# Patient Record
Sex: MALE | Race: WHITE | NOT HISPANIC OR LATINO | Employment: FULL TIME | URBAN - METROPOLITAN AREA
[De-identification: names, ages, dates, MRNs, and addresses within clinical notes are randomized per-mention and may not be internally consistent; named-entity substitution may affect disease eponyms.]

---

## 2017-10-25 ENCOUNTER — OFFICE VISIT (OUTPATIENT)
Dept: INTERNAL MEDICINE | Facility: CLINIC | Age: 52
End: 2017-10-25
Payer: COMMERCIAL

## 2017-10-25 VITALS
HEIGHT: 74 IN | HEART RATE: 78 BPM | BODY MASS INDEX: 24.56 KG/M2 | TEMPERATURE: 97 F | SYSTOLIC BLOOD PRESSURE: 138 MMHG | WEIGHT: 191.38 LBS | DIASTOLIC BLOOD PRESSURE: 88 MMHG

## 2017-10-25 DIAGNOSIS — Z00.00 ANNUAL PHYSICAL EXAM: Primary | ICD-10-CM

## 2017-10-25 PROBLEM — I10 HTN (HYPERTENSION): Status: ACTIVE | Noted: 2017-10-25

## 2017-10-25 PROCEDURE — 99999 PR PBB SHADOW E&M-NEW PATIENT-LVL III: CPT | Mod: PBBFAC,,, | Performed by: FAMILY MEDICINE

## 2017-10-25 PROCEDURE — 99386 PREV VISIT NEW AGE 40-64: CPT | Mod: S$GLB,,, | Performed by: FAMILY MEDICINE

## 2017-10-25 RX ORDER — METOPROLOL SUCCINATE 100 MG/1
100 TABLET, EXTENDED RELEASE ORAL DAILY
Qty: 90 TABLET | Refills: 3 | Status: SHIPPED | OUTPATIENT
Start: 2017-10-25 | End: 2018-10-26 | Stop reason: SDUPTHER

## 2017-10-25 RX ORDER — METOPROLOL SUCCINATE 100 MG/1
100 TABLET, EXTENDED RELEASE ORAL DAILY
COMMUNITY
Start: 2017-03-24 | End: 2017-10-25 | Stop reason: SDUPTHER

## 2017-10-26 NOTE — PROGRESS NOTES
"Subjective:      Patient ID: Rosalio Muñoz is a 51 y.o. male.    Chief Complaint: Establish Care (HTN )    HPI  52 yo male here to establish care and get general exam.  On BP med, has been stable.  Smoke 1.5 packs daily and using chewing tobacco.  Never had Colonoscopy, thinks he will do it next spring.  Denies CP/SOB, no cough/wheezing unless he is sick.  Drinks beer nightly    Past Medical History:   Diagnosis Date    Hypertension      Family History   Problem Relation Age of Onset    Hyperlipidemia Mother     Hypertension Father     Diabetes Father     Kidney disease Father     Prostate cancer Maternal Grandfather     Colon cancer Neg Hx      Past Surgical History:   Procedure Laterality Date    WISDOM TOOTH EXTRACTION       Social History   Substance Use Topics    Smoking status: Current Every Day Smoker     Packs/day: 1.50     Years: 25.00     Types: Cigarettes    Smokeless tobacco: Current User     Types: Chew    Alcohol use 2.4 - 3.6 oz/week     4 - 6 Cans of beer per week       /88   Pulse 78   Temp 97.3 °F (36.3 °C) (Tympanic)   Ht 6' 1.5" (1.867 m)   Wt 86.8 kg (191 lb 5.8 oz)   BMI 24.90 kg/m²     Review of Systems   Constitutional: Negative for activity change, appetite change, chills, diaphoresis, fatigue, fever and unexpected weight change.   HENT: Negative for hearing loss and tinnitus.    Eyes: Negative for visual disturbance.   Respiratory: Negative for cough, chest tightness, shortness of breath and wheezing.    Cardiovascular: Negative for chest pain, palpitations and leg swelling.   Gastrointestinal: Negative for abdominal distention, abdominal pain, constipation and diarrhea.   Genitourinary: Negative for difficulty urinating, dysuria and frequency.   Musculoskeletal: Negative for arthralgias and back pain.   Skin: Negative.    Neurological: Negative for dizziness, weakness and headaches.     Objective:     Physical Exam   Constitutional: He is oriented to person, " place, and time. He appears well-developed and well-nourished. No distress.   HENT:   Right Ear: External ear normal.   Left Ear: External ear normal.   Nose: Nose normal.   Mouth/Throat: Oropharynx is clear and moist.   Eyes: Pupils are equal, round, and reactive to light.   Neck: Normal range of motion. Neck supple.   Cardiovascular: Normal rate, regular rhythm and normal heart sounds.    No murmur heard.  Pulmonary/Chest: Effort normal and breath sounds normal. No respiratory distress. He has no wheezes.   Abdominal: Soft. Bowel sounds are normal. He exhibits no distension. There is no tenderness.   Musculoskeletal: He exhibits no edema.   Neurological: He is alert and oriented to person, place, and time. No cranial nerve deficit.   Skin: Skin is warm and dry. He is not diaphoretic.   Psychiatric: He has a normal mood and affect. His behavior is normal. Judgment and thought content normal.   Nursing note and vitals reviewed.      No results found for: WBC, HGB, HCT, PLT, CHOL, TRIG, HDL, LDLDIRECT, ALT, AST, NA, K, CL, CREATININE, BUN, CO2, TSH, PSA, INR, GLUF, HGBA1C, MICROALBUR    Assessment:     1. Annual physical exam       Plan:   Annual physical exam  -     CBC auto differential; Future; Expected date: 11/03/2017  -     Comprehensive metabolic panel; Future; Expected date: 11/03/2017  -     TSH; Future; Expected date: 11/03/2017  -     Lipid panel; Future; Expected date: 11/03/2017  -     PSA, Screening; Future; Expected date: 11/03/2017  -     Hemoglobin A1c; Future; Expected date: 11/03/2017    Other orders  -     metoprolol succinate (TOPROL-XL) 100 MG 24 hr tablet; Take 1 tablet (100 mg total) by mouth once daily.  Dispense: 90 tablet; Refill: 3    Schedule screening labs  BP stable, cont current med  Smoking cessation advised  Pt wishes to wait on Cscope, next Spring possibly  Healthy low sodium diet and daily exercise recommended  If lipids high, will likely need statin given risk factors  Recommend  starting 81mg of ASA  Return in about 1 year (around 10/25/2018), or if symptoms worsen or fail to improve.

## 2017-11-03 ENCOUNTER — LAB VISIT (OUTPATIENT)
Dept: LAB | Facility: HOSPITAL | Age: 52
End: 2017-11-03
Attending: FAMILY MEDICINE
Payer: COMMERCIAL

## 2017-11-03 DIAGNOSIS — Z00.00 ANNUAL PHYSICAL EXAM: ICD-10-CM

## 2017-11-03 LAB
ALBUMIN SERPL BCP-MCNC: 3.8 G/DL
ALP SERPL-CCNC: 65 U/L
ALT SERPL W/O P-5'-P-CCNC: 19 U/L
ANION GAP SERPL CALC-SCNC: 9 MMOL/L
AST SERPL-CCNC: 22 U/L
BASOPHILS # BLD AUTO: 0.06 K/UL
BASOPHILS NFR BLD: 1 %
BILIRUB SERPL-MCNC: 1 MG/DL
BUN SERPL-MCNC: 7 MG/DL
CALCIUM SERPL-MCNC: 9.6 MG/DL
CHLORIDE SERPL-SCNC: 101 MMOL/L
CHOLEST SERPL-MCNC: 208 MG/DL
CHOLEST/HDLC SERPL: 3.2 {RATIO}
CO2 SERPL-SCNC: 26 MMOL/L
COMPLEXED PSA SERPL-MCNC: 0.25 NG/ML
CREAT SERPL-MCNC: 0.8 MG/DL
DIFFERENTIAL METHOD: ABNORMAL
EOSINOPHIL # BLD AUTO: 0.1 K/UL
EOSINOPHIL NFR BLD: 1.8 %
ERYTHROCYTE [DISTWIDTH] IN BLOOD BY AUTOMATED COUNT: 13.4 %
EST. GFR  (AFRICAN AMERICAN): >60 ML/MIN/1.73 M^2
EST. GFR  (NON AFRICAN AMERICAN): >60 ML/MIN/1.73 M^2
ESTIMATED AVG GLUCOSE: 100 MG/DL
GLUCOSE SERPL-MCNC: 79 MG/DL
HBA1C MFR BLD HPLC: 5.1 %
HCT VFR BLD AUTO: 48.8 %
HDLC SERPL-MCNC: 66 MG/DL
HDLC SERPL: 31.7 %
HGB BLD-MCNC: 16.4 G/DL
IMM GRANULOCYTES # BLD AUTO: 0.01 K/UL
IMM GRANULOCYTES NFR BLD AUTO: 0.2 %
LDLC SERPL CALC-MCNC: 129.2 MG/DL
LYMPHOCYTES # BLD AUTO: 1.7 K/UL
LYMPHOCYTES NFR BLD: 27.5 %
MCH RBC QN AUTO: 33.8 PG
MCHC RBC AUTO-ENTMCNC: 33.6 G/DL
MCV RBC AUTO: 101 FL
MONOCYTES # BLD AUTO: 0.5 K/UL
MONOCYTES NFR BLD: 7.6 %
NEUTROPHILS # BLD AUTO: 3.8 K/UL
NEUTROPHILS NFR BLD: 61.9 %
NONHDLC SERPL-MCNC: 142 MG/DL
NRBC BLD-RTO: 0 /100 WBC
PLATELET # BLD AUTO: 251 K/UL
PMV BLD AUTO: 10.4 FL
POTASSIUM SERPL-SCNC: 5 MMOL/L
PROT SERPL-MCNC: 7.2 G/DL
RBC # BLD AUTO: 4.85 M/UL
SODIUM SERPL-SCNC: 136 MMOL/L
TRIGL SERPL-MCNC: 64 MG/DL
TSH SERPL DL<=0.005 MIU/L-ACNC: 1.85 UIU/ML
WBC # BLD AUTO: 6.07 K/UL

## 2017-11-03 PROCEDURE — 84153 ASSAY OF PSA TOTAL: CPT

## 2017-11-03 PROCEDURE — 83036 HEMOGLOBIN GLYCOSYLATED A1C: CPT

## 2017-11-03 PROCEDURE — 84443 ASSAY THYROID STIM HORMONE: CPT

## 2017-11-03 PROCEDURE — 85025 COMPLETE CBC W/AUTO DIFF WBC: CPT

## 2017-11-03 PROCEDURE — 80053 COMPREHEN METABOLIC PANEL: CPT

## 2017-11-03 PROCEDURE — 80061 LIPID PANEL: CPT

## 2017-11-03 PROCEDURE — 36415 COLL VENOUS BLD VENIPUNCTURE: CPT | Mod: PO

## 2017-12-15 ENCOUNTER — OFFICE VISIT (OUTPATIENT)
Dept: URGENT CARE | Facility: CLINIC | Age: 52
End: 2017-12-15
Payer: COMMERCIAL

## 2017-12-15 ENCOUNTER — HOSPITAL ENCOUNTER (OUTPATIENT)
Dept: RADIOLOGY | Facility: HOSPITAL | Age: 52
Discharge: HOME | End: 2017-12-15
Attending: NURSE PRACTITIONER
Payer: COMMERCIAL

## 2017-12-15 VITALS
HEIGHT: 73 IN | WEIGHT: 183.88 LBS | DIASTOLIC BLOOD PRESSURE: 89 MMHG | TEMPERATURE: 100 F | RESPIRATION RATE: 20 BRPM | HEART RATE: 101 BPM | OXYGEN SATURATION: 97 % | BODY MASS INDEX: 24.37 KG/M2 | SYSTOLIC BLOOD PRESSURE: 153 MMHG

## 2017-12-15 DIAGNOSIS — R68.89 FLU-LIKE SYMPTOMS: ICD-10-CM

## 2017-12-15 DIAGNOSIS — F17.200 TOBACCO USE DISORDER: ICD-10-CM

## 2017-12-15 DIAGNOSIS — J20.8 ACUTE BRONCHITIS DUE TO OTHER SPECIFIED ORGANISMS: ICD-10-CM

## 2017-12-15 DIAGNOSIS — Z20.828 EXPOSURE TO THE FLU: ICD-10-CM

## 2017-12-15 DIAGNOSIS — R05.9 COUGH: ICD-10-CM

## 2017-12-15 DIAGNOSIS — R05.9 COUGH: Primary | ICD-10-CM

## 2017-12-15 PROCEDURE — 71020 XR CHEST PA AND LATERAL: CPT | Mod: 26,,, | Performed by: RADIOLOGY

## 2017-12-15 PROCEDURE — 71020 XR CHEST PA AND LATERAL: CPT | Mod: TC,PO

## 2017-12-15 PROCEDURE — 99214 OFFICE O/P EST MOD 30 MIN: CPT | Mod: 25,S$GLB,, | Performed by: NURSE PRACTITIONER

## 2017-12-15 PROCEDURE — 99999 PR PBB SHADOW E&M-EST. PATIENT-LVL III: CPT | Mod: PBBFAC,,, | Performed by: NURSE PRACTITIONER

## 2017-12-15 PROCEDURE — 94640 AIRWAY INHALATION TREATMENT: CPT | Mod: S$GLB,,, | Performed by: FAMILY MEDICINE

## 2017-12-15 RX ORDER — PROMETHAZINE HYDROCHLORIDE AND DEXTROMETHORPHAN HYDROBROMIDE 6.25; 15 MG/5ML; MG/5ML
5 SYRUP ORAL
Qty: 180 ML | Refills: 0 | Status: SHIPPED | OUTPATIENT
Start: 2017-12-15 | End: 2017-12-25

## 2017-12-15 RX ORDER — ALBUTEROL SULFATE 90 UG/1
2 AEROSOL, METERED RESPIRATORY (INHALATION) EVERY 4 HOURS PRN
Qty: 1 INHALER | Refills: 1 | Status: SHIPPED | OUTPATIENT
Start: 2017-12-15 | End: 2018-11-09

## 2017-12-15 RX ORDER — OSELTAMIVIR PHOSPHATE 75 MG/1
75 CAPSULE ORAL 2 TIMES DAILY
Qty: 10 CAPSULE | Refills: 0 | Status: SHIPPED | OUTPATIENT
Start: 2017-12-15 | End: 2017-12-20

## 2017-12-15 RX ORDER — DOXYCYCLINE 100 MG/1
100 CAPSULE ORAL 2 TIMES DAILY
Qty: 14 CAPSULE | Refills: 0 | Status: SHIPPED | OUTPATIENT
Start: 2017-12-15 | End: 2017-12-22

## 2017-12-15 RX ORDER — LEVALBUTEROL INHALATION SOLUTION 1.25 MG/3ML
1.25 SOLUTION RESPIRATORY (INHALATION)
Status: COMPLETED | OUTPATIENT
Start: 2017-12-15 | End: 2017-12-15

## 2017-12-15 RX ADMIN — LEVALBUTEROL INHALATION SOLUTION 1.25 MG: 1.25 SOLUTION RESPIRATORY (INHALATION) at 10:12

## 2017-12-15 NOTE — PATIENT INSTRUCTIONS
PLAN: CXR,   Advised increased p.o. fluids  Nebulizer with Xopenex 1.25 for 15 and clinic now x 1  Drink plenty of clear fluids--at least 64 ounces of water/juice & rest  Simply saline nasal wash or flonase to irrigate sinuses and for congestion/runny nose.  Cool mist humidifier/vaporizer.  Meds: Doxycycline, prednisone, albuterol inhaler & phenergan dm / no refills  Practice good handwashing..  Mucinex for cough and chest congestion.  Tylenol  for fever, headache and body aches.  Warm salt water gargles for throat comfort.  Chloraseptic spray or lozenges for throat comfort.  Advise follow up with PCP  Advise go to ER if symptoms worsen or fail to improve with treatment.  Given work excuse

## 2017-12-15 NOTE — PROGRESS NOTES
Chief complaint/reason for visit: Nasal congestion, postnasal drip, cough and fever    HISTORY OF PRESENT ILLNESS:   50 y/o male complains of nasal congestion, postnasal drip, headache,  slight shortness of breath, productive cough with wheezing, fever with chills, body aches and back pain with cough onset 1-2 days ago.  Patient admits sone tested positive for influenza.  Patient complains cough worse at night.  Admits tried medication with no relief. Discussed with patient the need for further evaluation with a chest x-ray.  Discussed with patient the need for nebulized treatment due to rhonchi and wheezing.  Patient agrees with plan of therapy.  Patient requesting a work excuse.     Past Medical History:   Diagnosis Date    Hypertension        Past Surgical History:   Procedure Laterality Date    WISDOM TOOTH EXTRACTION              Family History   Problem Relation Age of Onset    Hyperlipidemia Mother     Hypertension Father     Diabetes Father     Kidney disease Father     Prostate cancer Maternal Grandfather     Colon cancer Neg Hx             Social History     Social History    Marital status:      Spouse name: N/A    Number of children: 2    Years of education: N/A     Occupational History          Social History Main Topics    Smoking status: Current Every Day Smoker     Packs/day: 1.50     Years: 25.00     Types: Cigarettes    Smokeless tobacco: Current User     Types: Chew    Alcohol use 2.4 - 3.6 oz/week     4 - 6 Cans of beer per week    Drug use: No    Sexual activity: Yes     Partners: Female     Other Topics Concern    Not on file     Social History Narrative    No narrative on file       ROS:  GENERAL: Reports fever with chills, body aches and fatigue.   SKIN: No rashes, itching or changes in color or texture of skin.  HEENT: Reports nasal congestion, postnasal drip, headache, hoarseness.   NODES: No masses or lesions. Denies swollen glands.  CHEST:  Reports productive cough. & wheezing  CARDIOVASCULAR: Denies chest pain, shortness of breath  ABDOMEN: Appetite fair, No weight loss.  MUSCULOSKELETAL: reports back pain.  NEUROLOGIC: No history of seizures, paralysis, alteration of gait or coordination.  PSYCHIATRIC: [place] mood swings, depression.    PE:   APPEARANCE: Well nourished, well developed, in moderate distress  V/S: Reviewed.  SKIN: Normal skin turgor, no lesions.  HEENT: Turbinates red, Minimal red pharynx, TMs poor light reflex bilateral.  CHEST:  minimal expiratory wheezing with rhonchi on auscultation.  CARDIOVASCULAR: Regular rate and rhythm.   ABDOMEN:  Soft. No tenderness or masses. No CVA tenderness.  MUSCULOSKELETAL: HUMPHRIES without difficulty  NEUROLOGIC: No sensory deficits. Gait & Posture: normal, No cerebellar signs.  MENTAL STATUS: Patient alert, oriented x 3 & conversant.    PLAN: CXR,   Advised increased p.o. fluids  Nebulizer with Xopenex 1.25 for 15 and clinic now x 1  Drink plenty of clear fluids--at least 64 ounces of water/juice & rest  Simply saline nasal wash or flonase to irrigate sinuses and for congestion/runny nose.  Cool mist humidifier/vaporizer.  Meds: Doxycycline, prednisone, albuterol inhaler & phenergan dm / no refills  Practice good handwashing..  Mucinex for cough and chest congestion.  Tylenol  for fever, headache and body aches.  Warm salt water gargles for throat comfort.  Chloraseptic spray or lozenges for throat comfort.  Advise follow up with PCP  Advise go to ER if symptoms worsen or fail to improve with treatment.  Given work excuse      DIAGNOSIS:  Fever   Fatigue  Hypertension  Flu exposure   Flulike symptoms   Bronchitis vs Pneumonia

## 2017-12-19 ENCOUNTER — OFFICE VISIT (OUTPATIENT)
Dept: INTERNAL MEDICINE | Facility: CLINIC | Age: 52
End: 2017-12-19
Payer: COMMERCIAL

## 2017-12-19 ENCOUNTER — PATIENT MESSAGE (OUTPATIENT)
Dept: INTERNAL MEDICINE | Facility: CLINIC | Age: 52
End: 2017-12-19

## 2017-12-19 VITALS
HEART RATE: 88 BPM | HEIGHT: 73 IN | SYSTOLIC BLOOD PRESSURE: 120 MMHG | WEIGHT: 185.44 LBS | BODY MASS INDEX: 24.58 KG/M2 | TEMPERATURE: 99 F | DIASTOLIC BLOOD PRESSURE: 80 MMHG

## 2017-12-19 DIAGNOSIS — Z72.0 TOBACCO ABUSE: ICD-10-CM

## 2017-12-19 PROCEDURE — 99999 PR PBB SHADOW E&M-EST. PATIENT-LVL III: CPT | Mod: PBBFAC,,, | Performed by: PHYSICIAN ASSISTANT

## 2017-12-19 NOTE — PROGRESS NOTES
"Subjective:       Patient ID: Rosalio Muñoz is a 51 y.o. male.    Chief Complaint: Follow-up    Patient comes in for follow up flu and bronchitis   Reports improvement   He is a 51-year-old male patient of Dr. Lawton.  He is a current smoker.  He was given both Tamiflu and doxycycline.  He had wheezing on exam.  Wheezing has improved today as compared to urgent care's note.  He feels that possibly after 1 more day of rest he is able to go back to work without restrictions.  He is an  and does labor and activity throughout his job everyday     No fever, shortness of breath or cp   Still has dry cough         Past Medical History:   Diagnosis Date    Hypertension        Current Outpatient Prescriptions   Medication Sig Dispense Refill    albuterol 90 mcg/actuation inhaler Inhale 2 puffs into the lungs every 4 (four) hours as needed for Wheezing. 1 Inhaler 1    doxycycline (MONODOX) 100 MG capsule Take 1 capsule (100 mg total) by mouth 2 (two) times daily. 14 capsule 0    metoprolol succinate (TOPROL-XL) 100 MG 24 hr tablet Take 1 tablet (100 mg total) by mouth once daily. 90 tablet 3    oseltamivir (TAMIFLU) 75 MG capsule Take 1 capsule (75 mg total) by mouth 2 (two) times daily. 10 capsule 0    promethazine-dextromethorphan (PROMETHAZINE-DM) 6.25-15 mg/5 mL Syrp Take 5 mLs by mouth every 6 to 8 hours as needed. 180 mL 0     No current facility-administered medications for this visit.        Review of Systems   Constitutional: Negative for fever and unexpected weight change.   Genitourinary: Negative for difficulty urinating.   Neurological: Negative for dizziness and facial asymmetry.   Hematological: Negative for adenopathy. Does not bruise/bleed easily.     see HPI   Objective:   /80   Pulse 88   Temp 98.6 °F (37 °C) (Tympanic)   Ht 6' 1" (1.854 m)   Wt 84.1 kg (185 lb 6.5 oz)   BMI 24.46 kg/m²      Physical Exam   Constitutional: He is oriented to person, place, and time. He " appears well-developed and well-nourished. No distress.   HENT:   Head: Normocephalic and atraumatic.   Right Ear: Hearing, tympanic membrane, external ear and ear canal normal.   Left Ear: Hearing, tympanic membrane, external ear and ear canal normal.   Nose: Nose normal.   Mouth/Throat: Oropharynx is clear and moist.   Eyes: Conjunctivae and EOM are normal. Pupils are equal, round, and reactive to light.   Neck: Normal range of motion. No thyromegaly present.   Cardiovascular: Normal rate, regular rhythm, normal heart sounds and intact distal pulses.    Pulmonary/Chest: Effort normal. He has wheezes (scant ).   Lymphadenopathy:     He has no cervical adenopathy.   Neurological: He is alert and oriented to person, place, and time.         Lab Results   Component Value Date    WBC 6.07 11/03/2017    HGB 16.4 11/03/2017    HCT 48.8 11/03/2017     11/03/2017    CHOL 208 (H) 11/03/2017    TRIG 64 11/03/2017    HDL 66 11/03/2017    ALT 19 11/03/2017    AST 22 11/03/2017     11/03/2017    K 5.0 11/03/2017     11/03/2017    CREATININE 0.8 11/03/2017    BUN 7 11/03/2017    CO2 26 11/03/2017    TSH 1.855 11/03/2017    PSA 0.25 11/03/2017    HGBA1C 5.1 11/03/2017       Assessment:       1. Influenza, bronchopneumonia    2. Tobacco abuse        Plan:   Influenza, bronchopneumonia  Treated with both antibiotic and Tamiflu.  Return to work Thursday.  Excuse given.  Tobacco abuse    Discussed to stop smoking.  This can infections and of course cause issues like cancers heart disease, stroke, aneurysms.

## 2018-10-09 ENCOUNTER — TELEPHONE (OUTPATIENT)
Dept: INTERNAL MEDICINE | Facility: CLINIC | Age: 53
End: 2018-10-09

## 2018-10-09 DIAGNOSIS — Z00.00 ROUTINE ADULT HEALTH MAINTENANCE: Primary | ICD-10-CM

## 2018-10-19 ENCOUNTER — LAB VISIT (OUTPATIENT)
Dept: LAB | Facility: HOSPITAL | Age: 53
End: 2018-10-19
Attending: FAMILY MEDICINE
Payer: COMMERCIAL

## 2018-10-19 DIAGNOSIS — Z00.00 ROUTINE ADULT HEALTH MAINTENANCE: ICD-10-CM

## 2018-10-19 LAB
ALBUMIN SERPL BCP-MCNC: 4.1 G/DL
ALP SERPL-CCNC: 74 U/L
ALT SERPL W/O P-5'-P-CCNC: 18 U/L
ANION GAP SERPL CALC-SCNC: 7 MMOL/L
AST SERPL-CCNC: 22 U/L
BASOPHILS # BLD AUTO: 0.06 K/UL
BASOPHILS NFR BLD: 0.8 %
BILIRUB SERPL-MCNC: 1 MG/DL
BUN SERPL-MCNC: 6 MG/DL
CALCIUM SERPL-MCNC: 9.7 MG/DL
CHLORIDE SERPL-SCNC: 102 MMOL/L
CHOLEST SERPL-MCNC: 196 MG/DL
CHOLEST/HDLC SERPL: 2.8 {RATIO}
CO2 SERPL-SCNC: 27 MMOL/L
COMPLEXED PSA SERPL-MCNC: 0.43 NG/ML
CREAT SERPL-MCNC: 0.9 MG/DL
DIFFERENTIAL METHOD: ABNORMAL
EOSINOPHIL # BLD AUTO: 0.1 K/UL
EOSINOPHIL NFR BLD: 1.1 %
ERYTHROCYTE [DISTWIDTH] IN BLOOD BY AUTOMATED COUNT: 13.6 %
EST. GFR  (AFRICAN AMERICAN): >60 ML/MIN/1.73 M^2
EST. GFR  (NON AFRICAN AMERICAN): >60 ML/MIN/1.73 M^2
ESTIMATED AVG GLUCOSE: 97 MG/DL
GLUCOSE SERPL-MCNC: 92 MG/DL
HBA1C MFR BLD HPLC: 5 %
HCT VFR BLD AUTO: 51.9 %
HDLC SERPL-MCNC: 70 MG/DL
HDLC SERPL: 35.7 %
HGB BLD-MCNC: 17.3 G/DL
IMM GRANULOCYTES # BLD AUTO: 0.03 K/UL
IMM GRANULOCYTES NFR BLD AUTO: 0.4 %
LDLC SERPL CALC-MCNC: 109.2 MG/DL
LYMPHOCYTES # BLD AUTO: 1.7 K/UL
LYMPHOCYTES NFR BLD: 23.7 %
MCH RBC QN AUTO: 34.2 PG
MCHC RBC AUTO-ENTMCNC: 33.3 G/DL
MCV RBC AUTO: 103 FL
MONOCYTES # BLD AUTO: 0.5 K/UL
MONOCYTES NFR BLD: 7.1 %
NEUTROPHILS # BLD AUTO: 4.8 K/UL
NEUTROPHILS NFR BLD: 66.9 %
NONHDLC SERPL-MCNC: 126 MG/DL
NRBC BLD-RTO: 0 /100 WBC
PLATELET # BLD AUTO: 234 K/UL
PMV BLD AUTO: 10.4 FL
POTASSIUM SERPL-SCNC: 5.3 MMOL/L
PROT SERPL-MCNC: 7.4 G/DL
RBC # BLD AUTO: 5.06 M/UL
SODIUM SERPL-SCNC: 136 MMOL/L
TRIGL SERPL-MCNC: 84 MG/DL
TSH SERPL DL<=0.005 MIU/L-ACNC: 1.63 UIU/ML
VIT B12 SERPL-MCNC: 445 PG/ML
WBC # BLD AUTO: 7.16 K/UL

## 2018-10-19 PROCEDURE — 84153 ASSAY OF PSA TOTAL: CPT

## 2018-10-19 PROCEDURE — 80053 COMPREHEN METABOLIC PANEL: CPT

## 2018-10-19 PROCEDURE — 82607 VITAMIN B-12: CPT

## 2018-10-19 PROCEDURE — 85025 COMPLETE CBC W/AUTO DIFF WBC: CPT

## 2018-10-19 PROCEDURE — 80061 LIPID PANEL: CPT

## 2018-10-19 PROCEDURE — 36415 COLL VENOUS BLD VENIPUNCTURE: CPT | Mod: PO

## 2018-10-19 PROCEDURE — 84443 ASSAY THYROID STIM HORMONE: CPT

## 2018-10-19 PROCEDURE — 83036 HEMOGLOBIN GLYCOSYLATED A1C: CPT

## 2018-10-26 RX ORDER — METOPROLOL SUCCINATE 100 MG/1
100 TABLET, EXTENDED RELEASE ORAL DAILY
Qty: 30 TABLET | Refills: 0 | Status: SHIPPED | OUTPATIENT
Start: 2018-10-26 | End: 2018-11-09 | Stop reason: SDUPTHER

## 2018-11-09 ENCOUNTER — OFFICE VISIT (OUTPATIENT)
Dept: INTERNAL MEDICINE | Facility: CLINIC | Age: 53
End: 2018-11-09
Payer: COMMERCIAL

## 2018-11-09 VITALS
WEIGHT: 194.44 LBS | HEART RATE: 80 BPM | SYSTOLIC BLOOD PRESSURE: 138 MMHG | TEMPERATURE: 97 F | BODY MASS INDEX: 25.77 KG/M2 | DIASTOLIC BLOOD PRESSURE: 86 MMHG | HEIGHT: 73 IN

## 2018-11-09 DIAGNOSIS — Z00.00 ANNUAL PHYSICAL EXAM: Primary | ICD-10-CM

## 2018-11-09 DIAGNOSIS — E78.5 DYSLIPIDEMIA: ICD-10-CM

## 2018-11-09 PROCEDURE — 90686 IIV4 VACC NO PRSV 0.5 ML IM: CPT | Mod: S$GLB,,, | Performed by: FAMILY MEDICINE

## 2018-11-09 PROCEDURE — 99999 PR PBB SHADOW E&M-EST. PATIENT-LVL III: CPT | Mod: PBBFAC,,, | Performed by: FAMILY MEDICINE

## 2018-11-09 PROCEDURE — 99396 PREV VISIT EST AGE 40-64: CPT | Mod: 25,S$GLB,, | Performed by: FAMILY MEDICINE

## 2018-11-09 PROCEDURE — 90471 IMMUNIZATION ADMIN: CPT | Mod: S$GLB,,, | Performed by: FAMILY MEDICINE

## 2018-11-09 RX ORDER — METOPROLOL SUCCINATE 100 MG/1
100 TABLET, EXTENDED RELEASE ORAL DAILY
Qty: 90 TABLET | Refills: 3 | Status: SHIPPED | OUTPATIENT
Start: 2018-11-09 | End: 2019-10-10 | Stop reason: SDUPTHER

## 2018-11-09 RX ORDER — ATORVASTATIN CALCIUM 20 MG/1
20 TABLET, FILM COATED ORAL DAILY
Qty: 90 TABLET | Refills: 3 | Status: SHIPPED | OUTPATIENT
Start: 2018-11-09 | End: 2019-10-10 | Stop reason: SDUPTHER

## 2018-11-09 NOTE — PROGRESS NOTES
"Subjective:      Patient ID: Rosalio Muñoz is a 52 y.o. male.    Chief Complaint: Annual Exam    52 year old male here for annual  Smoking 1 pack a day  Drinking 3 cups coffee a day  Doing well on Metoprolol 100mg. No side effects  Getting 10,000 steps a day  Bowels moving well  CV risk today is 20%, would benefit from low dose statin and baby asa to reduce risk factors  Drinking beer at night        Past Medical History:   Diagnosis Date    Hypertension      Family History   Problem Relation Age of Onset    Hyperlipidemia Mother     Hypertension Father     Diabetes Father     Kidney disease Father     Heart disease Father     Prostate cancer Maternal Grandfather     Colon cancer Neg Hx      Past Surgical History:   Procedure Laterality Date    WISDOM TOOTH EXTRACTION       Social History     Tobacco Use    Smoking status: Current Every Day Smoker     Packs/day: 1.50     Years: 25.00     Pack years: 37.50     Types: Cigarettes    Smokeless tobacco: Current User     Types: Chew   Substance Use Topics    Alcohol use: Yes     Alcohol/week: 2.4 - 3.6 oz     Types: 4 - 6 Cans of beer per week    Drug use: No       BP (!) 148/90 (BP Location: Right arm, Patient Position: Sitting, BP Method: Large (Manual))   Pulse 80   Temp 97.2 °F (36.2 °C) (Tympanic)   Ht 6' 1" (1.854 m)   Wt 88.2 kg (194 lb 7.1 oz)   BMI 25.65 kg/m²     Review of Systems   Constitutional: Negative for activity change, appetite change, chills, diaphoresis, fatigue, fever and unexpected weight change.   HENT: Negative for hearing loss and tinnitus.    Eyes: Negative for visual disturbance.   Respiratory: Negative for cough, chest tightness, shortness of breath and wheezing.    Cardiovascular: Negative for chest pain, palpitations and leg swelling.   Gastrointestinal: Negative for abdominal distention, abdominal pain, blood in stool and rectal pain.   Genitourinary: Negative for difficulty urinating, discharge and testicular pain. "   Musculoskeletal: Negative for arthralgias and back pain.   Neurological: Negative for dizziness, weakness and headaches.       Objective:     Physical Exam   Constitutional: He is oriented to person, place, and time. He appears well-developed and well-nourished. No distress.   HENT:   Right Ear: External ear normal.   Left Ear: External ear normal.   Nose: Nose normal.   Mouth/Throat: Oropharynx is clear and moist.   Eyes: Conjunctivae are normal. Pupils are equal, round, and reactive to light.   Neck: Normal range of motion. Neck supple. No thyromegaly present.   Cardiovascular: Normal rate, regular rhythm and normal heart sounds.   No murmur heard.  Pulmonary/Chest: Effort normal and breath sounds normal. No respiratory distress. He has no wheezes.   Abdominal: Soft. Bowel sounds are normal. He exhibits no distension. There is no tenderness. There is no guarding.   Musculoskeletal: He exhibits no edema.   Lymphadenopathy:     He has no cervical adenopathy.   Neurological: He is alert and oriented to person, place, and time. No cranial nerve deficit.   Skin: Skin is warm and dry. No rash noted. He is not diaphoretic.   Psychiatric: He has a normal mood and affect. His behavior is normal. Judgment and thought content normal.   Nursing note and vitals reviewed.      Lab Results   Component Value Date    WBC 7.16 10/19/2018    HGB 17.3 10/19/2018    HCT 51.9 10/19/2018     10/19/2018    CHOL 196 10/19/2018    TRIG 84 10/19/2018    HDL 70 10/19/2018    ALT 18 10/19/2018    AST 22 10/19/2018     10/19/2018    K 5.3 (H) 10/19/2018     10/19/2018    CREATININE 0.9 10/19/2018    BUN 6 10/19/2018    CO2 27 10/19/2018    TSH 1.634 10/19/2018    PSA 0.43 10/19/2018    HGBA1C 5.0 10/19/2018       Assessment:     1. Annual physical exam    2. Dyslipidemia         Plan:     Annual physical exam    Dyslipidemia  -     Comprehensive metabolic panel; Future; Expected date: 02/09/2019  -     Lipid panel; Future;  Expected date: 02/09/2019    Other orders  -     atorvastatin (LIPITOR) 20 MG tablet; Take 1 tablet (20 mg total) by mouth once daily.  Dispense: 90 tablet; Refill: 3  -     metoprolol succinate (TOPROL-XL) 100 MG 24 hr tablet; Take 1 tablet (100 mg total) by mouth once daily.  Dispense: 90 tablet; Refill: 3    needs to quit smoking, will work on decreasing this year  Start low dose lipitor with baby aspirin  Focus on diet and exercise, decrease caffeine   Repeat labs in 3 months  Flu shot today  F/u annually and PRN

## 2019-02-08 ENCOUNTER — LAB VISIT (OUTPATIENT)
Dept: LAB | Facility: HOSPITAL | Age: 54
End: 2019-02-08
Attending: FAMILY MEDICINE
Payer: COMMERCIAL

## 2019-02-08 DIAGNOSIS — E78.5 DYSLIPIDEMIA: ICD-10-CM

## 2019-02-08 LAB
ALBUMIN SERPL BCP-MCNC: 4.3 G/DL
ALP SERPL-CCNC: 71 U/L
ALT SERPL W/O P-5'-P-CCNC: 29 U/L
ANION GAP SERPL CALC-SCNC: 7 MMOL/L
AST SERPL-CCNC: 22 U/L
BILIRUB SERPL-MCNC: 1.2 MG/DL
BUN SERPL-MCNC: 9 MG/DL
CALCIUM SERPL-MCNC: 9.9 MG/DL
CHLORIDE SERPL-SCNC: 102 MMOL/L
CHOLEST SERPL-MCNC: 171 MG/DL
CHOLEST/HDLC SERPL: 2.2 {RATIO}
CO2 SERPL-SCNC: 26 MMOL/L
CREAT SERPL-MCNC: 0.8 MG/DL
EST. GFR  (AFRICAN AMERICAN): >60 ML/MIN/1.73 M^2
EST. GFR  (NON AFRICAN AMERICAN): >60 ML/MIN/1.73 M^2
GLUCOSE SERPL-MCNC: 96 MG/DL
HDLC SERPL-MCNC: 78 MG/DL
HDLC SERPL: 45.6 %
LDLC SERPL CALC-MCNC: 83 MG/DL
NONHDLC SERPL-MCNC: 93 MG/DL
POTASSIUM SERPL-SCNC: 4.3 MMOL/L
PROT SERPL-MCNC: 7.5 G/DL
SODIUM SERPL-SCNC: 135 MMOL/L
TRIGL SERPL-MCNC: 50 MG/DL

## 2019-02-08 PROCEDURE — 36415 COLL VENOUS BLD VENIPUNCTURE: CPT | Mod: PO

## 2019-02-08 PROCEDURE — 80061 LIPID PANEL: CPT

## 2019-02-08 PROCEDURE — 80053 COMPREHEN METABOLIC PANEL: CPT

## 2019-02-15 ENCOUNTER — TELEPHONE (OUTPATIENT)
Dept: INTERNAL MEDICINE | Facility: CLINIC | Age: 54
End: 2019-02-15

## 2019-02-15 NOTE — TELEPHONE ENCOUNTER
----- Message from Mihir Lopez MD sent at 2/13/2019 12:58 PM CST -----  Labs look good, cholesterol is better.  Cont current regimen.

## 2019-05-07 DIAGNOSIS — Z12.11 COLON CANCER SCREENING: ICD-10-CM

## 2019-10-04 ENCOUNTER — PATIENT OUTREACH (OUTPATIENT)
Dept: ADMINISTRATIVE | Facility: HOSPITAL | Age: 54
End: 2019-10-04

## 2019-10-10 ENCOUNTER — LAB VISIT (OUTPATIENT)
Dept: LAB | Facility: HOSPITAL | Age: 54
End: 2019-10-10
Attending: FAMILY MEDICINE
Payer: COMMERCIAL

## 2019-10-10 ENCOUNTER — OFFICE VISIT (OUTPATIENT)
Dept: INTERNAL MEDICINE | Facility: CLINIC | Age: 54
End: 2019-10-10
Payer: COMMERCIAL

## 2019-10-10 VITALS
DIASTOLIC BLOOD PRESSURE: 86 MMHG | BODY MASS INDEX: 24.66 KG/M2 | HEIGHT: 73 IN | HEART RATE: 76 BPM | TEMPERATURE: 98 F | WEIGHT: 186.06 LBS | SYSTOLIC BLOOD PRESSURE: 130 MMHG

## 2019-10-10 DIAGNOSIS — Z12.11 COLON CANCER SCREENING: ICD-10-CM

## 2019-10-10 DIAGNOSIS — Z00.00 ANNUAL PHYSICAL EXAM: Primary | ICD-10-CM

## 2019-10-10 DIAGNOSIS — Z00.00 ANNUAL PHYSICAL EXAM: ICD-10-CM

## 2019-10-10 DIAGNOSIS — Z72.0 TOBACCO ABUSE: ICD-10-CM

## 2019-10-10 LAB
ALBUMIN SERPL BCP-MCNC: 4.6 G/DL (ref 3.5–5.2)
ALP SERPL-CCNC: 71 U/L (ref 55–135)
ALT SERPL W/O P-5'-P-CCNC: 21 U/L (ref 10–44)
ANION GAP SERPL CALC-SCNC: 10 MMOL/L (ref 8–16)
AST SERPL-CCNC: 20 U/L (ref 10–40)
BASOPHILS # BLD AUTO: 0.07 K/UL (ref 0–0.2)
BASOPHILS NFR BLD: 0.8 % (ref 0–1.9)
BILIRUB SERPL-MCNC: 1.4 MG/DL (ref 0.1–1)
BUN SERPL-MCNC: 5 MG/DL (ref 6–20)
CALCIUM SERPL-MCNC: 9.7 MG/DL (ref 8.7–10.5)
CHLORIDE SERPL-SCNC: 98 MMOL/L (ref 95–110)
CHOLEST SERPL-MCNC: 176 MG/DL (ref 120–199)
CHOLEST/HDLC SERPL: 2.4 {RATIO} (ref 2–5)
CO2 SERPL-SCNC: 28 MMOL/L (ref 23–29)
CREAT SERPL-MCNC: 0.8 MG/DL (ref 0.5–1.4)
DIFFERENTIAL METHOD: ABNORMAL
EOSINOPHIL # BLD AUTO: 0.1 K/UL (ref 0–0.5)
EOSINOPHIL NFR BLD: 0.6 % (ref 0–8)
ERYTHROCYTE [DISTWIDTH] IN BLOOD BY AUTOMATED COUNT: 13.4 % (ref 11.5–14.5)
EST. GFR  (AFRICAN AMERICAN): >60 ML/MIN/1.73 M^2
EST. GFR  (NON AFRICAN AMERICAN): >60 ML/MIN/1.73 M^2
ESTIMATED AVG GLUCOSE: 97 MG/DL (ref 68–131)
GLUCOSE SERPL-MCNC: 93 MG/DL (ref 70–110)
HBA1C MFR BLD HPLC: 5 % (ref 4–5.6)
HCT VFR BLD AUTO: 52.1 % (ref 40–54)
HDLC SERPL-MCNC: 74 MG/DL (ref 40–75)
HDLC SERPL: 42 % (ref 20–50)
HGB BLD-MCNC: 17.6 G/DL (ref 14–18)
IMM GRANULOCYTES # BLD AUTO: 0.02 K/UL (ref 0–0.04)
IMM GRANULOCYTES NFR BLD AUTO: 0.2 % (ref 0–0.5)
LDLC SERPL CALC-MCNC: 87.8 MG/DL (ref 63–159)
LYMPHOCYTES # BLD AUTO: 1.8 K/UL (ref 1–4.8)
LYMPHOCYTES NFR BLD: 20.2 % (ref 18–48)
MCH RBC QN AUTO: 34.6 PG (ref 27–31)
MCHC RBC AUTO-ENTMCNC: 33.8 G/DL (ref 32–36)
MCV RBC AUTO: 103 FL (ref 82–98)
MONOCYTES # BLD AUTO: 0.6 K/UL (ref 0.3–1)
MONOCYTES NFR BLD: 6.3 % (ref 4–15)
NEUTROPHILS # BLD AUTO: 6.4 K/UL (ref 1.8–7.7)
NEUTROPHILS NFR BLD: 71.9 % (ref 38–73)
NONHDLC SERPL-MCNC: 102 MG/DL
NRBC BLD-RTO: 0 /100 WBC
PLATELET # BLD AUTO: 276 K/UL (ref 150–350)
PMV BLD AUTO: 9.8 FL (ref 9.2–12.9)
POTASSIUM SERPL-SCNC: 4.2 MMOL/L (ref 3.5–5.1)
PROT SERPL-MCNC: 7.8 G/DL (ref 6–8.4)
RBC # BLD AUTO: 5.08 M/UL (ref 4.6–6.2)
SODIUM SERPL-SCNC: 136 MMOL/L (ref 136–145)
TRIGL SERPL-MCNC: 71 MG/DL (ref 30–150)
WBC # BLD AUTO: 8.87 K/UL (ref 3.9–12.7)

## 2019-10-10 PROCEDURE — 84153 ASSAY OF PSA TOTAL: CPT

## 2019-10-10 PROCEDURE — 80053 COMPREHEN METABOLIC PANEL: CPT

## 2019-10-10 PROCEDURE — 90471 IMMUNIZATION ADMIN: CPT | Mod: S$GLB,,, | Performed by: FAMILY MEDICINE

## 2019-10-10 PROCEDURE — 83036 HEMOGLOBIN GLYCOSYLATED A1C: CPT

## 2019-10-10 PROCEDURE — 85025 COMPLETE CBC W/AUTO DIFF WBC: CPT

## 2019-10-10 PROCEDURE — 80061 LIPID PANEL: CPT

## 2019-10-10 PROCEDURE — 90472 IMMUNIZATION ADMIN EACH ADD: CPT | Mod: S$GLB,,, | Performed by: FAMILY MEDICINE

## 2019-10-10 PROCEDURE — 99396 PREV VISIT EST AGE 40-64: CPT | Mod: 25,S$GLB,, | Performed by: FAMILY MEDICINE

## 2019-10-10 PROCEDURE — 90750 ZOSTER RECOMBINANT VACCINE: ICD-10-PCS | Mod: S$GLB,,, | Performed by: FAMILY MEDICINE

## 2019-10-10 PROCEDURE — 99999 PR PBB SHADOW E&M-EST. PATIENT-LVL III: ICD-10-PCS | Mod: PBBFAC,,, | Performed by: FAMILY MEDICINE

## 2019-10-10 PROCEDURE — 90686 FLU VACCINE (QUAD) GREATER THAN OR EQUAL TO 3YO PRESERVATIVE FREE IM: ICD-10-PCS | Mod: S$GLB,,, | Performed by: FAMILY MEDICINE

## 2019-10-10 PROCEDURE — 99999 PR PBB SHADOW E&M-EST. PATIENT-LVL III: CPT | Mod: PBBFAC,,, | Performed by: FAMILY MEDICINE

## 2019-10-10 PROCEDURE — 99396 PR PREVENTIVE VISIT,EST,40-64: ICD-10-PCS | Mod: 25,S$GLB,, | Performed by: FAMILY MEDICINE

## 2019-10-10 PROCEDURE — 90471 FLU VACCINE (QUAD) GREATER THAN OR EQUAL TO 3YO PRESERVATIVE FREE IM: ICD-10-PCS | Mod: S$GLB,,, | Performed by: FAMILY MEDICINE

## 2019-10-10 PROCEDURE — 90750 HZV VACC RECOMBINANT IM: CPT | Mod: S$GLB,,, | Performed by: FAMILY MEDICINE

## 2019-10-10 PROCEDURE — 90686 IIV4 VACC NO PRSV 0.5 ML IM: CPT | Mod: S$GLB,,, | Performed by: FAMILY MEDICINE

## 2019-10-10 PROCEDURE — 36415 COLL VENOUS BLD VENIPUNCTURE: CPT | Mod: PO

## 2019-10-10 PROCEDURE — 90472 ZOSTER RECOMBINANT VACCINE: ICD-10-PCS | Mod: S$GLB,,, | Performed by: FAMILY MEDICINE

## 2019-10-10 RX ORDER — METOPROLOL SUCCINATE 100 MG/1
100 TABLET, EXTENDED RELEASE ORAL DAILY
Qty: 90 TABLET | Refills: 3 | Status: SHIPPED | OUTPATIENT
Start: 2019-10-10 | End: 2020-11-13 | Stop reason: SDUPTHER

## 2019-10-10 RX ORDER — ATORVASTATIN CALCIUM 20 MG/1
20 TABLET, FILM COATED ORAL DAILY
Qty: 90 TABLET | Refills: 3 | Status: SHIPPED | OUTPATIENT
Start: 2019-10-10 | End: 2020-11-13 | Stop reason: SDUPTHER

## 2019-10-10 NOTE — PROGRESS NOTES
"Subjective:      Patient ID: Rosalio Muñoz is a 53 y.o. male.    Chief Complaint:  Update annual    HPI  54 yo male here for annual visit.  Stable on meds, taking daily.  Still smoking, no desire to quit at this time.  Overall, feeling well  Working a lot, has dropped some weight with increased activity.  Bowels moving normal.  Ready to schedule colonoscopy    Past Medical History:   Diagnosis Date    Hypertension      Family History   Problem Relation Age of Onset    Hyperlipidemia Mother     Hypertension Father     Diabetes Father     Kidney disease Father     Heart disease Father     Prostate cancer Maternal Grandfather     Colon cancer Neg Hx      Past Surgical History:   Procedure Laterality Date    WISDOM TOOTH EXTRACTION       Social History     Tobacco Use    Smoking status: Current Every Day Smoker     Packs/day: 1.50     Years: 25.00     Pack years: 37.50     Types: Cigarettes    Smokeless tobacco: Current User     Types: Chew   Substance Use Topics    Alcohol use: Yes     Alcohol/week: 4.0 - 6.0 standard drinks     Types: 4 - 6 Cans of beer per week    Drug use: No       /86 (BP Location: Right arm, Patient Position: Sitting, BP Method: Large (Manual))   Pulse 76   Temp 98.3 °F (36.8 °C) (Oral)   Ht 6' 1" (1.854 m)   Wt 84.4 kg (186 lb 1.1 oz)   BMI 24.55 kg/m²     Review of Systems   Constitutional: Negative for activity change, appetite change, chills, diaphoresis, fatigue, fever and unexpected weight change.   HENT: Negative for hearing loss and tinnitus.    Eyes: Negative for visual disturbance.   Respiratory: Negative for cough, chest tightness, shortness of breath and wheezing.    Cardiovascular: Negative for chest pain, palpitations and leg swelling.   Gastrointestinal: Negative for abdominal distention, abdominal pain, blood in stool, constipation and diarrhea.   Genitourinary: Negative for difficulty urinating.   Musculoskeletal: Negative for arthralgias and back " pain.   Neurological: Negative for dizziness, weakness and headaches.       Objective:     Physical Exam   Constitutional: He is oriented to person, place, and time. He appears well-developed and well-nourished. No distress.   HENT:   Right Ear: External ear normal.   Left Ear: External ear normal.   Nose: Nose normal.   Mouth/Throat: Oropharynx is clear and moist.   Eyes: Pupils are equal, round, and reactive to light. Conjunctivae are normal.   Neck: Normal range of motion. Neck supple.   Cardiovascular: Normal rate, regular rhythm and normal heart sounds.   Pulmonary/Chest: Effort normal and breath sounds normal. No respiratory distress. He has no wheezes.   Abdominal: Soft. Bowel sounds are normal. He exhibits no distension. There is no tenderness. There is no guarding.   Musculoskeletal: He exhibits no edema.   Neurological: He is alert and oriented to person, place, and time. No cranial nerve deficit.   Skin: Skin is warm and dry. No rash noted. He is not diaphoretic.   Psychiatric: He has a normal mood and affect. His behavior is normal. Judgment and thought content normal.   Nursing note and vitals reviewed.      Lab Results   Component Value Date    WBC 7.16 10/19/2018    HGB 17.3 10/19/2018    HCT 51.9 10/19/2018     10/19/2018    CHOL 171 02/08/2019    TRIG 50 02/08/2019    HDL 78 (H) 02/08/2019    ALT 29 02/08/2019    AST 22 02/08/2019     (L) 02/08/2019    K 4.3 02/08/2019     02/08/2019    CREATININE 0.8 02/08/2019    BUN 9 02/08/2019    CO2 26 02/08/2019    TSH 1.634 10/19/2018    PSA 0.43 10/19/2018    HGBA1C 5.0 10/19/2018       Assessment:     1. Annual physical exam    2. Tobacco abuse    3. Colon cancer screening         Plan:     Annual physical exam  -     CBC auto differential; Future; Expected date: 10/10/2019  -     Comprehensive metabolic panel; Future; Expected date: 10/10/2019  -     Hemoglobin A1c; Future; Expected date: 10/10/2019  -     Lipid panel; Future; Expected  date: 10/10/2019  -     PSA, Screening; Future; Expected date: 10/10/2019    Tobacco abuse    Colon cancer screening  -     Case request GI: COLONOSCOPY    Other orders  -     (In Office Administered) Zoster Recombinant Vaccine  -     (In Office Administered) Zoster Recombinant Vaccine; Future; Expected date: 12/10/2019  -     metoprolol succinate (TOPROL-XL) 100 MG 24 hr tablet; Take 1 tablet (100 mg total) by mouth once daily.  Dispense: 90 tablet; Refill: 3  -     atorvastatin (LIPITOR) 20 MG tablet; Take 1 tablet (20 mg total) by mouth once daily.  Dispense: 90 tablet; Refill: 3  -     Influenza - Quadrivalent (PF)    Update annual labs  BP stable/cont current med  Lipids have been stable/cont statin  Start shingrix #1 today, #2 in 2 mos  Flu shot today  Colonoscopy orders in  Smoking cessation encouraged  F/u annually and PRN

## 2019-10-11 ENCOUNTER — TELEPHONE (OUTPATIENT)
Dept: ENDOSCOPY | Facility: HOSPITAL | Age: 54
End: 2019-10-11

## 2019-10-11 LAB — COMPLEXED PSA SERPL-MCNC: 0.23 NG/ML (ref 0–4)

## 2019-11-26 ENCOUNTER — TELEPHONE (OUTPATIENT)
Dept: ENDOSCOPY | Facility: HOSPITAL | Age: 54
End: 2019-11-26

## 2019-12-13 ENCOUNTER — CLINICAL SUPPORT (OUTPATIENT)
Dept: INTERNAL MEDICINE | Facility: CLINIC | Age: 54
End: 2019-12-13
Payer: COMMERCIAL

## 2019-12-13 DIAGNOSIS — Z23 NEED FOR SHINGLES VACCINE: Primary | ICD-10-CM

## 2019-12-13 PROCEDURE — 90471 IMMUNIZATION ADMIN: CPT | Mod: S$GLB,,, | Performed by: FAMILY MEDICINE

## 2019-12-13 PROCEDURE — 90750 ZOSTER RECOMBINANT VACCINE: ICD-10-PCS | Mod: S$GLB,,, | Performed by: FAMILY MEDICINE

## 2019-12-13 PROCEDURE — 90750 HZV VACC RECOMBINANT IM: CPT | Mod: S$GLB,,, | Performed by: FAMILY MEDICINE

## 2019-12-13 PROCEDURE — 90471 ZOSTER RECOMBINANT VACCINE: ICD-10-PCS | Mod: S$GLB,,, | Performed by: FAMILY MEDICINE

## 2019-12-13 PROCEDURE — 99999 PR PBB SHADOW E&M-EST. PATIENT-LVL I: CPT | Mod: PBBFAC,,,

## 2019-12-13 PROCEDURE — 99999 PR PBB SHADOW E&M-EST. PATIENT-LVL I: ICD-10-PCS | Mod: PBBFAC,,,

## 2019-12-13 NOTE — PROGRESS NOTES
Shingles vaccine administered. Pt tolerated well. Advised pt to remain in lobby 15 minutes after injection. If no adverse/allergic reaction, may leave

## 2020-03-03 ENCOUNTER — PATIENT OUTREACH (OUTPATIENT)
Dept: ADMINISTRATIVE | Facility: HOSPITAL | Age: 55
End: 2020-03-03

## 2020-03-11 ENCOUNTER — TELEPHONE (OUTPATIENT)
Dept: ADMINISTRATIVE | Facility: HOSPITAL | Age: 55
End: 2020-03-11

## 2020-08-10 ENCOUNTER — PATIENT OUTREACH (OUTPATIENT)
Dept: ADMINISTRATIVE | Facility: HOSPITAL | Age: 55
End: 2020-08-10

## 2020-09-14 ENCOUNTER — OFFICE VISIT (OUTPATIENT)
Dept: URGENT CARE | Facility: CLINIC | Age: 55
End: 2020-09-14
Payer: COMMERCIAL

## 2020-09-14 VITALS
SYSTOLIC BLOOD PRESSURE: 174 MMHG | OXYGEN SATURATION: 98 % | HEIGHT: 73 IN | BODY MASS INDEX: 24.18 KG/M2 | WEIGHT: 182.44 LBS | TEMPERATURE: 98 F | DIASTOLIC BLOOD PRESSURE: 89 MMHG | HEART RATE: 80 BPM

## 2020-09-14 DIAGNOSIS — F17.200 TOBACCO USE DISORDER: ICD-10-CM

## 2020-09-14 DIAGNOSIS — G44.89 OTHER HEADACHE SYNDROME: ICD-10-CM

## 2020-09-14 DIAGNOSIS — I10 HYPERTENSION, UNSPECIFIED TYPE: Primary | ICD-10-CM

## 2020-09-14 PROCEDURE — 99999 PR PBB SHADOW E&M-EST. PATIENT-LVL III: ICD-10-PCS | Mod: PBBFAC,,, | Performed by: NURSE PRACTITIONER

## 2020-09-14 PROCEDURE — 3008F PR BODY MASS INDEX (BMI) DOCUMENTED: ICD-10-PCS | Mod: CPTII,S$GLB,, | Performed by: NURSE PRACTITIONER

## 2020-09-14 PROCEDURE — 99999 PR PBB SHADOW E&M-EST. PATIENT-LVL III: CPT | Mod: PBBFAC,,, | Performed by: NURSE PRACTITIONER

## 2020-09-14 PROCEDURE — 99213 PR OFFICE/OUTPT VISIT, EST, LEVL III, 20-29 MIN: ICD-10-PCS | Mod: S$GLB,,, | Performed by: NURSE PRACTITIONER

## 2020-09-14 PROCEDURE — 3079F DIAST BP 80-89 MM HG: CPT | Mod: CPTII,S$GLB,, | Performed by: NURSE PRACTITIONER

## 2020-09-14 PROCEDURE — 3079F PR MOST RECENT DIASTOLIC BLOOD PRESSURE 80-89 MM HG: ICD-10-PCS | Mod: CPTII,S$GLB,, | Performed by: NURSE PRACTITIONER

## 2020-09-14 PROCEDURE — 3077F PR MOST RECENT SYSTOLIC BLOOD PRESSURE >= 140 MM HG: ICD-10-PCS | Mod: CPTII,S$GLB,, | Performed by: NURSE PRACTITIONER

## 2020-09-14 PROCEDURE — 3008F BODY MASS INDEX DOCD: CPT | Mod: CPTII,S$GLB,, | Performed by: NURSE PRACTITIONER

## 2020-09-14 PROCEDURE — 99213 OFFICE O/P EST LOW 20 MIN: CPT | Mod: S$GLB,,, | Performed by: NURSE PRACTITIONER

## 2020-09-14 PROCEDURE — 3077F SYST BP >= 140 MM HG: CPT | Mod: CPTII,S$GLB,, | Performed by: NURSE PRACTITIONER

## 2020-09-14 NOTE — PROGRESS NOTES
"CHIEF COMPLAINT/REASON FOR VISIT:  Migraine headache this morning    HISTORY OF PRESENT ILLNESS:  54-year-old male complains of having a migraine headache this morning.  Patient admits took medication with relief.  Complains of having to miss work, needing a work excuse.  Denies headache, nausea, vomiting, dizziness, blurred vision at present time.  Patient denies chest pain, shortness of breath, congestion, cough, dizziness, weakness, blurred vision, nausea, vomiting, diarrhea, " aching all over", fatigue, loss of appetite & fever.  Discussed smoking cessation/deferred.      Past Medical History:   Diagnosis Date    Hypertension           Social History     Socioeconomic History    Marital status:      Spouse name: Not on file    Number of children: 2    Years of education: Not on file    Highest education level: Not on file   Occupational History    Occupation:    Social Needs    Financial resource strain: Not on file    Food insecurity     Worry: Not on file     Inability: Not on file    Transportation needs     Medical: Not on file     Non-medical: Not on file   Tobacco Use    Smoking status: Current Every Day Smoker     Packs/day: 1.50     Years: 25.00     Pack years: 37.50     Types: Cigarettes    Smokeless tobacco: Current User     Types: Chew   Substance and Sexual Activity    Alcohol use: Yes     Alcohol/week: 4.0 - 6.0 standard drinks     Types: 4 - 6 Cans of beer per week    Drug use: No    Sexual activity: Yes     Partners: Female   Lifestyle    Physical activity     Days per week: Not on file     Minutes per session: Not on file    Stress: Not on file   Relationships    Social connections     Talks on phone: Not on file     Gets together: Not on file     Attends Church service: Not on file     Active member of club or organization: Not on file     Attends meetings of clubs or organizations: Not on file     Relationship status: Not on file   Other Topics " Concern    Not on file   Social History Narrative    Not on file          Family History   Problem Relation Age of Onset    Hyperlipidemia Mother     Hypertension Father     Diabetes Father     Kidney disease Father     Heart disease Father     Prostate cancer Maternal Grandfather     Colon cancer Neg Hx        ROS:  GENERAL: No fever, chills, fatigability or weight loss.  SKIN: No rashes, itching or changes in color or texture of skin.  HEENT:  Headache/ migraine,  Visual acuity fine. No photophobia, ocular pain or diplopia. Denies ear pain, discharge or vertigo. No loss of smell, no epistaxis or postnasal drip. No hoarseness or change in voice.   NODES: No masses or lesions. Denies swollen glands.  CHEST: Denies cyanosis, wheezing, cough and sputum production.  CARDIOVASCULAR: Denies chest pain, PND, orthopnea or reduced exercise tolerance.  ABDOMEN: Appetite fine. No weight loss. Denies diarrhea, abdominal pain  MUSCULOSKELETAL: No joint stiffness or swelling. Denies back pain.  NEUROLOGIC: No history of seizures, paralysis, alteration of gait or coordination.  PSYCHIATRIC: Denies mood swings, depression or suicidal thoughts.    PE:   APPEARANCE: Well nourished, well developed, in no acute distress.  Temp 98, pulse ox 98%, tobacco odor  V/S: Reviewed.  SKIN: Normal skin turgor, no lesions.  HEENT:  turbinates pink, pink pharynx, TMs clear bilateral.  CHEST: Lungs clear to auscultation.  CARDIOVASCULAR: Regular rate and rhythm   MUSCULOSKELETAL: Motor: 5/5 strength major flexors/extensors.  NEUROLOGIC: No sensory deficits. Gait & Posture: Normal gait and fine motion. No cerebellar signs.  MENTAL STATUS: Patient alert, oriented x 3 & conversant.    PLAN:   Advise increase p.o. fluids--water/juice & rest  Practice good handwashing.  Advise use of face mask.  Tylenol or Ibuprofen for fever, headache and body aches.  Advise follow up with PCP in 2- 3 days for recheck  Advise go to ER if nausea, vomiting,  fever, increased pain, or fail to improve with treatment.  AVS provided and reviewed with patient including supportive care, follow up, and red flag symptoms.   Patient verbalizes understanding and agrees with treatment plan. Discharged from Urgent Care in stable condition.  Given work excuse      DIAGNOSIS:  Other Headache  Hypertension  Tobacco use disorder

## 2020-09-14 NOTE — LETTER
September 14, 2020    Rosaliovandana Muñoz  [id number] [facility] [address] [id number]         [facility]  [address]  [place] [address]  Phone: [phone]  Fax: [phone] September 14, 2020     Patient: Rosalio Muñoz   YOB: 1965   Date of Visit: 9/14/2020       To Whom It May Concern:    It is my medical opinion that Rosalio Muñoz may return to full duty immediately with no restrictions on 9/15/2020.    If you have any questions or concerns, please don't hesitate to call.    Sincerely,        Shakila CortesNP

## 2020-09-14 NOTE — PATIENT INSTRUCTIONS
PLAN:   Advise increase p.o. fluids--water/juice & rest  Practice good handwashing.  Advise use of face mask.  Tylenol or Ibuprofen for fever, headache and body aches.  Advise follow up with PCP in 2- 3 days for recheck  Advise go to ER if nausea, vomiting, fever, increased pain, or fail to improve with treatment.  AVS provided and reviewed with patient including supportive care, follow up, and red flag symptoms.   Patient verbalizes understanding and agrees with treatment plan. Discharged from Urgent Care in stable condition.  Given work excuse

## 2020-11-13 ENCOUNTER — OFFICE VISIT (OUTPATIENT)
Dept: INTERNAL MEDICINE | Facility: CLINIC | Age: 55
End: 2020-11-13
Payer: COMMERCIAL

## 2020-11-13 VITALS
WEIGHT: 180.56 LBS | HEART RATE: 78 BPM | BODY MASS INDEX: 23.93 KG/M2 | TEMPERATURE: 98 F | DIASTOLIC BLOOD PRESSURE: 84 MMHG | HEIGHT: 73 IN | SYSTOLIC BLOOD PRESSURE: 156 MMHG

## 2020-11-13 DIAGNOSIS — H91.93 BILATERAL HEARING LOSS, UNSPECIFIED HEARING LOSS TYPE: ICD-10-CM

## 2020-11-13 DIAGNOSIS — Z00.00 ANNUAL PHYSICAL EXAM: Primary | ICD-10-CM

## 2020-11-13 DIAGNOSIS — Z12.11 COLON CANCER SCREENING: ICD-10-CM

## 2020-11-13 PROCEDURE — 90686 IIV4 VACC NO PRSV 0.5 ML IM: CPT | Mod: S$GLB,,, | Performed by: FAMILY MEDICINE

## 2020-11-13 PROCEDURE — 1126F AMNT PAIN NOTED NONE PRSNT: CPT | Mod: S$GLB,,, | Performed by: FAMILY MEDICINE

## 2020-11-13 PROCEDURE — 1126F PR PAIN SEVERITY QUANTIFIED, NO PAIN PRESENT: ICD-10-PCS | Mod: S$GLB,,, | Performed by: FAMILY MEDICINE

## 2020-11-13 PROCEDURE — 99396 PREV VISIT EST AGE 40-64: CPT | Mod: 25,S$GLB,, | Performed by: FAMILY MEDICINE

## 2020-11-13 PROCEDURE — 99396 PR PREVENTIVE VISIT,EST,40-64: ICD-10-PCS | Mod: 25,S$GLB,, | Performed by: FAMILY MEDICINE

## 2020-11-13 PROCEDURE — 3008F BODY MASS INDEX DOCD: CPT | Mod: CPTII,S$GLB,, | Performed by: FAMILY MEDICINE

## 2020-11-13 PROCEDURE — 90471 FLU VACCINE (QUAD) GREATER THAN OR EQUAL TO 3YO PRESERVATIVE FREE IM: ICD-10-PCS | Mod: S$GLB,,, | Performed by: FAMILY MEDICINE

## 2020-11-13 PROCEDURE — 90471 IMMUNIZATION ADMIN: CPT | Mod: S$GLB,,, | Performed by: FAMILY MEDICINE

## 2020-11-13 PROCEDURE — 3008F PR BODY MASS INDEX (BMI) DOCUMENTED: ICD-10-PCS | Mod: CPTII,S$GLB,, | Performed by: FAMILY MEDICINE

## 2020-11-13 PROCEDURE — 99999 PR PBB SHADOW E&M-EST. PATIENT-LVL IV: ICD-10-PCS | Mod: PBBFAC,,, | Performed by: FAMILY MEDICINE

## 2020-11-13 PROCEDURE — 90686 FLU VACCINE (QUAD) GREATER THAN OR EQUAL TO 3YO PRESERVATIVE FREE IM: ICD-10-PCS | Mod: S$GLB,,, | Performed by: FAMILY MEDICINE

## 2020-11-13 PROCEDURE — 99999 PR PBB SHADOW E&M-EST. PATIENT-LVL IV: CPT | Mod: PBBFAC,,, | Performed by: FAMILY MEDICINE

## 2020-11-13 RX ORDER — ATORVASTATIN CALCIUM 20 MG/1
20 TABLET, FILM COATED ORAL DAILY
Qty: 90 TABLET | Refills: 3 | Status: SHIPPED | OUTPATIENT
Start: 2020-11-13 | End: 2022-05-02 | Stop reason: SDUPTHER

## 2020-11-13 RX ORDER — METOPROLOL SUCCINATE 100 MG/1
100 TABLET, EXTENDED RELEASE ORAL DAILY
Qty: 90 TABLET | Refills: 3 | Status: SHIPPED | OUTPATIENT
Start: 2020-11-13 | End: 2021-11-05 | Stop reason: SDUPTHER

## 2020-11-13 RX ORDER — ASPIRIN 81 MG/1
81 TABLET ORAL DAILY
Status: ON HOLD | COMMUNITY
End: 2023-05-31 | Stop reason: SDUPTHER

## 2020-11-13 RX ORDER — OLMESARTAN MEDOXOMIL 20 MG/1
20 TABLET ORAL DAILY
Qty: 90 TABLET | Refills: 3 | Status: SHIPPED | OUTPATIENT
Start: 2020-11-13 | End: 2021-11-05 | Stop reason: SDUPTHER

## 2020-11-13 NOTE — PROGRESS NOTES
"Subjective:      Patient ID: Rosalio Muñoz is a 54 y.o. male.    Chief Complaint: Annual Exam    HPI  53 yo male smoker with HTN, hyperlipidemia here for annual.  Taking meds daily.  BP running high.  Feeling well//no symptoms.  Still smoking//no intent to quit at this time.  Denies CP/SOb  Reports normal BMs  Trouble with hearing//wants to see Audiology for evaluation    Past Medical History:   Diagnosis Date    Hypertension      Family History   Problem Relation Age of Onset    Hyperlipidemia Mother     Hypertension Father     Diabetes Father     Kidney disease Father     Heart disease Father     Prostate cancer Maternal Grandfather     Colon cancer Neg Hx      Past Surgical History:   Procedure Laterality Date    WISDOM TOOTH EXTRACTION       Social History     Tobacco Use    Smoking status: Current Every Day Smoker     Packs/day: 1.50     Years: 25.00     Pack years: 37.50     Types: Cigarettes    Smokeless tobacco: Current User     Types: Chew   Substance Use Topics    Alcohol use: Yes     Alcohol/week: 4.0 - 6.0 standard drinks     Types: 4 - 6 Cans of beer per week    Drug use: No       BP (!) 156/84 (BP Location: Right arm, Patient Position: Sitting, BP Method: Large (Manual))   Pulse 78   Temp 98.4 °F (36.9 °C) (Temporal)   Ht 6' 1" (1.854 m)   Wt 81.9 kg (180 lb 8.9 oz)   BMI 23.82 kg/m²     Review of Systems   Constitutional: Negative for activity change, appetite change, chills, diaphoresis, fatigue, fever and unexpected weight change.   HENT: Positive for hearing loss. Negative for tinnitus.    Eyes: Negative for visual disturbance.   Respiratory: Negative for cough, chest tightness, shortness of breath and wheezing.    Cardiovascular: Negative for chest pain, palpitations and leg swelling.   Gastrointestinal: Negative for abdominal distention, abdominal pain, blood in stool and rectal pain.   Genitourinary: Negative for difficulty urinating, discharge and testicular pain. "   Musculoskeletal: Negative for arthralgias and back pain.   Neurological: Negative for dizziness, weakness and headaches.       Objective:     Physical Exam  Vitals signs and nursing note reviewed.   Constitutional:       General: He is not in acute distress.     Appearance: He is well-developed. He is not diaphoretic.   HENT:      Right Ear: External ear normal.      Left Ear: External ear normal.      Nose: Nose normal.   Eyes:      Conjunctiva/sclera: Conjunctivae normal.      Pupils: Pupils are equal, round, and reactive to light.   Neck:      Musculoskeletal: Normal range of motion and neck supple.      Thyroid: No thyromegaly.   Cardiovascular:      Rate and Rhythm: Normal rate and regular rhythm.      Heart sounds: Normal heart sounds.   Pulmonary:      Effort: Pulmonary effort is normal. No respiratory distress.      Breath sounds: Normal breath sounds. No wheezing.   Abdominal:      General: Bowel sounds are normal. There is no distension.      Palpations: Abdomen is soft.      Tenderness: There is no abdominal tenderness. There is no guarding.   Musculoskeletal: Normal range of motion.   Skin:     General: Skin is warm and dry.      Findings: No rash.   Neurological:      Mental Status: He is alert and oriented to person, place, and time.      Cranial Nerves: No cranial nerve deficit.   Psychiatric:         Mood and Affect: Mood normal.         Behavior: Behavior normal.         Thought Content: Thought content normal.         Judgment: Judgment normal.         Lab Results   Component Value Date    WBC 8.87 10/10/2019    HGB 17.6 10/10/2019    HCT 52.1 10/10/2019     10/10/2019    CHOL 176 10/10/2019    TRIG 71 10/10/2019    HDL 74 10/10/2019    ALT 21 10/10/2019    AST 20 10/10/2019     10/10/2019    K 4.2 10/10/2019    CL 98 10/10/2019    CREATININE 0.8 10/10/2019    BUN 5 (L) 10/10/2019    CO2 28 10/10/2019    TSH 1.634 10/19/2018    PSA 0.23 10/10/2019    HGBA1C 5.0 10/10/2019        Assessment:     1. Annual physical exam    2. Bilateral hearing loss, unspecified hearing loss type    3. Colon cancer screening         Plan:     Annual physical exam  -     CBC Auto Differential; Future; Expected date: 11/13/2020  -     Comprehensive Metabolic Panel; Future; Expected date: 11/13/2020  -     Hemoglobin A1C; Future; Expected date: 11/13/2020  -     Hepatitis C Antibody; Future; Expected date: 11/13/2020  -     HIV 1/2 Ag/Ab (4th Gen); Future; Expected date: 11/13/2020  -     Lipid Panel; Future; Expected date: 11/13/2020  -     TSH; Future; Expected date: 11/13/2020  -     PSA, Screening; Future; Expected date: 11/13/2020    Bilateral hearing loss, unspecified hearing loss type  -     Ambulatory referral/consult to Audiology; Future; Expected date: 11/20/2020    Colon cancer screening  -     Case request GI: COLONOSCOPY    Other orders  -     atorvastatin (LIPITOR) 20 MG tablet; Take 1 tablet (20 mg total) by mouth once daily.  Dispense: 90 tablet; Refill: 3  -     olmesartan (BENICAR) 20 MG tablet; Take 1 tablet (20 mg total) by mouth once daily.  Dispense: 90 tablet; Refill: 3      Add Benicar 20mg to BB  Add 81mg ASA  Cont statin  Update labs  Colonoscopy order in  F/u 4 wks with Viki for BP check

## 2020-11-25 ENCOUNTER — TELEPHONE (OUTPATIENT)
Dept: ENDOSCOPY | Facility: HOSPITAL | Age: 55
End: 2020-11-25

## 2020-11-25 ENCOUNTER — CLINICAL SUPPORT (OUTPATIENT)
Dept: AUDIOLOGY | Facility: CLINIC | Age: 55
End: 2020-11-25
Payer: COMMERCIAL

## 2020-11-25 DIAGNOSIS — H91.93 BILATERAL HEARING LOSS, UNSPECIFIED HEARING LOSS TYPE: ICD-10-CM

## 2020-11-25 DIAGNOSIS — H93.12 TINNITUS, LEFT EAR: ICD-10-CM

## 2020-11-25 DIAGNOSIS — H90.3 SENSORINEURAL HEARING LOSS, BILATERAL: Primary | ICD-10-CM

## 2020-11-25 DIAGNOSIS — Z12.11 COLON CANCER SCREENING: Primary | ICD-10-CM

## 2020-11-25 PROCEDURE — 92567 PR TYMPA2METRY: ICD-10-PCS | Mod: S$GLB,,, | Performed by: AUDIOLOGIST-HEARING AID FITTER

## 2020-11-25 PROCEDURE — 92557 PR COMPREHENSIVE HEARING TEST: ICD-10-PCS | Mod: S$GLB,,, | Performed by: AUDIOLOGIST-HEARING AID FITTER

## 2020-11-25 PROCEDURE — 99999 PR PBB SHADOW E&M-EST. PATIENT-LVL I: ICD-10-PCS | Mod: PBBFAC,,, | Performed by: AUDIOLOGIST-HEARING AID FITTER

## 2020-11-25 PROCEDURE — 92557 COMPREHENSIVE HEARING TEST: CPT | Mod: S$GLB,,, | Performed by: AUDIOLOGIST-HEARING AID FITTER

## 2020-11-25 PROCEDURE — 92567 TYMPANOMETRY: CPT | Mod: S$GLB,,, | Performed by: AUDIOLOGIST-HEARING AID FITTER

## 2020-11-25 PROCEDURE — 99999 PR PBB SHADOW E&M-EST. PATIENT-LVL I: CPT | Mod: PBBFAC,,, | Performed by: AUDIOLOGIST-HEARING AID FITTER

## 2020-11-25 RX ORDER — SODIUM, POTASSIUM,MAG SULFATES 17.5-3.13G
1 SOLUTION, RECONSTITUTED, ORAL ORAL DAILY
Qty: 1 KIT | Refills: 0 | Status: SHIPPED | OUTPATIENT
Start: 2020-11-25 | End: 2020-11-27

## 2020-11-25 NOTE — PROGRESS NOTES
Rosalio Muñoz was seen 11/25/2020 for an audiological evaluation.  Patient complains of bilateral hearing loss which has been present for several years. The patient reports a history of loud noise exposure and family history (father and grandmother) of hearing loss. The patient reports left sided tinnitus which is described as humming. The patient denies dizziness.       Results reveal a moderate sensorineural hearing loss 250-8000 Hz for the right ear, and  moderate sensorineural hearing loss 250-8000 Hz for the left ear.   Speech Reception Thresholds were  50 dBHL for the right ear and 50 dBHL for the left ear.   Word recognition scores were good for the right ear and good for the left ear.   Tympanograms were Type A for the right ear and Type A for the left ear.    Patient was counseled on the above findings.    Recommendations:  1. Annual audiograms to monitor hearing loss  2. Hearing aids, binaural - patient provide [facility] hearing aid packet. He will call when ready to schedule a HA consult.   3. Hearing protection in loud noise.

## 2020-11-25 NOTE — TELEPHONE ENCOUNTER
Location Screening:    If answers yes to any of the following, schedule at [facility] ONLY. If No, OK for either location.    1. Is there a diagnosis of heart failure, severe heart valve disease (aortic stenosis) or mechanical valve? no  a. Is the Left Ventricle Ejection Fraction <30% ? no    2. Does the pt have pulmonary hypertension? no   a. Is pulmonary arterial pressure gradient >50mmHg? no   b. Is there evidence of right ventricular dysfunction? no    3. Does the pt have achalasia? no    4. Any history of negative reaction to anesthesia? no   a. Myasthenia gravis? no   b. Malignant hyperthermia? no   c. Other? no    5. Is procedure for esophageal banding? no      COVID Screening    1. Have you had a fever in the last 7 days or have you used fever reducing medicines for a fever in the last 7 days?  no    2. Are you experiencing shortness of breath, cough, muscle aches, loss of taste or loss of smell?  no    If answered yes to questions 1 and 2, the patient must seek medical attention with their PCP.  Do not schedule their procedure.     3. Are you residing with anyone who has tested positive for Covid?  no    If answered yes to question 3, recommend 14 day self-quarantine from the date of relative's positive test and place special needs note in the depot.  Wait to schedule.     ENDO screening    1. Have you been admitted for cardiac, kidney or pulmonary causes to the hospital in the past 3 months? no   If yes, schedule an appointment with PCP before scheduling endoscopic procedure.     2. Have you had a stent placed in the last 12 months? no   If yes, for a screening visit, cancel and message the ordering provider.  The patient will need a new order when the time is appropriate.     3. Have you had a stroke or heart attack in the past 6 months? no   If yes, cancel and refer patient to ordering provider for clearance, also message ordering provider to inform.     4. Have you had any chest pain in the past 3 months?  "no   If yes, Have you been evaluated by your PCP and/or cardiologist and it was determined to not be heart related? not applicable   If No, Pt needs to be seen by PCP or Cardiologist .  Pt can be scheduled once clearance obtained by either of those providers.     5. Do you take prescription weight loss medications?  no   If yes, must stop for 2 weeks prior to procedure.     6. Have you been diagnosed with diverticulitis within the past 3 months? no   If yes, must have been seen by GI within the last 3 months, if not schedule with GI JORDANA.    If pt has been seen by GI, schedule procedure 8-12 weeks post antibiotic treatment.     7. Are you on Dialysis? no  If yes, schedule procedure for the day AFTER dialysis.  Appt time should be 9am or later, patient arrival time is 2 hours prior.  Nulytely or miralax prep for all patients with kidney disease.     8. Are you diabetic?  no   If yes, schedule morning appt. Advise pt to hold all diabetic meds day of procedure.     9. If pt is older than 80 years of age and HAS NOT been seen by GI or PCP within the last 6 months, needs appt with GI JORDANA.   If pt has been seen by the GI provider or PCP within the past 6  months AND meets criteria, schedule procedure AND send message to the endoscopist.     10. Is patient on a "high risk" medication (blood thinner/antiplatelet agent)?  no   If yes, has cardiac clearance been obtained within the last 60 days? No   If no, a new clearance needs to be obtained.     Final Questions:    1.I have reviewed the last colonoscopy for recommendations regarding next procedure bowel prep. No  2. I have reviewed medications and allergies.  yes  3. I have verified the pharmacy information and appropriate prep sent if needed. yes  4. Prep instructions have been mailed or sent to portal per patient request. yes    03- rodriguez    All schedulers will have ability to reach out to the ordering GI provider to clarify any issues.       "

## 2020-12-08 ENCOUNTER — LAB VISIT (OUTPATIENT)
Dept: LAB | Facility: HOSPITAL | Age: 55
End: 2020-12-08
Attending: FAMILY MEDICINE
Payer: COMMERCIAL

## 2020-12-08 DIAGNOSIS — Z00.00 ANNUAL PHYSICAL EXAM: ICD-10-CM

## 2020-12-08 LAB
BASOPHILS # BLD AUTO: 0.08 K/UL (ref 0–0.2)
BASOPHILS NFR BLD: 1.1 % (ref 0–1.9)
DIFFERENTIAL METHOD: ABNORMAL
EOSINOPHIL # BLD AUTO: 0.1 K/UL (ref 0–0.5)
EOSINOPHIL NFR BLD: 1.2 % (ref 0–8)
ERYTHROCYTE [DISTWIDTH] IN BLOOD BY AUTOMATED COUNT: 12.9 % (ref 11.5–14.5)
HCT VFR BLD AUTO: 50 % (ref 40–54)
HGB BLD-MCNC: 16.8 G/DL (ref 14–18)
IMM GRANULOCYTES # BLD AUTO: 0.02 K/UL (ref 0–0.04)
IMM GRANULOCYTES NFR BLD AUTO: 0.3 % (ref 0–0.5)
LYMPHOCYTES # BLD AUTO: 1.9 K/UL (ref 1–4.8)
LYMPHOCYTES NFR BLD: 26.3 % (ref 18–48)
MCH RBC QN AUTO: 34.6 PG (ref 27–31)
MCHC RBC AUTO-ENTMCNC: 33.6 G/DL (ref 32–36)
MCV RBC AUTO: 103 FL (ref 82–98)
MONOCYTES # BLD AUTO: 0.6 K/UL (ref 0.3–1)
MONOCYTES NFR BLD: 8.9 % (ref 4–15)
NEUTROPHILS # BLD AUTO: 4.5 K/UL (ref 1.8–7.7)
NEUTROPHILS NFR BLD: 62.2 % (ref 38–73)
NRBC BLD-RTO: 0 /100 WBC
PLATELET # BLD AUTO: 305 K/UL (ref 150–350)
PMV BLD AUTO: 10.2 FL (ref 9.2–12.9)
RBC # BLD AUTO: 4.85 M/UL (ref 4.6–6.2)
WBC # BLD AUTO: 7.23 K/UL (ref 3.9–12.7)

## 2020-12-08 PROCEDURE — 85025 COMPLETE CBC W/AUTO DIFF WBC: CPT

## 2020-12-08 PROCEDURE — 84153 ASSAY OF PSA TOTAL: CPT

## 2020-12-08 PROCEDURE — 86803 HEPATITIS C AB TEST: CPT

## 2020-12-08 PROCEDURE — 36415 COLL VENOUS BLD VENIPUNCTURE: CPT | Mod: PO

## 2020-12-08 PROCEDURE — 80053 COMPREHEN METABOLIC PANEL: CPT

## 2020-12-08 PROCEDURE — 80061 LIPID PANEL: CPT

## 2020-12-08 PROCEDURE — 86703 HIV-1/HIV-2 1 RESULT ANTBDY: CPT

## 2020-12-08 PROCEDURE — 84443 ASSAY THYROID STIM HORMONE: CPT

## 2020-12-08 PROCEDURE — 83036 HEMOGLOBIN GLYCOSYLATED A1C: CPT

## 2020-12-09 LAB
ALBUMIN SERPL BCP-MCNC: 4.4 G/DL (ref 3.5–5.2)
ALP SERPL-CCNC: 80 U/L (ref 55–135)
ALT SERPL W/O P-5'-P-CCNC: 17 U/L (ref 10–44)
ANION GAP SERPL CALC-SCNC: 13 MMOL/L (ref 8–16)
AST SERPL-CCNC: 21 U/L (ref 10–40)
BILIRUB SERPL-MCNC: 1.3 MG/DL (ref 0.1–1)
BUN SERPL-MCNC: 6 MG/DL (ref 6–20)
CALCIUM SERPL-MCNC: 9.3 MG/DL (ref 8.7–10.5)
CHLORIDE SERPL-SCNC: 96 MMOL/L (ref 95–110)
CHOLEST SERPL-MCNC: 200 MG/DL (ref 120–199)
CHOLEST/HDLC SERPL: 2.6 {RATIO} (ref 2–5)
CO2 SERPL-SCNC: 23 MMOL/L (ref 23–29)
COMPLEXED PSA SERPL-MCNC: 0.16 NG/ML (ref 0–4)
CREAT SERPL-MCNC: 0.7 MG/DL (ref 0.5–1.4)
EST. GFR  (AFRICAN AMERICAN): >60 ML/MIN/1.73 M^2
EST. GFR  (NON AFRICAN AMERICAN): >60 ML/MIN/1.73 M^2
ESTIMATED AVG GLUCOSE: 94 MG/DL (ref 68–131)
GLUCOSE SERPL-MCNC: 96 MG/DL (ref 70–110)
HBA1C MFR BLD HPLC: 4.9 % (ref 4–5.6)
HCV AB SERPL QL IA: NEGATIVE
HDLC SERPL-MCNC: 77 MG/DL (ref 40–75)
HDLC SERPL: 38.5 % (ref 20–50)
HIV 1+2 AB+HIV1 P24 AG SERPL QL IA: NEGATIVE
LDLC SERPL CALC-MCNC: 108.6 MG/DL (ref 63–159)
NONHDLC SERPL-MCNC: 123 MG/DL
POTASSIUM SERPL-SCNC: 4.6 MMOL/L (ref 3.5–5.1)
PROT SERPL-MCNC: 7.6 G/DL (ref 6–8.4)
SODIUM SERPL-SCNC: 132 MMOL/L (ref 136–145)
TRIGL SERPL-MCNC: 72 MG/DL (ref 30–150)
TSH SERPL DL<=0.005 MIU/L-ACNC: 2.4 UIU/ML (ref 0.4–4)

## 2020-12-11 ENCOUNTER — LAB VISIT (OUTPATIENT)
Dept: LAB | Facility: HOSPITAL | Age: 55
End: 2020-12-11
Payer: COMMERCIAL

## 2020-12-11 ENCOUNTER — OFFICE VISIT (OUTPATIENT)
Dept: INTERNAL MEDICINE | Facility: CLINIC | Age: 55
End: 2020-12-11
Payer: COMMERCIAL

## 2020-12-11 VITALS
RESPIRATION RATE: 18 BRPM | DIASTOLIC BLOOD PRESSURE: 80 MMHG | BODY MASS INDEX: 24.05 KG/M2 | WEIGHT: 181.44 LBS | HEIGHT: 73 IN | SYSTOLIC BLOOD PRESSURE: 126 MMHG | HEART RATE: 86 BPM | TEMPERATURE: 98 F

## 2020-12-11 DIAGNOSIS — E87.1 LOW SODIUM LEVELS: ICD-10-CM

## 2020-12-11 DIAGNOSIS — I10 ESSENTIAL HYPERTENSION: Primary | ICD-10-CM

## 2020-12-11 LAB
ANION GAP SERPL CALC-SCNC: 11 MMOL/L (ref 8–16)
BUN SERPL-MCNC: 6 MG/DL (ref 6–20)
CALCIUM SERPL-MCNC: 9.2 MG/DL (ref 8.7–10.5)
CHLORIDE SERPL-SCNC: 97 MMOL/L (ref 95–110)
CO2 SERPL-SCNC: 26 MMOL/L (ref 23–29)
CREAT SERPL-MCNC: 0.8 MG/DL (ref 0.5–1.4)
EST. GFR  (AFRICAN AMERICAN): >60 ML/MIN/1.73 M^2
EST. GFR  (NON AFRICAN AMERICAN): >60 ML/MIN/1.73 M^2
GLUCOSE SERPL-MCNC: 93 MG/DL (ref 70–110)
POTASSIUM SERPL-SCNC: 5.3 MMOL/L (ref 3.5–5.1)
SODIUM SERPL-SCNC: 134 MMOL/L (ref 136–145)

## 2020-12-11 PROCEDURE — 1126F AMNT PAIN NOTED NONE PRSNT: CPT | Mod: S$GLB,,, | Performed by: NURSE PRACTITIONER

## 2020-12-11 PROCEDURE — 99999 PR PBB SHADOW E&M-EST. PATIENT-LVL III: ICD-10-PCS | Mod: PBBFAC,,, | Performed by: NURSE PRACTITIONER

## 2020-12-11 PROCEDURE — 99999 PR PBB SHADOW E&M-EST. PATIENT-LVL III: CPT | Mod: PBBFAC,,, | Performed by: NURSE PRACTITIONER

## 2020-12-11 PROCEDURE — 3074F SYST BP LT 130 MM HG: CPT | Mod: CPTII,S$GLB,, | Performed by: NURSE PRACTITIONER

## 2020-12-11 PROCEDURE — 3079F PR MOST RECENT DIASTOLIC BLOOD PRESSURE 80-89 MM HG: ICD-10-PCS | Mod: CPTII,S$GLB,, | Performed by: NURSE PRACTITIONER

## 2020-12-11 PROCEDURE — 99213 OFFICE O/P EST LOW 20 MIN: CPT | Mod: S$GLB,,, | Performed by: NURSE PRACTITIONER

## 2020-12-11 PROCEDURE — 1126F PR PAIN SEVERITY QUANTIFIED, NO PAIN PRESENT: ICD-10-PCS | Mod: S$GLB,,, | Performed by: NURSE PRACTITIONER

## 2020-12-11 PROCEDURE — 3008F PR BODY MASS INDEX (BMI) DOCUMENTED: ICD-10-PCS | Mod: CPTII,S$GLB,, | Performed by: NURSE PRACTITIONER

## 2020-12-11 PROCEDURE — 99213 PR OFFICE/OUTPT VISIT, EST, LEVL III, 20-29 MIN: ICD-10-PCS | Mod: S$GLB,,, | Performed by: NURSE PRACTITIONER

## 2020-12-11 PROCEDURE — 80048 BASIC METABOLIC PNL TOTAL CA: CPT

## 2020-12-11 PROCEDURE — 3079F DIAST BP 80-89 MM HG: CPT | Mod: CPTII,S$GLB,, | Performed by: NURSE PRACTITIONER

## 2020-12-11 PROCEDURE — 3008F BODY MASS INDEX DOCD: CPT | Mod: CPTII,S$GLB,, | Performed by: NURSE PRACTITIONER

## 2020-12-11 PROCEDURE — 36415 COLL VENOUS BLD VENIPUNCTURE: CPT | Mod: PO

## 2020-12-11 PROCEDURE — 3074F PR MOST RECENT SYSTOLIC BLOOD PRESSURE < 130 MM HG: ICD-10-PCS | Mod: CPTII,S$GLB,, | Performed by: NURSE PRACTITIONER

## 2020-12-11 NOTE — PROGRESS NOTES
"Subjective:       Patient ID: Rosalio Muñoz is a 54 y.o. male.    Chief Complaint: Hypertension    54 year old male here for bp follow up  Dr. POPE added olmesartan to his metoprolol last visit  bp controlled today  Feeling fine  Sodium was low on last wellness labs. We will be repeating it today. 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     Hypertension  Pertinent negatives include no chest pain, headaches, palpitations or shortness of breath.       /80   Pulse 86   Temp 98.2 °F (36.8 °C) (Temporal)   Resp 18   Ht 6' 1" (1.854 m)   Wt 82.3 kg (181 lb 7 oz)   BMI 23.94 kg/m²     Review of Systems   Constitutional: Negative for activity change, appetite change, chills, diaphoresis, fatigue, fever and unexpected weight change.   HENT: Negative.    Eyes: Negative.    Respiratory: Negative for apnea, chest tightness, shortness of breath and stridor.    Cardiovascular: Negative for chest pain, palpitations and leg swelling.   Gastrointestinal: Negative.    Endocrine: Negative.    Genitourinary: Negative.    Musculoskeletal: Negative for arthralgias and myalgias.   Skin: Negative for color change, pallor, rash and wound.   Allergic/Immunologic: Negative.    Neurological: Negative for dizziness, facial asymmetry, light-headedness and headaches.   Hematological: Negative for adenopathy.   Psychiatric/Behavioral: Negative for agitation and behavioral problems.       Objective:      Physical Exam  Vitals signs and nursing note reviewed.   Constitutional:       General: He is not in acute distress.     Appearance: He is well-developed. He is not diaphoretic.   HENT:      Head: Normocephalic and atraumatic.   Cardiovascular:      Rate and Rhythm: Normal rate and regular rhythm.      Heart sounds: Normal heart sounds.   Pulmonary:      Effort: Pulmonary effort is normal. No respiratory distress.      Breath sounds: Normal breath sounds.   Skin:     General: Skin is warm and dry.      " Findings: No rash.   Neurological:      Mental Status: He is alert and oriented to person, place, and time.   Psychiatric:         Behavior: Behavior normal.         Thought Content: Thought content normal.         Judgment: Judgment normal.         Assessment:       1. Essential hypertension    2. Low sodium levels        Plan:       Rosalio was seen today for hypertension.    Diagnoses and all orders for this visit:    Essential hypertension    Low sodium levels  -     Basic Metabolic Panel; Future    bp controlled, continue current meds  Recheck sodium today

## 2020-12-15 ENCOUNTER — TELEPHONE (OUTPATIENT)
Dept: INTERNAL MEDICINE | Facility: CLINIC | Age: 55
End: 2020-12-15

## 2020-12-15 DIAGNOSIS — E87.1 LOW SODIUM LEVELS: Primary | ICD-10-CM

## 2020-12-15 NOTE — TELEPHONE ENCOUNTER
Detailed message of results and recommendations left on voice mail as requested. Lab appt scheduled

## 2020-12-15 NOTE — TELEPHONE ENCOUNTER
Dr. POPE has reviewed labs. Sodium is a little better. Potassium is a little high. Is he on any potassium supplements or vitamins? If so stop those and try to limit food intake of potassium rich foods. Recheck lab 4 weeks

## 2020-12-15 NOTE — TELEPHONE ENCOUNTER
----- Message from Olga Mak sent at 12/15/2020 10:30 AM CST -----  Contact: Natan  Type:  Patient Returning Call    Who Called:Patient  Who Left Message for Patient:Angelika  Does the patient know what this is regarding?:no  Would the patient rather a call back or a response via [facility]? Call back  Best Call Back Number:[phone]  Additional Information: he stated that you can leave a detailed message as to what you needed to tell him due to the fact he is at work.    Thanks  KT

## 2021-01-29 ENCOUNTER — PATIENT MESSAGE (OUTPATIENT)
Dept: ADMINISTRATIVE | Facility: HOSPITAL | Age: 56
End: 2021-01-29

## 2021-02-25 ENCOUNTER — TELEPHONE (OUTPATIENT)
Dept: ENDOSCOPY | Facility: HOSPITAL | Age: 56
End: 2021-02-25

## 2021-03-02 ENCOUNTER — TELEPHONE (OUTPATIENT)
Dept: ENDOSCOPY | Facility: HOSPITAL | Age: 56
End: 2021-03-02

## 2021-03-22 ENCOUNTER — OFFICE VISIT (OUTPATIENT)
Dept: INTERNAL MEDICINE | Facility: CLINIC | Age: 56
End: 2021-03-22
Payer: COMMERCIAL

## 2021-03-22 VITALS
BODY MASS INDEX: 24.16 KG/M2 | HEART RATE: 78 BPM | SYSTOLIC BLOOD PRESSURE: 110 MMHG | TEMPERATURE: 98 F | WEIGHT: 182.31 LBS | DIASTOLIC BLOOD PRESSURE: 66 MMHG | HEIGHT: 73 IN

## 2021-03-22 DIAGNOSIS — Z28.39: ICD-10-CM

## 2021-03-22 DIAGNOSIS — I10 ESSENTIAL HYPERTENSION: ICD-10-CM

## 2021-03-22 DIAGNOSIS — Z28.9 DELAYED IMMUNIZATIONS: ICD-10-CM

## 2021-03-22 DIAGNOSIS — R30.0 DYSURIA: Primary | ICD-10-CM

## 2021-03-22 DIAGNOSIS — E78.49 OTHER HYPERLIPIDEMIA: ICD-10-CM

## 2021-03-22 LAB
BILIRUB UR QL STRIP: NEGATIVE
CLARITY UR REFRACT.AUTO: CLEAR
COLOR UR AUTO: YELLOW
GLUCOSE UR QL STRIP: NEGATIVE
HGB UR QL STRIP: NEGATIVE
KETONES UR QL STRIP: NEGATIVE
LEUKOCYTE ESTERASE UR QL STRIP: NEGATIVE
NITRITE UR QL STRIP: NEGATIVE
PH UR STRIP: 7 [PH] (ref 5–8)
PROT UR QL STRIP: NEGATIVE
SP GR UR STRIP: 1.01 (ref 1–1.03)
URN SPEC COLLECT METH UR: NORMAL

## 2021-03-22 PROCEDURE — 3078F PR MOST RECENT DIASTOLIC BLOOD PRESSURE < 80 MM HG: ICD-10-PCS | Mod: CPTII,S$GLB,, | Performed by: FAMILY MEDICINE

## 2021-03-22 PROCEDURE — 99214 OFFICE O/P EST MOD 30 MIN: CPT | Mod: 25,S$GLB,, | Performed by: FAMILY MEDICINE

## 2021-03-22 PROCEDURE — 3008F PR BODY MASS INDEX (BMI) DOCUMENTED: ICD-10-PCS | Mod: CPTII,S$GLB,, | Performed by: FAMILY MEDICINE

## 2021-03-22 PROCEDURE — 90732 PNEUMOCOCCAL POLYSACCHARIDE VACCINE 23-VALENT =>2YO SQ IM: ICD-10-PCS | Mod: S$GLB,,, | Performed by: FAMILY MEDICINE

## 2021-03-22 PROCEDURE — 1125F PR PAIN SEVERITY QUANTIFIED, PAIN PRESENT: ICD-10-PCS | Mod: S$GLB,,, | Performed by: FAMILY MEDICINE

## 2021-03-22 PROCEDURE — 81003 URINALYSIS AUTO W/O SCOPE: CPT | Performed by: FAMILY MEDICINE

## 2021-03-22 PROCEDURE — 96372 PR INJECTION,THERAP/PROPH/DIAG2ST, IM OR SUBCUT: ICD-10-PCS | Mod: 59,S$GLB,, | Performed by: FAMILY MEDICINE

## 2021-03-22 PROCEDURE — 3078F DIAST BP <80 MM HG: CPT | Mod: CPTII,S$GLB,, | Performed by: FAMILY MEDICINE

## 2021-03-22 PROCEDURE — 90732 PPSV23 VACC 2 YRS+ SUBQ/IM: CPT | Mod: S$GLB,,, | Performed by: FAMILY MEDICINE

## 2021-03-22 PROCEDURE — 3074F PR MOST RECENT SYSTOLIC BLOOD PRESSURE < 130 MM HG: ICD-10-PCS | Mod: CPTII,S$GLB,, | Performed by: FAMILY MEDICINE

## 2021-03-22 PROCEDURE — 96372 THER/PROPH/DIAG INJ SC/IM: CPT | Mod: 59,S$GLB,, | Performed by: FAMILY MEDICINE

## 2021-03-22 PROCEDURE — 1125F AMNT PAIN NOTED PAIN PRSNT: CPT | Mod: S$GLB,,, | Performed by: FAMILY MEDICINE

## 2021-03-22 PROCEDURE — 99999 PR PBB SHADOW E&M-EST. PATIENT-LVL III: ICD-10-PCS | Mod: PBBFAC,,, | Performed by: FAMILY MEDICINE

## 2021-03-22 PROCEDURE — 90471 PNEUMOCOCCAL POLYSACCHARIDE VACCINE 23-VALENT =>2YO SQ IM: ICD-10-PCS | Mod: S$GLB,,, | Performed by: FAMILY MEDICINE

## 2021-03-22 PROCEDURE — 99214 PR OFFICE/OUTPT VISIT, EST, LEVL IV, 30-39 MIN: ICD-10-PCS | Mod: 25,S$GLB,, | Performed by: FAMILY MEDICINE

## 2021-03-22 PROCEDURE — 90471 IMMUNIZATION ADMIN: CPT | Mod: S$GLB,,, | Performed by: FAMILY MEDICINE

## 2021-03-22 PROCEDURE — 3008F BODY MASS INDEX DOCD: CPT | Mod: CPTII,S$GLB,, | Performed by: FAMILY MEDICINE

## 2021-03-22 PROCEDURE — 99999 PR PBB SHADOW E&M-EST. PATIENT-LVL III: CPT | Mod: PBBFAC,,, | Performed by: FAMILY MEDICINE

## 2021-03-22 PROCEDURE — 3074F SYST BP LT 130 MM HG: CPT | Mod: CPTII,S$GLB,, | Performed by: FAMILY MEDICINE

## 2021-03-22 RX ORDER — CEFTRIAXONE 1 G/1
1 INJECTION, POWDER, FOR SOLUTION INTRAMUSCULAR; INTRAVENOUS
Status: COMPLETED | OUTPATIENT
Start: 2021-03-22 | End: 2021-03-22

## 2021-03-22 RX ORDER — KETOROLAC TROMETHAMINE 30 MG/ML
60 INJECTION, SOLUTION INTRAMUSCULAR; INTRAVENOUS
Status: COMPLETED | OUTPATIENT
Start: 2021-03-22 | End: 2021-03-22

## 2021-03-22 RX ORDER — CIPROFLOXACIN 500 MG/1
500 TABLET ORAL EVERY 12 HOURS
Qty: 10 TABLET | Refills: 0 | Status: CANCELLED | OUTPATIENT
Start: 2021-03-22

## 2021-03-22 RX ADMIN — CEFTRIAXONE 1 G: 1 INJECTION, POWDER, FOR SOLUTION INTRAMUSCULAR; INTRAVENOUS at 09:03

## 2021-03-22 RX ADMIN — KETOROLAC TROMETHAMINE 60 MG: 30 INJECTION, SOLUTION INTRAMUSCULAR; INTRAVENOUS at 09:03

## 2021-03-23 ENCOUNTER — PATIENT MESSAGE (OUTPATIENT)
Dept: INTERNAL MEDICINE | Facility: CLINIC | Age: 56
End: 2021-03-23

## 2021-06-14 ENCOUNTER — CLINICAL SUPPORT (OUTPATIENT)
Dept: AUDIOLOGY | Facility: CLINIC | Age: 56
End: 2021-06-14
Payer: COMMERCIAL

## 2021-06-14 DIAGNOSIS — Z71.89 HEARING AID CONSULTATION: Primary | ICD-10-CM

## 2021-06-14 PROCEDURE — 92557 COMPREHENSIVE HEARING TEST: CPT | Mod: S$GLB,,, | Performed by: AUDIOLOGIST-HEARING AID FITTER

## 2021-06-14 PROCEDURE — 92557 PR COMPREHENSIVE HEARING TEST: ICD-10-PCS | Mod: S$GLB,,, | Performed by: AUDIOLOGIST-HEARING AID FITTER

## 2021-06-23 ENCOUNTER — PATIENT OUTREACH (OUTPATIENT)
Dept: ADMINISTRATIVE | Facility: OTHER | Age: 56
End: 2021-06-23

## 2021-06-25 ENCOUNTER — LAB VISIT (OUTPATIENT)
Dept: LAB | Facility: HOSPITAL | Age: 56
End: 2021-06-25
Attending: PHYSICIAN ASSISTANT
Payer: COMMERCIAL

## 2021-06-25 ENCOUNTER — OFFICE VISIT (OUTPATIENT)
Dept: OTOLARYNGOLOGY | Facility: CLINIC | Age: 56
End: 2021-06-25
Payer: COMMERCIAL

## 2021-06-25 VITALS
BODY MASS INDEX: 23.56 KG/M2 | WEIGHT: 178.56 LBS | TEMPERATURE: 99 F | DIASTOLIC BLOOD PRESSURE: 83 MMHG | SYSTOLIC BLOOD PRESSURE: 134 MMHG | HEART RATE: 82 BPM

## 2021-06-25 DIAGNOSIS — H90.3 SENSORINEURAL HEARING LOSS (SNHL) OF BOTH EARS: ICD-10-CM

## 2021-06-25 DIAGNOSIS — H90.3 ASYMMETRIC SNHL (SENSORINEURAL HEARING LOSS): ICD-10-CM

## 2021-06-25 DIAGNOSIS — H90.3 ASYMMETRIC SNHL (SENSORINEURAL HEARING LOSS): Primary | ICD-10-CM

## 2021-06-25 DIAGNOSIS — H93.13 TINNITUS OF BOTH EARS: ICD-10-CM

## 2021-06-25 LAB
CREAT SERPL-MCNC: 0.8 MG/DL (ref 0.5–1.4)
EST. GFR  (AFRICAN AMERICAN): >60 ML/MIN/1.73 M^2
EST. GFR  (NON AFRICAN AMERICAN): >60 ML/MIN/1.73 M^2

## 2021-06-25 PROCEDURE — 82565 ASSAY OF CREATININE: CPT | Performed by: PHYSICIAN ASSISTANT

## 2021-06-25 PROCEDURE — 3008F BODY MASS INDEX DOCD: CPT | Mod: CPTII,S$GLB,, | Performed by: PHYSICIAN ASSISTANT

## 2021-06-25 PROCEDURE — 99999 PR PBB SHADOW E&M-EST. PATIENT-LVL III: ICD-10-PCS | Mod: PBBFAC,,, | Performed by: PHYSICIAN ASSISTANT

## 2021-06-25 PROCEDURE — 99204 OFFICE O/P NEW MOD 45 MIN: CPT | Mod: S$GLB,,, | Performed by: PHYSICIAN ASSISTANT

## 2021-06-25 PROCEDURE — 99999 PR PBB SHADOW E&M-EST. PATIENT-LVL III: CPT | Mod: PBBFAC,,, | Performed by: PHYSICIAN ASSISTANT

## 2021-06-25 PROCEDURE — 99204 PR OFFICE/OUTPT VISIT, NEW, LEVL IV, 45-59 MIN: ICD-10-PCS | Mod: S$GLB,,, | Performed by: PHYSICIAN ASSISTANT

## 2021-06-25 PROCEDURE — 36415 COLL VENOUS BLD VENIPUNCTURE: CPT | Performed by: PHYSICIAN ASSISTANT

## 2021-06-25 PROCEDURE — 3008F PR BODY MASS INDEX (BMI) DOCUMENTED: ICD-10-PCS | Mod: CPTII,S$GLB,, | Performed by: PHYSICIAN ASSISTANT

## 2021-06-29 ENCOUNTER — CLINICAL SUPPORT (OUTPATIENT)
Dept: AUDIOLOGY | Facility: CLINIC | Age: 56
End: 2021-06-29
Payer: COMMERCIAL

## 2021-06-29 DIAGNOSIS — Z46.1 ENCOUNTER FOR HEARING AID FITTING OF BOTH EARS: Primary | ICD-10-CM

## 2021-07-07 ENCOUNTER — HOSPITAL ENCOUNTER (OUTPATIENT)
Dept: RADIOLOGY | Facility: HOSPITAL | Age: 56
Discharge: HOME | End: 2021-07-07
Attending: PHYSICIAN ASSISTANT
Payer: COMMERCIAL

## 2021-07-07 DIAGNOSIS — H90.3 ASYMMETRIC SNHL (SENSORINEURAL HEARING LOSS): ICD-10-CM

## 2021-07-07 DIAGNOSIS — H90.3 SENSORINEURAL HEARING LOSS (SNHL) OF BOTH EARS: ICD-10-CM

## 2021-07-07 PROCEDURE — A9585 GADOBUTROL INJECTION: HCPCS | Performed by: PHYSICIAN ASSISTANT

## 2021-07-07 PROCEDURE — 70553 MRI BRAIN STEM W/O & W/DYE: CPT | Mod: TC

## 2021-07-07 PROCEDURE — 25500020 PHARM REV CODE 255: Performed by: PHYSICIAN ASSISTANT

## 2021-07-07 RX ORDER — GADOBUTROL 604.72 MG/ML
10 INJECTION INTRAVENOUS
Status: COMPLETED | OUTPATIENT
Start: 2021-07-07 | End: 2021-07-07

## 2021-07-07 RX ADMIN — GADOBUTROL 8 ML: 604.72 INJECTION INTRAVENOUS at 09:07

## 2021-07-08 ENCOUNTER — PATIENT MESSAGE (OUTPATIENT)
Dept: OTOLARYNGOLOGY | Facility: CLINIC | Age: 56
End: 2021-07-08

## 2021-07-09 ENCOUNTER — CLINICAL SUPPORT (OUTPATIENT)
Dept: AUDIOLOGY | Facility: CLINIC | Age: 56
End: 2021-07-09
Payer: COMMERCIAL

## 2021-07-09 DIAGNOSIS — Z46.1 ENCOUNTER FOR FITTING AND ADJUSTMENT OF HEARING AID OF BOTH EARS: Primary | ICD-10-CM

## 2021-07-22 ENCOUNTER — CLINICAL SUPPORT (OUTPATIENT)
Dept: AUDIOLOGY | Facility: CLINIC | Age: 56
End: 2021-07-22
Payer: COMMERCIAL

## 2021-07-22 DIAGNOSIS — Z46.1 ENCOUNTER FOR FITTING AND ADJUSTMENT OF HEARING AID OF BOTH EARS: Primary | ICD-10-CM

## 2021-08-09 ENCOUNTER — TELEPHONE (OUTPATIENT)
Dept: AUDIOLOGY | Facility: CLINIC | Age: 56
End: 2021-08-09

## 2021-08-09 ENCOUNTER — CLINICAL SUPPORT (OUTPATIENT)
Dept: AUDIOLOGY | Facility: CLINIC | Age: 56
End: 2021-08-09
Payer: COMMERCIAL

## 2021-08-09 DIAGNOSIS — Z46.1 FITTING AND ADJUSTMENT OF LEFT HEARING AID: Primary | ICD-10-CM

## 2021-08-20 ENCOUNTER — CLINICAL SUPPORT (OUTPATIENT)
Dept: AUDIOLOGY | Facility: CLINIC | Age: 56
End: 2021-08-20
Payer: COMMERCIAL

## 2021-08-20 DIAGNOSIS — Z46.1 ENCOUNTER FOR FITTING AND ADJUSTMENT OF HEARING AID OF BOTH EARS: Primary | ICD-10-CM

## 2021-11-08 RX ORDER — METOPROLOL SUCCINATE 100 MG/1
100 TABLET, EXTENDED RELEASE ORAL DAILY
Qty: 90 TABLET | Refills: 1 | Status: SHIPPED | OUTPATIENT
Start: 2021-11-08 | End: 2022-05-02 | Stop reason: SDUPTHER

## 2021-11-08 RX ORDER — OLMESARTAN MEDOXOMIL 20 MG/1
20 TABLET ORAL DAILY
Qty: 90 TABLET | Refills: 1 | Status: SHIPPED | OUTPATIENT
Start: 2021-11-08 | End: 2022-05-02 | Stop reason: SDUPTHER

## 2022-02-09 ENCOUNTER — PATIENT OUTREACH (OUTPATIENT)
Dept: ADMINISTRATIVE | Facility: HOSPITAL | Age: 57
End: 2022-02-09
Payer: COMMERCIAL

## 2022-04-14 DIAGNOSIS — I10 HTN (HYPERTENSION): ICD-10-CM

## 2022-05-02 ENCOUNTER — OFFICE VISIT (OUTPATIENT)
Dept: INTERNAL MEDICINE | Facility: CLINIC | Age: 57
End: 2022-05-02
Payer: COMMERCIAL

## 2022-05-02 VITALS
TEMPERATURE: 98 F | WEIGHT: 185.88 LBS | DIASTOLIC BLOOD PRESSURE: 80 MMHG | SYSTOLIC BLOOD PRESSURE: 122 MMHG | BODY MASS INDEX: 24.52 KG/M2 | HEART RATE: 99 BPM

## 2022-05-02 DIAGNOSIS — Z72.0 TOBACCO ABUSE: ICD-10-CM

## 2022-05-02 DIAGNOSIS — Z12.2 SCREENING FOR LUNG CANCER: ICD-10-CM

## 2022-05-02 DIAGNOSIS — Z00.00 ANNUAL PHYSICAL EXAM: Primary | ICD-10-CM

## 2022-05-02 DIAGNOSIS — Z12.11 COLON CANCER SCREENING: ICD-10-CM

## 2022-05-02 DIAGNOSIS — L98.9 SKIN LESION: ICD-10-CM

## 2022-05-02 PROCEDURE — 3074F SYST BP LT 130 MM HG: CPT | Mod: CPTII,S$GLB,, | Performed by: FAMILY MEDICINE

## 2022-05-02 PROCEDURE — 1159F MED LIST DOCD IN RCRD: CPT | Mod: CPTII,S$GLB,, | Performed by: FAMILY MEDICINE

## 2022-05-02 PROCEDURE — 3079F PR MOST RECENT DIASTOLIC BLOOD PRESSURE 80-89 MM HG: ICD-10-PCS | Mod: CPTII,S$GLB,, | Performed by: FAMILY MEDICINE

## 2022-05-02 PROCEDURE — 3079F DIAST BP 80-89 MM HG: CPT | Mod: CPTII,S$GLB,, | Performed by: FAMILY MEDICINE

## 2022-05-02 PROCEDURE — 4010F PR ACE/ARB THEARPY RXD/TAKEN: ICD-10-PCS | Mod: CPTII,S$GLB,, | Performed by: FAMILY MEDICINE

## 2022-05-02 PROCEDURE — 1160F RVW MEDS BY RX/DR IN RCRD: CPT | Mod: CPTII,S$GLB,, | Performed by: FAMILY MEDICINE

## 2022-05-02 PROCEDURE — 1160F PR REVIEW ALL MEDS BY PRESCRIBER/CLIN PHARMACIST DOCUMENTED: ICD-10-PCS | Mod: CPTII,S$GLB,, | Performed by: FAMILY MEDICINE

## 2022-05-02 PROCEDURE — 99396 PREV VISIT EST AGE 40-64: CPT | Mod: S$GLB,,, | Performed by: FAMILY MEDICINE

## 2022-05-02 PROCEDURE — 99396 PR PREVENTIVE VISIT,EST,40-64: ICD-10-PCS | Mod: S$GLB,,, | Performed by: FAMILY MEDICINE

## 2022-05-02 PROCEDURE — 3008F PR BODY MASS INDEX (BMI) DOCUMENTED: ICD-10-PCS | Mod: CPTII,S$GLB,, | Performed by: FAMILY MEDICINE

## 2022-05-02 PROCEDURE — 4010F ACE/ARB THERAPY RXD/TAKEN: CPT | Mod: CPTII,S$GLB,, | Performed by: FAMILY MEDICINE

## 2022-05-02 PROCEDURE — 99999 PR PBB SHADOW E&M-EST. PATIENT-LVL IV: CPT | Mod: PBBFAC,,, | Performed by: FAMILY MEDICINE

## 2022-05-02 PROCEDURE — 3008F BODY MASS INDEX DOCD: CPT | Mod: CPTII,S$GLB,, | Performed by: FAMILY MEDICINE

## 2022-05-02 PROCEDURE — 1159F PR MEDICATION LIST DOCUMENTED IN MEDICAL RECORD: ICD-10-PCS | Mod: CPTII,S$GLB,, | Performed by: FAMILY MEDICINE

## 2022-05-02 PROCEDURE — 99999 PR PBB SHADOW E&M-EST. PATIENT-LVL IV: ICD-10-PCS | Mod: PBBFAC,,, | Performed by: FAMILY MEDICINE

## 2022-05-02 PROCEDURE — 3074F PR MOST RECENT SYSTOLIC BLOOD PRESSURE < 130 MM HG: ICD-10-PCS | Mod: CPTII,S$GLB,, | Performed by: FAMILY MEDICINE

## 2022-05-02 RX ORDER — OLMESARTAN MEDOXOMIL 20 MG/1
20 TABLET ORAL DAILY
Qty: 90 TABLET | Refills: 3 | Status: SHIPPED | OUTPATIENT
Start: 2022-05-02 | End: 2023-05-08 | Stop reason: SDUPTHER

## 2022-05-02 RX ORDER — METOPROLOL SUCCINATE 100 MG/1
100 TABLET, EXTENDED RELEASE ORAL DAILY
Qty: 90 TABLET | Refills: 3 | Status: SHIPPED | OUTPATIENT
Start: 2022-05-02 | End: 2023-05-08 | Stop reason: SDUPTHER

## 2022-05-02 RX ORDER — ATORVASTATIN CALCIUM 20 MG/1
20 TABLET, FILM COATED ORAL DAILY
Qty: 90 TABLET | Refills: 3 | Status: SHIPPED | OUTPATIENT
Start: 2022-05-02 | End: 2023-05-08 | Stop reason: SDUPTHER

## 2022-05-02 NOTE — PROGRESS NOTES
Subjective:      Patient ID: Rosalio Muñoz is a 56 y.o. male.    Chief Complaint: Annual Exam    HPI 56 y.o.   male patient with a PMHx of HTN presents to clinic for annual exam. The patient was last seen on November 13, 2020      Today he reports he is doing good. Patient reports he did not quit smoking yet. He reports he is taking his medications daily. Patient reports he started taking OTC testosterone enhancements.  He reports that he have been exercising.  Patient otherwise without complaints. Denies SOB, chest pain, and bowel changes.      Past Medical History:   Diagnosis Date    Hypertension      Family History   Problem Relation Age of Onset    Hyperlipidemia Mother     Hypertension Father     Diabetes Father     Kidney disease Father     Heart disease Father     Prostate cancer Maternal Grandfather     Colon cancer Neg Hx      Past Surgical History:   Procedure Laterality Date    WISDOM TOOTH EXTRACTION       Social History     Tobacco Use    Smoking status: Current Every Day Smoker     Packs/day: 1.50     Years: 25.00     Pack years: 37.50     Types: Cigarettes    Smokeless tobacco: Current User     Types: Chew   Substance Use Topics    Alcohol use: Yes     Alcohol/week: 4.0 - 6.0 standard drinks     Types: 4 - 6 Cans of beer per week    Drug use: No       /80   Pulse 99   Temp 98.1 °F (36.7 °C)   Wt 84.3 kg (185 lb 13.6 oz)   BMI 24.52 kg/m²     Review of Systems   Constitutional: Negative for activity change, appetite change, chills, diaphoresis, fatigue, fever and unexpected weight change.   HENT: Negative for hearing loss and rhinorrhea.    Eyes: Negative for discharge and visual disturbance.   Respiratory: Negative for cough, chest tightness, shortness of breath and wheezing.    Cardiovascular: Negative for chest pain, palpitations and leg swelling.   Gastrointestinal: Negative for abdominal distention, abdominal pain, blood in stool, constipation, diarrhea and vomiting.    Endocrine: Negative for polydipsia and polyuria.   Genitourinary: Negative for difficulty urinating, dysuria, frequency, hematuria and urgency.   Musculoskeletal: Negative for arthralgias, back pain, joint swelling and neck pain.   Skin: Negative for color change and rash.   Neurological: Negative for weakness and headaches.   Hematological: Negative for adenopathy.   Psychiatric/Behavioral: Negative for confusion, decreased concentration and dysphoric mood.       Objective:     Physical Exam  Vitals and nursing note reviewed.   Constitutional:       General: He is not in acute distress.  HENT:      Right Ear: External ear normal.      Left Ear: External ear normal.      Nose: Nose normal.   Eyes:      Conjunctiva/sclera: Conjunctivae normal.      Pupils: Pupils are equal, round, and reactive to light.   Neck:      Vascular: No carotid bruit.   Cardiovascular:      Rate and Rhythm: Normal rate and regular rhythm.      Heart sounds: Normal heart sounds.   Pulmonary:      Effort: Pulmonary effort is normal. No respiratory distress.      Breath sounds: Normal breath sounds. No wheezing or rales.   Abdominal:      General: Bowel sounds are normal. There is no distension.      Palpations: Abdomen is soft.      Tenderness: There is no abdominal tenderness. There is no guarding.   Musculoskeletal:      Cervical back: Normal range of motion and neck supple.      Right lower leg: No edema.      Left lower leg: No edema.   Skin:     General: Skin is warm and dry.      Findings: No rash.   Neurological:      Mental Status: He is alert and oriented to person, place, and time.   Psychiatric:         Behavior: Behavior normal.         Thought Content: Thought content normal.         Judgment: Judgment normal.         Lab Results   Component Value Date    WBC 7.23 12/08/2020    HGB 16.8 12/08/2020    HCT 50.0 12/08/2020     12/08/2020    CHOL 200 (H) 12/08/2020    TRIG 72 12/08/2020    HDL 77 (H) 12/08/2020    ALT 17  12/08/2020    AST 21 12/08/2020     (L) 12/11/2020    K 5.3 (H) 12/11/2020    CL 97 12/11/2020    CREATININE 0.8 06/25/2021    BUN 6 12/11/2020    CO2 26 12/11/2020    TSH 2.397 12/08/2020    PSA 0.16 12/08/2020    HGBA1C 4.9 12/08/2020       Assessment:     1. Annual physical exam    2. Skin lesion    3. Colon cancer screening    4. Screening for lung cancer    5. Tobacco abuse         Plan:     Annual physical exam  -     CBC Auto Differential; Future; Expected date: 05/02/2022  -     Comprehensive Metabolic Panel; Future; Expected date: 05/02/2022  -     Hemoglobin A1C; Future; Expected date: 05/02/2022  -     Lipid Panel; Future; Expected date: 05/02/2022  -     TSH; Future; Expected date: 05/02/2022  -     PSA, Screening; Future; Expected date: 05/02/2022    Skin lesion  -     Ambulatory referral/consult to Dermatology; Future; Expected date: 05/09/2022    Colon cancer screening  -     Ambulatory referral/consult to Endo Procedure ; Future; Expected date: 05/03/2022    Screening for lung cancer  -     CT Chest Lung Screening Low Dose; Future; Expected date: 05/02/2022    Tobacco abuse  -     CT Chest Lung Screening Low Dose; Future; Expected date: 05/02/2022    Other orders  -     olmesartan (BENICAR) 20 MG tablet; Take 1 tablet (20 mg total) by mouth once daily.  Dispense: 90 tablet; Refill: 3  -     metoprolol succinate (TOPROL-XL) 100 MG 24 hr tablet; Take 1 tablet (100 mg total) by mouth once daily.  Dispense: 90 tablet; Refill: 3  -     atorvastatin (LIPITOR) 20 MG tablet; Take 1 tablet (20 mg total) by mouth once daily.  Dispense: 90 tablet; Refill: 3  -     (In Office Administered) Pneumococcal Conjugate Vaccine (13 Valent) (IM); Future; Expected date: 05/06/2022        Vitals reviewed. BP within normal range at 122/80.    Patient due for med refills.  He is due for labs, colon screening and lung screening, orders placed, he will receive a call for scheduling.  Patient is not fasting today  will reschedule for fasting labs.  Patient is also due for his second pneumonia vaccine, will get Friday when he return for his labs.  He is advised that he can continue the supplements he is currently taking if they are not affecting his BP and he is not having side effects for his testosterone.  He is advised to work on smoking cessation.  Patient otherwise doing well.  Questions and concerns addressed.          Documentation entered by Shalom Carpio, acting as scribe for Dr. Mihir Sorto. 05/02/2022 9:52 AM.

## 2022-05-06 ENCOUNTER — CLINICAL SUPPORT (OUTPATIENT)
Dept: INTERNAL MEDICINE | Facility: CLINIC | Age: 57
End: 2022-05-06
Payer: COMMERCIAL

## 2022-05-06 DIAGNOSIS — Z23 NEED FOR PNEUMOCOCCAL VACCINATION: Primary | ICD-10-CM

## 2022-05-06 PROCEDURE — 90471 IMMUNIZATION ADMIN: CPT | Mod: S$GLB,,, | Performed by: FAMILY MEDICINE

## 2022-05-06 PROCEDURE — 90471 PNEUMOCOCCAL CONJUGATE VACCINE 13-VALENT LESS THAN 5YO & GREATER THAN: ICD-10-PCS | Mod: S$GLB,,, | Performed by: FAMILY MEDICINE

## 2022-05-06 PROCEDURE — 99999 PR PBB SHADOW E&M-EST. PATIENT-LVL II: CPT | Mod: PBBFAC,,,

## 2022-05-06 PROCEDURE — 90670 PNEUMOCOCCAL CONJUGATE VACCINE 13-VALENT LESS THAN 5YO & GREATER THAN: ICD-10-PCS | Mod: S$GLB,,, | Performed by: FAMILY MEDICINE

## 2022-05-06 PROCEDURE — 90670 PCV13 VACCINE IM: CPT | Mod: S$GLB,,, | Performed by: FAMILY MEDICINE

## 2022-05-06 PROCEDURE — 99999 PR PBB SHADOW E&M-EST. PATIENT-LVL II: ICD-10-PCS | Mod: PBBFAC,,,

## 2022-05-06 NOTE — PROGRESS NOTES
Prevnar 13 administered. Pt tolerated well. Advised pt to remain in lobby 15 minutes after injection. If no adverse/allergic reaction, may leave

## 2022-05-11 ENCOUNTER — PATIENT OUTREACH (OUTPATIENT)
Dept: ADMINISTRATIVE | Facility: OTHER | Age: 57
End: 2022-05-11
Payer: COMMERCIAL

## 2022-05-11 ENCOUNTER — LAB VISIT (OUTPATIENT)
Dept: LAB | Facility: HOSPITAL | Age: 57
End: 2022-05-11
Attending: OTOLARYNGOLOGY
Payer: COMMERCIAL

## 2022-05-11 ENCOUNTER — PATIENT MESSAGE (OUTPATIENT)
Dept: INTERNAL MEDICINE | Facility: CLINIC | Age: 57
End: 2022-05-11

## 2022-05-11 ENCOUNTER — OFFICE VISIT (OUTPATIENT)
Dept: OTOLARYNGOLOGY | Facility: CLINIC | Age: 57
End: 2022-05-11
Payer: COMMERCIAL

## 2022-05-11 ENCOUNTER — OFFICE VISIT (OUTPATIENT)
Dept: INTERNAL MEDICINE | Facility: CLINIC | Age: 57
End: 2022-05-11
Payer: COMMERCIAL

## 2022-05-11 VITALS — SYSTOLIC BLOOD PRESSURE: 129 MMHG | HEART RATE: 91 BPM | TEMPERATURE: 98 F | DIASTOLIC BLOOD PRESSURE: 87 MMHG

## 2022-05-11 VITALS
HEART RATE: 86 BPM | DIASTOLIC BLOOD PRESSURE: 80 MMHG | BODY MASS INDEX: 24.37 KG/M2 | TEMPERATURE: 98 F | HEIGHT: 73 IN | WEIGHT: 183.88 LBS | SYSTOLIC BLOOD PRESSURE: 138 MMHG

## 2022-05-11 DIAGNOSIS — E78.49 OTHER HYPERLIPIDEMIA: ICD-10-CM

## 2022-05-11 DIAGNOSIS — Z72.0 TOBACCO ABUSE: ICD-10-CM

## 2022-05-11 DIAGNOSIS — J02.9 EXUDATIVE PHARYNGITIS: Primary | ICD-10-CM

## 2022-05-11 DIAGNOSIS — I10 PRIMARY HYPERTENSION: ICD-10-CM

## 2022-05-11 DIAGNOSIS — J03.90 ACUTE TONSILLITIS, UNSPECIFIED ETIOLOGY: ICD-10-CM

## 2022-05-11 DIAGNOSIS — J03.90 ACUTE TONSILLITIS, UNSPECIFIED ETIOLOGY: Primary | ICD-10-CM

## 2022-05-11 LAB
CTP QC/QA: YES
HETEROPH AB SERPL QL IA: NEGATIVE
MOLECULAR STREP A: NEGATIVE

## 2022-05-11 PROCEDURE — 36415 COLL VENOUS BLD VENIPUNCTURE: CPT | Performed by: OTOLARYNGOLOGY

## 2022-05-11 PROCEDURE — 4010F ACE/ARB THERAPY RXD/TAKEN: CPT | Mod: CPTII,S$GLB,, | Performed by: OTOLARYNGOLOGY

## 2022-05-11 PROCEDURE — 3008F BODY MASS INDEX DOCD: CPT | Mod: CPTII,S$GLB,, | Performed by: NURSE PRACTITIONER

## 2022-05-11 PROCEDURE — 99999 PR PBB SHADOW E&M-EST. PATIENT-LVL III: ICD-10-PCS | Mod: PBBFAC,,, | Performed by: OTOLARYNGOLOGY

## 2022-05-11 PROCEDURE — 87651 POCT STREP A MOLECULAR: ICD-10-PCS | Mod: QW,S$GLB,, | Performed by: NURSE PRACTITIONER

## 2022-05-11 PROCEDURE — 3074F PR MOST RECENT SYSTOLIC BLOOD PRESSURE < 130 MM HG: ICD-10-PCS | Mod: CPTII,S$GLB,, | Performed by: OTOLARYNGOLOGY

## 2022-05-11 PROCEDURE — 3079F PR MOST RECENT DIASTOLIC BLOOD PRESSURE 80-89 MM HG: ICD-10-PCS | Mod: CPTII,S$GLB,, | Performed by: OTOLARYNGOLOGY

## 2022-05-11 PROCEDURE — 1159F MED LIST DOCD IN RCRD: CPT | Mod: CPTII,S$GLB,, | Performed by: OTOLARYNGOLOGY

## 2022-05-11 PROCEDURE — 4010F ACE/ARB THERAPY RXD/TAKEN: CPT | Mod: CPTII,S$GLB,, | Performed by: NURSE PRACTITIONER

## 2022-05-11 PROCEDURE — 4010F PR ACE/ARB THEARPY RXD/TAKEN: ICD-10-PCS | Mod: CPTII,S$GLB,, | Performed by: NURSE PRACTITIONER

## 2022-05-11 PROCEDURE — 3079F DIAST BP 80-89 MM HG: CPT | Mod: CPTII,S$GLB,, | Performed by: OTOLARYNGOLOGY

## 2022-05-11 PROCEDURE — 3074F SYST BP LT 130 MM HG: CPT | Mod: CPTII,S$GLB,, | Performed by: OTOLARYNGOLOGY

## 2022-05-11 PROCEDURE — 3079F PR MOST RECENT DIASTOLIC BLOOD PRESSURE 80-89 MM HG: ICD-10-PCS | Mod: CPTII,S$GLB,, | Performed by: NURSE PRACTITIONER

## 2022-05-11 PROCEDURE — 99214 OFFICE O/P EST MOD 30 MIN: CPT | Mod: S$GLB,,, | Performed by: NURSE PRACTITIONER

## 2022-05-11 PROCEDURE — 3008F PR BODY MASS INDEX (BMI) DOCUMENTED: ICD-10-PCS | Mod: CPTII,S$GLB,, | Performed by: NURSE PRACTITIONER

## 2022-05-11 PROCEDURE — 99999 PR PBB SHADOW E&M-EST. PATIENT-LVL III: CPT | Mod: PBBFAC,,, | Performed by: NURSE PRACTITIONER

## 2022-05-11 PROCEDURE — 99999 PR PBB SHADOW E&M-EST. PATIENT-LVL III: CPT | Mod: PBBFAC,,, | Performed by: OTOLARYNGOLOGY

## 2022-05-11 PROCEDURE — 87651 STREP A DNA AMP PROBE: CPT | Mod: QW,S$GLB,, | Performed by: NURSE PRACTITIONER

## 2022-05-11 PROCEDURE — 86592 SYPHILIS TEST NON-TREP QUAL: CPT | Performed by: OTOLARYNGOLOGY

## 2022-05-11 PROCEDURE — 1159F PR MEDICATION LIST DOCUMENTED IN MEDICAL RECORD: ICD-10-PCS | Mod: CPTII,S$GLB,, | Performed by: OTOLARYNGOLOGY

## 2022-05-11 PROCEDURE — 99214 PR OFFICE/OUTPT VISIT, EST, LEVL IV, 30-39 MIN: ICD-10-PCS | Mod: S$GLB,,, | Performed by: OTOLARYNGOLOGY

## 2022-05-11 PROCEDURE — 1159F MED LIST DOCD IN RCRD: CPT | Mod: CPTII,S$GLB,, | Performed by: NURSE PRACTITIONER

## 2022-05-11 PROCEDURE — 4010F PR ACE/ARB THEARPY RXD/TAKEN: ICD-10-PCS | Mod: CPTII,S$GLB,, | Performed by: OTOLARYNGOLOGY

## 2022-05-11 PROCEDURE — 3075F SYST BP GE 130 - 139MM HG: CPT | Mod: CPTII,S$GLB,, | Performed by: NURSE PRACTITIONER

## 2022-05-11 PROCEDURE — 3075F PR MOST RECENT SYSTOLIC BLOOD PRESS GE 130-139MM HG: ICD-10-PCS | Mod: CPTII,S$GLB,, | Performed by: NURSE PRACTITIONER

## 2022-05-11 PROCEDURE — 86308 HETEROPHILE ANTIBODY SCREEN: CPT | Performed by: OTOLARYNGOLOGY

## 2022-05-11 PROCEDURE — 99214 PR OFFICE/OUTPT VISIT, EST, LEVL IV, 30-39 MIN: ICD-10-PCS | Mod: S$GLB,,, | Performed by: NURSE PRACTITIONER

## 2022-05-11 PROCEDURE — 99999 PR PBB SHADOW E&M-EST. PATIENT-LVL III: ICD-10-PCS | Mod: PBBFAC,,, | Performed by: NURSE PRACTITIONER

## 2022-05-11 PROCEDURE — 3079F DIAST BP 80-89 MM HG: CPT | Mod: CPTII,S$GLB,, | Performed by: NURSE PRACTITIONER

## 2022-05-11 PROCEDURE — 1159F PR MEDICATION LIST DOCUMENTED IN MEDICAL RECORD: ICD-10-PCS | Mod: CPTII,S$GLB,, | Performed by: NURSE PRACTITIONER

## 2022-05-11 PROCEDURE — 99214 OFFICE O/P EST MOD 30 MIN: CPT | Mod: S$GLB,,, | Performed by: OTOLARYNGOLOGY

## 2022-05-11 RX ORDER — AMOXICILLIN AND CLAVULANATE POTASSIUM 875; 125 MG/1; MG/1
1 TABLET, FILM COATED ORAL 2 TIMES DAILY
Qty: 20 TABLET | Refills: 0 | Status: SHIPPED | OUTPATIENT
Start: 2022-05-11 | End: 2022-05-21

## 2022-05-11 NOTE — PROGRESS NOTES
"Subjective:       Patient ID: Rosalio Muñoz is a 56 y.o. male.    Chief Complaint: Sore Throat    Patient here with throat pain 8/10 that started yesterday  No fever  Has been taking ibuprofen which helps a little  He is a smoker  Does still have tonsils  Has had some nasal congestion for the last 3 days, no fever  Patient is a daily smoker for 30 + years    Sore Throat   Associated symptoms include congestion. Pertinent negatives include no headaches, shortness of breath or stridor.       /80   Pulse 86   Temp 97.6 °F (36.4 °C)   Ht 6' 1" (1.854 m)   Wt 83.4 kg (183 lb 13.8 oz)   BMI 24.26 kg/m²     Review of Systems   Constitutional: Positive for activity change. Negative for appetite change, chills, diaphoresis, fatigue, fever and unexpected weight change.   HENT: Positive for congestion and sore throat.    Eyes: Negative.    Respiratory: Negative for apnea, chest tightness, shortness of breath and stridor.    Cardiovascular: Negative for chest pain, palpitations and leg swelling.   Gastrointestinal: Negative.    Endocrine: Negative.    Genitourinary: Negative.    Musculoskeletal: Negative for arthralgias and myalgias.   Skin: Negative for color change, pallor, rash and wound.   Allergic/Immunologic: Negative.    Neurological: Negative for dizziness, facial asymmetry, light-headedness and headaches.   Hematological: Negative for adenopathy.   Psychiatric/Behavioral: Negative for agitation and behavioral problems.       Objective:      Physical Exam  Vitals and nursing note reviewed.   Constitutional:       General: He is not in acute distress.     Appearance: He is well-developed. He is not ill-appearing, toxic-appearing or diaphoretic.   HENT:      Head: Normocephalic and atraumatic.      Right Ear: Hearing, tympanic membrane, ear canal and external ear normal.      Left Ear: Hearing, tympanic membrane, ear canal and external ear normal.      Nose: Nose normal.      Mouth/Throat:      Pharynx: " Oropharyngeal exudate and uvula swelling present.      Tonsils: Tonsillar exudate present.      Comments: See picture insert  Eyes:      General: Lids are normal. No scleral icterus.        Right eye: No discharge.         Left eye: No discharge.      Conjunctiva/sclera: Conjunctivae normal.   Cardiovascular:      Rate and Rhythm: Normal rate and regular rhythm.      Heart sounds: Normal heart sounds.   Pulmonary:      Effort: Pulmonary effort is normal. No tachypnea, accessory muscle usage or respiratory distress.      Breath sounds: Normal breath sounds. No stridor.   Musculoskeletal:      Cervical back: Full passive range of motion without pain.   Lymphadenopathy:      Head:      Right side of head: Tonsillar adenopathy present.      Left side of head: Tonsillar adenopathy present.      Cervical: Cervical adenopathy present.   Skin:     General: Skin is warm and dry.      Coloration: Skin is not pale.      Findings: No rash.   Neurological:      Mental Status: He is alert and oriented to person, place, and time. He is not disoriented.   Psychiatric:         Attention and Perception: He is attentive.         Mood and Affect: Mood is not anxious or depressed. Affect is not labile, blunt, angry or inappropriate.         Speech: Speech normal.         Behavior: Behavior normal.         Thought Content: Thought content normal.         Judgment: Judgment normal.             Assessment:       1. Exudative pharyngitis    2. Tobacco abuse    3. Primary hypertension    4. Other hyperlipidemia        Plan:       Rosalio was seen today for sore throat.    Diagnoses and all orders for this visit:    Exudative pharyngitis  -     POCT Strep A, Molecular  Strep test negative  Patient's throat appearance is quite concerning  Messaged Dr. Buckner who reviewed images and recommends patient see ENT today at the [facility]  Coordinated urgent visit with Dr. Flaherty this afternoon  Patient agrees to go straight for evaluation    Tobacco  abuse    Primary hypertension  Stable on metoprolol and olmesartan    Other hyperlipidemia  Stable on statin  Lab Results   Component Value Date    LDLCALC 108.6 12/08/2020

## 2022-05-11 NOTE — PROGRESS NOTES
"Referring Provider:    Viki Julien, [place]  [id number] [place]  [place] [id number]  Subjective:   Patient: Rosalio Muñoz [id number], :1965   Visit date:2022 2:29 PM    Chief Complaint:  Sore Throat (Pt states sore throat began yesterday afternoon. Pt states he did take ibuprofen to help with the soreness. He states he looked in the mirror this morning and had "white pockets" in the back of his throat. Pt went to urgent care to be tested for strep which came back negative for strep, was then referred to be seen by ent. Pt also notes sinus drainage and allergies. )    HPI:    Prior notes reviewed by myself.  Clinical documentation obtained by nursing staff reviewed.     55 y/o gentleman with long history of smoking presents with acute onset of sore throat that started during the last 24-48 hours.  He reports dysphagia, odynophagia, fatigue.  He has no prior history of recurrent acute or chronic tonsillitis.  He denies any acute weight loss.  He does have some enlarged tender cervical lymph nodes.  No known recent sick contacts.      Objective:     Physical Exam:  Vitals:  /87   Pulse 91   Temp 98 °F (36.7 °C) (Temporal)   General appearance:  Well developed, well nourished    Ears:  Otoscopy of external auditory canals and tympanic membranes was normal, clinical speech reception thresholds grossly intact, no mass/lesion of auricle.    Nose:  No masses/lesions of external nose, nasal mucosa, septum, and turbinates were within normal limits.    Mouth:  No mass/lesion of lips, teeth, gums, hard/soft palate, tongue, tonsils 3+ with thick white exudate bilaterally and oropharyngeal erythema.    Neck & Lymphatics:  No cervical lymphadenopathy, no neck mass/crepitus/ asymmetry, trachea is midline, no thyroid enlargement/tenderness/mass.        [x]  Data Reviewed:    Lab Results   Component Value Date    WBC 7.23 2020    HGB 16.8 2020    HCT 50.0 2020     (H) " 12/08/2020    EOSINOPHIL 1.2 12/08/2020               Assessment & Plan:   Acute tonsillitis, unspecified etiology  -     Heterophile Ab Screen; Future; Expected date: 05/11/2022  -     RPR; Future; Expected date: 05/11/2022  -     amoxicillin-clavulanate 875-125mg (AUGMENTIN) 875-125 mg per tablet; Take 1 tablet by mouth 2 (two) times daily. for 10 days  Dispense: 20 tablet; Refill: 0  -     diphenhydrAMINE-aluminum-magnesium hydroxide-simethicone-LIDOcaine HCl 2%; Swish and swallow 10 mLs every 4 (four) hours as needed (throat pain).  Dispense: 500 mL; Refill: 0        He has acute exudative tonsillitis.  We agreed to screen him for mono and treat with oral antibiotics and magic mouthwash.  He will f/u in 1 week

## 2022-05-11 NOTE — Clinical Note
Dr. Flaherty, thank you for seeing this patient today. His throat is quite concerning. Strep was negative.

## 2022-05-11 NOTE — PROGRESS NOTES
"Subjective:       Patient ID: Rosalio Muñoz is a 56 y.o. male.    Chief Complaint: Sore Throat    HPI    /80   Pulse 86   Temp 97.6 °F (36.4 °C)   Ht 6' 1" (1.854 m)   Wt 83.4 kg (183 lb 13.8 oz)   BMI 24.26 kg/m²     Review of Systems    Objective:      Physical Exam    Assessment:       1. Exudative pharyngitis        Plan:       Rosalio was seen today for sore throat.    Diagnoses and all orders for this visit:    Exudative pharyngitis  -     POCT rapid strep A      There are no Patient Instructions on file for this visit.    "

## 2022-05-11 NOTE — PROGRESS NOTES
Health Maintenance Due   Topic Date Due    Colorectal Cancer Screening  Never done    LDCT Lung Screen  Never done    PROSTATE-SPECIFIC ANTIGEN  12/08/2021    COVID-19 Vaccine (4 - Booster for Moderna series) 03/29/2022     Updates were requested from care everywhere.  Chart was reviewed for overdue Proactive [facility] Encounters ([facility]) topics (CRS, Breast Cancer Screening, Eye exam)  Health Maintenance has been updated.  LINKS immunization registry triggered.  Immunizations were reconciled.

## 2022-05-12 LAB — RPR SER QL: NORMAL

## 2022-05-18 ENCOUNTER — OFFICE VISIT (OUTPATIENT)
Dept: OTOLARYNGOLOGY | Facility: CLINIC | Age: 57
End: 2022-05-18
Payer: COMMERCIAL

## 2022-05-18 VITALS
TEMPERATURE: 98 F | HEART RATE: 95 BPM | WEIGHT: 181.69 LBS | SYSTOLIC BLOOD PRESSURE: 118 MMHG | DIASTOLIC BLOOD PRESSURE: 77 MMHG | BODY MASS INDEX: 23.97 KG/M2

## 2022-05-18 DIAGNOSIS — J03.90 ACUTE TONSILLITIS, UNSPECIFIED ETIOLOGY: Primary | ICD-10-CM

## 2022-05-18 PROCEDURE — 99999 PR PBB SHADOW E&M-EST. PATIENT-LVL III: ICD-10-PCS | Mod: PBBFAC,,, | Performed by: OTOLARYNGOLOGY

## 2022-05-18 PROCEDURE — 3008F PR BODY MASS INDEX (BMI) DOCUMENTED: ICD-10-PCS | Mod: CPTII,S$GLB,, | Performed by: OTOLARYNGOLOGY

## 2022-05-18 PROCEDURE — 1160F RVW MEDS BY RX/DR IN RCRD: CPT | Mod: CPTII,S$GLB,, | Performed by: OTOLARYNGOLOGY

## 2022-05-18 PROCEDURE — 99212 PR OFFICE/OUTPT VISIT, EST, LEVL II, 10-19 MIN: ICD-10-PCS | Mod: S$GLB,,, | Performed by: OTOLARYNGOLOGY

## 2022-05-18 PROCEDURE — 99999 PR PBB SHADOW E&M-EST. PATIENT-LVL III: CPT | Mod: PBBFAC,,, | Performed by: OTOLARYNGOLOGY

## 2022-05-18 PROCEDURE — 1159F PR MEDICATION LIST DOCUMENTED IN MEDICAL RECORD: ICD-10-PCS | Mod: CPTII,S$GLB,, | Performed by: OTOLARYNGOLOGY

## 2022-05-18 PROCEDURE — 99212 OFFICE O/P EST SF 10 MIN: CPT | Mod: S$GLB,,, | Performed by: OTOLARYNGOLOGY

## 2022-05-18 PROCEDURE — 3078F PR MOST RECENT DIASTOLIC BLOOD PRESSURE < 80 MM HG: ICD-10-PCS | Mod: CPTII,S$GLB,, | Performed by: OTOLARYNGOLOGY

## 2022-05-18 PROCEDURE — 3074F SYST BP LT 130 MM HG: CPT | Mod: CPTII,S$GLB,, | Performed by: OTOLARYNGOLOGY

## 2022-05-18 PROCEDURE — 3008F BODY MASS INDEX DOCD: CPT | Mod: CPTII,S$GLB,, | Performed by: OTOLARYNGOLOGY

## 2022-05-18 PROCEDURE — 1160F PR REVIEW ALL MEDS BY PRESCRIBER/CLIN PHARMACIST DOCUMENTED: ICD-10-PCS | Mod: CPTII,S$GLB,, | Performed by: OTOLARYNGOLOGY

## 2022-05-18 PROCEDURE — 3074F PR MOST RECENT SYSTOLIC BLOOD PRESSURE < 130 MM HG: ICD-10-PCS | Mod: CPTII,S$GLB,, | Performed by: OTOLARYNGOLOGY

## 2022-05-18 PROCEDURE — 3078F DIAST BP <80 MM HG: CPT | Mod: CPTII,S$GLB,, | Performed by: OTOLARYNGOLOGY

## 2022-05-18 PROCEDURE — 4010F PR ACE/ARB THEARPY RXD/TAKEN: ICD-10-PCS | Mod: CPTII,S$GLB,, | Performed by: OTOLARYNGOLOGY

## 2022-05-18 PROCEDURE — 1159F MED LIST DOCD IN RCRD: CPT | Mod: CPTII,S$GLB,, | Performed by: OTOLARYNGOLOGY

## 2022-05-18 PROCEDURE — 4010F ACE/ARB THERAPY RXD/TAKEN: CPT | Mod: CPTII,S$GLB,, | Performed by: OTOLARYNGOLOGY

## 2022-05-18 NOTE — PROGRESS NOTES
Referring Provider:    No referring provider defined for this encounter.  Subjective:   Patient: Rosalio Muñoz [id number], :1965   Visit date:2022 2:29 PM    Chief Complaint:  Follow-up (Pt states symptoms are much better)    HPI:    Prior notes reviewed by myself.  Clinical documentation obtained by nursing staff reviewed.     55 y/o gentleman with long history of smoking presents with acute onset of sore throat that started during the last 24-48 hours.  He reports dysphagia, odynophagia, fatigue.  He has no prior history of recurrent acute or chronic tonsillitis.  He denies any acute weight loss.  He does have some enlarged tender cervical lymph nodes.  No known recent sick contacts.    22 update:  Feels MUCH better, says he is almost back to normal.      Objective:     Physical Exam:  Vitals:  /77   Pulse 95   Temp 98.4 °F (36.9 °C) (Temporal)   Wt 82.4 kg (181 lb 10.5 oz)   BMI 23.97 kg/m²   General appearance:  Well developed, well nourished    Ears:  Otoscopy of external auditory canals and tympanic membranes was normal, clinical speech reception thresholds grossly intact, no mass/lesion of auricle.    Nose:  No masses/lesions of external nose, nasal mucosa, septum, and turbinates were within normal limits.    Mouth:  No mass/lesion of lips, teeth, gums, hard/soft palate, tongue, tonsils 2+ with mild erythema, exudate has resolved.    Neck & Lymphatics:  No cervical lymphadenopathy, no neck mass/crepitus/ asymmetry, trachea is midline, no thyroid enlargement/tenderness/mass.        [x]  Data Reviewed:    Lab Results   Component Value Date    WBC 7.23 2020    HGB 16.8 2020    HCT 50.0 2020     (H) 2020    EOSINOPHIL 1.2 2020             Results    Collected Updated Procedure    2022 1451 2022 1218 RPR [[id number]]    Blood    Component Value   RPR Non-reactive          2022 1451 2022 1514 Heterophile Ab Screen  [[id number]]    Blood    Component Value   Monospot Negative          05/11/2022 1500 05/11/2022 1500 POCT Strep A, Molecular [[id number]] Component Value   Molecular Strep A, POC Negative    Acceptable Yes                    Assessment & Plan:   Acute tonsillitis, unspecified etiology        He has acute exudative tonsillitis.  We agreed to screen him for mono and treat with oral antibiotics and magic mouthwash.  He will f/u in 1 week    5/18/22 update:  Exudate resolved, patient is subjectively and objectively improved.  Rec continuing his antibiotics and he can f/u as needed with ENT.

## 2022-06-01 ENCOUNTER — OFFICE VISIT (OUTPATIENT)
Dept: DERMATOLOGY | Facility: CLINIC | Age: 57
End: 2022-06-01
Payer: COMMERCIAL

## 2022-06-01 DIAGNOSIS — L81.4 LENTIGO: Primary | ICD-10-CM

## 2022-06-01 DIAGNOSIS — L98.9 SKIN LESION: ICD-10-CM

## 2022-06-01 DIAGNOSIS — L72.0 EPIDERMAL CYST: ICD-10-CM

## 2022-06-01 DIAGNOSIS — D23.9 INTRADERMAL NEVUS: ICD-10-CM

## 2022-06-01 PROCEDURE — 1160F PR REVIEW ALL MEDS BY PRESCRIBER/CLIN PHARMACIST DOCUMENTED: ICD-10-PCS | Mod: CPTII,S$GLB,, | Performed by: PHYSICIAN ASSISTANT

## 2022-06-01 PROCEDURE — 4010F PR ACE/ARB THEARPY RXD/TAKEN: ICD-10-PCS | Mod: CPTII,S$GLB,, | Performed by: PHYSICIAN ASSISTANT

## 2022-06-01 PROCEDURE — 1159F PR MEDICATION LIST DOCUMENTED IN MEDICAL RECORD: ICD-10-PCS | Mod: CPTII,S$GLB,, | Performed by: PHYSICIAN ASSISTANT

## 2022-06-01 PROCEDURE — 99999 PR PBB SHADOW E&M-EST. PATIENT-LVL III: CPT | Mod: PBBFAC,,, | Performed by: PHYSICIAN ASSISTANT

## 2022-06-01 PROCEDURE — 1160F RVW MEDS BY RX/DR IN RCRD: CPT | Mod: CPTII,S$GLB,, | Performed by: PHYSICIAN ASSISTANT

## 2022-06-01 PROCEDURE — 4010F ACE/ARB THERAPY RXD/TAKEN: CPT | Mod: CPTII,S$GLB,, | Performed by: PHYSICIAN ASSISTANT

## 2022-06-01 PROCEDURE — 99203 OFFICE O/P NEW LOW 30 MIN: CPT | Mod: S$GLB,,, | Performed by: PHYSICIAN ASSISTANT

## 2022-06-01 PROCEDURE — 1159F MED LIST DOCD IN RCRD: CPT | Mod: CPTII,S$GLB,, | Performed by: PHYSICIAN ASSISTANT

## 2022-06-01 PROCEDURE — 99203 PR OFFICE/OUTPT VISIT, NEW, LEVL III, 30-44 MIN: ICD-10-PCS | Mod: S$GLB,,, | Performed by: PHYSICIAN ASSISTANT

## 2022-06-01 PROCEDURE — 99999 PR PBB SHADOW E&M-EST. PATIENT-LVL III: ICD-10-PCS | Mod: PBBFAC,,, | Performed by: PHYSICIAN ASSISTANT

## 2022-06-01 NOTE — PROGRESS NOTES
Subjective:       Patient ID:  Rosalio Muñoz is a 56 y.o. male who presents for   Chief Complaint   Patient presents with    Spot     C/o spot on right cheek and nose, no hx of skin cancer     History of Present Illness: The patient presents with chief complaint of skin lesion.  Location: nasal bridge  Duration: 1 year  Signs/Symptoms: discoloration, occasional dryness. Denies bleeding, tenderness, change in size.   Referred by Dr. Lopez for evaluation of skin lesion.     Prior treatments: none    C/o spot of right cheek, bump beneath the skin, +cyst like. Duration: 10 years. Denies change in size, h/o swelling, drainage, or tenderness. States he had a similar cyst lesion (sebaceous cyst) of the left cheek removed many years ago.     Denies Personal or FHX of skin CA      Review of Systems   Constitutional: Negative for fever and chills.   HENT: Negative for nosebleeds and headaches.    Gastrointestinal: Negative for nausea, vomiting, diarrhea and Sensitivity to oral antibiotics.   Genitourinary: Negative for irregular periods.   Musculoskeletal: Negative for arthralgias.   Skin: Positive for activity-related sunscreen use. Negative for itching, rash, dry skin, sun sensitivity, daily sunscreen use, recent sunburn, dry lips and abscesses.   Neurological: Negative for headaches.   Psychiatric/Behavioral: Negative for depressed mood.   Hematologic/Lymphatic: Does not bruise/bleed easily.        Objective:    Physical Exam   Constitutional: He appears well-developed and well-nourished. No distress.   Neurological: He is alert and oriented to person, place, and time. He is not disoriented.   Psychiatric: He has a normal mood and affect.   Skin:   Areas Examined (abnormalities noted in diagram):   Head / Face Inspection Performed  Neck Inspection Performed  Chest / Axilla Inspection Performed  Back Inspection Performed  RUE Inspected  LUE Inspection Performed  Nails and Digits Inspection Performed                    Diagram Legend     Erythematous scaling macule/papule c/w actinic keratosis       Vascular papule c/w angioma      Pigmented verrucoid papule/plaque c/w seborrheic keratosis      Yellow umbilicated papule c/w sebaceous hyperplasia      Irregularly shaped tan macule c/w lentigo     1-2 mm smooth white papules consistent with Milia      Movable subcutaneous cyst with punctum c/w epidermal inclusion cyst      Subcutaneous movable cyst c/w pilar cyst      Firm pink to brown papule c/w dermatofibroma      Pedunculated fleshy papule(s) c/w skin tag(s)      Evenly pigmented macule c/w junctional nevus     Mildly variegated pigmented, slightly irregular-bordered macule c/w mildly atypical nevus      Flesh colored to evenly pigmented papule c/w intradermal nevus       Pink pearly papule/plaque c/w basal cell carcinoma      Erythematous hyperkeratotic cursted plaque c/w SCC      Surgical scar with no sign of skin cancer recurrence      Open and closed comedones      Inflammatory papules and pustules      Verrucoid papule consistent consistent with wart     Erythematous eczematous patches and plaques     Dystrophic onycholytic nail with subungual debris c/w onychomycosis     Umbilicated papule    Erythematous-base heme-crusted tan verrucoid plaque consistent with inflamed seborrheic keratosis     Erythematous Silvery Scaling Plaque c/w Psoriasis     See annotation      Assessment / Plan:        Lentigo  Ddx: early pigmented AK vs. R/o atypia  Encouraged daily photoprotection. Warned of potential LM and the red flag signs. Discussed trial of cyro in future. He declines any tx today.    Skin lesion  -     Ambulatory referral/consult to Dermatology    Epidermal cyst  Discussed excision is elective unless any red flag signs. Given size and location (~1.5 to 2 cm facial lesion) would recommend referral to ENT/ Plastics for excision if desired.     Intradermal nevus  Reviewed ABCDEFs of moles, encouraged regular skin exams at  least annually.            Follow up in about 3 months (around 9/1/2022) for Full Skin Exam.

## 2022-06-14 ENCOUNTER — LAB VISIT (OUTPATIENT)
Dept: LAB | Facility: HOSPITAL | Age: 57
End: 2022-06-14
Attending: FAMILY MEDICINE
Payer: COMMERCIAL

## 2022-06-14 DIAGNOSIS — Z00.00 ANNUAL PHYSICAL EXAM: ICD-10-CM

## 2022-06-14 LAB
ALBUMIN SERPL BCP-MCNC: 3.8 G/DL (ref 3.5–5.2)
ALP SERPL-CCNC: 92 U/L (ref 55–135)
ALT SERPL W/O P-5'-P-CCNC: 16 U/L (ref 10–44)
ANION GAP SERPL CALC-SCNC: 14 MMOL/L (ref 8–16)
AST SERPL-CCNC: 20 U/L (ref 10–40)
BASOPHILS # BLD AUTO: 0.05 K/UL (ref 0–0.2)
BASOPHILS NFR BLD: 1 % (ref 0–1.9)
BILIRUB SERPL-MCNC: 1.1 MG/DL (ref 0.1–1)
BUN SERPL-MCNC: <3 MG/DL (ref 6–20)
CALCIUM SERPL-MCNC: 9.5 MG/DL (ref 8.7–10.5)
CHLORIDE SERPL-SCNC: 96 MMOL/L (ref 95–110)
CHOLEST SERPL-MCNC: 196 MG/DL (ref 120–199)
CHOLEST/HDLC SERPL: 2.5 {RATIO} (ref 2–5)
CO2 SERPL-SCNC: 23 MMOL/L (ref 23–29)
COMPLEXED PSA SERPL-MCNC: 0.33 NG/ML (ref 0–4)
CREAT SERPL-MCNC: 0.7 MG/DL (ref 0.5–1.4)
DIFFERENTIAL METHOD: ABNORMAL
EOSINOPHIL # BLD AUTO: 0.1 K/UL (ref 0–0.5)
EOSINOPHIL NFR BLD: 1 % (ref 0–8)
ERYTHROCYTE [DISTWIDTH] IN BLOOD BY AUTOMATED COUNT: 13.9 % (ref 11.5–14.5)
EST. GFR  (AFRICAN AMERICAN): >60 ML/MIN/1.73 M^2
EST. GFR  (NON AFRICAN AMERICAN): >60 ML/MIN/1.73 M^2
ESTIMATED AVG GLUCOSE: 91 MG/DL (ref 68–131)
GLUCOSE SERPL-MCNC: 88 MG/DL (ref 70–110)
HBA1C MFR BLD: 4.8 % (ref 4–5.6)
HCT VFR BLD AUTO: 54.9 % (ref 40–54)
HDLC SERPL-MCNC: 78 MG/DL (ref 40–75)
HDLC SERPL: 39.8 % (ref 20–50)
HGB BLD-MCNC: 18.7 G/DL (ref 14–18)
IMM GRANULOCYTES # BLD AUTO: 0.02 K/UL (ref 0–0.04)
IMM GRANULOCYTES NFR BLD AUTO: 0.4 % (ref 0–0.5)
LDLC SERPL CALC-MCNC: 99.6 MG/DL (ref 63–159)
LYMPHOCYTES # BLD AUTO: 1.3 K/UL (ref 1–4.8)
LYMPHOCYTES NFR BLD: 26.3 % (ref 18–48)
MCH RBC QN AUTO: 34.7 PG (ref 27–31)
MCHC RBC AUTO-ENTMCNC: 34.1 G/DL (ref 32–36)
MCV RBC AUTO: 102 FL (ref 82–98)
MONOCYTES # BLD AUTO: 0.5 K/UL (ref 0.3–1)
MONOCYTES NFR BLD: 10.6 % (ref 4–15)
NEUTROPHILS # BLD AUTO: 2.9 K/UL (ref 1.8–7.7)
NEUTROPHILS NFR BLD: 60.7 % (ref 38–73)
NONHDLC SERPL-MCNC: 118 MG/DL
NRBC BLD-RTO: 0 /100 WBC
PLATELET # BLD AUTO: 263 K/UL (ref 150–450)
PMV BLD AUTO: 10 FL (ref 9.2–12.9)
POTASSIUM SERPL-SCNC: 4.9 MMOL/L (ref 3.5–5.1)
PROT SERPL-MCNC: 6.8 G/DL (ref 6–8.4)
RBC # BLD AUTO: 5.39 M/UL (ref 4.6–6.2)
SODIUM SERPL-SCNC: 133 MMOL/L (ref 136–145)
TRIGL SERPL-MCNC: 92 MG/DL (ref 30–150)
TSH SERPL DL<=0.005 MIU/L-ACNC: 2.12 UIU/ML (ref 0.4–4)
WBC # BLD AUTO: 4.83 K/UL (ref 3.9–12.7)

## 2022-06-14 PROCEDURE — 83036 HEMOGLOBIN GLYCOSYLATED A1C: CPT | Performed by: FAMILY MEDICINE

## 2022-06-14 PROCEDURE — 80061 LIPID PANEL: CPT | Performed by: FAMILY MEDICINE

## 2022-06-14 PROCEDURE — 84153 ASSAY OF PSA TOTAL: CPT | Performed by: FAMILY MEDICINE

## 2022-06-14 PROCEDURE — 85025 COMPLETE CBC W/AUTO DIFF WBC: CPT | Performed by: FAMILY MEDICINE

## 2022-06-14 PROCEDURE — 36415 COLL VENOUS BLD VENIPUNCTURE: CPT | Mod: PO | Performed by: FAMILY MEDICINE

## 2022-06-14 PROCEDURE — 80053 COMPREHEN METABOLIC PANEL: CPT | Performed by: FAMILY MEDICINE

## 2022-06-14 PROCEDURE — 84443 ASSAY THYROID STIM HORMONE: CPT | Performed by: FAMILY MEDICINE

## 2022-06-15 ENCOUNTER — PATIENT MESSAGE (OUTPATIENT)
Dept: INTERNAL MEDICINE | Facility: CLINIC | Age: 57
End: 2022-06-15
Payer: COMMERCIAL

## 2022-06-15 DIAGNOSIS — D58.2 ELEVATED HEMOGLOBIN: Primary | ICD-10-CM

## 2022-06-20 ENCOUNTER — PATIENT MESSAGE (OUTPATIENT)
Dept: INTERNAL MEDICINE | Facility: CLINIC | Age: 57
End: 2022-06-20
Payer: COMMERCIAL

## 2022-06-29 ENCOUNTER — TELEPHONE (OUTPATIENT)
Dept: INTERNAL MEDICINE | Facility: CLINIC | Age: 57
End: 2022-06-29
Payer: COMMERCIAL

## 2022-06-29 NOTE — TELEPHONE ENCOUNTER
----- Message from Mitch Ray sent at 6/29/2022  8:55 AM CDT -----  Contact: self  Rosalio Muñoz would like a call back at [phone], in regards to scheduling a pre op visit. Pt states that he would like an appointment on next week.

## 2022-07-08 ENCOUNTER — HOSPITAL ENCOUNTER (OUTPATIENT)
Dept: CARDIOLOGY | Facility: HOSPITAL | Age: 57
Discharge: HOME | End: 2022-07-08
Payer: COMMERCIAL

## 2022-07-08 ENCOUNTER — OFFICE VISIT (OUTPATIENT)
Dept: INTERNAL MEDICINE | Facility: CLINIC | Age: 57
End: 2022-07-08
Payer: COMMERCIAL

## 2022-07-08 ENCOUNTER — HOSPITAL ENCOUNTER (OUTPATIENT)
Dept: RADIOLOGY | Facility: HOSPITAL | Age: 57
Discharge: HOME | End: 2022-07-08
Attending: NURSE PRACTITIONER
Payer: COMMERCIAL

## 2022-07-08 VITALS
HEART RATE: 100 BPM | DIASTOLIC BLOOD PRESSURE: 88 MMHG | WEIGHT: 181 LBS | TEMPERATURE: 98 F | HEIGHT: 73 IN | SYSTOLIC BLOOD PRESSURE: 138 MMHG | BODY MASS INDEX: 23.99 KG/M2

## 2022-07-08 DIAGNOSIS — Z01.818 PRE-OP EXAM: ICD-10-CM

## 2022-07-08 DIAGNOSIS — F17.200 SMOKES TOBACCO DAILY: ICD-10-CM

## 2022-07-08 DIAGNOSIS — K08.9 POOR DENTITION: ICD-10-CM

## 2022-07-08 DIAGNOSIS — Z01.818 PRE-OP EXAM: Primary | ICD-10-CM

## 2022-07-08 PROCEDURE — 93005 ELECTROCARDIOGRAM TRACING: CPT | Mod: PO

## 2022-07-08 PROCEDURE — 4010F ACE/ARB THERAPY RXD/TAKEN: CPT | Mod: CPTII,S$GLB,, | Performed by: NURSE PRACTITIONER

## 2022-07-08 PROCEDURE — 4010F PR ACE/ARB THEARPY RXD/TAKEN: ICD-10-PCS | Mod: CPTII,S$GLB,, | Performed by: NURSE PRACTITIONER

## 2022-07-08 PROCEDURE — 93010 ELECTROCARDIOGRAM REPORT: CPT | Mod: ,,, | Performed by: INTERNAL MEDICINE

## 2022-07-08 PROCEDURE — 99214 PR OFFICE/OUTPT VISIT, EST, LEVL IV, 30-39 MIN: ICD-10-PCS | Mod: S$GLB,,, | Performed by: NURSE PRACTITIONER

## 2022-07-08 PROCEDURE — 3044F HG A1C LEVEL LT 7.0%: CPT | Mod: CPTII,S$GLB,, | Performed by: NURSE PRACTITIONER

## 2022-07-08 PROCEDURE — 99214 OFFICE O/P EST MOD 30 MIN: CPT | Mod: S$GLB,,, | Performed by: NURSE PRACTITIONER

## 2022-07-08 PROCEDURE — 93010 EKG 12-LEAD: ICD-10-PCS | Mod: ,,, | Performed by: INTERNAL MEDICINE

## 2022-07-08 PROCEDURE — 3079F PR MOST RECENT DIASTOLIC BLOOD PRESSURE 80-89 MM HG: ICD-10-PCS | Mod: CPTII,S$GLB,, | Performed by: NURSE PRACTITIONER

## 2022-07-08 PROCEDURE — 3008F PR BODY MASS INDEX (BMI) DOCUMENTED: ICD-10-PCS | Mod: CPTII,S$GLB,, | Performed by: NURSE PRACTITIONER

## 2022-07-08 PROCEDURE — 99999 PR PBB SHADOW E&M-EST. PATIENT-LVL III: CPT | Mod: PBBFAC,,, | Performed by: NURSE PRACTITIONER

## 2022-07-08 PROCEDURE — 99999 PR PBB SHADOW E&M-EST. PATIENT-LVL III: ICD-10-PCS | Mod: PBBFAC,,, | Performed by: NURSE PRACTITIONER

## 2022-07-08 PROCEDURE — 71046 X-RAY EXAM CHEST 2 VIEWS: CPT | Mod: 26,,, | Performed by: RADIOLOGY

## 2022-07-08 PROCEDURE — 3079F DIAST BP 80-89 MM HG: CPT | Mod: CPTII,S$GLB,, | Performed by: NURSE PRACTITIONER

## 2022-07-08 PROCEDURE — 71046 XR CHEST PA AND LATERAL: ICD-10-PCS | Mod: 26,,, | Performed by: RADIOLOGY

## 2022-07-08 PROCEDURE — 3044F PR MOST RECENT HEMOGLOBIN A1C LEVEL <7.0%: ICD-10-PCS | Mod: CPTII,S$GLB,, | Performed by: NURSE PRACTITIONER

## 2022-07-08 PROCEDURE — 3008F BODY MASS INDEX DOCD: CPT | Mod: CPTII,S$GLB,, | Performed by: NURSE PRACTITIONER

## 2022-07-08 PROCEDURE — 71046 X-RAY EXAM CHEST 2 VIEWS: CPT | Mod: TC,FY,PO

## 2022-07-08 PROCEDURE — 3075F PR MOST RECENT SYSTOLIC BLOOD PRESS GE 130-139MM HG: ICD-10-PCS | Mod: CPTII,S$GLB,, | Performed by: NURSE PRACTITIONER

## 2022-07-08 PROCEDURE — 3075F SYST BP GE 130 - 139MM HG: CPT | Mod: CPTII,S$GLB,, | Performed by: NURSE PRACTITIONER

## 2022-07-08 NOTE — PROGRESS NOTES
Subjective:     Rosalio Muñoz is a 56 y.o. male who presents to the office today for a preoperative consultation at the request of surgeon Dr. Chacon who plans on performing extraction of all teeth on to be determined. This consultation is requested for the specific conditions prompting preoperative evaluation (i.e. because of potential affect on operative risk). Planned anesthesia: general. The patient has the following known anesthesia issues: none. Patients bleeding risk: no recent abnormal bleeding. Patient does not have objections to receiving blood products if needed.    Smokes 1.5 packs/day     The following portions of the patient's history were reviewed and updated as appropriate:  Past Medical History:   Diagnosis Date    Hypertension      Past Medical History:   Diagnosis Date    Hypertension      Family History   Problem Relation Age of Onset    Hyperlipidemia Mother     Hypertension Father     Diabetes Father     Kidney disease Father     Heart disease Father     Prostate cancer Maternal Grandfather     Colon cancer Neg Hx      Past Surgical History:   Procedure Laterality Date    WISDOM TOOTH EXTRACTION       Social History     Socioeconomic History    Marital status:     Number of children: 2   Occupational History    Occupation:    Tobacco Use    Smoking status: Current Every Day Smoker     Packs/day: 1.50     Years: 25.00     Pack years: 37.50     Types: Cigarettes    Smokeless tobacco: Current User     Types: Chew   Substance and Sexual Activity    Alcohol use: Yes     Alcohol/week: 4.0 - 6.0 standard drinks     Types: 4 - 6 Cans of beer per week    Drug use: No    Sexual activity: Yes     Partners: Female     Review of patient's allergies indicates:  No Known Allergies  Medication List with Changes/Refills   Current Medications    ASPIRIN (ECOTRIN) 81 MG EC TABLET    Take 81 mg by mouth once daily.    ATORVASTATIN (LIPITOR) 20 MG TABLET    Take  1 tablet (20 mg total) by mouth once daily.    METOPROLOL SUCCINATE (TOPROL-XL) 100 MG 24 HR TABLET    Take 1 tablet (100 mg total) by mouth once daily.    OLMESARTAN (BENICAR) 20 MG TABLET    Take 1 tablet (20 mg total) by mouth once daily.     Patient Active Problem List   Diagnosis    HTN (hypertension)    Umbilical hernia    Tobacco abuse    Delayed immunizations    Other hyperlipidemia       Review of Systems  Review of Systems   Constitutional: Negative for appetite change, chills, diaphoresis, fever and unexpected weight change.   HENT: Negative for congestion, ear pain, nosebleeds, postnasal drip, rhinorrhea, sinus pressure, sneezing, sore throat and trouble swallowing.    Eyes: Negative for photophobia, pain and visual disturbance.   Respiratory: Negative for apnea, cough, choking, chest tightness, shortness of breath and wheezing.    Cardiovascular: Negative for chest pain, palpitations and leg swelling.   Gastrointestinal: Negative for abdominal pain, blood in stool, constipation, diarrhea, nausea and vomiting.   Genitourinary: Negative for decreased urine volume, difficulty urinating, dysuria, hematuria and urgency.   Musculoskeletal: Negative for arthralgias, gait problem, joint swelling and myalgias.   Skin: Negative for rash.   Neurological: Negative for dizziness, tremors, seizures, syncope, weakness, light-headedness, numbness and headaches.   Psychiatric/Behavioral: Negative for agitation, confusion, decreased concentration, hallucinations and sleep disturbance. The patient is not nervous/anxious.         Objective:     Vitals:    07/08/22 0845   BP: 138/88   Pulse: 100   Temp: 97.9 °F (36.6 °C)       Physical Exam  Vitals and nursing note reviewed.   Constitutional:       General: He is not in acute distress.     Appearance: He is well-developed. He is not diaphoretic.   HENT:      Head: Normocephalic and atraumatic.   Cardiovascular:      Rate and Rhythm: Normal rate and regular rhythm.       Heart sounds: Normal heart sounds.   Pulmonary:      Effort: Pulmonary effort is normal. No respiratory distress.      Breath sounds: Normal breath sounds.   Skin:     General: Skin is warm and dry.      Findings: No rash.   Neurological:      Mental Status: He is alert and oriented to person, place, and time.   Psychiatric:         Behavior: Behavior normal.         Thought Content: Thought content normal.         Judgment: Judgment normal.          Assessment:       Encounter Diagnoses   Name Primary?    Pre-op exam Yes    Poor dentition     Smokes tobacco daily         Plan:     Pre-op exam  -     Basic Metabolic Panel; Future; Expected date: 07/08/2022  -     CBC Auto Differential; Future; Expected date: 07/08/2022  -     EKG 12-lead; Future  -     X-Ray Chest PA And Lateral; Future; Expected date: 07/08/2022    Poor dentition    Smokes tobacco daily        I reviewed the patient's past medical, surgical, social and family history and with  physical exam findings and the proposed surgery and I make the following recommendations:     From a cardiac standpoint the patient is low risk for surgery with a low risk surgery. Patient has no evidence of cardiac symptomatology or cardiac diagnoses. The patient may proceed with surgery without further cardiac workup.     From a pulmonary standpoint the patient presents as a moderate risk candidate due to him smoking 1.5 packs of cigarettes/day. Pre op cxr shows no acute findings. The patient has no history of lung disease or pulmonary symptoms. Good pulmonary toilet, incentive spirometry, early ambulation are all recommended to improve the pulmonary outcome. No further pulmonary workup as noted prior to surgery.     Patient is a daily smoker. The patient understands that smoking puts him at increased risk of post op complication such as infection and delayed wound healing. I encouraged patient to stop smoking prior to procedure.    DVT prophylaxis should be per  standard. Venous compression devices are recommended. Early ambulation. Patient has been educated on signs and symptoms of both DVT and pulmonary embolus and instructed on what to do if there are symptoms postop.     The patient has been instructed to take blood pressure medication the morning of surgery with a sip of water. Avoid any aspirin or anti-inflammatories between now and surgery.     If there is any further I can do to assist in the care of this patient please not hesitate to contact me. I will forward the lab results upon my receipt.    Viki Julien, KENDRA-C

## 2022-07-10 ENCOUNTER — OFFICE VISIT (OUTPATIENT)
Dept: URGENT CARE | Facility: CLINIC | Age: 57
End: 2022-07-10
Payer: COMMERCIAL

## 2022-07-10 VITALS
HEART RATE: 94 BPM | RESPIRATION RATE: 18 BRPM | DIASTOLIC BLOOD PRESSURE: 86 MMHG | OXYGEN SATURATION: 98 % | BODY MASS INDEX: 23.99 KG/M2 | SYSTOLIC BLOOD PRESSURE: 144 MMHG | TEMPERATURE: 98 F | WEIGHT: 181 LBS | HEIGHT: 73 IN

## 2022-07-10 DIAGNOSIS — K13.21 LEUKOPLAKIA OF TONGUE: Primary | ICD-10-CM

## 2022-07-10 DIAGNOSIS — J02.9 SORE THROAT: ICD-10-CM

## 2022-07-10 LAB
CTP QC/QA: YES
MOLECULAR STREP A: NEGATIVE

## 2022-07-10 PROCEDURE — 87651 STREP A DNA AMP PROBE: CPT | Mod: QW,S$GLB,, | Performed by: PHYSICIAN ASSISTANT

## 2022-07-10 PROCEDURE — 3079F PR MOST RECENT DIASTOLIC BLOOD PRESSURE 80-89 MM HG: ICD-10-PCS | Mod: CPTII,S$GLB,, | Performed by: PHYSICIAN ASSISTANT

## 2022-07-10 PROCEDURE — 1159F PR MEDICATION LIST DOCUMENTED IN MEDICAL RECORD: ICD-10-PCS | Mod: CPTII,S$GLB,, | Performed by: PHYSICIAN ASSISTANT

## 2022-07-10 PROCEDURE — 1159F MED LIST DOCD IN RCRD: CPT | Mod: CPTII,S$GLB,, | Performed by: PHYSICIAN ASSISTANT

## 2022-07-10 PROCEDURE — 4010F PR ACE/ARB THEARPY RXD/TAKEN: ICD-10-PCS | Mod: CPTII,S$GLB,, | Performed by: PHYSICIAN ASSISTANT

## 2022-07-10 PROCEDURE — 3044F PR MOST RECENT HEMOGLOBIN A1C LEVEL <7.0%: ICD-10-PCS | Mod: CPTII,S$GLB,, | Performed by: PHYSICIAN ASSISTANT

## 2022-07-10 PROCEDURE — 99213 OFFICE O/P EST LOW 20 MIN: CPT | Mod: S$GLB,,, | Performed by: PHYSICIAN ASSISTANT

## 2022-07-10 PROCEDURE — 3044F HG A1C LEVEL LT 7.0%: CPT | Mod: CPTII,S$GLB,, | Performed by: PHYSICIAN ASSISTANT

## 2022-07-10 PROCEDURE — 3077F PR MOST RECENT SYSTOLIC BLOOD PRESSURE >= 140 MM HG: ICD-10-PCS | Mod: CPTII,S$GLB,, | Performed by: PHYSICIAN ASSISTANT

## 2022-07-10 PROCEDURE — 3008F BODY MASS INDEX DOCD: CPT | Mod: CPTII,S$GLB,, | Performed by: PHYSICIAN ASSISTANT

## 2022-07-10 PROCEDURE — 3077F SYST BP >= 140 MM HG: CPT | Mod: CPTII,S$GLB,, | Performed by: PHYSICIAN ASSISTANT

## 2022-07-10 PROCEDURE — 3079F DIAST BP 80-89 MM HG: CPT | Mod: CPTII,S$GLB,, | Performed by: PHYSICIAN ASSISTANT

## 2022-07-10 PROCEDURE — 3008F PR BODY MASS INDEX (BMI) DOCUMENTED: ICD-10-PCS | Mod: CPTII,S$GLB,, | Performed by: PHYSICIAN ASSISTANT

## 2022-07-10 PROCEDURE — 1160F RVW MEDS BY RX/DR IN RCRD: CPT | Mod: CPTII,S$GLB,, | Performed by: PHYSICIAN ASSISTANT

## 2022-07-10 PROCEDURE — 99213 PR OFFICE/OUTPT VISIT, EST, LEVL III, 20-29 MIN: ICD-10-PCS | Mod: S$GLB,,, | Performed by: PHYSICIAN ASSISTANT

## 2022-07-10 PROCEDURE — 4010F ACE/ARB THERAPY RXD/TAKEN: CPT | Mod: CPTII,S$GLB,, | Performed by: PHYSICIAN ASSISTANT

## 2022-07-10 PROCEDURE — 1160F PR REVIEW ALL MEDS BY PRESCRIBER/CLIN PHARMACIST DOCUMENTED: ICD-10-PCS | Mod: CPTII,S$GLB,, | Performed by: PHYSICIAN ASSISTANT

## 2022-07-10 PROCEDURE — 87651 POCT STREP A MOLECULAR: ICD-10-PCS | Mod: QW,S$GLB,, | Performed by: PHYSICIAN ASSISTANT

## 2022-07-10 NOTE — PROGRESS NOTES
"Subjective:       Patient ID: Rosalio Muñoz is a 56 y.o. male.    Vitals:  height is 6' 1" (1.854 m) and weight is 82.1 kg (181 lb). His temperature is 98.2 °F (36.8 °C). His blood pressure is 144/86 (abnormal) and his pulse is 94. His respiration is 18 and oxygen saturation is 98%.     Chief Complaint: Thrush    Rosalio Muñoz is a 56 year old male who presents with plaque like build up to tongue/mouth and sore throat for about 2 weeks.  States a couple of months ago he had a bad case of Tonsillitis and was placed on antibiotics.  He developed what he treated as thrush with an at home treatment and it cleared up.  States similar symptoms returned 2 weeks ago.  He is having trouble with pain when swallowing and a dark discoloration of his tongue.  He denies fever, cough, or nasal congestion.  He states he is a smoker.    Mouth Lesions   The current episode started more than 1 week ago. The onset was gradual. The problem occurs continuously. The problem has been gradually worsening. The problem is moderate. Nothing relieves the symptoms. Associated symptoms include mouth sores. Pertinent negatives include no orthopnea, no fever, no decreased vision, no double vision, no eye itching, no photophobia, no abdominal pain, no constipation, no diarrhea, no nausea, no vomiting, no congestion, no ear discharge, no ear pain, no headaches, no hearing loss, no rhinorrhea, no sore throat, no stridor, no swollen glands, no muscle aches, no neck pain, no neck stiffness, no cough, no URI, no wheezing, no rash, no diaper rash, no eye discharge, no eye pain and no eye redness.       Constitution: Negative for fever.   HENT: Positive for mouth sores. Negative for ear pain, ear discharge, hearing loss, congestion and sore throat.    Neck: Negative for neck pain.   Eyes: Negative for eye discharge, eye itching, eye pain, eye redness, photophobia and double vision.   Respiratory: Negative for cough, stridor and wheezing.  "   Gastrointestinal: Negative for abdominal pain, nausea, vomiting, constipation and diarrhea.   Skin: Negative for rash.   Neurological: Negative for headaches.       Objective:      Physical Exam   Constitutional: He is oriented to person, place, and time. He appears well-developed. He is cooperative.  Non-toxic appearance. He does not appear ill. No distress.   HENT:   Head: Normocephalic and atraumatic.   Ears:   Right Ear: Hearing, tympanic membrane, external ear and ear canal normal.   Left Ear: Hearing, tympanic membrane, external ear and ear canal normal.   Nose: Nose normal. No mucosal edema, rhinorrhea or nasal deformity. No epistaxis. Right sinus exhibits no maxillary sinus tenderness and no frontal sinus tenderness. Left sinus exhibits no maxillary sinus tenderness and no frontal sinus tenderness.   Mouth/Throat: Uvula is midline, oropharynx is clear and moist and mucous membranes are normal. Oral lesions (tongue with a plaque type build up.  Brownish/white in coloration.  Unable to scrape it off of tongue.) present. No trismus in the jaw. Abnormal dentition. Dental caries present. No uvula swelling. No oropharyngeal exudate, posterior oropharyngeal edema or posterior oropharyngeal erythema.   Eyes: Conjunctivae and lids are normal. No scleral icterus.   Neck: Trachea normal and phonation normal. Neck supple. No edema present. No erythema present. No neck rigidity present.   Cardiovascular: Normal rate, regular rhythm, normal heart sounds and normal pulses.   Pulmonary/Chest: Effort normal and breath sounds normal. No respiratory distress. He has no decreased breath sounds. He has no rhonchi.   Abdominal: Normal appearance.   Musculoskeletal: Normal range of motion.         General: No deformity. Normal range of motion.   Neurological: He is alert and oriented to person, place, and time. He exhibits normal muscle tone. Coordination normal.   Skin: Skin is warm, dry, intact, not diaphoretic and not pale.    Psychiatric: His speech is normal and behavior is normal. Judgment and thought content normal.   Nursing note and vitals reviewed.        Assessment:       1. Leukoplakia of tongue    2. Sore throat          Plan:         Leukoplakia of tongue    Sore throat  -     POCT Strep A, Molecular      Results for orders placed or performed in visit on 07/10/22   POCT Strep A, Molecular   Result Value Ref Range    Molecular Strep A, POC Negative Negative     Acceptable Yes         Supportive treatment.  Recommend close follow up with ENT for further evaluation.  RTC as needed with new or worsening symptoms.

## 2022-07-12 ENCOUNTER — PATIENT MESSAGE (OUTPATIENT)
Dept: INTERNAL MEDICINE | Facility: CLINIC | Age: 57
End: 2022-07-12
Payer: COMMERCIAL

## 2022-07-12 ENCOUNTER — TELEPHONE (OUTPATIENT)
Dept: INTERNAL MEDICINE | Facility: CLINIC | Age: 57
End: 2022-07-12
Payer: COMMERCIAL

## 2022-07-12 DIAGNOSIS — D58.2 ELEVATED HEMOGLOBIN: Primary | ICD-10-CM

## 2022-07-12 NOTE — TELEPHONE ENCOUNTER
----- Message from KENDRA Melo-TOSHIA sent at 7/12/2022  7:38 AM CDT -----  Fax pre op. See RX sheet with fax number.

## 2022-07-13 ENCOUNTER — TELEPHONE (OUTPATIENT)
Dept: URGENT CARE | Facility: CLINIC | Age: 57
End: 2022-07-13
Payer: COMMERCIAL

## 2022-07-13 ENCOUNTER — PATIENT MESSAGE (OUTPATIENT)
Dept: HEMATOLOGY/ONCOLOGY | Facility: CLINIC | Age: 57
End: 2022-07-13
Payer: COMMERCIAL

## 2022-07-13 ENCOUNTER — TELEPHONE (OUTPATIENT)
Dept: HEMATOLOGY/ONCOLOGY | Facility: CLINIC | Age: 57
End: 2022-07-13
Payer: COMMERCIAL

## 2022-07-19 ENCOUNTER — OFFICE VISIT (OUTPATIENT)
Dept: OTOLARYNGOLOGY | Facility: CLINIC | Age: 57
End: 2022-07-19
Payer: COMMERCIAL

## 2022-07-19 VITALS — TEMPERATURE: 98 F | BODY MASS INDEX: 24.23 KG/M2 | WEIGHT: 183.63 LBS

## 2022-07-19 DIAGNOSIS — K21.9 LARYNGOPHARYNGEAL REFLUX (LPR): Primary | ICD-10-CM

## 2022-07-19 DIAGNOSIS — K02.9 DENTAL CARIES: ICD-10-CM

## 2022-07-19 DIAGNOSIS — R13.10 DYSPHAGIA, UNSPECIFIED TYPE: ICD-10-CM

## 2022-07-19 PROCEDURE — 31575 DIAGNOSTIC LARYNGOSCOPY: CPT | Mod: S$GLB,,, | Performed by: OTOLARYNGOLOGY

## 2022-07-19 PROCEDURE — 99214 OFFICE O/P EST MOD 30 MIN: CPT | Mod: 25,S$GLB,, | Performed by: OTOLARYNGOLOGY

## 2022-07-19 PROCEDURE — 3008F PR BODY MASS INDEX (BMI) DOCUMENTED: ICD-10-PCS | Mod: CPTII,S$GLB,, | Performed by: OTOLARYNGOLOGY

## 2022-07-19 PROCEDURE — 4010F ACE/ARB THERAPY RXD/TAKEN: CPT | Mod: CPTII,S$GLB,, | Performed by: OTOLARYNGOLOGY

## 2022-07-19 PROCEDURE — 3008F BODY MASS INDEX DOCD: CPT | Mod: CPTII,S$GLB,, | Performed by: OTOLARYNGOLOGY

## 2022-07-19 PROCEDURE — 99214 PR OFFICE/OUTPT VISIT, EST, LEVL IV, 30-39 MIN: ICD-10-PCS | Mod: 25,S$GLB,, | Performed by: OTOLARYNGOLOGY

## 2022-07-19 PROCEDURE — 99999 PR PBB SHADOW E&M-EST. PATIENT-LVL III: CPT | Mod: PBBFAC,,, | Performed by: OTOLARYNGOLOGY

## 2022-07-19 PROCEDURE — 1159F PR MEDICATION LIST DOCUMENTED IN MEDICAL RECORD: ICD-10-PCS | Mod: CPTII,S$GLB,, | Performed by: OTOLARYNGOLOGY

## 2022-07-19 PROCEDURE — 3044F PR MOST RECENT HEMOGLOBIN A1C LEVEL <7.0%: ICD-10-PCS | Mod: CPTII,S$GLB,, | Performed by: OTOLARYNGOLOGY

## 2022-07-19 PROCEDURE — 1159F MED LIST DOCD IN RCRD: CPT | Mod: CPTII,S$GLB,, | Performed by: OTOLARYNGOLOGY

## 2022-07-19 PROCEDURE — 99999 PR PBB SHADOW E&M-EST. PATIENT-LVL III: ICD-10-PCS | Mod: PBBFAC,,, | Performed by: OTOLARYNGOLOGY

## 2022-07-19 PROCEDURE — 31575 PR LARYNGOSCOPY, FLEXIBLE; DIAGNOSTIC: ICD-10-PCS | Mod: S$GLB,,, | Performed by: OTOLARYNGOLOGY

## 2022-07-19 PROCEDURE — 4010F PR ACE/ARB THEARPY RXD/TAKEN: ICD-10-PCS | Mod: CPTII,S$GLB,, | Performed by: OTOLARYNGOLOGY

## 2022-07-19 PROCEDURE — 3044F HG A1C LEVEL LT 7.0%: CPT | Mod: CPTII,S$GLB,, | Performed by: OTOLARYNGOLOGY

## 2022-07-19 RX ORDER — PANTOPRAZOLE SODIUM 40 MG/1
40 TABLET, DELAYED RELEASE ORAL DAILY
Qty: 30 TABLET | Refills: 11 | Status: ON HOLD | OUTPATIENT
Start: 2022-07-19 | End: 2023-05-31 | Stop reason: HOSPADM

## 2022-07-19 NOTE — PROGRESS NOTES
Referring Provider:    No referring provider defined for this encounter.  Subjective:   Patient: Rosalio Muñoz [id number], :1965   Visit date:2022 10:24 AM    Chief Complaint:  Oral Pain and Dysphagia    HPI:    Prior notes reviewed by myself.  Clinical documentation obtained by nursing staff reviewed.     56-year-old gentleman presents for evaluation of dysphagia as well as possible tongue lesion.  He states that, since having COVID 19 in January, he has noticed some increased difficulty in swallowing.  He feels as if this has improved recently.  He does report frequent throat clearing and occasional heartburn.  He noticed some bumps on the posterior aspect of his tongue recently.  He is a heavy smoker and wants to have these evaluated as he is concerned about malignancy.      Objective:     Physical Exam:  Vitals:  Temp 97.9 °F (36.6 °C) (Temporal)   Wt 83.3 kg (183 lb 10.3 oz)   BMI 24.23 kg/m²   General appearance:  Well developed, well nourished    Ears:  Otoscopy of external auditory canals and tympanic membranes was normal, clinical speech reception thresholds grossly intact, no mass/lesion of auricle.    Nose:  No masses/lesions of external nose, nasal mucosa, septum, and turbinates were within normal limits.    Mouth:  No mass/lesion of lips, poor dentition/multiple caries, gums, hard/soft palate, tongue, tonsils, or oropharynx.    Neck & Lymphatics:  No cervical lymphadenopathy, no neck mass/crepitus/ asymmetry, trachea is midline, no thyroid enlargement/tenderness/mass.        [x]  Data Reviewed:    Lab Results   Component Value Date    WBC 7.04 2022    HGB 19.0 (H) 2022    HCT 54.8 (H) 2022     (H) 2022    EOSINOPHIL 0.7 2022         [x]  Independent interpretation of test:  Due to indication in patient's history, presentation or risk factors,  a fiber optic exam was performed.    SEPARATE PROCEDURE NOTE:    ANESTHESIA:  Topical xylocaine with  francine-synephrine  FINDINGS:  Moderate to severe inaterarytenoid erythema and edema    PROCEDURE:  After verbal consent was obtained, the flexible scope was passed through the patient's nasal cavity without difficulty.  The nasopharynx (adenoid pad) and eustachian tube orifices were first visualized and were found to be normal, without masses or irregularity.  The posterior pharyngeal wall and base of tongue were then examined and no mass or irregular tissue was seen.  The scope was then advanced to the larynx, and the epiglottis, valleculae, and piriform sinuses were normal, without masses or mucosal irregularity.  The false vocal folds and true vocal folds were then examined and were found to have normal mobility (full abduction and adduction) and no masses or mucosal irregularity was seen.  The interartyenoid area had moderate to severe edema and erythema consistent with reflux.            Assessment & Plan:   Laryngopharyngeal reflux (LPR)  -     pantoprazole (PROTONIX) 40 MG tablet; Take 1 tablet (40 mg total) by mouth once daily.  Dispense: 30 tablet; Refill: 11    Dysphagia, unspecified type    Dental caries      He is noticing his normal circumvallate papillae on his posterior tongue.  I reassured him that this was normal anatomy.  I also encouraged him to pursue smoking cessation and he has plans to stop smoking next week.  He does have signs and symptoms consistent with laryngopharyngeal reflux.  We agreed to treat this with Protonix daily.  He will follow-up in 6 months.    Laryngopharyngeal Reflux (LPR) Patient Information and Medication Instructions    LPR (laryngopharyngeal reflux) can be a challenging condition to control.  Some patients require once daily medication like a proton pump inhibitor to control their symptoms (such as Omeprazole, Pantoprazole, Esomeprazole).  HOWEVER, other patients will require taking this medication twice daily and even may be started on another medication called an H2  blocker (such as Pepcid, Zantac) at bedtime.    It is important that medication is not the only technique that you use to help control your LPR.  Therapeutic lifestyle changes are very important as well:    1.  Weight Loss:  If you are overweight, losing weight can significantly reduce your reflux.  2. Diet Control:  It is important to determine what your Trigger foods for reflux are.  Limiting your intake of these foods will significantly improve your reflux.  Examples of foods known to increase reflux are listed below  a. Carbonated beverages  b. Caffeine (this includes Coffee, soda, iced tea, energy drinks etc.)  c. Alcohol  d. Fried/ fatty/ greasy foods  e. Acidic foods (citrus, tomato etc.)  f. Chocolate  g. Spearmint/Peppermint  3. Elevating the head of your bed:  This doesn't mean more pillows.  You need to actually elevate the head of your bed.  Some patients have found something to put under the legs of the head of their bed.  Other patients have employed a wedge or an air bladder of some sort to elevate the head of their bed.  This makes a significant difference with reflux and can also help with nasal congestion.  4. Eating before bedtime:  Try not to eat anything for at least 2 hours before bedtime.  This allows for less material to remain in your stomach when you lay down at night which equals less severe reflux.  5. More information:  If you would like to read more about diet changes and lifestyle changes that you can employ that will help with your reflux, the books below may be helpful.  a. Dropping Acid:  The Reflux diet Cookbook & Cure by Sage Johnson M.D. and Dario Orellana M.D.  b. The Acid Watcher Diet:  A 28 Day Reflux Prevention and Healing Program by Aiden Madrigal M.D.      Weaning Instructions After Symptom Improvement    It can sometimes take up to 12 weeks to see symptom improvement in LPR.  The medications may reduce the amount of acid you are producing very quickly, but then the tissues  of your voice box and upper throat have to heal themselves.  Your physician may have increased year proton pump inhibitor to twice daily dosing and even may have added an H2 blocker at bedtime.  Eventually, the goal is a complete resolution of your symptoms.  Hopefully you have been working on the lifestyle modifications listed above over this time period as well!    Once your symptoms have improved, our goal is to wean you back off of your reflux medication.  The instructions below will help guide you through this process.    1. Take all anti-reflux medications for at least 3 weeks beyond when your symptoms have improved/resolved  2. If you are on Twice daily dosing of a proton pump inhibitor (omeprazole, pantoprazole, esomeprazole etc.) and an H2 blocker at bedtime (pepcid, zantac) then you should first discontinue your night time dose of your proton pump inhibitor.  3. If your symptoms are still controlled after 3 weeks of taking your PPI in the morning and your H2 blocker at bedtime,  discontinue your bedtime H2 blocker  4. If your symptoms are still controlled after 3 more weeks of only taking your PPI in the morning, discontinue your morning PPI    If at any point your symptoms return during this weaning process, you can return to the previous step and let your physician know at the next appointment.

## 2022-08-04 ENCOUNTER — HOSPITAL ENCOUNTER (OUTPATIENT)
Dept: RADIOLOGY | Facility: HOSPITAL | Age: 57
Discharge: HOME | End: 2022-08-04
Attending: FAMILY MEDICINE
Payer: COMMERCIAL

## 2022-08-04 DIAGNOSIS — Z12.2 SCREENING FOR LUNG CANCER: ICD-10-CM

## 2022-08-04 DIAGNOSIS — Z72.0 TOBACCO ABUSE: ICD-10-CM

## 2022-08-04 PROCEDURE — 71271 CT CHEST LUNG SCREENING LOW DOSE: ICD-10-PCS | Mod: 26,,, | Performed by: RADIOLOGY

## 2022-08-04 PROCEDURE — 71271 CT THORAX LUNG CANCER SCR C-: CPT | Mod: 26,,, | Performed by: RADIOLOGY

## 2022-08-04 PROCEDURE — 71271 CT THORAX LUNG CANCER SCR C-: CPT | Mod: TC

## 2022-08-16 ENCOUNTER — TELEPHONE (OUTPATIENT)
Dept: OTOLARYNGOLOGY | Facility: CLINIC | Age: 57
End: 2022-08-16
Payer: COMMERCIAL

## 2022-08-18 ENCOUNTER — CLINICAL SUPPORT (OUTPATIENT)
Dept: AUDIOLOGY | Facility: CLINIC | Age: 57
End: 2022-08-18
Payer: COMMERCIAL

## 2022-08-18 DIAGNOSIS — H90.3 SENSORINEURAL HEARING LOSS, ASYMMETRICAL: Primary | ICD-10-CM

## 2022-08-18 DIAGNOSIS — H90.A22 SENSORINEURAL HEARING LOSS (SNHL) OF LEFT EAR WITH RESTRICTED HEARING OF RIGHT EAR: ICD-10-CM

## 2022-08-18 DIAGNOSIS — Z46.1 ENCOUNTER FOR FITTING AND ADJUSTMENT OF HEARING AID OF BOTH EARS: Primary | ICD-10-CM

## 2022-08-18 PROCEDURE — 92557 PR COMPREHENSIVE HEARING TEST: ICD-10-PCS | Mod: S$GLB,,, | Performed by: AUDIOLOGIST-HEARING AID FITTER

## 2022-08-18 PROCEDURE — 92567 PR TYMPA2METRY: ICD-10-PCS | Mod: S$GLB,,, | Performed by: AUDIOLOGIST-HEARING AID FITTER

## 2022-08-18 PROCEDURE — 92567 TYMPANOMETRY: CPT | Mod: S$GLB,,, | Performed by: AUDIOLOGIST-HEARING AID FITTER

## 2022-08-18 PROCEDURE — 92557 COMPREHENSIVE HEARING TEST: CPT | Mod: S$GLB,,, | Performed by: AUDIOLOGIST-HEARING AID FITTER

## 2022-08-18 NOTE — PROGRESS NOTES
Rosalio Muñoz was seen 08/18/2022 for annual audiological evaluation to monitor asymmetry in hearing.  Patient has long history of bilateral symmetric SNHL. He was last seen for audiogram in June 2021 and presented with a notable left ear high-frequency sensorineural decline in hearing with newer onset left-sided tinnitus. Patient had a negative MRI IAC/Temporal bone 06/25/2021. Patient suspects he may have change in hearing over the past year. He no longer perceives tinnitus. Patient has been wearing hearing aids, binaurally, since 06/29/2021; and is doing well with them.     Results reveal a moderate sensorineural hearing loss 250-8000 Hz for the right ear, and a moderate-to-severe sensorineural hearing loss 250-8000 Hz for the left ear.   Speech Reception Thresholds were 55 dBHL for the right ear and 55 dBHL for the left ear.   Word recognition scores were good for the right ear and good for the left ear.   Tympanograms were Type A for the right ear and Type A for the left ear.    Hearing is essentially stable for each ear with no significant change since his previous audiogram on 06/14/2021.   Patient was counseled on the above findings.    Recommendations:  1. Annual audiogram to monitor asymmetry in hearing.   2. Continue with hearing aids - adjusted today.

## 2022-08-18 NOTE — PROGRESS NOTES
Rosalio Muñoz was seen 08/18/2022 for annual hearing aid follow-up.  Audiogram reveals no significant change in hearing. Patient reports he is doing well overall. His primary problem is understanding speech (ie: his wife) when speaker is not facing him or in another room. Discussed realistic expectations but also made adjustments to try to assist with this concern. He reports restaurant program does help to reduce the background noise but it also takes away too much speech intelligibility at his table; made adjustments to help with this. Both aids are in GWO and well-maintained. He has been changing wax filters weekly noting he sweats a lot that discolors filters; has not been performing daily cleanings. Reviewed daily cleaning recommendation to hopefully reduce need to change filters weekly, and discussed option to use sani-spray. He will wait on purchasing sani-spray and try daily cleaning routine first. He is purchasing wax filters through [employer]; works well. Patient and I agree for annual hearing and check with audiogram. He will contact me sooner if any concerns arise.     Adjusted aids as follows:  P1: Normal: reprogrammed to today's audiogram, level 3  P2: Restaurant: increased HF gain to assist with speech intelligibility  P3: Lecture: to assist with distant speech; patient appreciates a notable boost in HF-gain and likes this mode  Discussed safe music streaming levels. Confirmed luis/streaming is working normally.

## 2022-09-22 NOTE — TELEPHONE ENCOUNTER
----- Message from Maki Candelaria sent at 10/9/2018  2:18 PM CDT -----  Contact: pt's wife ciarra [phone]            Name of Who is Calling: pt's wife ciarra [phone]      What is the request in detail: pt needs blood work orders before his annual. Call wife      Can the clinic reply by [facility]: no      What Number to Call Back if not in [facility]: pt's wife ciarra [phone]                                    
Called pt's wife and let her know that booked him for fasting labs 10-19-18 .    
Quality 111:Pneumonia Vaccination Status For Older Adults: Pneumococcal Vaccination Previously Received
Quality 110: Preventive Care And Screening: Influenza Immunization: Influenza Immunization Administered during Influenza season
Detail Level: Detailed

## 2022-09-27 ENCOUNTER — PATIENT MESSAGE (OUTPATIENT)
Dept: HEMATOLOGY/ONCOLOGY | Facility: CLINIC | Age: 57
End: 2022-09-27
Payer: COMMERCIAL

## 2023-01-06 ENCOUNTER — PATIENT MESSAGE (OUTPATIENT)
Dept: ADMINISTRATIVE | Facility: HOSPITAL | Age: 58
End: 2023-01-06
Payer: COMMERCIAL

## 2023-04-19 ENCOUNTER — PATIENT MESSAGE (OUTPATIENT)
Dept: ADMINISTRATIVE | Facility: HOSPITAL | Age: 58
End: 2023-04-19
Payer: COMMERCIAL

## 2023-05-08 RX ORDER — OLMESARTAN MEDOXOMIL 20 MG/1
20 TABLET ORAL DAILY
Qty: 30 TABLET | Refills: 0 | Status: SHIPPED | OUTPATIENT
Start: 2023-05-08 | End: 2023-05-26 | Stop reason: SDUPTHER

## 2023-05-08 RX ORDER — METOPROLOL SUCCINATE 100 MG/1
100 TABLET, EXTENDED RELEASE ORAL DAILY
Qty: 30 TABLET | Refills: 0 | Status: SHIPPED | OUTPATIENT
Start: 2023-05-08 | End: 2023-05-26 | Stop reason: SDUPTHER

## 2023-05-08 RX ORDER — ATORVASTATIN CALCIUM 20 MG/1
20 TABLET, FILM COATED ORAL DAILY
Qty: 30 TABLET | Refills: 0 | Status: SHIPPED | OUTPATIENT
Start: 2023-05-08 | End: 2023-05-26 | Stop reason: SDUPTHER

## 2023-05-08 NOTE — TELEPHONE ENCOUNTER
Care Due:                  Date            Visit Type   Department     Provider  --------------------------------------------------------------------------------                                MYCHART                              ANNUAL                              CHECKUP/PHY  PRV INTERNAL  Last Visit: 05-      [facility]       Mihir Sorto  Next Visit: None Scheduled  None         None Found                                                            Last  Test          Frequency    Reason                     Performed    Due Date  --------------------------------------------------------------------------------    Office Visit  12 months..  atorvastatin, metoprolol,   05- 04-                             olmesartan...............    CMP.........  12 months..  atorvastatin, olmesartan.  06- 06-    Lipid Panel.  12 months..  atorvastatin.............  06- 06-    [facility] Embedded Care Due Messages. Reference number: [id number].   5/08/2023 6:59:58 AM CDT

## 2023-05-26 ENCOUNTER — OFFICE VISIT (OUTPATIENT)
Dept: INTERNAL MEDICINE | Facility: CLINIC | Age: 58
End: 2023-05-26
Payer: COMMERCIAL

## 2023-05-26 VITALS
DIASTOLIC BLOOD PRESSURE: 100 MMHG | HEIGHT: 73 IN | BODY MASS INDEX: 27.23 KG/M2 | SYSTOLIC BLOOD PRESSURE: 150 MMHG | WEIGHT: 205.5 LBS | TEMPERATURE: 99 F | HEART RATE: 88 BPM

## 2023-05-26 DIAGNOSIS — E78.49 OTHER HYPERLIPIDEMIA: ICD-10-CM

## 2023-05-26 DIAGNOSIS — Z12.11 COLON CANCER SCREENING: ICD-10-CM

## 2023-05-26 DIAGNOSIS — I10 PRIMARY HYPERTENSION: Primary | ICD-10-CM

## 2023-05-26 DIAGNOSIS — Z00.00 ANNUAL PHYSICAL EXAM: ICD-10-CM

## 2023-05-26 PROCEDURE — 4010F ACE/ARB THERAPY RXD/TAKEN: CPT | Mod: CPTII,S$GLB,, | Performed by: FAMILY MEDICINE

## 2023-05-26 PROCEDURE — 1160F RVW MEDS BY RX/DR IN RCRD: CPT | Mod: CPTII,S$GLB,, | Performed by: FAMILY MEDICINE

## 2023-05-26 PROCEDURE — 1160F PR REVIEW ALL MEDS BY PRESCRIBER/CLIN PHARMACIST DOCUMENTED: ICD-10-PCS | Mod: CPTII,S$GLB,, | Performed by: FAMILY MEDICINE

## 2023-05-26 PROCEDURE — 99999 PR PBB SHADOW E&M-EST. PATIENT-LVL IV: CPT | Mod: PBBFAC,,, | Performed by: FAMILY MEDICINE

## 2023-05-26 PROCEDURE — 3080F DIAST BP >= 90 MM HG: CPT | Mod: CPTII,S$GLB,, | Performed by: FAMILY MEDICINE

## 2023-05-26 PROCEDURE — 3008F BODY MASS INDEX DOCD: CPT | Mod: CPTII,S$GLB,, | Performed by: FAMILY MEDICINE

## 2023-05-26 PROCEDURE — 1159F PR MEDICATION LIST DOCUMENTED IN MEDICAL RECORD: ICD-10-PCS | Mod: CPTII,S$GLB,, | Performed by: FAMILY MEDICINE

## 2023-05-26 PROCEDURE — 3080F PR MOST RECENT DIASTOLIC BLOOD PRESSURE >= 90 MM HG: ICD-10-PCS | Mod: CPTII,S$GLB,, | Performed by: FAMILY MEDICINE

## 2023-05-26 PROCEDURE — 3077F PR MOST RECENT SYSTOLIC BLOOD PRESSURE >= 140 MM HG: ICD-10-PCS | Mod: CPTII,S$GLB,, | Performed by: FAMILY MEDICINE

## 2023-05-26 PROCEDURE — 99214 PR OFFICE/OUTPT VISIT, EST, LEVL IV, 30-39 MIN: ICD-10-PCS | Mod: S$GLB,,, | Performed by: FAMILY MEDICINE

## 2023-05-26 PROCEDURE — 4010F PR ACE/ARB THEARPY RXD/TAKEN: ICD-10-PCS | Mod: CPTII,S$GLB,, | Performed by: FAMILY MEDICINE

## 2023-05-26 PROCEDURE — 3077F SYST BP >= 140 MM HG: CPT | Mod: CPTII,S$GLB,, | Performed by: FAMILY MEDICINE

## 2023-05-26 PROCEDURE — 3008F PR BODY MASS INDEX (BMI) DOCUMENTED: ICD-10-PCS | Mod: CPTII,S$GLB,, | Performed by: FAMILY MEDICINE

## 2023-05-26 PROCEDURE — 1159F MED LIST DOCD IN RCRD: CPT | Mod: CPTII,S$GLB,, | Performed by: FAMILY MEDICINE

## 2023-05-26 PROCEDURE — 99214 OFFICE O/P EST MOD 30 MIN: CPT | Mod: S$GLB,,, | Performed by: FAMILY MEDICINE

## 2023-05-26 PROCEDURE — 99999 PR PBB SHADOW E&M-EST. PATIENT-LVL IV: ICD-10-PCS | Mod: PBBFAC,,, | Performed by: FAMILY MEDICINE

## 2023-05-26 RX ORDER — METOPROLOL SUCCINATE 100 MG/1
100 TABLET, EXTENDED RELEASE ORAL DAILY
Qty: 30 TABLET | Refills: 0 | Status: ON HOLD | OUTPATIENT
Start: 2023-05-26 | End: 2023-05-31 | Stop reason: HOSPADM

## 2023-05-26 RX ORDER — OLMESARTAN MEDOXOMIL 40 MG/1
40 TABLET ORAL DAILY
Qty: 30 TABLET | Refills: 0 | Status: ON HOLD | OUTPATIENT
Start: 2023-05-26 | End: 2023-05-31 | Stop reason: HOSPADM

## 2023-05-26 RX ORDER — ATORVASTATIN CALCIUM 20 MG/1
20 TABLET, FILM COATED ORAL DAILY
Qty: 30 TABLET | Refills: 0 | Status: ON HOLD | OUTPATIENT
Start: 2023-05-26 | End: 2023-05-31 | Stop reason: SDUPTHER

## 2023-05-26 NOTE — PROGRESS NOTES
"Subjective:      Patient ID: Rosalio Muñoz is a 57 y.o. male.    Chief Complaint: Annual Exam    HPI  56 yo here for annual but BP is too high.  Will reschedule annual.  Taking all meds in AM//about 2 hrs ago  Occ light HA  No Cp/SOB  No visual changes  Has gained weight, up around 20-25 lbs since quitting smoking.  Drinks a lot of caffeine and sodium intake is high.    Past Medical History:   Diagnosis Date    Hypertension      Family History   Problem Relation Age of Onset    Hyperlipidemia Mother     Hypertension Father     Diabetes Father     Kidney disease Father     Heart disease Father     Prostate cancer Maternal Grandfather     Colon cancer Neg Hx      Past Surgical History:   Procedure Laterality Date    WISDOM TOOTH EXTRACTION       Social History     Tobacco Use    Smoking status: Former     Packs/day: 1.50     Years: 25.00     Pack years: 37.50     Types: Cigarettes     Quit date: 10/4/2022     Years since quittin.6    Smokeless tobacco: Never   Substance Use Topics    Alcohol use: Yes     Alcohol/week: 4.0 - 6.0 standard drinks     Types: 4 - 6 Cans of beer per week    Drug use: No       BP (!) 150/100   Pulse 88   Temp 98.7 °F (37.1 °C)   Ht 6' 1" (1.854 m)   Wt 93.2 kg (205 lb 7.5 oz)   BMI 27.11 kg/m²     Review of Systems   Constitutional:  Positive for unexpected weight change.   Neurological:  Positive for headaches.     Objective:     Physical Exam  Vitals and nursing note reviewed.   Constitutional:       General: He is not in acute distress.     Appearance: He is well-developed. He is not diaphoretic.   HENT:      Head: Normocephalic and atraumatic.   Eyes:      General:         Right eye: No discharge.         Left eye: No discharge.   Cardiovascular:      Rate and Rhythm: Normal rate and regular rhythm.   Pulmonary:      Effort: Pulmonary effort is normal. No respiratory distress.   Neurological:      Mental Status: He is alert and oriented to person, place, and time. "   Psychiatric:         Behavior: Behavior normal.         Thought Content: Thought content normal.         Judgment: Judgment normal.       Lab Results   Component Value Date    WBC 7.04 07/08/2022    HGB 19.0 (H) 07/08/2022    HCT 54.8 (H) 07/08/2022     07/08/2022    CHOL 196 06/14/2022    TRIG 92 06/14/2022    HDL 78 (H) 06/14/2022    ALT 16 06/14/2022    AST 20 06/14/2022     07/08/2022    K 5.3 (H) 07/08/2022    CL 96 07/08/2022    CREATININE 0.7 07/08/2022    BUN 4 (L) 07/08/2022    CO2 28 07/08/2022    TSH 2.117 06/14/2022    PSA 0.33 06/14/2022    HGBA1C 4.8 06/14/2022       Assessment:     1. Primary hypertension    2. Other hyperlipidemia    3. Colon cancer screening    4. Annual physical exam         Plan:     Primary hypertension    Other hyperlipidemia    Colon cancer screening  -     Ambulatory referral/consult to Endo Procedure ; Future; Expected date: 05/27/2023    Annual physical exam  -     CBC Auto Differential; Future; Expected date: 06/26/2023  -     Comprehensive Metabolic Panel; Future; Expected date: 06/26/2023  -     Hemoglobin A1C; Future; Expected date: 06/26/2023  -     Lipid Panel; Future; Expected date: 06/26/2023  -     TSH; Future; Expected date: 06/26/2023  -     PSA, Screening; Future; Expected date: 06/26/2023    Other orders  -     olmesartan (BENICAR) 40 MG tablet; Take 1 tablet (40 mg total) by mouth once daily.  Dispense: 30 tablet; Refill: 0  -     atorvastatin (LIPITOR) 20 MG tablet; Take 1 tablet (20 mg total) by mouth once daily.  Dispense: 30 tablet; Refill: 0  -     metoprolol succinate (TOPROL-XL) 100 MG 24 hr tablet; Take 1 tablet (100 mg total) by mouth once daily.  Dispense: 30 tablet; Refill: 0      BP rechecked/correct  Increase benicar from 20 to 40mg.  Cont toprol 100mg  Cont statin  Take ASA  Reduce sodium/caffeine and increase water intake  Fu 1 mos with labs prior for annual

## 2023-05-29 ENCOUNTER — HOSPITAL ENCOUNTER (INPATIENT)
Facility: HOSPITAL | Age: 58
LOS: 1 days | Discharge: HOME | DRG: 305 | End: 2023-05-31
Attending: EMERGENCY MEDICINE | Admitting: INTERNAL MEDICINE
Payer: COMMERCIAL

## 2023-05-29 DIAGNOSIS — R79.89 ELEVATED TROPONIN: ICD-10-CM

## 2023-05-29 DIAGNOSIS — I21.4 NSTEMI (NON-ST ELEVATED MYOCARDIAL INFARCTION): ICD-10-CM

## 2023-05-29 DIAGNOSIS — I16.0 HYPERTENSIVE URGENCY: ICD-10-CM

## 2023-05-29 DIAGNOSIS — I10 HYPERTENSION: ICD-10-CM

## 2023-05-29 DIAGNOSIS — R03.0 ELEVATED BLOOD PRESSURE READING: ICD-10-CM

## 2023-05-29 DIAGNOSIS — I16.1 HYPERTENSIVE EMERGENCY: Primary | ICD-10-CM

## 2023-05-29 LAB
ALBUMIN SERPL BCP-MCNC: 4.1 G/DL (ref 3.5–5.2)
ALP SERPL-CCNC: 79 U/L (ref 55–135)
ALT SERPL W/O P-5'-P-CCNC: 17 U/L (ref 10–44)
ANION GAP SERPL CALC-SCNC: 15 MMOL/L (ref 8–16)
AST SERPL-CCNC: 20 U/L (ref 10–40)
BASOPHILS # BLD AUTO: 0.04 K/UL (ref 0–0.2)
BASOPHILS NFR BLD: 0.7 % (ref 0–1.9)
BILIRUB SERPL-MCNC: 1.1 MG/DL (ref 0.1–1)
BILIRUB UR QL STRIP: NEGATIVE
BUN SERPL-MCNC: 4 MG/DL (ref 6–20)
CALCIUM SERPL-MCNC: 9.3 MG/DL (ref 8.7–10.5)
CHLORIDE SERPL-SCNC: 94 MMOL/L (ref 95–110)
CLARITY UR: CLEAR
CO2 SERPL-SCNC: 24 MMOL/L (ref 23–29)
COLOR UR: COLORLESS
CREAT SERPL-MCNC: 0.8 MG/DL (ref 0.5–1.4)
DIFFERENTIAL METHOD: ABNORMAL
EOSINOPHIL # BLD AUTO: 0.1 K/UL (ref 0–0.5)
EOSINOPHIL NFR BLD: 1.2 % (ref 0–8)
ERYTHROCYTE [DISTWIDTH] IN BLOOD BY AUTOMATED COUNT: 11.4 % (ref 11.5–14.5)
EST. GFR  (NO RACE VARIABLE): >60 ML/MIN/1.73 M^2
GLUCOSE SERPL-MCNC: 95 MG/DL (ref 70–110)
GLUCOSE UR QL STRIP: NEGATIVE
HCT VFR BLD AUTO: 44.4 % (ref 40–54)
HGB BLD-MCNC: 15.8 G/DL (ref 14–18)
HGB UR QL STRIP: NEGATIVE
IMM GRANULOCYTES # BLD AUTO: 0.01 K/UL (ref 0–0.04)
IMM GRANULOCYTES NFR BLD AUTO: 0.2 % (ref 0–0.5)
KETONES UR QL STRIP: NEGATIVE
LEUKOCYTE ESTERASE UR QL STRIP: NEGATIVE
LYMPHOCYTES # BLD AUTO: 2.2 K/UL (ref 1–4.8)
LYMPHOCYTES NFR BLD: 37.1 % (ref 18–48)
MCH RBC QN AUTO: 33.6 PG (ref 27–31)
MCHC RBC AUTO-ENTMCNC: 35.6 G/DL (ref 32–36)
MCV RBC AUTO: 95 FL (ref 82–98)
MONOCYTES # BLD AUTO: 0.5 K/UL (ref 0.3–1)
MONOCYTES NFR BLD: 7.6 % (ref 4–15)
NEUTROPHILS # BLD AUTO: 3.2 K/UL (ref 1.8–7.7)
NEUTROPHILS NFR BLD: 53.2 % (ref 38–73)
NITRITE UR QL STRIP: NEGATIVE
NRBC BLD-RTO: 0 /100 WBC
PH UR STRIP: 7 [PH] (ref 5–8)
PLATELET # BLD AUTO: 230 K/UL (ref 150–450)
PMV BLD AUTO: 9.2 FL (ref 9.2–12.9)
POTASSIUM SERPL-SCNC: 3.7 MMOL/L (ref 3.5–5.1)
PROT SERPL-MCNC: 7.7 G/DL (ref 6–8.4)
PROT UR QL STRIP: NEGATIVE
RBC # BLD AUTO: 4.7 M/UL (ref 4.6–6.2)
SODIUM SERPL-SCNC: 133 MMOL/L (ref 136–145)
SP GR UR STRIP: <1.005 (ref 1–1.03)
TROPONIN I SERPL DL<=0.01 NG/ML-MCNC: 0.05 NG/ML (ref 0–0.03)
URN SPEC COLLECT METH UR: ABNORMAL
UROBILINOGEN UR STRIP-ACNC: NEGATIVE EU/DL
WBC # BLD AUTO: 5.96 K/UL (ref 3.9–12.7)

## 2023-05-29 PROCEDURE — 96375 TX/PRO/DX INJ NEW DRUG ADDON: CPT

## 2023-05-29 PROCEDURE — 93005 ELECTROCARDIOGRAM TRACING: CPT

## 2023-05-29 PROCEDURE — 85025 COMPLETE CBC W/AUTO DIFF WBC: CPT | Performed by: EMERGENCY MEDICINE

## 2023-05-29 PROCEDURE — 93010 ELECTROCARDIOGRAM REPORT: CPT | Mod: ,,, | Performed by: INTERNAL MEDICINE

## 2023-05-29 PROCEDURE — 93010 EKG 12-LEAD: ICD-10-PCS | Mod: ,,, | Performed by: INTERNAL MEDICINE

## 2023-05-29 PROCEDURE — 84484 ASSAY OF TROPONIN QUANT: CPT | Performed by: EMERGENCY MEDICINE

## 2023-05-29 PROCEDURE — 25000003 PHARM REV CODE 250: Performed by: EMERGENCY MEDICINE

## 2023-05-29 PROCEDURE — 99285 EMERGENCY DEPT VISIT HI MDM: CPT | Mod: 25

## 2023-05-29 PROCEDURE — 63600175 PHARM REV CODE 636 W HCPCS: Performed by: EMERGENCY MEDICINE

## 2023-05-29 PROCEDURE — 80053 COMPREHEN METABOLIC PANEL: CPT | Performed by: EMERGENCY MEDICINE

## 2023-05-29 PROCEDURE — 81003 URINALYSIS AUTO W/O SCOPE: CPT | Performed by: NURSE PRACTITIONER

## 2023-05-29 RX ORDER — MAG HYDROX/ALUMINUM HYD/SIMETH 200-200-20
30 SUSPENSION, ORAL (FINAL DOSE FORM) ORAL EVERY 6 HOURS PRN
Status: DISCONTINUED | OUTPATIENT
Start: 2023-05-30 | End: 2023-05-31 | Stop reason: HOSPADM

## 2023-05-29 RX ORDER — ASPIRIN 81 MG/1
81 TABLET ORAL DAILY
Status: DISCONTINUED | OUTPATIENT
Start: 2023-05-30 | End: 2023-05-31 | Stop reason: HOSPADM

## 2023-05-29 RX ORDER — LABETALOL HYDROCHLORIDE 5 MG/ML
10 INJECTION, SOLUTION INTRAVENOUS EVERY 6 HOURS PRN
Status: DISCONTINUED | OUTPATIENT
Start: 2023-05-29 | End: 2023-05-31 | Stop reason: HOSPADM

## 2023-05-29 RX ORDER — METOPROLOL SUCCINATE 50 MG/1
100 TABLET, EXTENDED RELEASE ORAL DAILY
Status: DISCONTINUED | OUTPATIENT
Start: 2023-05-30 | End: 2023-05-30

## 2023-05-29 RX ORDER — LABETALOL HYDROCHLORIDE 5 MG/ML
20 INJECTION, SOLUTION INTRAVENOUS
Status: COMPLETED | OUTPATIENT
Start: 2023-05-29 | End: 2023-05-29

## 2023-05-29 RX ORDER — IPRATROPIUM BROMIDE AND ALBUTEROL SULFATE 2.5; .5 MG/3ML; MG/3ML
3 SOLUTION RESPIRATORY (INHALATION) EVERY 4 HOURS PRN
Status: DISCONTINUED | OUTPATIENT
Start: 2023-05-30 | End: 2023-05-31 | Stop reason: HOSPADM

## 2023-05-29 RX ORDER — ACETAMINOPHEN 325 MG/1
650 TABLET ORAL EVERY 6 HOURS PRN
Status: DISCONTINUED | OUTPATIENT
Start: 2023-05-30 | End: 2023-05-31 | Stop reason: HOSPADM

## 2023-05-29 RX ORDER — GUAIFENESIN 100 MG/5ML
200 SOLUTION ORAL EVERY 4 HOURS PRN
Status: DISCONTINUED | OUTPATIENT
Start: 2023-05-30 | End: 2023-05-31 | Stop reason: HOSPADM

## 2023-05-29 RX ORDER — HYDRALAZINE HYDROCHLORIDE 20 MG/ML
10 INJECTION INTRAMUSCULAR; INTRAVENOUS EVERY 8 HOURS PRN
Status: DISCONTINUED | OUTPATIENT
Start: 2023-05-30 | End: 2023-05-31 | Stop reason: HOSPADM

## 2023-05-29 RX ORDER — ONDANSETRON 2 MG/ML
4 INJECTION INTRAMUSCULAR; INTRAVENOUS
Status: COMPLETED | OUTPATIENT
Start: 2023-05-29 | End: 2023-05-29

## 2023-05-29 RX ORDER — ATORVASTATIN CALCIUM 10 MG/1
20 TABLET, FILM COATED ORAL DAILY
Status: DISCONTINUED | OUTPATIENT
Start: 2023-05-30 | End: 2023-05-31 | Stop reason: HOSPADM

## 2023-05-29 RX ORDER — LOSARTAN POTASSIUM 50 MG/1
100 TABLET ORAL DAILY
Status: DISCONTINUED | OUTPATIENT
Start: 2023-05-30 | End: 2023-05-31 | Stop reason: HOSPADM

## 2023-05-29 RX ORDER — ONDANSETRON 2 MG/ML
4 INJECTION INTRAMUSCULAR; INTRAVENOUS EVERY 8 HOURS PRN
Status: DISCONTINUED | OUTPATIENT
Start: 2023-05-30 | End: 2023-05-31 | Stop reason: HOSPADM

## 2023-05-29 RX ADMIN — LABETALOL HYDROCHLORIDE 20 MG: 5 INJECTION INTRAVENOUS at 10:05

## 2023-05-29 RX ADMIN — ONDANSETRON 4 MG: 2 INJECTION INTRAMUSCULAR; INTRAVENOUS at 10:05

## 2023-05-29 NOTE — Clinical Note
Diagnosis: Hypertensive urgency [125856]   Future Attending Provider: MARVIN CORRALES [[id number]]   Admitting Provider:: MARVIN CORRALES [[id number]]   Special Needs:: No Special Needs [1]

## 2023-05-30 PROBLEM — E83.42 HYPOMAGNESEMIA: Status: ACTIVE | Noted: 2023-05-30

## 2023-05-30 LAB
ALBUMIN SERPL BCP-MCNC: 3.6 G/DL (ref 3.5–5.2)
ALP SERPL-CCNC: 76 U/L (ref 55–135)
ALT SERPL W/O P-5'-P-CCNC: 14 U/L (ref 10–44)
ANION GAP SERPL CALC-SCNC: 13 MMOL/L (ref 8–16)
AORTIC ROOT ANNULUS: 3.41 CM
ASCENDING AORTA: 3.48 CM
AST SERPL-CCNC: 17 U/L (ref 10–40)
AV INDEX (PROSTH): 0.85
AV MEAN GRADIENT: 6 MMHG
AV PEAK GRADIENT: 9 MMHG
AV VALVE AREA: 2.93 CM2
AV VELOCITY RATIO: 0.82
BASOPHILS # BLD AUTO: 0.04 K/UL (ref 0–0.2)
BASOPHILS NFR BLD: 0.7 % (ref 0–1.9)
BILIRUB SERPL-MCNC: 1.2 MG/DL (ref 0.1–1)
BNP SERPL-MCNC: 53 PG/ML (ref 0–99)
BSA FOR ECHO PROCEDURE: 2.19 M2
BUN SERPL-MCNC: 4 MG/DL (ref 6–20)
CALCIUM SERPL-MCNC: 9.3 MG/DL (ref 8.7–10.5)
CHLORIDE SERPL-SCNC: 97 MMOL/L (ref 95–110)
CO2 SERPL-SCNC: 24 MMOL/L (ref 23–29)
CREAT SERPL-MCNC: 0.7 MG/DL (ref 0.5–1.4)
CV ECHO LV RWT: 0.53 CM
DIFFERENTIAL METHOD: ABNORMAL
DOP CALC AO PEAK VEL: 1.52 M/S
DOP CALC AO VTI: 30.4 CM
DOP CALC LVOT AREA: 3.5 CM2
DOP CALC LVOT DIAMETER: 2.1 CM
DOP CALC LVOT PEAK VEL: 1.24 M/S
DOP CALC LVOT STROKE VOLUME: 88.97 CM3
DOP CALC RVOT PEAK VEL: 0.87 M/S
DOP CALC RVOT VTI: 15.5 CM
DOP CALCLVOT PEAK VEL VTI: 25.7 CM
E WAVE DECELERATION TIME: 210.36 MSEC
E/A RATIO: 0.79
E/E' RATIO: 6.9 M/S
ECHO LV POSTERIOR WALL: 1.2 CM (ref 0.6–1.1)
EJECTION FRACTION: 60 %
EOSINOPHIL # BLD AUTO: 0 K/UL (ref 0–0.5)
EOSINOPHIL NFR BLD: 0.5 % (ref 0–8)
ERYTHROCYTE [DISTWIDTH] IN BLOOD BY AUTOMATED COUNT: 11.5 % (ref 11.5–14.5)
EST. GFR  (NO RACE VARIABLE): >60 ML/MIN/1.73 M^2
FRACTIONAL SHORTENING: 31 % (ref 28–44)
GLUCOSE SERPL-MCNC: 111 MG/DL (ref 70–110)
HCT VFR BLD AUTO: 41.7 % (ref 40–54)
HGB BLD-MCNC: 15 G/DL (ref 14–18)
IMM GRANULOCYTES # BLD AUTO: 0.02 K/UL (ref 0–0.04)
IMM GRANULOCYTES NFR BLD AUTO: 0.4 % (ref 0–0.5)
INTERVENTRICULAR SEPTUM: 1.39 CM (ref 0.6–1.1)
IVC DIAMETER: 1.56 CM
IVRT: 72.31 MSEC
LA MAJOR: 5.82 CM
LA MINOR: 5.68 CM
LA WIDTH: 3.8 CM
LEFT ATRIUM SIZE: 3.98 CM
LEFT ATRIUM VOLUME INDEX: 33.9 ML/M2
LEFT ATRIUM VOLUME: 73.91 CM3
LEFT INTERNAL DIMENSION IN SYSTOLE: 3.09 CM (ref 2.1–4)
LEFT VENTRICLE DIASTOLIC VOLUME INDEX: 42.14 ML/M2
LEFT VENTRICLE DIASTOLIC VOLUME: 91.86 ML
LEFT VENTRICLE MASS INDEX: 101 G/M2
LEFT VENTRICLE SYSTOLIC VOLUME INDEX: 17.3 ML/M2
LEFT VENTRICLE SYSTOLIC VOLUME: 37.65 ML
LEFT VENTRICULAR INTERNAL DIMENSION IN DIASTOLE: 4.49 CM (ref 3.5–6)
LEFT VENTRICULAR MASS: 220.56 G
LV LATERAL E/E' RATIO: 6.27 M/S
LV SEPTAL E/E' RATIO: 7.67 M/S
LVOT MG: 3.94 MMHG
LVOT MV: 0.94 CM/S
LYMPHOCYTES # BLD AUTO: 1.4 K/UL (ref 1–4.8)
LYMPHOCYTES NFR BLD: 25.2 % (ref 18–48)
MAGNESIUM SERPL-MCNC: 0.8 MG/DL (ref 1.6–2.6)
MCH RBC QN AUTO: 33.6 PG (ref 27–31)
MCHC RBC AUTO-ENTMCNC: 36 G/DL (ref 32–36)
MCV RBC AUTO: 93 FL (ref 82–98)
MONOCYTES # BLD AUTO: 0.5 K/UL (ref 0.3–1)
MONOCYTES NFR BLD: 8.8 % (ref 4–15)
MV PEAK A VEL: 0.87 M/S
MV PEAK E VEL: 0.69 M/S
MV STENOSIS PRESSURE HALF TIME: 61 MS
MV VALVE AREA P 1/2 METHOD: 3.61 CM2
NEUTROPHILS # BLD AUTO: 3.6 K/UL (ref 1.8–7.7)
NEUTROPHILS NFR BLD: 64.4 % (ref 38–73)
NRBC BLD-RTO: 0 /100 WBC
PISA TR MAX VEL: 2.05 M/S
PLATELET # BLD AUTO: 204 K/UL (ref 150–450)
PMV BLD AUTO: 9.4 FL (ref 9.2–12.9)
POTASSIUM SERPL-SCNC: 3.7 MMOL/L (ref 3.5–5.1)
PROT SERPL-MCNC: 6.9 G/DL (ref 6–8.4)
PV MEAN GRADIENT: 2 MMHG
RA MAJOR: 4.3 CM
RA PRESSURE: 3 MMHG
RA WIDTH: 2.8 CM
RBC # BLD AUTO: 4.47 M/UL (ref 4.6–6.2)
SODIUM SERPL-SCNC: 134 MMOL/L (ref 136–145)
STJ: 3.64 CM
TDI LATERAL: 0.11 M/S
TDI SEPTAL: 0.09 M/S
TDI: 0.1 M/S
TR MAX PG: 17 MMHG
TRICUSPID ANNULAR PLANE SYSTOLIC EXCURSION: 2.2 CM
TROPONIN I SERPL DL<=0.01 NG/ML-MCNC: 0.04 NG/ML (ref 0–0.03)
TROPONIN I SERPL DL<=0.01 NG/ML-MCNC: 0.05 NG/ML (ref 0–0.03)
TV REST PULMONARY ARTERY PRESSURE: 20 MMHG
WBC # BLD AUTO: 5.59 K/UL (ref 3.9–12.7)

## 2023-05-30 PROCEDURE — 99222 PR INITIAL HOSPITAL CARE,LEVL II: ICD-10-PCS | Mod: 25,,, | Performed by: INTERNAL MEDICINE

## 2023-05-30 PROCEDURE — 96376 TX/PRO/DX INJ SAME DRUG ADON: CPT

## 2023-05-30 PROCEDURE — 25000003 PHARM REV CODE 250: Performed by: INTERNAL MEDICINE

## 2023-05-30 PROCEDURE — 36415 COLL VENOUS BLD VENIPUNCTURE: CPT | Performed by: INTERNAL MEDICINE

## 2023-05-30 PROCEDURE — 63600175 PHARM REV CODE 636 W HCPCS: Performed by: INTERNAL MEDICINE

## 2023-05-30 PROCEDURE — 96365 THER/PROPH/DIAG IV INF INIT: CPT

## 2023-05-30 PROCEDURE — 99222 1ST HOSP IP/OBS MODERATE 55: CPT | Mod: 25,,, | Performed by: INTERNAL MEDICINE

## 2023-05-30 PROCEDURE — 84484 ASSAY OF TROPONIN QUANT: CPT | Performed by: INTERNAL MEDICINE

## 2023-05-30 PROCEDURE — 96366 THER/PROPH/DIAG IV INF ADDON: CPT

## 2023-05-30 PROCEDURE — 80053 COMPREHEN METABOLIC PANEL: CPT | Performed by: INTERNAL MEDICINE

## 2023-05-30 PROCEDURE — 83880 ASSAY OF NATRIURETIC PEPTIDE: CPT | Performed by: INTERNAL MEDICINE

## 2023-05-30 PROCEDURE — 85025 COMPLETE CBC W/AUTO DIFF WBC: CPT | Performed by: INTERNAL MEDICINE

## 2023-05-30 PROCEDURE — 21400001 HC TELEMETRY ROOM

## 2023-05-30 PROCEDURE — 84484 ASSAY OF TROPONIN QUANT: CPT | Mod: 91 | Performed by: INTERNAL MEDICINE

## 2023-05-30 PROCEDURE — 83735 ASSAY OF MAGNESIUM: CPT | Performed by: INTERNAL MEDICINE

## 2023-05-30 RX ORDER — MAGNESIUM SULFATE HEPTAHYDRATE 40 MG/ML
2 INJECTION, SOLUTION INTRAVENOUS
Status: COMPLETED | OUTPATIENT
Start: 2023-05-30 | End: 2023-05-30

## 2023-05-30 RX ORDER — CARVEDILOL 12.5 MG/1
25 TABLET ORAL 2 TIMES DAILY
Status: DISCONTINUED | OUTPATIENT
Start: 2023-05-30 | End: 2023-05-31 | Stop reason: HOSPADM

## 2023-05-30 RX ORDER — AMLODIPINE BESYLATE 10 MG/1
10 TABLET ORAL DAILY
Status: DISCONTINUED | OUTPATIENT
Start: 2023-05-30 | End: 2023-05-31 | Stop reason: HOSPADM

## 2023-05-30 RX ORDER — REGADENOSON 0.08 MG/ML
0.4 INJECTION, SOLUTION INTRAVENOUS ONCE
Status: COMPLETED | OUTPATIENT
Start: 2023-05-31 | End: 2023-05-31

## 2023-05-30 RX ADMIN — MAGNESIUM SULFATE HEPTAHYDRATE 2 G: 40 INJECTION, SOLUTION INTRAVENOUS at 07:05

## 2023-05-30 RX ADMIN — ASPIRIN 81 MG: 81 TABLET, COATED ORAL at 08:05

## 2023-05-30 RX ADMIN — LABETALOL HYDROCHLORIDE 10 MG: 5 INJECTION INTRAVENOUS at 12:05

## 2023-05-30 RX ADMIN — METOPROLOL SUCCINATE 100 MG: 50 TABLET, EXTENDED RELEASE ORAL at 08:05

## 2023-05-30 RX ADMIN — AMLODIPINE BESYLATE 10 MG: 10 TABLET ORAL at 09:05

## 2023-05-30 RX ADMIN — LOSARTAN POTASSIUM 100 MG: 50 TABLET, FILM COATED ORAL at 08:05

## 2023-05-30 RX ADMIN — ATORVASTATIN CALCIUM 20 MG: 10 TABLET, FILM COATED ORAL at 08:05

## 2023-05-30 RX ADMIN — CARVEDILOL 25 MG: 12.5 TABLET, FILM COATED ORAL at 06:05

## 2023-05-30 RX ADMIN — MAGNESIUM SULFATE HEPTAHYDRATE 2 G: 40 INJECTION, SOLUTION INTRAVENOUS at 09:05

## 2023-05-30 NOTE — HPI
Mr. Muñoz is a 57-year-old  male with PMH significant for HTN, former tobacco user, hyperlipidemia, presented to the ED complaining of elevated blood pressure for the past 3-4 days, in spite of taking home dose olmesartan, metoprolol.  He was seen by his PCP, dose of olmesartan was doubled from 20-40 mg.  Highest /105.  Received labetalol 20 mg IV x1, with BP improving into systolic 180s.  Troponin elevated 0.049, patient denies CP/SOB, complained of mild nausea without vomiting.  Rest of the laboratory workup unremarkable.  No prior known history of CAD.  EKG NSR with no ST or T-wave changes.  Placed in observation for hypertensive urgency.

## 2023-05-30 NOTE — ED PROVIDER NOTES
SCRIBE #1 NOTE: I, Jason Bro, am scribing for, and in the presence of, Lavern Dacosta DO. I have scribed the entire note.       History     Chief Complaint   Patient presents with    Hypertension     States BP has been high, took meds today. Also reports low abdominal pain and low back pain x 3 days, tingling pain when urinating.      Review of patient's allergies indicates:  No Known Allergies      History of Present Illness     HPI    5/29/2023, 9:41 PM  History obtained from the patient      History of Present Illness: Rosalio Muñoz is a 57 y.o. male patient with a PMHx of HTN who presents to the Emergency Department for evaluation of Hypertension which onset gradually. Pt's BP has slowly elevated everyday since Friday when pt went in for yearly check-up with PCP. Symptoms are constant and moderate in severity. No mitigating or exacerbating factors reported. Associated sxs includes nausea. Patient denies any difficulty urinating or decreased urine output, HA, CP, SOB, emesis, and all other sxs at this time. No prior Tx. Pt has not eaten today since 11:30am. Pt drinks EtOH everyday. Pt quit smoking Oct 4th of last year and vape last week. Pt takes Olmesartan and Metoprolol daily. Pt also takes Lipitor. No further complaints or concerns at this time.       Arrival mode: Personal vehicle    PCP: Mihir Lopez MD        Past Medical History:  Past Medical History:   Diagnosis Date    Hypertension        Past Surgical History:  Past Surgical History:   Procedure Laterality Date    WISDOM TOOTH EXTRACTION           Family History:  Family History   Problem Relation Age of Onset    Hyperlipidemia Mother     Hypertension Father     Diabetes Father     Kidney disease Father     Heart disease Father     Prostate cancer Maternal Grandfather     Colon cancer Neg Hx        Social History:  Social History     Tobacco Use    Smoking status: Former     Packs/day: 1.50     Years: 25.00     Pack years: 37.50      Types: Cigarettes     Quit date: 10/4/2022     Years since quittin.6    Smokeless tobacco: Never   Substance and Sexual Activity    Alcohol use: Yes     Alcohol/week: 4.0 - 6.0 standard drinks     Types: 4 - 6 Cans of beer per week    Drug use: No    Sexual activity: Yes     Partners: Female        Review of Systems     Review of Systems   Respiratory:  Negative for shortness of breath.    Cardiovascular:  Negative for chest pain.        (+) High BP   Gastrointestinal:  Positive for nausea. Negative for vomiting.   Genitourinary:  Negative for difficulty urinating.   Neurological:  Negative for headaches.      Physical Exam     Initial Vitals [23 1713]   BP Pulse Resp Temp SpO2   (!) 191/103 80 16 98.2 °F (36.8 °C) 97 %      MAP       --          Physical Exam  Nursing Notes and Vital Signs Reviewed.  Constitutional: Patient is in no acute distress. Well-developed and well-nourished. Deaf, reads lips.   Head: Atraumatic. Normocephalic.  Eyes: PERRL. EOM intact. Conjunctivae are not pale. No scleral icterus.  ENT: Mucous membranes are moist.  Wearing dentures.  Neck: Supple. Full ROM. No JVD.  Cardiovascular: Regular rate. Regular rhythm. No murmurs, rubs, or gallops. Distal pulses are 2+ and symmetric.  Pulmonary/Chest: No respiratory distress. Clear to auscultation bilaterally. No wheezing or rales.  Abdominal: Soft and non-distended.  There is no tenderness.  No rebound, guarding, or rigidity.   Genitourinary: No CVA tenderness  Musculoskeletal: Moves all extremities. No obvious deformities. No edema. No calf tenderness.  Skin: Warm and dry.  Neurological:  Alert, awake, and appropriate.  Normal speech.  No acute focal neurological deficits are appreciated.  Psychiatric: Normal affect. Good eye contact. Appropriate in content.     ED Course   Critical Care    Date/Time: 2023 11:09 PM  Performed by: Lavern Dacosta DO  Authorized by: Lavern Dacosta DO   Direct patient critical care time: 10  minutes  Additional history critical care time: 6 minutes  Ordering / reviewing critical care time: 10 minutes  Documentation critical care time: 6 minutes  Consulting other physicians critical care time: 6 minutes  Total critical care time (exclusive of procedural time) : 38 minutes  Critical care time was exclusive of separately billable procedures and treating other patients and teaching time.  Critical care was necessary to treat or prevent imminent or life-threatening deterioration of the following conditions: circulatory failure (Severe Hypertension).  Critical care was time spent personally by me on the following activities: development of treatment plan with patient or surrogate, discussions with consultants, evaluation of patient's response to treatment, obtaining history from patient or surrogate, examination of patient, ordering and performing treatments and interventions, ordering and review of radiographic studies, ordering and review of laboratory studies, re-evaluation of patient's condition and pulse oximetry.      ED Vital Signs:  Vitals:    05/29/23 1713 05/29/23 2207 05/30/23 0030   BP: (!) 191/103  (!) 142/80   Pulse: 80 84 87   Resp: 16  17   Temp: 98.2 °F (36.8 °C)  98.3 °F (36.8 °C)   TempSrc: Oral  Oral   SpO2: 97%  (!) 93%   Weight: 93.7 kg (206 lb 9.1 oz)         Abnormal Lab Results:  Labs Reviewed   URINALYSIS, REFLEX TO URINE CULTURE - Abnormal; Notable for the following components:       Result Value    Color, UA Colorless (*)     Specific Gravity, UA <1.005 (*)     All other components within normal limits    Narrative:     Specimen Source->Urine   CBC W/ AUTO DIFFERENTIAL - Abnormal; Notable for the following components:    MCH 33.6 (*)     RDW 11.4 (*)     All other components within normal limits   COMPREHENSIVE METABOLIC PANEL - Abnormal; Notable for the following components:    Sodium 133 (*)     Chloride 94 (*)     BUN 4 (*)     Total Bilirubin 1.1 (*)     All other components  within normal limits   TROPONIN I - Abnormal; Notable for the following components:    Troponin I 0.049 (*)     All other components within normal limits   TROPONIN I        All Lab Results:  Results for orders placed or performed during the hospital encounter of 05/29/23   Urinalysis, Reflex to Urine Culture Urine, Clean Catch    Specimen: Urine   Result Value Ref Range    Specimen UA Urine, Clean Catch     Color, UA Colorless (A) Yellow, Straw, Marge    Appearance, UA Clear Clear    pH, UA 7.0 5.0 - 8.0    Specific Gravity, UA <1.005 (A) 1.005 - 1.030    Protein, UA Negative Negative    Glucose, UA Negative Negative    Ketones, UA Negative Negative    Bilirubin (UA) Negative Negative    Occult Blood UA Negative Negative    Nitrite, UA Negative Negative    Urobilinogen, UA Negative <2.0 EU/dL    Leukocytes, UA Negative Negative   CBC auto differential   Result Value Ref Range    WBC 5.96 3.90 - 12.70 K/uL    RBC 4.70 4.60 - 6.20 M/uL    Hemoglobin 15.8 14.0 - 18.0 g/dL    Hematocrit 44.4 40.0 - 54.0 %    MCV 95 82 - 98 fL    MCH 33.6 (H) 27.0 - 31.0 pg    MCHC 35.6 32.0 - 36.0 g/dL    RDW 11.4 (L) 11.5 - 14.5 %    Platelets 230 150 - 450 K/uL    MPV 9.2 9.2 - 12.9 fL    Immature Granulocytes 0.2 0.0 - 0.5 %    Gran # (ANC) 3.2 1.8 - 7.7 K/uL    Immature Grans (Abs) 0.01 0.00 - 0.04 K/uL    Lymph # 2.2 1.0 - 4.8 K/uL    Mono # 0.5 0.3 - 1.0 K/uL    Eos # 0.1 0.0 - 0.5 K/uL    Baso # 0.04 0.00 - 0.20 K/uL    nRBC 0 0 /100 WBC    Gran % 53.2 38.0 - 73.0 %    Lymph % 37.1 18.0 - 48.0 %    Mono % 7.6 4.0 - 15.0 %    Eosinophil % 1.2 0.0 - 8.0 %    Basophil % 0.7 0.0 - 1.9 %    Differential Method Automated    Comprehensive metabolic panel   Result Value Ref Range    Sodium 133 (L) 136 - 145 mmol/L    Potassium 3.7 3.5 - 5.1 mmol/L    Chloride 94 (L) 95 - 110 mmol/L    CO2 24 23 - 29 mmol/L    Glucose 95 70 - 110 mg/dL    BUN 4 (L) 6 - 20 mg/dL    Creatinine 0.8 0.5 - 1.4 mg/dL    Calcium 9.3 8.7 - 10.5 mg/dL    Total  Protein 7.7 6.0 - 8.4 g/dL    Albumin 4.1 3.5 - 5.2 g/dL    Total Bilirubin 1.1 (H) 0.1 - 1.0 mg/dL    Alkaline Phosphatase 79 55 - 135 U/L    AST 20 10 - 40 U/L    ALT 17 10 - 44 U/L    Anion Gap 15 8 - 16 mmol/L    eGFR >60 >60 mL/min/1.73 m^2   Troponin I   Result Value Ref Range    Troponin I 0.049 (H) 0.000 - 0.026 ng/mL        Imaging Results:  Imaging Results              X-Ray Chest AP Portable (Final result)  Result time 05/29/23 22:07:14      Final result by Guerline Castillo MD (05/29/23 22:07:14)                   Impression:      No active finding      Electronically signed by: Guerline Castillo  Date:    05/29/2023  Time:    22:07               Narrative:    EXAMINATION:  XR CHEST AP PORTABLE    CLINICAL HISTORY:  hypertension;    TECHNIQUE:  Single frontal portable view of the chest was performed.    COMPARISON:  July 8, 2022    FINDINGS:  No pulmonary consolidation or pleural effusion.  Mediastinal contour stable midline                                       The EKG was ordered, reviewed, and independently interpreted by the ED provider.  Interpretation time: 21:59  Rate: 90 BPM  Rhythm: normal sinus rhythm  Interpretation: No acute ST changes. No STEMI.           The Emergency Provider reviewed the vital signs and test results, which are outlined above.     ED Discussion       10:54 PM: Discussed case with Chloe Benavidez NP ([facility]). Dr. Payan agrees with current care and management of pt and accepts admission.   Admitting Service:   Admitting Physician: Dr. Payan  Admit to: OBS Tele     10:54 PM: Re-evaluated pt. I have discussed test results, shared treatment plan, and the need for admission with patient and family at bedside. Pt and family express understanding at this time and agree with all information. All questions answered. Pt and family have no further questions or concerns at this time. Pt is ready for admit.     ED Course as of 05/30/23 0102   Mon May 29, 2023   [id number]  37-year-old male who presents with hypertensive emergency.  Troponin 0.049.  Normal EKG and denies chest pain.  Elevated troponin likely secondary to blood pressure.  Normal renal function.  No protein in the urine.  Chest x-ray negative for CHF.  Blood pressure initially 191/103.  Now 172/93.  Treating with IV labetalol.  Hospital medicine consulted for admission for hypertensive emergency. [NF]      ED Course User Index  [NF] Lavren Dacosta DO     Medical Decision Making:   Clinical Tests:   Lab Tests: Ordered and Reviewed  Radiological Study: Ordered and Reviewed  Medical Tests: Ordered and Reviewed         ED Medication(s):  Medications   acetaminophen tablet 650 mg (has no administration in time range)   ondansetron injection 4 mg (has no administration in time range)   aluminum-magnesium hydroxide-simethicone 200-200-20 mg/5 mL suspension 30 mL (has no administration in time range)   albuterol-ipratropium 2.5 mg-0.5 mg/3 mL nebulizer solution 3 mL (has no administration in time range)   guaiFENesin 100 mg/5 ml syrup 200 mg (has no administration in time range)   labetaloL injection 10 mg (has no administration in time range)   hydrALAZINE injection 10 mg (has no administration in time range)   aspirin EC tablet 81 mg (has no administration in time range)   atorvastatin tablet 20 mg (has no administration in time range)   metoprolol succinate (TOPROL-XL) 24 hr tablet 100 mg (has no administration in time range)   losartan tablet 100 mg (has no administration in time range)   ondansetron injection 4 mg (4 mg Intravenous Given 5/29/23 2205)   labetaloL injection 20 mg (20 mg Intravenous Given 5/29/23 2251)       New Prescriptions    No medications on file               Scribe Attestation:   Scribe #1: I performed the above scribed service and the documentation accurately describes the services I performed. I attest to the accuracy of the note.     Attending:   Physician Attestation Statement for Scribe #1: I,  Lavern Dacosta DO, personally performed the services described in this documentation, as scribed by Jason Bro, in my presence, and it is both accurate and complete.           Clinical Impression       ICD-10-CM ICD-9-CM   1. Hypertensive emergency  I16.1 401.9   2. Elevated blood pressure reading  R03.0 796.2   3. Hypertension  I10 401.9   4. Hypertensive urgency  I16.0 401.9   5. NSTEMI (non-ST elevated myocardial infarction)  I21.4 410.70       Disposition:   Disposition: Placed in Observation  Condition: Stable      Lavern Dacosta DO  05/30/23 0102

## 2023-05-30 NOTE — CONSULTS
[facility] - Emergency Dept.  Cardiology  Consult Note    Patient Name: Rosalio Muñoz  MRN: [id number]  Admission Date: 5/29/2023  Hospital Length of Stay: 0 days  Code Status: No Order   Attending Provider: Kameron Patel MD   Consulting Provider: Maryjo Rosas NP  Primary Care Physician: Mihir Lopez MD  Principal Problem:Hypertensive urgency    Patient information was obtained from patient and ER records.     Inpatient consult to Cardiology  Consult performed by: Maryjo Rosas NP  Consult ordered by: Jonas Payan MD        Subjective:     Chief Complaint:  Elevated home BP readings     HPI:   Mr. Muñoz is a 57-year-old  male with PMH significant for HTN, former tobacco user, hyperlipidemia, presented to the ED complaining of elevated blood pressure for the past 3-4 days, in spite of taking home dose olmesartan, metoprolol.  He was seen by his PCP, dose of olmesartan was doubled from 20-40 mg.  Highest /105.  Received labetalol 20 mg IV x1, with BP improving into systolic 180s.  Troponin elevated 0.049, patient denies CP/SOB, complained of mild nausea without vomiting.  Rest of the laboratory workup unremarkable.  No prior known history of CAD.  EKG NSR with no ST or T-wave changes.  Placed in observation for hypertensive urgency. Cardiology consulted to assist with management. Pt seen and examined today resting in ED. Pt reports possibly eating salty food on Saturday. He also endorses recently stopping cigarettes and started vaping, reports he drinks 3-4 beers daily. Saw his PCP on Friday who increased his olmesartan. Reports strong family history of CAD.   Labs reviewed, troponin 0.044, BNP neg, Crt 0.7, Mg 0.8, CXR neg for acute findings, echo pending. CT 8/22' showed atherosclerosis including coronary arteries.      Past Medical History:   Diagnosis Date    Hypertension        Past Surgical History:   Procedure Laterality Date    WISDOM TOOTH EXTRACTION         Review  of patient's allergies indicates:  No Known Allergies    No current facility-administered medications on file prior to encounter.     Current Outpatient Medications on File Prior to Encounter   Medication Sig    aspirin (ECOTRIN) 81 MG EC tablet Take 81 mg by mouth once daily.    atorvastatin (LIPITOR) 20 MG tablet Take 1 tablet (20 mg total) by mouth once daily.    metoprolol succinate (TOPROL-XL) 100 MG 24 hr tablet Take 1 tablet (100 mg total) by mouth once daily.    olmesartan (BENICAR) 40 MG tablet Take 1 tablet (40 mg total) by mouth once daily.    pantoprazole (PROTONIX) 40 MG tablet Take 1 tablet (40 mg total) by mouth once daily.     Family History       Problem Relation (Age of Onset)    Diabetes Father    Heart disease Father    Hyperlipidemia Mother    Hypertension Father    Kidney disease Father    Prostate cancer Maternal Grandfather          Tobacco Use    Smoking status: Former     Packs/day: 1.50     Years: 25.00     Pack years: 37.50     Types: Cigarettes     Quit date: 10/4/2022     Years since quittin.6    Smokeless tobacco: Never   Substance and Sexual Activity    Alcohol use: Yes     Alcohol/week: 4.0 - 6.0 standard drinks     Types: 4 - 6 Cans of beer per week    Drug use: No    Sexual activity: Yes     Partners: Female     Review of Systems   Constitutional: Negative.   HENT: Negative.     Eyes: Negative.    Cardiovascular: Negative.    Respiratory: Negative.     Skin: Negative.    Musculoskeletal: Negative.    Gastrointestinal:  Positive for abdominal pain.   Genitourinary: Negative.    Neurological: Negative.    Psychiatric/Behavioral: Negative.     Objective:     Vital Signs (Most Recent):  Temp: 98.3 °F (36.8 °C) (23 0030)  Pulse: 95 (23 0932)  Resp: 19 (23 0931)  BP: (!) 156/99 (23 0932)  SpO2: 97 % (23 0932) Vital Signs (24h Range):  Temp:  [98.2 °F (36.8 °C)-98.3 °F (36.8 °C)] 98.3 °F (36.8 °C)  Pulse:  [80-95] 95  Resp:  [16-20] 19  SpO2:  [93 %-97 %]  97 %  BP: (130-191)/() 156/99     Weight: 93.4 kg (206 lb)  Body mass index is 27.18 kg/m².    SpO2: 97 %         Intake/Output Summary (Last 24 hours) at 5/30/2023 0956  Last data filed at 5/30/2023 0712  Gross per 24 hour   Intake --   Output 1430 ml   Net -1430 ml       Lines/Drains/Airways       Peripheral Intravenous Line  Duration                  Peripheral IV - Single Lumen 05/29/23 2205 22 G Right Antecubital <1 day                     Physical Exam  Vitals and nursing note reviewed.   Constitutional:       Appearance: Normal appearance.   HENT:      Head: Normocephalic and atraumatic.   Eyes:      General:         Right eye: No discharge.         Left eye: No discharge.      Pupils: Pupils are equal, round, and reactive to light.   Cardiovascular:      Rate and Rhythm: Normal rate and regular rhythm.      Heart sounds: S1 normal and S2 normal. No murmur heard.    No friction rub.   Pulmonary:      Effort: Pulmonary effort is normal. No respiratory distress.      Breath sounds: Normal breath sounds. No rales.   Abdominal:      Palpations: Abdomen is soft.      Tenderness: There is no abdominal tenderness.   Musculoskeletal:      Cervical back: Neck supple.      Right lower leg: No edema.      Left lower leg: No edema.   Skin:     General: Skin is warm and dry.   Neurological:      General: No focal deficit present.      Mental Status: He is alert and oriented to person, place, and time.   Psychiatric:         Mood and Affect: Mood normal.         Behavior: Behavior normal.         Thought Content: Thought content normal.        Significant Labs: BMP:   Recent Labs   Lab 05/29/23 2204 05/30/23  0537   GLU 95 111*   * 134*   K 3.7 3.7   CL 94* 97   CO2 24 24   BUN 4* 4*   CREATININE 0.8 0.7   CALCIUM 9.3 9.3   MG  --  0.8*   , CMP   Recent Labs   Lab 05/29/23 2204 05/30/23  0537   * 134*   K 3.7 3.7   CL 94* 97   CO2 24 24   GLU 95 111*   BUN 4* 4*   CREATININE 0.8 0.7   CALCIUM 9.3 9.3    PROT 7.7 6.9   ALBUMIN 4.1 3.6   BILITOT 1.1* 1.2*   ALKPHOS 79 76   AST 20 17   ALT 17 14   ANIONGAP 15 13   , CBC   Recent Labs   Lab 05/29/23 2204 05/30/23  0537   WBC 5.96 5.59   HGB 15.8 15.0   HCT 44.4 41.7    204   , INR No results for input(s): INR, PROTIME in the last 48 hours., Lipid Panel No results for input(s): CHOL, HDL, LDLCALC, TRIG, CHOLHDL in the last 48 hours., Troponin   Recent Labs   Lab 05/29/23 2204 05/30/23  0040 05/30/23  0537   TROPONINI 0.049* 0.047* 0.044*   , and All pertinent lab results from the last 24 hours have been reviewed.    Significant Imaging: Echocardiogram: Transthoracic echo (TTE) complete ([facility] Only):   Results for orders placed or performed during the hospital encounter of 05/29/23   Echo   Result Value Ref Range    BSA 2.19 m2    TDI SEPTAL 0.09 m/s    LV LATERAL E/E' RATIO 6.27 m/s    LV SEPTAL E/E' RATIO 7.67 m/s    LA WIDTH 3.80 cm    IVC diameter 1.56 cm    Left Ventricular Outflow Tract Mean Velocity 0.94 cm/s    Left Ventricular Outflow Tract Mean Gradient 3.94 mmHg    TDI LATERAL 0.11 m/s    LVIDd 4.49 3.5 - 6.0 cm    IVS 1.39 (A) 0.6 - 1.1 cm    Posterior Wall 1.20 (A) 0.6 - 1.1 cm    Ao root annulus 3.41 cm    LVIDs 3.09 2.1 - 4.0 cm    FS 31 28 - 44 %    LA volume 73.91 cm3    STJ 3.64 cm    Ascending aorta 3.48 cm    LV mass 220.56 g    LA size 3.98 cm    TAPSE 2.20 cm    Left Ventricle Relative Wall Thickness 0.53 cm    AV mean gradient 6 mmHg    AV valve area 2.93 cm2    AV Velocity Ratio 0.82     AV index (prosthetic) 0.85     MV valve area p 1/2 method 3.61 cm2    PV peak gradient 3.06 mmHg    E/A ratio 0.79     Mean e' 0.10 m/s    E wave deceleration time 210.36 msec    IVRT 72.31 msec    LVOT diameter 2.10 cm    LVOT area 3.5 cm2    LVOT peak dru 1.24 m/s    LVOT peak VTI 25.70 cm    Ao peak dru 1.52 m/s    Ao VTI 30.4 cm    RVOT peak dru 0.87 m/s    RVOT peak VTI 15.5 cm    LVOT stroke volume 88.97 cm3    AV peak gradient 9 mmHg    PV mean  gradient 2.00 mmHg    E/E' ratio 6.90 m/s    MV Peak E Ashok 0.69 m/s    TR Max Ashok 2.05 m/s    MV stenosis pressure 1/2 time 61.00 ms    MV Peak A Ashok 0.87 m/s    LV Systolic Volume 37.65 mL    LV Systolic Volume Index 17.3 mL/m2    LV Diastolic Volume 91.86 mL    LV Diastolic Volume Index 42.14 mL/m2    LA Volume Index 33.9 mL/m2    LV Mass Index 101 g/m2    RA Major Axis 4.30 cm    Left Atrium Minor Axis 5.68 cm    Left Atrium Major Axis 5.82 cm    Triscuspid Valve Regurgitation Peak Gradient 17 mmHg    RA Width 2.80 cm   , EKG: reviewed, and X-Ray: CXR: X-Ray Chest 1 View (CXR): No results found for this visit on 05/29/23.    Assessment and Plan:     * Hypertensive urgency  Added amlodipine  PRN hydralazine, labetolol   Titrate meds      Elevated troponin  Troponin flat  EKG reviewed no acute dynamic changes  Likely demand  Nuclear stress test tomorrow if BP stable  Mg 0.8, replete  NPO mn    Other hyperlipidemia  Statin therapy        VTE Risk Mitigation (From admission, onward)           Ordered     Place sequential compression device  Until discontinued         05/29/23 2284                    Thank you for your consult. I will follow-up with patient. Please contact us if you have any additional questions.    Maryjo Rosas, NP  Cardiology   [facility] - Emergency Dept.

## 2023-05-30 NOTE — PLAN OF CARE
Problem: Adult Inpatient Plan of Care  Goal: Plan of Care Review  Outcome: Ongoing, Progressing  Goal: Patient-Specific Goal (Individualized)  Outcome: Ongoing, Progressing  Goal: Absence of Hospital-Acquired Illness or Injury  Outcome: Ongoing, Progressing  Goal: Optimal Comfort and Wellbeing  Outcome: Ongoing, Progressing  Goal: Readiness for Transition of Care  Outcome: Ongoing, Progressing     Problem: Hypertension Acute  Goal: Blood Pressure Within Desired Range  Outcome: Ongoing, Progressing     Problem: Nausea and Vomiting  Goal: Fluid and Electrolyte Balance  Outcome: Ongoing, Progressing

## 2023-05-30 NOTE — ASSESSMENT & PLAN NOTE
Troponin flat  EKG reviewed no acute dynamic changes  Likely demand  Nuclear stress test tomorrow if BP stable  NPO mn

## 2023-05-30 NOTE — SUBJECTIVE & OBJECTIVE
Interval History: patient reads lips. He reports he was complaint with his medication but his blood pressure remained high. Having regular bowel movements. BP and troponin elevated. Amlodipine added by cardiology    Review of Systems  Objective:     Vital Signs (Most Recent):  Temp: 98.3 °F (36.8 °C) (05/30/23 0030)  Pulse: 78 (05/30/23 1202)  Resp: 15 (05/30/23 1202)  BP: (!) 167/86 (05/30/23 1202)  SpO2: (!) 94 % (05/30/23 1202) Vital Signs (24h Range):  Temp:  [98.2 °F (36.8 °C)-98.3 °F (36.8 °C)] 98.3 °F (36.8 °C)  Pulse:  [78-95] 78  Resp:  [15-20] 15  SpO2:  [93 %-97 %] 94 %  BP: (130-191)/() 167/86     Weight: 93.4 kg (206 lb)  Body mass index is 27.18 kg/m².    Intake/Output Summary (Last 24 hours) at 5/30/2023 1224  Last data filed at 5/30/2023 0712  Gross per 24 hour   Intake --   Output 1430 ml   Net -1430 ml         Physical Exam  HENT:      Head: Normocephalic and atraumatic.   Cardiovascular:      Rate and Rhythm: Normal rate and regular rhythm.      Heart sounds: No murmur heard.  Pulmonary:      Effort: Pulmonary effort is normal. No respiratory distress.      Breath sounds: Normal breath sounds. No wheezing.   Abdominal:      General: Bowel sounds are normal. There is no distension.      Palpations: Abdomen is soft.      Tenderness: There is no abdominal tenderness.   Musculoskeletal:         General: No swelling.   Skin:     General: Skin is warm and dry.   Neurological:      Mental Status: He is alert and oriented to person, place, and time. Mental status is at baseline.           Significant Labs: All pertinent labs within the past 24 hours have been reviewed.  CBC:   Recent Labs   Lab 05/29/23 2204 05/30/23 0537   WBC 5.96 5.59   HGB 15.8 15.0   HCT 44.4 41.7    204     CMP:   Recent Labs   Lab 05/29/23 2204 05/30/23 0537   * 134*   K 3.7 3.7   CL 94* 97   CO2 24 24   GLU 95 111*   BUN 4* 4*   CREATININE 0.8 0.7   CALCIUM 9.3 9.3   PROT 7.7 6.9   ALBUMIN 4.1 3.6   BILITOT  1.1* 1.2*   ALKPHOS 79 76   AST 20 17   ALT 17 14   ANIONGAP 15 13     Cardiac Markers:   Recent Labs   Lab 05/30/23  1101   BNP 53     Troponin:   Recent Labs   Lab 05/29/23  2204 05/30/23  0040 05/30/23  0537   TROPONINI 0.049* 0.047* 0.044*       Significant Imaging: I have reviewed all pertinent imaging results/findings within the past 24 hours.

## 2023-05-30 NOTE — ASSESSMENT & PLAN NOTE
Patient has a current diagnosis of hypertensive urgency (without evidence of end organ damage) which is uncontrolled.  Latest blood pressure and vitals reviewed-   Temp:  [98.2 °F (36.8 °C)]   Pulse:  [80-84]   Resp:  [16]   BP: (191)/(103)   SpO2:  [97 %] .   Patient currently on IV antihypertensives.   Home meds for hypertension were reviewed and noted below.   Hypertension Medications             metoprolol succinate (TOPROL-XL) 100 MG 24 hr tablet Take 1 tablet (100 mg total) by mouth once daily.    olmesartan (BENICAR) 40 MG tablet Take 1 tablet (40 mg total) by mouth once daily.          Medication adjustment for hospital antihypertensives is as follows- IV hydralazine, IV labetalol as needed    Will aim for controlled BP reduction by medications noted above. Monitor and mitigate end organ damage as indicated.

## 2023-05-30 NOTE — ASSESSMENT & PLAN NOTE
-troponin elevated 0.049.    -EKG NSR with no ST or T-wave changes.    -patient denies CP/SOB, palpitations.    -elevated troponin likely secondary to uncontrolled hypertension.    -trend cardiac enzymes.    -monitor on telemetry.

## 2023-05-30 NOTE — ASSESSMENT & PLAN NOTE
-troponin flat  -likely related to elevated blood pressure  -cardiology consulted, adjusted antihypertensive meds

## 2023-05-30 NOTE — ASSESSMENT & PLAN NOTE
Patient has a current diagnosis of hypertensive urgency (without evidence of end organ damage) which is uncontrolled.  Latest blood pressure and vitals reviewed-   Temp:  [98.2 °F (36.8 °C)-98.3 °F (36.8 °C)]   Pulse:  [78-95]   Resp:  [15-20]   BP: (130-191)/()   SpO2:  [93 %-97 %] .   Patient currently on IV antihypertensives.   Home meds for hypertension were reviewed and noted below.   Hypertension Medications             metoprolol succinate (TOPROL-XL) 100 MG 24 hr tablet Take 1 tablet (100 mg total) by mouth once daily.    olmesartan (BENICAR) 40 MG tablet Take 1 tablet (40 mg total) by mouth once daily.          Medication adjustment for hospital antihypertensives is as follows- IV hydralazine, IV labetalol as needed, amlodipine added to oral regimen  Continue losartan and metoprolol, ARB changed as home med not on formulary    Will aim for controlled BP reduction by medications noted above. Monitor and mitigate end organ damage as indicated.

## 2023-05-30 NOTE — PHARMACY MED REC
"Admission Medication History     The home medication history was taken by Tc Martinez.    You may go to "Admission" then "Reconcile Home Medications" tabs to review and/or act upon these items.     The home medication list has been updated by the Pharmacy department.   Please read ALL comments highlighted in yellow.   Please address this information as you see fit.    Feel free to contact us if you have any questions or require assistance.      Medications listed below were obtained from: Patient/family and Analytic software- [employer]  (Not in a hospital admission)      Tc Martinez  [id number]    Current Outpatient Medications on File Prior to Encounter   Medication Sig Dispense Refill Last Dose    aspirin (ECOTRIN) 81 MG EC tablet Take 81 mg by mouth once daily.   5/29/2023    atorvastatin (LIPITOR) 20 MG tablet Take 1 tablet (20 mg total) by mouth once daily. 30 tablet 0 5/29/2023    metoprolol succinate (TOPROL-XL) 100 MG 24 hr tablet Take 1 tablet (100 mg total) by mouth once daily. 30 tablet 0 5/29/2023    olmesartan (BENICAR) 40 MG tablet Take 1 tablet (40 mg total) by mouth once daily. 30 tablet 0 5/29/2023    pantoprazole (PROTONIX) 40 MG tablet Take 1 tablet (40 mg total) by mouth once daily. 30 tablet 11 5/29/2023                         .        "

## 2023-05-30 NOTE — HPI
Mr. Muñoz is a 57-year-old  male with PMH significant for HTN, former tobacco user, hyperlipidemia, presented to the ED complaining of elevated blood pressure for the past 3-4 days, in spite of taking home dose olmesartan, metoprolol.  He was seen by his PCP, dose of olmesartan was doubled from 20-40 mg.  Highest /105.  Received labetalol 20 mg IV x1, with BP improving into systolic 180s.  Troponin elevated 0.049, patient denies CP/SOB, complained of mild nausea without vomiting.  Rest of the laboratory workup unremarkable.  No prior known history of CAD.  EKG NSR with no ST or T-wave changes.  Placed in observation for hypertensive urgency. Cardiology consulted to assist with management. Pt seen and examined today resting in ED. Pt reports possibly eating salty food on Saturday. He also endorses recently stopping cigarettes and started vaping, reports he drinks 3-4 beers daily. Saw his PCP on Friday who increased his olmesartan. Reports strong family history of CAD.   Labs reviewed, troponin 0.044, BNP neg, Crt 0.7, Mg 0.8, CXR neg for acute findings, echo pending. CT 8/22' showed atherosclerosis including coronary arteries.

## 2023-05-30 NOTE — H&P
[facility] - Emergency Dept.  [facility]  History & Physical    Patient Name: Rosalio Muñoz  MRN: [id number]  Patient Class: Emergency  Admission Date: 5/29/2023  Attending Physician: Lavern Dacosta DO   Primary Care Provider: Mihir Lopez MD         Patient information was obtained from patient, past medical records and ER records.     Subjective:     Principal Problem:Hypertensive urgency    Chief Complaint:   Chief Complaint   Patient presents with    Hypertension     States BP has been high, took meds today. Also reports low abdominal pain and low back pain x 3 days, tingling pain when urinating.         HPI: Mr. Muñoz is a 57-year-old  male with PMH significant for HTN, former tobacco user, hyperlipidemia, presented to the ED complaining of elevated blood pressure for the past 3-4 days, in spite of taking home dose olmesartan, metoprolol.  He was seen by his PCP, dose of olmesartan was doubled from 20-40 mg.  Highest /105.  Received labetalol 20 mg IV x1, with BP improving into systolic 180s.  Troponin elevated 0.049, patient denies CP/SOB, complained of mild nausea without vomiting.  Rest of the laboratory workup unremarkable.  No prior known history of CAD.  EKG NSR with no ST or T-wave changes.  Placed in observation for hypertensive urgency.      Past Medical History:   Diagnosis Date    Hypertension        Past Surgical History:   Procedure Laterality Date    WISDOM TOOTH EXTRACTION         Review of patient's allergies indicates:  No Known Allergies    No current facility-administered medications on file prior to encounter.     Current Outpatient Medications on File Prior to Encounter   Medication Sig    aspirin (ECOTRIN) 81 MG EC tablet Take 81 mg by mouth once daily.    atorvastatin (LIPITOR) 20 MG tablet Take 1 tablet (20 mg total) by mouth once daily.    metoprolol succinate (TOPROL-XL) 100 MG 24 hr tablet Take 1 tablet (100 mg total) by mouth once daily.     olmesartan (BENICAR) 40 MG tablet Take 1 tablet (40 mg total) by mouth once daily.    pantoprazole (PROTONIX) 40 MG tablet Take 1 tablet (40 mg total) by mouth once daily.     Family History       Problem Relation (Age of Onset)    Diabetes Father    Heart disease Father    Hyperlipidemia Mother    Hypertension Father    Kidney disease Father    Prostate cancer Maternal Grandfather          Tobacco Use    Smoking status: Former     Packs/day: 1.50     Years: 25.00     Pack years: 37.50     Types: Cigarettes     Quit date: 10/4/2022     Years since quittin.6    Smokeless tobacco: Never   Substance and Sexual Activity    Alcohol use: Yes     Alcohol/week: 4.0 - 6.0 standard drinks     Types: 4 - 6 Cans of beer per week    Drug use: No    Sexual activity: Yes     Partners: Female     Review of Systems   Constitutional: Negative.  Negative for chills and fever.   HENT: Negative.  Negative for congestion, rhinorrhea, sore throat and trouble swallowing.    Eyes: Negative.  Negative for visual disturbance.   Respiratory: Negative.  Negative for cough, shortness of breath and wheezing.    Cardiovascular: Negative.  Negative for chest pain and palpitations.   Gastrointestinal:  Positive for nausea. Negative for abdominal pain, diarrhea and vomiting.   Endocrine: Negative.    Genitourinary: Negative.  Negative for dysuria and flank pain.   Musculoskeletal: Negative.  Negative for back pain.   Skin: Negative.  Negative for rash.   Allergic/Immunologic: Negative.    Neurological: Negative.  Negative for speech difficulty, weakness, numbness and headaches.   Hematological: Negative.    Psychiatric/Behavioral: Negative.  Negative for hallucinations.    All other systems reviewed and are negative.  Objective:     Vital Signs (Most Recent):  Temp: 98.2 °F (36.8 °C) (23)  Pulse: 84 (23)  Resp: 16 (23)  BP: (!) 191/103 (23)  SpO2: 97 % (23) Vital Signs (24h  Range):  Temp:  [98.2 °F (36.8 °C)] 98.2 °F (36.8 °C)  Pulse:  [80-84] 84  Resp:  [16] 16  SpO2:  [97 %] 97 %  BP: (191)/(103) 191/103     Weight: 93.7 kg (206 lb 9.1 oz)  Body mass index is 27.25 kg/m².     Physical Exam  Vitals and nursing note reviewed.   Constitutional:       General: He is awake. He is not in acute distress.     Appearance: He is not ill-appearing.   HENT:      Head: Normocephalic and atraumatic.      Mouth/Throat:      Mouth: Mucous membranes are moist.   Eyes:      General: No scleral icterus.     Conjunctiva/sclera: Conjunctivae normal.   Cardiovascular:      Rate and Rhythm: Normal rate and regular rhythm.      Heart sounds: No murmur heard.  Pulmonary:      Effort: Pulmonary effort is normal. No respiratory distress.      Breath sounds: Normal breath sounds. No wheezing.   Abdominal:      Palpations: Abdomen is soft.      Tenderness: There is no abdominal tenderness.   Musculoskeletal:         General: No swelling. Normal range of motion.      Cervical back: Normal range of motion and neck supple.   Skin:     General: Skin is warm.      Coloration: Skin is not jaundiced.   Neurological:      General: No focal deficit present.      Mental Status: He is alert and oriented to person, place, and time. Mental status is at baseline.   Psychiatric:         Attention and Perception: Attention normal.         Mood and Affect: Mood normal.         Speech: Speech normal.         Behavior: Behavior normal. Behavior is cooperative.              Significant Labs: All pertinent labs within the past 24 hours have been reviewed.  BMP:   Recent Labs   Lab 05/29/23 2204   GLU 95   *   K 3.7   CL 94*   CO2 24   BUN 4*   CREATININE 0.8   CALCIUM 9.3     CBC:   Recent Labs   Lab 05/29/23 2204   WBC 5.96   HGB 15.8   HCT 44.4        CMP:   Recent Labs   Lab 05/29/23 2204   *   K 3.7   CL 94*   CO2 24   GLU 95   BUN 4*   CREATININE 0.8   CALCIUM 9.3   PROT 7.7   ALBUMIN 4.1   BILITOT 1.1*    ALKPHOS 79   AST 20   ALT 17   ANIONGAP 15     Troponin:   Recent Labs   Lab 05/29/23  2204   TROPONINI 0.049*       Significant Imaging: I have reviewed all pertinent imaging results/findings within the past 24 hours.  I have reviewed and interpreted all pertinent imaging results/findings within the past 24 hours.    Imaging Results              X-Ray Chest AP Portable (Final result)  Result time 05/29/23 22:07:14      Final result by Guerline Castillo MD (05/29/23 22:07:14)                   Impression:      No active finding      Electronically signed by: Guerline Castillo  Date:    05/29/2023  Time:    22:07               Narrative:    EXAMINATION:  XR CHEST AP PORTABLE    CLINICAL HISTORY:  hypertension;    TECHNIQUE:  Single frontal portable view of the chest was performed.    COMPARISON:  July 8, 2022    FINDINGS:  No pulmonary consolidation or pleural effusion.  Mediastinal contour stable midline                                    I have independently reviewed and interpreted the EKG.     I have independently reviewed all pertinent labs within the past 24 hours.    I have independently reviewed, visualized and interpreted all pertinent imaging results within the past 24 hours and discussed the findings with the ED physician, Lavern Dacosta DO          Assessment/Plan:     * Hypertensive urgency  Patient has a current diagnosis of hypertensive urgency (without evidence of end organ damage) which is uncontrolled.  Latest blood pressure and vitals reviewed-   Temp:  [98.2 °F (36.8 °C)]   Pulse:  [80-84]   Resp:  [16]   BP: (191)/(103)   SpO2:  [97 %] .   Patient currently on IV antihypertensives.   Home meds for hypertension were reviewed and noted below.   Hypertension Medications             metoprolol succinate (TOPROL-XL) 100 MG 24 hr tablet Take 1 tablet (100 mg total) by mouth once daily.    olmesartan (BENICAR) 40 MG tablet Take 1 tablet (40 mg total) by mouth once daily.          Medication  adjustment for hospital antihypertensives is as follows- IV hydralazine, IV labetalol as needed    Will aim for controlled BP reduction by medications noted above. Monitor and mitigate end organ damage as indicated.    Elevated troponin  -troponin elevated 0.049.    -EKG NSR with no ST or T-wave changes.    -patient denies CP/SOB, palpitations.    -elevated troponin likely secondary to uncontrolled hypertension.    -trend cardiac enzymes.    -monitor on telemetry.      Other hyperlipidemia  -continue statin        VTE Risk Mitigation (From admission, onward)         Ordered     Place sequential compression device  Until discontinued         05/29/23 1132                 The patient is placed in OBSERVATION status.      Jonas Payan MD  Department of Hospital Medicine  [facility] - Emergency Dept.

## 2023-05-30 NOTE — SUBJECTIVE & OBJECTIVE
Past Medical History:   Diagnosis Date    Hypertension        Past Surgical History:   Procedure Laterality Date    WISDOM TOOTH EXTRACTION         Review of patient's allergies indicates:  No Known Allergies    No current facility-administered medications on file prior to encounter.     Current Outpatient Medications on File Prior to Encounter   Medication Sig    aspirin (ECOTRIN) 81 MG EC tablet Take 81 mg by mouth once daily.    atorvastatin (LIPITOR) 20 MG tablet Take 1 tablet (20 mg total) by mouth once daily.    metoprolol succinate (TOPROL-XL) 100 MG 24 hr tablet Take 1 tablet (100 mg total) by mouth once daily.    olmesartan (BENICAR) 40 MG tablet Take 1 tablet (40 mg total) by mouth once daily.    pantoprazole (PROTONIX) 40 MG tablet Take 1 tablet (40 mg total) by mouth once daily.     Family History       Problem Relation (Age of Onset)    Diabetes Father    Heart disease Father    Hyperlipidemia Mother    Hypertension Father    Kidney disease Father    Prostate cancer Maternal Grandfather          Tobacco Use    Smoking status: Former     Packs/day: 1.50     Years: 25.00     Pack years: 37.50     Types: Cigarettes     Quit date: 10/4/2022     Years since quittin.6    Smokeless tobacco: Never   Substance and Sexual Activity    Alcohol use: Yes     Alcohol/week: 4.0 - 6.0 standard drinks     Types: 4 - 6 Cans of beer per week    Drug use: No    Sexual activity: Yes     Partners: Female     Review of Systems   Constitutional: Negative.  Negative for chills and fever.   HENT: Negative.  Negative for congestion, rhinorrhea, sore throat and trouble swallowing.    Eyes: Negative.  Negative for visual disturbance.   Respiratory: Negative.  Negative for cough, shortness of breath and wheezing.    Cardiovascular: Negative.  Negative for chest pain and palpitations.   Gastrointestinal:  Positive for nausea. Negative for abdominal pain, diarrhea and vomiting.   Endocrine: Negative.    Genitourinary: Negative.   Negative for dysuria and flank pain.   Musculoskeletal: Negative.  Negative for back pain.   Skin: Negative.  Negative for rash.   Allergic/Immunologic: Negative.    Neurological: Negative.  Negative for speech difficulty, weakness, numbness and headaches.   Hematological: Negative.    Psychiatric/Behavioral: Negative.  Negative for hallucinations.    All other systems reviewed and are negative.  Objective:     Vital Signs (Most Recent):  Temp: 98.2 °F (36.8 °C) (05/29/23 1713)  Pulse: 84 (05/29/23 2207)  Resp: 16 (05/29/23 1713)  BP: (!) 191/103 (05/29/23 1713)  SpO2: 97 % (05/29/23 1713) Vital Signs (24h Range):  Temp:  [98.2 °F (36.8 °C)] 98.2 °F (36.8 °C)  Pulse:  [80-84] 84  Resp:  [16] 16  SpO2:  [97 %] 97 %  BP: (191)/(103) 191/103     Weight: 93.7 kg (206 lb 9.1 oz)  Body mass index is 27.25 kg/m².     Physical Exam  Vitals and nursing note reviewed.   Constitutional:       General: He is awake. He is not in acute distress.     Appearance: He is not ill-appearing.   HENT:      Head: Normocephalic and atraumatic.      Mouth/Throat:      Mouth: Mucous membranes are moist.   Eyes:      General: No scleral icterus.     Conjunctiva/sclera: Conjunctivae normal.   Cardiovascular:      Rate and Rhythm: Normal rate and regular rhythm.      Heart sounds: No murmur heard.  Pulmonary:      Effort: Pulmonary effort is normal. No respiratory distress.      Breath sounds: Normal breath sounds. No wheezing.   Abdominal:      Palpations: Abdomen is soft.      Tenderness: There is no abdominal tenderness.   Musculoskeletal:         General: No swelling. Normal range of motion.      Cervical back: Normal range of motion and neck supple.   Skin:     General: Skin is warm.      Coloration: Skin is not jaundiced.   Neurological:      General: No focal deficit present.      Mental Status: He is alert and oriented to person, place, and time. Mental status is at baseline.   Psychiatric:         Attention and Perception: Attention  normal.         Mood and Affect: Mood normal.         Speech: Speech normal.         Behavior: Behavior normal. Behavior is cooperative.              Significant Labs: All pertinent labs within the past 24 hours have been reviewed.  BMP:   Recent Labs   Lab 05/29/23 2204   GLU 95   *   K 3.7   CL 94*   CO2 24   BUN 4*   CREATININE 0.8   CALCIUM 9.3     CBC:   Recent Labs   Lab 05/29/23 2204   WBC 5.96   HGB 15.8   HCT 44.4        CMP:   Recent Labs   Lab 05/29/23 2204   *   K 3.7   CL 94*   CO2 24   GLU 95   BUN 4*   CREATININE 0.8   CALCIUM 9.3   PROT 7.7   ALBUMIN 4.1   BILITOT 1.1*   ALKPHOS 79   AST 20   ALT 17   ANIONGAP 15     Troponin:   Recent Labs   Lab 05/29/23 2204   TROPONINI 0.049*       Significant Imaging: I have reviewed all pertinent imaging results/findings within the past 24 hours.  I have reviewed and interpreted all pertinent imaging results/findings within the past 24 hours.    Imaging Results              X-Ray Chest AP Portable (Final result)  Result time 05/29/23 22:07:14      Final result by Guerline Castillo MD (05/29/23 22:07:14)                   Impression:      No active finding      Electronically signed by: Guerline Castillo  Date:    05/29/2023  Time:    22:07               Narrative:    EXAMINATION:  XR CHEST AP PORTABLE    CLINICAL HISTORY:  hypertension;    TECHNIQUE:  Single frontal portable view of the chest was performed.    COMPARISON:  July 8, 2022    FINDINGS:  No pulmonary consolidation or pleural effusion.  Mediastinal contour stable midline                                    I have independently reviewed and interpreted the EKG.     I have independently reviewed all pertinent labs within the past 24 hours.    I have independently reviewed, visualized and interpreted all pertinent imaging results within the past 24 hours and discussed the findings with the ED physician, Lavern Dacosta DO

## 2023-05-30 NOTE — PROGRESS NOTES
[facility] - Emergency Dept.  [facility] Medicine  Progress Note    Patient Name: Roslaio Muñoz  MRN: [id number]  Patient Class: OP- Observation   Admission Date: 5/29/2023  Length of Stay: 0 days  Attending Physician: Kameron Patel MD  Primary Care Provider: Mihir Lopez MD        Subjective:     Principal Problem:Hypertensive urgency        HPI:  Mr. Muñoz is a 57-year-old  male with PMH significant for HTN, former tobacco user, hyperlipidemia, presented to the ED complaining of elevated blood pressure for the past 3-4 days, in spite of taking home dose olmesartan, metoprolol.  He was seen by his PCP, dose of olmesartan was doubled from 20-40 mg.  Highest /105.  Received labetalol 20 mg IV x1, with BP improving into systolic 180s.  Troponin elevated 0.049, patient denies CP/SOB, complained of mild nausea without vomiting.  Rest of the laboratory workup unremarkable.  No prior known history of CAD.  EKG NSR with no ST or T-wave changes.  Placed in observation for hypertensive urgency.      Overview/Hospital Course:  No notes on file    Interval History: patient reads lips. He reports he was complaint with his medication but his blood pressure remained high. Having regular bowel movements. BP and troponin elevated. Amlodipine added by cardiology    Review of Systems  Objective:     Vital Signs (Most Recent):  Temp: 98.3 °F (36.8 °C) (05/30/23 0030)  Pulse: 78 (05/30/23 1202)  Resp: 15 (05/30/23 1202)  BP: (!) 167/86 (05/30/23 1202)  SpO2: (!) 94 % (05/30/23 1202) Vital Signs (24h Range):  Temp:  [98.2 °F (36.8 °C)-98.3 °F (36.8 °C)] 98.3 °F (36.8 °C)  Pulse:  [78-95] 78  Resp:  [15-20] 15  SpO2:  [93 %-97 %] 94 %  BP: (130-191)/() 167/86     Weight: 93.4 kg (206 lb)  Body mass index is 27.18 kg/m².    Intake/Output Summary (Last 24 hours) at 5/30/2023 1224  Last data filed at 5/30/2023 0712  Gross per 24 hour   Intake --   Output 1430 ml   Net -1430 ml         Physical Exam  HENT:       Head: Normocephalic and atraumatic.   Cardiovascular:      Rate and Rhythm: Normal rate and regular rhythm.      Heart sounds: No murmur heard.  Pulmonary:      Effort: Pulmonary effort is normal. No respiratory distress.      Breath sounds: Normal breath sounds. No wheezing.   Abdominal:      General: Bowel sounds are normal. There is no distension.      Palpations: Abdomen is soft.      Tenderness: There is no abdominal tenderness.   Musculoskeletal:         General: No swelling.   Skin:     General: Skin is warm and dry.   Neurological:      Mental Status: He is alert and oriented to person, place, and time. Mental status is at baseline.           Significant Labs: All pertinent labs within the past 24 hours have been reviewed.  CBC:   Recent Labs   Lab 05/29/23 2204 05/30/23  0537   WBC 5.96 5.59   HGB 15.8 15.0   HCT 44.4 41.7    204     CMP:   Recent Labs   Lab 05/29/23 2204 05/30/23  0537   * 134*   K 3.7 3.7   CL 94* 97   CO2 24 24   GLU 95 111*   BUN 4* 4*   CREATININE 0.8 0.7   CALCIUM 9.3 9.3   PROT 7.7 6.9   ALBUMIN 4.1 3.6   BILITOT 1.1* 1.2*   ALKPHOS 79 76   AST 20 17   ALT 17 14   ANIONGAP 15 13     Cardiac Markers:   Recent Labs   Lab 05/30/23  1101   BNP 53     Troponin:   Recent Labs   Lab 05/29/23 2204 05/30/23  0040 05/30/23  0537   TROPONINI 0.049* 0.047* 0.044*       Significant Imaging: I have reviewed all pertinent imaging results/findings within the past 24 hours.      Assessment/Plan:      * Hypertensive urgency  Patient has a current diagnosis of hypertensive urgency (without evidence of end organ damage) which is uncontrolled.  Latest blood pressure and vitals reviewed-   Temp:  [98.2 °F (36.8 °C)-98.3 °F (36.8 °C)]   Pulse:  [78-95]   Resp:  [15-20]   BP: (130-191)/()   SpO2:  [93 %-97 %] .   Patient currently on IV antihypertensives.   Home meds for hypertension were reviewed and noted below.   Hypertension Medications               metoprolol succinate  (TOPROL-XL) 100 MG 24 hr tablet Take 1 tablet (100 mg total) by mouth once daily.    olmesartan (BENICAR) 40 MG tablet Take 1 tablet (40 mg total) by mouth once daily.            Medication adjustment for hospital antihypertensives is as follows- IV hydralazine, IV labetalol as needed, amlodipine added to oral regimen  Continue losartan and metoprolol, ARB changed as home med not on formulary    Will aim for controlled BP reduction by medications noted above. Monitor and mitigate end organ damage as indicated.    Elevated troponin  -troponin flat  -likely related to elevated blood pressure  -cardiology consulted, adjusted antihypertensive meds    Other hyperlipidemia  -continue statin        Hypomagnesemia        -replace with IV supplementation        -repeat labs in am      VTE Risk Mitigation (From admission, onward)           Ordered     Place sequential compression device  Until discontinued         05/29/23 1859                    Discharge Planning   RUTH:      Code Status: Not on file   Is the patient medically ready for discharge?:     Reason for patient still in hospital (select all that apply): Patient trending condition, Laboratory test, Treatment and Consult recommendations                     Kameron Patel MD  Department of Hospital Medicine   [facility] - Emergency Dept.

## 2023-05-31 ENCOUNTER — TELEPHONE (OUTPATIENT)
Dept: CARDIOLOGY | Facility: HOSPITAL | Age: 58
End: 2023-05-31
Payer: COMMERCIAL

## 2023-05-31 VITALS
TEMPERATURE: 98 F | BODY MASS INDEX: 27.17 KG/M2 | OXYGEN SATURATION: 98 % | DIASTOLIC BLOOD PRESSURE: 73 MMHG | SYSTOLIC BLOOD PRESSURE: 124 MMHG | RESPIRATION RATE: 16 BRPM | WEIGHT: 205 LBS | HEART RATE: 72 BPM | HEIGHT: 73 IN

## 2023-05-31 LAB
ALBUMIN SERPL BCP-MCNC: 3.7 G/DL (ref 3.5–5.2)
ALP SERPL-CCNC: 78 U/L (ref 55–135)
ALT SERPL W/O P-5'-P-CCNC: 14 U/L (ref 10–44)
ANION GAP SERPL CALC-SCNC: 10 MMOL/L (ref 8–16)
AST SERPL-CCNC: 18 U/L (ref 10–40)
BASOPHILS # BLD AUTO: 0.03 K/UL (ref 0–0.2)
BASOPHILS NFR BLD: 0.6 % (ref 0–1.9)
BILIRUB SERPL-MCNC: 1.7 MG/DL (ref 0.1–1)
BNP SERPL-MCNC: 26 PG/ML (ref 0–99)
BUN SERPL-MCNC: 5 MG/DL (ref 6–20)
CALCIUM SERPL-MCNC: 8.9 MG/DL (ref 8.7–10.5)
CHLORIDE SERPL-SCNC: 97 MMOL/L (ref 95–110)
CO2 SERPL-SCNC: 28 MMOL/L (ref 23–29)
CREAT SERPL-MCNC: 0.8 MG/DL (ref 0.5–1.4)
CV STRESS BASE HR: 77 BPM
DIASTOLIC BLOOD PRESSURE: 90 MMHG
DIFFERENTIAL METHOD: ABNORMAL
EOSINOPHIL # BLD AUTO: 0 K/UL (ref 0–0.5)
EOSINOPHIL NFR BLD: 0.8 % (ref 0–8)
ERYTHROCYTE [DISTWIDTH] IN BLOOD BY AUTOMATED COUNT: 11.8 % (ref 11.5–14.5)
EST. GFR  (NO RACE VARIABLE): >60 ML/MIN/1.73 M^2
GLUCOSE SERPL-MCNC: 114 MG/DL (ref 70–110)
HCT VFR BLD AUTO: 42.4 % (ref 40–54)
HGB BLD-MCNC: 15 G/DL (ref 14–18)
IMM GRANULOCYTES # BLD AUTO: 0.01 K/UL (ref 0–0.04)
IMM GRANULOCYTES NFR BLD AUTO: 0.2 % (ref 0–0.5)
LYMPHOCYTES # BLD AUTO: 1.2 K/UL (ref 1–4.8)
LYMPHOCYTES NFR BLD: 25.8 % (ref 18–48)
MAGNESIUM SERPL-MCNC: 1.8 MG/DL (ref 1.6–2.6)
MCH RBC QN AUTO: 33.8 PG (ref 27–31)
MCHC RBC AUTO-ENTMCNC: 35.4 G/DL (ref 32–36)
MCV RBC AUTO: 96 FL (ref 82–98)
MONOCYTES # BLD AUTO: 0.6 K/UL (ref 0.3–1)
MONOCYTES NFR BLD: 12.1 % (ref 4–15)
NEUTROPHILS # BLD AUTO: 2.9 K/UL (ref 1.8–7.7)
NEUTROPHILS NFR BLD: 60.5 % (ref 38–73)
NRBC BLD-RTO: 0 /100 WBC
NUC REST EJECTION FRACTION: 71
NUC STRESS EJECTION FRACTION: 68 %
OHS CV CPX 85 PERCENT MAX PREDICTED HEART RATE MALE: 139
OHS CV CPX MAX PREDICTED HEART RATE: 164
OHS CV CPX PATIENT IS FEMALE: 0
OHS CV CPX PATIENT IS MALE: 1
OHS CV CPX PEAK DIASTOLIC BLOOD PRESSURE: 90 MMHG
OHS CV CPX PEAK HEAR RATE: 116 BPM
OHS CV CPX PEAK RATE PRESSURE PRODUCT: NORMAL
OHS CV CPX PEAK SYSTOLIC BLOOD PRESSURE: 154 MMHG
OHS CV CPX PERCENT MAX PREDICTED HEART RATE ACHIEVED: 71
OHS CV CPX RATE PRESSURE PRODUCT PRESENTING: NORMAL
PLATELET # BLD AUTO: 219 K/UL (ref 150–450)
PMV BLD AUTO: 9.5 FL (ref 9.2–12.9)
POTASSIUM SERPL-SCNC: 4.1 MMOL/L (ref 3.5–5.1)
PROT SERPL-MCNC: 7 G/DL (ref 6–8.4)
RBC # BLD AUTO: 4.44 M/UL (ref 4.6–6.2)
SODIUM SERPL-SCNC: 135 MMOL/L (ref 136–145)
SYSTOLIC BLOOD PRESSURE: 154 MMHG
WBC # BLD AUTO: 4.72 K/UL (ref 3.9–12.7)

## 2023-05-31 PROCEDURE — 25000003 PHARM REV CODE 250: Performed by: INTERNAL MEDICINE

## 2023-05-31 PROCEDURE — 85025 COMPLETE CBC W/AUTO DIFF WBC: CPT | Performed by: STUDENT IN AN ORGANIZED HEALTH CARE EDUCATION/TRAINING PROGRAM

## 2023-05-31 PROCEDURE — 80053 COMPREHEN METABOLIC PANEL: CPT | Performed by: INTERNAL MEDICINE

## 2023-05-31 PROCEDURE — 99499 NO LOS: ICD-10-PCS | Mod: ,,, | Performed by: INTERNAL MEDICINE

## 2023-05-31 PROCEDURE — 63600175 PHARM REV CODE 636 W HCPCS: Performed by: INTERNAL MEDICINE

## 2023-05-31 PROCEDURE — 83735 ASSAY OF MAGNESIUM: CPT | Performed by: INTERNAL MEDICINE

## 2023-05-31 PROCEDURE — 83880 ASSAY OF NATRIURETIC PEPTIDE: CPT | Performed by: INTERNAL MEDICINE

## 2023-05-31 PROCEDURE — 36415 COLL VENOUS BLD VENIPUNCTURE: CPT | Performed by: INTERNAL MEDICINE

## 2023-05-31 PROCEDURE — 99499 UNLISTED E&M SERVICE: CPT | Mod: ,,, | Performed by: INTERNAL MEDICINE

## 2023-05-31 RX ORDER — ASPIRIN 81 MG/1
81 TABLET ORAL DAILY
Qty: 30 TABLET | Refills: 0 | Status: SHIPPED | OUTPATIENT
Start: 2023-05-31 | End: 2023-06-16 | Stop reason: SDUPTHER

## 2023-05-31 RX ORDER — LOSARTAN POTASSIUM 100 MG/1
100 TABLET ORAL DAILY
Qty: 30 TABLET | Refills: 0 | Status: SHIPPED | OUTPATIENT
Start: 2023-06-01 | End: 2023-06-16 | Stop reason: SDUPTHER

## 2023-05-31 RX ORDER — CARVEDILOL 25 MG/1
25 TABLET ORAL 2 TIMES DAILY
Qty: 60 TABLET | Refills: 0 | Status: SHIPPED | OUTPATIENT
Start: 2023-05-31 | End: 2023-06-16 | Stop reason: SDUPTHER

## 2023-05-31 RX ORDER — AMLODIPINE BESYLATE 10 MG/1
10 TABLET ORAL DAILY
Qty: 30 TABLET | Refills: 0 | Status: SHIPPED | OUTPATIENT
Start: 2023-06-01 | End: 2023-06-02

## 2023-05-31 RX ORDER — ATORVASTATIN CALCIUM 20 MG/1
20 TABLET, FILM COATED ORAL DAILY
Qty: 30 TABLET | Refills: 0 | Status: SHIPPED | OUTPATIENT
Start: 2023-05-31 | End: 2023-06-16

## 2023-05-31 RX ADMIN — LOSARTAN POTASSIUM 100 MG: 50 TABLET, FILM COATED ORAL at 08:05

## 2023-05-31 RX ADMIN — ATORVASTATIN CALCIUM 20 MG: 10 TABLET, FILM COATED ORAL at 08:05

## 2023-05-31 RX ADMIN — CARVEDILOL 25 MG: 12.5 TABLET, FILM COATED ORAL at 08:05

## 2023-05-31 RX ADMIN — AMLODIPINE BESYLATE 10 MG: 10 TABLET ORAL at 08:05

## 2023-05-31 RX ADMIN — REGADENOSON 0.4 MG: 0.08 INJECTION, SOLUTION INTRAVENOUS at 10:05

## 2023-05-31 RX ADMIN — ASPIRIN 81 MG: 81 TABLET, COATED ORAL at 08:05

## 2023-05-31 NOTE — SUBJECTIVE & OBJECTIVE
Interval History:     No acute events overnight  Patient denied chest pain, shortness Akil, chest tightness, palpitations.    Blood pressure stable on current regimen, will monitor and adjust meds;     NPO since midnight, scheduled for stress test today;       Review of Systems    -ve except as mentioned above    Objective:     Vital Signs (Most Recent):  Temp: 98.4 °F (36.9 °C) (05/31/23 1115)  Pulse: 78 (05/31/23 1115)  Resp: 16 (05/31/23 1115)  BP: 124/73 (05/31/23 1115)  SpO2: 98 % (05/31/23 1115) Vital Signs (24h Range):  Temp:  [97.9 °F (36.6 °C)-98.4 °F (36.9 °C)] 98.4 °F (36.9 °C)  Pulse:  [74-87] 78  Resp:  [15-20] 16  SpO2:  [93 %-99 %] 98 %  BP: (124-167)/(59-96) 124/73     Weight: 93 kg (205 lb)  Body mass index is 27.05 kg/m².    Intake/Output Summary (Last 24 hours) at 5/31/2023 1130  Last data filed at 5/31/2023 1017  Gross per 24 hour   Intake --   Output 1450 ml   Net -1450 ml         Physical Exam      HENT:      Head: Normocephalic and atraumatic.   Cardiovascular:      Rate and Rhythm: Normal rate and regular rhythm.      Heart sounds: No murmur heard.  Pulmonary:      Effort: Pulmonary effort is normal. No respiratory distress.      Breath sounds: Normal breath sounds. No wheezing.   Abdominal:      General: Bowel sounds are normal. There is no distension.      Palpations: Abdomen is soft.      Tenderness: There is no abdominal tenderness.   Musculoskeletal:         General: No swelling.   Skin:     General: Skin is warm and dry.   Neurological:      Mental Status: He is alert and oriented to person, place, and time. Mental status is at baseline.     Significant Labs: All pertinent labs within the past 24 hours have been reviewed.  CBC:   Recent Labs   Lab 05/29/23  2204 05/30/23  0537 05/31/23  0437   WBC 5.96 5.59 4.72   HGB 15.8 15.0 15.0   HCT 44.4 41.7 42.4    204 219     CMP:   Recent Labs   Lab 05/29/23  2204 05/30/23  0537 05/31/23  0437   * 134* 135*   K 3.7 3.7 4.1   CL  94* 97 97   CO2 24 24 28   GLU 95 111* 114*   BUN 4* 4* 5*   CREATININE 0.8 0.7 0.8   CALCIUM 9.3 9.3 8.9   PROT 7.7 6.9 7.0   ALBUMIN 4.1 3.6 3.7   BILITOT 1.1* 1.2* 1.7*   ALKPHOS 79 76 78   AST 20 17 18   ALT 17 14 14   ANIONGAP 15 13 10       Significant Imaging:     Imaging Results              X-Ray Chest AP Portable (Final result)  Result time 05/29/23 22:07:14      Final result by Guerline Castillo MD (05/29/23 22:07:14)                   Impression:      No active finding      Electronically signed by: Guerline Castillo  Date:    05/29/2023  Time:    22:07               Narrative:    EXAMINATION:  XR CHEST AP PORTABLE    CLINICAL HISTORY:  hypertension;    TECHNIQUE:  Single frontal portable view of the chest was performed.    COMPARISON:  July 8, 2022    FINDINGS:  No pulmonary consolidation or pleural effusion.  Mediastinal contour stable midline

## 2023-05-31 NOTE — PLAN OF CARE
[facility] - Telemetry (Hospital)  Discharge Final Note    Primary Care Provider: Mihir Lopez MD    Expected Discharge Date: 5/31/2023    Final Discharge Note (most recent)       Final Note - 05/31/23 1352          Final Note    Assessment Type Final Discharge Note     Anticipated Discharge Disposition Home or Self Care     Hospital Resources/Appts/Education Provided Appointments scheduled and added to AVS        Post-Acute Status    Discharge Delays None known at this time                   Pt to discharge home today.  No CM needs for discharge.     PCP: Hospital Follow Up with Mihir Lopez MD, Tuesday Jun 6, 2023 8:20 AM    Important Message from Medicare

## 2023-05-31 NOTE — DISCHARGE INSTRUCTIONS
Recommend pt to be compliant with medications, diet, follow up with PCP/ cardio within one week upon discharge

## 2023-05-31 NOTE — PLAN OF CARE
[facility] - [facility] (Hospital)  Initial Discharge Assessment       Primary Care Provider: Mihir Lopez MD    Admission Diagnosis: Elevated blood pressure reading [R03.0]  Hypertension [I10]  NSTEMI (non-ST elevated myocardial infarction) [I21.4]  Hypertensive urgency [I16.0]  Hypertensive emergency [I16.1]    Admission Date: 5/29/2023  Expected Discharge Date:     Transition of Care Barriers: None    Payor: BLUE CROSS [employer]IT / Plan: [employer] [place] [place]OSS [place] / Product Type: Self Funded /     Extended Emergency Contact Information  Primary Emergency Contact: Fanta Muñoz   [facility]  Home Phone: [phone]  Mobile Phone: [phone]  Relation: Spouse    Discharge Plan A: Home, Home with family  Discharge Plan B: Home      [facility] [address] [place] [address] [facility] [address]  [address]  [facility] [address]  Phone: [phone] Fax: [phone]      Initial Assessment (most recent)       Adult Discharge Assessment - 05/31/23 1234          Discharge Assessment    Assessment Type Discharge Planning Assessment     Confirmed/corrected address, phone number and insurance Yes     Confirmed Demographics Correct on Facesheet     Source of Information patient     Communicated RUTH with patient/caregiver Date not available/Unable to determine     Reason For Admission Hypertensive urgency     People in Home spouse     Facility Arrived From: Home     Do you expect to return to your current living situation? Yes     Do you have help at home or someone to help you manage your care at home? Yes     Who are your caregiver(s) and their phone number(s)? Pt's spouse     Current cognitive status: Alert/Oriented     Equipment Currently Used at Home none     Readmission within 30 days? No     Patient currently being followed by outpatient case management? No     Do you currently have service(s) that help you manage your care at home? No     Do you take prescription  medications? Yes     Do you have prescription coverage? Yes     Is the patient taking medications as prescribed? yes     Who is going to help you get home at discharge? Pt's spouse or parents     How do you get to doctors appointments? car, drives self;family or friend will provide     Are you on dialysis? No     Do you take coumadin? No     Discharge Plan A Home;Home with family     Discharge Plan B Home     DME Needed Upon Discharge  none     Discharge Plan discussed with: Patient     Transition of Care Barriers None        Financial Resource Strain    How hard is it for you to pay for the very basics like food, housing, medical care, and heating? Not hard at all        Housing Stability    In the last 12 months, was there a time when you were not able to pay the mortgage or rent on time? No     In the last 12 months, was there a time when you did not have a steady place to sleep or slept in a shelter (including now)? No        Transportation Needs    In the past 12 months, has lack of transportation kept you from medical appointments or from getting medications? No     In the past 12 months, has lack of transportation kept you from meetings, work, or from getting things needed for daily living? No        Food Insecurity    Within the past 12 months, you worried that your food would run out before you got the money to buy more. Never true     Within the past 12 months, the food you bought just didn't last and you didn't have money to get more. Never true                   Anticipated DC dispo: Home   Prior Level of Function: Lives at home with spouse, independent and ambulatory    PCP: SHIRA Lopez MD      Comments: SW met with patient at bedside. No CM needs expressed during assessment. Pt anticipates DC today.     SW provided CM contact number, if needs arise.

## 2023-05-31 NOTE — HOSPITAL COURSE
Mr. Muñoz is a 57-year-old  male with PMH significant for HTN, former tobacco user, hyperlipidemia, presented to the ED complaining of elevated blood pressure for the past 3-4 days, in spite of taking home dose olmesartan, metoprolol. He was seen by his PCP, dose of olmesartan was doubled from 20-40 mg.  Highest /105.  Received labetalol 20 mg IV x1, with BP improving into systolic 180s.  Troponin elevated 0.049, ;  Placed in observation for hypertensive urgency.      5/31    No acute events overnight, examination done at bedside, patient denied headache, dizziness, chest pain, palpitations, shortness O breath, nausea, vomiting, bowel or bladder issues.    Troponin 0.049, likely demand ischemia, however given strong family history of coronary artery disease, Cardiology plan for stress test on 05/31, no acute/abnormal findings.  Blood pressure stabilized on current regimen.    Considering clinical and hemodynamic stability, planning to discharge patient today, discussed with Cardiology, agreed to the plan, emphasized on compliance with medications, follow-up visits, diet, exercise, hydration, patient agreed to the plan.  Medications to be delivered to bedside, ordered NP at home program, discharge today;

## 2023-05-31 NOTE — HOSPITAL COURSE
5/31/23 Pt seen and examined today, feels ok. Denies any Cp, agnina or anginal equivalent at this time. Nuclear stress test neg

## 2023-05-31 NOTE — SUBJECTIVE & OBJECTIVE
Review of Systems   Constitutional: Negative.   HENT: Negative.     Eyes: Negative.    Cardiovascular: Negative.    Respiratory: Negative.     Skin: Negative.    Musculoskeletal: Negative.    Gastrointestinal: Negative.    Genitourinary: Negative.    Neurological: Negative.    Psychiatric/Behavioral: Negative.     Objective:     Vital Signs (Most Recent):  Temp: 98.4 °F (36.9 °C) (05/31/23 1115)  Pulse: 72 (05/31/23 1304)  Resp: 16 (05/31/23 1115)  BP: 124/73 (05/31/23 1115)  SpO2: 98 % (05/31/23 1115) Vital Signs (24h Range):  Temp:  [97.9 °F (36.6 °C)-98.4 °F (36.9 °C)] 98.4 °F (36.9 °C)  Pulse:  [72-87] 72  Resp:  [16-20] 16  SpO2:  [93 %-99 %] 98 %  BP: (124-160)/(59-96) 124/73     Weight: 93 kg (205 lb)  Body mass index is 27.05 kg/m².     SpO2: 98 %         Intake/Output Summary (Last 24 hours) at 5/31/2023 1346  Last data filed at 5/31/2023 1017  Gross per 24 hour   Intake --   Output 1450 ml   Net -1450 ml       Lines/Drains/Airways       Peripheral Intravenous Line  Duration                  Peripheral IV - Single Lumen 05/29/23 2205 22 G Right Antecubital 1 day                       Physical Exam  Vitals and nursing note reviewed.   Constitutional:       Appearance: Normal appearance.   HENT:      Head: Normocephalic and atraumatic.   Eyes:      General:         Right eye: No discharge.         Left eye: No discharge.      Pupils: Pupils are equal, round, and reactive to light.   Cardiovascular:      Rate and Rhythm: Normal rate and regular rhythm.      Heart sounds: S1 normal and S2 normal. No murmur heard.    No friction rub.   Pulmonary:      Effort: Pulmonary effort is normal. No respiratory distress.      Breath sounds: Normal breath sounds. No rales.   Abdominal:      Palpations: Abdomen is soft.      Tenderness: There is no abdominal tenderness.   Musculoskeletal:      Cervical back: Neck supple.      Right lower leg: No edema.      Left lower leg: No edema.   Skin:     General: Skin is warm and  dry.   Neurological:      General: No focal deficit present.      Mental Status: He is alert and oriented to person, place, and time.   Psychiatric:         Mood and Affect: Mood normal.         Behavior: Behavior normal.         Thought Content: Thought content normal.          Significant Labs: BMP:   Recent Labs   Lab 05/29/23 2204 05/30/23 0537 05/31/23 0437   GLU 95 111* 114*   * 134* 135*   K 3.7 3.7 4.1   CL 94* 97 97   CO2 24 24 28   BUN 4* 4* 5*   CREATININE 0.8 0.7 0.8   CALCIUM 9.3 9.3 8.9   MG  --  0.8* 1.8   , CMP   Recent Labs   Lab 05/29/23 2204 05/30/23 0537 05/31/23 0437   * 134* 135*   K 3.7 3.7 4.1   CL 94* 97 97   CO2 24 24 28   GLU 95 111* 114*   BUN 4* 4* 5*   CREATININE 0.8 0.7 0.8   CALCIUM 9.3 9.3 8.9   PROT 7.7 6.9 7.0   ALBUMIN 4.1 3.6 3.7   BILITOT 1.1* 1.2* 1.7*   ALKPHOS 79 76 78   AST 20 17 18   ALT 17 14 14   ANIONGAP 15 13 10   , CBC   Recent Labs   Lab 05/29/23 2204 05/30/23 0537 05/31/23 0437   WBC 5.96 5.59 4.72   HGB 15.8 15.0 15.0   HCT 44.4 41.7 42.4    204 219   , INR No results for input(s): INR, PROTIME in the last 48 hours., Lipid Panel No results for input(s): CHOL, HDL, LDLCALC, TRIG, CHOLHDL in the last 48 hours., Troponin   Recent Labs   Lab 05/29/23 2204 05/30/23  0040 05/30/23 0537   TROPONINI 0.049* 0.047* 0.044*   , and All pertinent lab results from the last 24 hours have been reviewed.    Significant Imaging: Echocardiogram: Transthoracic echo (TTE) complete (Cupid Only):   Results for orders placed or performed during the hospital encounter of 05/29/23   Echo   Result Value Ref Range    BSA 2.19 m2    TDI SEPTAL 0.09 m/s    LV LATERAL E/E' RATIO 6.27 m/s    LV SEPTAL E/E' RATIO 7.67 m/s    LA WIDTH 3.80 cm    IVC diameter 1.56 cm    Left Ventricular Outflow Tract Mean Velocity 0.94 cm/s    Left Ventricular Outflow Tract Mean Gradient 3.94 mmHg    TDI LATERAL 0.11 m/s    LVIDd 4.49 3.5 - 6.0 cm    IVS 1.39 (A) 0.6 - 1.1 cm     Posterior Wall 1.20 (A) 0.6 - 1.1 cm    Ao root annulus 3.41 cm    LVIDs 3.09 2.1 - 4.0 cm    FS 31 28 - 44 %    LA volume 73.91 cm3    STJ 3.64 cm    Ascending aorta 3.48 cm    LV mass 220.56 g    LA size 3.98 cm    TAPSE 2.20 cm    Left Ventricle Relative Wall Thickness 0.53 cm    AV mean gradient 6 mmHg    AV valve area 2.93 cm2    AV Velocity Ratio 0.82     AV index (prosthetic) 0.85     MV valve area p 1/2 method 3.61 cm2    E/A ratio 0.79     Mean e' 0.10 m/s    E wave deceleration time 210.36 msec    IVRT 72.31 msec    LVOT diameter 2.10 cm    LVOT area 3.5 cm2    LVOT peak ashok 1.24 m/s    LVOT peak VTI 25.70 cm    Ao peak ashok 1.52 m/s    Ao VTI 30.4 cm    RVOT peak ashok 0.87 m/s    RVOT peak VTI 15.5 cm    LVOT stroke volume 88.97 cm3    AV peak gradient 9 mmHg    PV mean gradient 2.00 mmHg    E/E' ratio 6.90 m/s    MV Peak E Ashok 0.69 m/s    TR Max Ashok 2.05 m/s    MV stenosis pressure 1/2 time 61.00 ms    MV Peak A Ashok 0.87 m/s    LV Systolic Volume 37.65 mL    LV Systolic Volume Index 17.3 mL/m2    LV Diastolic Volume 91.86 mL    LV Diastolic Volume Index 42.14 mL/m2    LA Volume Index 33.9 mL/m2    LV Mass Index 101 g/m2    RA Major Axis 4.30 cm    Left Atrium Minor Axis 5.68 cm    Left Atrium Major Axis 5.82 cm    Triscuspid Valve Regurgitation Peak Gradient 17 mmHg    RA Width 2.80 cm    Right Atrial Pressure (from IVC) 3 mmHg    EF 60 %    TV rest pulmonary artery pressure 20 mmHg    Narrative    · The left ventricle is normal in size with concentric remodeling and   normal systolic function.  · The estimated ejection fraction is 60%.  · Normal left ventricular diastolic function.  · Normal right ventricular size with normal right ventricular systolic   function.  · Normal central venous pressure (3 mmHg).  · The estimated PA systolic pressure is 20 mmHg.      , EKG: reviewed, Stress Test: reviewed, and X-Ray: CXR: X-Ray Chest 1 View (CXR): No results found for this visit on 05/29/23.

## 2023-05-31 NOTE — PROGRESS NOTES
[facility] - Telemetry ([facility])  Cardiology  Progress Note    Patient Name: Rosalio Muñoz  MRN: [id number]  Admission Date: 5/29/2023  Hospital Length of Stay: 1 days  Code Status: No Order   Attending Physician: Lisa Sewell, *   Primary Care Physician: Mihir Lopez MD  Expected Discharge Date: 5/31/2023  Principal Problem:Hypertensive urgency    Subjective:   HPI:   Mr. Muñoz is a 57-year-old  male with PMH significant for HTN, former tobacco user, hyperlipidemia, presented to the ED complaining of elevated blood pressure for the past 3-4 days, in spite of taking home dose olmesartan, metoprolol.  He was seen by his PCP, dose of olmesartan was doubled from 20-40 mg.  Highest /105.  Received labetalol 20 mg IV x1, with BP improving into systolic 180s.  Troponin elevated 0.049, patient denies CP/SOB, complained of mild nausea without vomiting.  Rest of the laboratory workup unremarkable.  No prior known history of CAD.  EKG NSR with no ST or T-wave changes.  Placed in observation for hypertensive urgency. Cardiology consulted to assist with management. Pt seen and examined today resting in ED. Pt reports possibly eating salty food on Saturday. He also endorses recently stopping cigarettes and started vaping, reports he drinks 3-4 beers daily. Saw his PCP on Friday who increased his olmesartan. Reports strong family history of CAD.   Labs reviewed, troponin 0.044, BNP neg, Crt 0.7, Mg 0.8, CXR neg for acute findings, echo pending. CT 8/22' showed atherosclerosis including coronary arteries.  Hospital Course:   5/31/23 Pt seen and examined today, feels ok. Denies any Cp, agnina or anginal equivalent at this time. Nuclear stress test neg          Review of Systems   Constitutional: Negative.   HENT: Negative.     Eyes: Negative.    Cardiovascular: Negative.    Respiratory: Negative.     Skin: Negative.    Musculoskeletal: Negative.    Gastrointestinal: Negative.    Genitourinary:  Negative.    Neurological: Negative.    Psychiatric/Behavioral: Negative.     Objective:     Vital Signs (Most Recent):  Temp: 98.4 °F (36.9 °C) (05/31/23 1115)  Pulse: 72 (05/31/23 1304)  Resp: 16 (05/31/23 1115)  BP: 124/73 (05/31/23 1115)  SpO2: 98 % (05/31/23 1115) Vital Signs (24h Range):  Temp:  [97.9 °F (36.6 °C)-98.4 °F (36.9 °C)] 98.4 °F (36.9 °C)  Pulse:  [72-87] 72  Resp:  [16-20] 16  SpO2:  [93 %-99 %] 98 %  BP: (124-160)/(59-96) 124/73     Weight: 93 kg (205 lb)  Body mass index is 27.05 kg/m².     SpO2: 98 %         Intake/Output Summary (Last 24 hours) at 5/31/2023 1346  Last data filed at 5/31/2023 1017  Gross per 24 hour   Intake --   Output 1450 ml   Net -1450 ml       Lines/Drains/Airways       Peripheral Intravenous Line  Duration                  Peripheral IV - Single Lumen 05/29/23 2205 22 G Right Antecubital 1 day                       Physical Exam  Vitals and nursing note reviewed.   Constitutional:       Appearance: Normal appearance.   HENT:      Head: Normocephalic and atraumatic.   Eyes:      General:         Right eye: No discharge.         Left eye: No discharge.      Pupils: Pupils are equal, round, and reactive to light.   Cardiovascular:      Rate and Rhythm: Normal rate and regular rhythm.      Heart sounds: S1 normal and S2 normal. No murmur heard.    No friction rub.   Pulmonary:      Effort: Pulmonary effort is normal. No respiratory distress.      Breath sounds: Normal breath sounds. No rales.   Abdominal:      Palpations: Abdomen is soft.      Tenderness: There is no abdominal tenderness.   Musculoskeletal:      Cervical back: Neck supple.      Right lower leg: No edema.      Left lower leg: No edema.   Skin:     General: Skin is warm and dry.   Neurological:      General: No focal deficit present.      Mental Status: He is alert and oriented to person, place, and time.   Psychiatric:         Mood and Affect: Mood normal.         Behavior: Behavior normal.         Thought  Content: Thought content normal.          Significant Labs: BMP:   Recent Labs   Lab 05/29/23 2204 05/30/23 0537 05/31/23 0437   GLU 95 111* 114*   * 134* 135*   K 3.7 3.7 4.1   CL 94* 97 97   CO2 24 24 28   BUN 4* 4* 5*   CREATININE 0.8 0.7 0.8   CALCIUM 9.3 9.3 8.9   MG  --  0.8* 1.8   , CMP   Recent Labs   Lab 05/29/23 2204 05/30/23 0537 05/31/23 0437   * 134* 135*   K 3.7 3.7 4.1   CL 94* 97 97   CO2 24 24 28   GLU 95 111* 114*   BUN 4* 4* 5*   CREATININE 0.8 0.7 0.8   CALCIUM 9.3 9.3 8.9   PROT 7.7 6.9 7.0   ALBUMIN 4.1 3.6 3.7   BILITOT 1.1* 1.2* 1.7*   ALKPHOS 79 76 78   AST 20 17 18   ALT 17 14 14   ANIONGAP 15 13 10   , CBC   Recent Labs   Lab 05/29/23 2204 05/30/23 0537 05/31/23 0437   WBC 5.96 5.59 4.72   HGB 15.8 15.0 15.0   HCT 44.4 41.7 42.4    204 219   , INR No results for input(s): INR, PROTIME in the last 48 hours., Lipid Panel No results for input(s): CHOL, HDL, LDLCALC, TRIG, CHOLHDL in the last 48 hours., Troponin   Recent Labs   Lab 05/29/23 2204 05/30/23  0040 05/30/23 0537   TROPONINI 0.049* 0.047* 0.044*   , and All pertinent lab results from the last 24 hours have been reviewed.    Significant Imaging: Echocardiogram: Transthoracic echo (TTE) complete (Cupid Only):   Results for orders placed or performed during the hospital encounter of 05/29/23   Echo   Result Value Ref Range    BSA 2.19 m2    TDI SEPTAL 0.09 m/s    LV LATERAL E/E' RATIO 6.27 m/s    LV SEPTAL E/E' RATIO 7.67 m/s    LA WIDTH 3.80 cm    IVC diameter 1.56 cm    Left Ventricular Outflow Tract Mean Velocity 0.94 cm/s    Left Ventricular Outflow Tract Mean Gradient 3.94 mmHg    TDI LATERAL 0.11 m/s    LVIDd 4.49 3.5 - 6.0 cm    IVS 1.39 (A) 0.6 - 1.1 cm    Posterior Wall 1.20 (A) 0.6 - 1.1 cm    Ao root annulus 3.41 cm    LVIDs 3.09 2.1 - 4.0 cm    FS 31 28 - 44 %    LA volume 73.91 cm3    STJ 3.64 cm    Ascending aorta 3.48 cm    LV mass 220.56 g    LA size 3.98 cm    TAPSE 2.20 cm    Left  Ventricle Relative Wall Thickness 0.53 cm    AV mean gradient 6 mmHg    AV valve area 2.93 cm2    AV Velocity Ratio 0.82     AV index (prosthetic) 0.85     MV valve area p 1/2 method 3.61 cm2    E/A ratio 0.79     Mean e' 0.10 m/s    E wave deceleration time 210.36 msec    IVRT 72.31 msec    LVOT diameter 2.10 cm    LVOT area 3.5 cm2    LVOT peak ashok 1.24 m/s    LVOT peak VTI 25.70 cm    Ao peak ashok 1.52 m/s    Ao VTI 30.4 cm    RVOT peak ashok 0.87 m/s    RVOT peak VTI 15.5 cm    LVOT stroke volume 88.97 cm3    AV peak gradient 9 mmHg    PV mean gradient 2.00 mmHg    E/E' ratio 6.90 m/s    MV Peak E Ashok 0.69 m/s    TR Max Ashok 2.05 m/s    MV stenosis pressure 1/2 time 61.00 ms    MV Peak A Ashok 0.87 m/s    LV Systolic Volume 37.65 mL    LV Systolic Volume Index 17.3 mL/m2    LV Diastolic Volume 91.86 mL    LV Diastolic Volume Index 42.14 mL/m2    LA Volume Index 33.9 mL/m2    LV Mass Index 101 g/m2    RA Major Axis 4.30 cm    Left Atrium Minor Axis 5.68 cm    Left Atrium Major Axis 5.82 cm    Triscuspid Valve Regurgitation Peak Gradient 17 mmHg    RA Width 2.80 cm    Right Atrial Pressure (from IVC) 3 mmHg    EF 60 %    TV rest pulmonary artery pressure 20 mmHg    Narrative    · The left ventricle is normal in size with concentric remodeling and   normal systolic function.  · The estimated ejection fraction is 60%.  · Normal left ventricular diastolic function.  · Normal right ventricular size with normal right ventricular systolic   function.  · Normal central venous pressure (3 mmHg).  · The estimated PA systolic pressure is 20 mmHg.      , EKG: reviewed, Stress Test: reviewed, and X-Ray: CXR: X-Ray Chest 1 View (CXR): No results found for this visit on 05/29/23.    Assessment and Plan:         * Hypertensive urgency  amlodipine, coreg, losartan  Titrate meds      Elevated troponin  Troponin flat  EKG reviewed no acute dynamic changes  Likely demand  Nuclear stress test tomorrow if BP stable  NPO  mn    5/31/23  Stress test neg  Follow up in [facility]inc    Other hyperlipidemia  Statin therapy        VTE Risk Mitigation (From admission, onward)         Ordered     Place sequential compression device  Until discontinued         05/29/23 7558                Maryjo Rosas NP  Cardiology  [facility] - Telemetry ([facility])

## 2023-05-31 NOTE — DISCHARGE SUMMARY
[facility] - Telemetry ([facility]  Discharge Summary      Patient Name: Rosalio Muñoz  MRN: [id number]  [facility]: [id number]  Patient Class: IP- Inpatient  Admission Date: 5/29/2023  Hospital Length of Stay: 1 days  Discharge Date and Time: 5/31/2023  Attending Physician: Lisa Sewell, *   Discharging Provider: Lisa Sewell MD  Primary Care Provider: Mihir Lopez MD    Primary Care Team: [facility]    HPI:   Mr. Muñoz is a 57-year-old  male with PMH significant for HTN, former tobacco user, hyperlipidemia, presented to the ED complaining of elevated blood pressure for the past 3-4 days, in spite of taking home dose olmesartan, metoprolol.  He was seen by his PCP, dose of olmesartan was doubled from 20-40 mg.  Highest /105.  Received labetalol 20 mg IV x1, with BP improving into systolic 180s.  Troponin elevated 0.049, patient denies CP/SOB, complained of mild nausea without vomiting.  Rest of the laboratory workup unremarkable.  No prior known history of CAD.  EKG NSR with no ST or T-wave changes.  Placed in observation for hypertensive urgency.      * No surgery found *      Hospital Course:   Mr. Muñoz is a 57-year-old  male with PMH significant for HTN, former tobacco user, hyperlipidemia, presented to the ED complaining of elevated blood pressure for the past 3-4 days, in spite of taking home dose olmesartan, metoprolol. He was seen by his PCP, dose of olmesartan was doubled from 20-40 mg.  Highest /105.  Received labetalol 20 mg IV x1, with BP improving into systolic 180s.  Troponin elevated 0.049, ;  Placed in observation for hypertensive urgency.      5/31    No acute events overnight, examination done at bedside, patient denied headache, dizziness, chest pain, palpitations, shortness O breath, nausea, vomiting, bowel or bladder issues.    Troponin 0.049, likely demand ischemia, however given strong family history of  coronary artery disease, Cardiology plan for stress test on 05/31, no acute/abnormal findings.  Blood pressure stabilized on current regimen.    Considering clinical and hemodynamic stability, planning to discharge patient today, discussed with Cardiology, agreed to the plan, emphasized on compliance with medications, follow-up visits, diet, exercise, hydration, patient agreed to the plan.  Medications to be delivered to bedside, ordered NP at home program, discharge today;             Review of Systems     -ve except as mentioned above      Physical Exam      HENT:      Head: Normocephalic and atraumatic.   Cardiovascular:      Rate and Rhythm: Normal rate and regular rhythm.      Heart sounds: No murmur heard.  Pulmonary:      Effort: Pulmonary effort is normal. No respiratory distress.      Breath sounds: Normal breath sounds. No wheezing.   Abdominal:      General: Bowel sounds are normal. There is no distension.      Palpations: Abdomen is soft.      Tenderness: There is no abdominal tenderness.   Musculoskeletal:         General: No swelling.   Skin:     General: Skin is warm and dry.   Neurological:      Mental Status: He is alert and oriented to person, place, and time. Mental status is at baseline.       Goals of Care Treatment Preferences:         Consults:   Consults (From admission, onward)        Status Ordering Provider     Inpatient consult to Cardiology  Once        Provider:  Elias Morales MD    Completed MARVIN CORRALES          No new Assessment & Plan notes have been filed under this hospital service since the last note was generated.  Service: Hospital Medicine    Final Active Diagnoses:    Diagnosis Date Noted POA    PRINCIPAL PROBLEM:  Hypertensive urgency [I16.0] 05/29/2023 Yes    Hypomagnesemia [E83.42] 05/30/2023 Unknown    Elevated troponin [R77.8] 05/29/2023 Yes    Other hyperlipidemia [E78.49] 03/22/2021 Yes      Problems Resolved During this Admission:       Discharged Condition:  stable    Disposition:     Follow Up:   Follow-up Information     Mihir Lopez MD Follow up in 1 week(s).    Specialty: Family Medicine  Contact information:  [address] [place] [address] [phone]             Dennis Lockett Md, MD Follow up.    Specialties: Cardiology, Internal Medicine  Contact information:  [address] [facility] [place]  [place] [address] [phone]                       Patient Instructions:      Ambulatory referral/consult to [facility]   Standing Status: Future   Referral Priority: Routine Referral Type: Consultation   Referral Reason: Specialty Services Required   Number of Visits Requested: 1       Significant Diagnostic Studies:     Results for orders placed or performed during the hospital encounter of 05/29/23   Urinalysis, Reflex to Urine Culture Urine, Clean Catch    Specimen: Urine   Result Value Ref Range    Specimen UA Urine, Clean Catch     Color, UA Colorless (A) Yellow, Straw, Marge    Appearance, UA Clear Clear    pH, UA 7.0 5.0 - 8.0    Specific Gravity, UA <1.005 (A) 1.005 - 1.030    Protein, UA Negative Negative    Glucose, UA Negative Negative    Ketones, UA Negative Negative    Bilirubin (UA) Negative Negative    Occult Blood UA Negative Negative    Nitrite, UA Negative Negative    Urobilinogen, UA Negative <2.0 EU/dL    Leukocytes, UA Negative Negative   CBC auto differential   Result Value Ref Range    WBC 5.96 3.90 - 12.70 K/uL    RBC 4.70 4.60 - 6.20 M/uL    Hemoglobin 15.8 14.0 - 18.0 g/dL    Hematocrit 44.4 40.0 - 54.0 %    MCV 95 82 - 98 fL    MCH 33.6 (H) 27.0 - 31.0 pg    MCHC 35.6 32.0 - 36.0 g/dL    RDW 11.4 (L) 11.5 - 14.5 %    Platelets 230 150 - 450 K/uL    MPV 9.2 9.2 - 12.9 fL    Immature Granulocytes 0.2 0.0 - 0.5 %    Gran # (ANC) 3.2 1.8 - 7.7 K/uL    Immature Grans (Abs) 0.01 0.00 - 0.04 K/uL    Lymph # 2.2 1.0 - 4.8 K/uL    Mono # 0.5 0.3 - 1.0 K/uL    Eos # 0.1 0.0 - 0.5 K/uL    Baso # 0.04 0.00 - 0.20 K/uL    nRBC 0 0  /100 WBC    Gran % 53.2 38.0 - 73.0 %    Lymph % 37.1 18.0 - 48.0 %    Mono % 7.6 4.0 - 15.0 %    Eosinophil % 1.2 0.0 - 8.0 %    Basophil % 0.7 0.0 - 1.9 %    Differential Method Automated    Comprehensive metabolic panel   Result Value Ref Range    Sodium 133 (L) 136 - 145 mmol/L    Potassium 3.7 3.5 - 5.1 mmol/L    Chloride 94 (L) 95 - 110 mmol/L    CO2 24 23 - 29 mmol/L    Glucose 95 70 - 110 mg/dL    BUN 4 (L) 6 - 20 mg/dL    Creatinine 0.8 0.5 - 1.4 mg/dL    Calcium 9.3 8.7 - 10.5 mg/dL    Total Protein 7.7 6.0 - 8.4 g/dL    Albumin 4.1 3.5 - 5.2 g/dL    Total Bilirubin 1.1 (H) 0.1 - 1.0 mg/dL    Alkaline Phosphatase 79 55 - 135 U/L    AST 20 10 - 40 U/L    ALT 17 10 - 44 U/L    Anion Gap 15 8 - 16 mmol/L    eGFR >60 >60 mL/min/1.73 m^2   Troponin I   Result Value Ref Range    Troponin I 0.049 (H) 0.000 - 0.026 ng/mL   Troponin I   Result Value Ref Range    Troponin I 0.047 (H) 0.000 - 0.026 ng/mL   CBC Auto Differential   Result Value Ref Range    WBC 5.59 3.90 - 12.70 K/uL    RBC 4.47 (L) 4.60 - 6.20 M/uL    Hemoglobin 15.0 14.0 - 18.0 g/dL    Hematocrit 41.7 40.0 - 54.0 %    MCV 93 82 - 98 fL    MCH 33.6 (H) 27.0 - 31.0 pg    MCHC 36.0 32.0 - 36.0 g/dL    RDW 11.5 11.5 - 14.5 %    Platelets 204 150 - 450 K/uL    MPV 9.4 9.2 - 12.9 fL    Immature Granulocytes 0.4 0.0 - 0.5 %    Gran # (ANC) 3.6 1.8 - 7.7 K/uL    Immature Grans (Abs) 0.02 0.00 - 0.04 K/uL    Lymph # 1.4 1.0 - 4.8 K/uL    Mono # 0.5 0.3 - 1.0 K/uL    Eos # 0.0 0.0 - 0.5 K/uL    Baso # 0.04 0.00 - 0.20 K/uL    nRBC 0 0 /100 WBC    Gran % 64.4 38.0 - 73.0 %    Lymph % 25.2 18.0 - 48.0 %    Mono % 8.8 4.0 - 15.0 %    Eosinophil % 0.5 0.0 - 8.0 %    Basophil % 0.7 0.0 - 1.9 %    Differential Method Automated    Magnesium   Result Value Ref Range    Magnesium 0.8 (LL) 1.6 - 2.6 mg/dL   Comprehensive Metabolic Panel   Result Value Ref Range    Sodium 134 (L) 136 - 145 mmol/L    Potassium 3.7 3.5 - 5.1 mmol/L    Chloride 97 95 - 110 mmol/L    CO2 24  23 - 29 mmol/L    Glucose 111 (H) 70 - 110 mg/dL    BUN 4 (L) 6 - 20 mg/dL    Creatinine 0.7 0.5 - 1.4 mg/dL    Calcium 9.3 8.7 - 10.5 mg/dL    Total Protein 6.9 6.0 - 8.4 g/dL    Albumin 3.6 3.5 - 5.2 g/dL    Total Bilirubin 1.2 (H) 0.1 - 1.0 mg/dL    Alkaline Phosphatase 76 55 - 135 U/L    AST 17 10 - 40 U/L    ALT 14 10 - 44 U/L    Anion Gap 13 8 - 16 mmol/L    eGFR >60 >60 mL/min/1.73 m^2   Troponin I   Result Value Ref Range    Troponin I 0.044 (H) 0.000 - 0.026 ng/mL   Brain natriuretic peptide   Result Value Ref Range    BNP 26 0 - 99 pg/mL   Brain natriuretic peptide   Result Value Ref Range    BNP 53 0 - 99 pg/mL   Comprehensive Metabolic Panel   Result Value Ref Range    Sodium 135 (L) 136 - 145 mmol/L    Potassium 4.1 3.5 - 5.1 mmol/L    Chloride 97 95 - 110 mmol/L    CO2 28 23 - 29 mmol/L    Glucose 114 (H) 70 - 110 mg/dL    BUN 5 (L) 6 - 20 mg/dL    Creatinine 0.8 0.5 - 1.4 mg/dL    Calcium 8.9 8.7 - 10.5 mg/dL    Total Protein 7.0 6.0 - 8.4 g/dL    Albumin 3.7 3.5 - 5.2 g/dL    Total Bilirubin 1.7 (H) 0.1 - 1.0 mg/dL    Alkaline Phosphatase 78 55 - 135 U/L    AST 18 10 - 40 U/L    ALT 14 10 - 44 U/L    Anion Gap 10 8 - 16 mmol/L    eGFR >60 >60 mL/min/1.73 m^2   Magnesium   Result Value Ref Range    Magnesium 1.8 1.6 - 2.6 mg/dL   CBC auto differential   Result Value Ref Range    WBC 4.72 3.90 - 12.70 K/uL    RBC 4.44 (L) 4.60 - 6.20 M/uL    Hemoglobin 15.0 14.0 - 18.0 g/dL    Hematocrit 42.4 40.0 - 54.0 %    MCV 96 82 - 98 fL    MCH 33.8 (H) 27.0 - 31.0 pg    MCHC 35.4 32.0 - 36.0 g/dL    RDW 11.8 11.5 - 14.5 %    Platelets 219 150 - 450 K/uL    MPV 9.5 9.2 - 12.9 fL    Immature Granulocytes 0.2 0.0 - 0.5 %    Gran # (ANC) 2.9 1.8 - 7.7 K/uL    Immature Grans (Abs) 0.01 0.00 - 0.04 K/uL    Lymph # 1.2 1.0 - 4.8 K/uL    Mono # 0.6 0.3 - 1.0 K/uL    Eos # 0.0 0.0 - 0.5 K/uL    Baso # 0.03 0.00 - 0.20 K/uL    nRBC 0 0 /100 WBC    Gran % 60.5 38.0 - 73.0 %    Lymph % 25.8 18.0 - 48.0 %    Mono % 12.1 4.0  - 15.0 %    Eosinophil % 0.8 0.0 - 8.0 %    Basophil % 0.6 0.0 - 1.9 %    Differential Method Automated    Nuclear Stress - Cardiology Interpreted   Result Value Ref Range    85% Max Predicted      Max Predicted      OHS CV CPX PATIENT IS MALE 1.0     OHS CV CPX PATIENT IS FEMALE 0.0     Systolic blood pressure 154 mmHg    Diastolic blood pressure 90 mmHg    HR at rest 77 bpm    Peak Systolic  mmHg    Peak Diatolic BP 90 mmHg    Peak  bpm    % Max HR Achieved 71     RPP 11,858     Peak RPP 17,864     Nuc Rest EF 71     Nuc Stress EF 68 %   Echo   Result Value Ref Range    BSA 2.19 m2    TDI SEPTAL 0.09 m/s    LV LATERAL E/E' RATIO 6.27 m/s    LV SEPTAL E/E' RATIO 7.67 m/s    LA WIDTH 3.80 cm    IVC diameter 1.56 cm    Left Ventricular Outflow Tract Mean Velocity 0.94 cm/s    Left Ventricular Outflow Tract Mean Gradient 3.94 mmHg    TDI LATERAL 0.11 m/s    LVIDd 4.49 3.5 - 6.0 cm    IVS 1.39 (A) 0.6 - 1.1 cm    Posterior Wall 1.20 (A) 0.6 - 1.1 cm    Ao root annulus 3.41 cm    LVIDs 3.09 2.1 - 4.0 cm    FS 31 28 - 44 %    LA volume 73.91 cm3    STJ 3.64 cm    Ascending aorta 3.48 cm    LV mass 220.56 g    LA size 3.98 cm    TAPSE 2.20 cm    Left Ventricle Relative Wall Thickness 0.53 cm    AV mean gradient 6 mmHg    AV valve area 2.93 cm2    AV Velocity Ratio 0.82     AV index (prosthetic) 0.85     MV valve area p 1/2 method 3.61 cm2    E/A ratio 0.79     Mean e' 0.10 m/s    E wave deceleration time 210.36 msec    IVRT 72.31 msec    LVOT diameter 2.10 cm    LVOT area 3.5 cm2    LVOT peak ashok 1.24 m/s    LVOT peak VTI 25.70 cm    Ao peak ashok 1.52 m/s    Ao VTI 30.4 cm    RVOT peak ashok 0.87 m/s    RVOT peak VTI 15.5 cm    LVOT stroke volume 88.97 cm3    AV peak gradient 9 mmHg    PV mean gradient 2.00 mmHg    E/E' ratio 6.90 m/s    MV Peak E Ashok 0.69 m/s    TR Max Ashok 2.05 m/s    MV stenosis pressure 1/2 time 61.00 ms    MV Peak A Ashok 0.87 m/s    LV Systolic Volume 37.65 mL    LV Systolic Volume  Index 17.3 mL/m2    LV Diastolic Volume 91.86 mL    LV Diastolic Volume Index 42.14 mL/m2    LA Volume Index 33.9 mL/m2    LV Mass Index 101 g/m2    RA Major Axis 4.30 cm    Left Atrium Minor Axis 5.68 cm    Left Atrium Major Axis 5.82 cm    Triscuspid Valve Regurgitation Peak Gradient 17 mmHg    RA Width 2.80 cm    Right Atrial Pressure (from IVC) 3 mmHg    EF 60 %    TV rest pulmonary artery pressure 20 mmHg       Imaging Results          X-Ray Chest AP Portable (Final result)  Result time 05/29/23 22:07:14    Final result by Guerline Castillo MD (05/29/23 22:07:14)                 Impression:      No active finding      Electronically signed by: Guerline Castillo  Date:    05/29/2023  Time:    22:07             Narrative:    EXAMINATION:  XR CHEST AP PORTABLE    CLINICAL HISTORY:  hypertension;    TECHNIQUE:  Single frontal portable view of the chest was performed.    COMPARISON:  July 8, 2022    FINDINGS:  No pulmonary consolidation or pleural effusion.  Mediastinal contour stable midline                                Pending Diagnostic Studies:     None         Medications:         Medication List      START taking these medications    amLODIPine 10 MG tablet  Commonly known as: NORVASC  Take 1 tablet (10 mg total) by mouth once daily.  Start taking on: June 1, 2023     carvediloL 25 MG tablet  Commonly known as: COREG  Take 1 tablet (25 mg total) by mouth 2 (two) times daily.     losartan 100 MG tablet  Commonly known as: COZAAR  Take 1 tablet (100 mg total) by mouth once daily.  Start taking on: June 1, 2023  Replaces: olmesartan 40 MG tablet        CONTINUE taking these medications    aspirin 81 MG EC tablet  Commonly known as: ECOTRIN  Take 1 tablet (81 mg total) by mouth once daily.     atorvastatin 20 MG tablet  Commonly known as: LIPITOR  Take 1 tablet (20 mg total) by mouth once daily.        STOP taking these medications    metoprolol succinate 100 MG 24 hr tablet  Commonly known as: TOPROL-XL      olmesartan 40 MG tablet  Commonly known as: BENICAR  Replaced by: losartan 100 MG tablet     pantoprazole 40 MG tablet  Commonly known as: PROTONIX           Where to Get Your Medications      These medications were sent to [facility] [address] [place] [address]    Hours: Mon-Fri, 8a-5:30p Phone: [phone]   · amLODIPine 10 MG tablet  · aspirin 81 MG EC tablet  · atorvastatin 20 MG tablet  · carvediloL 25 MG tablet  · losartan 100 MG tablet         Indwelling Lines/Drains at time of discharge:   Lines/Drains/Airways     None                 Time spent on the discharge of patient: 82 minutes         Lisa Sewell MD  Department of Hospital Medicine  [facility] - Telemetry ([facility])

## 2023-05-31 NOTE — ASSESSMENT & PLAN NOTE
Troponin flat  EKG reviewed no acute dynamic changes  Likely demand  Nuclear stress test tomorrow if BP stable  NPO mn    5/31/23  Stress test neg  Follow up in [facility]

## 2023-05-31 NOTE — PROGRESS NOTES
[facility] - Telemetry ([facility]  Progress Note    Patient Name: Rosalio Muñoz  MRN: [id number]  Patient Class: IP- Inpatient   Admission Date: 5/29/2023  Length of Stay: 1 days  Attending Physician: Lisa Sewell, *  Primary Care Provider: Mihir Lopez MD        Subjective:     Principal Problem:Hypertensive urgency        HPI:  Mr. Muñoz is a 57-year-old  male with PMH significant for HTN, former tobacco user, hyperlipidemia, presented to the ED complaining of elevated blood pressure for the past 3-4 days, in spite of taking home dose olmesartan, metoprolol.  He was seen by his PCP, dose of olmesartan was doubled from 20-40 mg.  Highest /105.  Received labetalol 20 mg IV x1, with BP improving into systolic 180s.  Troponin elevated 0.049, patient denies CP/SOB, complained of mild nausea without vomiting.  Rest of the laboratory workup unremarkable.  No prior known history of CAD.  EKG NSR with no ST or T-wave changes.  Placed in observation for hypertensive urgency.      Overview/Hospital Course:  Mr. Muñoz is a 57-year-old  male with PMH significant for HTN, former tobacco user, hyperlipidemia, presented to the ED complaining of elevated blood pressure for the past 3-4 days, in spite of taking home dose olmesartan, metoprolol. He was seen by his PCP, dose of olmesartan was doubled from 20-40 mg.  Highest /105.  Received labetalol 20 mg IV x1, with BP improving into systolic 180s.  Troponin elevated 0.049, ;  Placed in observation for hypertensive urgency.  Med pressure medications adjusted, given elevated troponin although likely demand ischemia, with significant family history is of heart disease, cardiology - should refer stress test on 05/31              Interval History:     No acute events overnight  Patient denied chest pain, shortness Akil, chest tightness, palpitations.    Blood pressure stable on current regimen, will monitor and  adjust meds;     NPO since midnight, scheduled for stress test today;       Review of Systems    -ve except as mentioned above    Objective:     Vital Signs (Most Recent):  Temp: 98.4 °F (36.9 °C) (05/31/23 1115)  Pulse: 78 (05/31/23 1115)  Resp: 16 (05/31/23 1115)  BP: 124/73 (05/31/23 1115)  SpO2: 98 % (05/31/23 1115) Vital Signs (24h Range):  Temp:  [97.9 °F (36.6 °C)-98.4 °F (36.9 °C)] 98.4 °F (36.9 °C)  Pulse:  [74-87] 78  Resp:  [15-20] 16  SpO2:  [93 %-99 %] 98 %  BP: (124-167)/(59-96) 124/73     Weight: 93 kg (205 lb)  Body mass index is 27.05 kg/m².    Intake/Output Summary (Last 24 hours) at 5/31/2023 1130  Last data filed at 5/31/2023 1017  Gross per 24 hour   Intake --   Output 1450 ml   Net -1450 ml         Physical Exam      HENT:      Head: Normocephalic and atraumatic.   Cardiovascular:      Rate and Rhythm: Normal rate and regular rhythm.      Heart sounds: No murmur heard.  Pulmonary:      Effort: Pulmonary effort is normal. No respiratory distress.      Breath sounds: Normal breath sounds. No wheezing.   Abdominal:      General: Bowel sounds are normal. There is no distension.      Palpations: Abdomen is soft.      Tenderness: There is no abdominal tenderness.   Musculoskeletal:         General: No swelling.   Skin:     General: Skin is warm and dry.   Neurological:      Mental Status: He is alert and oriented to person, place, and time. Mental status is at baseline.     Significant Labs: All pertinent labs within the past 24 hours have been reviewed.  CBC:   Recent Labs   Lab 05/29/23 2204 05/30/23 0537 05/31/23 0437   WBC 5.96 5.59 4.72   HGB 15.8 15.0 15.0   HCT 44.4 41.7 42.4    204 219     CMP:   Recent Labs   Lab 05/29/23 2204 05/30/23 0537 05/31/23 0437   * 134* 135*   K 3.7 3.7 4.1   CL 94* 97 97   CO2 24 24 28   GLU 95 111* 114*   BUN 4* 4* 5*   CREATININE 0.8 0.7 0.8   CALCIUM 9.3 9.3 8.9   PROT 7.7 6.9 7.0   ALBUMIN 4.1 3.6 3.7   BILITOT 1.1* 1.2* 1.7*   ALKPHOS 79 76  78   AST 20 17 18   ALT 17 14 14   ANIONGAP 15 13 10       Significant Imaging:     Imaging Results              X-Ray Chest AP Portable (Final result)  Result time 05/29/23 22:07:14      Final result by Guerline Castillo MD (05/29/23 22:07:14)                   Impression:      No active finding      Electronically signed by: Guerline Castillo  Date:    05/29/2023  Time:    22:07               Narrative:    EXAMINATION:  XR CHEST AP PORTABLE    CLINICAL HISTORY:  hypertension;    TECHNIQUE:  Single frontal portable view of the chest was performed.    COMPARISON:  July 8, 2022    FINDINGS:  No pulmonary consolidation or pleural effusion.  Mediastinal contour stable midline                                         Assessment/Plan:      * Hypertensive urgency  Patient has a current diagnosis of hypertensive urgency (without evidence of end organ damage) which is uncontrolled.  Latest blood pressure and vitals reviewed-   Temp:  [98.2 °F (36.8 °C)-98.3 °F (36.8 °C)]   Pulse:  [78-95]   Resp:  [15-20]   BP: (130-191)/()   SpO2:  [93 %-97 %] .   Patient currently on IV antihypertensives.   Home meds for hypertension were reviewed and noted below.   Hypertension Medications             metoprolol succinate (TOPROL-XL) 100 MG 24 hr tablet Take 1 tablet (100 mg total) by mouth once daily.    olmesartan (BENICAR) 40 MG tablet Take 1 tablet (40 mg total) by mouth once daily.          Medication adjustment for hospital antihypertensives is as follows- IV hydralazine, IV labetalol as needed, amlodipine added to oral regimen  Continue losartan and metoprolol, ARB changed as home med not on formulary    Will aim for controlled BP reduction by medications noted above. Monitor and mitigate end organ damage as indicated.    Hypomagnesemia  -replace with IV supplementation  -repeat labs in am      Elevated troponin  -troponin flat  -likely related to elevated blood pressure  -cardiology consulted, adjusted antihypertensive  meds    Other hyperlipidemia  -continue statin        VTE Risk Mitigation (From admission, onward)         Ordered     Place sequential compression device  Until discontinued         05/29/23 6164                Discharge Planning   RUTH:      Code Status: Not on file   Is the patient medically ready for discharge?:     Reason for patient still in hospital (select all that apply): monitor clinical improvement                      Lisa Sewell MD  Department of Hospital Medicine   [facility] - Telemetry ([facility])

## 2023-05-31 NOTE — PLAN OF CARE
Patient AAO x 4. Room air. SBP < 160, no PRNs given for hypertension. Safety measures in place. Will continue to monitor.       Problem: Hypertension Acute  Goal: Blood Pressure Within Desired Range  Outcome: Ongoing, Progressing

## 2023-06-01 ENCOUNTER — PATIENT MESSAGE (OUTPATIENT)
Dept: INTERNAL MEDICINE | Facility: CLINIC | Age: 58
End: 2023-06-01
Payer: COMMERCIAL

## 2023-06-01 ENCOUNTER — PATIENT OUTREACH (OUTPATIENT)
Dept: ADMINISTRATIVE | Facility: CLINIC | Age: 58
End: 2023-06-01
Payer: COMMERCIAL

## 2023-06-01 ENCOUNTER — PATIENT MESSAGE (OUTPATIENT)
Dept: CARDIOLOGY | Facility: CLINIC | Age: 58
End: 2023-06-01
Payer: COMMERCIAL

## 2023-06-01 ENCOUNTER — PES CALL (OUTPATIENT)
Dept: ADMINISTRATIVE | Facility: CLINIC | Age: 58
End: 2023-06-01
Payer: COMMERCIAL

## 2023-06-02 ENCOUNTER — PES CALL (OUTPATIENT)
Dept: ADMINISTRATIVE | Facility: CLINIC | Age: 58
End: 2023-06-02
Payer: COMMERCIAL

## 2023-06-02 RX ORDER — AMLODIPINE BESYLATE 5 MG/1
5 TABLET ORAL DAILY
Qty: 30 TABLET | Refills: 0 | Status: SHIPPED | OUTPATIENT
Start: 2023-06-02 | End: 2023-06-16 | Stop reason: SDUPTHER

## 2023-06-06 ENCOUNTER — OFFICE VISIT (OUTPATIENT)
Dept: INTERNAL MEDICINE | Facility: CLINIC | Age: 58
End: 2023-06-06
Payer: COMMERCIAL

## 2023-06-06 VITALS
DIASTOLIC BLOOD PRESSURE: 88 MMHG | WEIGHT: 205.25 LBS | HEART RATE: 67 BPM | SYSTOLIC BLOOD PRESSURE: 136 MMHG | TEMPERATURE: 98 F | BODY MASS INDEX: 27.2 KG/M2 | HEIGHT: 73 IN

## 2023-06-06 DIAGNOSIS — E78.49 OTHER HYPERLIPIDEMIA: ICD-10-CM

## 2023-06-06 DIAGNOSIS — I10 PRIMARY HYPERTENSION: Primary | ICD-10-CM

## 2023-06-06 DIAGNOSIS — Z09 HOSPITAL DISCHARGE FOLLOW-UP: ICD-10-CM

## 2023-06-06 PROCEDURE — 99214 PR OFFICE/OUTPT VISIT, EST, LEVL IV, 30-39 MIN: ICD-10-PCS | Mod: S$GLB,,, | Performed by: FAMILY MEDICINE

## 2023-06-06 PROCEDURE — 1159F PR MEDICATION LIST DOCUMENTED IN MEDICAL RECORD: ICD-10-PCS | Mod: CPTII,S$GLB,, | Performed by: FAMILY MEDICINE

## 2023-06-06 PROCEDURE — 3008F PR BODY MASS INDEX (BMI) DOCUMENTED: ICD-10-PCS | Mod: CPTII,S$GLB,, | Performed by: FAMILY MEDICINE

## 2023-06-06 PROCEDURE — 1160F RVW MEDS BY RX/DR IN RCRD: CPT | Mod: CPTII,S$GLB,, | Performed by: FAMILY MEDICINE

## 2023-06-06 PROCEDURE — 4010F ACE/ARB THERAPY RXD/TAKEN: CPT | Mod: CPTII,S$GLB,, | Performed by: FAMILY MEDICINE

## 2023-06-06 PROCEDURE — 99999 PR PBB SHADOW E&M-EST. PATIENT-LVL III: CPT | Mod: PBBFAC,,, | Performed by: FAMILY MEDICINE

## 2023-06-06 PROCEDURE — 3079F PR MOST RECENT DIASTOLIC BLOOD PRESSURE 80-89 MM HG: ICD-10-PCS | Mod: CPTII,S$GLB,, | Performed by: FAMILY MEDICINE

## 2023-06-06 PROCEDURE — 99214 OFFICE O/P EST MOD 30 MIN: CPT | Mod: S$GLB,,, | Performed by: FAMILY MEDICINE

## 2023-06-06 PROCEDURE — 3008F BODY MASS INDEX DOCD: CPT | Mod: CPTII,S$GLB,, | Performed by: FAMILY MEDICINE

## 2023-06-06 PROCEDURE — 3075F SYST BP GE 130 - 139MM HG: CPT | Mod: CPTII,S$GLB,, | Performed by: FAMILY MEDICINE

## 2023-06-06 PROCEDURE — 1160F PR REVIEW ALL MEDS BY PRESCRIBER/CLIN PHARMACIST DOCUMENTED: ICD-10-PCS | Mod: CPTII,S$GLB,, | Performed by: FAMILY MEDICINE

## 2023-06-06 PROCEDURE — 3075F PR MOST RECENT SYSTOLIC BLOOD PRESS GE 130-139MM HG: ICD-10-PCS | Mod: CPTII,S$GLB,, | Performed by: FAMILY MEDICINE

## 2023-06-06 PROCEDURE — 99999 PR PBB SHADOW E&M-EST. PATIENT-LVL III: ICD-10-PCS | Mod: PBBFAC,,, | Performed by: FAMILY MEDICINE

## 2023-06-06 PROCEDURE — 1111F PR DISCHARGE MEDS RECONCILED W/ CURRENT OUTPATIENT MED LIST: ICD-10-PCS | Mod: CPTII,S$GLB,, | Performed by: FAMILY MEDICINE

## 2023-06-06 PROCEDURE — 3079F DIAST BP 80-89 MM HG: CPT | Mod: CPTII,S$GLB,, | Performed by: FAMILY MEDICINE

## 2023-06-06 PROCEDURE — 1111F DSCHRG MED/CURRENT MED MERGE: CPT | Mod: CPTII,S$GLB,, | Performed by: FAMILY MEDICINE

## 2023-06-06 PROCEDURE — 4010F PR ACE/ARB THEARPY RXD/TAKEN: ICD-10-PCS | Mod: CPTII,S$GLB,, | Performed by: FAMILY MEDICINE

## 2023-06-06 PROCEDURE — 1159F MED LIST DOCD IN RCRD: CPT | Mod: CPTII,S$GLB,, | Performed by: FAMILY MEDICINE

## 2023-06-06 NOTE — PROGRESS NOTES
"Subjective:      Patient ID: Rosalio Muñoz is a 57 y.o. male.    Chief Complaint: Hypertension    HPI  56 yo here for hosp f/u.  Last visit with me, pt's BP was high.  We adjusted meds, still kept increasing.  Went to ER//troponin was elevated.  Pt was asymptomatic.  BP regimen adjusted by Cards  Cardiac stress test/Echo looked ok.  Bp readings improving and overall he is feeling better.      Past Medical History:   Diagnosis Date    Hypertension      Family History   Problem Relation Age of Onset    Hyperlipidemia Mother     Hypertension Father     Diabetes Father     Kidney disease Father     Heart disease Father     Prostate cancer Maternal Grandfather     Colon cancer Neg Hx      Past Surgical History:   Procedure Laterality Date    WISDOM TOOTH EXTRACTION       Social History     Tobacco Use    Smoking status: Former     Packs/day: 1.50     Years: 25.00     Pack years: 37.50     Types: Cigarettes     Quit date: 10/4/2022     Years since quittin.6     Passive exposure: Never    Smokeless tobacco: Never   Substance Use Topics    Alcohol use: Yes     Alcohol/week: 4.0 - 6.0 standard drinks     Types: 4 - 6 Cans of beer per week    Drug use: No       /88   Pulse 67   Temp 97.6 °F (36.4 °C) (Tympanic)   Ht 6' 1" (1.854 m)   Wt 93.1 kg (205 lb 4 oz)   BMI 27.08 kg/m²     Review of Systems   Constitutional:  Negative for activity change, appetite change, chills, diaphoresis, fatigue, fever and unexpected weight change.   HENT:  Negative for hearing loss and tinnitus.    Eyes:  Negative for visual disturbance.   Respiratory:  Negative for cough, chest tightness, shortness of breath and wheezing.    Cardiovascular:  Negative for chest pain, palpitations and leg swelling.   Gastrointestinal:  Negative for abdominal distention, abdominal pain, blood in stool and rectal pain.   Genitourinary:  Negative for difficulty urinating, penile discharge and testicular pain.   Musculoskeletal:  Negative for " arthralgias and back pain.   Neurological:  Negative for dizziness, weakness and headaches.     Objective:     Physical Exam  Vitals and nursing note reviewed.   Constitutional:       General: He is not in acute distress.     Appearance: He is well-developed. He is not diaphoretic.   HENT:      Head: Normocephalic and atraumatic.   Eyes:      General:         Right eye: No discharge.         Left eye: No discharge.   Cardiovascular:      Rate and Rhythm: Normal rate and regular rhythm.   Pulmonary:      Effort: Pulmonary effort is normal. No respiratory distress.   Neurological:      Mental Status: He is alert and oriented to person, place, and time.   Psychiatric:         Behavior: Behavior normal.         Thought Content: Thought content normal.         Judgment: Judgment normal.       Lab Results   Component Value Date    WBC 4.72 05/31/2023    HGB 15.0 05/31/2023    HCT 42.4 05/31/2023     05/31/2023    CHOL 196 06/14/2022    TRIG 92 06/14/2022    HDL 78 (H) 06/14/2022    ALT 14 05/31/2023    AST 18 05/31/2023     (L) 05/31/2023    K 4.1 05/31/2023    CL 97 05/31/2023    CREATININE 0.8 05/31/2023    BUN 5 (L) 05/31/2023    CO2 28 05/31/2023    TSH 2.117 06/14/2022    PSA 0.33 06/14/2022    HGBA1C 4.8 06/14/2022       Assessment:     1. Primary hypertension    2. Other hyperlipidemia    3. Hospital discharge follow-up         Plan:     Primary hypertension    Other hyperlipidemia    Hospital discharge follow-up      Bp improving  Cont meds  Reviewed records  Cardiac testing normal//has upcoming visit with Cards  Keep annual review with me in a few wks

## 2023-06-07 ENCOUNTER — HOSPITAL ENCOUNTER (OUTPATIENT)
Dept: PREADMISSION TESTING | Facility: HOSPITAL | Age: 58
Discharge: HOME | End: 2023-06-07
Attending: SURGERY
Payer: COMMERCIAL

## 2023-06-07 DIAGNOSIS — Z12.11 COLON CANCER SCREENING: ICD-10-CM

## 2023-06-15 ENCOUNTER — TELEPHONE (OUTPATIENT)
Dept: CARDIOLOGY | Facility: CLINIC | Age: 58
End: 2023-06-15
Payer: COMMERCIAL

## 2023-06-15 NOTE — PROGRESS NOTES
Subjective:   Patient ID:  Rosalio Muñoz is a 57 y.o. male who presents for evaluation of No chief complaint on file.      Ada Muñoz is a 57-year-old  male with PMH significant for HTN, former tobacco user, hyperlipidemia here for HFU. Pt was seen by cardiology at [facility] for HTN, he was started on amlodipine, coreg and losartan. Here today, home BP 1teens-130s/70s. Currently taking amlodipine every other day, will try 5mg daily. (10mg dropped sys to 80s and felt bad)    No longer smoking cigarettes nor nape  Smokes cigarillo in am  Drinks beer daily 3-4    Past Medical History:   Diagnosis Date    Hypertension        Past Surgical History:   Procedure Laterality Date    WISDOM TOOTH EXTRACTION         Social History     Tobacco Use    Smoking status: Former     Packs/day: 1.50     Years: 25.00     Pack years: 37.50     Types: Cigarettes     Quit date: 10/4/2022     Years since quittin.6     Passive exposure: Never    Smokeless tobacco: Never   Substance Use Topics    Alcohol use: Yes     Alcohol/week: 4.0 - 6.0 standard drinks     Types: 4 - 6 Cans of beer per week    Drug use: No       Family History   Problem Relation Age of Onset    Hyperlipidemia Mother     Hypertension Father     Diabetes Father     Kidney disease Father     Heart disease Father     Prostate cancer Maternal Grandfather     Colon cancer Neg Hx        Current Outpatient Medications on File Prior to Visit   Medication Sig Dispense Refill    amLODIPine (NORVASC) 5 MG tablet Take 1 tablet (5 mg total) by mouth once daily. 30 tablet 0    aspirin (ECOTRIN) 81 MG EC tablet Take 1 tablet (81 mg total) by mouth once daily. 30 tablet 0    atorvastatin (LIPITOR) 20 MG tablet Take 1 tablet (20 mg total) by mouth once daily. 30 tablet 0    carvediloL (COREG) 25 MG tablet Take 1 tablet (25 mg total) by mouth 2 (two) times daily. 60 tablet 0    losartan (COZAAR) 100 MG tablet Take 1 tablet (100 mg total) by mouth once daily. 30  tablet 0     No current facility-administered medications on file prior to visit.      Wt Readings from Last 3 Encounters:   06/06/23 93.1 kg (205 lb 4 oz)   05/31/23 93 kg (205 lb)   05/26/23 93.2 kg (205 lb 7.5 oz)     Temp Readings from Last 3 Encounters:   06/06/23 97.6 °F (36.4 °C) (Tympanic)   05/31/23 98.4 °F (36.9 °C)   05/26/23 98.7 °F (37.1 °C)     BP Readings from Last 3 Encounters:   06/06/23 136/88   05/31/23 124/73   05/26/23 (!) 150/100     Pulse Readings from Last 3 Encounters:   06/06/23 67   05/31/23 72   05/26/23 88        Review of Systems   Constitutional: Negative.   HENT: Negative.     Eyes: Negative.    Cardiovascular: Negative.    Respiratory: Negative.     Skin: Negative.    Musculoskeletal: Negative.    Gastrointestinal: Negative.    Genitourinary: Negative.    Neurological: Negative.    Psychiatric/Behavioral: Negative.       Objective:   Physical Exam  Vitals and nursing note reviewed.   Constitutional:       Appearance: Normal appearance.   HENT:      Head: Normocephalic and atraumatic.   Eyes:      General:         Right eye: No discharge.         Left eye: No discharge.      Pupils: Pupils are equal, round, and reactive to light.   Cardiovascular:      Rate and Rhythm: Normal rate and regular rhythm.      Heart sounds: S1 normal and S2 normal. No murmur heard.    No friction rub.   Pulmonary:      Effort: Pulmonary effort is normal. No respiratory distress.      Breath sounds: Normal breath sounds. No rales.   Abdominal:      Palpations: Abdomen is soft.      Tenderness: There is no abdominal tenderness.   Musculoskeletal:      Cervical back: Neck supple.      Right lower leg: No edema.      Left lower leg: No edema.   Skin:     General: Skin is warm and dry.   Neurological:      General: No focal deficit present.      Mental Status: He is alert and oriented to person, place, and time.   Psychiatric:         Mood and Affect: Mood normal.         Behavior: Behavior normal.          Thought Content: Thought content normal.       Lab Results   Component Value Date    CHOL 196 06/14/2022    CHOL 200 (H) 12/08/2020    CHOL 176 10/10/2019     Lab Results   Component Value Date    HDL 78 (H) 06/14/2022    HDL 77 (H) 12/08/2020    HDL 74 10/10/2019     Lab Results   Component Value Date    LDLCALC 99.6 06/14/2022    LDLCALC 108.6 12/08/2020    LDLCALC 87.8 10/10/2019     Lab Results   Component Value Date    TRIG 92 06/14/2022    TRIG 72 12/08/2020    TRIG 71 10/10/2019     Lab Results   Component Value Date    CHOLHDL 39.8 06/14/2022    CHOLHDL 38.5 12/08/2020    CHOLHDL 42.0 10/10/2019       Chemistry        Component Value Date/Time     (L) 05/31/2023 0437    K 4.1 05/31/2023 0437    CL 97 05/31/2023 0437    CO2 28 05/31/2023 0437    BUN 5 (L) 05/31/2023 0437    CREATININE 0.8 05/31/2023 0437     (H) 05/31/2023 0437        Component Value Date/Time    CALCIUM 8.9 05/31/2023 0437    ALKPHOS 78 05/31/2023 0437    AST 18 05/31/2023 0437    ALT 14 05/31/2023 0437    BILITOT 1.7 (H) 05/31/2023 0437    ESTGFRAFRICA >60.0 07/08/2022 0928    EGFRNONAA >60.0 07/08/2022 0928          Lab Results   Component Value Date    TSH 2.117 06/14/2022     No results found for: INR, PROTIME  @RESUFAST(WBC,HGB,HCT,MCV,PLT)  @LABRCNTIP(BNP,BNPTRIAGEBLO)@  CrCl cannot be calculated (Patient's most recent lab result is older than the maximum 7 days allowed.).     Results for orders placed during the hospital encounter of 05/29/23    Echo    Interpretation Summary  · The left ventricle is normal in size with concentric remodeling and normal systolic function.  · The estimated ejection fraction is 60%.  · Normal left ventricular diastolic function.  · Normal right ventricular size with normal right ventricular systolic function.  · Normal central venous pressure (3 mmHg).  · The estimated PA systolic pressure is 20 mmHg.     Results for orders placed during the hospital encounter of 05/29/23    Nuclear Stress -  Cardiology Interpreted    Interpretation Summary    Normal myocardial perfusion scan. There is no evidence of myocardial ischemia or infarction.    The gated perfusion images showed an ejection fraction of 71% at rest. The gated perfusion images showed an ejection fraction of 68% post stress.    There is normal wall motion at rest and post stress.    The ECG portion of the study is negative for ischemia.     Assessment:      1. Primary hypertension    2. Other hyperlipidemia    3. Elevated troponin    4. Tobacco abuse        Plan:   Primary hypertension    Other hyperlipidemia    Elevated troponin    Tobacco abuse      Cont amlodipine, coreg, losartan- HTN  Smoking cessation  RF modification, low Na diet, daily exercise as tolerated    RTC 3 mos, BP    Maryjo Rosas, FNP-C [facility], Cardiology

## 2023-06-16 ENCOUNTER — OFFICE VISIT (OUTPATIENT)
Dept: CARDIOLOGY | Facility: CLINIC | Age: 58
End: 2023-06-16
Payer: COMMERCIAL

## 2023-06-16 ENCOUNTER — LAB VISIT (OUTPATIENT)
Dept: LAB | Facility: HOSPITAL | Age: 58
End: 2023-06-16
Attending: FAMILY MEDICINE
Payer: COMMERCIAL

## 2023-06-16 VITALS
SYSTOLIC BLOOD PRESSURE: 130 MMHG | DIASTOLIC BLOOD PRESSURE: 80 MMHG | WEIGHT: 203.69 LBS | HEART RATE: 73 BPM | BODY MASS INDEX: 26.99 KG/M2 | HEIGHT: 73 IN | OXYGEN SATURATION: 99 %

## 2023-06-16 DIAGNOSIS — I10 PRIMARY HYPERTENSION: Primary | ICD-10-CM

## 2023-06-16 DIAGNOSIS — E78.49 OTHER HYPERLIPIDEMIA: ICD-10-CM

## 2023-06-16 DIAGNOSIS — Z72.0 TOBACCO ABUSE: ICD-10-CM

## 2023-06-16 DIAGNOSIS — Z00.00 ANNUAL PHYSICAL EXAM: ICD-10-CM

## 2023-06-16 DIAGNOSIS — R79.89 ELEVATED TROPONIN: ICD-10-CM

## 2023-06-16 LAB
ALBUMIN SERPL BCP-MCNC: 4.2 G/DL (ref 3.5–5.2)
ALP SERPL-CCNC: 79 U/L (ref 55–135)
ALT SERPL W/O P-5'-P-CCNC: 27 U/L (ref 10–44)
ANION GAP SERPL CALC-SCNC: 9 MMOL/L (ref 8–16)
AST SERPL-CCNC: 24 U/L (ref 10–40)
BASOPHILS # BLD AUTO: 0.05 K/UL (ref 0–0.2)
BASOPHILS NFR BLD: 1.2 % (ref 0–1.9)
BILIRUB SERPL-MCNC: 1.3 MG/DL (ref 0.1–1)
BUN SERPL-MCNC: 4 MG/DL (ref 6–20)
CALCIUM SERPL-MCNC: 9.3 MG/DL (ref 8.7–10.5)
CHLORIDE SERPL-SCNC: 100 MMOL/L (ref 95–110)
CHOLEST SERPL-MCNC: 146 MG/DL (ref 120–199)
CHOLEST/HDLC SERPL: 2.1 {RATIO} (ref 2–5)
CO2 SERPL-SCNC: 23 MMOL/L (ref 23–29)
COMPLEXED PSA SERPL-MCNC: 0.23 NG/ML (ref 0–4)
CREAT SERPL-MCNC: 0.8 MG/DL (ref 0.5–1.4)
DIFFERENTIAL METHOD: ABNORMAL
EOSINOPHIL # BLD AUTO: 0.1 K/UL (ref 0–0.5)
EOSINOPHIL NFR BLD: 2.1 % (ref 0–8)
ERYTHROCYTE [DISTWIDTH] IN BLOOD BY AUTOMATED COUNT: 11.6 % (ref 11.5–14.5)
EST. GFR  (NO RACE VARIABLE): >60 ML/MIN/1.73 M^2
ESTIMATED AVG GLUCOSE: 100 MG/DL (ref 68–131)
GLUCOSE SERPL-MCNC: 103 MG/DL (ref 70–110)
HBA1C MFR BLD: 5.1 % (ref 4–5.6)
HCT VFR BLD AUTO: 44.5 % (ref 40–54)
HDLC SERPL-MCNC: 69 MG/DL (ref 40–75)
HDLC SERPL: 47.3 % (ref 20–50)
HGB BLD-MCNC: 15.5 G/DL (ref 14–18)
IMM GRANULOCYTES # BLD AUTO: 0.01 K/UL (ref 0–0.04)
IMM GRANULOCYTES NFR BLD AUTO: 0.2 % (ref 0–0.5)
LDLC SERPL CALC-MCNC: 65.4 MG/DL (ref 63–159)
LYMPHOCYTES # BLD AUTO: 1.4 K/UL (ref 1–4.8)
LYMPHOCYTES NFR BLD: 32.8 % (ref 18–48)
MCH RBC QN AUTO: 33.1 PG (ref 27–31)
MCHC RBC AUTO-ENTMCNC: 34.8 G/DL (ref 32–36)
MCV RBC AUTO: 95 FL (ref 82–98)
MONOCYTES # BLD AUTO: 0.4 K/UL (ref 0.3–1)
MONOCYTES NFR BLD: 8.7 % (ref 4–15)
NEUTROPHILS # BLD AUTO: 2.4 K/UL (ref 1.8–7.7)
NEUTROPHILS NFR BLD: 55 % (ref 38–73)
NONHDLC SERPL-MCNC: 77 MG/DL
NRBC BLD-RTO: 0 /100 WBC
PLATELET # BLD AUTO: 278 K/UL (ref 150–450)
PMV BLD AUTO: 8.8 FL (ref 9.2–12.9)
POTASSIUM SERPL-SCNC: 4.2 MMOL/L (ref 3.5–5.1)
PROT SERPL-MCNC: 7.5 G/DL (ref 6–8.4)
RBC # BLD AUTO: 4.68 M/UL (ref 4.6–6.2)
SODIUM SERPL-SCNC: 132 MMOL/L (ref 136–145)
TRIGL SERPL-MCNC: 58 MG/DL (ref 30–150)
TSH SERPL DL<=0.005 MIU/L-ACNC: 2.42 UIU/ML (ref 0.4–4)
WBC # BLD AUTO: 4.27 K/UL (ref 3.9–12.7)

## 2023-06-16 PROCEDURE — 4010F PR ACE/ARB THEARPY RXD/TAKEN: ICD-10-PCS | Mod: CPTII,S$GLB,,

## 2023-06-16 PROCEDURE — 84153 ASSAY OF PSA TOTAL: CPT | Performed by: FAMILY MEDICINE

## 2023-06-16 PROCEDURE — 99999 PR PBB SHADOW E&M-EST. PATIENT-LVL III: ICD-10-PCS | Mod: PBBFAC,,,

## 2023-06-16 PROCEDURE — 1160F RVW MEDS BY RX/DR IN RCRD: CPT | Mod: CPTII,S$GLB,,

## 2023-06-16 PROCEDURE — 99999 PR PBB SHADOW E&M-EST. PATIENT-LVL III: CPT | Mod: PBBFAC,,,

## 2023-06-16 PROCEDURE — 1159F MED LIST DOCD IN RCRD: CPT | Mod: CPTII,S$GLB,,

## 2023-06-16 PROCEDURE — 3008F PR BODY MASS INDEX (BMI) DOCUMENTED: ICD-10-PCS | Mod: CPTII,S$GLB,,

## 2023-06-16 PROCEDURE — 99214 PR OFFICE/OUTPT VISIT, EST, LEVL IV, 30-39 MIN: ICD-10-PCS | Mod: S$GLB,,,

## 2023-06-16 PROCEDURE — 1160F PR REVIEW ALL MEDS BY PRESCRIBER/CLIN PHARMACIST DOCUMENTED: ICD-10-PCS | Mod: CPTII,S$GLB,,

## 2023-06-16 PROCEDURE — 99214 OFFICE O/P EST MOD 30 MIN: CPT | Mod: S$GLB,,,

## 2023-06-16 PROCEDURE — 83036 HEMOGLOBIN GLYCOSYLATED A1C: CPT | Performed by: FAMILY MEDICINE

## 2023-06-16 PROCEDURE — 84443 ASSAY THYROID STIM HORMONE: CPT | Performed by: FAMILY MEDICINE

## 2023-06-16 PROCEDURE — 3008F BODY MASS INDEX DOCD: CPT | Mod: CPTII,S$GLB,,

## 2023-06-16 PROCEDURE — 4010F ACE/ARB THERAPY RXD/TAKEN: CPT | Mod: CPTII,S$GLB,,

## 2023-06-16 PROCEDURE — 36415 COLL VENOUS BLD VENIPUNCTURE: CPT | Performed by: FAMILY MEDICINE

## 2023-06-16 PROCEDURE — 1111F DSCHRG MED/CURRENT MED MERGE: CPT | Mod: CPTII,S$GLB,,

## 2023-06-16 PROCEDURE — 3075F PR MOST RECENT SYSTOLIC BLOOD PRESS GE 130-139MM HG: ICD-10-PCS | Mod: CPTII,S$GLB,,

## 2023-06-16 PROCEDURE — 3079F DIAST BP 80-89 MM HG: CPT | Mod: CPTII,S$GLB,,

## 2023-06-16 PROCEDURE — 85025 COMPLETE CBC W/AUTO DIFF WBC: CPT | Performed by: FAMILY MEDICINE

## 2023-06-16 PROCEDURE — 3075F SYST BP GE 130 - 139MM HG: CPT | Mod: CPTII,S$GLB,,

## 2023-06-16 PROCEDURE — 80053 COMPREHEN METABOLIC PANEL: CPT | Performed by: FAMILY MEDICINE

## 2023-06-16 PROCEDURE — 80061 LIPID PANEL: CPT | Performed by: FAMILY MEDICINE

## 2023-06-16 PROCEDURE — 3079F PR MOST RECENT DIASTOLIC BLOOD PRESSURE 80-89 MM HG: ICD-10-PCS | Mod: CPTII,S$GLB,,

## 2023-06-16 PROCEDURE — 1111F PR DISCHARGE MEDS RECONCILED W/ CURRENT OUTPATIENT MED LIST: ICD-10-PCS | Mod: CPTII,S$GLB,,

## 2023-06-16 PROCEDURE — 1159F PR MEDICATION LIST DOCUMENTED IN MEDICAL RECORD: ICD-10-PCS | Mod: CPTII,S$GLB,,

## 2023-06-16 RX ORDER — CARVEDILOL 25 MG/1
25 TABLET ORAL 2 TIMES DAILY
Qty: 60 TABLET | Refills: 11 | Status: SHIPPED | OUTPATIENT
Start: 2023-06-16 | End: 2024-06-10

## 2023-06-16 RX ORDER — ATORVASTATIN CALCIUM 20 MG/1
20 TABLET, FILM COATED ORAL NIGHTLY
Qty: 30 TABLET | Refills: 11 | Status: SHIPPED | OUTPATIENT
Start: 2023-06-16 | End: 2024-06-10

## 2023-06-16 RX ORDER — LOSARTAN POTASSIUM 100 MG/1
100 TABLET ORAL DAILY
Qty: 30 TABLET | Refills: 11 | Status: SHIPPED | OUTPATIENT
Start: 2023-06-16 | End: 2024-03-28 | Stop reason: SDUPTHER

## 2023-06-16 RX ORDER — ASPIRIN 81 MG/1
81 TABLET ORAL DAILY
Qty: 30 TABLET | Refills: 11 | Status: SHIPPED | OUTPATIENT
Start: 2023-06-16 | End: 2024-06-10

## 2023-06-16 RX ORDER — AMLODIPINE BESYLATE 5 MG/1
5 TABLET ORAL DAILY
Qty: 30 TABLET | Refills: 11 | Status: SHIPPED | OUTPATIENT
Start: 2023-06-16 | End: 2024-03-28 | Stop reason: SDUPTHER

## 2023-06-23 ENCOUNTER — HOSPITAL ENCOUNTER (OUTPATIENT)
Dept: PREADMISSION TESTING | Facility: HOSPITAL | Age: 58
Discharge: HOME | End: 2023-06-23
Attending: INTERNAL MEDICINE
Payer: COMMERCIAL

## 2023-06-26 ENCOUNTER — OFFICE VISIT (OUTPATIENT)
Dept: INTERNAL MEDICINE | Facility: CLINIC | Age: 58
End: 2023-06-26
Payer: COMMERCIAL

## 2023-06-26 VITALS
DIASTOLIC BLOOD PRESSURE: 78 MMHG | TEMPERATURE: 98 F | HEART RATE: 76 BPM | WEIGHT: 203.25 LBS | HEIGHT: 73 IN | SYSTOLIC BLOOD PRESSURE: 126 MMHG | BODY MASS INDEX: 26.94 KG/M2

## 2023-06-26 DIAGNOSIS — Z87.891 SMOKING HX: ICD-10-CM

## 2023-06-26 DIAGNOSIS — Z00.00 ANNUAL PHYSICAL EXAM: Primary | ICD-10-CM

## 2023-06-26 DIAGNOSIS — H91.93 BILATERAL HEARING LOSS, UNSPECIFIED HEARING LOSS TYPE: ICD-10-CM

## 2023-06-26 DIAGNOSIS — E87.1 HYPONATREMIA: ICD-10-CM

## 2023-06-26 DIAGNOSIS — Z12.11 COLON CANCER SCREENING: ICD-10-CM

## 2023-06-26 DIAGNOSIS — Z12.2 SCREENING FOR LUNG CANCER: ICD-10-CM

## 2023-06-26 PROCEDURE — 3078F PR MOST RECENT DIASTOLIC BLOOD PRESSURE < 80 MM HG: ICD-10-PCS | Mod: CPTII,S$GLB,, | Performed by: FAMILY MEDICINE

## 2023-06-26 PROCEDURE — 1160F RVW MEDS BY RX/DR IN RCRD: CPT | Mod: CPTII,S$GLB,, | Performed by: FAMILY MEDICINE

## 2023-06-26 PROCEDURE — 4010F PR ACE/ARB THEARPY RXD/TAKEN: ICD-10-PCS | Mod: CPTII,S$GLB,, | Performed by: FAMILY MEDICINE

## 2023-06-26 PROCEDURE — 3008F PR BODY MASS INDEX (BMI) DOCUMENTED: ICD-10-PCS | Mod: CPTII,S$GLB,, | Performed by: FAMILY MEDICINE

## 2023-06-26 PROCEDURE — 99999 PR PBB SHADOW E&M-EST. PATIENT-LVL IV: ICD-10-PCS | Mod: PBBFAC,,, | Performed by: FAMILY MEDICINE

## 2023-06-26 PROCEDURE — 99396 PR PREVENTIVE VISIT,EST,40-64: ICD-10-PCS | Mod: S$GLB,,, | Performed by: FAMILY MEDICINE

## 2023-06-26 PROCEDURE — 4010F ACE/ARB THERAPY RXD/TAKEN: CPT | Mod: CPTII,S$GLB,, | Performed by: FAMILY MEDICINE

## 2023-06-26 PROCEDURE — 3044F PR MOST RECENT HEMOGLOBIN A1C LEVEL <7.0%: ICD-10-PCS | Mod: CPTII,S$GLB,, | Performed by: FAMILY MEDICINE

## 2023-06-26 PROCEDURE — 3074F PR MOST RECENT SYSTOLIC BLOOD PRESSURE < 130 MM HG: ICD-10-PCS | Mod: CPTII,S$GLB,, | Performed by: FAMILY MEDICINE

## 2023-06-26 PROCEDURE — 1159F PR MEDICATION LIST DOCUMENTED IN MEDICAL RECORD: ICD-10-PCS | Mod: CPTII,S$GLB,, | Performed by: FAMILY MEDICINE

## 2023-06-26 PROCEDURE — 1159F MED LIST DOCD IN RCRD: CPT | Mod: CPTII,S$GLB,, | Performed by: FAMILY MEDICINE

## 2023-06-26 PROCEDURE — 1160F PR REVIEW ALL MEDS BY PRESCRIBER/CLIN PHARMACIST DOCUMENTED: ICD-10-PCS | Mod: CPTII,S$GLB,, | Performed by: FAMILY MEDICINE

## 2023-06-26 PROCEDURE — 1111F DSCHRG MED/CURRENT MED MERGE: CPT | Mod: CPTII,S$GLB,, | Performed by: FAMILY MEDICINE

## 2023-06-26 PROCEDURE — 3044F HG A1C LEVEL LT 7.0%: CPT | Mod: CPTII,S$GLB,, | Performed by: FAMILY MEDICINE

## 2023-06-26 PROCEDURE — 1111F PR DISCHARGE MEDS RECONCILED W/ CURRENT OUTPATIENT MED LIST: ICD-10-PCS | Mod: CPTII,S$GLB,, | Performed by: FAMILY MEDICINE

## 2023-06-26 PROCEDURE — 99396 PREV VISIT EST AGE 40-64: CPT | Mod: S$GLB,,, | Performed by: FAMILY MEDICINE

## 2023-06-26 PROCEDURE — 3074F SYST BP LT 130 MM HG: CPT | Mod: CPTII,S$GLB,, | Performed by: FAMILY MEDICINE

## 2023-06-26 PROCEDURE — 3008F BODY MASS INDEX DOCD: CPT | Mod: CPTII,S$GLB,, | Performed by: FAMILY MEDICINE

## 2023-06-26 PROCEDURE — 3078F DIAST BP <80 MM HG: CPT | Mod: CPTII,S$GLB,, | Performed by: FAMILY MEDICINE

## 2023-06-26 PROCEDURE — 99999 PR PBB SHADOW E&M-EST. PATIENT-LVL IV: CPT | Mod: PBBFAC,,, | Performed by: FAMILY MEDICINE

## 2023-06-27 ENCOUNTER — HOSPITAL ENCOUNTER (OUTPATIENT)
Dept: PREADMISSION TESTING | Facility: HOSPITAL | Age: 58
Discharge: HOME | End: 2023-06-27
Attending: INTERNAL MEDICINE
Payer: COMMERCIAL

## 2023-06-27 DIAGNOSIS — Z12.11 COLON CANCER SCREENING: Primary | ICD-10-CM

## 2023-06-28 RX ORDER — SODIUM, POTASSIUM,MAG SULFATES 17.5-3.13G
1 SOLUTION, RECONSTITUTED, ORAL ORAL DAILY
Qty: 1 KIT | Refills: 0 | Status: SHIPPED | OUTPATIENT
Start: 2023-06-28 | End: 2023-06-30

## 2023-07-24 ENCOUNTER — LAB VISIT (OUTPATIENT)
Dept: LAB | Facility: HOSPITAL | Age: 58
End: 2023-07-24
Attending: FAMILY MEDICINE
Payer: COMMERCIAL

## 2023-07-24 DIAGNOSIS — E87.1 HYPONATREMIA: ICD-10-CM

## 2023-07-24 LAB
ANION GAP SERPL CALC-SCNC: 14 MMOL/L (ref 8–16)
BUN SERPL-MCNC: 4 MG/DL (ref 6–20)
CALCIUM SERPL-MCNC: 8.9 MG/DL (ref 8.7–10.5)
CHLORIDE SERPL-SCNC: 97 MMOL/L (ref 95–110)
CO2 SERPL-SCNC: 22 MMOL/L (ref 23–29)
CREAT SERPL-MCNC: 0.8 MG/DL (ref 0.5–1.4)
EST. GFR  (NO RACE VARIABLE): >60 ML/MIN/1.73 M^2
GLUCOSE SERPL-MCNC: 87 MG/DL (ref 70–110)
POTASSIUM SERPL-SCNC: 4.7 MMOL/L (ref 3.5–5.1)
SODIUM SERPL-SCNC: 133 MMOL/L (ref 136–145)

## 2023-07-24 PROCEDURE — 80048 BASIC METABOLIC PNL TOTAL CA: CPT | Performed by: FAMILY MEDICINE

## 2023-07-24 PROCEDURE — 36415 COLL VENOUS BLD VENIPUNCTURE: CPT | Mod: PO | Performed by: FAMILY MEDICINE

## 2023-07-31 ENCOUNTER — OFFICE VISIT (OUTPATIENT)
Dept: INTERNAL MEDICINE | Facility: CLINIC | Age: 58
End: 2023-07-31
Payer: COMMERCIAL

## 2023-07-31 VITALS
HEART RATE: 75 BPM | BODY MASS INDEX: 27.03 KG/M2 | SYSTOLIC BLOOD PRESSURE: 134 MMHG | HEIGHT: 73 IN | DIASTOLIC BLOOD PRESSURE: 78 MMHG | TEMPERATURE: 99 F | WEIGHT: 203.94 LBS

## 2023-07-31 DIAGNOSIS — Z87.891 SMOKING HX: ICD-10-CM

## 2023-07-31 DIAGNOSIS — E87.1 HYPONATREMIA: ICD-10-CM

## 2023-07-31 DIAGNOSIS — I10 PRIMARY HYPERTENSION: Primary | ICD-10-CM

## 2023-07-31 PROCEDURE — 99214 OFFICE O/P EST MOD 30 MIN: CPT | Mod: S$GLB,,, | Performed by: FAMILY MEDICINE

## 2023-07-31 PROCEDURE — 3078F DIAST BP <80 MM HG: CPT | Mod: CPTII,S$GLB,, | Performed by: FAMILY MEDICINE

## 2023-07-31 PROCEDURE — 3044F HG A1C LEVEL LT 7.0%: CPT | Mod: CPTII,S$GLB,, | Performed by: FAMILY MEDICINE

## 2023-07-31 PROCEDURE — 1160F PR REVIEW ALL MEDS BY PRESCRIBER/CLIN PHARMACIST DOCUMENTED: ICD-10-PCS | Mod: CPTII,S$GLB,, | Performed by: FAMILY MEDICINE

## 2023-07-31 PROCEDURE — 1159F PR MEDICATION LIST DOCUMENTED IN MEDICAL RECORD: ICD-10-PCS | Mod: CPTII,S$GLB,, | Performed by: FAMILY MEDICINE

## 2023-07-31 PROCEDURE — 3078F PR MOST RECENT DIASTOLIC BLOOD PRESSURE < 80 MM HG: ICD-10-PCS | Mod: CPTII,S$GLB,, | Performed by: FAMILY MEDICINE

## 2023-07-31 PROCEDURE — 3075F SYST BP GE 130 - 139MM HG: CPT | Mod: CPTII,S$GLB,, | Performed by: FAMILY MEDICINE

## 2023-07-31 PROCEDURE — 3075F PR MOST RECENT SYSTOLIC BLOOD PRESS GE 130-139MM HG: ICD-10-PCS | Mod: CPTII,S$GLB,, | Performed by: FAMILY MEDICINE

## 2023-07-31 PROCEDURE — 1159F MED LIST DOCD IN RCRD: CPT | Mod: CPTII,S$GLB,, | Performed by: FAMILY MEDICINE

## 2023-07-31 PROCEDURE — 3008F BODY MASS INDEX DOCD: CPT | Mod: CPTII,S$GLB,, | Performed by: FAMILY MEDICINE

## 2023-07-31 PROCEDURE — 3008F PR BODY MASS INDEX (BMI) DOCUMENTED: ICD-10-PCS | Mod: CPTII,S$GLB,, | Performed by: FAMILY MEDICINE

## 2023-07-31 PROCEDURE — 3044F PR MOST RECENT HEMOGLOBIN A1C LEVEL <7.0%: ICD-10-PCS | Mod: CPTII,S$GLB,, | Performed by: FAMILY MEDICINE

## 2023-07-31 PROCEDURE — 1160F RVW MEDS BY RX/DR IN RCRD: CPT | Mod: CPTII,S$GLB,, | Performed by: FAMILY MEDICINE

## 2023-07-31 PROCEDURE — 4010F PR ACE/ARB THEARPY RXD/TAKEN: ICD-10-PCS | Mod: CPTII,S$GLB,, | Performed by: FAMILY MEDICINE

## 2023-07-31 PROCEDURE — 99999 PR PBB SHADOW E&M-EST. PATIENT-LVL III: ICD-10-PCS | Mod: PBBFAC,,, | Performed by: FAMILY MEDICINE

## 2023-07-31 PROCEDURE — 4010F ACE/ARB THERAPY RXD/TAKEN: CPT | Mod: CPTII,S$GLB,, | Performed by: FAMILY MEDICINE

## 2023-07-31 PROCEDURE — 99999 PR PBB SHADOW E&M-EST. PATIENT-LVL III: CPT | Mod: PBBFAC,,, | Performed by: FAMILY MEDICINE

## 2023-07-31 PROCEDURE — 99214 PR OFFICE/OUTPT VISIT, EST, LEVL IV, 30-39 MIN: ICD-10-PCS | Mod: S$GLB,,, | Performed by: FAMILY MEDICINE

## 2023-07-31 NOTE — PROGRESS NOTES
"Subjective:      Patient ID: Rosalio Muñoz is a 57 y.o. male.    Chief Complaint: Hypertension    Hypertension      58 yo male here for fu on BP/lab.  Drinking 8-10 16 oz bottles of water daily  Feeling well    Past Medical History:   Diagnosis Date    Hypertension      Family History   Problem Relation Age of Onset    Hyperlipidemia Mother     Hypertension Father     Diabetes Father     Kidney disease Father     Heart disease Father     Prostate cancer Maternal Grandfather     Colon cancer Neg Hx      Past Surgical History:   Procedure Laterality Date    WISDOM TOOTH EXTRACTION       Social History     Tobacco Use    Smoking status: Former     Current packs/day: 0.00     Types: Cigars, Cigarettes     Quit date: 10/4/2022     Years since quittin.8     Passive exposure: Never    Smokeless tobacco: Never   Substance Use Topics    Alcohol use: Yes     Alcohol/week: 4.0 - 6.0 standard drinks of alcohol     Types: 4 - 6 Cans of beer per week    Drug use: No       /78   Pulse 75   Temp 98.8 °F (37.1 °C) (Tympanic)   Ht 6' 1" (1.854 m)   Wt 92.5 kg (203 lb 14.8 oz)   BMI 26.90 kg/m²     Review of Systems   Constitutional:  Negative for activity change.   Musculoskeletal:  Negative for arthralgias.   Neurological:  Negative for dizziness and weakness.       Objective:     Physical Exam  Vitals and nursing note reviewed.   Constitutional:       General: He is not in acute distress.     Appearance: He is well-developed. He is not diaphoretic.   HENT:      Head: Normocephalic and atraumatic.   Eyes:      General:         Right eye: No discharge.         Left eye: No discharge.   Cardiovascular:      Rate and Rhythm: Normal rate and regular rhythm.   Pulmonary:      Effort: Pulmonary effort is normal. No respiratory distress.   Neurological:      Mental Status: He is alert and oriented to person, place, and time.   Psychiatric:         Behavior: Behavior normal.         Thought Content: Thought content " normal.         Judgment: Judgment normal.         Lab Results   Component Value Date    WBC 4.27 06/16/2023    HGB 15.5 06/16/2023    HCT 44.5 06/16/2023     06/16/2023    CHOL 146 06/16/2023    TRIG 58 06/16/2023    HDL 69 06/16/2023    ALT 27 06/16/2023    AST 24 06/16/2023     (L) 07/24/2023    K 4.7 07/24/2023    CL 97 07/24/2023    CREATININE 0.8 07/24/2023    BUN 4 (L) 07/24/2023    CO2 22 (L) 07/24/2023    TSH 2.420 06/16/2023    PSA 0.23 06/16/2023    HGBA1C 5.1 06/16/2023       Assessment:     1. Primary hypertension    2. Hyponatremia    3. Smoking hx         Plan:     Primary hypertension    Hyponatremia  -     Basic Metabolic Panel; Future; Expected date: 09/25/2023    Smoking hx      BP stable/cont med  Na likely low due to all the water intake  Cut down on water intake, 4 16 oz bottles, 5 at most  Upcoming CT lung and colon  Rpt lab in 2 mos

## 2023-08-07 ENCOUNTER — HOSPITAL ENCOUNTER (OUTPATIENT)
Dept: RADIOLOGY | Facility: HOSPITAL | Age: 58
Discharge: HOME | End: 2023-08-07
Attending: FAMILY MEDICINE
Payer: COMMERCIAL

## 2023-08-07 DIAGNOSIS — Z87.891 SMOKING HX: ICD-10-CM

## 2023-08-07 DIAGNOSIS — Z12.2 SCREENING FOR LUNG CANCER: ICD-10-CM

## 2023-08-07 PROCEDURE — 71271 CT THORAX LUNG CANCER SCR C-: CPT | Mod: 26,,, | Performed by: RADIOLOGY

## 2023-08-07 PROCEDURE — 71271 CT CHEST LUNG SCREENING LOW DOSE: ICD-10-PCS | Mod: 26,,, | Performed by: RADIOLOGY

## 2023-08-07 PROCEDURE — 71271 CT THORAX LUNG CANCER SCR C-: CPT | Mod: TC

## 2023-08-25 ENCOUNTER — ANESTHESIA (OUTPATIENT)
Dept: ENDOSCOPY | Facility: HOSPITAL | Age: 58
End: 2023-08-25
Payer: COMMERCIAL

## 2023-08-25 ENCOUNTER — HOSPITAL ENCOUNTER (OUTPATIENT)
Facility: HOSPITAL | Age: 58
Discharge: HOME | End: 2023-08-25
Attending: INTERNAL MEDICINE | Admitting: INTERNAL MEDICINE
Payer: COMMERCIAL

## 2023-08-25 ENCOUNTER — ANESTHESIA EVENT (OUTPATIENT)
Dept: ENDOSCOPY | Facility: HOSPITAL | Age: 58
End: 2023-08-25
Payer: COMMERCIAL

## 2023-08-25 VITALS
OXYGEN SATURATION: 100 % | BODY MASS INDEX: 26.9 KG/M2 | DIASTOLIC BLOOD PRESSURE: 69 MMHG | RESPIRATION RATE: 20 BRPM | SYSTOLIC BLOOD PRESSURE: 128 MMHG | TEMPERATURE: 98 F | HEART RATE: 73 BPM | WEIGHT: 203 LBS | HEIGHT: 73 IN

## 2023-08-25 DIAGNOSIS — Z12.11 COLON CANCER SCREENING: Primary | ICD-10-CM

## 2023-08-25 PROCEDURE — 63600175 PHARM REV CODE 636 W HCPCS: Performed by: NURSE ANESTHETIST, CERTIFIED REGISTERED

## 2023-08-25 PROCEDURE — 37000009 HC ANESTHESIA EA ADD 15 MINS: Performed by: INTERNAL MEDICINE

## 2023-08-25 PROCEDURE — 27201089 HC SNARE, DISP (ANY): Performed by: INTERNAL MEDICINE

## 2023-08-25 PROCEDURE — 45385 COLONOSCOPY W/LESION REMOVAL: CPT | Mod: 33,,, | Performed by: INTERNAL MEDICINE

## 2023-08-25 PROCEDURE — 25000003 PHARM REV CODE 250: Performed by: NURSE ANESTHETIST, CERTIFIED REGISTERED

## 2023-08-25 PROCEDURE — 88305 TISSUE EXAM BY PATHOLOGIST: CPT | Mod: 26,,, | Performed by: PATHOLOGY

## 2023-08-25 PROCEDURE — 88305 TISSUE EXAM BY PATHOLOGIST: ICD-10-PCS | Mod: 26,,, | Performed by: PATHOLOGY

## 2023-08-25 PROCEDURE — 25000003 PHARM REV CODE 250: Performed by: INTERNAL MEDICINE

## 2023-08-25 PROCEDURE — 45380 COLONOSCOPY AND BIOPSY: CPT | Mod: PT,59 | Performed by: INTERNAL MEDICINE

## 2023-08-25 PROCEDURE — 45380 COLONOSCOPY AND BIOPSY: CPT | Mod: 33,59,, | Performed by: INTERNAL MEDICINE

## 2023-08-25 PROCEDURE — 27201012 HC FORCEPS, HOT/COLD, DISP: Performed by: INTERNAL MEDICINE

## 2023-08-25 PROCEDURE — 37000008 HC ANESTHESIA 1ST 15 MINUTES: Performed by: INTERNAL MEDICINE

## 2023-08-25 PROCEDURE — 45385 PR COLONOSCOPY,REMV LESN,SNARE: ICD-10-PCS | Mod: 33,,, | Performed by: INTERNAL MEDICINE

## 2023-08-25 PROCEDURE — 45385 COLONOSCOPY W/LESION REMOVAL: CPT | Mod: PT | Performed by: INTERNAL MEDICINE

## 2023-08-25 PROCEDURE — 45380 PR COLONOSCOPY,BIOPSY: ICD-10-PCS | Mod: 33,59,, | Performed by: INTERNAL MEDICINE

## 2023-08-25 PROCEDURE — 88305 TISSUE EXAM BY PATHOLOGIST: CPT | Performed by: PATHOLOGY

## 2023-08-25 RX ORDER — LIDOCAINE HYDROCHLORIDE 10 MG/ML
INJECTION, SOLUTION EPIDURAL; INFILTRATION; INTRACAUDAL; PERINEURAL
Status: DISCONTINUED | OUTPATIENT
Start: 2023-08-25 | End: 2023-08-25

## 2023-08-25 RX ORDER — PROPOFOL 10 MG/ML
VIAL (ML) INTRAVENOUS
Status: DISCONTINUED | OUTPATIENT
Start: 2023-08-25 | End: 2023-08-25

## 2023-08-25 RX ORDER — DEXTROMETHORPHAN/PSEUDOEPHED 2.5-7.5/.8
DROPS ORAL
Status: DISCONTINUED | OUTPATIENT
Start: 2023-08-25 | End: 2023-08-25 | Stop reason: HOSPADM

## 2023-08-25 RX ORDER — PHENYLEPHRINE HYDROCHLORIDE 10 MG/ML
INJECTION INTRAVENOUS
Status: DISCONTINUED | OUTPATIENT
Start: 2023-08-25 | End: 2023-08-25

## 2023-08-25 RX ADMIN — PROPOFOL 20 MG: 10 INJECTION, EMULSION INTRAVENOUS at 06:08

## 2023-08-25 RX ADMIN — PROPOFOL 20 MG: 10 INJECTION, EMULSION INTRAVENOUS at 07:08

## 2023-08-25 RX ADMIN — PROPOFOL 30 MG: 10 INJECTION, EMULSION INTRAVENOUS at 07:08

## 2023-08-25 RX ADMIN — PHENYLEPHRINE HYDROCHLORIDE 200 MCG: 10 INJECTION INTRAVENOUS at 07:08

## 2023-08-25 RX ADMIN — LIDOCAINE HYDROCHLORIDE 50 MG: 10 SOLUTION INTRAVENOUS at 06:08

## 2023-08-25 RX ADMIN — PROPOFOL 50 MG: 10 INJECTION, EMULSION INTRAVENOUS at 07:08

## 2023-08-25 RX ADMIN — PROPOFOL 100 MG: 10 INJECTION, EMULSION INTRAVENOUS at 06:08

## 2023-08-25 RX ADMIN — PROPOFOL 40 MG: 10 INJECTION, EMULSION INTRAVENOUS at 07:08

## 2023-08-25 RX ADMIN — SODIUM CHLORIDE, POTASSIUM CHLORIDE, SODIUM LACTATE AND CALCIUM CHLORIDE: 600; 310; 30; 20 INJECTION, SOLUTION INTRAVENOUS at 06:08

## 2023-08-25 NOTE — PROVATION PATIENT INSTRUCTIONS
Discharge Summary/Instructions after an Endoscopic Procedure  Patient Name: Rosalio Muñoz  Patient MRN: [id number]  Patient YOB: 1965 Friday, August 25, 2023 Pebbles Spear MD  Dear patient,  As a result of recent federal legislation (The Federal Cures Act), you may   receive lab or pathology results from your procedure in your [facility]   account before your physician is able to contact you. Your physician or   their representative will relay the results to you with their   recommendations at their soonest availability.  Thank you,  RESTRICTIONS:  During your procedure today, you received medications for sedation.  These   medications may affect your judgment, balance and coordination.  Therefore,   for 24 hours, you have the following restrictions:   - DO NOT drive a car, operate machinery, make legal/financial decisions,   sign important papers or drink alcohol.    ACTIVITY:  Today: no heavy lifting, straining or running due to procedural   sedation/anesthesia.  The following day: return to full activity including work.  DIET:  Eat and drink normally unless instructed otherwise.     TREATMENT FOR COMMON SIDE EFFECTS:  - Mild abdominal pain, nausea, belching, bloating or excessive gas:  rest,   eat lightly and use a heating pad.  - Sore Throat: treat with throat lozenges and/or gargle with warm salt   water.  - Because air was used during the procedure, expelling large amounts of air   from your rectum or belching is normal.  - If a bowel prep was taken, you may not have a bowel movement for 1-3 days.    This is normal.  SYMPTOMS TO WATCH FOR AND REPORT TO YOUR PHYSICIAN:  1. Abdominal pain or bloating, other than gas cramps.  2. Chest pain.  3. Back pain.  4. Signs of infection such as: chills or fever occurring within 24 hours   after the procedure.  5. Rectal bleeding, which would show as bright red, maroon, or black stools.   (A tablespoon of blood from the rectum is not serious, especially  if   hemorrhoids are present.)  6. Vomiting.  7. Weakness or dizziness.  GO DIRECTLY TO THE NEAREST EMERGENCY ROOM IF YOU HAVE ANY OF THE FOLLOWING:      Difficulty breathing              Chills and/or fever over 101 F   Persistent vomiting and/or vomiting blood   Severe abdominal pain   Severe chest pain   Black, tarry stools   Bleeding- more than one tablespoon   Any other symptom or condition that you feel may need urgent attention  Your doctor recommends these additional instructions:  If any biopsies were taken, your doctors clinic will contact you in 1 to 2   weeks with any results.  - Discharge patient to home.   - Resume previous diet.   - Continue present medications.   - Await pathology results.   - Repeat colonoscopy in 6 months for surveillance.   - Return to referring physician.   - Patient has a contact number available for emergencies.  The signs and   symptoms of potential delayed complications were discussed with the   patient.  Return to normal activities tomorrow.  Written discharge   instructions were provided to the patient.  For questions, problems or results please call your physician Pebbles Spear MD at Work:  [phone]  If you have any questions about the above instructions, call the GI   department at [phone] or call the endoscopy unit at [phone]   from 7am until 3 pm.  [facility] [place], EMERGENCY ROOM PHONE NUMBER:   [phone]  IF A COMPLICATION OR EMERGENCY SITUATION ARISES AND YOU ARE UNABLE TO REACH   YOUR PHYSICIAN - GO DIRECTLY TO THE EMERGENCY ROOM.  I have read or have had read to me these discharge instructions for my   procedure and have received a written copy.  I understand these   instructions and will follow-up with my physician if I have any questions.     __________________________________       _____________________________________  Nurse Signature                                          Patient/Designated   Responsible  Party Signature  Pebbles Spear MD  8/25/2023 7:50:14 AM  This report has been verified and signed electronically.  Dear patient,  As a result of recent federal legislation (The Federal Cures Act), you may   receive lab or pathology results from your procedure in your [facility]   account before your physician is able to contact you. Your physician or   their representative will relay the results to you with their   recommendations at their soonest availability.  Thank you,  PROVATION

## 2023-08-25 NOTE — H&P
PRE PROCEDURE H&P    Patient Name: Rosalio Muñoz  MRN: [id number]  : 1965  Date of Procedure:  2023  Referring Physician: Mihir Kimble MD  Primary Physician: Mihir Kimble MD  Procedure Physician: Pebbles Spear MD       Planned Procedure: Colonoscopy  Diagnosis: screening for colon cancer  Chief Complaint: Same as above    HPI: Patient is an 57 y.o. male is here for the above.     Last colonoscopy: Index colonoscopy   Family history: None   Anticoagulation: None     Past Medical History:   Past Medical History:   Diagnosis Date    Hypertension         Past Surgical History:  Past Surgical History:   Procedure Laterality Date    WISDOM TOOTH EXTRACTION          Home Medications:  Prior to Admission medications    Medication Sig Start Date End Date Taking? Authorizing Provider   amLODIPine (NORVASC) 5 MG tablet Take 1 tablet (5 mg total) by mouth once daily. 6/16/23 6/10/24 Yes Maryjo Rosas NP   aspirin (ECOTRIN) 81 MG EC tablet Take 1 tablet (81 mg total) by mouth once daily. 6/16/23 6/10/24 Yes Maryjo Rosas NP   atorvastatin (LIPITOR) 20 MG tablet Take 1 tablet (20 mg total) by mouth every evening. 6/16/23 6/10/24 Yes Maryjo Rosas NP   carvediloL (COREG) 25 MG tablet Take 1 tablet (25 mg total) by mouth 2 (two) times daily. 6/16/23 6/10/24 Yes Maryjo Rosas NP   losartan (COZAAR) 100 MG tablet Take 1 tablet (100 mg total) by mouth once daily. 6/16/23 6/10/24 Yes Maryjo Rosas NP        Allergies:  Review of patient's allergies indicates:  No Known Allergies     Social History:   Social History     Socioeconomic History    Marital status:     Number of children: 2   Occupational History    Occupation:    Tobacco Use    Smoking status: Former     Current packs/day: 0.00     Types: Cigars, Cigarettes     Quit date: 10/4/2022     Years since quittin.8     Passive exposure: Never    Smokeless tobacco: Current  "  Substance and Sexual Activity    Alcohol use: Yes     Alcohol/week: 4.0 - 6.0 standard drinks of alcohol     Types: 4 - 6 Cans of beer per week    Drug use: No    Sexual activity: Yes     Partners: Female     Social Determinants of Health     Financial Resource Strain: Low Risk  (5/31/2023)    Overall Financial Resource Strain (CARDIA)     Difficulty of Paying Living Expenses: Not hard at all   Food Insecurity: No Food Insecurity (5/31/2023)    Hunger Vital Sign     Worried About Running Out of Food in the Last Year: Never true     Ran Out of Food in the Last Year: Never true   Transportation Needs: No Transportation Needs (5/31/2023)    PRAPARE - Transportation     Lack of Transportation (Medical): No     Lack of Transportation (Non-Medical): No   Housing Stability: Unknown (5/31/2023)    Housing Stability Vital Sign     Unable to Pay for Housing in the Last Year: No     Unstable Housing in the Last Year: No       Family History:  Family History   Problem Relation Age of Onset    Hyperlipidemia Mother     Hypertension Father     Diabetes Father     Kidney disease Father     Heart disease Father     Prostate cancer Maternal Grandfather     Colon cancer Neg Hx        ROS: No acute cardiac events, no acute respiratory complaints.     Physical Exam (all patients):    /67   Pulse 74   Temp 98.2 °F (36.8 °C) (Temporal)   Resp 16   Ht 6' 1" (1.854 m)   Wt 92.1 kg (203 lb)   SpO2 95%   BMI 26.78 kg/m²   Lungs: Clear to auscultation bilaterally, respirations unlabored  Heart: Regular rate and rhythm, S1 and S2 normal, no obvious murmurs  Abdomen:         Soft, non-tender, bowel sounds normal, no masses, no organomegaly    Lab Results   Component Value Date    WBC 4.27 06/16/2023    MCV 95 06/16/2023    RDW 11.6 06/16/2023     06/16/2023    GLU 87 07/24/2023    HGBA1C 5.1 06/16/2023    BUN 4 (L) 07/24/2023     (L) 07/24/2023    K 4.7 07/24/2023    CL 97 07/24/2023        SEDATION PLAN: per " anesthesia      History reviewed, vital signs satisfactory, cardiopulmonary status satisfactory, sedation options, risks and plans have been discussed with the patient  All their questions were answered and the patient agrees to the sedation procedures as planned and the patient is deemed an appropriate candidate for the sedation as planned.    Procedure explained to patient, informed consent obtained and placed in chart.    Pebbles Spear  8/25/2023  6:54 AM

## 2023-08-25 NOTE — ANESTHESIA POSTPROCEDURE EVALUATION
Anesthesia Post Evaluation    Patient: Rosalio Muñoz    Procedure(s) Performed: Procedure(s) (LRB):  COLONOSCOPY (N/A)    Final Anesthesia Type: MAC      Patient location during evaluation: GI PACU  Patient participation: Yes- Able to Participate  Level of consciousness: awake and alert  Post-procedure vital signs: reviewed and stable  Pain management: adequate  Airway patency: patent    PONV status at discharge: No PONV  Anesthetic complications: no      Cardiovascular status: hemodynamically stable  Respiratory status: unassisted, room air and spontaneous ventilation  Hydration status: euvolemic  Follow-up not needed.          Vitals Value Taken Time   /69 08/25/23 0758   Temp 36.6 °C (97.9 °F) 08/25/23 0758   Pulse 73 08/25/23 0758   Resp 20 08/25/23 0758   SpO2 100 % 08/25/23 0758         Event Time   Out of Recovery 08:01:40         Pain/Nick Score: Nick Score: 10 (8/25/2023  7:58 AM)

## 2023-08-25 NOTE — ANESTHESIA PREPROCEDURE EVALUATION
08/25/2023  Rosalio Muñoz is a 57 y.o., male.      Pre-op Assessment    I have reviewed the Patient Summary Reports.     I have reviewed the Nursing Notes. I have reviewed the NPO Status.   I have reviewed the Medications.     Review of Systems  Hematology/Oncology:     Oncology Normal     EENT/Dental:EENT/Dental Normal   Cardiovascular:   Exercise tolerance: good Hypertension Conclusion         Normal myocardial perfusion scan. There is no evidence of myocardial ischemia or infarction.    The gated perfusion images showed an ejection fraction of 71% at rest. The gated perfusion images showed an ejection fraction of 68% post stress.    There is normal wall motion at rest and post stress.    The ECG portion of the study is negative for ischemia.      Summary    ? The left ventricle is normal in size with concentric remodeling and normal systolic function.  ? The estimated ejection fraction is 60%.  ? Normal left ventricular diastolic function.  ? Normal right ventricular size with normal right ventricular systolic function.  ? Normal central venous pressure (3 mmHg).  ? The estimated PA systolic pressure is 20 mmHg.    Normal sinus rhythm   Normal ECG   When compared with ECG of 08-JUL-2022 09:47,   No significant change was found   Confirmed by MAN MCDONOUGH MD ([id number]) on 5/30/2023 1:35:22 PM    Pulmonary:  Pulmonary Normal    Hepatic/GI:   Bowel Prep.    Musculoskeletal:  Musculoskeletal Normal    Neurological:  Neurology Normal    Endocrine:  Endocrine Normal    Psych:  Psychiatric Normal           Physical Exam  General: Cooperative, Well nourished, Alert and Oriented    Airway:  Mallampati: II   Mouth Opening: Normal  TM Distance: Normal  Tongue: Normal  Neck ROM: Normal ROM    Dental:  Intact        Anesthesia Plan  Type of Anesthesia, risks & benefits discussed:    Anesthesia Type: Gen Natural  Airway, MAC  Intra-op Monitoring Plan: Standard ASA Monitors  Post Op Pain Control Plan: IV/PO Opioids PRN  Induction:  IV  Informed Consent: Informed consent signed with the Patient and all parties understand the risks and agree with anesthesia plan.  All questions answered.   ASA Score: 2  Day of Surgery Review of History & Physical: H&P Update referred to the surgeon/provider.I have interviewed and examined the patient. I have reviewed the patient's H&P dated: There are no significant changes.     Ready For Surgery From Anesthesia Perspective.     .

## 2023-08-25 NOTE — TRANSFER OF CARE
"Anesthesia Transfer of Care Note    Patient: Rosalio Muñoz    Procedure(s) Performed: Procedure(s) (LRB):  COLONOSCOPY (N/A)    Patient location: GI    Anesthesia Type: MAC    Transport from OR: Transported from OR on room air with adequate spontaneous ventilation    Post pain: adequate analgesia    Post assessment: no apparent anesthetic complications    Post vital signs: stable    Level of consciousness: responds to stimulation    Nausea/Vomiting: no nausea/vomiting    Complications: none    Transfer of care protocol was followed      Last vitals:   Visit Vitals  /67   Pulse 74   Temp 36.8 °C (98.2 °F) (Temporal)   Resp 16   Ht 6' 1" (1.854 m)   Wt 92.1 kg (203 lb)   SpO2 95%   BMI 26.78 kg/m²     "

## 2023-08-28 LAB
FINAL PATHOLOGIC DIAGNOSIS: NORMAL
GROSS: NORMAL
Lab: NORMAL

## 2023-09-13 ENCOUNTER — OFFICE VISIT (OUTPATIENT)
Dept: CARDIOLOGY | Facility: CLINIC | Age: 58
End: 2023-09-13
Payer: COMMERCIAL

## 2023-09-13 VITALS
WEIGHT: 197.75 LBS | OXYGEN SATURATION: 98 % | DIASTOLIC BLOOD PRESSURE: 86 MMHG | HEART RATE: 65 BPM | HEIGHT: 73 IN | SYSTOLIC BLOOD PRESSURE: 138 MMHG | BODY MASS INDEX: 26.21 KG/M2

## 2023-09-13 DIAGNOSIS — Z72.0 TOBACCO ABUSE: ICD-10-CM

## 2023-09-13 DIAGNOSIS — E78.49 OTHER HYPERLIPIDEMIA: ICD-10-CM

## 2023-09-13 DIAGNOSIS — I10 PRIMARY HYPERTENSION: Primary | ICD-10-CM

## 2023-09-13 PROCEDURE — 3075F PR MOST RECENT SYSTOLIC BLOOD PRESS GE 130-139MM HG: ICD-10-PCS | Mod: CPTII,S$GLB,,

## 2023-09-13 PROCEDURE — 3008F PR BODY MASS INDEX (BMI) DOCUMENTED: ICD-10-PCS | Mod: CPTII,S$GLB,,

## 2023-09-13 PROCEDURE — 99999 PR PBB SHADOW E&M-EST. PATIENT-LVL III: ICD-10-PCS | Mod: PBBFAC,,,

## 2023-09-13 PROCEDURE — 3075F SYST BP GE 130 - 139MM HG: CPT | Mod: CPTII,S$GLB,,

## 2023-09-13 PROCEDURE — 3044F HG A1C LEVEL LT 7.0%: CPT | Mod: CPTII,S$GLB,,

## 2023-09-13 PROCEDURE — 1160F PR REVIEW ALL MEDS BY PRESCRIBER/CLIN PHARMACIST DOCUMENTED: ICD-10-PCS | Mod: CPTII,S$GLB,,

## 2023-09-13 PROCEDURE — 3079F DIAST BP 80-89 MM HG: CPT | Mod: CPTII,S$GLB,,

## 2023-09-13 PROCEDURE — 1159F MED LIST DOCD IN RCRD: CPT | Mod: CPTII,S$GLB,,

## 2023-09-13 PROCEDURE — 4010F ACE/ARB THERAPY RXD/TAKEN: CPT | Mod: CPTII,S$GLB,,

## 2023-09-13 PROCEDURE — 99999 PR PBB SHADOW E&M-EST. PATIENT-LVL III: CPT | Mod: PBBFAC,,,

## 2023-09-13 PROCEDURE — 1159F PR MEDICATION LIST DOCUMENTED IN MEDICAL RECORD: ICD-10-PCS | Mod: CPTII,S$GLB,,

## 2023-09-13 PROCEDURE — 4010F PR ACE/ARB THEARPY RXD/TAKEN: ICD-10-PCS | Mod: CPTII,S$GLB,,

## 2023-09-13 PROCEDURE — 99214 OFFICE O/P EST MOD 30 MIN: CPT | Mod: S$GLB,,,

## 2023-09-13 PROCEDURE — 3079F PR MOST RECENT DIASTOLIC BLOOD PRESSURE 80-89 MM HG: ICD-10-PCS | Mod: CPTII,S$GLB,,

## 2023-09-13 PROCEDURE — 1160F RVW MEDS BY RX/DR IN RCRD: CPT | Mod: CPTII,S$GLB,,

## 2023-09-13 PROCEDURE — 99214 PR OFFICE/OUTPT VISIT, EST, LEVL IV, 30-39 MIN: ICD-10-PCS | Mod: S$GLB,,,

## 2023-09-13 PROCEDURE — 3008F BODY MASS INDEX DOCD: CPT | Mod: CPTII,S$GLB,,

## 2023-09-13 PROCEDURE — 3044F PR MOST RECENT HEMOGLOBIN A1C LEVEL <7.0%: ICD-10-PCS | Mod: CPTII,S$GLB,,

## 2023-09-13 NOTE — PROGRESS NOTES
Subjective:   Patient ID:  Rosalio Muñoz is a 57 y.o. male who presents for evaluation of Follow-up      Follow-up    Mr. Muñoz is a 57-year-old  male with PMH significant for HTN, former tobacco user, hyperlipidemia here for HFU. Pt was seen by cardiology at [facility] for HTN, he was started on amlodipine, coreg and losartan. Here today, home BP 1teens-130s/70s. Currently taking amlodipine every other day, will try 5mg daily. (10mg dropped sys to 80s and felt bad)    No longer smoking cigarettes nor nape  Smokes cigarillo in am  Drinks beer daily 3-4      23  Here today for CV folow up. Home -130s/70-80s. Compliant with medications, drink 4-5 beers daily. Denies any CP, angina or anginal equivalent at this time Denies any LUKE, tried to watch his salt intake    Past Medical History:   Diagnosis Date    Hypertension        Past Surgical History:   Procedure Laterality Date    COLONOSCOPY N/A 2023    Procedure: COLONOSCOPY;  Surgeon: Pebbles Spear MD;  Location: [facility] ENDO;  Service: Endoscopy;  Laterality: N/A;    WISDOM TOOTH EXTRACTION         Social History     Tobacco Use    Smoking status: Former     Current packs/day: 0.00     Types: Cigars, Cigarettes     Quit date: 10/4/2022     Years since quittin.9     Passive exposure: Never    Smokeless tobacco: Current   Substance Use Topics    Alcohol use: Yes     Alcohol/week: 4.0 - 6.0 standard drinks of alcohol     Types: 4 - 6 Cans of beer per week    Drug use: No       Family History   Problem Relation Age of Onset    Hyperlipidemia Mother     Hypertension Father     Diabetes Father     Kidney disease Father     Heart disease Father     Prostate cancer Maternal Grandfather     Colon cancer Neg Hx        Current Outpatient Medications on File Prior to Visit   Medication Sig Dispense Refill    amLODIPine (NORVASC) 5 MG tablet Take 1 tablet (5 mg total) by mouth once daily. 30 tablet 11    aspirin (ECOTRIN) 81 MG EC  tablet Take 1 tablet (81 mg total) by mouth once daily. 30 tablet 11    atorvastatin (LIPITOR) 20 MG tablet Take 1 tablet (20 mg total) by mouth every evening. 30 tablet 11    carvediloL (COREG) 25 MG tablet Take 1 tablet (25 mg total) by mouth 2 (two) times daily. 60 tablet 11    losartan (COZAAR) 100 MG tablet Take 1 tablet (100 mg total) by mouth once daily. 30 tablet 11     No current facility-administered medications on file prior to visit.      Wt Readings from Last 3 Encounters:   09/13/23 89.7 kg (197 lb 12 oz)   08/25/23 92.1 kg (203 lb)   07/31/23 92.5 kg (203 lb 14.8 oz)     Temp Readings from Last 3 Encounters:   08/25/23 97.9 °F (36.6 °C) (Temporal)   07/31/23 98.8 °F (37.1 °C) (Tympanic)   06/26/23 98.1 °F (36.7 °C) (Tympanic)     BP Readings from Last 3 Encounters:   09/13/23 138/86   08/25/23 128/69   07/31/23 134/78     Pulse Readings from Last 3 Encounters:   09/13/23 65   08/25/23 73   07/31/23 75        Review of Systems   Constitutional: Negative.   HENT: Negative.     Eyes: Negative.    Cardiovascular: Negative.    Respiratory: Negative.     Skin: Negative.    Musculoskeletal: Negative.    Gastrointestinal: Negative.    Genitourinary: Negative.    Neurological: Negative.    Psychiatric/Behavioral: Negative.         Objective:   Physical Exam  Vitals and nursing note reviewed.   Constitutional:       Appearance: Normal appearance.   HENT:      Head: Normocephalic and atraumatic.   Eyes:      General:         Right eye: No discharge.         Left eye: No discharge.      Pupils: Pupils are equal, round, and reactive to light.   Cardiovascular:      Rate and Rhythm: Normal rate and regular rhythm.      Heart sounds: S1 normal and S2 normal. No murmur heard.     No friction rub.   Pulmonary:      Effort: Pulmonary effort is normal. No respiratory distress.      Breath sounds: Normal breath sounds. No rales.   Abdominal:      Palpations: Abdomen is soft.      Tenderness: There is no abdominal  "tenderness.   Musculoskeletal:      Cervical back: Neck supple.      Right lower leg: No edema.      Left lower leg: No edema.   Skin:     General: Skin is warm and dry.   Neurological:      General: No focal deficit present.      Mental Status: He is alert and oriented to person, place, and time.   Psychiatric:         Mood and Affect: Mood normal.         Behavior: Behavior normal.         Thought Content: Thought content normal.         Lab Results   Component Value Date    CHOL 146 06/16/2023    CHOL 196 06/14/2022    CHOL 200 (H) 12/08/2020     Lab Results   Component Value Date    HDL 69 06/16/2023    HDL 78 (H) 06/14/2022    HDL 77 (H) 12/08/2020     Lab Results   Component Value Date    LDLCALC 65.4 06/16/2023    LDLCALC 99.6 06/14/2022    LDLCALC 108.6 12/08/2020     Lab Results   Component Value Date    TRIG 58 06/16/2023    TRIG 92 06/14/2022    TRIG 72 12/08/2020     Lab Results   Component Value Date    CHOLHDL 47.3 06/16/2023    CHOLHDL 39.8 06/14/2022    CHOLHDL 38.5 12/08/2020       Chemistry        Component Value Date/Time     (L) 07/24/2023 1202    K 4.7 07/24/2023 1202    CL 97 07/24/2023 1202    CO2 22 (L) 07/24/2023 1202    BUN 4 (L) 07/24/2023 1202    CREATININE 0.8 07/24/2023 1202    GLU 87 07/24/2023 1202        Component Value Date/Time    CALCIUM 8.9 07/24/2023 1202    ALKPHOS 79 06/16/2023 0908    AST 24 06/16/2023 0908    ALT 27 06/16/2023 0908    BILITOT 1.3 (H) 06/16/2023 0908    ESTGFRAFRICA >60.0 07/08/2022 0928    EGFRNONAA >60.0 07/08/2022 0928          Lab Results   Component Value Date    TSH 2.420 06/16/2023     No results found for: "INR", "PROTIME"  @RESUFAST(WBC,HGB,HCT,MCV,PLT)  @LABRCNTIP(BNP,BNPTRIAGEBLO)@  CrCl cannot be calculated (Patient's most recent lab result is older than the maximum 7 days allowed.).     Results for orders placed during the hospital encounter of 05/29/23    Echo    Interpretation Summary  · The left ventricle is normal in size with " concentric remodeling and normal systolic function.  · The estimated ejection fraction is 60%.  · Normal left ventricular diastolic function.  · Normal right ventricular size with normal right ventricular systolic function.  · Normal central venous pressure (3 mmHg).  · The estimated PA systolic pressure is 20 mmHg.     Results for orders placed during the hospital encounter of 05/29/23    Nuclear Stress - Cardiology Interpreted    Interpretation Summary    Normal myocardial perfusion scan. There is no evidence of myocardial ischemia or infarction.    The gated perfusion images showed an ejection fraction of 71% at rest. The gated perfusion images showed an ejection fraction of 68% post stress.    There is normal wall motion at rest and post stress.    The ECG portion of the study is negative for ischemia.     Assessment:      1. Primary hypertension    2. Other hyperlipidemia    3. Tobacco abuse          Plan:   Primary hypertension    Other hyperlipidemia    Tobacco abuse        Cont amlodipine, coreg, losartan- HTN  Smoking cessation, no longer smoking  Stop drinking  RF modification, low Na diet, daily exercise as tolerated    RTC 6 mos with MD Maryjo Rosas, FNP-C [facility], Cardiology

## 2023-10-26 ENCOUNTER — LAB VISIT (OUTPATIENT)
Dept: LAB | Facility: HOSPITAL | Age: 58
End: 2023-10-26
Attending: FAMILY MEDICINE
Payer: COMMERCIAL

## 2023-10-26 DIAGNOSIS — E87.1 HYPONATREMIA: ICD-10-CM

## 2023-10-26 PROCEDURE — 80048 BASIC METABOLIC PNL TOTAL CA: CPT | Performed by: FAMILY MEDICINE

## 2023-10-26 PROCEDURE — 36415 COLL VENOUS BLD VENIPUNCTURE: CPT | Mod: PO | Performed by: FAMILY MEDICINE

## 2023-10-27 LAB
ANION GAP SERPL CALC-SCNC: 10 MMOL/L (ref 8–16)
BUN SERPL-MCNC: 7 MG/DL (ref 6–20)
CALCIUM SERPL-MCNC: 9.1 MG/DL (ref 8.7–10.5)
CHLORIDE SERPL-SCNC: 96 MMOL/L (ref 95–110)
CO2 SERPL-SCNC: 23 MMOL/L (ref 23–29)
CREAT SERPL-MCNC: 0.8 MG/DL (ref 0.5–1.4)
EST. GFR  (NO RACE VARIABLE): >60 ML/MIN/1.73 M^2
GLUCOSE SERPL-MCNC: 92 MG/DL (ref 70–110)
POTASSIUM SERPL-SCNC: 3.9 MMOL/L (ref 3.5–5.1)
SODIUM SERPL-SCNC: 129 MMOL/L (ref 136–145)

## 2023-11-20 ENCOUNTER — OFFICE VISIT (OUTPATIENT)
Dept: OTOLARYNGOLOGY | Facility: CLINIC | Age: 58
End: 2023-11-20
Payer: COMMERCIAL

## 2023-11-20 VITALS — BODY MASS INDEX: 25.3 KG/M2 | WEIGHT: 191.81 LBS

## 2023-11-20 DIAGNOSIS — R22.0 FACIAL MASS: Primary | ICD-10-CM

## 2023-11-20 PROCEDURE — 99999 PR PBB SHADOW E&M-EST. PATIENT-LVL IV: CPT | Mod: PBBFAC,,, | Performed by: OTOLARYNGOLOGY

## 2023-11-20 PROCEDURE — 99999 PR PBB SHADOW E&M-EST. PATIENT-LVL IV: ICD-10-PCS | Mod: PBBFAC,,, | Performed by: OTOLARYNGOLOGY

## 2023-11-20 PROCEDURE — 1160F PR REVIEW ALL MEDS BY PRESCRIBER/CLIN PHARMACIST DOCUMENTED: ICD-10-PCS | Mod: CPTII,S$GLB,, | Performed by: OTOLARYNGOLOGY

## 2023-11-20 PROCEDURE — 3008F PR BODY MASS INDEX (BMI) DOCUMENTED: ICD-10-PCS | Mod: CPTII,S$GLB,, | Performed by: OTOLARYNGOLOGY

## 2023-11-20 PROCEDURE — 3008F BODY MASS INDEX DOCD: CPT | Mod: CPTII,S$GLB,, | Performed by: OTOLARYNGOLOGY

## 2023-11-20 PROCEDURE — 99214 OFFICE O/P EST MOD 30 MIN: CPT | Mod: S$GLB,,, | Performed by: OTOLARYNGOLOGY

## 2023-11-20 PROCEDURE — 4010F PR ACE/ARB THEARPY RXD/TAKEN: ICD-10-PCS | Mod: CPTII,S$GLB,, | Performed by: OTOLARYNGOLOGY

## 2023-11-20 PROCEDURE — 3044F PR MOST RECENT HEMOGLOBIN A1C LEVEL <7.0%: ICD-10-PCS | Mod: CPTII,S$GLB,, | Performed by: OTOLARYNGOLOGY

## 2023-11-20 PROCEDURE — 99214 PR OFFICE/OUTPT VISIT, EST, LEVL IV, 30-39 MIN: ICD-10-PCS | Mod: S$GLB,,, | Performed by: OTOLARYNGOLOGY

## 2023-11-20 PROCEDURE — 4010F ACE/ARB THERAPY RXD/TAKEN: CPT | Mod: CPTII,S$GLB,, | Performed by: OTOLARYNGOLOGY

## 2023-11-20 PROCEDURE — 1159F PR MEDICATION LIST DOCUMENTED IN MEDICAL RECORD: ICD-10-PCS | Mod: CPTII,S$GLB,, | Performed by: OTOLARYNGOLOGY

## 2023-11-20 PROCEDURE — 3044F HG A1C LEVEL LT 7.0%: CPT | Mod: CPTII,S$GLB,, | Performed by: OTOLARYNGOLOGY

## 2023-11-20 PROCEDURE — 1159F MED LIST DOCD IN RCRD: CPT | Mod: CPTII,S$GLB,, | Performed by: OTOLARYNGOLOGY

## 2023-11-20 PROCEDURE — 1160F RVW MEDS BY RX/DR IN RCRD: CPT | Mod: CPTII,S$GLB,, | Performed by: OTOLARYNGOLOGY

## 2023-11-20 NOTE — PROGRESS NOTES
"Referring Provider:    No referring provider defined for this encounter.  Subjective:   Patient: Rosalio Muñoz [id number], :1965   Visit date:2023 9:10 AM    Chief Complaint:  Cyst (Pt has a cyst on face and states that it has been there for about 6 years now )    HPI:    Prior notes reviewed by myself.  Clinical documentation obtained by nursing staff reviewed.     56 y/o gentleman here for evaluation of cyst on right cheek.  He  states that this cyst has been steadily enlarging and has been there for at least 6 years.  Minor discomfort with the cyst and states that it will occasionally drain "white stuff."  He is interested in having it removed.      Objective:     Physical Exam:  Vitals:  Wt 87 kg (191 lb 12.8 oz)   BMI 25.30 kg/m²   General appearance:  Well developed, well nourished    Ears:  Otoscopy of external auditory canals and tympanic membranes was normal, clinical speech reception thresholds grossly intact, no mass/lesion of auricle.    Nose:  No masses/lesions of external nose, nasal mucosa, septum, and turbinates were within normal limits.    Mouth:  No mass/lesion of lips, teeth, gums, hard/soft palate, tongue, tonsils, or oropharynx.    Neck & Lymphatics:  No cervical lymphadenopathy, no neck mass/crepitus/ asymmetry, trachea is midline, no thyroid enlargement/tenderness/mass. Cystic lesion subcutaneous right cheek, 3 cm.         [x]  Data Reviewed:    Lab Results   Component Value Date    WBC 4.27 2023    HGB 15.5 2023    HCT 44.5 2023    MCV 95 2023    EOSINOPHIL 2.1 2023                 Assessment & Plan:   Facial mass  -     Case Request Operating Room: EXCISION, LESION, FACE        We discussed the risks, benefits and alternatives to the procedure and he would like to proceed on     "

## 2023-11-20 NOTE — H&P (VIEW-ONLY)
"Referring Provider:    No referring provider defined for this encounter.  Subjective:   Patient: Rosalio Muñoz [id number], :1965   Visit date:2023 9:10 AM    Chief Complaint:  Cyst (Pt has a cyst on face and states that it has been there for about 6 years now )    HPI:    Prior notes reviewed by myself.  Clinical documentation obtained by nursing staff reviewed.     58 y/o gentleman here for evaluation of cyst on right cheek.  He  states that this cyst has been steadily enlarging and has been there for at least 6 years.  Minor discomfort with the cyst and states that it will occasionally drain "white stuff."  He is interested in having it removed.      Objective:     Physical Exam:  Vitals:  Wt 87 kg (191 lb 12.8 oz)   BMI 25.30 kg/m²   General appearance:  Well developed, well nourished    Ears:  Otoscopy of external auditory canals and tympanic membranes was normal, clinical speech reception thresholds grossly intact, no mass/lesion of auricle.    Nose:  No masses/lesions of external nose, nasal mucosa, septum, and turbinates were within normal limits.    Mouth:  No mass/lesion of lips, teeth, gums, hard/soft palate, tongue, tonsils, or oropharynx.    Neck & Lymphatics:  No cervical lymphadenopathy, no neck mass/crepitus/ asymmetry, trachea is midline, no thyroid enlargement/tenderness/mass. Cystic lesion subcutaneous right cheek, 3 cm.         [x]  Data Reviewed:    Lab Results   Component Value Date    WBC 4.27 2023    HGB 15.5 2023    HCT 44.5 2023    MCV 95 2023    EOSINOPHIL 2.1 2023                 Assessment & Plan:   Facial mass  -     Case Request Operating Room: EXCISION, LESION, FACE        We discussed the risks, benefits and alternatives to the procedure and he would like to proceed on     "

## 2023-11-22 ENCOUNTER — PATIENT MESSAGE (OUTPATIENT)
Dept: PREADMISSION TESTING | Facility: HOSPITAL | Age: 58
End: 2023-11-22
Payer: COMMERCIAL

## 2023-11-27 ENCOUNTER — HOSPITAL ENCOUNTER (OUTPATIENT)
Dept: PREADMISSION TESTING | Facility: HOSPITAL | Age: 58
Discharge: HOME | End: 2023-11-27
Attending: PHYSICIAN ASSISTANT
Payer: COMMERCIAL

## 2023-11-27 DIAGNOSIS — Z01.818 PREOP TESTING: ICD-10-CM

## 2023-11-28 ENCOUNTER — TELEPHONE (OUTPATIENT)
Dept: CARDIOLOGY | Facility: CLINIC | Age: 58
End: 2023-11-28
Payer: COMMERCIAL

## 2023-11-28 ENCOUNTER — ANESTHESIA EVENT (OUTPATIENT)
Dept: SURGERY | Facility: HOSPITAL | Age: 58
End: 2023-11-28
Payer: COMMERCIAL

## 2023-11-28 NOTE — TELEPHONE ENCOUNTER
Notified the patient of the results          Labs overall stable cont current medications, potassium mildly elevated but kidney function looks good, limit potassium rich foods

## 2023-11-28 NOTE — TELEPHONE ENCOUNTER
----- Message from Maryjo Rosas NP sent at 11/28/2023  9:45 AM CST -----  Labs overall stable cont current medications, potassium mildly elevated but kidney function looks good, limit potassium rich foods

## 2023-11-28 NOTE — ANESTHESIA PREPROCEDURE EVALUATION
11/28/2023  Rosalio Muñoz is a 57 y.o., male.  Past Medical History:   Diagnosis Date    Hypertension      Past Surgical History:   Procedure Laterality Date    COLONOSCOPY N/A 8/25/2023    Procedure: COLONOSCOPY;  Surgeon: Pebbles Spear MD;  Location: [facility];  Service: Endoscopy;  Laterality: N/A;    WISDOM TOOTH EXTRACTION           Pre-op Assessment    I have reviewed the Patient Summary Reports.    I have reviewed the NPO Status.   I have reviewed the Medications.     Review of Systems  Anesthesia Hx:  No problems with previous Anesthesia   History of prior surgery of interest to airway management or planning:  Previous anesthesia: MAC        Denies Family Hx of Anesthesia complications.    Denies Personal Hx of Anesthesia complications.                    Social:  Smoker       Cardiovascular:     Hypertension           hyperlipidemia                             Pulmonary:  Pulmonary Normal                       Hepatic/GI:  Hepatic/GI Normal                 Endocrine:  Endocrine Normal                Physical Exam  General: Alert and Oriented    Airway:  Mallampati: II   Mouth Opening: Normal  TM Distance: Normal  Tongue: Normal  Neck ROM: Normal ROM    Dental:  Dentures    Chest/Lungs:  Clear to auscultation, Normal Respiratory Rate    Heart:  Rate: Normal  Rhythm: Regular Rhythm        Anesthesia Plan  Type of Anesthesia, risks & benefits discussed:    Anesthesia Type: Gen Supraglottic Airway  Intra-op Monitoring Plan: Standard ASA Monitors  Post Op Pain Control Plan: multimodal analgesia and IV/PO Opioids PRN  Induction:  IV  Informed Consent: Informed consent signed with the Patient and all parties understand the risks and agree with anesthesia plan.  All questions answered. Patient consented to blood products? No  ASA Score: 2  Day of Surgery Review of History & Physical: H&P Update  referred to the surgeon/provider.    Ready For Surgery From Anesthesia Perspective.     .

## 2023-11-30 ENCOUNTER — TELEPHONE (OUTPATIENT)
Dept: PREADMISSION TESTING | Facility: HOSPITAL | Age: 58
End: 2023-11-30
Payer: COMMERCIAL

## 2023-12-01 ENCOUNTER — ANESTHESIA (OUTPATIENT)
Dept: SURGERY | Facility: HOSPITAL | Age: 58
End: 2023-12-01
Payer: COMMERCIAL

## 2023-12-01 ENCOUNTER — HOSPITAL ENCOUNTER (OUTPATIENT)
Facility: HOSPITAL | Age: 58
Discharge: HOME | End: 2023-12-01
Attending: OTOLARYNGOLOGY | Admitting: OTOLARYNGOLOGY
Payer: COMMERCIAL

## 2023-12-01 VITALS
SYSTOLIC BLOOD PRESSURE: 142 MMHG | BODY MASS INDEX: 25.54 KG/M2 | HEART RATE: 81 BPM | OXYGEN SATURATION: 97 % | HEIGHT: 73 IN | WEIGHT: 192.69 LBS | RESPIRATION RATE: 20 BRPM | DIASTOLIC BLOOD PRESSURE: 90 MMHG | TEMPERATURE: 98 F

## 2023-12-01 DIAGNOSIS — Z01.818 PREOP TESTING: Primary | ICD-10-CM

## 2023-12-01 DIAGNOSIS — R22.0 FACIAL MASS: ICD-10-CM

## 2023-12-01 PROCEDURE — 88304 PR  SURG PATH,LEVEL III: ICD-10-PCS | Mod: 26,,, | Performed by: PATHOLOGY

## 2023-12-01 PROCEDURE — 36000706: Performed by: OTOLARYNGOLOGY

## 2023-12-01 PROCEDURE — D9220A PRA ANESTHESIA: ICD-10-PCS | Mod: ,,, | Performed by: NURSE ANESTHETIST, CERTIFIED REGISTERED

## 2023-12-01 PROCEDURE — 37000008 HC ANESTHESIA 1ST 15 MINUTES: Performed by: OTOLARYNGOLOGY

## 2023-12-01 PROCEDURE — 12052 INTMD RPR FACE/MM 2.6-5.0 CM: CPT | Mod: ,,, | Performed by: OTOLARYNGOLOGY

## 2023-12-01 PROCEDURE — 11443 PR EXC SKIN BENIG 2.1-3 CM FACE,FACIAL: ICD-10-PCS | Mod: 51,,, | Performed by: OTOLARYNGOLOGY

## 2023-12-01 PROCEDURE — 11443 EXC FACE-MM B9+MARG 2.1-3 CM: CPT | Mod: 51,,, | Performed by: OTOLARYNGOLOGY

## 2023-12-01 PROCEDURE — 36000707: Performed by: OTOLARYNGOLOGY

## 2023-12-01 PROCEDURE — 37000009 HC ANESTHESIA EA ADD 15 MINS: Performed by: OTOLARYNGOLOGY

## 2023-12-01 PROCEDURE — 63600175 PHARM REV CODE 636 W HCPCS: Performed by: OTOLARYNGOLOGY

## 2023-12-01 PROCEDURE — D9220A PRA ANESTHESIA: Mod: ,,, | Performed by: NURSE ANESTHETIST, CERTIFIED REGISTERED

## 2023-12-01 PROCEDURE — 71000015 HC POSTOP RECOV 1ST HR: Performed by: OTOLARYNGOLOGY

## 2023-12-01 PROCEDURE — 25000003 PHARM REV CODE 250: Performed by: NURSE ANESTHETIST, CERTIFIED REGISTERED

## 2023-12-01 PROCEDURE — 71000033 HC RECOVERY, INTIAL HOUR: Performed by: OTOLARYNGOLOGY

## 2023-12-01 PROCEDURE — 88304 TISSUE EXAM BY PATHOLOGIST: CPT | Mod: 26,,, | Performed by: PATHOLOGY

## 2023-12-01 PROCEDURE — 12052 PR INTERMED WOUND REPAIR FACE/EAR/EYELID/NOSE/LIP/MUC MEBR, 2.6 TO 5.0CM: ICD-10-PCS | Mod: ,,, | Performed by: OTOLARYNGOLOGY

## 2023-12-01 PROCEDURE — 88307 TISSUE EXAM BY PATHOLOGIST: CPT | Performed by: PATHOLOGY

## 2023-12-01 PROCEDURE — 63600175 PHARM REV CODE 636 W HCPCS: Performed by: NURSE ANESTHETIST, CERTIFIED REGISTERED

## 2023-12-01 PROCEDURE — 88304 TISSUE EXAM BY PATHOLOGIST: CPT | Performed by: PATHOLOGY

## 2023-12-01 RX ORDER — CEFAZOLIN SODIUM 1 G/3ML
INJECTION, POWDER, FOR SOLUTION INTRAMUSCULAR; INTRAVENOUS
Status: DISCONTINUED | OUTPATIENT
Start: 2023-12-01 | End: 2023-12-01

## 2023-12-01 RX ORDER — HYDROCODONE BITARTRATE AND ACETAMINOPHEN 7.5; 325 MG/1; MG/1
1 TABLET ORAL EVERY 6 HOURS PRN
Qty: 12 TABLET | Refills: 0 | Status: SHIPPED | OUTPATIENT
Start: 2023-12-01 | End: 2023-12-11

## 2023-12-01 RX ORDER — PHENYLEPHRINE HYDROCHLORIDE 10 MG/ML
INJECTION INTRAVENOUS
Status: DISCONTINUED | OUTPATIENT
Start: 2023-12-01 | End: 2023-12-01

## 2023-12-01 RX ORDER — DIPHENHYDRAMINE HYDROCHLORIDE 50 MG/ML
25 INJECTION INTRAMUSCULAR; INTRAVENOUS EVERY 6 HOURS PRN
Status: DISCONTINUED | OUTPATIENT
Start: 2023-12-01 | End: 2023-12-01 | Stop reason: HOSPADM

## 2023-12-01 RX ORDER — DEXAMETHASONE SODIUM PHOSPHATE 4 MG/ML
INJECTION, SOLUTION INTRA-ARTICULAR; INTRALESIONAL; INTRAMUSCULAR; INTRAVENOUS; SOFT TISSUE
Status: DISCONTINUED | OUTPATIENT
Start: 2023-12-01 | End: 2023-12-01

## 2023-12-01 RX ORDER — BACITRACIN ZINC 500 UNIT/G
OINTMENT (GRAM) TOPICAL
Status: DISCONTINUED
Start: 2023-12-01 | End: 2023-12-01 | Stop reason: HOSPADM

## 2023-12-01 RX ORDER — SODIUM CHLORIDE, SODIUM LACTATE, POTASSIUM CHLORIDE, CALCIUM CHLORIDE 600; 310; 30; 20 MG/100ML; MG/100ML; MG/100ML; MG/100ML
INJECTION, SOLUTION INTRAVENOUS CONTINUOUS
Status: DISCONTINUED | OUTPATIENT
Start: 2023-12-01 | End: 2023-12-01 | Stop reason: HOSPADM

## 2023-12-01 RX ORDER — CEPHALEXIN 500 MG/1
500 CAPSULE ORAL EVERY 8 HOURS
Qty: 21 CAPSULE | Refills: 0 | Status: SHIPPED | OUTPATIENT
Start: 2023-12-01 | End: 2023-12-08

## 2023-12-01 RX ORDER — FENTANYL CITRATE 50 UG/ML
25 INJECTION, SOLUTION INTRAMUSCULAR; INTRAVENOUS EVERY 5 MIN PRN
Status: DISCONTINUED | OUTPATIENT
Start: 2023-12-01 | End: 2023-12-01 | Stop reason: HOSPADM

## 2023-12-01 RX ORDER — HYDROCODONE BITARTRATE AND ACETAMINOPHEN 5; 325 MG/1; MG/1
1 TABLET ORAL
Status: DISCONTINUED | OUTPATIENT
Start: 2023-12-01 | End: 2023-12-01 | Stop reason: HOSPADM

## 2023-12-01 RX ORDER — LIDOCAINE HYDROCHLORIDE AND EPINEPHRINE 10; 10 MG/ML; UG/ML
INJECTION, SOLUTION INFILTRATION; PERINEURAL
Status: DISCONTINUED | OUTPATIENT
Start: 2023-12-01 | End: 2023-12-01 | Stop reason: HOSPADM

## 2023-12-01 RX ORDER — ONDANSETRON 2 MG/ML
4 INJECTION INTRAMUSCULAR; INTRAVENOUS ONCE AS NEEDED
Status: DISCONTINUED | OUTPATIENT
Start: 2023-12-01 | End: 2023-12-01 | Stop reason: HOSPADM

## 2023-12-01 RX ORDER — PROPOFOL 10 MG/ML
VIAL (ML) INTRAVENOUS
Status: DISCONTINUED | OUTPATIENT
Start: 2023-12-01 | End: 2023-12-01

## 2023-12-01 RX ORDER — ONDANSETRON HYDROCHLORIDE 2 MG/ML
INJECTION, SOLUTION INTRAMUSCULAR; INTRAVENOUS
Status: DISCONTINUED | OUTPATIENT
Start: 2023-12-01 | End: 2023-12-01

## 2023-12-01 RX ORDER — FENTANYL CITRATE 50 UG/ML
INJECTION, SOLUTION INTRAMUSCULAR; INTRAVENOUS
Status: DISCONTINUED | OUTPATIENT
Start: 2023-12-01 | End: 2023-12-01

## 2023-12-01 RX ORDER — LIDOCAINE HYDROCHLORIDE 20 MG/ML
INJECTION, SOLUTION EPIDURAL; INFILTRATION; INTRACAUDAL; PERINEURAL
Status: DISCONTINUED | OUTPATIENT
Start: 2023-12-01 | End: 2023-12-01

## 2023-12-01 RX ORDER — MIDAZOLAM HYDROCHLORIDE 1 MG/ML
INJECTION INTRAMUSCULAR; INTRAVENOUS
Status: DISCONTINUED | OUTPATIENT
Start: 2023-12-01 | End: 2023-12-01

## 2023-12-01 RX ADMIN — MIDAZOLAM HYDROCHLORIDE 2 MG: 1 INJECTION INTRAMUSCULAR; INTRAVENOUS at 09:12

## 2023-12-01 RX ADMIN — SODIUM CHLORIDE, POTASSIUM CHLORIDE, SODIUM LACTATE AND CALCIUM CHLORIDE: 600; 310; 30; 20 INJECTION, SOLUTION INTRAVENOUS at 08:12

## 2023-12-01 RX ADMIN — PHENYLEPHRINE HYDROCHLORIDE 100 MCG: 10 INJECTION INTRAVENOUS at 10:12

## 2023-12-01 RX ADMIN — LIDOCAINE HYDROCHLORIDE 40 MG: 20 INJECTION, SOLUTION EPIDURAL; INFILTRATION; INTRACAUDAL; PERINEURAL at 09:12

## 2023-12-01 RX ADMIN — PHENYLEPHRINE HYDROCHLORIDE 50 MCG: 10 INJECTION INTRAVENOUS at 10:12

## 2023-12-01 RX ADMIN — DEXAMETHASONE SODIUM PHOSPHATE 4 MG: 4 INJECTION, SOLUTION INTRA-ARTICULAR; INTRALESIONAL; INTRAMUSCULAR; INTRAVENOUS; SOFT TISSUE at 10:12

## 2023-12-01 RX ADMIN — CEFAZOLIN 2 G: 330 INJECTION, POWDER, FOR SOLUTION INTRAMUSCULAR; INTRAVENOUS at 10:12

## 2023-12-01 RX ADMIN — ONDANSETRON HYDROCHLORIDE 4 MG: 2 INJECTION, SOLUTION INTRAMUSCULAR; INTRAVENOUS at 10:12

## 2023-12-01 RX ADMIN — FENTANYL CITRATE 50 MCG: 0.05 INJECTION, SOLUTION INTRAMUSCULAR; INTRAVENOUS at 09:12

## 2023-12-01 RX ADMIN — PROPOFOL 150 MG: 10 INJECTION, EMULSION INTRAVENOUS at 09:12

## 2023-12-01 NOTE — TRANSFER OF CARE
"Anesthesia Transfer of Care Note    Patient: Rosalio Muñoz    Procedure(s) Performed: Procedure(s) (LRB):  EXCISION, LESION, FACE (Right)    Patient location: PACU    Anesthesia Type: general    Transport from OR: Transported from OR on room air with adequate spontaneous ventilation    Post pain: adequate analgesia    Post assessment: no apparent anesthetic complications    Post vital signs: stable    Level of consciousness: awake    Nausea/Vomiting: no nausea/vomiting    Complications: none    Transfer of care protocol was followed      Last vitals: Visit Vitals  /75 (BP Location: Right arm, Patient Position: Lying)   Pulse 82   Temp 36.4 °C (97.6 °F) (Temporal)   Resp 17   Ht 6' 1" (1.854 m)   Wt 87.4 kg (192 lb 10.9 oz)   SpO2 96%   BMI 25.42 kg/m²     "

## 2023-12-01 NOTE — BRIEF OP NOTE
[facility]  Brief Operative Note     SUMMARY     Surgery Date: 12/1/2023     Surgeon(s) and Role:     * Jose Flaherty MD - Primary    Assisting Surgeon: None    Pre-op Diagnosis:  Facial mass [R22.0]    Post-op Diagnosis:  Post-Op Diagnosis Codes:     * Facial mass [R22.0]    Procedure(s) (LRB):  EXCISION, LESION, FACE (Right)    Anesthesia: General    Findings/Key Components:  3 cm right facial mass consistent with EIC    Estimated Blood Loss: minimal         Specimens:   Specimen (24h ago, onward)       Start     Ordered    12/01/23 1021  Specimen to Pathology, Surgery ENT  Once        Comments: Pre-op Diagnosis: Facial mass [R22.0]Procedure(s):EXCISION, LESION, FACE Number of specimens: 1Name of specimens: 1. Right facial mass - PERM     References:    Click here for ordering Quick Tip   Question Answer Comment   Procedure Type: ENT    Specimen Class: Routine/Screening    Which provider would you like to cc? JOSE FLAHERTY    Release to patient Immediate        12/01/23 1020                    Discharge Note    SUMMARY     Admit Date: 12/1/2023    Discharge Date and Time: No discharge date for patient encounter.    Attending Physician: Jose Flaherty MD     Discharge Provider: Jose Flaherty    Final Diagnosis: Post-Op Diagnosis Codes:     * Facial mass [R22.0]    Disposition: Home or Self Care, discharged in good condition    Follow Up/Patient Instructions:       Medications:  Reconciled Home Medications:   Current Discharge Medication List        CONTINUE these medications which have NOT CHANGED    Details   amLODIPine (NORVASC) 5 MG tablet Take 1 tablet (5 mg total) by mouth once daily.  Qty: 30 tablet, Refills: 11    Comments: .  Associated Diagnoses: Primary hypertension      aspirin (ECOTRIN) 81 MG EC tablet Take 1 tablet (81 mg total) by mouth once daily.  Qty: 30 tablet, Refills: 11    Associated Diagnoses: Primary hypertension      atorvastatin (LIPITOR) 20 MG tablet Take 1 tablet (20 mg total)  by mouth every evening.  Qty: 30 tablet, Refills: 11    Associated Diagnoses: Other hyperlipidemia      carvediloL (COREG) 25 MG tablet Take 1 tablet (25 mg total) by mouth 2 (two) times daily.  Qty: 60 tablet, Refills: 11    Comments: .  Associated Diagnoses: Primary hypertension      losartan (COZAAR) 100 MG tablet Take 1 tablet (100 mg total) by mouth once daily.  Qty: 30 tablet, Refills: 11    Comments: .  Associated Diagnoses: Primary hypertension           Discharge Procedure Orders   CBC W/ AUTO DIFFERENTIAL   Standing Status: Future Number of Occurrences: 1 Standing Exp. Date: 01/19/25     COMPREHENSIVE METABOLIC PANEL   Standing Status: Future Number of Occurrences: 1 Standing Exp. Date: 01/19/25

## 2023-12-01 NOTE — OP NOTE
SURGEON:  Dr. Jose Flaherty  Assistant:   None  Date of procedure:  12/01/2023    Preoperative Diagnosis:  Cystic facial mass    Postoperative Diagnosis:  Same    Procedure:    1. Excision facial subcutaneous mass right cheek 3 cm. 2. Intermediate layered closure 3.5 cm    Findings:   Large facial cyst consistent with epidermal inclusion cyst right cheek    Anesthesia:  General    Blood loss:  minimal    Implants, prosthesis:   None    Specimens:  right facial mass    Indications for procedure:   Patient present to ENT clinic with complaints of a progressively enlarging right-sided subcutaneous facial mass.  Did report that at times some white substance would drain from the mass.  Risks and benefits of the procedure were discussed in detail, and the patient elected to proceed with the procedure.    Procedure in detail:  After appropriate consents were obtained, the patient was taken to the Operating Room and placed on the operating table in a supine position.  His head was turned to the left to expose the right cheek.  The patient was prepped and draped in usual sterile fashion.  An elliptical incision was drawn out over the cystic mass of his right cheek paralleling his relaxed skin tension lines.  3 cc of 1% lidocaine with epinephrine were used to infiltrate the planned incision.  He is incision was made using a 15 blade and then the cystic mass was dissected circumferentially using tenotomy scissors.  The mass lesion was removed from the field and the wound was irrigated with normal saline.  Bleeding was controlled using bipolar electrocautery.  He skin incision was closed in 2 layers of deep interrupted 5 0 Vicryl and interrupted 5 0 Prolene for the skin.  He was cleaned with wet and dry laps and bacitracin was applied.  The patient tolerated the procedure well, and was awakened by Anesthesia without difficulty and brought to the recovery room in good condition.

## 2023-12-01 NOTE — ANESTHESIA PROCEDURE NOTES
Intubation    Date/Time: 12/1/2023 9:56 AM    Performed by: Michell Garcia CRNA  Authorized by: Kim Cortes MD    Intubation:     Induction:  Intravenous    Intubated:  Postinduction    Mask Ventilation:  Easy mask    Attempts:  1    Attempted By:  CRNA    Difficult Airway Encountered?: No      Complications:  None    Airway Device:  Supraglottic airway/LMA    Airway Device Size:  4.0    Style/Cuff Inflation:  Cuffed    Placement Verified By:  Capnometry    Complicating Factors:  None    Findings Post-Intubation:  BS equal bilateral

## 2023-12-06 LAB
FINAL PATHOLOGIC DIAGNOSIS: NORMAL
GROSS: NORMAL
Lab: NORMAL

## 2023-12-11 ENCOUNTER — OFFICE VISIT (OUTPATIENT)
Dept: OTOLARYNGOLOGY | Facility: CLINIC | Age: 58
End: 2023-12-11
Payer: COMMERCIAL

## 2023-12-11 VITALS — BODY MASS INDEX: 25.8 KG/M2 | WEIGHT: 195.56 LBS

## 2023-12-11 DIAGNOSIS — R22.0 FACIAL MASS: Primary | ICD-10-CM

## 2023-12-11 PROCEDURE — 1159F PR MEDICATION LIST DOCUMENTED IN MEDICAL RECORD: ICD-10-PCS | Mod: CPTII,S$GLB,, | Performed by: OTOLARYNGOLOGY

## 2023-12-11 PROCEDURE — 99999 PR PBB SHADOW E&M-EST. PATIENT-LVL III: ICD-10-PCS | Mod: PBBFAC,,, | Performed by: OTOLARYNGOLOGY

## 2023-12-11 PROCEDURE — 99999 PR PBB SHADOW E&M-EST. PATIENT-LVL III: CPT | Mod: PBBFAC,,, | Performed by: OTOLARYNGOLOGY

## 2023-12-11 PROCEDURE — 4010F PR ACE/ARB THEARPY RXD/TAKEN: ICD-10-PCS | Mod: CPTII,S$GLB,, | Performed by: OTOLARYNGOLOGY

## 2023-12-11 PROCEDURE — 3044F PR MOST RECENT HEMOGLOBIN A1C LEVEL <7.0%: ICD-10-PCS | Mod: CPTII,S$GLB,, | Performed by: OTOLARYNGOLOGY

## 2023-12-11 PROCEDURE — 99024 PR POST-OP FOLLOW-UP VISIT: ICD-10-PCS | Mod: S$GLB,,, | Performed by: OTOLARYNGOLOGY

## 2023-12-11 PROCEDURE — 3044F HG A1C LEVEL LT 7.0%: CPT | Mod: CPTII,S$GLB,, | Performed by: OTOLARYNGOLOGY

## 2023-12-11 PROCEDURE — 4010F ACE/ARB THERAPY RXD/TAKEN: CPT | Mod: CPTII,S$GLB,, | Performed by: OTOLARYNGOLOGY

## 2023-12-11 PROCEDURE — 1159F MED LIST DOCD IN RCRD: CPT | Mod: CPTII,S$GLB,, | Performed by: OTOLARYNGOLOGY

## 2023-12-11 PROCEDURE — 99024 POSTOP FOLLOW-UP VISIT: CPT | Mod: S$GLB,,, | Performed by: OTOLARYNGOLOGY

## 2023-12-11 NOTE — PROGRESS NOTES
Sutures removed from right facial incision.  He has a small amount of erythema and induration but no visible purulent drainage.  I encouraged him to finish his oral antibiotics and continue with the wound care.  We will follow-up with him in one month for reevaluation.    Specimen to Pathology, Surgery ENT  Order: [id number]  Status: Final result       Visible to patient: Yes (not seen)       Next appt: 01/30/2024 at 07:00 AM in Internal Medicine (Mihir Lopez MD)    0 Result Notes      Component 10 d ago   Final Pathologic Diagnosis SPECIMEN FROM RIGHT FACE:  EPIDERMAL INCLUSION CYST  NO NEOPLASIA OR DYSPLASIA IDENTIFIED   Comment: Interp By Pablo Cannon M.D., Signed on 12/06/2023 at 09:33   Gross Surgery ID:  [id number]   Pathology ID:  [id number]  1. Received in formalin labeled &quot;right facial mass&quot; is a 2.2 x 2.1 x 1 cm disrupted portion of purple-gray, wrinkled tissue.  Sectioning reveals a tan-white, wrinkled, cyst lining.  There are no cyst contents identified.  The specimen is  submitted entirely in cassettes 1A-1B.    VQO--1-A  FLX--1-B    Grossed by: Aaliyah Camarillo MS, PA([facility])   Disclaimer Unless the case is a 'gross only' or additional testing only, the final diagnosis for each specimen is based on a microscopic examination of appropriate tissue sections.   Resulting Agency [facility]              Narrative  Performed by: [employer]  Pre-op Diagnosis: Facial mass [R22.0]  Procedure(s):  EXCISION, LESION, FACE  Number of specimens: 1  Name of specimens:  1. Right facial mass - PERM  Which provider would you like to cc?->SUKUMAR MONTGOMERY  Release to patient->Immediate  Specimen total (fresh, frozen, permanent):->1      Specimen Collected: 12/01/23 10:24 CST Last Resulted: 12/06/23 09:57 CST        Lab Flowsheet        Order Details        View Encounter        Lab and Collection Details        Routing        Result History     View All Conversations on this Encounter           Result  Care Coordination      Patient Communication     Add Comments   Add Notifications  Back to Top

## 2024-01-10 ENCOUNTER — OFFICE VISIT (OUTPATIENT)
Dept: OTOLARYNGOLOGY | Facility: CLINIC | Age: 59
End: 2024-01-10
Payer: COMMERCIAL

## 2024-01-10 DIAGNOSIS — R22.0 FACIAL MASS: Primary | ICD-10-CM

## 2024-01-10 DIAGNOSIS — K21.9 LARYNGOPHARYNGEAL REFLUX (LPR): ICD-10-CM

## 2024-01-10 DIAGNOSIS — K02.9 DENTAL CARIES: ICD-10-CM

## 2024-01-10 PROCEDURE — 1159F MED LIST DOCD IN RCRD: CPT | Mod: CPTII,S$GLB,, | Performed by: OTOLARYNGOLOGY

## 2024-01-10 PROCEDURE — 99999 PR PBB SHADOW E&M-EST. PATIENT-LVL II: CPT | Mod: PBBFAC,,, | Performed by: OTOLARYNGOLOGY

## 2024-01-10 PROCEDURE — 99212 OFFICE O/P EST SF 10 MIN: CPT | Mod: S$GLB,,, | Performed by: OTOLARYNGOLOGY

## 2024-01-10 PROCEDURE — 4010F ACE/ARB THERAPY RXD/TAKEN: CPT | Mod: CPTII,S$GLB,, | Performed by: OTOLARYNGOLOGY

## 2024-01-10 NOTE — PROGRESS NOTES
"Referring Provider:    No referring provider defined for this encounter.  Subjective:   Patient: Rosalio Muñoz [id number], :1965   Visit date:1/10/2024 9:10 AM    Chief Complaint:  Post-op Evaluation    HPI:    Prior notes reviewed by myself.  Clinical documentation obtained by nursing staff reviewed.     56 y/o gentleman here for evaluation of cyst on right cheek.  He  states that this cyst has been steadily enlarging and has been there for at least 6 years.  Minor discomfort with the cyst and states that it will occasionally drain "white stuff."  He is interested in having it removed.    1/10/24 update:  Here for evaluation of incision right cheek, s/p excision facial mass over 1 month ago.  No issues    Objective:     Physical Exam:  Vitals:  There were no vitals taken for this visit.  General appearance:  Well developed, well nourished    Ears:  Otoscopy of external auditory canals and tympanic membranes was normal, clinical speech reception thresholds grossly intact, no mass/lesion of auricle.    Nose:  No masses/lesions of external nose, nasal mucosa, septum, and turbinates were within normal limits.    Mouth:  No mass/lesion of lips, teeth, gums, hard/soft palate, tongue, tonsils, or oropharynx.    Neck & Lymphatics:  No cervical lymphadenopathy, no neck mass/crepitus/ asymmetry, trachea is midline, no thyroid enlargement/tenderness/mass. Well healed incision right cheek, CDI, no erythema/edema/fluctuance.        [x]  Data Reviewed:    Lab Results   Component Value Date    WBC 6.96 2023    HGB 17.7 2023    HCT 52.5 2023     (H) 2023    EOSINOPHIL 1.9 2023                 Assessment & Plan:   Facial mass    Laryngopharyngeal reflux (LPR)    Dental caries          We discussed the risks, benefits and alternatives to the procedure and he would like to proceed on December 1st    1/10/24 update:  His incision is well healed.  He has extremely sebaceous skin with " multiple small cysts notable.  He has been washing his face more and using a moisturizer.  I encouraged him to consider a dermatology evaluation and he will follow-up with ENT as needed

## 2024-03-18 DIAGNOSIS — Z76.89 ENCOUNTER TO ESTABLISH CARE: Primary | ICD-10-CM

## 2024-03-18 DIAGNOSIS — Z00.00 ROUTINE ADULT HEALTH MAINTENANCE: ICD-10-CM

## 2024-03-28 ENCOUNTER — HOSPITAL ENCOUNTER (OUTPATIENT)
Dept: CARDIOLOGY | Facility: HOSPITAL | Age: 59
Discharge: HOME | End: 2024-03-28
Attending: INTERNAL MEDICINE
Payer: COMMERCIAL

## 2024-03-28 ENCOUNTER — OFFICE VISIT (OUTPATIENT)
Dept: CARDIOLOGY | Facility: CLINIC | Age: 59
End: 2024-03-28
Payer: COMMERCIAL

## 2024-03-28 VITALS
BODY MASS INDEX: 24.98 KG/M2 | OXYGEN SATURATION: 97 % | SYSTOLIC BLOOD PRESSURE: 140 MMHG | HEIGHT: 73 IN | HEART RATE: 83 BPM | DIASTOLIC BLOOD PRESSURE: 98 MMHG | WEIGHT: 188.5 LBS

## 2024-03-28 DIAGNOSIS — E78.49 OTHER HYPERLIPIDEMIA: ICD-10-CM

## 2024-03-28 DIAGNOSIS — Z76.89 ENCOUNTER TO ESTABLISH CARE: ICD-10-CM

## 2024-03-28 DIAGNOSIS — R79.89 ELEVATED TROPONIN: ICD-10-CM

## 2024-03-28 DIAGNOSIS — Z00.00 ROUTINE ADULT HEALTH MAINTENANCE: ICD-10-CM

## 2024-03-28 DIAGNOSIS — Z72.0 TOBACCO ABUSE: ICD-10-CM

## 2024-03-28 DIAGNOSIS — I10 PRIMARY HYPERTENSION: Primary | ICD-10-CM

## 2024-03-28 DIAGNOSIS — I25.2 HISTORY OF NON-ST ELEVATION MYOCARDIAL INFARCTION (NSTEMI): ICD-10-CM

## 2024-03-28 PROCEDURE — 3077F SYST BP >= 140 MM HG: CPT | Mod: CPTII,S$GLB,, | Performed by: INTERNAL MEDICINE

## 2024-03-28 PROCEDURE — 99214 OFFICE O/P EST MOD 30 MIN: CPT | Mod: S$GLB,,, | Performed by: INTERNAL MEDICINE

## 2024-03-28 PROCEDURE — G2211 COMPLEX E/M VISIT ADD ON: HCPCS | Mod: S$GLB,,, | Performed by: INTERNAL MEDICINE

## 2024-03-28 PROCEDURE — 4010F ACE/ARB THERAPY RXD/TAKEN: CPT | Mod: CPTII,S$GLB,, | Performed by: INTERNAL MEDICINE

## 2024-03-28 PROCEDURE — 3080F DIAST BP >= 90 MM HG: CPT | Mod: CPTII,S$GLB,, | Performed by: INTERNAL MEDICINE

## 2024-03-28 PROCEDURE — 1159F MED LIST DOCD IN RCRD: CPT | Mod: CPTII,S$GLB,, | Performed by: INTERNAL MEDICINE

## 2024-03-28 PROCEDURE — 93005 ELECTROCARDIOGRAM TRACING: CPT

## 2024-03-28 PROCEDURE — 99999 PR PBB SHADOW E&M-EST. PATIENT-LVL III: CPT | Mod: PBBFAC,,, | Performed by: INTERNAL MEDICINE

## 2024-03-28 PROCEDURE — 1160F RVW MEDS BY RX/DR IN RCRD: CPT | Mod: CPTII,S$GLB,, | Performed by: INTERNAL MEDICINE

## 2024-03-28 PROCEDURE — 93010 ELECTROCARDIOGRAM REPORT: CPT | Mod: ,,, | Performed by: INTERNAL MEDICINE

## 2024-03-28 PROCEDURE — 3008F BODY MASS INDEX DOCD: CPT | Mod: CPTII,S$GLB,, | Performed by: INTERNAL MEDICINE

## 2024-03-28 RX ORDER — LOSARTAN POTASSIUM 100 MG/1
100 TABLET ORAL NIGHTLY
Qty: 90 TABLET | Refills: 1 | Status: SHIPPED | OUTPATIENT
Start: 2024-03-28 | End: 2024-06-11 | Stop reason: SDUPTHER

## 2024-03-28 RX ORDER — AMLODIPINE BESYLATE 5 MG/1
5 TABLET ORAL 2 TIMES DAILY
Qty: 180 TABLET | Refills: 1 | Status: SHIPPED | OUTPATIENT
Start: 2024-03-28 | End: 2024-06-11 | Stop reason: SDUPTHER

## 2024-03-28 NOTE — PROGRESS NOTES
Subjective:   Patient ID:  Rosalio Muñoz is a 58 y.o. male who presents for cardiac consult of No chief complaint on file.      Referral by: No referring provider defined for this encounter.     Reason for consult:       HPI  The patient came in today for cardiac consult of No chief complaint on file.      Rosalio Muñoz is a 58 y.o. male     Follow-up     Mr. Muñoz is a 57-year-old  male with PMH significant for HTN, former tobacco user, hyperlipidemia here for HFU. Pt was seen by cardiology at [facility] 5/30/23 for HTN, he was started on amlodipine, coreg and losartan. Here today, home BP 1teens-130s/70s. Currently taking amlodipine every other day, will try 5mg daily. (10mg dropped sys to 80s and felt bad)     No longer smoking cigarettes nor nape  Smokes cigarillo in am  Drinks beer daily 3-4        9/13/23  Here today for CV folow up. Home -130s/70-80s. Compliant with medications, drink 4-5 beers daily. Denies any CP, angina or anginal equivalent at this time Denies any LUKE, tried to watch his salt intake    Cont amlodipine, coreg, losartan- HTN  Smoking cessation, no longer smoking  Stop drinking  RF modification, low Na diet, daily exercise as tolerated    RTC 6 mos with MD    3/28/24  Seen by Maryjo last year here for follow up.   BP elevated 140/98. HR 82  He has been eating more salty food.     Results for orders placed during the hospital encounter of 05/29/23    Echo    Interpretation Summary  · The left ventricle is normal in size with concentric remodeling and normal systolic function.  · The estimated ejection fraction is 60%.  · Normal left ventricular diastolic function.  · Normal right ventricular size with normal right ventricular systolic function.  · Normal central venous pressure (3 mmHg).  · The estimated PA systolic pressure is 20 mmHg.      Results for orders placed during the hospital encounter of 05/29/23    Nuclear Stress - Cardiology  Interpreted    Interpretation Summary    Normal myocardial perfusion scan. There is no evidence of myocardial ischemia or infarction.    The gated perfusion images showed an ejection fraction of 71% at rest. The gated perfusion images showed an ejection fraction of 68% post stress.    There is normal wall motion at rest and post stress.    The ECG portion of the study is negative for ischemia.      No results found for this or any previous visit.      No cardiac monitor results found for the past 12 months         Past Medical History:   Diagnosis Date    Hypertension        Past Surgical History:   Procedure Laterality Date    COLONOSCOPY N/A 2023    Procedure: COLONOSCOPY;  Surgeon: Pebbles Spear MD;  Location: [facility] ENDO;  Service: Endoscopy;  Laterality: N/A;    SURGICAL REMOVAL OF LESION OF FACE Right 2023    Procedure: EXCISION, LESION, FACE;  Surgeon: Jose Flaherty MD;  Location: [facility] OR;  Service: ENT;  Laterality: Right;  1. Excision facial subcutaneous mass right cheek 3 cm. 2. Intermediate layered closure 3.5 cm    WISDOM TOOTH EXTRACTION         Social History     Tobacco Use    Smoking status: Former     Current packs/day: 0.00     Types: Cigars, Cigarettes     Quit date: 10/4/2022     Years since quittin.4     Passive exposure: Never    Smokeless tobacco: Current    Tobacco comments:     Cigar every afternoon   Substance Use Topics    Alcohol use: Yes     Alcohol/week: 4.0 - 6.0 standard drinks of alcohol     Types: 4 - 6 Cans of beer per week     Comment: occ    Drug use: No       Family History   Problem Relation Age of Onset    Hyperlipidemia Mother     Hypertension Father     Diabetes Father     Kidney disease Father     Heart disease Father     Prostate cancer Maternal Grandfather     Colon cancer Neg Hx        Patient's Medications   New Prescriptions    No medications on file   Previous Medications    ASPIRIN (ECOTRIN) 81 MG EC TABLET    Take 1 tablet (81 mg total) by mouth  "once daily.    ATORVASTATIN (LIPITOR) 20 MG TABLET    Take 1 tablet (20 mg total) by mouth every evening.    CARVEDILOL (COREG) 25 MG TABLET    Take 1 tablet (25 mg total) by mouth 2 (two) times daily.   Modified Medications    Modified Medication Previous Medication    AMLODIPINE (NORVASC) 5 MG TABLET amLODIPine (NORVASC) 5 MG tablet       Take 1 tablet (5 mg total) by mouth 2 (two) times daily.    Take 1 tablet (5 mg total) by mouth once daily.    LOSARTAN (COZAAR) 100 MG TABLET losartan (COZAAR) 100 MG tablet       Take 1 tablet (100 mg total) by mouth every evening.    Take 1 tablet (100 mg total) by mouth once daily.   Discontinued Medications    No medications on file       Review of Systems   Constitutional: Negative.    HENT: Negative.     Eyes: Negative.    Respiratory: Negative.     Cardiovascular: Negative.    Gastrointestinal: Negative.    Genitourinary: Negative.    Musculoskeletal: Negative.    Skin: Negative.    Neurological: Negative.    Endo/Heme/Allergies: Negative.    Psychiatric/Behavioral: Negative.     All 12 systems otherwise negative.      Wt Readings from Last 3 Encounters:   03/28/24 85.5 kg (188 lb 7.9 oz)   12/11/23 88.7 kg (195 lb 8.8 oz)   12/01/23 87.4 kg (192 lb 10.9 oz)     Temp Readings from Last 3 Encounters:   12/01/23 97.8 °F (36.6 °C) (Temporal)   08/25/23 97.9 °F (36.6 °C) (Temporal)   07/31/23 98.8 °F (37.1 °C) (Tympanic)     BP Readings from Last 3 Encounters:   03/28/24 (!) 140/98   12/01/23 (!) 142/90   09/13/23 138/86     Pulse Readings from Last 3 Encounters:   03/28/24 83   12/01/23 81   09/13/23 65       BP (!) 140/98 (BP Location: Right arm, Patient Position: Sitting, BP Method: Medium (Manual))   Pulse 83   Ht 6' 1" (1.854 m)   Wt 85.5 kg (188 lb 7.9 oz)   SpO2 97%   BMI 24.87 kg/m²     Objective:   Physical Exam  Vitals and nursing note reviewed.   Constitutional:       General: He is not in acute distress.     Appearance: He is well-developed. He is not " diaphoretic.   HENT:      Head: Normocephalic and atraumatic.      Nose: Nose normal.   Eyes:      General: No scleral icterus.     Conjunctiva/sclera: Conjunctivae normal.   Neck:      Thyroid: No thyromegaly.      Vascular: No JVD.   Cardiovascular:      Rate and Rhythm: Normal rate and regular rhythm.      Heart sounds: S1 normal and S2 normal. No murmur heard.     No friction rub. No gallop. No S3 or S4 sounds.   Pulmonary:      Effort: Pulmonary effort is normal. No respiratory distress.      Breath sounds: Normal breath sounds. No stridor. No wheezing or rales.   Chest:      Chest wall: No tenderness.   Abdominal:      General: Bowel sounds are normal. There is no distension.      Palpations: Abdomen is soft. There is no mass.      Tenderness: There is no abdominal tenderness. There is no rebound.   Genitourinary:     Comments: Deferred  Musculoskeletal:         General: No tenderness or deformity. Normal range of motion.      Cervical back: Normal range of motion and neck supple.   Lymphadenopathy:      Cervical: No cervical adenopathy.   Skin:     General: Skin is warm and dry.      Coloration: Skin is not pale.      Findings: No erythema or rash.   Neurological:      Mental Status: He is alert and oriented to person, place, and time.      Motor: No abnormal muscle tone.      Coordination: Coordination normal.   Psychiatric:         Behavior: Behavior normal.         Thought Content: Thought content normal.         Judgment: Judgment normal.         Lab Results   Component Value Date     (L) 11/27/2023    K 5.2 (H) 11/27/2023    CL 96 11/27/2023    CO2 27 11/27/2023    BUN 4 (L) 11/27/2023    CREATININE 0.7 11/27/2023    GLU 96 11/27/2023    HGBA1C 5.1 06/16/2023    MG 1.8 05/31/2023    AST 21 11/27/2023    ALT 19 11/27/2023    ALBUMIN 4.1 11/27/2023    PROT 7.7 11/27/2023    BILITOT 1.0 11/27/2023    WBC 6.96 11/27/2023    HGB 17.7 11/27/2023    HCT 52.5 11/27/2023     (H) 11/27/2023      11/27/2023    TSH 2.420 06/16/2023    CHOL 146 06/16/2023    HDL 69 06/16/2023    LDLCALC 65.4 06/16/2023    TRIG 58 06/16/2023    BNP 26 05/31/2023         BNP (pg/mL)   Date Value   05/31/2023 26   05/30/2023 53          Assessment:      1. Primary hypertension    2. Other hyperlipidemia    3. Tobacco abuse    4. History of non-ST elevation myocardial infarction (NSTEMI)    5. Elevated troponin        Plan:   Primary hypertension  -     amLODIPine (NORVASC) 5 MG tablet; Take 1 tablet (5 mg total) by mouth 2 (two) times daily.  Dispense: 180 tablet; Refill: 1  -     losartan (COZAAR) 100 MG tablet; Take 1 tablet (100 mg total) by mouth every evening.  Dispense: 90 tablet; Refill: 1    Other hyperlipidemia    Tobacco abuse    History of non-ST elevation myocardial infarction (NSTEMI)    Elevated troponin    H/O NSTEMI, elevated Trop, HTN urgency  - cont asa, statin, BB    2. HTN  - titrate meds - increase Norvasc to 5 mg BID  - drinks 4-5 beers     3. HLD  - cont statin    Visit today included increased complexity associated with the care of the episodic problem HTN addressed and managing the longitudinal care of the patient due to the serious and/or complex managed problem(s) .      Thank you for allowing me to participate in this patient's care. Please do not hesitate to contact me with any questions or concerns. Consult note has been forwarded to the referral physician.

## 2024-03-29 LAB
OHS QRS DURATION: 82 MS
OHS QTC CALCULATION: 430 MS

## 2024-06-11 ENCOUNTER — OFFICE VISIT (OUTPATIENT)
Dept: CARDIOLOGY | Facility: CLINIC | Age: 59
End: 2024-06-11
Payer: COMMERCIAL

## 2024-06-11 VITALS
HEIGHT: 73 IN | DIASTOLIC BLOOD PRESSURE: 86 MMHG | BODY MASS INDEX: 24.28 KG/M2 | SYSTOLIC BLOOD PRESSURE: 136 MMHG | WEIGHT: 183.19 LBS | HEART RATE: 97 BPM | OXYGEN SATURATION: 98 %

## 2024-06-11 DIAGNOSIS — E78.49 OTHER HYPERLIPIDEMIA: ICD-10-CM

## 2024-06-11 DIAGNOSIS — R79.89 ELEVATED TROPONIN: ICD-10-CM

## 2024-06-11 DIAGNOSIS — I16.0 HYPERTENSIVE URGENCY: ICD-10-CM

## 2024-06-11 DIAGNOSIS — Z72.0 TOBACCO ABUSE: ICD-10-CM

## 2024-06-11 DIAGNOSIS — I25.2 HISTORY OF NON-ST ELEVATION MYOCARDIAL INFARCTION (NSTEMI): ICD-10-CM

## 2024-06-11 DIAGNOSIS — I10 PRIMARY HYPERTENSION: Primary | ICD-10-CM

## 2024-06-11 PROCEDURE — 3008F BODY MASS INDEX DOCD: CPT | Mod: CPTII,S$GLB,, | Performed by: INTERNAL MEDICINE

## 2024-06-11 PROCEDURE — 1160F RVW MEDS BY RX/DR IN RCRD: CPT | Mod: CPTII,S$GLB,, | Performed by: INTERNAL MEDICINE

## 2024-06-11 PROCEDURE — 3079F DIAST BP 80-89 MM HG: CPT | Mod: CPTII,S$GLB,, | Performed by: INTERNAL MEDICINE

## 2024-06-11 PROCEDURE — 4010F ACE/ARB THERAPY RXD/TAKEN: CPT | Mod: CPTII,S$GLB,, | Performed by: INTERNAL MEDICINE

## 2024-06-11 PROCEDURE — 99999 PR PBB SHADOW E&M-EST. PATIENT-LVL III: CPT | Mod: PBBFAC,,, | Performed by: INTERNAL MEDICINE

## 2024-06-11 PROCEDURE — G2211 COMPLEX E/M VISIT ADD ON: HCPCS | Mod: S$GLB,,, | Performed by: INTERNAL MEDICINE

## 2024-06-11 PROCEDURE — 99214 OFFICE O/P EST MOD 30 MIN: CPT | Mod: S$GLB,,, | Performed by: INTERNAL MEDICINE

## 2024-06-11 PROCEDURE — 1159F MED LIST DOCD IN RCRD: CPT | Mod: CPTII,S$GLB,, | Performed by: INTERNAL MEDICINE

## 2024-06-11 PROCEDURE — 3075F SYST BP GE 130 - 139MM HG: CPT | Mod: CPTII,S$GLB,, | Performed by: INTERNAL MEDICINE

## 2024-06-11 RX ORDER — ATORVASTATIN CALCIUM 20 MG/1
20 TABLET, FILM COATED ORAL NIGHTLY
Qty: 90 TABLET | Refills: 3 | Status: SHIPPED | OUTPATIENT
Start: 2024-06-11 | End: 2025-06-06

## 2024-06-11 RX ORDER — ASPIRIN 81 MG/1
81 TABLET ORAL DAILY
Qty: 90 TABLET | Refills: 3 | Status: SHIPPED | OUTPATIENT
Start: 2024-06-11 | End: 2025-06-06

## 2024-06-11 RX ORDER — LOSARTAN POTASSIUM 100 MG/1
100 TABLET ORAL NIGHTLY
Qty: 90 TABLET | Refills: 1 | Status: SHIPPED | OUTPATIENT
Start: 2024-06-11 | End: 2025-06-06

## 2024-06-11 RX ORDER — AMLODIPINE BESYLATE 5 MG/1
5 TABLET ORAL 2 TIMES DAILY
Qty: 180 TABLET | Refills: 3 | Status: SHIPPED | OUTPATIENT
Start: 2024-06-11 | End: 2025-06-06

## 2024-06-11 RX ORDER — CARVEDILOL 25 MG/1
25 TABLET ORAL 2 TIMES DAILY
Qty: 180 TABLET | Refills: 3 | Status: SHIPPED | OUTPATIENT
Start: 2024-06-11 | End: 2025-06-06

## 2024-06-11 NOTE — PROGRESS NOTES
Subjective:   Patient ID:  Rosalio Muñoz is a 58 y.o. male who presents for cardiac consult of No chief complaint on file.      Referral by: No referring provider defined for this encounter.     Reason for consult:       HPI  The patient came in today for cardiac consult of No chief complaint on file.      Rosalio Muñoz is a 58 y.o. male HTN, former tobacco user, hyperlipidemia , h/o NSTEMI presents for CV follow up.       3/28/24  Seen by Maryjo last year here for follow up.   BP elevated 140/98. HR 82  He has been eating more salty food.     6/11/24  Bp and HR stable. He has cut back salty food.   BMI 24 - 183 lbs       Results for orders placed during the hospital encounter of 05/29/23    Echo    Interpretation Summary  · The left ventricle is normal in size with concentric remodeling and normal systolic function.  · The estimated ejection fraction is 60%.  · Normal left ventricular diastolic function.  · Normal right ventricular size with normal right ventricular systolic function.  · Normal central venous pressure (3 mmHg).  · The estimated PA systolic pressure is 20 mmHg.      Results for orders placed during the hospital encounter of 05/29/23    Nuclear Stress - Cardiology Interpreted    Interpretation Summary    Normal myocardial perfusion scan. There is no evidence of myocardial ischemia or infarction.    The gated perfusion images showed an ejection fraction of 71% at rest. The gated perfusion images showed an ejection fraction of 68% post stress.    There is normal wall motion at rest and post stress.    The ECG portion of the study is negative for ischemia.      No results found for this or any previous visit.      No cardiac monitor results found for the past 12 months         Past Medical History:   Diagnosis Date    Hypertension        Past Surgical History:   Procedure Laterality Date    COLONOSCOPY N/A 8/25/2023    Procedure: COLONOSCOPY;  Surgeon: Pebbles Spear MD;  Location:  [facility] ENDO;  Service: Endoscopy;  Laterality: N/A;    SURGICAL REMOVAL OF LESION OF FACE Right 2023    Procedure: EXCISION, LESION, FACE;  Surgeon: Jose Flaherty MD;  Location: [facility] OR;  Service: ENT;  Laterality: Right;  1. Excision facial subcutaneous mass right cheek 3 cm. 2. Intermediate layered closure 3.5 cm    WISDOM TOOTH EXTRACTION         Social History     Tobacco Use    Smoking status: Former     Current packs/day: 0.00     Types: Cigars, Cigarettes     Quit date: 10/4/2022     Years since quittin.6     Passive exposure: Never    Smokeless tobacco: Current    Tobacco comments:     Cigar every afternoon   Substance Use Topics    Alcohol use: Yes     Alcohol/week: 4.0 - 6.0 standard drinks of alcohol     Types: 4 - 6 Cans of beer per week     Comment: occ    Drug use: No       Family History   Problem Relation Name Age of Onset    Hyperlipidemia Mother      Hypertension Father      Diabetes Father      Kidney disease Father      Heart disease Father      Prostate cancer Maternal Grandfather      Colon cancer Neg Hx         Patient's Medications   New Prescriptions    No medications on file   Previous Medications    AMLODIPINE (NORVASC) 5 MG TABLET    Take 1 tablet (5 mg total) by mouth 2 (two) times daily.    ASPIRIN (ECOTRIN) 81 MG EC TABLET    Take 1 tablet (81 mg total) by mouth once daily.    ATORVASTATIN (LIPITOR) 20 MG TABLET    Take 1 tablet (20 mg total) by mouth every evening.    CARVEDILOL (COREG) 25 MG TABLET    Take 1 tablet (25 mg total) by mouth 2 (two) times daily.    LOSARTAN (COZAAR) 100 MG TABLET    Take 1 tablet (100 mg total) by mouth every evening.   Modified Medications    No medications on file   Discontinued Medications    No medications on file       Review of Systems   Constitutional: Negative.    HENT: Negative.     Eyes: Negative.    Respiratory: Negative.     Cardiovascular: Negative.    Gastrointestinal: Negative.    Genitourinary: Negative.    Musculoskeletal:  "Negative.    Skin: Negative.    Neurological: Negative.    Endo/Heme/Allergies: Negative.    Psychiatric/Behavioral: Negative.     All 12 systems otherwise negative.      Wt Readings from Last 3 Encounters:   06/11/24 83.1 kg (183 lb 3.2 oz)   03/28/24 85.5 kg (188 lb 7.9 oz)   12/11/23 88.7 kg (195 lb 8.8 oz)     Temp Readings from Last 3 Encounters:   12/01/23 97.8 °F (36.6 °C) (Temporal)   08/25/23 97.9 °F (36.6 °C) (Temporal)   07/31/23 98.8 °F (37.1 °C) (Tympanic)     BP Readings from Last 3 Encounters:   06/11/24 136/86   03/28/24 (!) 140/98   12/01/23 (!) 142/90     Pulse Readings from Last 3 Encounters:   06/11/24 97   03/28/24 83   12/01/23 81       /86 (BP Location: Right arm, Patient Position: Sitting, BP Method: Medium (Manual))   Pulse 97   Ht 6' 1" (1.854 m)   Wt 83.1 kg (183 lb 3.2 oz)   SpO2 98%   BMI 24.17 kg/m²     Objective:   Physical Exam  Vitals and nursing note reviewed.   Constitutional:       General: He is not in acute distress.     Appearance: He is well-developed. He is not diaphoretic.   HENT:      Head: Normocephalic and atraumatic.      Nose: Nose normal.   Eyes:      General: No scleral icterus.     Conjunctiva/sclera: Conjunctivae normal.   Neck:      Thyroid: No thyromegaly.      Vascular: No JVD.   Cardiovascular:      Rate and Rhythm: Normal rate and regular rhythm.      Heart sounds: S1 normal and S2 normal. No murmur heard.     No friction rub. No gallop. No S3 or S4 sounds.   Pulmonary:      Effort: Pulmonary effort is normal. No respiratory distress.      Breath sounds: Normal breath sounds. No stridor. No wheezing or rales.   Chest:      Chest wall: No tenderness.   Abdominal:      General: Bowel sounds are normal. There is no distension.      Palpations: Abdomen is soft. There is no mass.      Tenderness: There is no abdominal tenderness. There is no rebound.   Genitourinary:     Comments: Deferred  Musculoskeletal:         General: No tenderness or deformity. " Normal range of motion.      Cervical back: Normal range of motion and neck supple.   Lymphadenopathy:      Cervical: No cervical adenopathy.   Skin:     General: Skin is warm and dry.      Coloration: Skin is not pale.      Findings: No erythema or rash.   Neurological:      Mental Status: He is alert and oriented to person, place, and time.      Motor: No abnormal muscle tone.      Coordination: Coordination normal.   Psychiatric:         Behavior: Behavior normal.         Thought Content: Thought content normal.         Judgment: Judgment normal.         Lab Results   Component Value Date     (L) 11/27/2023    K 5.2 (H) 11/27/2023    CL 96 11/27/2023    CO2 27 11/27/2023    BUN 4 (L) 11/27/2023    CREATININE 0.7 11/27/2023    GLU 96 11/27/2023    HGBA1C 5.1 06/16/2023    MG 1.8 05/31/2023    AST 21 11/27/2023    ALT 19 11/27/2023    ALBUMIN 4.1 11/27/2023    PROT 7.7 11/27/2023    BILITOT 1.0 11/27/2023    WBC 6.96 11/27/2023    HGB 17.7 11/27/2023    HCT 52.5 11/27/2023     (H) 11/27/2023     11/27/2023    TSH 2.420 06/16/2023    CHOL 146 06/16/2023    HDL 69 06/16/2023    LDLCALC 65.4 06/16/2023    TRIG 58 06/16/2023    BNP 26 05/31/2023         BNP (pg/mL)   Date Value   05/31/2023 26   05/30/2023 53          Assessment:      1. Primary hypertension    2. Other hyperlipidemia    3. Tobacco abuse    4. History of non-ST elevation myocardial infarction (NSTEMI)    5. Elevated troponin    6. Hypertensive urgency          Plan:     H/O NSTEMI, elevated Trop, HTN urgency - improved   - cont asa, statin, BB    2. HTN - stable   - titrate meds - increase Norvasc to 5 mg BID  - drinks 4-5 beers     3. HLD  - cont statin    Visit today included increased complexity associated with the care of the episodic problem HTN addressed and managing the longitudinal care of the patient due to the serious and/or complex managed problem(s) .      Thank you for allowing me to participate in this patient's care.  Please do not hesitate to contact me with any questions or concerns. Consult note has been forwarded to the referral physician.

## 2024-07-16 ENCOUNTER — OFFICE VISIT (OUTPATIENT)
Dept: CARDIOLOGY | Facility: CLINIC | Age: 59
End: 2024-07-16
Payer: COMMERCIAL

## 2024-07-16 VITALS
DIASTOLIC BLOOD PRESSURE: 86 MMHG | BODY MASS INDEX: 23.03 KG/M2 | OXYGEN SATURATION: 97 % | WEIGHT: 173.75 LBS | HEIGHT: 73 IN | SYSTOLIC BLOOD PRESSURE: 126 MMHG | HEART RATE: 87 BPM

## 2024-07-16 DIAGNOSIS — E78.49 OTHER HYPERLIPIDEMIA: ICD-10-CM

## 2024-07-16 DIAGNOSIS — I25.2 HISTORY OF NON-ST ELEVATION MYOCARDIAL INFARCTION (NSTEMI): ICD-10-CM

## 2024-07-16 DIAGNOSIS — R79.89 ELEVATED TROPONIN: ICD-10-CM

## 2024-07-16 DIAGNOSIS — I10 PRIMARY HYPERTENSION: Primary | ICD-10-CM

## 2024-07-16 PROCEDURE — 1160F RVW MEDS BY RX/DR IN RCRD: CPT | Mod: CPTII,S$GLB,, | Performed by: INTERNAL MEDICINE

## 2024-07-16 PROCEDURE — 1159F MED LIST DOCD IN RCRD: CPT | Mod: CPTII,S$GLB,, | Performed by: INTERNAL MEDICINE

## 2024-07-16 PROCEDURE — 99999 PR PBB SHADOW E&M-EST. PATIENT-LVL IV: CPT | Mod: PBBFAC,,, | Performed by: INTERNAL MEDICINE

## 2024-07-16 PROCEDURE — 3074F SYST BP LT 130 MM HG: CPT | Mod: CPTII,S$GLB,, | Performed by: INTERNAL MEDICINE

## 2024-07-16 PROCEDURE — 99214 OFFICE O/P EST MOD 30 MIN: CPT | Mod: S$GLB,,, | Performed by: INTERNAL MEDICINE

## 2024-07-16 PROCEDURE — 4010F ACE/ARB THERAPY RXD/TAKEN: CPT | Mod: CPTII,S$GLB,, | Performed by: INTERNAL MEDICINE

## 2024-07-16 PROCEDURE — 3008F BODY MASS INDEX DOCD: CPT | Mod: CPTII,S$GLB,, | Performed by: INTERNAL MEDICINE

## 2024-07-16 PROCEDURE — G2211 COMPLEX E/M VISIT ADD ON: HCPCS | Mod: S$GLB,,, | Performed by: INTERNAL MEDICINE

## 2024-07-16 PROCEDURE — 3079F DIAST BP 80-89 MM HG: CPT | Mod: CPTII,S$GLB,, | Performed by: INTERNAL MEDICINE

## 2024-07-16 NOTE — PROGRESS NOTES
Subjective:   Patient ID:  Rosalio Muñoz is a 58 y.o. male who presents for cardiac consult of No chief complaint on file.      Referral by: No referring provider defined for this encounter.     Reason for consult:       HPI  The patient came in today for cardiac consult of No chief complaint on file.      Rosalio Muñoz is a 58 y.o. male HTN, former tobacco user, hyperlipidemia , h/o NSTEMI presents for CV follow up.       3/28/24  Seen by Maryjo last year here for follow up.   BP elevated 140/98. HR 82  He has been eating more salty food.     6/11/24  Bp and HR stable. He has cut back salty food.   BMI 24 - 183 lbs     7/16/24  BP has been low - he has a couple of loose bowel movements - happened a few times.   BP is ok today.   BMI 22 - 173 lbs       Results for orders placed during the hospital encounter of 05/29/23    Echo    Interpretation Summary  · The left ventricle is normal in size with concentric remodeling and normal systolic function.  · The estimated ejection fraction is 60%.  · Normal left ventricular diastolic function.  · Normal right ventricular size with normal right ventricular systolic function.  · Normal central venous pressure (3 mmHg).  · The estimated PA systolic pressure is 20 mmHg.      Results for orders placed during the hospital encounter of 05/29/23    Nuclear Stress - Cardiology Interpreted    Interpretation Summary    Normal myocardial perfusion scan. There is no evidence of myocardial ischemia or infarction.    The gated perfusion images showed an ejection fraction of 71% at rest. The gated perfusion images showed an ejection fraction of 68% post stress.    There is normal wall motion at rest and post stress.    The ECG portion of the study is negative for ischemia.      No results found for this or any previous visit.      No cardiac monitor results found for the past 12 months         Past Medical History:   Diagnosis Date    Hypertension        Past Surgical  History:   Procedure Laterality Date    COLONOSCOPY N/A 2023    Procedure: COLONOSCOPY;  Surgeon: Pebbles Spear MD;  Location: [facility] ENDO;  Service: Endoscopy;  Laterality: N/A;    SURGICAL REMOVAL OF LESION OF FACE Right 2023    Procedure: EXCISION, LESION, FACE;  Surgeon: Jose Flaherty MD;  Location: [facility] OR;  Service: ENT;  Laterality: Right;  1. Excision facial subcutaneous mass right cheek 3 cm. 2. Intermediate layered closure 3.5 cm    WISDOM TOOTH EXTRACTION         Social History     Tobacco Use    Smoking status: Former     Current packs/day: 0.00     Types: Cigars, Cigarettes     Quit date: 10/4/2022     Years since quittin.7     Passive exposure: Never    Smokeless tobacco: Current    Tobacco comments:     Cigar every afternoon   Substance Use Topics    Alcohol use: Yes     Alcohol/week: 4.0 - 6.0 standard drinks of alcohol     Types: 4 - 6 Cans of beer per week     Comment: occ    Drug use: No       Family History   Problem Relation Name Age of Onset    Hyperlipidemia Mother      Hypertension Father      Diabetes Father      Kidney disease Father      Heart disease Father      Prostate cancer Maternal Grandfather      Colon cancer Neg Hx         Patient's Medications   New Prescriptions    No medications on file   Previous Medications    AMLODIPINE (NORVASC) 5 MG TABLET    Take 1 tablet (5 mg total) by mouth 2 (two) times daily.    ASPIRIN (ECOTRIN) 81 MG EC TABLET    Take 1 tablet (81 mg total) by mouth once daily.    ATORVASTATIN (LIPITOR) 20 MG TABLET    Take 1 tablet (20 mg total) by mouth every evening.    CARVEDILOL (COREG) 25 MG TABLET    Take 1 tablet (25 mg total) by mouth 2 (two) times daily.    LOSARTAN (COZAAR) 100 MG TABLET    Take 1 tablet (100 mg total) by mouth every evening.   Modified Medications    No medications on file   Discontinued Medications    No medications on file       Review of Systems   Constitutional: Negative.    HENT: Negative.     Eyes: Negative.   "  Respiratory: Negative.     Cardiovascular: Negative.    Gastrointestinal: Negative.    Genitourinary: Negative.    Musculoskeletal: Negative.    Skin: Negative.    Neurological: Negative.    Endo/Heme/Allergies: Negative.    Psychiatric/Behavioral: Negative.     All 12 systems otherwise negative.      Wt Readings from Last 3 Encounters:   07/16/24 78.8 kg (173 lb 11.6 oz)   06/11/24 83.1 kg (183 lb 3.2 oz)   03/28/24 85.5 kg (188 lb 7.9 oz)     Temp Readings from Last 3 Encounters:   12/01/23 97.8 °F (36.6 °C) (Temporal)   08/25/23 97.9 °F (36.6 °C) (Temporal)   07/31/23 98.8 °F (37.1 °C) (Tympanic)     BP Readings from Last 3 Encounters:   07/16/24 126/86   06/11/24 136/86   03/28/24 (!) 140/98     Pulse Readings from Last 3 Encounters:   07/16/24 87   06/11/24 97   03/28/24 83       /86   Pulse 87   Ht 6' 1" (1.854 m)   Wt 78.8 kg (173 lb 11.6 oz)   SpO2 97%   BMI 22.92 kg/m²     Objective:   Physical Exam  Vitals and nursing note reviewed.   Constitutional:       General: He is not in acute distress.     Appearance: He is well-developed. He is not diaphoretic.   HENT:      Head: Normocephalic and atraumatic.      Nose: Nose normal.   Eyes:      General: No scleral icterus.     Conjunctiva/sclera: Conjunctivae normal.   Neck:      Thyroid: No thyromegaly.      Vascular: No JVD.   Cardiovascular:      Rate and Rhythm: Normal rate and regular rhythm.      Heart sounds: S1 normal and S2 normal. No murmur heard.     No friction rub. No gallop. No S3 or S4 sounds.   Pulmonary:      Effort: Pulmonary effort is normal. No respiratory distress.      Breath sounds: Normal breath sounds. No stridor. No wheezing or rales.   Chest:      Chest wall: No tenderness.   Abdominal:      General: Bowel sounds are normal. There is no distension.      Palpations: Abdomen is soft. There is no mass.      Tenderness: There is no abdominal tenderness. There is no rebound.   Genitourinary:     Comments: " Deferred  Musculoskeletal:         General: No tenderness or deformity. Normal range of motion.      Cervical back: Normal range of motion and neck supple.   Lymphadenopathy:      Cervical: No cervical adenopathy.   Skin:     General: Skin is warm and dry.      Coloration: Skin is not pale.      Findings: No erythema or rash.   Neurological:      Mental Status: He is alert and oriented to person, place, and time.      Motor: No abnormal muscle tone.      Coordination: Coordination normal.   Psychiatric:         Behavior: Behavior normal.         Thought Content: Thought content normal.         Judgment: Judgment normal.         Lab Results   Component Value Date     (L) 11/27/2023    K 5.2 (H) 11/27/2023    CL 96 11/27/2023    CO2 27 11/27/2023    BUN 4 (L) 11/27/2023    CREATININE 0.7 11/27/2023    GLU 96 11/27/2023    HGBA1C 5.1 06/16/2023    MG 1.8 05/31/2023    AST 21 11/27/2023    ALT 19 11/27/2023    ALBUMIN 4.1 11/27/2023    PROT 7.7 11/27/2023    BILITOT 1.0 11/27/2023    WBC 6.96 11/27/2023    HGB 17.7 11/27/2023    HCT 52.5 11/27/2023     (H) 11/27/2023     11/27/2023    TSH 2.420 06/16/2023    CHOL 146 06/16/2023    HDL 69 06/16/2023    LDLCALC 65.4 06/16/2023    TRIG 58 06/16/2023    BNP 26 05/31/2023         BNP (pg/mL)   Date Value   05/31/2023 26   05/30/2023 53          Assessment:      1. Primary hypertension    2. Other hyperlipidemia    3. History of non-ST elevation myocardial infarction (NSTEMI)    4. Elevated troponin          Plan:     H/O NSTEMI, elevated Trop, HTN urgency - improved   - cont asa, statin, BB    2. HTN - lately has been very low - has loose stools and more weight loss   - titrate meds - stop Norvasc  - drinks 4-5 beers  - has cut back    3. HLD  - cont statin    Visit today included increased complexity associated with the care of the episodic problem HTN addressed and managing the longitudinal care of the patient due to the serious and/or complex managed  problem(s) .      Thank you for allowing me to participate in this patient's care. Please do not hesitate to contact me with any questions or concerns. Consult note has been forwarded to the referral physician.

## 2024-07-31 ENCOUNTER — ANESTHESIA (OUTPATIENT)
Dept: SURGERY | Facility: HOSPITAL | Age: 59
End: 2024-07-31
Payer: COMMERCIAL

## 2024-07-31 ENCOUNTER — HOSPITAL ENCOUNTER (INPATIENT)
Facility: HOSPITAL | Age: 59
LOS: 1 days | Discharge: HOME | DRG: 399 | End: 2024-08-04
Attending: EMERGENCY MEDICINE | Admitting: STUDENT IN AN ORGANIZED HEALTH CARE EDUCATION/TRAINING PROGRAM
Payer: COMMERCIAL

## 2024-07-31 ENCOUNTER — ANESTHESIA EVENT (OUTPATIENT)
Dept: SURGERY | Facility: HOSPITAL | Age: 59
End: 2024-07-31
Payer: COMMERCIAL

## 2024-07-31 DIAGNOSIS — K35.32 ACUTE APPENDICITIS WITH PERFORATION AND LOCALIZED PERITONITIS, WITHOUT ABSCESS OR GANGRENE: Primary | ICD-10-CM

## 2024-07-31 DIAGNOSIS — Z01.810 PREOP CARDIOVASCULAR EXAM: ICD-10-CM

## 2024-07-31 DIAGNOSIS — E78.49 OTHER HYPERLIPIDEMIA: ICD-10-CM

## 2024-07-31 DIAGNOSIS — E83.42 HYPOMAGNESEMIA: ICD-10-CM

## 2024-07-31 DIAGNOSIS — I10 HYPERTENSION, UNSPECIFIED TYPE: ICD-10-CM

## 2024-07-31 DIAGNOSIS — K35.30 ACUTE APPENDICITIS WITH LOCALIZED PERITONITIS, WITHOUT PERFORATION, ABSCESS, OR GANGRENE: ICD-10-CM

## 2024-07-31 PROBLEM — F10.90 ALCOHOL USE: Status: ACTIVE | Noted: 2024-07-31

## 2024-07-31 PROBLEM — R93.3 ABNORMAL CT SCAN, SMALL BOWEL: Status: ACTIVE | Noted: 2024-07-31

## 2024-07-31 PROBLEM — Z78.9 ALCOHOL USE: Status: ACTIVE | Noted: 2024-07-31

## 2024-07-31 LAB
ALBUMIN SERPL BCP-MCNC: 3.3 G/DL (ref 3.5–5.2)
ALP SERPL-CCNC: 95 U/L (ref 55–135)
ALT SERPL W/O P-5'-P-CCNC: 10 U/L (ref 10–44)
ANION GAP SERPL CALC-SCNC: 9 MMOL/L (ref 8–16)
AST SERPL-CCNC: 14 U/L (ref 10–40)
BASOPHILS # BLD AUTO: 0.03 K/UL (ref 0–0.2)
BASOPHILS NFR BLD: 0.3 % (ref 0–1.9)
BILIRUB SERPL-MCNC: 1.3 MG/DL (ref 0.1–1)
BILIRUB UR QL STRIP: NEGATIVE
BUN SERPL-MCNC: 4 MG/DL (ref 6–20)
CALCIUM SERPL-MCNC: 9 MG/DL (ref 8.7–10.5)
CHLORIDE SERPL-SCNC: 95 MMOL/L (ref 95–110)
CLARITY UR: CLEAR
CO2 SERPL-SCNC: 24 MMOL/L (ref 23–29)
COLOR UR: YELLOW
CREAT SERPL-MCNC: 0.7 MG/DL (ref 0.5–1.4)
DIFFERENTIAL METHOD BLD: ABNORMAL
EOSINOPHIL # BLD AUTO: 0 K/UL (ref 0–0.5)
EOSINOPHIL NFR BLD: 0.2 % (ref 0–8)
ERYTHROCYTE [DISTWIDTH] IN BLOOD BY AUTOMATED COUNT: 12.7 % (ref 11.5–14.5)
EST. GFR  (NO RACE VARIABLE): >60 ML/MIN/1.73 M^2
GLUCOSE SERPL-MCNC: 101 MG/DL (ref 70–110)
GLUCOSE UR QL STRIP: NEGATIVE
HCT VFR BLD AUTO: 51.7 % (ref 40–54)
HGB BLD-MCNC: 18.4 G/DL (ref 14–18)
HGB UR QL STRIP: NEGATIVE
IMM GRANULOCYTES # BLD AUTO: 0.04 K/UL (ref 0–0.04)
IMM GRANULOCYTES NFR BLD AUTO: 0.5 % (ref 0–0.5)
KETONES UR QL STRIP: NEGATIVE
LEUKOCYTE ESTERASE UR QL STRIP: NEGATIVE
LYMPHOCYTES # BLD AUTO: 0.9 K/UL (ref 1–4.8)
LYMPHOCYTES NFR BLD: 10.3 % (ref 18–48)
MCH RBC QN AUTO: 34.1 PG (ref 27–31)
MCHC RBC AUTO-ENTMCNC: 35.6 G/DL (ref 32–36)
MCV RBC AUTO: 96 FL (ref 82–98)
MONOCYTES # BLD AUTO: 0.9 K/UL (ref 0.3–1)
MONOCYTES NFR BLD: 10.9 % (ref 4–15)
NEUTROPHILS # BLD AUTO: 6.7 K/UL (ref 1.8–7.7)
NEUTROPHILS NFR BLD: 77.8 % (ref 38–73)
NITRITE UR QL STRIP: NEGATIVE
NRBC BLD-RTO: 0 /100 WBC
OHS QRS DURATION: 82 MS
OHS QTC CALCULATION: 449 MS
PH UR STRIP: 7 [PH] (ref 5–8)
PLATELET # BLD AUTO: 235 K/UL (ref 150–450)
PMV BLD AUTO: 8.9 FL (ref 9.2–12.9)
POTASSIUM SERPL-SCNC: 4.3 MMOL/L (ref 3.5–5.1)
PROT SERPL-MCNC: 6.9 G/DL (ref 6–8.4)
PROT UR QL STRIP: NEGATIVE
RBC # BLD AUTO: 5.39 M/UL (ref 4.6–6.2)
SODIUM SERPL-SCNC: 128 MMOL/L (ref 136–145)
SP GR UR STRIP: 1.01 (ref 1–1.03)
URN SPEC COLLECT METH UR: ABNORMAL
UROBILINOGEN UR STRIP-ACNC: ABNORMAL EU/DL
WBC # BLD AUTO: 8.62 K/UL (ref 3.9–12.7)

## 2024-07-31 PROCEDURE — 63600175 PHARM REV CODE 636 W HCPCS: Performed by: NURSE ANESTHETIST, CERTIFIED REGISTERED

## 2024-07-31 PROCEDURE — 99223 1ST HOSP IP/OBS HIGH 75: CPT | Mod: 57,,, | Performed by: SURGERY

## 2024-07-31 PROCEDURE — 93010 ELECTROCARDIOGRAM REPORT: CPT | Mod: ,,, | Performed by: INTERNAL MEDICINE

## 2024-07-31 PROCEDURE — 37000008 HC ANESTHESIA 1ST 15 MINUTES: Performed by: SURGERY

## 2024-07-31 PROCEDURE — 63600175 PHARM REV CODE 636 W HCPCS: Performed by: SURGERY

## 2024-07-31 PROCEDURE — 27201423 OPTIME MED/SURG SUP & DEVICES STERILE SUPPLY: Performed by: SURGERY

## 2024-07-31 PROCEDURE — 96375 TX/PRO/DX INJ NEW DRUG ADDON: CPT

## 2024-07-31 PROCEDURE — 37000009 HC ANESTHESIA EA ADD 15 MINS: Performed by: SURGERY

## 2024-07-31 PROCEDURE — 81003 URINALYSIS AUTO W/O SCOPE: CPT | Performed by: NURSE PRACTITIONER

## 2024-07-31 PROCEDURE — 80053 COMPREHEN METABOLIC PANEL: CPT | Performed by: EMERGENCY MEDICINE

## 2024-07-31 PROCEDURE — 0DTJ4ZZ RESECTION OF APPENDIX, PERCUTANEOUS ENDOSCOPIC APPROACH: ICD-10-PCS | Performed by: SURGERY

## 2024-07-31 PROCEDURE — 25000003 PHARM REV CODE 250: Performed by: NURSE ANESTHETIST, CERTIFIED REGISTERED

## 2024-07-31 PROCEDURE — 27000221 HC OXYGEN, UP TO 24 HOURS

## 2024-07-31 PROCEDURE — G0378 HOSPITAL OBSERVATION PER HR: HCPCS

## 2024-07-31 PROCEDURE — 63600175 PHARM REV CODE 636 W HCPCS: Performed by: ANESTHESIOLOGY

## 2024-07-31 PROCEDURE — 36000711: Performed by: SURGERY

## 2024-07-31 PROCEDURE — 36000710: Performed by: SURGERY

## 2024-07-31 PROCEDURE — 8E0Y4CZ ROBOTIC ASSISTED PROCEDURE OF LOWER EXTREMITY, PERCUTANEOUS ENDOSCOPIC APPROACH: ICD-10-PCS | Performed by: SURGERY

## 2024-07-31 PROCEDURE — 25000003 PHARM REV CODE 250: Performed by: SURGERY

## 2024-07-31 PROCEDURE — 63600175 PHARM REV CODE 636 W HCPCS: Performed by: EMERGENCY MEDICINE

## 2024-07-31 PROCEDURE — 88304 TISSUE EXAM BY PATHOLOGIST: CPT | Mod: 26,,, | Performed by: PATHOLOGY

## 2024-07-31 PROCEDURE — 85025 COMPLETE CBC W/AUTO DIFF WBC: CPT | Performed by: NURSE PRACTITIONER

## 2024-07-31 PROCEDURE — 99900035 HC TECH TIME PER 15 MIN (STAT)

## 2024-07-31 PROCEDURE — 99285 EMERGENCY DEPT VISIT HI MDM: CPT | Mod: 25

## 2024-07-31 PROCEDURE — 93005 ELECTROCARDIOGRAM TRACING: CPT

## 2024-07-31 PROCEDURE — 94799 UNLISTED PULMONARY SVC/PX: CPT

## 2024-07-31 PROCEDURE — 25000003 PHARM REV CODE 250: Performed by: EMERGENCY MEDICINE

## 2024-07-31 PROCEDURE — 96365 THER/PROPH/DIAG IV INF INIT: CPT

## 2024-07-31 PROCEDURE — 44970 LAPAROSCOPY APPENDECTOMY: CPT | Mod: ,,, | Performed by: SURGERY

## 2024-07-31 PROCEDURE — 71000039 HC RECOVERY, EACH ADD'L HOUR: Performed by: SURGERY

## 2024-07-31 PROCEDURE — 88304 TISSUE EXAM BY PATHOLOGIST: CPT | Performed by: PATHOLOGY

## 2024-07-31 PROCEDURE — 63600175 PHARM REV CODE 636 W HCPCS: Mod: JZ,JG | Performed by: SURGERY

## 2024-07-31 PROCEDURE — 71000033 HC RECOVERY, INTIAL HOUR: Performed by: SURGERY

## 2024-07-31 RX ORDER — ENOXAPARIN SODIUM 100 MG/ML
40 INJECTION SUBCUTANEOUS EVERY 24 HOURS
Status: DISCONTINUED | OUTPATIENT
Start: 2024-08-01 | End: 2024-08-04 | Stop reason: HOSPADM

## 2024-07-31 RX ORDER — SODIUM CHLORIDE, SODIUM LACTATE, POTASSIUM CHLORIDE, CALCIUM CHLORIDE 600; 310; 30; 20 MG/100ML; MG/100ML; MG/100ML; MG/100ML
INJECTION, SOLUTION INTRAVENOUS CONTINUOUS
Status: DISCONTINUED | OUTPATIENT
Start: 2024-07-31 | End: 2024-08-02

## 2024-07-31 RX ORDER — ONDANSETRON HYDROCHLORIDE 2 MG/ML
4 INJECTION, SOLUTION INTRAVENOUS DAILY PRN
Status: DISCONTINUED | OUTPATIENT
Start: 2024-07-31 | End: 2024-07-31 | Stop reason: HOSPADM

## 2024-07-31 RX ORDER — LOSARTAN POTASSIUM 50 MG/1
100 TABLET ORAL NIGHTLY
Status: DISCONTINUED | OUTPATIENT
Start: 2024-07-31 | End: 2024-08-04 | Stop reason: HOSPADM

## 2024-07-31 RX ORDER — BUPIVACAINE HYDROCHLORIDE 2.5 MG/ML
INJECTION, SOLUTION EPIDURAL; INFILTRATION; INTRACAUDAL
Status: DISCONTINUED | OUTPATIENT
Start: 2024-07-31 | End: 2024-07-31 | Stop reason: HOSPADM

## 2024-07-31 RX ORDER — ATORVASTATIN CALCIUM 10 MG/1
20 TABLET, FILM COATED ORAL NIGHTLY
Status: DISCONTINUED | OUTPATIENT
Start: 2024-07-31 | End: 2024-08-04 | Stop reason: HOSPADM

## 2024-07-31 RX ORDER — OXYCODONE AND ACETAMINOPHEN 5; 325 MG/1; MG/1
1 TABLET ORAL
Status: DISCONTINUED | OUTPATIENT
Start: 2024-07-31 | End: 2024-07-31 | Stop reason: HOSPADM

## 2024-07-31 RX ORDER — HYDROCODONE BITARTRATE AND ACETAMINOPHEN 5; 325 MG/1; MG/1
1 TABLET ORAL EVERY 4 HOURS PRN
Status: DISCONTINUED | OUTPATIENT
Start: 2024-07-31 | End: 2024-08-04 | Stop reason: HOSPADM

## 2024-07-31 RX ORDER — ONDANSETRON HYDROCHLORIDE 2 MG/ML
4 INJECTION, SOLUTION INTRAVENOUS
Status: COMPLETED | OUTPATIENT
Start: 2024-07-31 | End: 2024-07-31

## 2024-07-31 RX ORDER — HYDROMORPHONE HYDROCHLORIDE 2 MG/ML
1 INJECTION, SOLUTION INTRAMUSCULAR; INTRAVENOUS; SUBCUTANEOUS EVERY 4 HOURS PRN
Status: DISCONTINUED | OUTPATIENT
Start: 2024-07-31 | End: 2024-08-04 | Stop reason: HOSPADM

## 2024-07-31 RX ORDER — FAMOTIDINE 10 MG/ML
20 INJECTION INTRAVENOUS EVERY 12 HOURS
Status: DISCONTINUED | OUTPATIENT
Start: 2024-07-31 | End: 2024-08-02

## 2024-07-31 RX ORDER — CARVEDILOL 12.5 MG/1
25 TABLET ORAL 2 TIMES DAILY
Status: DISCONTINUED | OUTPATIENT
Start: 2024-07-31 | End: 2024-08-04 | Stop reason: HOSPADM

## 2024-07-31 RX ORDER — MORPHINE SULFATE 4 MG/ML
4 INJECTION, SOLUTION INTRAMUSCULAR; INTRAVENOUS
Status: COMPLETED | OUTPATIENT
Start: 2024-07-31 | End: 2024-07-31

## 2024-07-31 RX ORDER — FENTANYL CITRATE 50 UG/ML
INJECTION, SOLUTION INTRAMUSCULAR; INTRAVENOUS
Status: DISCONTINUED | OUTPATIENT
Start: 2024-07-31 | End: 2024-07-31

## 2024-07-31 RX ORDER — CHLORHEXIDINE GLUCONATE ORAL RINSE 1.2 MG/ML
10 SOLUTION DENTAL 2 TIMES DAILY
Status: DISCONTINUED | OUTPATIENT
Start: 2024-07-31 | End: 2024-08-04 | Stop reason: HOSPADM

## 2024-07-31 RX ORDER — HYDROMORPHONE HYDROCHLORIDE 2 MG/ML
0.2 INJECTION, SOLUTION INTRAMUSCULAR; INTRAVENOUS; SUBCUTANEOUS EVERY 5 MIN PRN
Status: DISCONTINUED | OUTPATIENT
Start: 2024-07-31 | End: 2024-07-31 | Stop reason: HOSPADM

## 2024-07-31 RX ORDER — DEXAMETHASONE SODIUM PHOSPHATE 4 MG/ML
INJECTION, SOLUTION INTRA-ARTICULAR; INTRALESIONAL; INTRAMUSCULAR; INTRAVENOUS; SOFT TISSUE
Status: DISCONTINUED | OUTPATIENT
Start: 2024-07-31 | End: 2024-07-31

## 2024-07-31 RX ORDER — ONDANSETRON HYDROCHLORIDE 2 MG/ML
INJECTION, SOLUTION INTRAVENOUS
Status: DISCONTINUED | OUTPATIENT
Start: 2024-07-31 | End: 2024-07-31

## 2024-07-31 RX ORDER — ONDANSETRON 8 MG/1
8 TABLET, ORALLY DISINTEGRATING ORAL EVERY 8 HOURS PRN
Status: DISCONTINUED | OUTPATIENT
Start: 2024-07-31 | End: 2024-08-04 | Stop reason: HOSPADM

## 2024-07-31 RX ORDER — ONDANSETRON HYDROCHLORIDE 2 MG/ML
4 INJECTION, SOLUTION INTRAVENOUS ONCE AS NEEDED
Status: DISCONTINUED | OUTPATIENT
Start: 2024-07-31 | End: 2024-07-31 | Stop reason: HOSPADM

## 2024-07-31 RX ORDER — HYDROCODONE BITARTRATE AND ACETAMINOPHEN 7.5; 325 MG/1; MG/1
1 TABLET ORAL EVERY 6 HOURS PRN
Status: DISCONTINUED | OUTPATIENT
Start: 2024-07-31 | End: 2024-08-04 | Stop reason: HOSPADM

## 2024-07-31 RX ORDER — PROCHLORPERAZINE EDISYLATE 5 MG/ML
5 INJECTION INTRAMUSCULAR; INTRAVENOUS EVERY 6 HOURS PRN
Status: DISCONTINUED | OUTPATIENT
Start: 2024-07-31 | End: 2024-08-04 | Stop reason: HOSPADM

## 2024-07-31 RX ORDER — PROPOFOL 10 MG/ML
VIAL (ML) INTRAVENOUS
Status: DISCONTINUED | OUTPATIENT
Start: 2024-07-31 | End: 2024-07-31

## 2024-07-31 RX ORDER — LIDOCAINE HYDROCHLORIDE 20 MG/ML
INJECTION, SOLUTION EPIDURAL; INFILTRATION; INTRACAUDAL; PERINEURAL
Status: DISCONTINUED | OUTPATIENT
Start: 2024-07-31 | End: 2024-07-31

## 2024-07-31 RX ORDER — PHENYLEPHRINE HYDROCHLORIDE 10 MG/ML
INJECTION INTRAVENOUS
Status: DISCONTINUED | OUTPATIENT
Start: 2024-07-31 | End: 2024-07-31

## 2024-07-31 RX ORDER — MEPERIDINE HYDROCHLORIDE 25 MG/ML
12.5 INJECTION INTRAMUSCULAR; INTRAVENOUS; SUBCUTANEOUS ONCE AS NEEDED
Status: DISCONTINUED | OUTPATIENT
Start: 2024-07-31 | End: 2024-07-31 | Stop reason: HOSPADM

## 2024-07-31 RX ORDER — DOCUSATE SODIUM 100 MG/1
100 CAPSULE, LIQUID FILLED ORAL 2 TIMES DAILY
Status: DISCONTINUED | OUTPATIENT
Start: 2024-07-31 | End: 2024-08-04 | Stop reason: HOSPADM

## 2024-07-31 RX ORDER — MIDAZOLAM HYDROCHLORIDE 1 MG/ML
INJECTION INTRAMUSCULAR; INTRAVENOUS
Status: DISCONTINUED | OUTPATIENT
Start: 2024-07-31 | End: 2024-07-31

## 2024-07-31 RX ORDER — ROCURONIUM BROMIDE 10 MG/ML
INJECTION, SOLUTION INTRAVENOUS
Status: DISCONTINUED | OUTPATIENT
Start: 2024-07-31 | End: 2024-07-31

## 2024-07-31 RX ORDER — FENTANYL CITRATE 50 UG/ML
25 INJECTION, SOLUTION INTRAMUSCULAR; INTRAVENOUS EVERY 5 MIN PRN
Status: DISCONTINUED | OUTPATIENT
Start: 2024-07-31 | End: 2024-07-31 | Stop reason: HOSPADM

## 2024-07-31 RX ADMIN — DOCUSATE SODIUM 100 MG: 100 CAPSULE, LIQUID FILLED ORAL at 08:07

## 2024-07-31 RX ADMIN — HYDROMORPHONE HYDROCHLORIDE 0.2 MG: 2 INJECTION INTRAMUSCULAR; INTRAVENOUS; SUBCUTANEOUS at 02:07

## 2024-07-31 RX ADMIN — HYDROCODONE BITARTRATE AND ACETAMINOPHEN 1 TABLET: 7.5; 325 TABLET ORAL at 03:07

## 2024-07-31 RX ADMIN — PHENYLEPHRINE HYDROCHLORIDE 100 MCG: 10 INJECTION INTRAVENOUS at 12:07

## 2024-07-31 RX ADMIN — PHENYLEPHRINE HYDROCHLORIDE 200 MCG: 10 INJECTION INTRAVENOUS at 01:07

## 2024-07-31 RX ADMIN — ATORVASTATIN CALCIUM 20 MG: 10 TABLET, FILM COATED ORAL at 08:07

## 2024-07-31 RX ADMIN — FAMOTIDINE 20 MG: 10 INJECTION, SOLUTION INTRAVENOUS at 08:07

## 2024-07-31 RX ADMIN — DEXAMETHASONE SODIUM PHOSPHATE 8 MG: 4 INJECTION, SOLUTION INTRA-ARTICULAR; INTRALESIONAL; INTRAMUSCULAR; INTRAVENOUS; SOFT TISSUE at 12:07

## 2024-07-31 RX ADMIN — ONDANSETRON 4 MG: 2 INJECTION INTRAMUSCULAR; INTRAVENOUS at 10:07

## 2024-07-31 RX ADMIN — HYDROCODONE BITARTRATE AND ACETAMINOPHEN 1 TABLET: 7.5; 325 TABLET ORAL at 11:07

## 2024-07-31 RX ADMIN — SODIUM CHLORIDE 1000 ML: 9 INJECTION, SOLUTION INTRAVENOUS at 10:07

## 2024-07-31 RX ADMIN — FENTANYL CITRATE 50 MCG: 50 INJECTION, SOLUTION INTRAMUSCULAR; INTRAVENOUS at 12:07

## 2024-07-31 RX ADMIN — PHENYLEPHRINE HYDROCHLORIDE 200 MCG: 10 INJECTION INTRAVENOUS at 12:07

## 2024-07-31 RX ADMIN — LIDOCAINE HYDROCHLORIDE 100 MG: 20 INJECTION, SOLUTION EPIDURAL; INFILTRATION; INTRACAUDAL; PERINEURAL at 12:07

## 2024-07-31 RX ADMIN — HYDROMORPHONE HYDROCHLORIDE 1 MG: 2 INJECTION INTRAMUSCULAR; INTRAVENOUS; SUBCUTANEOUS at 04:07

## 2024-07-31 RX ADMIN — PIPERACILLIN SODIUM AND TAZOBACTAM SODIUM 4.5 G: 4; .5 INJECTION, POWDER, FOR SOLUTION INTRAVENOUS at 05:07

## 2024-07-31 RX ADMIN — PROPOFOL 150 MG: 10 INJECTION, EMULSION INTRAVENOUS at 12:07

## 2024-07-31 RX ADMIN — ROCURONIUM BROMIDE 30 MG: 10 SOLUTION INTRAVENOUS at 12:07

## 2024-07-31 RX ADMIN — CHLORHEXIDINE GLUCONATE 0.12% ORAL RINSE 10 ML: 1.2 LIQUID ORAL at 11:07

## 2024-07-31 RX ADMIN — ROCURONIUM BROMIDE 20 MG: 10 SOLUTION INTRAVENOUS at 12:07

## 2024-07-31 RX ADMIN — SODIUM CHLORIDE, POTASSIUM CHLORIDE, SODIUM LACTATE AND CALCIUM CHLORIDE: 600; 310; 30; 20 INJECTION, SOLUTION INTRAVENOUS at 04:07

## 2024-07-31 RX ADMIN — HYDROMORPHONE HYDROCHLORIDE 1 MG: 2 INJECTION INTRAMUSCULAR; INTRAVENOUS; SUBCUTANEOUS at 08:07

## 2024-07-31 RX ADMIN — ONDANSETRON 4 MG: 2 INJECTION INTRAMUSCULAR; INTRAVENOUS at 01:07

## 2024-07-31 RX ADMIN — SUGAMMADEX 150 MG: 100 INJECTION, SOLUTION INTRAVENOUS at 01:07

## 2024-07-31 RX ADMIN — PIPERACILLIN SODIUM AND TAZOBACTAM SODIUM 4.5 G: 4; .5 INJECTION, POWDER, FOR SOLUTION INTRAVENOUS at 10:07

## 2024-07-31 RX ADMIN — HYDROMORPHONE HYDROCHLORIDE 0.2 MG: 2 INJECTION INTRAMUSCULAR; INTRAVENOUS; SUBCUTANEOUS at 01:07

## 2024-07-31 RX ADMIN — PIPERACILLIN SODIUM AND TAZOBACTAM SODIUM 4.5 G: 4; .5 INJECTION, POWDER, FOR SOLUTION INTRAVENOUS at 11:07

## 2024-07-31 RX ADMIN — SUGAMMADEX 50 MG: 100 INJECTION, SOLUTION INTRAVENOUS at 01:07

## 2024-07-31 RX ADMIN — MORPHINE SULFATE 4 MG: 4 INJECTION INTRAVENOUS at 10:07

## 2024-07-31 RX ADMIN — MIDAZOLAM HYDROCHLORIDE 2 MG: 1 INJECTION, SOLUTION INTRAMUSCULAR; INTRAVENOUS at 11:07

## 2024-07-31 RX ADMIN — SODIUM CHLORIDE, SODIUM LACTATE, POTASSIUM CHLORIDE, AND CALCIUM CHLORIDE: .6; .31; .03; .02 INJECTION, SOLUTION INTRAVENOUS at 11:07

## 2024-07-31 NOTE — TRANSFER OF CARE
Anesthesia Transfer of Care Note    Patient: Rosalio Muñoz    Procedure(s) Performed: Procedure(s) (LRB):  XI ROBOTIC APPENDECTOMY (N/A)    Patient location: PACU    Anesthesia Type: general    Transport from OR: Transported from OR on room air with adequate spontaneous ventilation    Post pain: adequate analgesia    Post assessment: no apparent anesthetic complications    Post vital signs: stable    Level of consciousness: sedated    Nausea/Vomiting: no nausea/vomiting    Complications: none    Transfer of care protocol was followed      Last vitals: Visit Vitals  /78 (BP Location: Right arm)   Pulse 87   Temp 36.7 °C (98.1 °F)   Resp 18   Wt 80.1 kg (176 lb 9.4 oz)   SpO2 96%   BMI 23.30 kg/m²

## 2024-07-31 NOTE — ANESTHESIA PROCEDURE NOTES
Intubation    Date/Time: 7/31/2024 12:09 PM    Performed by: Gwen Cabral CRNA  Authorized by: Robby Alvarenga MD    Intubation:     Induction:  Intravenous    Intubated:  Postinduction    Mask Ventilation:  Easy with oral airway    Attempts:  1    Attempted By:  CRNA    Method of Intubation:  Direct    Blade:  Earl 2    Laryngeal View Grade: Grade I - full view of cords      Difficult Airway Encountered?: No      Complications:  None    Airway Device:  Oral endotracheal tube    Airway Device Size:  7.5    Style/Cuff Inflation:  Cuffed (inflated to minimal occlusive pressure)    Tube secured:  23    Secured at:  The lips    Placement Verified By:  Capnometry    Complicating Factors:  None    Findings Post-Intubation:  BS equal bilateral and atraumatic/condition of teeth unchanged

## 2024-07-31 NOTE — ANESTHESIA PREPROCEDURE EVALUATION
07/31/2024  Rosalio Muñoz is a 58 y.o., male.      Pre-op Assessment    I have reviewed the Patient Summary Reports.     I have reviewed the Nursing Notes. I have reviewed the NPO Status.      Review of Systems  Anesthesia Hx:  No problems with previous Anesthesia                Hematology/Oncology:  Hematology Normal   Oncology Normal                                   Cardiovascular:     Hypertension                                        Renal/:  Renal/ Normal                 Hepatic/GI:  Hepatic/GI Normal                 Neurological:  Neurology Normal                                      Endocrine:  Endocrine Normal            Dermatological:  Skin Normal    Psych:  Psychiatric Normal                  Patient Active Problem List   Diagnosis    HTN (hypertension)    Umbilical hernia    Tobacco abuse    Delayed immunizations    Other hyperlipidemia    Hypertensive urgency    Elevated troponin    Hypomagnesemia    Bilateral hearing loss     Results for orders placed during the hospital encounter of 05/29/23    Echo    Interpretation Summary  · The left ventricle is normal in size with concentric remodeling and normal systolic function.  · The estimated ejection fraction is 60%.  · Normal left ventricular diastolic function.  · Normal right ventricular size with normal right ventricular systolic function.  · Normal central venous pressure (3 mmHg).  · The estimated PA systolic pressure is 20 mmHg.      Physical Exam  General: Well nourished, Cooperative, Alert and Oriented    Airway:  Mallampati: II / II  Mouth Opening: Normal  TM Distance: Normal  Tongue: Normal  Neck ROM: Normal ROM    Dental:  Intact        Anesthesia Plan  Type of Anesthesia, risks & benefits discussed:    Anesthesia Type: Gen ETT  Intra-op Monitoring Plan: Standard ASA Monitors  Post Op Pain Control Plan: multimodal  analgesia  Induction:  IV  Airway Plan: Direct  Informed Consent: Informed consent signed with the Patient and all parties understand the risks and agree with anesthesia plan.  All questions answered.   ASA Score: 3 Emergent  Day of Surgery Review of History & Physical: H&P Update referred to the surgeon/provider.I have interviewed and examined the patient. I have reviewed the patient's H&P dated: There are no significant changes. H&P completed by Anesthesiologist.    Ready For Surgery From Anesthesia Perspective.     .

## 2024-08-01 LAB
ANION GAP SERPL CALC-SCNC: 9 MMOL/L (ref 8–16)
BUN SERPL-MCNC: 6 MG/DL (ref 6–20)
CALCIUM SERPL-MCNC: 8.4 MG/DL (ref 8.7–10.5)
CHLORIDE SERPL-SCNC: 99 MMOL/L (ref 95–110)
CO2 SERPL-SCNC: 24 MMOL/L (ref 23–29)
CREAT SERPL-MCNC: 0.7 MG/DL (ref 0.5–1.4)
ERYTHROCYTE [DISTWIDTH] IN BLOOD BY AUTOMATED COUNT: 12.8 % (ref 11.5–14.5)
EST. GFR  (NO RACE VARIABLE): >60 ML/MIN/1.73 M^2
FINAL PATHOLOGIC DIAGNOSIS: NORMAL
GLUCOSE SERPL-MCNC: 110 MG/DL (ref 70–110)
GROSS: NORMAL
HCT VFR BLD AUTO: 47.6 % (ref 40–54)
HGB BLD-MCNC: 16.8 G/DL (ref 14–18)
Lab: NORMAL
MCH RBC QN AUTO: 34.6 PG (ref 27–31)
MCHC RBC AUTO-ENTMCNC: 35.3 G/DL (ref 32–36)
MCV RBC AUTO: 98 FL (ref 82–98)
PLATELET # BLD AUTO: 209 K/UL (ref 150–450)
PMV BLD AUTO: 9.5 FL (ref 9.2–12.9)
POTASSIUM SERPL-SCNC: 4.6 MMOL/L (ref 3.5–5.1)
RBC # BLD AUTO: 4.85 M/UL (ref 4.6–6.2)
SODIUM SERPL-SCNC: 132 MMOL/L (ref 136–145)
WBC # BLD AUTO: 10.64 K/UL (ref 3.9–12.7)

## 2024-08-01 PROCEDURE — 99900035 HC TECH TIME PER 15 MIN (STAT)

## 2024-08-01 PROCEDURE — 25000003 PHARM REV CODE 250: Performed by: SURGERY

## 2024-08-01 PROCEDURE — 63600175 PHARM REV CODE 636 W HCPCS: Performed by: SURGERY

## 2024-08-01 PROCEDURE — 94799 UNLISTED PULMONARY SVC/PX: CPT

## 2024-08-01 PROCEDURE — 80048 BASIC METABOLIC PNL TOTAL CA: CPT | Performed by: SURGERY

## 2024-08-01 PROCEDURE — 94761 N-INVAS EAR/PLS OXIMETRY MLT: CPT

## 2024-08-01 PROCEDURE — 36415 COLL VENOUS BLD VENIPUNCTURE: CPT | Performed by: SURGERY

## 2024-08-01 PROCEDURE — 27000221 HC OXYGEN, UP TO 24 HOURS

## 2024-08-01 PROCEDURE — 85027 COMPLETE CBC AUTOMATED: CPT | Performed by: SURGERY

## 2024-08-01 RX ORDER — AMOXICILLIN AND CLAVULANATE POTASSIUM 875; 125 MG/1; MG/1
1 TABLET, FILM COATED ORAL EVERY 12 HOURS
Status: DISCONTINUED | OUTPATIENT
Start: 2024-08-01 | End: 2024-08-04 | Stop reason: HOSPADM

## 2024-08-01 RX ORDER — BISACODYL 5 MG
10 TABLET, DELAYED RELEASE (ENTERIC COATED) ORAL ONCE
Status: COMPLETED | OUTPATIENT
Start: 2024-08-01 | End: 2024-08-01

## 2024-08-01 RX ADMIN — CHLORHEXIDINE GLUCONATE 0.12% ORAL RINSE 10 ML: 1.2 LIQUID ORAL at 09:08

## 2024-08-01 RX ADMIN — SODIUM CHLORIDE, POTASSIUM CHLORIDE, SODIUM LACTATE AND CALCIUM CHLORIDE: 600; 310; 30; 20 INJECTION, SOLUTION INTRAVENOUS at 09:08

## 2024-08-01 RX ADMIN — DOCUSATE SODIUM 100 MG: 100 CAPSULE, LIQUID FILLED ORAL at 09:08

## 2024-08-01 RX ADMIN — ENOXAPARIN SODIUM 40 MG: 40 INJECTION SUBCUTANEOUS at 11:08

## 2024-08-01 RX ADMIN — HYDROCODONE BITARTRATE AND ACETAMINOPHEN 1 TABLET: 5; 325 TABLET ORAL at 06:08

## 2024-08-01 RX ADMIN — FAMOTIDINE 20 MG: 10 INJECTION, SOLUTION INTRAVENOUS at 09:08

## 2024-08-01 RX ADMIN — BISACODYL 10 MG: 5 TABLET, COATED ORAL at 09:08

## 2024-08-01 RX ADMIN — HYDROCODONE BITARTRATE AND ACETAMINOPHEN 1 TABLET: 7.5; 325 TABLET ORAL at 05:08

## 2024-08-01 RX ADMIN — HYDROMORPHONE HYDROCHLORIDE 1 MG: 2 INJECTION INTRAMUSCULAR; INTRAVENOUS; SUBCUTANEOUS at 03:08

## 2024-08-01 RX ADMIN — HYDROCODONE BITARTRATE AND ACETAMINOPHEN 1 TABLET: 7.5; 325 TABLET ORAL at 11:08

## 2024-08-01 RX ADMIN — DOCUSATE SODIUM 100 MG: 100 CAPSULE, LIQUID FILLED ORAL at 08:08

## 2024-08-01 RX ADMIN — LOSARTAN POTASSIUM 100 MG: 50 TABLET, FILM COATED ORAL at 09:08

## 2024-08-01 RX ADMIN — AMOXICILLIN AND CLAVULANATE POTASSIUM 1 TABLET: 875; 125 TABLET, FILM COATED ORAL at 09:08

## 2024-08-01 RX ADMIN — CHLORHEXIDINE GLUCONATE 0.12% ORAL RINSE 10 ML: 1.2 LIQUID ORAL at 08:08

## 2024-08-01 RX ADMIN — HYDROMORPHONE HYDROCHLORIDE 1 MG: 2 INJECTION INTRAMUSCULAR; INTRAVENOUS; SUBCUTANEOUS at 09:08

## 2024-08-01 RX ADMIN — CARVEDILOL 25 MG: 12.5 TABLET, FILM COATED ORAL at 09:08

## 2024-08-01 RX ADMIN — FAMOTIDINE 20 MG: 10 INJECTION, SOLUTION INTRAVENOUS at 08:08

## 2024-08-01 RX ADMIN — ATORVASTATIN CALCIUM 20 MG: 10 TABLET, FILM COATED ORAL at 09:08

## 2024-08-02 PROCEDURE — 94761 N-INVAS EAR/PLS OXIMETRY MLT: CPT

## 2024-08-02 PROCEDURE — 25000003 PHARM REV CODE 250: Performed by: SURGERY

## 2024-08-02 PROCEDURE — 99900035 HC TECH TIME PER 15 MIN (STAT)

## 2024-08-02 PROCEDURE — 25000003 PHARM REV CODE 250

## 2024-08-02 PROCEDURE — 27000221 HC OXYGEN, UP TO 24 HOURS

## 2024-08-02 PROCEDURE — 63600175 PHARM REV CODE 636 W HCPCS: Performed by: SURGERY

## 2024-08-02 RX ORDER — FAMOTIDINE 20 MG/1
20 TABLET, FILM COATED ORAL 2 TIMES DAILY
Status: DISCONTINUED | OUTPATIENT
Start: 2024-08-02 | End: 2024-08-04 | Stop reason: HOSPADM

## 2024-08-02 RX ORDER — DEXTROSE MONOHYDRATE, SODIUM CHLORIDE, AND POTASSIUM CHLORIDE 50; 1.49; 4.5 G/1000ML; G/1000ML; G/1000ML
INJECTION, SOLUTION INTRAVENOUS CONTINUOUS
Status: DISCONTINUED | OUTPATIENT
Start: 2024-08-02 | End: 2024-08-03

## 2024-08-02 RX ORDER — BISACODYL 5 MG
5 TABLET, DELAYED RELEASE (ENTERIC COATED) ORAL DAILY
Status: DISCONTINUED | OUTPATIENT
Start: 2024-08-02 | End: 2024-08-04 | Stop reason: HOSPADM

## 2024-08-02 RX ADMIN — SODIUM CHLORIDE, POTASSIUM CHLORIDE, SODIUM LACTATE AND CALCIUM CHLORIDE: 600; 310; 30; 20 INJECTION, SOLUTION INTRAVENOUS at 08:08

## 2024-08-02 RX ADMIN — DOCUSATE SODIUM 100 MG: 100 CAPSULE, LIQUID FILLED ORAL at 08:08

## 2024-08-02 RX ADMIN — DEXTROSE, SODIUM CHLORIDE, AND POTASSIUM CHLORIDE: 5; .45; .15 INJECTION INTRAVENOUS at 01:08

## 2024-08-02 RX ADMIN — BISACODYL 5 MG: 5 TABLET, COATED ORAL at 10:08

## 2024-08-02 RX ADMIN — HYDROCODONE BITARTRATE AND ACETAMINOPHEN 1 TABLET: 7.5; 325 TABLET ORAL at 08:08

## 2024-08-02 RX ADMIN — FAMOTIDINE 20 MG: 20 TABLET ORAL at 08:08

## 2024-08-02 RX ADMIN — ATORVASTATIN CALCIUM 20 MG: 10 TABLET, FILM COATED ORAL at 08:08

## 2024-08-02 RX ADMIN — CHLORHEXIDINE GLUCONATE 0.12% ORAL RINSE 10 ML: 1.2 LIQUID ORAL at 08:08

## 2024-08-02 RX ADMIN — HYDROCODONE BITARTRATE AND ACETAMINOPHEN 1 TABLET: 7.5; 325 TABLET ORAL at 03:08

## 2024-08-02 RX ADMIN — AMOXICILLIN AND CLAVULANATE POTASSIUM 1 TABLET: 875; 125 TABLET, FILM COATED ORAL at 08:08

## 2024-08-02 RX ADMIN — CARVEDILOL 25 MG: 12.5 TABLET, FILM COATED ORAL at 08:08

## 2024-08-02 RX ADMIN — HYDROCODONE BITARTRATE AND ACETAMINOPHEN 1 TABLET: 7.5; 325 TABLET ORAL at 12:08

## 2024-08-02 RX ADMIN — ENOXAPARIN SODIUM 40 MG: 40 INJECTION SUBCUTANEOUS at 05:08

## 2024-08-02 RX ADMIN — HYDROMORPHONE HYDROCHLORIDE 1 MG: 2 INJECTION INTRAMUSCULAR; INTRAVENOUS; SUBCUTANEOUS at 05:08

## 2024-08-02 RX ADMIN — HYDROCODONE BITARTRATE AND ACETAMINOPHEN 1 TABLET: 7.5; 325 TABLET ORAL at 09:08

## 2024-08-02 RX ADMIN — LOSARTAN POTASSIUM 100 MG: 50 TABLET, FILM COATED ORAL at 08:08

## 2024-08-02 NOTE — ANESTHESIA POSTPROCEDURE EVALUATION
Anesthesia Post Evaluation    Patient: Rosalio Muñoz    Procedure(s) Performed: Procedure(s) (LRB):  XI ROBOTIC APPENDECTOMY (N/A)    Final Anesthesia Type: general      Patient location during evaluation: PACU  Patient participation: Yes- Able to Participate  Level of consciousness: awake and alert, oriented and awake  Post-procedure vital signs: reviewed and stable  Pain management: adequate  Airway patency: patent    PONV status at discharge: No PONV  Anesthetic complications: no      Cardiovascular status: blood pressure returned to baseline  Respiratory status: unassisted  Hydration status: euvolemic  Follow-up not needed.              Vitals Value Taken Time   /68 08/02/24 1216   Temp 36.8 °C (98.2 °F) 08/02/24 1216   Pulse 73 08/02/24 1216   Resp 18 08/02/24 1216   SpO2 94 % 08/02/24 1216         Event Time   Out of Recovery 16:00:00         Pain/Nick Score: Pain Rating Prior to Med Admin: 5 (8/2/2024  8:44 AM)

## 2024-08-03 LAB
ANION GAP SERPL CALC-SCNC: 10 MMOL/L (ref 8–16)
BASOPHILS # BLD AUTO: 0.03 K/UL (ref 0–0.2)
BASOPHILS NFR BLD: 0.5 % (ref 0–1.9)
BUN SERPL-MCNC: 3 MG/DL (ref 6–20)
CALCIUM SERPL-MCNC: 8.2 MG/DL (ref 8.7–10.5)
CHLORIDE SERPL-SCNC: 99 MMOL/L (ref 95–110)
CO2 SERPL-SCNC: 21 MMOL/L (ref 23–29)
CREAT SERPL-MCNC: 0.5 MG/DL (ref 0.5–1.4)
DIFFERENTIAL METHOD BLD: ABNORMAL
EOSINOPHIL # BLD AUTO: 0.1 K/UL (ref 0–0.5)
EOSINOPHIL NFR BLD: 1.7 % (ref 0–8)
ERYTHROCYTE [DISTWIDTH] IN BLOOD BY AUTOMATED COUNT: 12.5 % (ref 11.5–14.5)
EST. GFR  (NO RACE VARIABLE): >60 ML/MIN/1.73 M^2
GLUCOSE SERPL-MCNC: 107 MG/DL (ref 70–110)
HCT VFR BLD AUTO: 44.9 % (ref 40–54)
HGB BLD-MCNC: 15.8 G/DL (ref 14–18)
IMM GRANULOCYTES # BLD AUTO: 0.03 K/UL (ref 0–0.04)
IMM GRANULOCYTES NFR BLD AUTO: 0.5 % (ref 0–0.5)
LYMPHOCYTES # BLD AUTO: 1 K/UL (ref 1–4.8)
LYMPHOCYTES NFR BLD: 16.6 % (ref 18–48)
MAGNESIUM SERPL-MCNC: 1.4 MG/DL (ref 1.6–2.6)
MCH RBC QN AUTO: 33.8 PG (ref 27–31)
MCHC RBC AUTO-ENTMCNC: 35.2 G/DL (ref 32–36)
MCV RBC AUTO: 96 FL (ref 82–98)
MONOCYTES # BLD AUTO: 0.6 K/UL (ref 0.3–1)
MONOCYTES NFR BLD: 10.3 % (ref 4–15)
NEUTROPHILS # BLD AUTO: 4.1 K/UL (ref 1.8–7.7)
NEUTROPHILS NFR BLD: 70.4 % (ref 38–73)
NRBC BLD-RTO: 0 /100 WBC
PHOSPHATE SERPL-MCNC: 3.4 MG/DL (ref 2.7–4.5)
PLATELET # BLD AUTO: 222 K/UL (ref 150–450)
PMV BLD AUTO: 10 FL (ref 9.2–12.9)
POTASSIUM SERPL-SCNC: 3.9 MMOL/L (ref 3.5–5.1)
RBC # BLD AUTO: 4.67 M/UL (ref 4.6–6.2)
SODIUM SERPL-SCNC: 130 MMOL/L (ref 136–145)
WBC # BLD AUTO: 5.83 K/UL (ref 3.9–12.7)

## 2024-08-03 PROCEDURE — 80048 BASIC METABOLIC PNL TOTAL CA: CPT | Performed by: SURGERY

## 2024-08-03 PROCEDURE — 63600175 PHARM REV CODE 636 W HCPCS: Performed by: STUDENT IN AN ORGANIZED HEALTH CARE EDUCATION/TRAINING PROGRAM

## 2024-08-03 PROCEDURE — 84100 ASSAY OF PHOSPHORUS: CPT | Performed by: SURGERY

## 2024-08-03 PROCEDURE — 25000003 PHARM REV CODE 250: Performed by: STUDENT IN AN ORGANIZED HEALTH CARE EDUCATION/TRAINING PROGRAM

## 2024-08-03 PROCEDURE — 83735 ASSAY OF MAGNESIUM: CPT | Performed by: SURGERY

## 2024-08-03 PROCEDURE — 85025 COMPLETE CBC W/AUTO DIFF WBC: CPT | Performed by: SURGERY

## 2024-08-03 PROCEDURE — 25000003 PHARM REV CODE 250: Performed by: SURGERY

## 2024-08-03 PROCEDURE — 63600175 PHARM REV CODE 636 W HCPCS: Performed by: SURGERY

## 2024-08-03 PROCEDURE — 11000001 HC ACUTE MED/SURG PRIVATE ROOM

## 2024-08-03 PROCEDURE — 25000003 PHARM REV CODE 250

## 2024-08-03 PROCEDURE — 36415 COLL VENOUS BLD VENIPUNCTURE: CPT | Performed by: SURGERY

## 2024-08-03 PROCEDURE — 94761 N-INVAS EAR/PLS OXIMETRY MLT: CPT

## 2024-08-03 PROCEDURE — 94799 UNLISTED PULMONARY SVC/PX: CPT

## 2024-08-03 RX ORDER — SIMETHICONE 80 MG
1 TABLET,CHEWABLE ORAL 3 TIMES DAILY PRN
Status: DISCONTINUED | OUTPATIENT
Start: 2024-08-03 | End: 2024-08-04 | Stop reason: HOSPADM

## 2024-08-03 RX ORDER — MAGNESIUM SULFATE HEPTAHYDRATE 40 MG/ML
2 INJECTION, SOLUTION INTRAVENOUS ONCE
Status: COMPLETED | OUTPATIENT
Start: 2024-08-03 | End: 2024-08-03

## 2024-08-03 RX ADMIN — CARVEDILOL 25 MG: 12.5 TABLET, FILM COATED ORAL at 08:08

## 2024-08-03 RX ADMIN — HYDROCODONE BITARTRATE AND ACETAMINOPHEN 1 TABLET: 5; 325 TABLET ORAL at 08:08

## 2024-08-03 RX ADMIN — MAGNESIUM SULFATE HEPTAHYDRATE 2 G: 40 INJECTION, SOLUTION INTRAVENOUS at 09:08

## 2024-08-03 RX ADMIN — DEXTROSE, SODIUM CHLORIDE, AND POTASSIUM CHLORIDE: 5; .45; .15 INJECTION INTRAVENOUS at 12:08

## 2024-08-03 RX ADMIN — BISACODYL 5 MG: 5 TABLET, COATED ORAL at 08:08

## 2024-08-03 RX ADMIN — HYDROCODONE BITARTRATE AND ACETAMINOPHEN 1 TABLET: 5; 325 TABLET ORAL at 03:08

## 2024-08-03 RX ADMIN — DOCUSATE SODIUM 100 MG: 100 CAPSULE, LIQUID FILLED ORAL at 08:08

## 2024-08-03 RX ADMIN — CHLORHEXIDINE GLUCONATE 0.12% ORAL RINSE 10 ML: 1.2 LIQUID ORAL at 08:08

## 2024-08-03 RX ADMIN — FAMOTIDINE 20 MG: 20 TABLET ORAL at 08:08

## 2024-08-03 RX ADMIN — AMOXICILLIN AND CLAVULANATE POTASSIUM 1 TABLET: 875; 125 TABLET, FILM COATED ORAL at 08:08

## 2024-08-03 RX ADMIN — SIMETHICONE 80 MG: 80 TABLET, CHEWABLE ORAL at 05:08

## 2024-08-03 RX ADMIN — ATORVASTATIN CALCIUM 20 MG: 10 TABLET, FILM COATED ORAL at 08:08

## 2024-08-03 RX ADMIN — HYDROCODONE BITARTRATE AND ACETAMINOPHEN 1 TABLET: 5; 325 TABLET ORAL at 10:08

## 2024-08-03 RX ADMIN — LOSARTAN POTASSIUM 100 MG: 50 TABLET, FILM COATED ORAL at 08:08

## 2024-08-03 RX ADMIN — SIMETHICONE 80 MG: 80 TABLET, CHEWABLE ORAL at 09:08

## 2024-08-03 RX ADMIN — HYDROCODONE BITARTRATE AND ACETAMINOPHEN 1 TABLET: 7.5; 325 TABLET ORAL at 04:08

## 2024-08-03 RX ADMIN — ENOXAPARIN SODIUM 40 MG: 40 INJECTION SUBCUTANEOUS at 05:08

## 2024-08-04 VITALS
DIASTOLIC BLOOD PRESSURE: 82 MMHG | BODY MASS INDEX: 24.16 KG/M2 | RESPIRATION RATE: 18 BRPM | OXYGEN SATURATION: 95 % | HEIGHT: 73 IN | WEIGHT: 182.31 LBS | HEART RATE: 66 BPM | TEMPERATURE: 98 F | SYSTOLIC BLOOD PRESSURE: 143 MMHG

## 2024-08-04 PROCEDURE — 25000003 PHARM REV CODE 250: Performed by: SURGERY

## 2024-08-04 PROCEDURE — 25000003 PHARM REV CODE 250

## 2024-08-04 RX ORDER — AMOXICILLIN AND CLAVULANATE POTASSIUM 875; 125 MG/1; MG/1
1 TABLET, FILM COATED ORAL EVERY 12 HOURS
Qty: 10 TABLET | Refills: 0 | Status: SHIPPED | OUTPATIENT
Start: 2024-08-04

## 2024-08-04 RX ORDER — HYDROCODONE BITARTRATE AND ACETAMINOPHEN 5; 325 MG/1; MG/1
1 TABLET ORAL EVERY 4 HOURS PRN
Qty: 12 TABLET | Refills: 0 | Status: SHIPPED | OUTPATIENT
Start: 2024-08-04

## 2024-08-04 RX ADMIN — HYDROCODONE BITARTRATE AND ACETAMINOPHEN 1 TABLET: 5; 325 TABLET ORAL at 04:08

## 2024-08-04 RX ADMIN — BISACODYL 5 MG: 5 TABLET, COATED ORAL at 08:08

## 2024-08-04 RX ADMIN — CARVEDILOL 25 MG: 12.5 TABLET, FILM COATED ORAL at 08:08

## 2024-08-04 RX ADMIN — FAMOTIDINE 20 MG: 20 TABLET ORAL at 08:08

## 2024-08-04 RX ADMIN — HYDROCODONE BITARTRATE AND ACETAMINOPHEN 1 TABLET: 5; 325 TABLET ORAL at 12:08

## 2024-08-04 RX ADMIN — CHLORHEXIDINE GLUCONATE 0.12% ORAL RINSE 10 ML: 1.2 LIQUID ORAL at 08:08

## 2024-08-04 RX ADMIN — HYDROCODONE BITARTRATE AND ACETAMINOPHEN 1 TABLET: 7.5; 325 TABLET ORAL at 10:08

## 2024-08-04 RX ADMIN — DOCUSATE SODIUM 100 MG: 100 CAPSULE, LIQUID FILLED ORAL at 08:08

## 2024-08-04 RX ADMIN — AMOXICILLIN AND CLAVULANATE POTASSIUM 1 TABLET: 875; 125 TABLET, FILM COATED ORAL at 08:08

## 2024-08-06 ENCOUNTER — PATIENT OUTREACH (OUTPATIENT)
Dept: ADMINISTRATIVE | Facility: CLINIC | Age: 59
End: 2024-08-06
Payer: COMMERCIAL

## 2024-08-14 ENCOUNTER — TELEPHONE (OUTPATIENT)
Dept: SURGERY | Facility: CLINIC | Age: 59
End: 2024-08-14
Payer: COMMERCIAL

## 2024-08-14 NOTE — TELEPHONE ENCOUNTER
Returned call to patient he is aware of his post op appointment date and time with DEBO Saavedra. The patient verbalized understanding.

## 2024-08-14 NOTE — TELEPHONE ENCOUNTER
----- Message from Vita Valle sent at 8/14/2024  9:35 AM CDT -----  Type:  Sooner Apoointment Request    Caller is requesting a sooner appointment.  Caller declined first available appointment listed below.  Caller will not accept being placed on the waitlist and is requesting a message be sent to doctor.  Name of Caller: Pt  When is the first available appointment?   Symptoms: hospital follow up  Would the patient rather a call back or [facility]?  Call  back  Best Call Back Number: [phone]  Additional Information:  Post opt   Procedure:  64805 - Total Knee Arthroplasty Right  Patient's preferred date:  To be determined  Duration of Procedure: 1 1/2 hrs  Hospital[facility]  [facility]  Anesthesia: General and Regional  Length of Stay: Inpatient  Equipment: - [employer]   PA First Assist: Yes  Consent: at OR Facility  Special Instructions: none  BMI - Estimated body mass index is 25.31 kg/m² as calculated from the following:    Height as of this encounter: 5' 10\" (1.778 m).    Weight as of this encounter: 80 kg.     Please coordinate:   - First postop visit at 10-14 days postop with MD.  - [employer]:  NO  - Preop H&P / Testing to be done by: PCP - already done, and I will update the H&P  - Prehab: NO  - Postop PT: Yes

## 2024-08-15 ENCOUNTER — OFFICE VISIT (OUTPATIENT)
Dept: SURGERY | Facility: CLINIC | Age: 59
End: 2024-08-15
Payer: COMMERCIAL

## 2024-08-15 VITALS
WEIGHT: 169.56 LBS | HEART RATE: 76 BPM | BODY MASS INDEX: 22.47 KG/M2 | SYSTOLIC BLOOD PRESSURE: 100 MMHG | HEIGHT: 73 IN | DIASTOLIC BLOOD PRESSURE: 67 MMHG | TEMPERATURE: 98 F

## 2024-08-15 DIAGNOSIS — K35.30 ACUTE APPENDICITIS WITH LOCALIZED PERITONITIS, WITHOUT PERFORATION, ABSCESS, OR GANGRENE: Primary | ICD-10-CM

## 2024-08-15 PROCEDURE — 4010F ACE/ARB THERAPY RXD/TAKEN: CPT | Mod: CPTII,S$GLB,, | Performed by: SURGERY

## 2024-08-15 PROCEDURE — 99999 PR PBB SHADOW E&M-EST. PATIENT-LVL III: CPT | Mod: PBBFAC,,, | Performed by: SURGERY

## 2024-08-15 PROCEDURE — 3078F DIAST BP <80 MM HG: CPT | Mod: CPTII,S$GLB,, | Performed by: SURGERY

## 2024-08-15 PROCEDURE — 3074F SYST BP LT 130 MM HG: CPT | Mod: CPTII,S$GLB,, | Performed by: SURGERY

## 2024-08-15 PROCEDURE — 99024 POSTOP FOLLOW-UP VISIT: CPT | Mod: S$GLB,,, | Performed by: SURGERY

## 2024-08-15 PROCEDURE — 1159F MED LIST DOCD IN RCRD: CPT | Mod: CPTII,S$GLB,, | Performed by: SURGERY

## 2024-08-15 NOTE — PATIENT INSTRUCTIONS
There are no limits on your activity.      You can return to work on August 26, 2024 with no restrictions as we discussed.      Removed the glue when it becomes loose.      If you have any questions or concerns please call the office    Our office phone numbers are  [phone] and

## 2024-08-15 NOTE — PROGRESS NOTES
Subjective     Patient ID: Rosalio Muñoz is a 58 y.o. male.    Chief Complaint: No chief complaint on file.    Patient returns after robotic assisted appendectomy.  He had a prolonged postop course due to bowel dysfunction.  He has returned to his normal activities.  He was tolerating a diet.  Bowel function is within normal limits.  He was no incisional pain.  He was accompanied by his wife  Review of Systems   Gastrointestinal: Negative.         Objective     Physical Exam  Cardiovascular:      Rate and Rhythm: Normal rate and regular rhythm.   Pulmonary:      Effort: Pulmonary effort is normal.      Breath sounds: Normal breath sounds.   Abdominal:      General: Abdomen is flat. Bowel sounds are normal.      Palpations: Abdomen is soft.      Comments: All incisions are clean   Skin:     Comments: Bilateral forearm show abrasions and excoriations   Neurological:      Mental Status: He is alert.        Final Pathologic Diagnosis APPENDIX:  INTENSE ACUTE APPENDICITIS     Assessment and Plan     Patient was recovered from acute appendicitis with a robotic assisted appendectomy    Activity as tolerated.  Removed the glue when it becomes loose.  May return to work on August 26, 2024 with no restrictions.      Follow up as needed

## 2024-08-20 ENCOUNTER — OFFICE VISIT (OUTPATIENT)
Dept: INTERNAL MEDICINE | Facility: CLINIC | Age: 59
End: 2024-08-20
Payer: COMMERCIAL

## 2024-08-20 ENCOUNTER — LAB VISIT (OUTPATIENT)
Dept: LAB | Facility: HOSPITAL | Age: 59
End: 2024-08-20
Attending: FAMILY MEDICINE
Payer: COMMERCIAL

## 2024-08-20 VITALS
DIASTOLIC BLOOD PRESSURE: 86 MMHG | HEART RATE: 93 BPM | SYSTOLIC BLOOD PRESSURE: 116 MMHG | OXYGEN SATURATION: 97 % | BODY MASS INDEX: 21.93 KG/M2 | WEIGHT: 166.25 LBS

## 2024-08-20 DIAGNOSIS — E83.42 HYPOMAGNESEMIA: ICD-10-CM

## 2024-08-20 DIAGNOSIS — R63.4 UNINTENTIONAL WEIGHT LOSS: ICD-10-CM

## 2024-08-20 DIAGNOSIS — Z12.5 PROSTATE CANCER SCREENING: ICD-10-CM

## 2024-08-20 DIAGNOSIS — Z12.11 COLON CANCER SCREENING: ICD-10-CM

## 2024-08-20 DIAGNOSIS — F10.90 ALCOHOL USE DISORDER: ICD-10-CM

## 2024-08-20 DIAGNOSIS — R68.82 LOW LIBIDO: ICD-10-CM

## 2024-08-20 DIAGNOSIS — R63.4 UNINTENTIONAL WEIGHT LOSS: Primary | ICD-10-CM

## 2024-08-20 DIAGNOSIS — F17.200 TOBACCO USE DISORDER: ICD-10-CM

## 2024-08-20 PROCEDURE — 84439 ASSAY OF FREE THYROXINE: CPT | Performed by: FAMILY MEDICINE

## 2024-08-20 PROCEDURE — 85025 COMPLETE CBC W/AUTO DIFF WBC: CPT | Performed by: FAMILY MEDICINE

## 2024-08-20 PROCEDURE — 84403 ASSAY OF TOTAL TESTOSTERONE: CPT | Performed by: FAMILY MEDICINE

## 2024-08-20 PROCEDURE — 1160F RVW MEDS BY RX/DR IN RCRD: CPT | Mod: CPTII,S$GLB,, | Performed by: FAMILY MEDICINE

## 2024-08-20 PROCEDURE — 1159F MED LIST DOCD IN RCRD: CPT | Mod: CPTII,S$GLB,, | Performed by: FAMILY MEDICINE

## 2024-08-20 PROCEDURE — 84153 ASSAY OF PSA TOTAL: CPT | Performed by: FAMILY MEDICINE

## 2024-08-20 PROCEDURE — 99214 OFFICE O/P EST MOD 30 MIN: CPT | Mod: S$GLB,,, | Performed by: FAMILY MEDICINE

## 2024-08-20 PROCEDURE — 99999 PR PBB SHADOW E&M-EST. PATIENT-LVL IV: CPT | Mod: PBBFAC,,, | Performed by: FAMILY MEDICINE

## 2024-08-20 PROCEDURE — 3079F DIAST BP 80-89 MM HG: CPT | Mod: CPTII,S$GLB,, | Performed by: FAMILY MEDICINE

## 2024-08-20 PROCEDURE — 3074F SYST BP LT 130 MM HG: CPT | Mod: CPTII,S$GLB,, | Performed by: FAMILY MEDICINE

## 2024-08-20 PROCEDURE — G2211 COMPLEX E/M VISIT ADD ON: HCPCS | Mod: S$GLB,,, | Performed by: FAMILY MEDICINE

## 2024-08-20 PROCEDURE — 36415 COLL VENOUS BLD VENIPUNCTURE: CPT | Performed by: FAMILY MEDICINE

## 2024-08-20 PROCEDURE — 83735 ASSAY OF MAGNESIUM: CPT | Performed by: FAMILY MEDICINE

## 2024-08-20 PROCEDURE — 3008F BODY MASS INDEX DOCD: CPT | Mod: CPTII,S$GLB,, | Performed by: FAMILY MEDICINE

## 2024-08-20 PROCEDURE — 1111F DSCHRG MED/CURRENT MED MERGE: CPT | Mod: CPTII,S$GLB,, | Performed by: FAMILY MEDICINE

## 2024-08-20 PROCEDURE — 80053 COMPREHEN METABOLIC PANEL: CPT | Performed by: FAMILY MEDICINE

## 2024-08-20 PROCEDURE — 84443 ASSAY THYROID STIM HORMONE: CPT | Performed by: FAMILY MEDICINE

## 2024-08-20 PROCEDURE — 4010F ACE/ARB THERAPY RXD/TAKEN: CPT | Mod: CPTII,S$GLB,, | Performed by: FAMILY MEDICINE

## 2024-08-21 LAB
ALBUMIN SERPL BCP-MCNC: 3.4 G/DL (ref 3.5–5.2)
ALP SERPL-CCNC: 108 U/L (ref 55–135)
ALT SERPL W/O P-5'-P-CCNC: 17 U/L (ref 10–44)
ANION GAP SERPL CALC-SCNC: 10 MMOL/L (ref 8–16)
AST SERPL-CCNC: 19 U/L (ref 10–40)
BASOPHILS # BLD AUTO: 0.05 K/UL (ref 0–0.2)
BASOPHILS NFR BLD: 0.6 % (ref 0–1.9)
BILIRUB SERPL-MCNC: 0.8 MG/DL (ref 0.1–1)
BUN SERPL-MCNC: 5 MG/DL (ref 6–20)
CALCIUM SERPL-MCNC: 9.9 MG/DL (ref 8.7–10.5)
CHLORIDE SERPL-SCNC: 95 MMOL/L (ref 95–110)
CO2 SERPL-SCNC: 24 MMOL/L (ref 23–29)
COMPLEXED PSA SERPL-MCNC: 0.3 NG/ML (ref 0–4)
CREAT SERPL-MCNC: 0.7 MG/DL (ref 0.5–1.4)
DIFFERENTIAL METHOD BLD: ABNORMAL
EOSINOPHIL # BLD AUTO: 0.1 K/UL (ref 0–0.5)
EOSINOPHIL NFR BLD: 1.3 % (ref 0–8)
ERYTHROCYTE [DISTWIDTH] IN BLOOD BY AUTOMATED COUNT: 13.2 % (ref 11.5–14.5)
EST. GFR  (NO RACE VARIABLE): >60 ML/MIN/1.73 M^2
GLUCOSE SERPL-MCNC: 80 MG/DL (ref 70–110)
HCT VFR BLD AUTO: 52.7 % (ref 40–54)
HGB BLD-MCNC: 17.9 G/DL (ref 14–18)
IMM GRANULOCYTES # BLD AUTO: 0.02 K/UL (ref 0–0.04)
IMM GRANULOCYTES NFR BLD AUTO: 0.3 % (ref 0–0.5)
LYMPHOCYTES # BLD AUTO: 1.4 K/UL (ref 1–4.8)
LYMPHOCYTES NFR BLD: 17.8 % (ref 18–48)
MAGNESIUM SERPL-MCNC: 1.8 MG/DL (ref 1.6–2.6)
MCH RBC QN AUTO: 33.5 PG (ref 27–31)
MCHC RBC AUTO-ENTMCNC: 34 G/DL (ref 32–36)
MCV RBC AUTO: 99 FL (ref 82–98)
MONOCYTES # BLD AUTO: 0.9 K/UL (ref 0.3–1)
MONOCYTES NFR BLD: 11.6 % (ref 4–15)
NEUTROPHILS # BLD AUTO: 5.3 K/UL (ref 1.8–7.7)
NEUTROPHILS NFR BLD: 68.4 % (ref 38–73)
NRBC BLD-RTO: 0 /100 WBC
PLATELET # BLD AUTO: 409 K/UL (ref 150–450)
PMV BLD AUTO: 10 FL (ref 9.2–12.9)
POTASSIUM SERPL-SCNC: 4.8 MMOL/L (ref 3.5–5.1)
PROT SERPL-MCNC: 7.4 G/DL (ref 6–8.4)
RBC # BLD AUTO: 5.34 M/UL (ref 4.6–6.2)
SODIUM SERPL-SCNC: 129 MMOL/L (ref 136–145)
T4 FREE SERPL-MCNC: 0.8 NG/DL (ref 0.71–1.51)
TESTOST SERPL-MCNC: 871 NG/DL (ref 304–1227)
TSH SERPL DL<=0.005 MIU/L-ACNC: 3.46 UIU/ML (ref 0.4–4)
WBC # BLD AUTO: 7.74 K/UL (ref 3.9–12.7)

## 2024-08-25 PROBLEM — R63.4 UNINTENTIONAL WEIGHT LOSS: Status: ACTIVE | Noted: 2024-08-25

## 2024-08-25 PROBLEM — F10.90 ALCOHOL USE DISORDER: Status: ACTIVE | Noted: 2024-08-25

## 2024-08-25 PROBLEM — F17.200 TOBACCO USE DISORDER: Status: ACTIVE | Noted: 2017-12-19

## 2024-08-25 PROBLEM — R68.82 LOW LIBIDO: Status: ACTIVE | Noted: 2024-08-25

## 2024-08-25 NOTE — PROGRESS NOTES
Subjective:       Patient ID: Rosalio Muñoz is a 58 y.o. male.    Chief Complaint: Establish Care    History of Present Illness    Patient presents to clinic today to establish care. His wife is present with him.   - Unexpected weight loss of 25-30 lbs over the past 4 months without intentional effort  - Decreased appetite with increased cravings for dairy products for the past 2 months  - Appendectomy recently, requiring a 5-day hospitalization  - Pain initially attributed to muscular strain from gardening activities, followed by a two-week pain-free period before presenting with severe pain on the day of hospitalization  - Decreased libido, potentially due to low testosterone levels  - Ill-fitting dentures requiring increased adhesive use, potentially contributing to decreased appetite  - Difficulty swallowing pills after having COVID-19 2 years ago, but no trouble swallowing food or liquids  - Low-grade fever the Sunday before his appendectomy, for which he took Tylenol  - Monitors his blood pressure twice daily and occasionally holds his blood pressure medication when readings are within normal range to avoid hypotension  - Decreased alcohol consumption from 8-10 beers a day to about 4 beers a day  - Continues to smoke cigars after quitting cigarettes 2 years ago  - Stopped vaping after hypertensive urgency last year  - Denies cough, trouble breathing, confusion, weakness, or behavioral changes  - Denies any current heart conditions, though heart issues run in his family    MEDICATIONS:  - Blood pressure medication, taken as needed based on blood pressure readings  - Gatorade, daily, to maintain electrolyte balance (as recommended by Dr. Lopez)    MEDICAL HISTORY:  - Hypertension  - COVID-19: 2 years ago    FAMILY HISTORY:  - Mother: Hyperthyroidism  - Family history: Heart disease    SURGICAL HISTORY:  - Appendectomy: Recently for appendicitis. Patient was hospitalized for 5 days post-surgery  -  Colonoscopy: October/November last year, 22 polyps found    TEST RESULTS:  - Sodium: Low in hospital, critically low previously  Magnesium: Low in hospital, critically low a year ago  Kidney function: Normal  PSA: Checked about a year ago    IMAGING:  - Screening chest CT: August last year, no significant findings reported  - CT renal stone study: No significant findings reported  - CT (non-contrast): During recent hospital stay, showed:  - - Tiny cyst in liver  - - No abnormal lymph nodes  - - Inflammatory changes around the appendix  - - Negative for significant spinal stenosis  - - Some degenerative changes    ALLERGIES:  - No known allergies    SOCIAL HISTORY:  - Smoking: Currently smokes cigars; quit cigarettes (2 packs/day) 2 years ago  - Vaping: Previously vaped; quit after hypertensive urgency  - Alcohol Use: Currently drinks 4 beers/day; previously consumed 8-10 beers/day  - 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  - Marital status:     ROS:  General: -fever, -chills, -fatigue, -weight gain, +WEIGHT LOSS, +LOSS OF APPETITE  Eyes: -eye pain, -vision change  ENMT: -hearing loss, -ear pain, -nasal congestion, -rhinorrhea, -dental problem, -trouble swallowing  Cardiovascular: -chest pain, -palpitations, -lower extremity edema  Respiratory: -cough, -trouble breathing  Gastrointestinal: -abdominal pain, -abdominal swelling, -nausea, -vomiting, -diarrhea, -constipation, -blood in stool  Genitourinary: -difficulty urinating, -genital problem  Musculoskeletal: -joint pain, -muscle aches  Integumentary: -rash  Lymphatics: -swollen glands, -easy bruising  Neurologic: -headache, -dizziness, -numbness, -weakness  Psychiatric: -anxiety, -depression, -sleep difficulty       Review of Systems   Constitutional:  Positive for unexpected weight change. Negative for chills, fatigue and fever.   HENT:  Positive for hearing loss (chronic). Negative  for congestion, dental problem, ear pain, rhinorrhea and trouble swallowing.    Eyes:  Negative for pain and visual disturbance.   Respiratory:  Negative for cough and shortness of breath.    Cardiovascular:  Negative for chest pain, palpitations and leg swelling.   Gastrointestinal:  Positive for diarrhea (occasionally). Negative for abdominal distention, abdominal pain, blood in stool, constipation, nausea and vomiting.   Genitourinary:  Negative for difficulty urinating, scrotal swelling and testicular pain.   Musculoskeletal:  Negative for arthralgias and myalgias.   Skin:  Negative for rash.   Neurological:  Negative for dizziness, weakness, numbness and headaches.   Hematological:  Negative for adenopathy. Bruises/bleeds easily.   Psychiatric/Behavioral:  Negative for dysphoric mood and sleep disturbance. The patient is not nervous/anxious.          Objective:      Physical Exam  Vitals reviewed.   Constitutional:       General: He is not in acute distress.     Appearance: He is well-developed.   HENT:      Head: Normocephalic and atraumatic.      Right Ear: Hearing, tympanic membrane, ear canal and external ear normal.      Left Ear: Hearing, tympanic membrane, ear canal and external ear normal.      Nose: Nose normal.      Mouth/Throat:      Pharynx: Uvula midline.   Eyes:      General: Lids are normal. No scleral icterus.     Conjunctiva/sclera: Conjunctivae normal.      Pupils: Pupils are equal, round, and reactive to light.   Neck:      Thyroid: No thyromegaly.   Cardiovascular:      Rate and Rhythm: Normal rate and regular rhythm.      Heart sounds: No murmur heard.     No friction rub. No gallop.   Pulmonary:      Effort: Pulmonary effort is normal.      Breath sounds: Normal breath sounds. No wheezing or rales.   Abdominal:      General: Bowel sounds are normal. There is no distension.      Palpations: Abdomen is soft. There is no mass.      Tenderness: There is no abdominal tenderness.    Musculoskeletal:         General: No tenderness. Normal range of motion.      Cervical back: Normal range of motion and neck supple.   Lymphadenopathy:      Cervical: No cervical adenopathy.   Skin:     General: Skin is warm and dry.      Findings: No rash.   Neurological:      Mental Status: He is alert and oriented to person, place, and time.      Cranial Nerves: No cranial nerve deficit.      Gait: Gait normal.   Psychiatric:         Mood and Affect: Mood and affect normal.         Assessment:       1. Unintentional weight loss    2. Low libido    3. Hypomagnesemia    4. Tobacco use disorder    5. Alcohol use disorder    6. Colon cancer screening    7. Prostate cancer screening        Plan:   1. Unintentional weight loss  -     CBC Auto Differential; Future; Expected date: 08/20/2024  -     Comprehensive Metabolic Panel; Future; Expected date: 08/20/2024  -     T4, Free; Future; Expected date: 08/20/2024  -     TSH; Future; Expected date: 08/20/2024  -     CT Chest With Contrast; Future; Expected date: 08/20/2024  -     CT Abdomen Pelvis With IV Contrast Routine Oral Contrast; Future; Expected date: 08/20/2024    2. Low libido  -     TESTOSTERONE; Future; Expected date: 08/20/2024    3. Hypomagnesemia  -     Magnesium; Future; Expected date: 08/20/2024    4. Tobacco use disorder    5. Alcohol use disorder    6. Colon cancer screening  -     Ambulatory referral/consult to Endo Procedure ; Future; Expected date: 08/21/2024    7. Prostate cancer screening  -     PSA, Screening; Future; Expected date: 08/20/2024       Investigating unexplained weight loss of 25-30 lbs over 4 months   Considered thyroid dysfunction as potential cause, given family history of overactive thyroid   Evaluated low sodium levels, previously noted but not investigated   Planned contrast CT of chest, abdomen, and pelvis to rule out underlying malignancy, particularly given patient's smoking history and alcohol use   Monitored blood  pressure, deferring to Dr. Lockett for management   Assessed swallowing difficulties persisting since COVID infection 2 years ago   Evaluated alcohol consumption as a health concern     Educated on health risks associated with smoking cigars and excessive alcohol consumption.   Explained that more than 2 beers daily is considered problem drinking and a health hazard.   Discussed increased cancer risk, particularly of mouth and esophagus, with combined tobacco and alcohol use.   Patient to continue cutting back on alcohol consumption.   Recommend considering programs like AA or psychiatry referral if additional support needed for alcohol reduction.     Patient to see dentist about refitting dentures to address nutrition concerns.   Recommend increasing protein and healthy fat intake to address weight loss.     Ordered labs including metabolic panel, blood count, magnesium, PSA.   Ordered contrast CT of chest, abdomen, and pelvis.   Referred for colonoscopy follow-up.     Follow up in 6 months for physical exam.   Return sooner if having any issues.   Will notify with test results and next steps after review.       Patient and his wife expressed understanding and agreement with plan.    Visit today included increased complexity associated with the care of the episodic problem weight loss, which was addressed while instituting co-management of the longitudinal care of the patient due to the serious and/or complex managed problem(s) .    I have evaluated and discussed management associated with medical care services that serve as the continuing focal point for all needed health care services and/or with medical care services that are part of ongoing care related to my patient's single, serious condition or a complex condition(s).    I am providing ongoing care and I am the primary care provider for this patient, and they are being managed, monitored, and/or observed for their chronic conditions over time.     I have  addressed their ongoing health maintenance requirements and needs for all health care services and reviewed co-management plans provided by specialty providers when available.    Health Maintenance Due   Topic Date Due    COVID-19 Vaccine (4 - 2023-24 season) 09/01/2023    Colorectal Cancer Screening  02/25/2024     Health Maintenance reviewed/updated.    Follow up in about 6 months (around 2/20/2025), or if symptoms worsen or fail to improve, for EPP/annual with JORDANA, labs today.    This note was generated with the assistance of ambient listening technology. Verbal consent was obtained by the patient and accompanying visitor(s) for the recording of patient appointment to facilitate this note. I attest to having reviewed and edited the generated note for accuracy, though some syntax or spelling errors may persist. Please contact the author of this note for any clarification.

## 2024-08-29 ENCOUNTER — TELEPHONE (OUTPATIENT)
Dept: INTERNAL MEDICINE | Facility: CLINIC | Age: 59
End: 2024-08-29
Payer: COMMERCIAL

## 2024-08-29 NOTE — TELEPHONE ENCOUNTER
----- Message from Michell Goyal MD sent at 8/28/2024  2:12 PM CDT -----  Please contact patient regarding results/recommendations. It appears they have not reviewed results in [facility].

## 2024-08-30 ENCOUNTER — HOSPITAL ENCOUNTER (OUTPATIENT)
Dept: PREADMISSION TESTING | Facility: HOSPITAL | Age: 59
Discharge: HOME | End: 2024-08-30
Attending: INTERNAL MEDICINE
Payer: COMMERCIAL

## 2024-08-30 DIAGNOSIS — Z12.11 COLON CANCER SCREENING: Primary | ICD-10-CM

## 2024-08-30 RX ORDER — POLYETHYLENE GLYCOL 3350, SODIUM SULFATE, POTASSIUM CHLORIDE, MAGNESIUM SULFATE, AND SODIUM CHLORIDE FOR ORAL SOLUTION 178.7-7.3G
2 KIT ORAL SEE ADMIN INSTRUCTIONS
Qty: 1 EACH | Refills: 0 | Status: SHIPPED | OUTPATIENT
Start: 2024-08-30

## 2024-09-06 ENCOUNTER — HOSPITAL ENCOUNTER (OUTPATIENT)
Dept: RADIOLOGY | Facility: HOSPITAL | Age: 59
Discharge: HOME | End: 2024-09-06
Attending: FAMILY MEDICINE
Payer: COMMERCIAL

## 2024-09-06 DIAGNOSIS — R63.4 UNINTENTIONAL WEIGHT LOSS: ICD-10-CM

## 2024-09-06 PROCEDURE — 71260 CT THORAX DX C+: CPT | Mod: 26,,, | Performed by: RADIOLOGY

## 2024-09-06 PROCEDURE — 71260 CT THORAX DX C+: CPT | Mod: TC

## 2024-09-06 PROCEDURE — 25500020 PHARM REV CODE 255: Performed by: FAMILY MEDICINE

## 2024-09-06 PROCEDURE — A9698 NON-RAD CONTRAST MATERIALNOC: HCPCS | Performed by: FAMILY MEDICINE

## 2024-09-06 PROCEDURE — 74177 CT ABD & PELVIS W/CONTRAST: CPT | Mod: 26,,, | Performed by: RADIOLOGY

## 2024-09-06 RX ADMIN — IOHEXOL 1000 ML: 12 SOLUTION ORAL at 04:09

## 2024-09-06 RX ADMIN — IOHEXOL 100 ML: 350 INJECTION, SOLUTION INTRAVENOUS at 04:09

## 2024-09-10 ENCOUNTER — TELEPHONE (OUTPATIENT)
Dept: CARDIOLOGY | Facility: CLINIC | Age: 59
End: 2024-09-10
Payer: COMMERCIAL

## 2024-09-10 ENCOUNTER — TELEPHONE (OUTPATIENT)
Dept: SURGICAL ONCOLOGY | Facility: CLINIC | Age: 59
End: 2024-09-10
Payer: COMMERCIAL

## 2024-09-10 DIAGNOSIS — K22.89 ESOPHAGEAL MASS: ICD-10-CM

## 2024-09-10 DIAGNOSIS — R63.4 UNINTENTIONAL WEIGHT LOSS: Primary | ICD-10-CM

## 2024-09-10 NOTE — TELEPHONE ENCOUNTER
Called and spoke to pt. Pt scheduled for 09/20/24.     ----- Message from Dennis Lockett MD sent at 9/10/2024  2:53 PM CDT -----  HI    Ok I will get some arterial studies for the PAD.. I am assuming it is PAD and not DVT (although radiologist wrote DVT studies)     He also has Moderate to severe coronary artery disease based on that CT so I will discuss ischemic work up also - either nuc stress or cath.     He should continue aspirin and statin and if having any severe chest pain or leg pain/claudication rec ER eval or follow up with me.     TO my staff - please [facility] follow up with me asap. Thanks  ----- Message -----  From: Michell Goyal MD  Sent: 9/10/2024   2:49 PM CDT  To: Dennis Lockett MD    Please review and let me know what you recommend regarding femoral artery findings. Thank you!

## 2024-09-10 NOTE — TELEPHONE ENCOUNTER
Spoke with patient regarding appointment scheduled with Dr. Rosas. Direct number provided if patient needs to re schedule. Patient verbalizes understanding.

## 2024-09-13 ENCOUNTER — PATIENT MESSAGE (OUTPATIENT)
Dept: SURGERY | Facility: HOSPITAL | Age: 59
End: 2024-09-13
Payer: COMMERCIAL

## 2024-09-20 ENCOUNTER — OFFICE VISIT (OUTPATIENT)
Dept: CARDIOLOGY | Facility: CLINIC | Age: 59
End: 2024-09-20
Payer: COMMERCIAL

## 2024-09-20 VITALS
OXYGEN SATURATION: 98 % | WEIGHT: 176.81 LBS | BODY MASS INDEX: 23.43 KG/M2 | HEIGHT: 73 IN | SYSTOLIC BLOOD PRESSURE: 110 MMHG | HEART RATE: 80 BPM | DIASTOLIC BLOOD PRESSURE: 78 MMHG

## 2024-09-20 DIAGNOSIS — I73.9 CLAUDICATION: ICD-10-CM

## 2024-09-20 DIAGNOSIS — Z72.0 TOBACCO ABUSE: ICD-10-CM

## 2024-09-20 DIAGNOSIS — E78.49 OTHER HYPERLIPIDEMIA: ICD-10-CM

## 2024-09-20 DIAGNOSIS — I10 PRIMARY HYPERTENSION: ICD-10-CM

## 2024-09-20 DIAGNOSIS — I73.9 PAD (PERIPHERAL ARTERY DISEASE): ICD-10-CM

## 2024-09-20 DIAGNOSIS — I25.2 HISTORY OF NON-ST ELEVATION MYOCARDIAL INFARCTION (NSTEMI): ICD-10-CM

## 2024-09-20 DIAGNOSIS — I25.118 CORONARY ARTERY DISEASE OF NATIVE ARTERY OF NATIVE HEART WITH STABLE ANGINA PECTORIS: Primary | ICD-10-CM

## 2024-09-20 PROCEDURE — 99999 PR PBB SHADOW E&M-EST. PATIENT-LVL IV: CPT | Mod: PBBFAC,,, | Performed by: INTERNAL MEDICINE

## 2024-09-20 NOTE — PROGRESS NOTES
Subjective:   Patient ID:  Rosalio Muñoz is a 58 y.o. male who presents for cardiac consult of Follow-up (CT Chest Abdomen Pelvis With IV Contrast (XPD) Routine Oral Contrast results)      Referral by: No referring provider defined for this encounter.     Reason for consult:       HPI  The patient came in today for cardiac consult of Follow-up (CT Chest Abdomen Pelvis With IV Contrast (XPD) Routine Oral Contrast results)      Rosalio Muñoz is a 58 y.o. male CAD, PAD, HTN, former tobacco user, hyperlipidemia , h/o NSTEMI presents for CV follow up.       3/28/24  Seen by Maryjo last year here for follow up.   BP elevated 140/98. HR 82  He has been eating more salty food.     6/11/24  Bp and HR stable. He has cut back salty food.   BMI 24 - 183 lbs     7/16/24  BP has been low - he has a couple of loose bowel movements - happened a few times.   BP is ok today.   BMI 22 - 173 lbs     9/20/24  Pt had recent CT chest abd -   Heart/Aorta: Normal size. Moderate to severe coronary artery disease. No effusion.No pericardial effusion. Aorta normal caliber.   Vasculature: No aneurysm.Dense atherosclerotic disease.  Heterogeneous attenuation seen within the bilateral common femoral arteries.  Recommend follow-up DVT study.    told PCP   arterial studies for the PAD.. I am assuming it is PAD and not DVT (although radiologist wrote DVT studies)   He also has Moderate to severe coronary artery disease based on that CT so I will discuss ischemic work up also - either nuc stress or cath.   He should continue aspirin and statin and if having any severe chest pain or leg pain/claudication rec ER eval or follow up with me.     Results for orders placed during the hospital encounter of 05/29/23    Echo    Interpretation Summary  · The left ventricle is normal in size with concentric remodeling and normal systolic function.  · The estimated ejection fraction is 60%.  · Normal left ventricular diastolic function.  · Normal  right ventricular size with normal right ventricular systolic function.  · Normal central venous pressure (3 mmHg).  · The estimated PA systolic pressure is 20 mmHg.      Results for orders placed during the hospital encounter of 23    Nuclear Stress - Cardiology Interpreted    Interpretation Summary    Normal myocardial perfusion scan. There is no evidence of myocardial ischemia or infarction.    The gated perfusion images showed an ejection fraction of 71% at rest. The gated perfusion images showed an ejection fraction of 68% post stress.    There is normal wall motion at rest and post stress.    The ECG portion of the study is negative for ischemia.      No results found for this or any previous visit.      No cardiac monitor results found for the past 12 months         Past Medical History:   Diagnosis Date    Hypertension        Past Surgical History:   Procedure Laterality Date    COLONOSCOPY N/A 2023    Procedure: COLONOSCOPY;  Surgeon: Pebbles Spear MD;  Location[facility];  Service: Endoscopy;  Laterality: N/A;    SURGICAL REMOVAL OF LESION OF FACE Right 2023    Procedure: EXCISION, LESION, FACE;  Surgeon: Jose Flaherty MD;  Location: [facility] OR;  Service: ENT;  Laterality: Right;  1. Excision facial subcutaneous mass right cheek 3 cm. 2. Intermediate layered closure 3.5 cm    WISDOM TOOTH EXTRACTION      XI ROBOTIC APPENDECTOMY N/A 2024    Procedure: XI ROBOTIC APPENDECTOMY;  Surgeon: Paulo Saavedra MD;  Location: [facility] OR;  Service: General;  Laterality: N/A;       Social History     Tobacco Use    Smoking status: Former     Current packs/day: 0.00     Types: Cigars, Cigarettes     Quit date: 10/4/2022     Years since quittin.9     Passive exposure: Never    Smokeless tobacco: Current    Tobacco comments:     Cigar every afternoon   Substance Use Topics    Alcohol use: Yes     Alcohol/week: 4.0 - 6.0 standard drinks of alcohol     Types: 4 - 6 Cans of beer per week      Comment: occ    Drug use: No       Family History   Problem Relation Name Age of Onset    Hyperlipidemia Mother      Hypertension Father      Diabetes Father      Kidney disease Father      Heart disease Father      Prostate cancer Maternal Grandfather      Colon cancer Neg Hx         Patient's Medications   New Prescriptions    No medications on file   Previous Medications    ASPIRIN (ECOTRIN) 81 MG EC TABLET    Take 1 tablet (81 mg total) by mouth once daily.    ATORVASTATIN (LIPITOR) 20 MG TABLET    Take 1 tablet (20 mg total) by mouth every evening.    CARVEDILOL (COREG) 25 MG TABLET    Take 1 tablet (25 mg total) by mouth 2 (two) times daily.    HYDROCODONE-ACETAMINOPHEN (NORCO) 5-325 MG PER TABLET    Take 1 tablet by mouth every 4 (four) hours as needed for Pain.    LOSARTAN (COZAAR) 100 MG TABLET    Take 1 tablet (100 mg total) by mouth every evening.    PEG 3350-SOD SULF,CHLR-POT-MAG (SUFLAVE) 178.7-7.3-0.5 GRAM SOLR    Take 2 Bottles by mouth As instructed (Use as directed in facility directions).   Modified Medications    No medications on file   Discontinued Medications    No medications on file       Review of Systems   Constitutional: Negative.    HENT: Negative.     Eyes: Negative.    Respiratory: Negative.     Cardiovascular: Negative.    Gastrointestinal: Negative.    Genitourinary: Negative.    Musculoskeletal: Negative.    Skin: Negative.    Neurological: Negative.    Endo/Heme/Allergies: Negative.    Psychiatric/Behavioral: Negative.     All 12 systems otherwise negative.      Wt Readings from Last 3 Encounters:   09/20/24 80.2 kg (176 lb 12.9 oz)   08/20/24 75.4 kg (166 lb 3.6 oz)   08/15/24 76.9 kg (169 lb 8.5 oz)     Temp Readings from Last 3 Encounters:   08/15/24 98 °F (36.7 °C)   08/04/24 98.1 °F (36.7 °C) (Oral)   12/01/23 97.8 °F (36.6 °C) (Temporal)     BP Readings from Last 3 Encounters:   09/20/24 110/78   08/20/24 116/86   08/15/24 100/67     Pulse Readings from Last 3 Encounters:  "  09/20/24 80   08/20/24 93   08/15/24 76       /78 (BP Location: Right arm, Patient Position: Sitting, BP Method: Medium (Manual))   Pulse 80   Ht 6' 1" (1.854 m)   Wt 80.2 kg (176 lb 12.9 oz)   SpO2 98%   BMI 23.33 kg/m²     Objective:   Physical Exam  Vitals and nursing note reviewed.   Constitutional:       General: He is not in acute distress.     Appearance: He is well-developed. He is not diaphoretic.   HENT:      Head: Normocephalic and atraumatic.      Nose: Nose normal.   Eyes:      General: No scleral icterus.     Conjunctiva/sclera: Conjunctivae normal.   Neck:      Thyroid: No thyromegaly.      Vascular: No JVD.   Cardiovascular:      Rate and Rhythm: Normal rate and regular rhythm.      Heart sounds: S1 normal and S2 normal. No murmur heard.     No friction rub. No gallop. No S3 or S4 sounds.   Pulmonary:      Effort: Pulmonary effort is normal. No respiratory distress.      Breath sounds: Normal breath sounds. No stridor. No wheezing or rales.   Chest:      Chest wall: No tenderness.   Abdominal:      General: Bowel sounds are normal. There is no distension.      Palpations: Abdomen is soft. There is no mass.      Tenderness: There is no abdominal tenderness. There is no rebound.   Genitourinary:     Comments: Deferred  Musculoskeletal:         General: No tenderness or deformity. Normal range of motion.      Cervical back: Normal range of motion and neck supple.   Lymphadenopathy:      Cervical: No cervical adenopathy.   Skin:     General: Skin is warm and dry.      Coloration: Skin is not pale.      Findings: No erythema or rash.   Neurological:      Mental Status: He is alert and oriented to person, place, and time.      Motor: No abnormal muscle tone.      Coordination: Coordination normal.   Psychiatric:         Behavior: Behavior normal.         Thought Content: Thought content normal.         Judgment: Judgment normal.         Lab Results   Component Value Date     (L) " 08/20/2024    K 4.8 08/20/2024    CL 95 08/20/2024    CO2 24 08/20/2024    BUN 5 (L) 08/20/2024    CREATININE 0.7 08/20/2024    GLU 80 08/20/2024    HGBA1C 5.1 06/16/2023    MG 1.8 08/20/2024    AST 19 08/20/2024    ALT 17 08/20/2024    ALBUMIN 3.4 (L) 08/20/2024    PROT 7.4 08/20/2024    BILITOT 0.8 08/20/2024    WBC 7.74 08/20/2024    HGB 17.9 08/20/2024    HCT 52.7 08/20/2024    MCV 99 (H) 08/20/2024     08/20/2024    TSH 3.465 08/20/2024    CHOL 146 06/16/2023    HDL 69 06/16/2023    LDLCALC 65.4 06/16/2023    TRIG 58 06/16/2023    BNP 26 05/31/2023         BNP (pg/mL)   Date Value   05/31/2023 26   05/30/2023 53          Assessment:      1. Coronary artery disease of native artery of native heart with stable angina pectoris    2. History of non-ST elevation myocardial infarction (NSTEMI)    3. Other hyperlipidemia    4. Primary hypertension    5. Tobacco abuse    6. Claudication    7. PAD (peripheral artery disease)            Plan:     H/O NSTEMI, elevated Trop, HTN urgency - improved ; recent CAD/PAD noted on CT scan  - cont asa, statin, BB  - repeat exercise nuclear stress test and ECHO  - order LE u/s STEVEN and arterial u/s     2. HTN - lately has been very low - has loose stools and more weight loss   - titrate meds - stop Norvasc  - drinks 4-5 beers  - has cut back    3. HLD  - cont statin    4. Pre-OP CV evaluation prior to EGD/CScope  Low periop risk of CV events for moderate risk procedure.  Good functional and exercise capacity.  Ok to proceed to the scheduled surgery without further cardiac study.  OK to hold Aspirin 7 days before the procedure and resume ASAP postop.  Continue BB and Statin periop.      Visit today included increased complexity associated with the care of the episodic problem HTN addressed and managing the longitudinal care of the patient due to the serious and/or complex managed problem(s) .      Thank you for allowing me to participate in this patient's care. Please do not  hesitate to contact me with any questions or concerns. Consult note has been forwarded to the referral physician.

## 2024-09-25 ENCOUNTER — OFFICE VISIT (OUTPATIENT)
Dept: SURGICAL ONCOLOGY | Facility: CLINIC | Age: 59
End: 2024-09-25
Payer: COMMERCIAL

## 2024-09-25 ENCOUNTER — HOSPITAL ENCOUNTER (OUTPATIENT)
Dept: PREADMISSION TESTING | Facility: HOSPITAL | Age: 59
Discharge: HOME | End: 2024-09-25
Attending: SURGERY
Payer: COMMERCIAL

## 2024-09-25 VITALS
HEIGHT: 73 IN | SYSTOLIC BLOOD PRESSURE: 110 MMHG | DIASTOLIC BLOOD PRESSURE: 75 MMHG | WEIGHT: 175.63 LBS | HEART RATE: 96 BPM | TEMPERATURE: 98 F | BODY MASS INDEX: 23.28 KG/M2

## 2024-09-25 DIAGNOSIS — R63.4 UNINTENTIONAL WEIGHT LOSS: ICD-10-CM

## 2024-09-25 DIAGNOSIS — K22.89 ESOPHAGEAL MASS: Primary | ICD-10-CM

## 2024-09-25 DIAGNOSIS — R63.4 UNINTENTIONAL WEIGHT LOSS: Primary | ICD-10-CM

## 2024-09-25 DIAGNOSIS — Z72.0 TOBACCO ABUSE: ICD-10-CM

## 2024-09-25 PROCEDURE — 99999 PR PBB SHADOW E&M-EST. PATIENT-LVL V: CPT | Mod: PBBFAC,,, | Performed by: SURGERY

## 2024-09-25 PROCEDURE — 4010F ACE/ARB THERAPY RXD/TAKEN: CPT | Mod: CPTII,S$GLB,, | Performed by: SURGERY

## 2024-09-25 PROCEDURE — 3078F DIAST BP <80 MM HG: CPT | Mod: CPTII,S$GLB,, | Performed by: SURGERY

## 2024-09-25 PROCEDURE — 99205 OFFICE O/P NEW HI 60 MIN: CPT | Mod: S$GLB,,, | Performed by: SURGERY

## 2024-09-25 PROCEDURE — 3008F BODY MASS INDEX DOCD: CPT | Mod: CPTII,S$GLB,, | Performed by: SURGERY

## 2024-09-25 PROCEDURE — 3074F SYST BP LT 130 MM HG: CPT | Mod: CPTII,S$GLB,, | Performed by: SURGERY

## 2024-09-25 RX ORDER — SODIUM, POTASSIUM,MAG SULFATES 17.5-3.13G
1 SOLUTION, RECONSTITUTED, ORAL ORAL DAILY
Qty: 1 KIT | Refills: 0 | Status: SHIPPED | OUTPATIENT
Start: 2024-09-25 | End: 2024-09-27

## 2024-09-25 NOTE — NURSING
"Nurse Navigator Note:     Met with patient during her consult with Dr. Rosas.  Patient wife present during consult. Patient and I reviewed the information he discussed with Dr. Rosas, including treatment options, referrals, diagnosis, and future plans for workup. Plan for patient to have EGD/ Colonoscopy done in next 2 weeks. Referral sent to Endo schedulers. Patient instructed to call me if he does not hear from Endo this week. Patient and I went through the new patient booklet, I explained some of the information and why it is provided.   Specifically discussed shared services listed in booklet and how to request referrals. Discussed the role of the nurse navigator and how I can help him through his cancer journey.     Patient was given a copy of his appointments, Dr. Rosas's card, and my card. Encouraged him to call me if he has any questions or concerns or would like to schedule any additional appointments. Patient verbalized understanding of all information.      Oncology Navigation   Intake  Cancer Type: GI  Type of Referral: Internal  Date of Referral: 09/10/24  Initial Nurse Navigator Contact: 09/10/24  Referral to Initial Contact Timeline (days): 0  First Appointment Available: 09/25/24  Appointment Date: 09/25/24  First Available Date vs. Scheduled Date (days): 0  Multiple appointments: No     Treatment  Current Status: Staging work-up             Procedures: EUS / EGD; Colonoscopy  EUS / EGD: EGD             Support Systems: Spouse/significant other  Barriers of Care: Barriers to Care "Assessment completed-no barriers noted"     Acuity      Follow Up  No follow-ups on file.       "

## 2024-09-25 NOTE — PROGRESS NOTES
Surgical Oncology Clinic Note      Referring Provider: Dr. Michell Goyal   PCP: Michell Goyal MD    Reason For Visit: Gastroesophageal: esophageal mass/stricture      HISTORY OF PRESENT ILLNESS     Rosalio Muñoz is a 58 y.o. male w/ significant smoking and drinking history who presents today for evaluation and management of a distal esophageal mass found on imaging.      Wanted to establish care with a primary care physician after losing about 25-30 lb over the previous 4 months without effort.  At that time a CT of the abdomen and pelvis with IV contrast was obtained as well as thyroid studies (which were normal).  The CT scan showed severe thickening at the GE junction concerning for an esophageal mass as well as multiple enlarged lymph nodes within the epigastric region concerning for local metastatic disease.  He was also noted to have some scattered subcentimeter hypodensities within the liver too small to characterize.  He was then referred to me for further evaluation.  Of note, he was recently admitted to the hospital for 5 days with a acute appendicitis back in July.    He states that ever since he had COVID about 2 years ago he has had really hard time swallowing pills and that really cold or really hot liquids have burned and really bothered him.  He attributed all that COVID originally.  However does note that over the last 4-5 months he has had increasing issues with swallowing and he is really limited himself now to just liquids and softer foods.  He used to eat ribeye steak regularly and according to his wife has not had any in about 4-5 months.  He states whenever he eats he feels like food gets stuck in his chest and then he feels full very quickly.  He does note that soups and mashed potatoes go down fine but anything more solid than that tends to get stuck.  He has occasionally vomited and even vomited up a liquid diet the other night but states that that is uncommon.  He  has tried to really maintain his weight and has been drinking more [facility] protein shake, ice cream, peanut butter, and whole milk-and has managed to gain back about 5 lb.  He has also decreased his drinking in his gone from drinking 8-10 beers a day to around 4 beers a day.  He does note a longstanding history of reflux but has never had an EGD and was told several years ago the hiatal hernia.    He does have a longstanding history of smoking.  He used to smoke about 2 packs a day from age 17 up until a couple of years ago.  He did quit smoking at that time but started sleeping however quit that when his hypertension got worse, and then he started smoking cigars and he now smokes about 5-6 baby cigars in a day has done that for quite some time.  His last colonoscopy was a little over a year ago, at that time they found around 22 polyps and he was told to get a repeat scope in 6 months but has not had that done yet.  He has no real family history of cancer.    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  Wife is house supervisor at [employer].  2 sons - ages 36 and 32.   for 36 years.          Past Medical History:   Diagnosis Date    Hypertension        Past Surgical History:   Procedure Laterality Date    COLONOSCOPY N/A 8/25/2023    Procedure: COLONOSCOPY;  Surgeon: Pebbles Spear MD;  [facility];  Service: Endoscopy;  Laterality: N/A;    SURGICAL REMOVAL OF LESION OF FACE Right 12/1/2023    Procedure: EXCISION, LESION, FACE;  Surgeon: Jose Flaherty MD;  Location: [facility] OR;  Service: ENT;  Laterality: Right;  1. Excision facial subcutaneous mass right cheek 3 cm. 2. Intermediate layered closure 3.5 cm    WISDOM TOOTH EXTRACTION      XI ROBOTIC APPENDECTOMY N/A 7/31/2024    Procedure: XI ROBOTIC APPENDECTOMY;  Surgeon: Paulo Saavedra MD;  Location: [facility] OR;  Service: General;  Laterality: N/A;       Family History   Problem  Relation Name Age of Onset    Hyperlipidemia Mother      Hypertension Father      Diabetes Father      Kidney disease Father      Heart disease Father      Prostate cancer Maternal Grandfather      Colon cancer Neg Hx         Social History     Socioeconomic History    Marital status:     Number of children: 2   Occupational History    Occupation:    Tobacco Use    Smoking status: Former     Current packs/day: 0.00     Types: Cigars, Cigarettes     Quit date: 10/4/2022     Years since quittin.9     Passive exposure: Never    Smokeless tobacco: Current    Tobacco comments:     Cigar every afternoon   Substance and Sexual Activity    Alcohol use: Yes     Alcohol/week: 4.0 - 6.0 standard drinks of alcohol     Types: 4 - 6 Cans of beer per week     Comment: occ    Drug use: No    Sexual activity: Yes     Partners: Female     Social Determinants of Health     Financial Resource Strain: Low Risk  (2023)    Overall Financial Resource Strain (CARDIA)     Difficulty of Paying Living Expenses: Not hard at all   Food Insecurity: No Food Insecurity (2023)    Hunger Vital Sign     Worried About Running Out of Food in the Last Year: Never true     Ran Out of Food in the Last Year: Never true   Transportation Needs: No Transportation Needs (2023)    PRAPARE - Transportation     Lack of Transportation (Medical): No     Lack of Transportation (Non-Medical): No   Housing Stability: Unknown (2023)    Housing Stability Vital Sign     Unable to Pay for Housing in the Last Year: No     Unstable Housing in the Last Year: No          Medication List            Accurate as of 2024  8:10 AM. If you have any questions, ask your nurse or doctor.                CONTINUE taking these medications      aspirin 81 MG EC tablet  Commonly known as: ECOTRIN  Take 1 tablet (81 mg total) by mouth once daily.     atorvastatin 20 MG tablet  Commonly known as: LIPITOR  Take 1 tablet (20 mg  total) by mouth every evening.     carvediloL 25 MG tablet  Commonly known as: COREG  Take 1 tablet (25 mg total) by mouth 2 (two) times daily.     HYDROcodone-acetaminophen 5-325 mg per tablet  Commonly known as: NORCO  Take 1 tablet by mouth every 4 (four) hours as needed for Pain.     losartan 100 MG tablet  Commonly known as: COZAAR  Take 1 tablet (100 mg total) by mouth every evening.     SUFLAVE 178.7-7.3-0.5 gram Solr  Generic drug: peg 3350-sod sulf,chlr-pot-mag  Take 2 Bottles by mouth As instructed (Use as directed in facility directions).              Review of patient's allergies indicates:  No Known Allergies    Review of Systems   Constitutional:  Positive for malaise/fatigue and weight loss. Negative for chills and fever.   HENT:  Positive for hearing loss.    Respiratory:  Negative for cough and shortness of breath.    Cardiovascular:  Negative for chest pain and palpitations.   Gastrointestinal:  Positive for heartburn and nausea. Negative for abdominal pain, blood in stool, constipation and vomiting.   Neurological:  Negative for dizziness, focal weakness, seizures and weakness.   Psychiatric/Behavioral:  Negative for depression, memory loss and substance abuse. The patient is not nervous/anxious.        PHYSICAL EXAM     Vitals:    09/25/24 0758   BP: 110/75   Pulse: 96   Temp: 98.1 °F (36.7 °C)     Body mass index is 23.17 kg/m².  ECOG SCORE    1 - Restricted in strenuous activity-ambulatory and able to carry out work of a light nature         Physical Exam  Constitutional:       General: He is not in acute distress.     Appearance: Normal appearance. He is not ill-appearing.   HENT:      Head: Normocephalic and atraumatic.      Mouth/Throat:      Mouth: Mucous membranes are moist.   Eyes:      Extraocular Movements: Extraocular movements intact.      Conjunctiva/sclera: Conjunctivae normal.   Cardiovascular:      Rate and Rhythm: Normal rate and regular rhythm.   Pulmonary:      Effort:  "Pulmonary effort is normal.   Abdominal:      General: Abdomen is flat.      Palpations: Abdomen is soft. There is no mass.      Tenderness: There is no abdominal tenderness.   Musculoskeletal:         General: Normal range of motion.   Skin:     General: Skin is warm and dry.   Neurological:      General: No focal deficit present.      Mental Status: He is alert.   Psychiatric:         Mood and Affect: Mood normal.         Behavior: Behavior normal.         Thought Content: Thought content normal.          DATA REVIEW:  I personally reviewed the following records for this visit: lab work from prior visit, notes from other physicians, computed tomography/CT imaging, surgical pathology results, endoscopy results, radiographic study evaluation, and laboratory results done by primary care physician    LABS       Lab Results   Component Value Date    WBC 7.74 08/20/2024    HGB 17.9 08/20/2024    HCT 52.7 08/20/2024     08/20/2024     Lab Results   Component Value Date    GLU 80 08/20/2024    CALCIUM 9.9 08/20/2024    ALBUMIN 3.4 (L) 08/20/2024    PROT 7.4 08/20/2024     (L) 08/20/2024    K 4.8 08/20/2024    CO2 24 08/20/2024    CL 95 08/20/2024    BUN 5 (L) 08/20/2024    CREATININE 0.7 08/20/2024    ALKPHOS 108 08/20/2024    ALT 17 08/20/2024    AST 19 08/20/2024    BILITOT 0.8 08/20/2024       Nutritional:  No results found for: "PREALBUMIN"    Tumor Markers:  No results found for: "CEA", "AMYLASE", "ASPIRATE", "SJR374", "", "ZX3657", "AFP", "AFPTM"  No results found for: ""    PATHOLOGY     None    IMAGING     08/07/2023:  CT Chest- Lung screen  FINDINGS:   There are 2 stable tiny benign type subpleural nodules in the right middle lobe measuring up to 3 mm on image 280 series 4.  Stable 3 mm benign-type subpleural nodule in the left upper lobe on image 100.  No suspicious lung nodules are identified.  No lung masses.  No acute infiltrate or pleural effusion identified.  No pathologically " enlarged lymph nodes are identified. Coronary artery calcifications are noted. No suspicious lytic or blastic bone marrow lesions are identified.  Limited images through the upper abdomen demonstrate no suspicious mass or acute disease.    07/31/2024:  CT Renal Stone Study  Impression:   1.  Inflammatory changes surround the appendix which, measures 1 cm in diameter.  There are some air bubbles immediately adjacent to the tip of the appendix in the adjacent fat, concerning for early perforation of the appendix.   2. Multiple dilated air and fluid-filled loops of small bowel noted, concerning for ileus or small-bowel obstruction.   3.  Negative for acute process otherwise.  Negative for renal stone disease or hydronephrosis.   4.  Extensive coronary artery calcifications.  Large hiatal hernia.  Vascular calcifications without aneurysmal change.  Other nonemergent findings as described above.     09/06/2024:  CT Chest, Abdomen, and Pelvis  Impression:   1. Severe thickening at the GE junction which could reflect distal esophageal mass versus less likely esophagitis. Recommend endoscopy with possible tissue sampling.  Multiple enlarged lymph nodes are seen within the epigastric region measuring up to 2.4 cm concerning for metastatic disease.   2.  See above for additional findings and recommendations    ASSESSMENT & PLAN     1. Esophageal mass    2. Unintentional weight loss    3. Tobacco abuse       Rosalio Muñoz is a 58 y.o. male with unexplained weight loss and imaging findings concerning for an esophageal malignancy.    I had a lengthy discussion with him and his wife today regarding the differential diagnosis of this esophageal or even potentially gastric mass, as well as benign things such as Schatzki's rings or just chronic inflammation.  I did discuss with them that however my concern is that this is a malignancy and it was certainly concerning for that based on the enlarged lymph nodes and the overall  picture and his weight loss.  I suspect this will be an esophageal primary however upon my review of his imaging he does have some thickening of his proximal stomach also concerning for malignancy.  Ultimately he needs upper endoscopy with biopsies for diagnostic purposes which can also help us delineate Siewert classification of this lesion.  We discussed overall the 1st step is to get a diagnosis and that the next step would involve getting appropriate staging information, most likely a PET-CT scan.  Ultimately I did discuss with him that the treatment of esophageal cancer depends on the stage that he has.  Assuming he has local regional disease and not metastatic disease the treatment typically involves chemo and radiation followed by potentially surgery.  I did discuss with him that I do not do esophageal cancer operations however that I would be happy to refer him to 1 of my partners in [place] assuming the EGD does show an esophageal mass.    We also discussed the importance of smoking cessation and that especially if this is an esophageal cancer, continued smoking can have a direct detrimental effect on his response to chemo and radiation.  I offered him smoking cessation counseling which he has agreed to.      -- needs upper and lower endoscopy ASAP  -- likely will need PET CT scan following     Mr. Muñoz expressed understanding in regards to our discussion today. Many good questions were asked on today's visit, all of which were answered to their satisfaction.    Follow-up: Follow up in about 3 weeks (around 10/16/2024).                  Roseanna Rosas MD MS              Surgical Oncology              [facility] [place], [place]              Office: [phone]     Communications: 60 minutes were spent on today's visit in face-to-face and non face-to-face time with the patient. This patient was recently diagnosed with esophageal mass and the time was required to provide  counseling and guidance regarding their new diagnosis. Time was spent reviewing all outside records and information pertaining to their work-up and formulating a treatment plan in line with standardized guidelines. Additional time was spent communicating with referring physicians and facilities to facilitate the efficient exchange of previous healthcare records and radiographic imaging pertinent to the diagnosis and disease management.       Orders Placed This Encounter   Procedures    Ambulatory referral/consult to Smoking Cessation Program     Standing Status:   Future     Standing Expiration Date:   10/25/2025     Referral Priority:   Routine     Referral Type:   Consultation     Referral Reason:   Specialty Services Required     Requested Specialty:   CTTS     Number of Visits Requested:   1    Ambulatory referral/consult to Endo Procedure      Standing Status:   Future     Number of Occurrences:   1     Standing Expiration Date:   3/31/2025     Referral Priority:   Urgent     Referral Type:   Consultation     Number of Visits Requested:   1

## 2024-09-26 RX ORDER — AMLODIPINE BESYLATE 5 MG/1
5 TABLET ORAL 2 TIMES DAILY
COMMUNITY
Start: 2024-09-23

## 2024-09-30 ENCOUNTER — ANESTHESIA EVENT (OUTPATIENT)
Dept: ENDOSCOPY | Facility: HOSPITAL | Age: 59
End: 2024-09-30
Payer: COMMERCIAL

## 2024-09-30 NOTE — ANESTHESIA PREPROCEDURE EVALUATION
09/30/2024  Rosalio Muñoz is a 58 y.o., male.  Past Medical History:   Diagnosis Date    Hypertension      Past Surgical History:   Procedure Laterality Date    COLONOSCOPY N/A 8/25/2023    Procedure: COLONOSCOPY;  Surgeon: Pebbles Spear MD;  Location: [facility];  Service: Endoscopy;  Laterality: N/A;    SURGICAL REMOVAL OF LESION OF FACE Right 12/1/2023    Procedure: EXCISION, LESION, FACE;  Surgeon: Jose Flaherty MD;  Location: [facility] OR;  Service: ENT;  Laterality: Right;  1. Excision facial subcutaneous mass right cheek 3 cm. 2. Intermediate layered closure 3.5 cm    WISDOM TOOTH EXTRACTION      XI ROBOTIC APPENDECTOMY N/A 7/31/2024    Procedure: XI ROBOTIC APPENDECTOMY;  Surgeon: Paulo Saavedra MD;  Location: [facility] OR;  Service: General;  Laterality: N/A;     ECHO (5/29/23):  Echo     Interpretation Summary  · The left ventricle is normal in size with concentric remodeling and normal systolic function.  · The estimated ejection fraction is 60%.  · Normal left ventricular diastolic function.  · Normal right ventricular size with normal right ventricular systolic function.  · Normal central venous pressure (3 mmHg).  · The estimated PA systolic pressure is 20 mmHg.    Stress Test (5/29/23):  Nuclear Stress - Cardiology Interpreted     Interpretation Summary    Normal myocardial perfusion scan. There is no evidence of myocardial ischemia or infarction.    The gated perfusion images showed an ejection fraction of 71% at rest. The gated perfusion images showed an ejection fraction of 68% post stress.    There is normal wall motion at rest and post stress.    The ECG portion of the study is negative for ischemia.       Pre-op Assessment    I have reviewed the Patient Summary Reports.     I have reviewed the Nursing Notes. I have reviewed the NPO Status.   I have reviewed the Medications.      Review of Systems  Anesthesia Hx:  No problems with previous Anesthesia                Social:  Smoker, Alcohol Use       Cardiovascular:     Hypertension               Hx NSTEMI, PAD                         Hepatic/GI:  Bowel Prep.      Unintentional weight loss              Physical Exam  General: Well nourished, Cooperative, Alert and Oriented    Airway:  Mallampati: II   Mouth Opening: Normal  Tongue: Normal  Neck ROM: Normal ROM        Anesthesia Plan  Type of Anesthesia, risks & benefits discussed:    Anesthesia Type: Gen Natural Airway  Intra-op Monitoring Plan: Standard ASA Monitors  Induction:  IV  Informed Consent: Informed consent signed with the Patient and all parties understand the risks and agree with anesthesia plan.  All questions answered. Patient consented to blood products? No  ASA Score: 3  Day of Surgery Review of History & Physical: H&P Update referred to the surgeon/provider.    Ready For Surgery From Anesthesia Perspective.     .

## 2024-10-02 ENCOUNTER — HOSPITAL ENCOUNTER (OUTPATIENT)
Facility: HOSPITAL | Age: 59
Discharge: HOME | End: 2024-10-02
Attending: INTERNAL MEDICINE | Admitting: INTERNAL MEDICINE
Payer: COMMERCIAL

## 2024-10-02 ENCOUNTER — TELEPHONE (OUTPATIENT)
Dept: HEMATOLOGY/ONCOLOGY | Facility: CLINIC | Age: 59
End: 2024-10-02
Payer: COMMERCIAL

## 2024-10-02 ENCOUNTER — ANESTHESIA (OUTPATIENT)
Dept: ENDOSCOPY | Facility: HOSPITAL | Age: 59
End: 2024-10-02
Payer: COMMERCIAL

## 2024-10-02 DIAGNOSIS — Z86.0100 PERSONAL HISTORY OF COLON POLYPS, UNSPECIFIED: ICD-10-CM

## 2024-10-02 DIAGNOSIS — K22.89 ESOPHAGEAL MASS: Primary | ICD-10-CM

## 2024-10-02 DIAGNOSIS — R13.19 ESOPHAGEAL DYSPHAGIA: ICD-10-CM

## 2024-10-02 DIAGNOSIS — R93.3 ABNORMAL CT SCAN, ESOPHAGUS: Primary | ICD-10-CM

## 2024-10-02 DIAGNOSIS — R63.4 UNINTENTIONAL WEIGHT LOSS: ICD-10-CM

## 2024-10-02 DIAGNOSIS — C15.9 ESOPHAGEAL ADENOCARCINOMA: Primary | ICD-10-CM

## 2024-10-02 PROBLEM — R13.10 DYSPHAGIA: Status: ACTIVE | Noted: 2024-10-02

## 2024-10-02 PROCEDURE — 27201012 HC FORCEPS, HOT/COLD, DISP: Performed by: INTERNAL MEDICINE

## 2024-10-02 PROCEDURE — 88342 IMHCHEM/IMCYTCHM 1ST ANTB: CPT | Performed by: PATHOLOGY

## 2024-10-02 PROCEDURE — 88341 IMHCHEM/IMCYTCHM EA ADD ANTB: CPT | Performed by: PATHOLOGY

## 2024-10-02 PROCEDURE — 25000003 PHARM REV CODE 250: Performed by: INTERNAL MEDICINE

## 2024-10-02 PROCEDURE — 63600175 PHARM REV CODE 636 W HCPCS: Performed by: INTERNAL MEDICINE

## 2024-10-02 PROCEDURE — 43239 EGD BIOPSY SINGLE/MULTIPLE: CPT | Performed by: INTERNAL MEDICINE

## 2024-10-02 PROCEDURE — 45385 COLONOSCOPY W/LESION REMOVAL: CPT | Mod: 33 | Performed by: INTERNAL MEDICINE

## 2024-10-02 PROCEDURE — 37000009 HC ANESTHESIA EA ADD 15 MINS: Performed by: INTERNAL MEDICINE

## 2024-10-02 PROCEDURE — 27201089 HC SNARE, DISP (ANY): Performed by: INTERNAL MEDICINE

## 2024-10-02 PROCEDURE — 63600175 PHARM REV CODE 636 W HCPCS: Performed by: NURSE ANESTHETIST, CERTIFIED REGISTERED

## 2024-10-02 PROCEDURE — 88305 TISSUE EXAM BY PATHOLOGIST: CPT | Performed by: PATHOLOGY

## 2024-10-02 PROCEDURE — 43239 EGD BIOPSY SINGLE/MULTIPLE: CPT | Mod: ,,, | Performed by: INTERNAL MEDICINE

## 2024-10-02 PROCEDURE — 45385 COLONOSCOPY W/LESION REMOVAL: CPT | Mod: ,,, | Performed by: INTERNAL MEDICINE

## 2024-10-02 PROCEDURE — 37000008 HC ANESTHESIA 1ST 15 MINUTES: Performed by: INTERNAL MEDICINE

## 2024-10-02 RX ORDER — DEXTROMETHORPHAN/PSEUDOEPHED 2.5-7.5/.8
DROPS ORAL
Status: DISCONTINUED | OUTPATIENT
Start: 2024-10-02 | End: 2024-10-02 | Stop reason: HOSPADM

## 2024-10-02 RX ORDER — FENTANYL CITRATE 50 UG/ML
INJECTION, SOLUTION INTRAMUSCULAR; INTRAVENOUS
Status: DISCONTINUED | OUTPATIENT
Start: 2024-10-02 | End: 2024-10-02

## 2024-10-02 RX ORDER — PHENYLEPHRINE HYDROCHLORIDE 10 MG/ML
INJECTION INTRAVENOUS
Status: DISCONTINUED | OUTPATIENT
Start: 2024-10-02 | End: 2024-10-02

## 2024-10-02 RX ORDER — SODIUM CHLORIDE, SODIUM LACTATE, POTASSIUM CHLORIDE, CALCIUM CHLORIDE 600; 310; 30; 20 MG/100ML; MG/100ML; MG/100ML; MG/100ML
INJECTION, SOLUTION INTRAVENOUS CONTINUOUS
Status: DISCONTINUED | OUTPATIENT
Start: 2024-10-02 | End: 2024-10-02 | Stop reason: HOSPADM

## 2024-10-02 RX ORDER — LIDOCAINE HYDROCHLORIDE 20 MG/ML
INJECTION, SOLUTION EPIDURAL; INFILTRATION; INTRACAUDAL; PERINEURAL
Status: DISCONTINUED | OUTPATIENT
Start: 2024-10-02 | End: 2024-10-02

## 2024-10-02 RX ORDER — PROPOFOL 10 MG/ML
VIAL (ML) INTRAVENOUS
Status: DISCONTINUED | OUTPATIENT
Start: 2024-10-02 | End: 2024-10-02

## 2024-10-02 RX ADMIN — PROPOFOL 100 MG: 10 INJECTION, EMULSION INTRAVENOUS at 08:10

## 2024-10-02 RX ADMIN — PROPOFOL 50 MG: 10 INJECTION, EMULSION INTRAVENOUS at 08:10

## 2024-10-02 RX ADMIN — PHENYLEPHRINE HYDROCHLORIDE 50 MCG: 10 INJECTION INTRAVENOUS at 08:10

## 2024-10-02 RX ADMIN — SODIUM CHLORIDE, POTASSIUM CHLORIDE, SODIUM LACTATE AND CALCIUM CHLORIDE: 600; 310; 30; 20 INJECTION, SOLUTION INTRAVENOUS at 08:10

## 2024-10-02 RX ADMIN — PROPOFOL 60 MG: 10 INJECTION, EMULSION INTRAVENOUS at 09:10

## 2024-10-02 RX ADMIN — PROPOFOL 50 MG: 10 INJECTION, EMULSION INTRAVENOUS at 09:10

## 2024-10-02 RX ADMIN — FENTANYL CITRATE 25 MCG: 50 INJECTION, SOLUTION INTRAMUSCULAR; INTRAVENOUS at 08:10

## 2024-10-02 RX ADMIN — LIDOCAINE HYDROCHLORIDE 100 MG: 20 INJECTION, SOLUTION EPIDURAL; INFILTRATION; INTRACAUDAL; PERINEURAL at 08:10

## 2024-10-02 NOTE — ANESTHESIA POSTPROCEDURE EVALUATION
Anesthesia Post Evaluation    Patient: Rosalio Muñoz    Procedure(s) Performed: Procedure(s) (LRB):  EGD (ESOPHAGOGASTRODUODENOSCOPY) (N/A)  COLONOSCOPY  Cardiac clearance received on 09/20/24 per Dr. Lockett, Cardiology.  Note in encounters.  LB (N/A)    Final Anesthesia Type: MAC      Patient location during evaluation: PACU  Patient participation: Yes- Able to Participate  Level of consciousness: awake and alert and oriented  Post-procedure vital signs: reviewed and stable  Pain management: adequate  Airway patency: patent    PONV status at discharge: No PONV  Anesthetic complications: no      Cardiovascular status: hemodynamically stable  Respiratory status: unassisted  Hydration status: euvolemic  Follow-up not needed.              Vitals Value Taken Time   /72 10/02/24 0945   Temp 98 10/02/24 1225   Pulse 85 10/02/24 0950   Resp 29 10/02/24 0950   SpO2 95 % 10/02/24 0949   Vitals shown include unfiled device data.      Event Time   Out of Recovery 09:52:00         Pain/Nick Score: Nick Score: 10 (10/2/2024  9:42 AM)

## 2024-10-02 NOTE — TRANSFER OF CARE
"Anesthesia Transfer of Care Note    Patient: Rosalio Muñoz    Procedure(s) Performed: Procedure(s) (LRB):  EGD (ESOPHAGOGASTRODUODENOSCOPY) (N/A)  COLONOSCOPY  Cardiac clearance received on 09/20/24 per Dr. Lockett, Cardiology.  Note in encounters.  LB (N/A)    Patient location: PACU    Anesthesia Type: general    Transport from OR: Transported from OR on room air with adequate spontaneous ventilation    Post pain: adequate analgesia    Post assessment: no apparent anesthetic complications    Post vital signs: stable    Level of consciousness: awake    Nausea/Vomiting: no nausea/vomiting    Complications: none    Transfer of care protocol was followed      Last vitals: Visit Vitals  /66 (BP Location: Right arm, Patient Position: Lying)   Pulse 79   Temp 36.2 °C (97.2 °F)   Resp 17   Ht 6' 1" (1.854 m)   Wt 76 kg (167 lb 8.8 oz)   SpO2 97%   BMI 22.11 kg/m²     "

## 2024-10-02 NOTE — [employer] PATIENT INSTRUCTIONS
Discharge Summary/Instructions after an Endoscopic Procedure  Patient Name: Rosalio Muñoz  Patient MRN: [id number]  Patient YOB: 1965 Wednesday, October 2, 2024  Sully Farris MD  Dear patient,  As a result of recent federal legislation (The Federal Cures Act), you may   receive lab or pathology results from your procedure in your [facility]   account before your physician is able to contact you. Your physician or   their representative will relay the results to you with their   recommendations at their soonest availability.  Thank you,  RESTRICTIONS:  During your procedure today, you received medications for sedation.  These   medications may affect your judgment, balance and coordination.  Therefore,   for 24 hours, you have the following restrictions:   - DO NOT drive a car, operate machinery, make legal/financial decisions,   sign important papers or drink alcohol.    ACTIVITY:  Today: no heavy lifting, straining or running due to procedural   sedation/anesthesia.  The following day: return to full activity including work.  DIET:  Eat and drink normally unless instructed otherwise.     TREATMENT FOR COMMON SIDE EFFECTS:  - Mild abdominal pain, nausea, belching, bloating or excessive gas:  rest,   eat lightly and use a heating pad.  - Sore Throat: treat with throat lozenges and/or gargle with warm salt   water.  - Because air was used during the procedure, expelling large amounts of air   from your rectum or belching is normal.  - If a bowel prep was taken, you may not have a bowel movement for 1-3 days.    This is normal.  SYMPTOMS TO WATCH FOR AND REPORT TO YOUR PHYSICIAN:  1. Abdominal pain or bloating, other than gas cramps.  2. Chest pain.  3. Back pain.  4. Signs of infection such as: chills or fever occurring within 24 hours   after the procedure.  5. Rectal bleeding, which would show as bright red, maroon, or black stools.   (A tablespoon of blood from the rectum is not serious,  especially if   hemorrhoids are present.)  6. Vomiting.  7. Weakness or dizziness.  GO DIRECTLY TO THE NEAREST EMERGENCY ROOM IF YOU HAVE ANY OF THE FOLLOWING:      Difficulty breathing              Chills and/or fever over 101 F   Persistent vomiting and/or vomiting blood   Severe abdominal pain   Severe chest pain   Black, tarry stools   Bleeding- more than one tablespoon   Any other symptom or condition that you feel may need urgent attention  Your doctor recommends these additional instructions:  If any biopsies were taken, your doctors clinic will contact you in 1 to 2   weeks with any results.  - Discharge patient to home (via wheelchair).   - Full liquid diet.   - Continue present medications.   - Await pathology results.   - Follow back with surgical oncology  - Patient has a contact number available for emergencies.  The signs and   symptoms of potential delayed complications were discussed with the   patient.  Return to normal activities tomorrow.  Written discharge   instructions were provided to the patient.   - Resume anticoagulant at prior dose.  For questions, problems or results please call your physician Sully Farris MD at Work:  [phone]  If you have any questions about the above instructions, call the GI   department at [phone] or call the endoscopy unit at [phone]   from 7am until 3 pm.  [facility], EMERGENCY ROOM PHONE NUMBER:   [phone]  IF A COMPLICATION OR EMERGENCY SITUATION ARISES AND YOU ARE UNABLE TO REACH   YOUR PHYSICIAN - GO DIRECTLY TO THE EMERGENCY ROOM.  I have read or have had read to me these discharge instructions for my   procedure and have received a written copy.  I understand these   instructions and will follow-up with my physician if I have any questions.     __________________________________       _____________________________________  Nurse Signature                                          Patient/Designated    Responsible Party Signature  MD Sully Berrios MD  10/2/2024 9:27:58 AM  [employer]

## 2024-10-02 NOTE — [employer] PATIENT INSTRUCTIONS
Discharge Summary/Instructions after an Endoscopic Procedure  Patient Name: Rosalio Muñoz  Patient MRN: [id number]  Patient YOB: 1965 Wednesday, October 2, 2024  Sully Farris MD  Dear patient,  As a result of recent federal legislation (The Federal Cures Act), you may   receive lab or pathology results from your procedure in your [facility]   account before your physician is able to contact you. Your physician or   their representative will relay the results to you with their   recommendations at their soonest availability.  Thank you,  RESTRICTIONS:  During your procedure today, you received medications for sedation.  These   medications may affect your judgment, balance and coordination.  Therefore,   for 24 hours, you have the following restrictions:   - DO NOT drive a car, operate machinery, make legal/financial decisions,   sign important papers or drink alcohol.    ACTIVITY:  Today: no heavy lifting, straining or running due to procedural   sedation/anesthesia.  The following day: return to full activity including work.  DIET:  Eat and drink normally unless instructed otherwise.     TREATMENT FOR COMMON SIDE EFFECTS:  - Mild abdominal pain, nausea, belching, bloating or excessive gas:  rest,   eat lightly and use a heating pad.  - Sore Throat: treat with throat lozenges and/or gargle with warm salt   water.  - Because air was used during the procedure, expelling large amounts of air   from your rectum or belching is normal.  - If a bowel prep was taken, you may not have a bowel movement for 1-3 days.    This is normal.  SYMPTOMS TO WATCH FOR AND REPORT TO YOUR PHYSICIAN:  1. Abdominal pain or bloating, other than gas cramps.  2. Chest pain.  3. Back pain.  4. Signs of infection such as: chills or fever occurring within 24 hours   after the procedure.  5. Rectal bleeding, which would show as bright red, maroon, or black stools.   (A tablespoon of blood from the rectum is not serious,  especially if   hemorrhoids are present.)  6. Vomiting.  7. Weakness or dizziness.  GO DIRECTLY TO THE NEAREST EMERGENCY ROOM IF YOU HAVE ANY OF THE FOLLOWING:      Difficulty breathing              Chills and/or fever over 101 F   Persistent vomiting and/or vomiting blood   Severe abdominal pain   Severe chest pain   Black, tarry stools   Bleeding- more than one tablespoon   Any other symptom or condition that you feel may need urgent attention  Your doctor recommends these additional instructions:  If any biopsies were taken, your doctors clinic will contact you in 1 to 2   weeks with any results.  - Discharge patient to home (via wheelchair).   - Resume previous diet.   - Continue present medications.   - Await pathology results.   - Repeat colonoscopy in 1 year for surveillance.   - Patient has a contact number available for emergencies.  The signs and   symptoms of potential delayed complications were discussed with the   patient.  Return to normal activities tomorrow.  Written discharge   instructions were provided to the patient.  For questions, problems or results please call your physician Sully Farris MD at Work:  [phone]  If you have any questions about the above instructions, call the GI   department at [phone] or call the endoscopy unit at [phone]   from 7am until 3 pm.  [facility] [place], EMERGENCY ROOM PHONE NUMBER:   [phone]  IF A COMPLICATION OR EMERGENCY SITUATION ARISES AND YOU ARE UNABLE TO REACH   YOUR PHYSICIAN - GO DIRECTLY TO THE EMERGENCY ROOM.  I have read or have had read to me these discharge instructions for my   procedure and have received a written copy.  I understand these   instructions and will follow-up with my physician if I have any questions.     __________________________________       _____________________________________  Nurse Signature                                          Patient/Designated   Responsible Party  Signature  MD Sully Berrios MD  10/2/2024 9:22:10 AM  [employer]

## 2024-10-02 NOTE — PLAN OF CARE
Discharge instructions reviewed with pt and spouse, handouts given, verbalized understanding with no further questions at this time. Dr. Farris spoke to pt at bedside, reviewed procedure and answered questions aware they are awaiting biopsy results with MD telephone number provided per AVS sheet. VSS on RA, no pain or nausea noted, tolerating po fluids without difficulty, no other complaints noted. Fall precautions reviewed, consents in chart.

## 2024-10-02 NOTE — DISCHARGE SUMMARY
[facility] - Endoscopy 1st Fl  Discharge Note  Short Stay    Procedure(s) (LRB):  EGD (ESOPHAGOGASTRODUODENOSCOPY) (N/A)  COLONOSCOPY  Cardiac clearance received on 09/20/24 per Dr. Lockett, Cardiology.  Note in encounters.  LB (N/A)      OUTCOME: Patient tolerated treatment/procedure well without complication and is now ready for discharge.    DISPOSITION: Home or Self Care    FINAL DIAGNOSIS:  Abnormal CT scan, esophagus    FOLLOWUP: With primary care provider    DISCHARGE INSTRUCTIONS:  No discharge procedures on file.

## 2024-10-02 NOTE — H&P
Short Stay Endoscopy History and Physical    PCP - Michell Goyal MD    Procedure - EGD and colonoscopy  ASA - per anesthesia  Mallampati - per anesthesia  History of Anesthesia problems - no  Family history Anesthesia problems -  no     HPI:  This is a 58 y.o. male here for evaluation of :   Active Hospital Problems    Diagnosis  POA    *Abnormal CT scan, esophagus [R93.3]  Yes    Dysphagia [R13.10]  No    Personal history of colon polyps, unspecified [Z86.0100]  Not Applicable    Unintentional weight loss [R63.4]  Yes      Resolved Hospital Problems   No resolved problems to display.         Health Maintenance         Date Due Completion Date    Colorectal Cancer Screening 02/25/2024 8/25/2023    Influenza Vaccine (1) 09/01/2024 11/11/2023    COVID-19 Vaccine (4 - 2024-25 season) 09/01/2024 11/29/2021    PROSTATE-SPECIFIC ANTIGEN 08/20/2025 8/20/2024    TETANUS VACCINE 09/09/2026 9/9/2016    Lipid Panel 06/16/2028 6/16/2023    Pneumococcal Vaccines (Age 0-64) (3 of 3 - PPSV23 or PCV20) 12/22/2031 5/6/2022    RSV Vaccine (Age 60+ and Pregnant patients) (1 - 1-dose 75+ series) 12/22/2041 ---              ROS:  CONSTITUTIONAL: Denies weight change,  fatigue, fevers, chills, night sweats.  CARDIOVASCULAR: Denies chest pain, shortness of breath, orthopnea and edema.  RESPIRATORY: Denies cough, hemoptysis, dyspnea, and wheezing.  GI: See HPI.    Medical History:   Past Medical History:   Diagnosis Date    Hypertension        Surgical History:   Past Surgical History:   Procedure Laterality Date    COLONOSCOPY N/A 8/25/2023    Procedure: COLONOSCOPY;  Surgeon: Pebbles Spear MD;  Location: [facility];  Service: Endoscopy;  Laterality: N/A;    SURGICAL REMOVAL OF LESION OF FACE Right 12/1/2023    Procedure: EXCISION, LESION, FACE;  Surgeon: Jose Flaherty MD;  Location: [facility] OR;  Service: ENT;  Laterality: Right;  1. Excision facial subcutaneous mass right cheek 3 cm. 2. Intermediate layered closure 3.5 cm     WISDOM TOOTH EXTRACTION      XI ROBOTIC APPENDECTOMY N/A 2024    Procedure: XI ROBOTIC APPENDECTOMY;  Surgeon: Paulo Saavedra MD;  Location: [facility];  Service: General;  Laterality: N/A;       Family History:   Family History   Problem Relation Name Age of Onset    Hyperlipidemia Mother      Hypertension Father      Diabetes Father      Kidney disease Father      Heart disease Father      Breast cancer Maternal Grandmother      Prostate cancer Maternal Grandfather      Colon cancer Neg Hx         Social History:   Social History     Tobacco Use    Smoking status: Former     Current packs/day: 0.00     Types: Cigars, Cigarettes     Quit date: 10/4/2022     Years since quittin.9     Passive exposure: Never    Smokeless tobacco: Current    Tobacco comments:     Cigar every afternoon   Substance Use Topics    Alcohol use: Yes     Alcohol/week: 4.0 - 6.0 standard drinks of alcohol     Types: 4 - 6 Cans of beer per week     Comment: occ    Drug use: No       Allergies:   Review of patient's allergies indicates:  No Known Allergies    Medications:   No current facility-administered medications on file prior to encounter.     Current Outpatient Medications on File Prior to Encounter   Medication Sig Dispense Refill    amLODIPine (NORVASC) 5 MG tablet Take 5 mg by mouth 2 (two) times daily.      aspirin (ECOTRIN) 81 MG EC tablet Take 1 tablet (81 mg total) by mouth once daily. 90 tablet 3    atorvastatin (LIPITOR) 20 MG tablet Take 1 tablet (20 mg total) by mouth every evening. 90 tablet 3    carvediloL (COREG) 25 MG tablet Take 1 tablet (25 mg total) by mouth 2 (two) times daily. 180 tablet 3    losartan (COZAAR) 100 MG tablet Take 1 tablet (100 mg total) by mouth every evening. 90 tablet 1    peg 3350-sod sulf,chlr-pot-mag (SUFLAVE) 178.7-7.3-0.5 gram SolR Take 2 Bottles by mouth As instructed (Use as directed in facility directions). 1 each 0    HYDROcodone-acetaminophen (NORCO) 5-325 mg per tablet Take 1  tablet by mouth every 4 (four) hours as needed for Pain. (Patient not taking: Reported on 9/26/2024) 12 tablet 0       Physical Exam:  Vital Signs: There were no vitals filed for this visit.  General Appearance: Well appearing in no acute distress  ENT: OP clear  Chest: CTA B  CV: RRR, no m/r/g  Abd: s/nt/nd/nabs  Ext: no edema    Labs:Reviewed    IMP:  Active Hospital Problems    Diagnosis  POA    *Abnormal CT scan, esophagus [R93.3]  Yes    Dysphagia [R13.10]  No    Personal history of colon polyps, unspecified [Z86.0100]  Not Applicable    Unintentional weight loss [R63.4]  Yes      Resolved Hospital Problems   No resolved problems to display.         Plan:   I have explained the risks and benefits of upper endoscopy and colonoscopy to the patient including but not limited to bleeding, perforation, infection, and death. The patient wishes to proceed.

## 2024-10-03 ENCOUNTER — PATIENT MESSAGE (OUTPATIENT)
Dept: HEMATOLOGY/ONCOLOGY | Facility: CLINIC | Age: 59
End: 2024-10-03
Payer: COMMERCIAL

## 2024-10-03 NOTE — NURSING
Spoke with patient and scheduled appointment for 10/14/2024 with Dr. Marshall; Provided patient with appointment time and date, address of [facility] facility and direct line to navigator. All questions and concerns addressed.

## 2024-10-04 VITALS
HEIGHT: 73 IN | TEMPERATURE: 97 F | RESPIRATION RATE: 19 BRPM | OXYGEN SATURATION: 99 % | WEIGHT: 167.56 LBS | HEART RATE: 75 BPM | DIASTOLIC BLOOD PRESSURE: 65 MMHG | SYSTOLIC BLOOD PRESSURE: 106 MMHG | BODY MASS INDEX: 22.21 KG/M2

## 2024-10-08 ENCOUNTER — DOCUMENTATION ONLY (OUTPATIENT)
Dept: HEMATOLOGY/ONCOLOGY | Facility: CLINIC | Age: 59
End: 2024-10-08
Payer: COMMERCIAL

## 2024-10-08 ENCOUNTER — TELEPHONE (OUTPATIENT)
Dept: RADIATION ONCOLOGY | Facility: CLINIC | Age: 59
End: 2024-10-08
Payer: COMMERCIAL

## 2024-10-08 ENCOUNTER — TELEPHONE (OUTPATIENT)
Dept: HEMATOLOGY/ONCOLOGY | Facility: CLINIC | Age: 59
End: 2024-10-08
Payer: COMMERCIAL

## 2024-10-08 DIAGNOSIS — K22.89 ESOPHAGEAL MASS: Primary | ICD-10-CM

## 2024-10-08 DIAGNOSIS — C15.9 ESOPHAGEAL ADENOCARCINOMA: ICD-10-CM

## 2024-10-08 LAB
FINAL PATHOLOGIC DIAGNOSIS: ABNORMAL
GROSS: ABNORMAL
Lab: ABNORMAL

## 2024-10-10 ENCOUNTER — HOSPITAL ENCOUNTER (OUTPATIENT)
Dept: RADIOLOGY | Facility: HOSPITAL | Age: 59
Discharge: HOME | End: 2024-10-10
Attending: SURGERY
Payer: COMMERCIAL

## 2024-10-10 ENCOUNTER — OFFICE VISIT (OUTPATIENT)
Dept: RADIATION ONCOLOGY | Facility: CLINIC | Age: 59
End: 2024-10-10
Payer: COMMERCIAL

## 2024-10-10 ENCOUNTER — TELEPHONE (OUTPATIENT)
Dept: HEMATOLOGY/ONCOLOGY | Facility: CLINIC | Age: 59
End: 2024-10-10
Payer: COMMERCIAL

## 2024-10-10 VITALS
RESPIRATION RATE: 18 BRPM | OXYGEN SATURATION: 99 % | BODY MASS INDEX: 23.11 KG/M2 | WEIGHT: 174.38 LBS | HEART RATE: 97 BPM | SYSTOLIC BLOOD PRESSURE: 97 MMHG | HEIGHT: 73 IN | TEMPERATURE: 98 F | DIASTOLIC BLOOD PRESSURE: 70 MMHG

## 2024-10-10 DIAGNOSIS — C15.9 ESOPHAGEAL ADENOCARCINOMA: ICD-10-CM

## 2024-10-10 DIAGNOSIS — K22.89 ESOPHAGEAL MASS: ICD-10-CM

## 2024-10-10 DIAGNOSIS — C15.9 ESOPHAGEAL CANCER, STAGE IV: Primary | ICD-10-CM

## 2024-10-10 PROCEDURE — 99999 PR PBB SHADOW E&M-EST. PATIENT-LVL IV: CPT | Mod: PBBFAC,,, | Performed by: SPECIALIST

## 2024-10-10 PROCEDURE — 78815 PET IMAGE W/CT SKULL-THIGH: CPT | Mod: 26,PI,, | Performed by: RADIOLOGY

## 2024-10-10 PROCEDURE — 78815 PET IMAGE W/CT SKULL-THIGH: CPT | Mod: TC

## 2024-10-10 PROCEDURE — A9552 F18 FDG: HCPCS | Performed by: SURGERY

## 2024-10-10 RX ORDER — FLUDEOXYGLUCOSE F18 500 MCI/ML
11.19 INJECTION INTRAVENOUS
Status: COMPLETED | OUTPATIENT
Start: 2024-10-10 | End: 2024-10-10

## 2024-10-10 RX ADMIN — FLUDEOXYGLUCOSE F-18 11.19 MILLICURIE: 500 INJECTION INTRAVENOUS at 11:10

## 2024-10-10 NOTE — TELEPHONE ENCOUNTER
----- Message from Hudosn Latif MD sent at 10/10/2024  2:40 PM CDT -----  I need to see this patient as soon as we can with a new diagnosis of esophageal cancer if I can see him tomorrow that would probably be okay at the [facility]

## 2024-10-10 NOTE — PROGRESS NOTES
I have been asked to see this pleasant 58-year-old with esophageal cancer to discuss treatment.  History of present illness: Mr. Muñoz has a longstanding history of gastroesophageal reflux disease which is intermittent in symptomatology.  He presented 08/20/2024 with anorexia, dysphagia to solid foods and a 25-30 lb weight loss over 4 months.  CT scan of the chest abdomen and pelvis showed GE junction thickening and nodes in the epigastric region.  On 10/02/2024 he underwent esophagogastroduodenoscopy with findings of a mass in the lower 3rd of the esophagus 40 cm from the incisors.  He was noted to have the mass visible on retroflexion but no mention of antral involvement.  He did have erythema in the gastric mucosa and duodenal bulb.  Biopsies from this procedure demonstrated moderately differentiated adenocarcinoma from the mass.  The gastric and duodenal bulb biopsies were negative for H pylori.  He concurrently had colonoscopy with multiple benign polyps removed but no other findings.  PET scanning performed today showed the disease in the distal esophagus extending into the cardia (Siewert 2).  He was also noted to have a hypermetabolic gastrohepatic ligament node and a 2.2 cm right posterior liver metastasis.  He has no complaints of pain.  Past medical history: gastroesophageal reflux disease  Hypertension in his 40s with hypertensive crisis resulting in NSTEMI versus demand ischemic changes May of 2023  Question of peripheral vascular disease on imaging  Past surgical history: Appendectomy 07/31/2024  Allergies: None  Habits: He smoked 2 packs a day from age [age] then changing to 5 or 6 small cigars a day.  He drinks variable amounts of beer but has never had any adverse consequences and went 5 days with his appendectomy without withdrawal symptoms.  Family history: His maternal grandfather had prostate cancer.  His maternal grandmother had breast cancer.  Social: He lives here in the area.  His wife is a  supervisor at our hospital.  He works as an .  He does have hearing loss from his occupation working in [employer] prior to being an .  He also enjoys hunting and fishing.  Review of systems: Noncontributory  His ECOG performance status is 0  Physical examination: He is a well-developed well-nourished gentleman in no apparent distress.  He is alert and oriented answering questions appropriately.  He does have hearing aids.  On cardiovascular exam he has a regular rhythm and rate without murmur rub or gallop  His lungs are clear to auscultation  His abdomen is benign  Impression: Mr. Muñoz has stage IV esophageal cancer (Tx N0 M1) at this point I have recommended that he proceed with systemic chemotherapy and then we can follow-up with local irradiation and also discuss management of the liver lesion.  Given his metastatic disease I have discussed his case with Dr. Jon and we do not believe he is a surgical candidate.  I have also discussed his case with Medical Oncology who will expedite getting him in to initiate therapy.  Plan: We will discuss him at our tumor board.  I will see him at the appropriate time in the future after systemic chemotherapy.  I certainly appreciate participating in this delightful gentleman's care.    45 minutes was spent reviewing records and discussing this face-to-face with the patient.

## 2024-10-11 ENCOUNTER — HOSPITAL ENCOUNTER (OUTPATIENT)
Dept: CARDIOLOGY | Facility: HOSPITAL | Age: 59
Discharge: HOME | End: 2024-10-11
Attending: INTERNAL MEDICINE
Payer: COMMERCIAL

## 2024-10-11 ENCOUNTER — OFFICE VISIT (OUTPATIENT)
Dept: HEMATOLOGY/ONCOLOGY | Facility: CLINIC | Age: 59
End: 2024-10-11
Payer: COMMERCIAL

## 2024-10-11 ENCOUNTER — HOSPITAL ENCOUNTER (OUTPATIENT)
Dept: RADIOLOGY | Facility: HOSPITAL | Age: 59
Discharge: HOME | End: 2024-10-11
Attending: INTERNAL MEDICINE
Payer: COMMERCIAL

## 2024-10-11 ENCOUNTER — TUMOR BOARD CONFERENCE (OUTPATIENT)
Dept: HEMATOLOGY/ONCOLOGY | Facility: CLINIC | Age: 59
End: 2024-10-11
Payer: COMMERCIAL

## 2024-10-11 ENCOUNTER — TELEPHONE (OUTPATIENT)
Dept: HEMATOLOGY/ONCOLOGY | Facility: CLINIC | Age: 59
End: 2024-10-11

## 2024-10-11 VITALS
HEIGHT: 73 IN | HEIGHT: 73 IN | WEIGHT: 167 LBS | WEIGHT: 167 LBS | BODY MASS INDEX: 22.13 KG/M2 | DIASTOLIC BLOOD PRESSURE: 69 MMHG | SYSTOLIC BLOOD PRESSURE: 122 MMHG | SYSTOLIC BLOOD PRESSURE: 122 MMHG | DIASTOLIC BLOOD PRESSURE: 69 MMHG | BODY MASS INDEX: 22.13 KG/M2

## 2024-10-11 VITALS
BODY MASS INDEX: 23.06 KG/M2 | WEIGHT: 174 LBS | HEIGHT: 73 IN | SYSTOLIC BLOOD PRESSURE: 97 MMHG | DIASTOLIC BLOOD PRESSURE: 70 MMHG

## 2024-10-11 VITALS
HEART RATE: 92 BPM | OXYGEN SATURATION: 100 % | WEIGHT: 171.94 LBS | HEIGHT: 73 IN | TEMPERATURE: 97 F | BODY MASS INDEX: 22.79 KG/M2 | SYSTOLIC BLOOD PRESSURE: 111 MMHG | DIASTOLIC BLOOD PRESSURE: 71 MMHG

## 2024-10-11 DIAGNOSIS — Z72.0 TOBACCO ABUSE: ICD-10-CM

## 2024-10-11 DIAGNOSIS — C15.9 ESOPHAGEAL ADENOCARCINOMA: Primary | ICD-10-CM

## 2024-10-11 DIAGNOSIS — I25.118 CORONARY ARTERY DISEASE OF NATIVE ARTERY OF NATIVE HEART WITH STABLE ANGINA PECTORIS: ICD-10-CM

## 2024-10-11 DIAGNOSIS — E78.49 OTHER HYPERLIPIDEMIA: ICD-10-CM

## 2024-10-11 DIAGNOSIS — R64 CACHEXIA: ICD-10-CM

## 2024-10-11 DIAGNOSIS — I25.2 HISTORY OF NON-ST ELEVATION MYOCARDIAL INFARCTION (NSTEMI): ICD-10-CM

## 2024-10-11 DIAGNOSIS — I10 PRIMARY HYPERTENSION: ICD-10-CM

## 2024-10-11 DIAGNOSIS — C15.9 ESOPHAGEAL CANCER, STAGE IV: Primary | ICD-10-CM

## 2024-10-11 DIAGNOSIS — I73.9 PAD (PERIPHERAL ARTERY DISEASE): ICD-10-CM

## 2024-10-11 DIAGNOSIS — F17.200 TOBACCO USE DISORDER: ICD-10-CM

## 2024-10-11 DIAGNOSIS — C78.6 SECONDARY ADENOCARCINOMA OF RETROPERITONEUM: ICD-10-CM

## 2024-10-11 DIAGNOSIS — C79.89 MALIGNANT NEOPLASM METASTATIC TO OTHER SITE: ICD-10-CM

## 2024-10-11 DIAGNOSIS — C78.7 SECONDARY LIVER CANCER: ICD-10-CM

## 2024-10-11 DIAGNOSIS — I73.9 CLAUDICATION: ICD-10-CM

## 2024-10-11 PROBLEM — R93.3 ABNORMAL CT SCAN, ESOPHAGUS: Status: RESOLVED | Noted: 2024-10-02 | Resolved: 2024-10-11

## 2024-10-11 LAB
ABI POST MINUTES1: 2 MIN
AORTIC ROOT ANNULUS: 3.44 CM
AV INDEX (PROSTH): 0.93
AV MEAN GRADIENT: 3.3 MMHG
AV PEAK GRADIENT: 5.8 MMHG
AV VALVE AREA BY VELOCITY RATIO: 2.9 CM²
AV VALVE AREA: 2.9 CM²
AV VELOCITY RATIO: 0.92
BSA FOR ECHO PROCEDURE: 2.02 M2
CV ECHO LV RWT: 0.36 CM
CV STRESS BASE HR: 97 BPM
DIASTOLIC BLOOD PRESSURE: 83 MMHG
DOP CALC AO PEAK VEL: 1.2 M/S
DOP CALC AO VTI: 25.6 CM
DOP CALC LVOT AREA: 3.1 CM2
DOP CALC LVOT DIAMETER: 2 CM
DOP CALC LVOT PEAK VEL: 1.1 M/S
DOP CALC LVOT STROKE VOLUME: 74.7 CM3
DOP CALC RVOT PEAK VEL: 0.72 M/S
DOP CALC RVOT VTI: 18.8 CM
DOP CALCLVOT PEAK VEL VTI: 23.8 CM
E WAVE DECELERATION TIME: 201.73 MSEC
E/A RATIO: 1
E/E' RATIO: 10 M/S
ECHO LV POSTERIOR WALL: 0.9 CM (ref 0.6–1.1)
EJECTION FRACTION: 55 %
FRACTIONAL SHORTENING: 34 % (ref 28–44)
IMMEDIATE ARM BP: 121 MMHG
IMMEDIATE LEFT ABI: 0.88
IMMEDIATE LEFT TIBIAL: 107 MMHG
IMMEDIATE RIGHT ABI: 0.88
IMMEDIATE RIGHT TIBIAL: 107 MMHG
INTERVENTRICULAR SEPTUM: 1.1 CM (ref 0.6–1.1)
IVRT: 87.54 MSEC
LA MAJOR: 5.9 CM
LA MINOR: 5.08 CM
LA WIDTH: 3.7 CM
LEFT ABI: 0.88
LEFT ANT TIBIAL SYS PSV: 44 CM/S
LEFT ARM BP: 122 MMHG
LEFT ATRIUM AREA SYSTOLIC (APICAL 2 CHAMBER): 18.17 CM2
LEFT ATRIUM AREA SYSTOLIC (APICAL 4 CHAMBER): 24.3 CM2
LEFT ATRIUM SIZE: 3.74 CM
LEFT ATRIUM VOLUME INDEX MOD: 30.3 ML/M2
LEFT ATRIUM VOLUME INDEX: 31.6 ML/M2
LEFT ATRIUM VOLUME MOD: 61.6 ML
LEFT ATRIUM VOLUME: 64.21 CM3
LEFT CFA PSV: 115 CM/S
LEFT DORSALIS PEDIS: 107 MMHG
LEFT INTERNAL DIMENSION IN SYSTOLE: 3.3 CM (ref 2.1–4)
LEFT PERONEAL SYS PSV: 42 CM/S
LEFT POPLITEAL PSV: 45 CM/S
LEFT POST TIBIAL SYS PSV: 31 CM/S
LEFT POSTERIOR TIBIAL: 107 MMHG
LEFT PROFUNDA SYS PSV: 144 CM/S
LEFT SUPER FEMORAL DIST SYS PSV: 67 CM/S
LEFT SUPER FEMORAL MID SYS PSV: 89 CM/S
LEFT SUPER FEMORAL OSTIAL SYS PSV: 100 CM/S
LEFT SUPER FEMORAL PROX SYS PSV: 87 CM/S
LEFT TIB/PER TRUNK SYS PSV: 50 CM/S
LEFT VENTRICLE DIASTOLIC VOLUME INDEX: 58.03 ML/M2
LEFT VENTRICLE DIASTOLIC VOLUME: 117.8 ML
LEFT VENTRICLE END SYSTOLIC VOLUME APICAL 2 CHAMBER: 46.66 ML
LEFT VENTRICLE END SYSTOLIC VOLUME APICAL 4 CHAMBER: 76.91 ML
LEFT VENTRICLE MASS INDEX: 89.6 G/M2
LEFT VENTRICLE SYSTOLIC VOLUME INDEX: 21.4 ML/M2
LEFT VENTRICLE SYSTOLIC VOLUME: 43.43 ML
LEFT VENTRICULAR INTERNAL DIMENSION IN DIASTOLE: 5 CM (ref 3.5–6)
LEFT VENTRICULAR MASS: 182 G
LV LATERAL E/E' RATIO: 9 M/S
LV SEPTAL E/E' RATIO: 11.25 M/S
LVED V (TEICH): 117.8 ML
LVES V (TEICH): 43.43 ML
LVOT MG: 2.56 MMHG
LVOT MV: 0.74 CM/S
MV PEAK A VEL: 0.9 M/S
MV PEAK E VEL: 0.9 M/S
NUC REST EJECTION FRACTION: 71
NUC STRESS EJECTION FRACTION: 66 %
OHS CV CPX 85 PERCENT MAX PREDICTED HEART RATE MALE: 139
OHS CV CPX ESTIMATED METS: 7
OHS CV CPX MAX PREDICTED HEART RATE: 163
OHS CV CPX PATIENT IS FEMALE: 0
OHS CV CPX PATIENT IS MALE: 1
OHS CV CPX PEAK DIASTOLIC BLOOD PRESSURE: 65 MMHG
OHS CV CPX PEAK HEAR RATE: 137 BPM
OHS CV CPX PEAK RATE PRESSURE PRODUCT: NORMAL
OHS CV CPX PEAK SYSTOLIC BLOOD PRESSURE: 166 MMHG
OHS CV CPX PERCENT MAX PREDICTED HEART RATE ACHIEVED: 84
OHS CV CPX RATE PRESSURE PRODUCT PRESENTING: 9991
OHS CV INITIAL DOSE: 9.6 MCG/KG/MIN
OHS CV LEFT LOWER EXTREMITY ABI (NO CALC): 0.88
OHS CV PEAK DOSE: 30.3 MCG/KG/MIN
OHS CV RIGHT ABI LOWER EXTREMITY (NO CALC): 0.91
PISA TR MAX VEL: 2.24 M/S
POST1 ARM BP: 109 MMHG
POST1 LEFT ABI: 0.98
POST1 LEFT TIBIAL: 107 MMHG
POST1 RIGHT ABI: 1.06
POST1 RIGHT TIBIAL: 115 MMHG
PV MEAN GRADIENT: 2 MMHG
PV MV: 0.6 M/S
PV PEAK GRADIENT: 2 MMHG
PV PEAK VELOCITY: 0.78 M/S
RA MAJOR: 5.21 CM
RA PRESSURE ESTIMATED: 3 MMHG
RA WIDTH: 3.57 CM
RIGHT ABI: 0.91
RIGHT ANT TIBIAL SYS PSV: 38 CM/S
RIGHT ARM BP: 115 MMHG
RIGHT CFA PSV: 113 CM/S
RIGHT DORSALIS PEDIS: 111 MMHG
RIGHT PERONEAL SYS PSV: 37 CM/S
RIGHT POPLITEAL PSV: 51 CM/S
RIGHT POST TIBIAL SYS PSV: 29 CM/S
RIGHT POSTERIOR TIBIAL: 104 MMHG
RIGHT PROFUNDA SYS PSV: 78 CM/S
RIGHT SUPER FEMORAL DIST SYS PSV: 61 CM/S
RIGHT SUPER FEMORAL MID SYS PSV: 101 CM/S
RIGHT SUPER FEMORAL OSTIAL SYS PSV: 88 CM/S
RIGHT SUPER FEMORAL PROX SYS PSV: 95 CM/S
RIGHT TBI: 0.61
RIGHT TIB/PER TRUNK SYS PSV: 46 CM/S
RIGHT TOE PRESSURE: 74 MMHG
RIGHT VENTRICULAR END-DIASTOLIC DIMENSION: 3.1 CM
RV TB RVSP: 5 MMHG
SINUS: 3.11 CM
STJ: 2.56 CM
STRESS ECHO POST EXERCISE DUR MIN: 6 MINUTES
STRESS ECHO POST EXERCISE DUR SEC: 2 SECONDS
SYSTOLIC BLOOD PRESSURE: 103 MMHG
TDI LATERAL: 0.1 M/S
TDI SEPTAL: 0.08 M/S
TDI: 0.09 M/S
TR MAX PG: 20 MMHG
TREADMILL GRADE: 10 %
TREADMILL SPEED: 2 MPH
TREADMILL TIME: 5 MIN
TRICUSPID ANNULAR PLANE SYSTOLIC EXCURSION: 2.46 CM
TV REST PULMONARY ARTERY PRESSURE: 23 MMHG
Z-SCORE OF LEFT VENTRICULAR DIMENSION IN END DIASTOLE: -1.85
Z-SCORE OF LEFT VENTRICULAR DIMENSION IN END SYSTOLE: -0.88

## 2024-10-11 PROCEDURE — 99999 PR PBB SHADOW E&M-EST. PATIENT-LVL IV: CPT | Mod: PBBFAC,,, | Performed by: INTERNAL MEDICINE

## 2024-10-11 PROCEDURE — 93924 LWR XTR VASC STDY BILAT: CPT | Mod: 26,,, | Performed by: INTERNAL MEDICINE

## 2024-10-11 PROCEDURE — A9502 TC99M TETROFOSMIN: HCPCS | Performed by: INTERNAL MEDICINE

## 2024-10-11 PROCEDURE — 93018 CV STRESS TEST I&R ONLY: CPT | Mod: ,,, | Performed by: INTERNAL MEDICINE

## 2024-10-11 PROCEDURE — 93925 LOWER EXTREMITY STUDY: CPT | Mod: 26,,, | Performed by: INTERNAL MEDICINE

## 2024-10-11 PROCEDURE — 93306 TTE W/DOPPLER COMPLETE: CPT | Mod: 26,,, | Performed by: INTERNAL MEDICINE

## 2024-10-11 PROCEDURE — 93924 LWR XTR VASC STDY BILAT: CPT

## 2024-10-11 PROCEDURE — 93016 CV STRESS TEST SUPVJ ONLY: CPT | Mod: ,,, | Performed by: INTERNAL MEDICINE

## 2024-10-11 PROCEDURE — 93017 CV STRESS TEST TRACING ONLY: CPT

## 2024-10-11 PROCEDURE — 93306 TTE W/DOPPLER COMPLETE: CPT

## 2024-10-11 PROCEDURE — 78452 HT MUSCLE IMAGE SPECT MULT: CPT

## 2024-10-11 PROCEDURE — 93925 LOWER EXTREMITY STUDY: CPT

## 2024-10-11 PROCEDURE — 78452 HT MUSCLE IMAGE SPECT MULT: CPT | Mod: 26,,, | Performed by: INTERNAL MEDICINE

## 2024-10-11 RX ORDER — PROCHLORPERAZINE MALEATE 5 MG
10 TABLET ORAL EVERY 6 HOURS PRN
Qty: 20 TABLET | Refills: 5 | Status: SHIPPED | OUTPATIENT
Start: 2024-10-11

## 2024-10-11 RX ORDER — OLANZAPINE 5 MG/1
5 TABLET ORAL NIGHTLY
Qty: 6 TABLET | Refills: 11 | Status: SHIPPED | OUTPATIENT
Start: 2024-10-11

## 2024-10-11 RX ORDER — LIDOCAINE AND PRILOCAINE 25; 25 MG/G; MG/G
CREAM TOPICAL
Qty: 5 G | Refills: 11 | Status: SHIPPED | OUTPATIENT
Start: 2024-10-11

## 2024-10-11 RX ADMIN — TETROFOSMIN 9.6 MILLICURIE: 1.38 INJECTION, POWDER, LYOPHILIZED, FOR SOLUTION INTRAVENOUS at 08:10

## 2024-10-11 RX ADMIN — TETROFOSMIN 30.3 MILLICURIE: 1.38 INJECTION, POWDER, LYOPHILIZED, FOR SOLUTION INTRAVENOUS at 11:10

## 2024-10-11 NOTE — PLAN OF CARE
START ON PATHWAY REGIMEN - Gastroesophageal    GEOS3        Oxaliplatin       Leucovorin       Fluorouracil       Fluorouracil     **Always confirm dose/schedule in your pharmacy ordering system**    Patient Characteristics:  Distant Metastases (cM1/pM1) / Locally Recurrent Disease, Adenocarcinoma -   Esophageal, GE Junction, and Gastric, First Line, HER2 Negative/Unknown, PD?L1   Expression CPS < 5/Negative/Unknown, TIEN/pMMR or MSI Unknown  Therapeutic Status: Distant Metastases (No Additional Staging)  Histology: Adenocarcinoma  Disease Classification: GE Junction  Line of Therapy: First Line  HER2 Status: Awaiting Test Results  PD-L1 Expression Status: Awaiting Test Results  Microsatellite/Mismatch Repair Status: TIEN/pMMR  Intent of Therapy:  Non-Curative / Palliative Intent, Discussed with Patient

## 2024-10-11 NOTE — PROGRESS NOTES
Tumor Board Documentation      Rosalio Muñoz was presented by Roseanna Rosas MD at our Tumor Board on 10/11/2024, which included representatives from Medical Oncology, Hematology, Radiation Oncology, Surgical Oncology, Pathology, Navigation, Genetics, Radiology, Gastrointestinal, Pulmonology.    Rosalio currently presents as a current patient with Esophageal cancer, with history of the following treatments: None.    Additionally, we reviewed previous medical and familial history, history of present illness, and recent lab results along with all available histopathologic and imaging studies. The tumor board considered available treatment options and made the following recommendations:  Chemotherapy, Genetic testing, Biopsy    recommend RTC for surgical planning for port and potential nutrition tube placement, referral to IR for biopsy of the liver lesion, refer to medical oncology for NGS blood testing and induction chemo planning, referral placed for palliative care and labs. Also check with pathology to send Bx tissue for NGS / HER2 testing.        The following procedures/referrals were also placed: No orders of the defined types were placed in this encounter.      Clinical Trial Status: Not discussed     National site-specific guidelines were discussed with respect to the case.    Tumor board is a meeting of clinicians from various specialty areas who evaluate and discuss patients for whom a multidisciplinary approach is being considered. Final determinations in the plan of care are those of the provider(s). The responsibility for follow up of recommendations given during tumor board is that of the provider.     Roseanna Rosas MD

## 2024-10-11 NOTE — PROGRESS NOTES
Subjective:       Patient ID: Rosalio Muñoz is a 58 y.o. male.    Chief Complaint: Results    HPI:  58-year-old male referred after having increasing difficulty swallowing weight loss variant patient underwent upper endoscopies demonstrating adenocarcinoma GE junction.  PET scan demonstrated presumptive diagnosis of metastatic disease in liver I was asked to see the patient for further evaluation ECOG status 1 accompanied by his wife    Past Medical History:   Diagnosis Date    Hypertension     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 10/11/2024     Family History   Problem Relation Name Age of Onset    Hyperlipidemia Mother      Hypertension Father      Diabetes Father      Kidney disease Father      Heart disease Father      Breast cancer Maternal Grandmother      Prostate cancer Maternal Grandfather      Colon cancer Neg Hx       Social History     Socioeconomic History    Marital status:     Number of children: 2   Occupational History    Occupation: Industrial electrician   Tobacco Use    Smoking status: Former     Current packs/day: 0.00     Types: Cigars, Cigarettes     Quit date: 10/4/2022     Years since quittin.0     Passive exposure: Never    Smokeless tobacco: Current    Tobacco comments:     Cigar every afternoon   Substance and Sexual Activity    Alcohol use: Yes     Alcohol/week: 4.0 - 6.0 standard drinks of alcohol     Types: 4 - 6 Cans of beer per week     Comment: occ    Drug use: No    Sexual activity: Yes     Partners: Female     Social Drivers of Health     Financial Resource Strain: Low Risk  (2023)    Overall Financial Resource Strain (CARDIA)     Difficulty of Paying Living Expenses: Not hard at all   Food Insecurity: No Food Insecurity (2023)    Hunger Vital Sign     Worried About Running Out of Food in the Last Year: Never true     Ran Out of Food in the Last Year: Never true   Transportation Needs: No Transportation Needs (2023)    PRAPARE -  Transportation     Lack of Transportation (Medical): No     Lack of Transportation (Non-Medical): No   Housing Stability: Unknown (5/31/2023)    Housing Stability Vital Sign     Unable to Pay for Housing in the Last Year: No     Unstable Housing in the Last Year: No     Past Surgical History:   Procedure Laterality Date    COLONOSCOPY N/A 8/25/2023    Procedure: COLONOSCOPY;  Surgeon: Pebbles Spear MD;  Location: [facility] ENDO;  Service: Endoscopy;  Laterality: N/A;    COLONOSCOPY N/A 10/2/2024    Procedure: COLONOSCOPY  Cardiac clearance received on 09/20/24 per Dr. Lockett, Cardiology.  Note in encounters.  LB;  Surgeon: Sully Farris MD;  Location: [facility] ENDO;  Service: Endoscopy;  Laterality: N/A;    ESOPHAGOGASTRODUODENOSCOPY N/A 10/2/2024    Procedure: EGD (ESOPHAGOGASTRODUODENOSCOPY);  Surgeon: Sully Farris MD;  Location: [facility];  Service: Endoscopy;  Laterality: N/A;    SURGICAL REMOVAL OF LESION OF FACE Right 12/1/2023    Procedure: EXCISION, LESION, FACE;  Surgeon: Jose Flaherty MD;  Location: [facility] OR;  Service: ENT;  Laterality: Right;  1. Excision facial subcutaneous mass right cheek 3 cm. 2. Intermediate layered closure 3.5 cm    WISDOM TOOTH EXTRACTION      XI ROBOTIC APPENDECTOMY N/A 7/31/2024    Procedure: XI ROBOTIC APPENDECTOMY;  Surgeon: Paulo Saavedra MD;  Location: [facility] OR;  Service: General;  Laterality: N/A;       Labs:  Lab Results   Component Value Date    WBC 7.74 08/20/2024    HGB 17.9 08/20/2024    HCT 52.7 08/20/2024    MCV 99 (H) 08/20/2024     08/20/2024     BMP  Lab Results   Component Value Date     (L) 08/20/2024    K 4.8 08/20/2024    CL 95 08/20/2024    CO2 24 08/20/2024    BUN 5 (L) 08/20/2024    CREATININE 0.7 08/20/2024    CALCIUM 9.9 08/20/2024    ANIONGAP 10 08/20/2024    ESTGFRAFRICA >60.0 07/08/2022    EGFRNONAA >60.0 07/08/2022     Lab Results   Component Value Date    ALT 17 08/20/2024    AST 19 08/20/2024    ALKPHOS 108 08/20/2024     "BILITOT 0.8 08/20/2024       No results found for: "IRON", "TIBC", "FERRITIN", "SATURATEDIRO"  Lab Results   Component Value Date    UQJDGGAH50 445 10/19/2018     No results found for: "FOLATE"  Lab Results   Component Value Date    TSH 3.465 08/20/2024         Review of Systems   Constitutional:  Positive for activity change, appetite change, fatigue and unexpected weight change. Negative for chills, diaphoresis and fever.   HENT:  Negative for congestion, dental problem, drooling, ear discharge, ear pain, facial swelling, hearing loss, mouth sores, nosebleeds, postnasal drip, rhinorrhea, sinus pressure, sneezing, sore throat, tinnitus, trouble swallowing and voice change.    Eyes:  Negative for photophobia, pain, discharge, redness, itching and visual disturbance.   Respiratory:  Negative for apnea, cough, choking, chest tightness, shortness of breath, wheezing and stridor.    Cardiovascular:  Negative for chest pain, palpitations and leg swelling.   Gastrointestinal:  Negative for abdominal distention, abdominal pain, anal bleeding, blood in stool, constipation, diarrhea, nausea, rectal pain and vomiting.   Endocrine: Negative for cold intolerance, heat intolerance, polydipsia, polyphagia and polyuria.   Genitourinary:  Negative for decreased urine volume, difficulty urinating, dysuria, enuresis, flank pain, frequency, genital sores, hematuria, penile discharge, penile pain, penile swelling, scrotal swelling, testicular pain and urgency.   Musculoskeletal:  Negative for arthralgias, back pain, gait problem, joint swelling, myalgias, neck pain and neck stiffness.   Skin:  Negative for color change, pallor, rash and wound.   Allergic/Immunologic: Negative for environmental allergies, food allergies and immunocompromised state.   Neurological:  Positive for weakness. Negative for dizziness, tremors, seizures, syncope, facial asymmetry, speech difficulty, light-headedness, numbness and headaches.   Hematological:  " Negative for adenopathy. Does not bruise/bleed easily.   Psychiatric/Behavioral:  Positive for dysphoric mood. Negative for agitation, behavioral problems, confusion, decreased concentration, hallucinations, self-injury, sleep disturbance and suicidal ideas. The patient is nervous/anxious. The patient is not hyperactive.        Objective:      Physical Exam  Vitals reviewed.   Constitutional:       General: He is not in acute distress.     Appearance: He is well-developed. He is not diaphoretic.   HENT:      Head: Normocephalic.      Right Ear: External ear normal.      Left Ear: External ear normal.      Nose: Nose normal.      Right Sinus: No maxillary sinus tenderness or frontal sinus tenderness.      Left Sinus: No maxillary sinus tenderness or frontal sinus tenderness.      Mouth/Throat:      Pharynx: No oropharyngeal exudate.   Eyes:      General: Lids are normal. No scleral icterus.        Right eye: No discharge.         Left eye: No discharge.      Extraocular Movements:      Right eye: Normal extraocular motion.      Left eye: Normal extraocular motion.      Conjunctiva/sclera:      Right eye: Right conjunctiva is not injected. No hemorrhage.     Left eye: Left conjunctiva is not injected. No hemorrhage.     Pupils: Pupils are equal, round, and reactive to light.   Neck:      Thyroid: No thyromegaly.      Vascular: No JVD.      Trachea: No tracheal deviation.   Cardiovascular:      Rate and Rhythm: Normal rate.   Pulmonary:      Effort: Pulmonary effort is normal. No respiratory distress.      Breath sounds: No stridor.   Abdominal:      General: Bowel sounds are normal.      Palpations: Abdomen is soft. There is no hepatomegaly, splenomegaly or mass.      Tenderness: There is no abdominal tenderness.   Musculoskeletal:         General: No tenderness. Normal range of motion.      Cervical back: Normal range of motion and neck supple.   Lymphadenopathy:      Head:      Right side of head: No posterior  auricular or occipital adenopathy.      Left side of head: No posterior auricular or occipital adenopathy.      Cervical: No cervical adenopathy.      Right cervical: No superficial, deep or posterior cervical adenopathy.     Left cervical: No superficial, deep or posterior cervical adenopathy.      Upper Body:      Right upper body: No supraclavicular adenopathy.      Left upper body: No supraclavicular adenopathy.   Skin:     General: Skin is dry.      Findings: No erythema or rash.      Nails: There is no clubbing.   Neurological:      Mental Status: He is alert and oriented to person, place, and time.      Cranial Nerves: No cranial nerve deficit.      Motor: Weakness present.      Coordination: Coordination normal.      Gait: Gait abnormal.   Psychiatric:         Behavior: Behavior normal.         Thought Content: Thought content normal.         Judgment: Judgment normal.             Assessment:      1. Esophageal cancer, stage IV    2. Secondary adenocarcinoma of retroperitoneum    3. Secondary liver cancer    4. 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    5. Cachexia    6. Tobacco use disorder           Med Onc Chart Routing      Follow up with physician . Return to clinic after port in PEG tube placed to initiate chemotherapy.   Follow up with JORDANA    Infusion scheduling note    Injection scheduling note    Labs   Scheduling:  Preferred lab:  Lab interval:  CBC CMP serum iron TIBC ferritin LDH next generation sequencing in pharmacogenomic testing today   Imaging   CT-directed biopsy of liver needs to be done   Pharmacy appointment    Other referrals         Referral for CT biopsy as well as palliative care consultation will also need to be seen back by referring physician Dr. Jon for MediPort as well as feeding tube placement              Plan:     Extensive conversation with them high likelihood this represents stage IV metastatic GE junction tumor.  Case was placed through the via Oncology pathways  consent has been signed for FOLFOXConsented the patient to the treatment plan and the patient was educated on the planned duration of the treatment and schedule of the treatment administration.  Modifications after next generation sequencing on tissue as well as blood referral made for advanced care planningAdvance Care Planning   Patient will need MediPort as well as feeding tube placed by Surgical Oncology.  Articles from up-to-date followed for review told he has treatable but not curable malignancy.  Spoke with pathology after multidisciplinary tumor conference this morning.           Hudson Latif Jr, MD FACP

## 2024-10-11 NOTE — H&P (VIEW-ONLY)
Subjective:       Patient ID: Rosalio Muñoz is a 58 y.o. male.    Chief Complaint: Results    HPI:  58-year-old male referred after having increasing difficulty swallowing weight loss variant patient underwent upper endoscopies demonstrating adenocarcinoma GE junction.  PET scan demonstrated presumptive diagnosis of metastatic disease in liver I was asked to see the patient for further evaluation ECOG status 1 accompanied by his wife    Past Medical History:   Diagnosis Date    Hypertension     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 10/11/2024     Family History   Problem Relation Name Age of Onset    Hyperlipidemia Mother      Hypertension Father      Diabetes Father      Kidney disease Father      Heart disease Father      Breast cancer Maternal Grandmother      Prostate cancer Maternal Grandfather      Colon cancer Neg Hx       Social History     Socioeconomic History    Marital status:     Number of children: 2   Occupational History    Occupation: Industrial electrician   Tobacco Use    Smoking status: Former     Current packs/day: 0.00     Types: Cigars, Cigarettes     Quit date: 10/4/2022     Years since quittin.0     Passive exposure: Never    Smokeless tobacco: Current    Tobacco comments:     Cigar every afternoon   Substance and Sexual Activity    Alcohol use: Yes     Alcohol/week: 4.0 - 6.0 standard drinks of alcohol     Types: 4 - 6 Cans of beer per week     Comment: occ    Drug use: No    Sexual activity: Yes     Partners: Female     Social Drivers of Health     Financial Resource Strain: Low Risk  (2023)    Overall Financial Resource Strain (CARDIA)     Difficulty of Paying Living Expenses: Not hard at all   Food Insecurity: No Food Insecurity (2023)    Hunger Vital Sign     Worried About Running Out of Food in the Last Year: Never true     Ran Out of Food in the Last Year: Never true   Transportation Needs: No Transportation Needs (2023)    PRAPARE -  Transportation     Lack of Transportation (Medical): No     Lack of Transportation (Non-Medical): No   Housing Stability: Unknown (5/31/2023)    Housing Stability Vital Sign     Unable to Pay for Housing in the Last Year: No     Unstable Housing in the Last Year: No     Past Surgical History:   Procedure Laterality Date    COLONOSCOPY N/A 8/25/2023    Procedure: COLONOSCOPY;  Surgeon: Pebbles Spear MD;  Location: [facility] ENDO;  Service: Endoscopy;  Laterality: N/A;    COLONOSCOPY N/A 10/2/2024    Procedure: COLONOSCOPY  Cardiac clearance received on 09/20/24 per Dr. Lockett, Cardiology.  Note in encounters.  LB;  Surgeon: Sully Farris MD;  Location: [facility] ENDO;  Service: Endoscopy;  Laterality: N/A;    ESOPHAGOGASTRODUODENOSCOPY N/A 10/2/2024    Procedure: EGD (ESOPHAGOGASTRODUODENOSCOPY);  Surgeon: Sully Farris MD;  [facility];  Service: Endoscopy;  Laterality: N/A;    SURGICAL REMOVAL OF LESION OF FACE Right 12/1/2023    Procedure: EXCISION, LESION, FACE;  Surgeon: Jose Flaherty MD;  Location: [facility] OR;  Service: ENT;  Laterality: Right;  1. Excision facial subcutaneous mass right cheek 3 cm. 2. Intermediate layered closure 3.5 cm    WISDOM TOOTH EXTRACTION      XI ROBOTIC APPENDECTOMY N/A 7/31/2024    Procedure: XI ROBOTIC APPENDECTOMY;  Surgeon: Paulo Saavedra MD;  Location: [facility] OR;  Service: General;  Laterality: N/A;       Labs:  Lab Results   Component Value Date    WBC 7.74 08/20/2024    HGB 17.9 08/20/2024    HCT 52.7 08/20/2024    MCV 99 (H) 08/20/2024     08/20/2024     BMP  Lab Results   Component Value Date     (L) 08/20/2024    K 4.8 08/20/2024    CL 95 08/20/2024    CO2 24 08/20/2024    BUN 5 (L) 08/20/2024    CREATININE 0.7 08/20/2024    CALCIUM 9.9 08/20/2024    ANIONGAP 10 08/20/2024    ESTGFRAFRICA >60.0 07/08/2022    EGFRNONAA >60.0 07/08/2022     Lab Results   Component Value Date    ALT 17 08/20/2024    AST 19 08/20/2024    ALKPHOS 108 08/20/2024     "BILITOT 0.8 08/20/2024       No results found for: "IRON", "TIBC", "FERRITIN", "SATURATEDIRO"  Lab Results   Component Value Date    [id number] 445 10/19/2018     No results found for: "FOLATE"  Lab Results   Component Value Date    TSH 3.465 08/20/2024         Review of Systems   Constitutional:  Positive for activity change, appetite change, fatigue and unexpected weight change. Negative for chills, diaphoresis and fever.   HENT:  Negative for congestion, dental problem, drooling, ear discharge, ear pain, facial swelling, hearing loss, mouth sores, nosebleeds, postnasal drip, rhinorrhea, sinus pressure, sneezing, sore throat, tinnitus, trouble swallowing and voice change.    Eyes:  Negative for photophobia, pain, discharge, redness, itching and visual disturbance.   Respiratory:  Negative for apnea, cough, choking, chest tightness, shortness of breath, wheezing and stridor.    Cardiovascular:  Negative for chest pain, palpitations and leg swelling.   Gastrointestinal:  Negative for abdominal distention, abdominal pain, anal bleeding, blood in stool, constipation, diarrhea, nausea, rectal pain and vomiting.   Endocrine: Negative for cold intolerance, heat intolerance, polydipsia, polyphagia and polyuria.   Genitourinary:  Negative for decreased urine volume, difficulty urinating, dysuria, enuresis, flank pain, frequency, genital sores, hematuria, penile discharge, penile pain, penile swelling, scrotal swelling, testicular pain and urgency.   Musculoskeletal:  Negative for arthralgias, back pain, gait problem, joint swelling, myalgias, neck pain and neck stiffness.   Skin:  Negative for color change, pallor, rash and wound.   Allergic/Immunologic: Negative for environmental allergies, food allergies and immunocompromised state.   Neurological:  Positive for weakness. Negative for dizziness, tremors, seizures, syncope, facial asymmetry, speech difficulty, light-headedness, numbness and headaches.   Hematological:  " Negative for adenopathy. Does not bruise/bleed easily.   Psychiatric/Behavioral:  Positive for dysphoric mood. Negative for agitation, behavioral problems, confusion, decreased concentration, hallucinations, self-injury, sleep disturbance and suicidal ideas. The patient is nervous/anxious. The patient is not hyperactive.        Objective:      Physical Exam  Vitals reviewed.   Constitutional:       General: He is not in acute distress.     Appearance: He is well-developed. He is not diaphoretic.   HENT:      Head: Normocephalic.      Right Ear: External ear normal.      Left Ear: External ear normal.      Nose: Nose normal.      Right Sinus: No maxillary sinus tenderness or frontal sinus tenderness.      Left Sinus: No maxillary sinus tenderness or frontal sinus tenderness.      Mouth/Throat:      Pharynx: No oropharyngeal exudate.   Eyes:      General: Lids are normal. No scleral icterus.        Right eye: No discharge.         Left eye: No discharge.      Extraocular Movements:      Right eye: Normal extraocular motion.      Left eye: Normal extraocular motion.      Conjunctiva/sclera:      Right eye: Right conjunctiva is not injected. No hemorrhage.     Left eye: Left conjunctiva is not injected. No hemorrhage.     Pupils: Pupils are equal, round, and reactive to light.   Neck:      Thyroid: No thyromegaly.      Vascular: No JVD.      Trachea: No tracheal deviation.   Cardiovascular:      Rate and Rhythm: Normal rate.   Pulmonary:      Effort: Pulmonary effort is normal. No respiratory distress.      Breath sounds: No stridor.   Abdominal:      General: Bowel sounds are normal.      Palpations: Abdomen is soft. There is no hepatomegaly, splenomegaly or mass.      Tenderness: There is no abdominal tenderness.   Musculoskeletal:         General: No tenderness. Normal range of motion.      Cervical back: Normal range of motion and neck supple.   Lymphadenopathy:      Head:      Right side of head: No posterior  auricular or occipital adenopathy.      Left side of head: No posterior auricular or occipital adenopathy.      Cervical: No cervical adenopathy.      Right cervical: No superficial, deep or posterior cervical adenopathy.     Left cervical: No superficial, deep or posterior cervical adenopathy.      Upper Body:      Right upper body: No supraclavicular adenopathy.      Left upper body: No supraclavicular adenopathy.   Skin:     General: Skin is dry.      Findings: No erythema or rash.      Nails: There is no clubbing.   Neurological:      Mental Status: He is alert and oriented to person, place, and time.      Cranial Nerves: No cranial nerve deficit.      Motor: Weakness present.      Coordination: Coordination normal.      Gait: Gait abnormal.   Psychiatric:         Behavior: Behavior normal.         Thought Content: Thought content normal.         Judgment: Judgment normal.             Assessment:      1. Esophageal cancer, stage IV    2. Secondary adenocarcinoma of retroperitoneum    3. Secondary liver cancer    4. 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    5. Cachexia    6. Tobacco use disorder           Med Onc Chart Routing      Follow up with physician . Return to clinic after port in PEG tube placed to initiate chemotherapy.   Follow up with JORDANA    Infusion scheduling note    Injection scheduling note    Labs   Scheduling:  Preferred lab:  Lab interval:  CBC CMP serum iron TIBC ferritin LDH next generation sequencing in pharmacogenomic testing today   Imaging   CT-directed biopsy of liver needs to be done   Pharmacy appointment    Other referrals         Referral for CT biopsy as well as palliative care consultation will also need to be seen back by referring physician Dr. Jon for MediPort as well as feeding tube placement              Plan:     Extensive conversation with them high likelihood this represents stage IV metastatic GE junction tumor.  Case was placed through the via Oncology pathways  consent has been signed for FOLFOXConsented the patient to the treatment plan and the patient was educated on the planned duration of the treatment and schedule of the treatment administration.  Modifications after next generation sequencing on tissue as well as blood referral made for advanced care planningAdvance Care Planning   Patient will need MediPort as well as feeding tube placed by Surgical Oncology.  Articles from up-to-date followed for review told he has treatable but not curable malignancy.  Spoke with pathology after multidisciplinary tumor conference this morning.           Hudson Latif Jr, MD FACP

## 2024-10-12 DIAGNOSIS — D50.0 IRON DEFICIENCY ANEMIA DUE TO CHRONIC BLOOD LOSS: Primary | ICD-10-CM

## 2024-10-12 RX ORDER — SODIUM CHLORIDE 0.9 % (FLUSH) 0.9 %
10 SYRINGE (ML) INJECTION
OUTPATIENT
Start: 2024-10-19

## 2024-10-12 RX ORDER — SODIUM CHLORIDE 0.9 % (FLUSH) 0.9 %
10 SYRINGE (ML) INJECTION
OUTPATIENT
Start: 2024-10-12

## 2024-10-12 RX ORDER — HEPARIN 100 UNIT/ML
500 SYRINGE INTRAVENOUS
OUTPATIENT
Start: 2024-10-12

## 2024-10-12 RX ORDER — HEPARIN 100 UNIT/ML
500 SYRINGE INTRAVENOUS
OUTPATIENT
Start: 2024-10-19

## 2024-10-13 ENCOUNTER — PATIENT MESSAGE (OUTPATIENT)
Dept: ADMINISTRATIVE | Facility: OTHER | Age: 59
End: 2024-10-13
Payer: COMMERCIAL

## 2024-10-14 ENCOUNTER — TELEPHONE (OUTPATIENT)
Dept: SURGICAL ONCOLOGY | Facility: CLINIC | Age: 59
End: 2024-10-14
Payer: COMMERCIAL

## 2024-10-14 ENCOUNTER — PATIENT MESSAGE (OUTPATIENT)
Dept: HEMATOLOGY/ONCOLOGY | Facility: CLINIC | Age: 59
End: 2024-10-14
Payer: COMMERCIAL

## 2024-10-14 ENCOUNTER — TELEPHONE (OUTPATIENT)
Dept: RADIOLOGY | Facility: HOSPITAL | Age: 59
End: 2024-10-14
Payer: COMMERCIAL

## 2024-10-14 ENCOUNTER — PATIENT MESSAGE (OUTPATIENT)
Dept: ADMINISTRATIVE | Facility: OTHER | Age: 59
End: 2024-10-14
Payer: COMMERCIAL

## 2024-10-14 DIAGNOSIS — D68.9 COAGULATION DEFECT: Primary | ICD-10-CM

## 2024-10-14 DIAGNOSIS — C15.9 ESOPHAGEAL ADENOCARCINOMA: Primary | ICD-10-CM

## 2024-10-14 NOTE — TELEPHONE ENCOUNTER
Received called from patient's wife, Cherelle, to scheduled Bx of liver. Reached out to Kim with IR. She will be in contact with Mrs. Villarreal to schedule. Verbalized understanding.

## 2024-10-14 NOTE — TELEPHONE ENCOUNTER
Interventional Radiology  Scheduled 8:30AM liver biopsy for 10/18/24 w/patient's wife, Cherelle.  Instructed that pt. is to arrive by 7:15AM to the hospital ([address]/ [address]) off [address] in [place] and check in at Patient Registration located on the first floor.  He must have a ride and be NPO after midnight the night before.  Pt. does not take ASA or other blood thinners, NSAIDs, fish oil, or GLP-1/GIP agonists.  Pt. can take morning medications the day of the procedure with a small sip of water.  I gave Cherelle our direct contact number [phone] and she verbalized understanding of all instructions.

## 2024-10-15 ENCOUNTER — PATIENT MESSAGE (OUTPATIENT)
Dept: ADMINISTRATIVE | Facility: OTHER | Age: 59
End: 2024-10-15
Payer: COMMERCIAL

## 2024-10-15 ENCOUNTER — TELEPHONE (OUTPATIENT)
Dept: HEMATOLOGY/ONCOLOGY | Facility: CLINIC | Age: 59
End: 2024-10-15
Payer: COMMERCIAL

## 2024-10-15 DIAGNOSIS — C15.9 ESOPHAGEAL CANCER, STAGE IV: Primary | ICD-10-CM

## 2024-10-15 NOTE — TELEPHONE ENCOUNTER
Notified pt appt on 10/17 with Dr. Latif no longer needed since he saw him 10/11. We will schedule f/u after port/peg placement, pt agreed. No further needs at this time.

## 2024-10-16 ENCOUNTER — OFFICE VISIT (OUTPATIENT)
Dept: SURGICAL ONCOLOGY | Facility: CLINIC | Age: 59
End: 2024-10-16
Payer: COMMERCIAL

## 2024-10-16 ENCOUNTER — LAB VISIT (OUTPATIENT)
Dept: LAB | Facility: HOSPITAL | Age: 59
End: 2024-10-16
Attending: INTERNAL MEDICINE
Payer: COMMERCIAL

## 2024-10-16 ENCOUNTER — NUTRITION (OUTPATIENT)
Dept: NUTRITION | Facility: CLINIC | Age: 59
End: 2024-10-16
Payer: COMMERCIAL

## 2024-10-16 VITALS
WEIGHT: 171.94 LBS | HEART RATE: 83 BPM | HEIGHT: 73 IN | BODY MASS INDEX: 22.79 KG/M2 | TEMPERATURE: 98 F | DIASTOLIC BLOOD PRESSURE: 80 MMHG | SYSTOLIC BLOOD PRESSURE: 124 MMHG

## 2024-10-16 DIAGNOSIS — Z93.1 FEEDING BY G-TUBE: Primary | ICD-10-CM

## 2024-10-16 DIAGNOSIS — D68.9 COAGULATION DEFECT: ICD-10-CM

## 2024-10-16 DIAGNOSIS — C15.9 ESOPHAGEAL CANCER, STAGE IV: Primary | ICD-10-CM

## 2024-10-16 DIAGNOSIS — R63.4 ABNORMAL WEIGHT LOSS: ICD-10-CM

## 2024-10-16 DIAGNOSIS — C15.9 ESOPHAGEAL CANCER, STAGE IV: ICD-10-CM

## 2024-10-16 LAB
INR PPP: 0.9 (ref 0.8–1.2)
PD-L1 BY 22C3 TISS IMSTN DOC: POSITIVE
PROTHROMBIN TIME: 11 SEC (ref 9–12.5)
TEMPUS PD-L1 (22C3) COMBINED POSITIVE SCORE: 5
TEMPUS PD-L1 (22C3) TUMOR PROPORTION SCORE: 1 %
TEMPUS PORTAL: NORMAL

## 2024-10-16 PROCEDURE — 85610 PROTHROMBIN TIME: CPT | Performed by: INTERNAL MEDICINE

## 2024-10-16 PROCEDURE — 99999 PR PBB SHADOW E&M-EST. PATIENT-LVL II: CPT | Mod: PBBFAC,,,

## 2024-10-16 PROCEDURE — 3008F BODY MASS INDEX DOCD: CPT | Mod: CPTII,S$GLB,, | Performed by: SURGERY

## 2024-10-16 PROCEDURE — 99999 PR PBB SHADOW E&M-EST. PATIENT-LVL IV: CPT | Mod: PBBFAC,,, | Performed by: SURGERY

## 2024-10-16 PROCEDURE — 97802 MEDICAL NUTRITION INDIV IN: CPT | Mod: S$GLB,,,

## 2024-10-16 PROCEDURE — 3074F SYST BP LT 130 MM HG: CPT | Mod: CPTII,S$GLB,, | Performed by: SURGERY

## 2024-10-16 PROCEDURE — 4010F ACE/ARB THERAPY RXD/TAKEN: CPT | Mod: CPTII,S$GLB,, | Performed by: SURGERY

## 2024-10-16 PROCEDURE — 36415 COLL VENOUS BLD VENIPUNCTURE: CPT | Performed by: INTERNAL MEDICINE

## 2024-10-16 PROCEDURE — 3079F DIAST BP 80-89 MM HG: CPT | Mod: CPTII,S$GLB,, | Performed by: SURGERY

## 2024-10-16 PROCEDURE — 99215 OFFICE O/P EST HI 40 MIN: CPT | Mod: S$GLB,,, | Performed by: SURGERY

## 2024-10-16 RX ORDER — CEFAZOLIN SODIUM 2 G/50ML
2 SOLUTION INTRAVENOUS
OUTPATIENT
Start: 2024-10-16

## 2024-10-16 RX ORDER — SODIUM CHLORIDE 9 MG/ML
INJECTION, SOLUTION INTRAVENOUS CONTINUOUS
OUTPATIENT
Start: 2024-10-16

## 2024-10-16 NOTE — PROGRESS NOTES
Surgical Oncology Clinic Note      Referring Provider: Dr. Michell Goyal   PCP: Michell Goyal MD    Reason For Visit: Gastroesophageal: esophageal mass/stricture      HISTORY OF PRESENT ILLNESS     Rosalio Muñoz is a 58 y.o. male w/ significant smoking and drinking history who presents today for evaluation and management of a distal esophageal mass found on imaging.      Wanted to establish care with a primary care physician after losing about 25-30 lb over the previous 4 months without effort.  At that time a CT of the abdomen and pelvis with IV contrast was obtained as well as thyroid studies (which were normal).  The CT scan showed severe thickening at the GE junction concerning for an esophageal mass as well as multiple enlarged lymph nodes within the epigastric region concerning for local metastatic disease.  He was also noted to have some scattered subcentimeter hypodensities within the liver too small to characterize.  He was then referred to me for further evaluation.  Of note, he was recently admitted to the hospital for 5 days with a acute appendicitis back in July.    He states that ever since he had COVID about 2 years ago he has had really hard time swallowing pills and that really cold or really hot liquids have burned and really bothered him.  He attributed all that COVID originally.  However does note that over the last 4-5 months he has had increasing issues with swallowing and he is really limited himself now to just liquids and softer foods.  He used to eat ribeye steak regularly and according to his wife has not had any in about 4-5 months.  He states whenever he eats he feels like food gets stuck in his chest and then he feels full very quickly.  He does note that soups and mashed potatoes go down fine but anything more solid than that tends to get stuck.  He has occasionally vomited and even vomited up a liquid diet the other night but states that that is uncommon.  He  has tried to really maintain his weight and has been drinking more [employer] protein shake, ice cream, peanut butter, and whole milk-and has managed to gain back about 5 lb.  He has also decreased his drinking in his gone from drinking 8-10 beers a day to around 4 beers a day.  He does note a longstanding history of reflux but has never had an EGD and was told several years ago the hiatal hernia.    He does have a longstanding history of smoking.  He used to smoke about 2 packs a day from age 17 up until a couple of years ago.  He did quit smoking at that time but started sleeping however quit that when his hypertension got worse, and then he started smoking cigars and he now smokes about 5-6 baby cigars in a day has done that for quite some time.  His last colonoscopy was a little over a year ago, at that time they found around 22 polyps and he was told to get a repeat scope in 6 months but has not had that done yet.  He has no real family history of cancer.    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  Wife is house supervisor at [employer].  2 sons - ages 36 and 32.   for 36 years.        INTERVAL HISTORY   10/16/2024:  Patient returns to clinic today.  He has had an EGD which did show a fungating mass at his GE junction, biopsies confirmed the presence of adenocarcinoma.  Subsequent PET-CT scan showed a 2.2 cm hypermetabolic right posterior hepatic lesion concerning for metastatic disease, as well as hypermetabolic gastrohepatic ligament lymph node.  He has met with Dr. Crowley with Radiation Oncology as well as Dr. Latif with Medical Oncology.  He was originally planning to go see Dr. Bran, my partner, in [place] however with a diagnosis of presumed metastatic disease they are deferring that at this time.    Past Medical History:   Diagnosis Date    Hypertension     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 10/11/2024       Past  Surgical History:   Procedure Laterality Date    COLONOSCOPY N/A 2023    Procedure: COLONOSCOPY;  Surgeon: Pebbles Spear MD;  Location: [facility] ENDO;  Service: Endoscopy;  Laterality: N/A;    COLONOSCOPY N/A 10/2/2024    Procedure: COLONOSCOPY  Cardiac clearance received on 24 per Dr. Lockett, Cardiology.  Note in encounters.  LB;  Surgeon: Sully Farris MD;  Location: [facility];  Service: Endoscopy;  Laterality: N/A;    ESOPHAGOGASTRODUODENOSCOPY N/A 10/2/2024    Procedure: EGD (ESOPHAGOGASTRODUODENOSCOPY);  Surgeon: Sully Farris MD;  Location: [facility];  Service: Endoscopy;  Laterality: N/A;    SURGICAL REMOVAL OF LESION OF FACE Right 2023    Procedure: EXCISION, LESION, FACE;  Surgeon: Jose Flaherty MD;  Location: [facility] OR;  Service: ENT;  Laterality: Right;  1. Excision facial subcutaneous mass right cheek 3 cm. 2. Intermediate layered closure 3.5 cm    WISDOM TOOTH EXTRACTION      XI ROBOTIC APPENDECTOMY N/A 2024    Procedure: XI ROBOTIC APPENDECTOMY;  Surgeon: Paulo Saavedra MD;  Location: [facility] OR;  Service: General;  Laterality: N/A;       Family History   Problem Relation Name Age of Onset    Hyperlipidemia Mother      Hypertension Father      Diabetes Father      Kidney disease Father      Heart disease Father      Breast cancer Maternal Grandmother      Prostate cancer Maternal Grandfather      Colon cancer Neg Hx         Social History     Socioeconomic History    Marital status:     Number of children: 2   Occupational History    Occupation:    Tobacco Use    Smoking status: Every Day     Current packs/day: 0.00     Types: Cigars, Cigarettes     Last attempt to quit: 10/4/2022     Years since quittin.0     Passive exposure: Never    Smokeless tobacco: Never    Tobacco comments:     Cigar every afternoon   Substance and Sexual Activity    Alcohol use: Yes     Alcohol/week: 4.0 - 6.0 standard drinks of alcohol     Types: 4 - 6 Cans  of beer per week     Comment: occ    Drug use: No    Sexual activity: Yes     Partners: Female     Social Drivers of Health     Financial Resource Strain: Low Risk  (5/31/2023)    Overall Financial Resource Strain (CARDIA)     Difficulty of Paying Living Expenses: Not hard at all   Food Insecurity: No Food Insecurity (5/31/2023)    Hunger Vital Sign     Worried About Running Out of Food in the Last Year: Never true     Ran Out of Food in the Last Year: Never true   Transportation Needs: No Transportation Needs (5/31/2023)    PRAPARE - Transportation     Lack of Transportation (Medical): No     Lack of Transportation (Non-Medical): No   Housing Stability: Unknown (5/31/2023)    Housing Stability Vital Sign     Unable to Pay for Housing in the Last Year: No     Unstable Housing in the Last Year: No          Medication List            Accurate as of October 16, 2024 11:59 PM. If you have any questions, ask your nurse or doctor.                CONTINUE taking these medications      amLODIPine 5 MG tablet  Commonly known as: NORVASC     aspirin 81 MG EC tablet  Commonly known as: ECOTRIN  Take 1 tablet (81 mg total) by mouth once daily.     atorvastatin 20 MG tablet  Commonly known as: LIPITOR  Take 1 tablet (20 mg total) by mouth every evening.     carvediloL 25 MG tablet  Commonly known as: COREG  Take 1 tablet (25 mg total) by mouth 2 (two) times daily.     HYDROcodone-acetaminophen 5-325 mg per tablet  Commonly known as: NORCO  Take 1 tablet by mouth every 4 (four) hours as needed for Pain.     LIDOcaine-prilocaine cream  Commonly known as: EMLA  Apply topically as needed.     losartan 100 MG tablet  Commonly known as: COZAAR  Take 1 tablet (100 mg total) by mouth every evening.     OLANZapine 5 MG tablet  Commonly known as: ZyPREXA  Take 1 tablet (5 mg total) by mouth every evening. Take nightly on days 1-3 of each chemotherapy cycle.     prochlorperazine 5 MG tablet  Commonly known as: COMPAZINE  Take 2 tablets (10  mg total) by mouth every 6 (six) hours as needed for Nausea.              Review of patient's allergies indicates:  No Known Allergies    Review of Systems   Constitutional:  Positive for malaise/fatigue and weight loss. Negative for chills and fever.   HENT:  Positive for hearing loss.    Respiratory:  Negative for cough and shortness of breath.    Cardiovascular:  Negative for chest pain and palpitations.   Gastrointestinal:  Positive for heartburn and nausea. Negative for abdominal pain, blood in stool, constipation and vomiting.   Neurological:  Negative for dizziness, focal weakness, seizures and weakness.   Psychiatric/Behavioral:  Negative for depression, memory loss and substance abuse. The patient is not nervous/anxious.        PHYSICAL EXAM     Vitals:    10/16/24 1541   BP: 124/80   Pulse: 83   Temp: 98.4 °F (36.9 °C)     Body mass index is 22.69 kg/m².  ECOG SCORE    1 - Restricted in strenuous activity-ambulatory and able to carry out work of a light nature         Physical Exam  Constitutional:       General: He is not in acute distress.     Appearance: Normal appearance. He is not ill-appearing.   HENT:      Head: Normocephalic and atraumatic.      Mouth/Throat:      Mouth: Mucous membranes are moist.   Eyes:      Extraocular Movements: Extraocular movements intact.      Conjunctiva/sclera: Conjunctivae normal.   Cardiovascular:      Rate and Rhythm: Normal rate and regular rhythm.   Pulmonary:      Effort: Pulmonary effort is normal.   Abdominal:      General: Abdomen is flat.      Palpations: Abdomen is soft. There is no mass.      Tenderness: There is no abdominal tenderness.   Musculoskeletal:         General: Normal range of motion.   Skin:     General: Skin is warm and dry.   Neurological:      General: No focal deficit present.      Mental Status: He is alert.   Psychiatric:         Mood and Affect: Mood normal.         Behavior: Behavior normal.         Thought Content: Thought content normal.  "         DATA REVIEW:  I personally reviewed the following records for this visit: lab work from prior visit, notes from other physicians, computed tomography/CT imaging, surgical pathology results, endoscopy results, radiographic study evaluation, and laboratory results done by primary care physician    LABS       Lab Results   Component Value Date    WBC 6.79 10/11/2024    HGB 14.0 10/11/2024    HCT 40.3 10/11/2024     10/11/2024     Lab Results   Component Value Date    GLU 94 10/11/2024    CALCIUM 8.9 10/11/2024    ALBUMIN 3.3 (L) 10/11/2024    PROT 7.2 10/11/2024     (L) 10/11/2024    K 4.0 10/11/2024    CO2 22 (L) 10/11/2024    CL 97 10/11/2024    BUN 10 10/11/2024    CREATININE 0.7 10/11/2024    ALKPHOS 83 10/11/2024    ALT 20 10/11/2024    AST 24 10/11/2024    BILITOT 0.5 10/11/2024       Nutritional:  No results found for: "PREALBUMIN"    Tumor Markers:  No results found for: "CEA", "AMYLASE", "ASPIRATE", "ZHI606", "", "SZ1374", "AFP", "AFPTM"  No results found for: ""    PATHOLOGY     10/02/2024:  EGD          Abnormal   1. Esophagus, mass, biopsy:  - Adenocarcinoma, moderately differentiated, see comment.  MMR pending**    2. Stomach, biopsy:  - Antral mucosa with minimal reactive changes.    - Negative for Helicobacter organisms on routine H&E sections.      3. Colon, transverse, polypectomies:  - Colonic mucosa with hyperplastic change and lymphoid aggregate, multiple fragments.      4. Colon, descending, polypectomies:  - Tubular adenoma, multiple fragments.    - Colonic mucosa with hyperplastic change, multiple fragments.      5. Colon, sigmoid, polypectomy:  - Hyperplastic polyp.      6. Rectum, polypectomies:  - Hyperplastic polyp, multiple fragments.      COMMENT: The esophagus mass (specimen 1) shows a malignant epithelial proliferation.  Immunostains show the cells to be positive for CK7 and negative for CDX2, synaptophysin, and chromogranin.  This is consistent with " adenocarcinoma.  Part 1 of this case   is reviewed by Dr. TESSA Parson who agrees with the above diagnosis.  Appropriate positive controls are examined.  Specimen 1 will be sent for Claudin 18.2, results will be issued in addendum.    Immunohistochemistry (IHC) Testing for Mismatch Repair (MMR) Proteins:    MLH1 - Intact nuclear expression  MSH2 - Intact nuclear expression  MSH6 - Intact nuclear expression  PMS2 - Intact nuclear expression    Background nonneoplastic tissue/internal control with intact nuclear expression    IHC Interpretation  No loss of nuclear expression of MMR proteins: low probability of microsatellite instability    There are exceptions to the above IHC interpretations. These results should not be considered in isolation, and clinical correlation with genetic counseling is recommended to assess the need for germline testing.  VC       Comment: Interp By Ioana Gomes MD, Signed on 10/15/2024 at 08:14   Supplemental Diagnosis      Abnormal   HER2 by immunohistochemistry performed on specimen 1: Positive (3+)    Specimen 1 sent for tempus.  Appropriate positive controls are examined           IMAGING     08/07/2023:  CT Chest- Lung screen  FINDINGS:   There are 2 stable tiny benign type subpleural nodules in the right middle lobe measuring up to 3 mm on image 280 series 4.  Stable 3 mm benign-type subpleural nodule in the left upper lobe on image 100.  No suspicious lung nodules are identified.  No lung masses.  No acute infiltrate or pleural effusion identified.  No pathologically enlarged lymph nodes are identified. Coronary artery calcifications are noted. No suspicious lytic or blastic bone marrow lesions are identified.  Limited images through the upper abdomen demonstrate no suspicious mass or acute disease.    07/31/2024:  CT Renal Stone Study  Impression:   1.  Inflammatory changes surround the appendix which, measures 1 cm in diameter.  There are some air bubbles immediately adjacent to  the tip of the appendix in the adjacent fat, concerning for early perforation of the appendix.   2. Multiple dilated air and fluid-filled loops of small bowel noted, concerning for ileus or small-bowel obstruction.   3.  Negative for acute process otherwise.  Negative for renal stone disease or hydronephrosis.   4.  Extensive coronary artery calcifications.  Large hiatal hernia.  Vascular calcifications without aneurysmal change.  Other nonemergent findings as described above.     09/06/2024:  CT Chest, Abdomen, and Pelvis  Impression:   1. Severe thickening at the GE junction which could reflect distal esophageal mass versus less likely esophagitis. Recommend endoscopy with possible tissue sampling.  Multiple enlarged lymph nodes are seen within the epigastric region measuring up to 2.4 cm concerning for metastatic disease.   2.  See above for additional findings and recommendations    10/10/2024:  PET CT Scan  Impression:  Gastroesophageal cancer with metastatic lymphadenopathy and right hepatic lobe metastasis.    ASSESSMENT & PLAN     1. Esophageal cancer, stage IV       Rosalio Muñoz is a 58 y.o. male with newly diagnosed esophageal adenocarcinoma was presumed liver metastasis.    Patient is adenocarcinoma is HER2 positive and MSI stable.  Presumed Siewert II based on imaging and EGD.  I have had a lengthy discussion with the patient and his wife regarding the management of metastatic esophageal cancer.  We discussed that the management is predominantly focused on chemotherapy for systemic treatment and at times may also involve radiation.  We did discuss that there he has no role for surgical resection in metastatic esophageal cancer.  He still has not had his liver lesion biopsied however we discussed that even if this is not metastatic esophageal cancer he would benefit from induction chemotherapy due to his significant symptomatology and his difficulty with swallowing.    I discussed with him and his  wife the role for port placement as well as for feeding gastrostomy tube to improve his nutrition.  They have thought about it and are in agreement with proceeding with G-tube placement.  They understand that this will be done transabdominally due to his near obstructing mass in his esophagus that which prevents any ability for PEG tube placement.  We reviewed the risks of port placement including pneumothorax, bleeding, inability to place a port, and need for other surgeries or procedures to name a few.  We also reviewed risks of G-tube placement including dislodgement, damage to surrounding structures, damage to the stomach thus impairing its use for few surgical future surgeries (although this is unlikely to be an issue based on his presumed metastatic disease), and need for other surgeries or procedures.    -- based on his pathology would recommend 5FU + Oxaliplatin + Trastuzumab +/- pembrolizumab pending PD-L1 expression studies  -- recommend biopsy of liver lesion (scheduled for Friday)  -- will plan for port (pt prefers LEFT side for port) and robotic G tube placement on Monday, 10/21  -- risks reviewed in clinic and consent obtained.      Mr. Muñoz expressed understanding in regards to our discussion today. Many good questions were asked on today's visit, all of which were answered to their satisfaction.    Follow-up: Follow up in about 20 days (around 11/5/2024).                  Roseanna Rosas MD MS              Surgical Oncology              [facility] [place], [place]              Office: [phone]     Communications: 45 minutes were spent on today's visit in face-to-face and non face-to-face time with the patient. This patient was recently diagnosed with esophageal mass and the time was required to provide counseling and guidance regarding their new diagnosis. Time was spent reviewing all outside records and information pertaining to their work-up and formulating a  treatment plan in line with standardized guidelines. Additional time was spent communicating with referring physicians and facilities to facilitate the efficient exchange of previous healthcare records and radiographic imaging pertinent to the diagnosis and disease management.       Orders Placed This Encounter   Procedures    Case Request Operating Room: INSERTION, VENOUS ACCESS PORT, ROBOTIC INSERTION, GASTROSTOMY TUBE     Order Specific Question:   Medical Necessity:     Answer:   Medically Urgent [101]     Order Specific Question:   Case classification     Answer:   E - Elective [90]     Order Specific Question:   Is an on-site pathologist required for this procedure?     Answer:   N/A

## 2024-10-16 NOTE — H&P (VIEW-ONLY)
Surgical Oncology Clinic Note      Referring Provider: Dr. Michell Goyal   PCP: Michell Goyal MD    Reason For Visit: Gastroesophageal: esophageal mass/stricture      HISTORY OF PRESENT ILLNESS     Rosalio Muñoz is a 58 y.o. male w/ significant smoking and drinking history who presents today for evaluation and management of a distal esophageal mass found on imaging.      Wanted to establish care with a primary care physician after losing about 25-30 lb over the previous 4 months without effort.  At that time a CT of the abdomen and pelvis with IV contrast was obtained as well as thyroid studies (which were normal).  The CT scan showed severe thickening at the GE junction concerning for an esophageal mass as well as multiple enlarged lymph nodes within the epigastric region concerning for local metastatic disease.  He was also noted to have some scattered subcentimeter hypodensities within the liver too small to characterize.  He was then referred to me for further evaluation.  Of note, he was recently admitted to the hospital for 5 days with a acute appendicitis back in July.    He states that ever since he had COVID about 2 years ago he has had really hard time swallowing pills and that really cold or really hot liquids have burned and really bothered him.  He attributed all that COVID originally.  However does note that over the last 4-5 months he has had increasing issues with swallowing and he is really limited himself now to just liquids and softer foods.  He used to eat ribeye steak regularly and according to his wife has not had any in about 4-5 months.  He states whenever he eats he feels like food gets stuck in his chest and then he feels full very quickly.  He does note that soups and mashed potatoes go down fine but anything more solid than that tends to get stuck.  He has occasionally vomited and even vomited up a liquid diet the other night but states that that is uncommon.  He  has tried to really maintain his weight and has been drinking more [employer] protein shake, ice cream, peanut butter, and whole milk-and has managed to gain back about 5 lb.  He has also decreased his drinking in his gone from drinking 8-10 beers a day to around 4 beers a day.  He does note a longstanding history of reflux but has never had an EGD and was told several years ago the hiatal hernia.    He does have a longstanding history of smoking.  He used to smoke about 2 packs a day from age 17 up until a couple of years ago.  He did quit smoking at that time but started sleeping however quit that when his hypertension got worse, and then he started smoking cigars and he now smokes about 5-6 baby cigars in a day has done that for quite some time.  His last colonoscopy was a little over a year ago, at that time they found around 22 polyps and he was told to get a repeat scope in 6 months but has not had that done yet.  He has no real family history of cancer.    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  Wife is house supervisor at [employer].  2 sons - ages 36 and 32.   for 36 years.        INTERVAL HISTORY   10/16/2024:  Patient returns to clinic today.  He has had an EGD which did show a fungating mass at his GE junction, biopsies confirmed the presence of adenocarcinoma.  Subsequent PET-CT scan showed a 2.2 cm hypermetabolic right posterior hepatic lesion concerning for metastatic disease, as well as hypermetabolic gastrohepatic ligament lymph node.  He has met with Dr. Crowley with Radiation Oncology as well as Dr. Latif with Medical Oncology.  He was originally planning to go see Dr. Bran, my partner, in [place] however with a diagnosis of presumed metastatic disease they are deferring that at this time.    Past Medical History:   Diagnosis Date    Hypertension     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 10/11/2024       Past  Surgical History:   Procedure Laterality Date    COLONOSCOPY N/A 2023    Procedure: COLONOSCOPY;  Surgeon: Pebbles Spear MD;  Location: [facility] ENDO;  Service: Endoscopy;  Laterality: N/A;    COLONOSCOPY N/A 10/2/2024    Procedure: COLONOSCOPY  Cardiac clearance received on 24 per Dr. Lockett, Cardiology.  Note in encounters.  LB;  Surgeon: Sully Farris MD;  Location: [facility];  Service: Endoscopy;  Laterality: N/A;    ESOPHAGOGASTRODUODENOSCOPY N/A 10/2/2024    Procedure: EGD (ESOPHAGOGASTRODUODENOSCOPY);  Surgeon: Sully Farris MD;  Location: [facility];  Service: Endoscopy;  Laterality: N/A;    SURGICAL REMOVAL OF LESION OF FACE Right 2023    Procedure: EXCISION, LESION, FACE;  Surgeon: Jose Flaherty MD;  Location: [facility] OR;  Service: ENT;  Laterality: Right;  1. Excision facial subcutaneous mass right cheek 3 cm. 2. Intermediate layered closure 3.5 cm    WISDOM TOOTH EXTRACTION      XI ROBOTIC APPENDECTOMY N/A 2024    Procedure: XI ROBOTIC APPENDECTOMY;  Surgeon: Paulo Saavedra MD;  Location: [facility] OR;  Service: General;  Laterality: N/A;       Family History   Problem Relation Name Age of Onset    Hyperlipidemia Mother      Hypertension Father      Diabetes Father      Kidney disease Father      Heart disease Father      Breast cancer Maternal Grandmother      Prostate cancer Maternal Grandfather      Colon cancer Neg Hx         Social History     Socioeconomic History    Marital status:     Number of children: 2   Occupational History    Occupation:    Tobacco Use    Smoking status: Every Day     Current packs/day: 0.00     Types: Cigars, Cigarettes     Last attempt to quit: 10/4/2022     Years since quittin.0     Passive exposure: Never    Smokeless tobacco: Never    Tobacco comments:     Cigar every afternoon   Substance and Sexual Activity    Alcohol use: Yes     Alcohol/week: 4.0 - 6.0 standard drinks of alcohol     Types: 4 - 6 Cans  of beer per week     Comment: occ    Drug use: No    Sexual activity: Yes     Partners: Female     Social Drivers of Health     Financial Resource Strain: Low Risk  (5/31/2023)    Overall Financial Resource Strain (CARDIA)     Difficulty of Paying Living Expenses: Not hard at all   Food Insecurity: No Food Insecurity (5/31/2023)    Hunger Vital Sign     Worried About Running Out of Food in the Last Year: Never true     Ran Out of Food in the Last Year: Never true   Transportation Needs: No Transportation Needs (5/31/2023)    PRAPARE - Transportation     Lack of Transportation (Medical): No     Lack of Transportation (Non-Medical): No   Housing Stability: Unknown (5/31/2023)    Housing Stability Vital Sign     Unable to Pay for Housing in the Last Year: No     Unstable Housing in the Last Year: No          Medication List            Accurate as of October 16, 2024 11:59 PM. If you have any questions, ask your nurse or doctor.                CONTINUE taking these medications      amLODIPine 5 MG tablet  Commonly known as: NORVASC     aspirin 81 MG EC tablet  Commonly known as: ECOTRIN  Take 1 tablet (81 mg total) by mouth once daily.     atorvastatin 20 MG tablet  Commonly known as: LIPITOR  Take 1 tablet (20 mg total) by mouth every evening.     carvediloL 25 MG tablet  Commonly known as: COREG  Take 1 tablet (25 mg total) by mouth 2 (two) times daily.     HYDROcodone-acetaminophen 5-325 mg per tablet  Commonly known as: NORCO  Take 1 tablet by mouth every 4 (four) hours as needed for Pain.     LIDOcaine-prilocaine cream  Commonly known as: EMLA  Apply topically as needed.     losartan 100 MG tablet  Commonly known as: COZAAR  Take 1 tablet (100 mg total) by mouth every evening.     OLANZapine 5 MG tablet  Commonly known as: ZyPREXA  Take 1 tablet (5 mg total) by mouth every evening. Take nightly on days 1-3 of each chemotherapy cycle.     prochlorperazine 5 MG tablet  Commonly known as: COMPAZINE  Take 2 tablets (10  mg total) by mouth every 6 (six) hours as needed for Nausea.              Review of patient's allergies indicates:  No Known Allergies    Review of Systems   Constitutional:  Positive for malaise/fatigue and weight loss. Negative for chills and fever.   HENT:  Positive for hearing loss.    Respiratory:  Negative for cough and shortness of breath.    Cardiovascular:  Negative for chest pain and palpitations.   Gastrointestinal:  Positive for heartburn and nausea. Negative for abdominal pain, blood in stool, constipation and vomiting.   Neurological:  Negative for dizziness, focal weakness, seizures and weakness.   Psychiatric/Behavioral:  Negative for depression, memory loss and substance abuse. The patient is not nervous/anxious.        PHYSICAL EXAM     Vitals:    10/16/24 1541   BP: 124/80   Pulse: 83   Temp: 98.4 °F (36.9 °C)     Body mass index is 22.69 kg/m².  ECOG SCORE    1 - Restricted in strenuous activity-ambulatory and able to carry out work of a light nature         Physical Exam  Constitutional:       General: He is not in acute distress.     Appearance: Normal appearance. He is not ill-appearing.   HENT:      Head: Normocephalic and atraumatic.      Mouth/Throat:      Mouth: Mucous membranes are moist.   Eyes:      Extraocular Movements: Extraocular movements intact.      Conjunctiva/sclera: Conjunctivae normal.   Cardiovascular:      Rate and Rhythm: Normal rate and regular rhythm.   Pulmonary:      Effort: Pulmonary effort is normal.   Abdominal:      General: Abdomen is flat.      Palpations: Abdomen is soft. There is no mass.      Tenderness: There is no abdominal tenderness.   Musculoskeletal:         General: Normal range of motion.   Skin:     General: Skin is warm and dry.   Neurological:      General: No focal deficit present.      Mental Status: He is alert.   Psychiatric:         Mood and Affect: Mood normal.         Behavior: Behavior normal.         Thought Content: Thought content normal.  "         DATA REVIEW:  I personally reviewed the following records for this visit: lab work from prior visit, notes from other physicians, computed tomography/CT imaging, surgical pathology results, endoscopy results, radiographic study evaluation, and laboratory results done by primary care physician    LABS       Lab Results   Component Value Date    WBC 6.79 10/11/2024    HGB 14.0 10/11/2024    HCT 40.3 10/11/2024     10/11/2024     Lab Results   Component Value Date    GLU 94 10/11/2024    CALCIUM 8.9 10/11/2024    ALBUMIN 3.3 (L) 10/11/2024    PROT 7.2 10/11/2024     (L) 10/11/2024    K 4.0 10/11/2024    CO2 22 (L) 10/11/2024    CL 97 10/11/2024    BUN 10 10/11/2024    CREATININE 0.7 10/11/2024    ALKPHOS 83 10/11/2024    ALT 20 10/11/2024    AST 24 10/11/2024    BILITOT 0.5 10/11/2024       Nutritional:  No results found for: "PREALBUMIN"    Tumor Markers:  No results found for: "CEA", "AMYLASE", "ASPIRATE", "SXY194", "", "PP5849", "AFP", "AFPTM"  No results found for: ""    PATHOLOGY     10/02/2024:  EGD          Abnormal   1. Esophagus, mass, biopsy:  - Adenocarcinoma, moderately differentiated, see comment.  MMR pending**    2. Stomach, biopsy:  - Antral mucosa with minimal reactive changes.    - Negative for Helicobacter organisms on routine H&E sections.      3. Colon, transverse, polypectomies:  - Colonic mucosa with hyperplastic change and lymphoid aggregate, multiple fragments.      4. Colon, descending, polypectomies:  - Tubular adenoma, multiple fragments.    - Colonic mucosa with hyperplastic change, multiple fragments.      5. Colon, sigmoid, polypectomy:  - Hyperplastic polyp.      6. Rectum, polypectomies:  - Hyperplastic polyp, multiple fragments.      COMMENT: The esophagus mass (specimen 1) shows a malignant epithelial proliferation.  Immunostains show the cells to be positive for CK7 and negative for CDX2, synaptophysin, and chromogranin.  This is consistent with " adenocarcinoma.  Part 1 of this case   is reviewed by Dr. TESSA Parson who agrees with the above diagnosis.  Appropriate positive controls are examined.  Specimen 1 will be sent for Claudin 18.2, results will be issued in addendum.    Immunohistochemistry (IHC) Testing for Mismatch Repair (MMR) Proteins:    MLH1 - Intact nuclear expression  MSH2 - Intact nuclear expression  MSH6 - Intact nuclear expression  PMS2 - Intact nuclear expression    Background nonneoplastic tissue/internal control with intact nuclear expression    IHC Interpretation  No loss of nuclear expression of MMR proteins: low probability of microsatellite instability    There are exceptions to the above IHC interpretations. These results should not be considered in isolation, and clinical correlation with genetic counseling is recommended to assess the need for germline testing.  VC       Comment: Interp By Ioana Gomes MD, Signed on 10/15/2024 at 08:14   Supplemental Diagnosis      Abnormal   HER2 by immunohistochemistry performed on specimen 1: Positive (3+)    Specimen 1 sent for tempus.  Appropriate positive controls are examined           IMAGING     08/07/2023:  CT Chest- Lung screen  FINDINGS:   There are 2 stable tiny benign type subpleural nodules in the right middle lobe measuring up to 3 mm on image 280 series 4.  Stable 3 mm benign-type subpleural nodule in the left upper lobe on image 100.  No suspicious lung nodules are identified.  No lung masses.  No acute infiltrate or pleural effusion identified.  No pathologically enlarged lymph nodes are identified. Coronary artery calcifications are noted. No suspicious lytic or blastic bone marrow lesions are identified.  Limited images through the upper abdomen demonstrate no suspicious mass or acute disease.    07/31/2024:  CT Renal Stone Study  Impression:   1.  Inflammatory changes surround the appendix which, measures 1 cm in diameter.  There are some air bubbles immediately adjacent to  the tip of the appendix in the adjacent fat, concerning for early perforation of the appendix.   2. Multiple dilated air and fluid-filled loops of small bowel noted, concerning for ileus or small-bowel obstruction.   3.  Negative for acute process otherwise.  Negative for renal stone disease or hydronephrosis.   4.  Extensive coronary artery calcifications.  Large hiatal hernia.  Vascular calcifications without aneurysmal change.  Other nonemergent findings as described above.     09/06/2024:  CT Chest, Abdomen, and Pelvis  Impression:   1. Severe thickening at the GE junction which could reflect distal esophageal mass versus less likely esophagitis. Recommend endoscopy with possible tissue sampling.  Multiple enlarged lymph nodes are seen within the epigastric region measuring up to 2.4 cm concerning for metastatic disease.   2.  See above for additional findings and recommendations    10/10/2024:  PET CT Scan  Impression:  Gastroesophageal cancer with metastatic lymphadenopathy and right hepatic lobe metastasis.    ASSESSMENT & PLAN     1. Esophageal cancer, stage IV       Rosalio Muñoz is a 58 y.o. male with newly diagnosed esophageal adenocarcinoma was presumed liver metastasis.    Patient is adenocarcinoma is HER2 positive and MSI stable.  Presumed Siewert II based on imaging and EGD.  I have had a lengthy discussion with the patient and his wife regarding the management of metastatic esophageal cancer.  We discussed that the management is predominantly focused on chemotherapy for systemic treatment and at times may also involve radiation.  We did discuss that there he has no role for surgical resection in metastatic esophageal cancer.  He still has not had his liver lesion biopsied however we discussed that even if this is not metastatic esophageal cancer he would benefit from induction chemotherapy due to his significant symptomatology and his difficulty with swallowing.    I discussed with him and his  wife the role for port placement as well as for feeding gastrostomy tube to improve his nutrition.  They have thought about it and are in agreement with proceeding with G-tube placement.  They understand that this will be done transabdominally due to his near obstructing mass in his esophagus that which prevents any ability for PEG tube placement.  We reviewed the risks of port placement including pneumothorax, bleeding, inability to place a port, and need for other surgeries or procedures to name a few.  We also reviewed risks of G-tube placement including dislodgement, damage to surrounding structures, damage to the stomach thus impairing its use for few surgical future surgeries (although this is unlikely to be an issue based on his presumed metastatic disease), and need for other surgeries or procedures.    -- based on his pathology would recommend 5FU + Oxaliplatin + Trastuzumab +/- pembrolizumab pending PD-L1 expression studies  -- recommend biopsy of liver lesion (scheduled for Friday)  -- will plan for port (pt prefers LEFT side for port) and robotic G tube placement on Monday, 10/21  -- risks reviewed in clinic and consent obtained.      Mr. Muñoz expressed understanding in regards to our discussion today. Many good questions were asked on today's visit, all of which were answered to their satisfaction.    Follow-up: Follow up in about 20 days (around 11/5/2024).                  Roseanna Rosas MD MS              Surgical Oncology              [facility] [place], [place]              Office: [phone]     Communications: 45 minutes were spent on today's visit in face-to-face and non face-to-face time with the patient. This patient was recently diagnosed with esophageal mass and the time was required to provide counseling and guidance regarding their new diagnosis. Time was spent reviewing all outside records and information pertaining to their work-up and formulating a  treatment plan in line with standardized guidelines. Additional time was spent communicating with referring physicians and facilities to facilitate the efficient exchange of previous healthcare records and radiographic imaging pertinent to the diagnosis and disease management.       Orders Placed This Encounter   Procedures    Case Request Operating Room: INSERTION, VENOUS ACCESS PORT, ROBOTIC INSERTION, GASTROSTOMY TUBE     Order Specific Question:   Medical Necessity:     Answer:   Medically Urgent [101]     Order Specific Question:   Case classification     Answer:   E - Elective [90]     Order Specific Question:   Is an on-site pathologist required for this procedure?     Answer:   N/A

## 2024-10-17 ENCOUNTER — TELEPHONE (OUTPATIENT)
Dept: RADIOLOGY | Facility: HOSPITAL | Age: 59
End: 2024-10-17
Payer: COMMERCIAL

## 2024-10-17 ENCOUNTER — TELEPHONE (OUTPATIENT)
Dept: NUTRITION | Facility: CLINIC | Age: 59
End: 2024-10-17
Payer: COMMERCIAL

## 2024-10-17 VITALS — WEIGHT: 171.06 LBS | BODY MASS INDEX: 22.57 KG/M2

## 2024-10-17 DIAGNOSIS — C15.9 ESOPHAGEAL CANCER, STAGE IV: Primary | ICD-10-CM

## 2024-10-17 NOTE — PATIENT INSTRUCTIONS
Recommendations:   Continue to consume protein shakes and Boost until PEG tube placed.   Try Centrum liquid multivitamin.     Recommend, once PEG tube placed:   DME/Insurance: [facility]  Formula: Isosource 1.5   Rate/Volume/Schedule: Administer 1 carton of Isosource 1.5  by syringe bolus feed via PEG every 3 hours with 60 ml water flush before and 120 ml after each carton. Recommend 5 cartons daily. Once this is well tolerated, increase to 6 cartons daily spaced every 2.5 hours.   Additional Water flushes: none  Provides: 2580 mL/day total volume, 2250 kcals/day, 102 g/day protein

## 2024-10-17 NOTE — TELEPHONE ENCOUNTER
Interventional Radiology  Called pt.'s wife, Cherelle, and confirmed his appointment for tomorrow, 10/18/24 at 8:30AM.  Pt. is to arrive by 7:15AM to the hospital ([address]/ [address]) off [address] in [place].  She confirmed that she will drive him and he will be NPO after midnight tonight.  Explained that he can take morning medication the day of the procedure with a small sip of water.  Pt. does not take medications that need to be stopped prior to this procedure.  Cherelle verbalized understanding of all instructions.

## 2024-10-17 NOTE — TELEPHONE ENCOUNTER
Called to introduce myself to patient's spouse Ms. Muñoz and discuss the PEG tube placement. She will stop by my office later today. Will remain available.  Signature: Agnes Lema, MPH, RD, LDN

## 2024-10-17 NOTE — PROGRESS NOTES
"Oncology Nutrition Assessment for Medical Nutrition Therapy Initial Visit  Consultation Time: 45 Minutes  Referring Provider: Hudson Latif MD  Reason for Nutrition Consult: New Patient - Nutrition Counseling and Education , Nutrition related questions, and Weight Loss    Nutrition Assessment      Patient Information:    Rosalio Muñoz  : 1965   58 y.o. male    Allergies/Intolerances: No known food allergies  Social Data: lives with spouse/significant Other.   Anthropometrics:     Weight: 77.6 kg (171 lb 1.2 oz)                                 Height:  6'1" (1.85 m)     BMI: Body mass index is 22.57 kg/m².             Usual BW: 188 lb 3/28/24   Weight Change: loss of 17 lb or 9% of weight in 6 months     Supplements/Vitamins:    MVI/Supp: yes - crushed iron tablet   Drug/Nutrient interactions: No Activity Level:     Low Active     Form of Activity: activities of daily living , walking      Malnutrition Assessment:   Nutrition Risk: Patient does not meet at least 2 characteristics of the ASPEN/AND criteria at this time, but may be at risk due to significant weight loss in the past 6 months.      Food/Nutrition-related history:    Recommend, once PEG tube placed:   DME/Insurance:  [facility]  Formula: Isosource 1.5   Rate/Volume/Schedule: Administer 1 carton of Isosource 1.5  by syringe bolus feed via PEG every 3 hours with 60 ml water flush before and 120 ml after each carton. Recommend 5 cartons daily. Once this is well tolerated, increase to 6 cartons daily spaced every 2.5 hours.   Additional Water flushes: none  Provides: 2580 mL/day total volume, 2250 kcals/day, 102 g/day protein    Diet/PO Recall:   Appetite: Fair  Fluid Intake: Adequate  Diet Recall:  Breakfast: Boost   Lunch: Boost, soup or mashed potatoes   Dinner: shake made of 3 scoops [employer] protein powder, milk, peanut butter, and ice cream  Snacks: 0-1x/day  Drinks: water  ONS: Boost Plus 2-3x/day  Servings of F/V per day: " 0-1x/day  Eating out: 0-1x/week.   Cultural/Spiritual/Personal Preferences: Texture specific     GI Symptoms: early satiety, heartburn or weight loss 17 lbs. within the last 6 month(s)   Oncology Nutrition Symptoms: dysphagia, esophagitis, heartburn, weight loss, and feel full quickly              Difficulty chewing or swallowing?  yes - limited to liquid diet     Patient Notes/Reports: Pt referred for a Nutrition Consultation related to Enteral Formula , New Feeding Tube - Gastrostomy Tube - PEG, New Patient - Nutrition Counseling and Education , Nutrition related questions, and Weight Loss. Patient has a medical diagnosis of esophageal cancer. Currently about to start OP GI [id number] (oxaliplatin leucovorin fluorouracil) Q2W chemotherapy and might get radiation therapy later. Mr. Muñoz has had long term reflux and now has esophageal cancer stage IV on top of that. Mr. Muñoz has not been able to swallow normal foods like steak in several months, and he currently drinks about 4 beers a day (down from 8+) and smokes several cigars a day (down from two packs of cigarettes a day). His port placement and PEG tube surgery will be done on Monday, 10/21/24. Discussed out of pocket cost options for enteral formula and shared with Mr. Muñoz that he can purchase it at  [employer] at wholesale price. Will fill out [facility] Boost form for him once chemo starts. Mr. Muñoz's wife is a nurse for [employer] and will be able to stop by and speak with me as needed. Will notify [facility] of his PEG placement Monday.    Medical Tests and Procedures:  Patient Active Problem List   Diagnosis    HTN (hypertension)    Umbilical hernia    Tobacco use disorder    Delayed immunizations    Other hyperlipidemia    Hypertensive urgency    Elevated troponin    Hypomagnesemia    Bilateral hearing loss    Alcohol use    Unintentional weight loss    Low libido    Alcohol use disorder    Dysphagia    Personal history of colon  polyps, unspecified    Esophageal cancer, stage IV    Secondary adenocarcinoma of retroperitoneum    Secondary liver cancer    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    Cachexia    Iron deficiency anemia due to chronic blood loss      Past Medical History:   Diagnosis Date    Hypertension     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 10/11/2024     Past Surgical History:   Procedure Laterality Date    COLONOSCOPY N/A 8/25/2023    Procedure: COLONOSCOPY;  Surgeon: Pebbles Spear MD;  Location: [facility];  Service: Endoscopy;  Laterality: N/A;    COLONOSCOPY N/A 10/2/2024    Procedure: COLONOSCOPY  Cardiac clearance received on 09/20/24 per Dr. Lockett, Cardiology.  Note in encounters.  LB;  Surgeon: Sully Farris MD;  Location: [facility] ENDO;  Service: Endoscopy;  Laterality: N/A;    ESOPHAGOGASTRODUODENOSCOPY N/A 10/2/2024    Procedure: EGD (ESOPHAGOGASTRODUODENOSCOPY);  Surgeon: Sully Farris MD;  Location: [facility];  Service: Endoscopy;  Laterality: N/A;    SURGICAL REMOVAL OF LESION OF FACE Right 12/1/2023    Procedure: EXCISION, LESION, FACE;  Surgeon: Jose Flaherty MD;  Location: [facility] OR;  Service: ENT;  Laterality: Right;  1. Excision facial subcutaneous mass right cheek 3 cm. 2. Intermediate layered closure 3.5 cm    WISDOM TOOTH EXTRACTION      XI ROBOTIC APPENDECTOMY N/A 7/31/2024    Procedure: XI ROBOTIC APPENDECTOMY;  Surgeon: Paulo Saavedra MD;  Location: [facility];  Service: General;  Laterality: N/A;       Current Outpatient Medications   Medication Instructions    amLODIPine (NORVASC) 5 mg, 2 times daily    aspirin (ECOTRIN) 81 mg, Oral, Daily    atorvastatin (LIPITOR) 20 mg, Oral, Nightly    carvediloL (COREG) 25 mg, Oral, 2 times daily    HYDROcodone-acetaminophen (NORCO) 5-325 mg per tablet 1 tablet, Oral, Every 4 hours PRN    LIDOcaine-prilocaine (EMLA) cream Topical (Top), As needed (PRN)    losartan (COZAAR) 100 mg, Oral, Nightly    OLANZapine (ZYPREXA) 5 mg, Oral,  Nightly, Take nightly on days 1-3 of each chemotherapy cycle.    prochlorperazine (COMPAZINE) 10 mg, Oral, Every 6 hours PRN      Labs:  Reviewed - followed by MD funes        Nutrition Diagnosis    Nutrition Problem: inadequate protein/energy intake  Etiology (related to): appetite loss , dysphagia , tumor location, and early satiety   Signs/Symptoms (as evidenced by): weight loss, requiring tube feeding for nutrition support, new cancer diagnosis, and self reported diet questions/concerns     Nutrition Intervention      Estimated Energy/Fluid Requirements:   Weight used: CBW 78 kg  Calories: 2236-9549 kcal/day (25-30 kcal/kg)  Protein: 94 g/day (1.2 g/kg)  Fluid: 7175-4343 mL/day (1 mL/kcal)   Recommendations:   Continue to consume protein shakes and Boost until PEG tube placed.   Try Centrum liquid multivitamin.     Recommend, once PEG tube placed:   [facility]  Formula: Isosource 1.5   Rate/Volume/Schedule: Administer 1 carton of Isosource 1.5  by syringe bolus feed via PEG every 3 hours with 60 ml water flush before and 120 ml after each carton. Recommend 5 cartons daily. Once this is well tolerated, increase to 6 cartons daily spaced every 2.5 hours.   Additional Water flushes: none  Provides: 2580 mL/day total volume, 2250 kcals/day, 102 g/day protein     Education Needs Satisfied: yes   Education Materials Provided / Reviewed:   Bolus or Syringe Tube Feeding Instructions   Foods That Are Easy to Chew and Swallow   Iron   Nutrition During Your Cancer Treatment  How to Make a High-Calorie Shake    Nutrition Plan  Barriers to Learning: none identified  Patient and/ or Family Verbalizes understanding: yes      Nutrition Monitoring and Evaluation    Monitor: energy intake, rate/formulation of tube feeding, weight, symptom management , and adherence to nutrition recommendations     Goals:   1: Adequate intake of calories and protein to promote weight gain   Indicator: Weight/BMI    2: Weight  maintenance throughout treatment. , No episodes of dehydration requiring emergency hydrations. , and Patient or caregiver is able to experience a smooth initiation of enteral feeding start-up, including delivery of supplies and patient education on the use and care of the feeding tube.   Indicator: Diet Recall     Follow up In 1 weeks     Communication to referring provider/care team: note available in chart  Signature: Agnes Lema, MPH, RD, LDN

## 2024-10-18 ENCOUNTER — TELEPHONE (OUTPATIENT)
Dept: PREADMISSION TESTING | Facility: HOSPITAL | Age: 59
End: 2024-10-18
Payer: COMMERCIAL

## 2024-10-18 ENCOUNTER — HOSPITAL ENCOUNTER (OUTPATIENT)
Dept: RADIOLOGY | Facility: HOSPITAL | Age: 59
Discharge: HOME | End: 2024-10-18
Attending: INTERNAL MEDICINE
Payer: COMMERCIAL

## 2024-10-18 VITALS
WEIGHT: 175 LBS | BODY MASS INDEX: 23.19 KG/M2 | DIASTOLIC BLOOD PRESSURE: 76 MMHG | HEART RATE: 91 BPM | RESPIRATION RATE: 14 BRPM | OXYGEN SATURATION: 98 % | SYSTOLIC BLOOD PRESSURE: 116 MMHG | HEIGHT: 73 IN

## 2024-10-18 DIAGNOSIS — C15.9 ESOPHAGEAL ADENOCARCINOMA: ICD-10-CM

## 2024-10-18 PROCEDURE — 27200940 CT BIOPSY LIVER (XPD)

## 2024-10-18 PROCEDURE — 63600175 PHARM REV CODE 636 W HCPCS: Performed by: RADIOLOGY

## 2024-10-18 PROCEDURE — 88307 TISSUE EXAM BY PATHOLOGIST: CPT | Mod: 26,,, | Performed by: PATHOLOGY

## 2024-10-18 PROCEDURE — 47000 NEEDLE BIOPSY OF LIVER PERQ: CPT | Mod: ,,, | Performed by: RADIOLOGY

## 2024-10-18 PROCEDURE — 77012 CT SCAN FOR NEEDLE BIOPSY: CPT | Mod: 26,,, | Performed by: RADIOLOGY

## 2024-10-18 PROCEDURE — 77012 CT SCAN FOR NEEDLE BIOPSY: CPT | Mod: TC

## 2024-10-18 PROCEDURE — 88307 TISSUE EXAM BY PATHOLOGIST: CPT | Performed by: PATHOLOGY

## 2024-10-18 RX ORDER — LIDOCAINE HYDROCHLORIDE 10 MG/ML
INJECTION, SOLUTION INFILTRATION; PERINEURAL CODE/TRAUMA/SEDATION MEDICATION
Status: COMPLETED | OUTPATIENT
Start: 2024-10-18 | End: 2024-10-18

## 2024-10-18 RX ORDER — FENTANYL CITRATE 50 UG/ML
INJECTION, SOLUTION INTRAMUSCULAR; INTRAVENOUS CODE/TRAUMA/SEDATION MEDICATION
Status: COMPLETED | OUTPATIENT
Start: 2024-10-18 | End: 2024-10-18

## 2024-10-18 RX ORDER — MIDAZOLAM HYDROCHLORIDE 1 MG/ML
INJECTION, SOLUTION INTRAMUSCULAR; INTRAVENOUS CODE/TRAUMA/SEDATION MEDICATION
Status: COMPLETED | OUTPATIENT
Start: 2024-10-18 | End: 2024-10-18

## 2024-10-18 RX ADMIN — MIDAZOLAM HYDROCHLORIDE 0.5 MG: 1 INJECTION, SOLUTION INTRAMUSCULAR; INTRAVENOUS at 08:10

## 2024-10-18 RX ADMIN — MIDAZOLAM HYDROCHLORIDE 1 MG: 1 INJECTION, SOLUTION INTRAMUSCULAR; INTRAVENOUS at 08:10

## 2024-10-18 RX ADMIN — LIDOCAINE HYDROCHLORIDE 5 ML: 10 INJECTION, SOLUTION INFILTRATION; PERINEURAL at 08:10

## 2024-10-18 RX ADMIN — FENTANYL CITRATE 50 MCG: 50 INJECTION, SOLUTION INTRAMUSCULAR; INTRAVENOUS at 08:10

## 2024-10-18 RX ADMIN — FENTANYL CITRATE 25 MCG: 50 INJECTION, SOLUTION INTRAMUSCULAR; INTRAVENOUS at 08:10

## 2024-10-18 NOTE — PLAN OF CARE
Band aid to right lateral mid abd,  C/D/I with no bleeding/redness/swelling noted. VSS, NADN, and pt meets criteria for discharge. Discharge instructions given to and reviewed with pt, and pt verbalized understanding of all. Pt discharged to home, taken out via wheelchair and driven home by spouse.

## 2024-10-18 NOTE — DISCHARGE SUMMARY
[facility] (Hospital)  Discharge Note  Short Stay    CT Biopsy Liver (xpd)      OUTCOME: Patient tolerated treatment/procedure well without complication and is now ready for discharge.    DISPOSITION: Home or Self Care    FINAL DIAGNOSIS:  <principal problem not specified>    FOLLOWUP: In clinic    DISCHARGE INSTRUCTIONS:  No discharge procedures on file.      Clinical Reference Documents Added to Patient Instructions         Document    LIVER BIOPSY DISCHARGE INSTRUCTIONS (ENGLISH)    PROCEDURAL SEDATION, ADULT ED (ENGLISH)            TIME SPENT ON DISCHARGE: 15 minutes    Pre Op Diagnosis: esophageal adenocarcinoma      Post Op Diagnosis: same     Procedure:  liver mass biopsy     Procedure performed by: Raleigh HANDY, Ayde GAN     Written Informed Consent Obtained: Yes     Specimen Removed:  yes     Estimated Blood Loss:  minimal     Findings: Local anesthesia     Sedation:  yes     The patient tolerated the procedure well and there were no complications.      Disposition:  F/U in clinic or with ordering physician    Discharge instructions:  Light activity for 24 hours.  Remove band aid in 24 hours.  No baths (showers are appropriate).      Sterile technique was performed in the RUQ, lidocaine was used as a local anesthetic.  Multiple samples taken percutaneously from the right hepatic lobe mass.  Pt tolerated the procedure well without immediate complications.  Please see radiologist report for details. F/u with PCP and/or ordering physician.

## 2024-10-18 NOTE — TELEPHONE ENCOUNTER
Pre op instructions reviewed with Pt's spouse over telephone, verbalized understanding.    Procedure Date: 10/21/24  Arrival Time: PLEASE ARRIVE AT 11:30 AM  Address:   [facility] (Off [facility], 2nd Building on the left)  [address] [place] [address] [place]. [address]  >>>Please enter through front entrance Lobby of 1st floor to Registration desk<<<      !!!INSTRUCTIONS IMPORTANT!!!  !!!NO FOOD after midnight! You may have clear liquids up to 3 hrs before your arrival to the Hospital!!!  Clear liquids include Gatorade, water, soda, black coffee or tea (no milk or creamer), and clear juices.  Clear liquids do NOT include anything with pulp or food particles (Chicken broth, ice cream, yogurt, Jello, etc.)   OK to brush teeth, no gum, candy or mints!    >>>MEDICATION INSTRUCTIONS<<<: Morning of Surgery, take small sip of water with ONLY these medications:  NONE    Additional Instructions: WILL BRING COREG TO PRE OP DAY OF SURGERY!    *Diabetic/ Prediabetic Patients: !!!If you take diabetic or weight loss medication, Do NOT take morning of surgery unless instructed by Doctor!!!  Metformin to be stopped 24 hrs prior to surgery.   Long Acting Insulin Instructions: HOLD the night before surgery unless instructed differently by Provider!  Ozempic/ Mounjaro/ Wegovy/ Trulicity/ Semaglutide injections or weight loss medication to be stopped 7 days prior to surgery.    !!!STOP ALL Aspirins, NSAIDS, WEIGHT LOSS INJECTIONS/PILLS, Herbal supplements, & Vitamins 7 DAYS BEFORE SURGERY!!!    ____  Avoid Alcoholic beverages 3 days prior to surgery, as it can thin the blood.  ____  NO Acrylic/fake nails or nail polish worn day of surgery (specifically hand/arm & foot surgeries).  ____  NO powder, lotions, deodorants, oils or cream on body.  ____  Remove all jewelry & piercings & foreign objects before arrival & leave at home.  ____  Remove Dentures, Hearing Aids & Contact Lens prior to surgery.  ____  Bring photo ID and  insurance information to hospital (Leave Valuables at Home).  ____  If going home the same day, arrange for a ride home. You will not be able to drive for 24 hrs if Anesthesia was used.   ____  Females (ages 11-60): may need to give a urine sample the morning of surgery; please see Pre op Nurse prior to using the restroom.  ____  Males: Stop ED medications (Viagra, Cialis) 24 hrs prior to surgery.  ____  Wear clean, loose fitting clothing to allow for dressings/ bandages.      Bathing Instructions:    -Shower with anti-bacterial Soap (Hibiclens or Dial) the night before surgery and the morning of.   -Do not use Hibiclens on your face or genitals.   -Apply clean clothes after shower.  -Do not shave your face or body 2 days prior to surgery unless instructed otherwise by your Surgeon.  -Do not shave pubic hair 7 days prior to surgery (gyn pt's).    [facility] Visitor/Ride Policy:  Only 2 adults allowed in pre op/recovery area during your procedure. You MUST HAVE A RIDE HOME from a responsible adult that you know and trust. Medical Transport, Uber or Lyft can ONLY be used if patient has a responsible adult to accompany them during ride home.       *Signs and symptoms of Infection Before or After Surgery:               !!!If you experience any fever, chills, nausea/ vomiting, foul odor/ excessive drainage from surgical site, flu-like symptoms, new wounds or cuts, PLEASE CALL THE SURGEON OFFICE at [phone] or SEND MESSAGE THROUGH [employer] PORTAL!!!     *If you are running late the morning of surgery, please call the Hospital Surgery Dept @ [phone].     *Billing questions:  [phone]     Thank you,  -[facility] Pre Admit Dept.  [phone] or [phone]  M-F 7:30 am-4:00 pm (Closed Major Holidays)

## 2024-10-18 NOTE — PLAN OF CARE
Received patient from procedure via stretcher. VSS. Breathing even and unlabored. No complaints of pain, nausea, or headache. Procedure to liver, bandage CDI. Family updated with patients status. Patient resting on stretcher.

## 2024-10-18 NOTE — DISCHARGE INSTRUCTIONS
Please return to ER if any of these symptoms occur:  Fever over 101 degrees,  Any purulent drainage from site (pus, yellow or has foul odor), or any redness or swelling to site  Bleeding from the puncture site not controlled, If bleeding occurs at site hold pressure for 5 mins.  If bleeding continues go to ER  Pain not controlled with Aleve or Tylenol,     No driving for 24 hours after procedure due to sedation given during procedure.      Do not submerge in standing water for 2 days after biopsy but you may shower.     May change bandage if it becomes soiled and bandage may be removed in 2 days.     Rest for the next couple of days. Do not lift any thing heavier than a gallon of milk.  Increase activity as tolerated.     Resume home medications and diet     Biopsy results will be with Dr. Latif in 5-7 days, please follow up with him for results and any other questions or concerns that you may have.

## 2024-10-21 ENCOUNTER — TELEPHONE (OUTPATIENT)
Dept: NUTRITION | Facility: CLINIC | Age: 59
End: 2024-10-21
Payer: COMMERCIAL

## 2024-10-21 ENCOUNTER — ANESTHESIA (OUTPATIENT)
Dept: SURGERY | Facility: HOSPITAL | Age: 59
DRG: 356 | End: 2024-10-21
Payer: COMMERCIAL

## 2024-10-21 ENCOUNTER — TELEPHONE (OUTPATIENT)
Dept: RADIOLOGY | Facility: HOSPITAL | Age: 59
End: 2024-10-21
Payer: COMMERCIAL

## 2024-10-21 ENCOUNTER — HOSPITAL ENCOUNTER (INPATIENT)
Facility: HOSPITAL | Age: 59
LOS: 2 days | Discharge: HOME HEALTH | DRG: 356 | End: 2024-10-23
Attending: SURGERY | Admitting: SURGERY
Payer: COMMERCIAL

## 2024-10-21 ENCOUNTER — ANESTHESIA EVENT (OUTPATIENT)
Dept: SURGERY | Facility: HOSPITAL | Age: 59
DRG: 356 | End: 2024-10-21
Payer: COMMERCIAL

## 2024-10-21 DIAGNOSIS — C79.89 MALIGNANT NEOPLASM METASTATIC TO OTHER SITE: ICD-10-CM

## 2024-10-21 DIAGNOSIS — C15.9 ESOPHAGEAL CANCER, STAGE IV: Primary | ICD-10-CM

## 2024-10-21 LAB
FINAL PATHOLOGIC DIAGNOSIS: ABNORMAL
GROSS: ABNORMAL
Lab: ABNORMAL
MICROSCOPIC EXAM: ABNORMAL

## 2024-10-21 PROCEDURE — 21400001 HC TELEMETRY ROOM

## 2024-10-21 PROCEDURE — 8E0W4CZ ROBOTIC ASSISTED PROCEDURE OF TRUNK REGION, PERCUTANEOUS ENDOSCOPIC APPROACH: ICD-10-PCS | Performed by: SURGERY

## 2024-10-21 PROCEDURE — 63600175 PHARM REV CODE 636 W HCPCS: Performed by: NURSE ANESTHETIST, CERTIFIED REGISTERED

## 2024-10-21 PROCEDURE — 25000003 PHARM REV CODE 250: Performed by: SURGERY

## 2024-10-21 PROCEDURE — 94799 UNLISTED PULMONARY SVC/PX: CPT

## 2024-10-21 PROCEDURE — 36000711: Performed by: SURGERY

## 2024-10-21 PROCEDURE — 37000009 HC ANESTHESIA EA ADD 15 MINS: Performed by: SURGERY

## 2024-10-21 PROCEDURE — 63600175 PHARM REV CODE 636 W HCPCS: Performed by: FAMILY MEDICINE

## 2024-10-21 PROCEDURE — 36000710: Performed by: SURGERY

## 2024-10-21 PROCEDURE — 37000008 HC ANESTHESIA 1ST 15 MINUTES: Performed by: SURGERY

## 2024-10-21 PROCEDURE — 63600175 PHARM REV CODE 636 W HCPCS: Performed by: SURGERY

## 2024-10-21 PROCEDURE — 99900035 HC TECH TIME PER 15 MIN (STAT)

## 2024-10-21 PROCEDURE — 25000003 PHARM REV CODE 250: Performed by: NURSE ANESTHETIST, CERTIFIED REGISTERED

## 2024-10-21 PROCEDURE — 71000033 HC RECOVERY, INTIAL HOUR: Performed by: SURGERY

## 2024-10-21 PROCEDURE — 27201423 OPTIME MED/SURG SUP & DEVICES STERILE SUPPLY: Performed by: SURGERY

## 2024-10-21 PROCEDURE — 25000003 PHARM REV CODE 250: Performed by: FAMILY MEDICINE

## 2024-10-21 PROCEDURE — 0DH63UZ INSERTION OF FEEDING DEVICE INTO STOMACH, PERCUTANEOUS APPROACH: ICD-10-PCS | Performed by: SURGERY

## 2024-10-21 PROCEDURE — 63600175 PHARM REV CODE 636 W HCPCS: Mod: JZ,JG | Performed by: SURGERY

## 2024-10-21 PROCEDURE — C1729 CATH, DRAINAGE: HCPCS | Performed by: SURGERY

## 2024-10-21 PROCEDURE — C1788 PORT, INDWELLING, IMP: HCPCS | Performed by: SURGERY

## 2024-10-21 PROCEDURE — 3E0G76Z INTRODUCTION OF NUTRITIONAL SUBSTANCE INTO UPPER GI, VIA NATURAL OR ARTIFICIAL OPENING: ICD-10-PCS | Performed by: SURGERY

## 2024-10-21 PROCEDURE — 63600175 PHARM REV CODE 636 W HCPCS: Performed by: ANESTHESIOLOGY

## 2024-10-21 PROCEDURE — 71000039 HC RECOVERY, EACH ADD'L HOUR: Performed by: SURGERY

## 2024-10-21 PROCEDURE — 27000221 HC OXYGEN, UP TO 24 HOURS

## 2024-10-21 PROCEDURE — 0JH80WZ INSERTION OF TOTALLY IMPLANTABLE VASCULAR ACCESS DEVICE INTO ABDOMEN SUBCUTANEOUS TISSUE AND FASCIA, OPEN APPROACH: ICD-10-PCS | Performed by: SURGERY

## 2024-10-21 PROCEDURE — 02HV33Z INSERTION OF INFUSION DEVICE INTO SUPERIOR VENA CAVA, PERCUTANEOUS APPROACH: ICD-10-PCS | Performed by: SURGERY

## 2024-10-21 DEVICE — PORT POWER CLEAR VIEW: Type: IMPLANTABLE DEVICE | Site: CHEST | Status: FUNCTIONAL

## 2024-10-21 RX ORDER — PROPOFOL 10 MG/ML
VIAL (ML) INTRAVENOUS
Status: DISCONTINUED | OUTPATIENT
Start: 2024-10-21 | End: 2024-10-21

## 2024-10-21 RX ORDER — OXYCODONE HYDROCHLORIDE 5 MG/1
5 TABLET ORAL EVERY 4 HOURS PRN
Status: DISCONTINUED | OUTPATIENT
Start: 2024-10-21 | End: 2024-10-23 | Stop reason: HOSPADM

## 2024-10-21 RX ORDER — CHLORHEXIDINE GLUCONATE ORAL RINSE 1.2 MG/ML
10 SOLUTION DENTAL 2 TIMES DAILY
Status: DISCONTINUED | OUTPATIENT
Start: 2024-10-21 | End: 2024-10-23 | Stop reason: HOSPADM

## 2024-10-21 RX ORDER — OXYCODONE AND ACETAMINOPHEN 5; 325 MG/1; MG/1
1 TABLET ORAL
Status: DISCONTINUED | OUTPATIENT
Start: 2024-10-21 | End: 2024-10-21

## 2024-10-21 RX ORDER — MEPERIDINE HYDROCHLORIDE 25 MG/ML
12.5 INJECTION INTRAMUSCULAR; INTRAVENOUS; SUBCUTANEOUS ONCE AS NEEDED
Status: COMPLETED | OUTPATIENT
Start: 2024-10-21 | End: 2024-10-21

## 2024-10-21 RX ORDER — CARVEDILOL 12.5 MG/1
12.5 TABLET ORAL 2 TIMES DAILY
Status: DISCONTINUED | OUTPATIENT
Start: 2024-10-22 | End: 2024-10-23 | Stop reason: HOSPADM

## 2024-10-21 RX ORDER — BUPIVACAINE HYDROCHLORIDE 2.5 MG/ML
INJECTION, SOLUTION EPIDURAL; INFILTRATION; INTRACAUDAL
Status: DISCONTINUED | OUTPATIENT
Start: 2024-10-21 | End: 2024-10-21 | Stop reason: HOSPADM

## 2024-10-21 RX ORDER — HEPARIN 100 UNIT/ML
SYRINGE INTRAVENOUS
Status: DISCONTINUED | OUTPATIENT
Start: 2024-10-21 | End: 2024-10-21 | Stop reason: HOSPADM

## 2024-10-21 RX ORDER — FENTANYL CITRATE 50 UG/ML
25 INJECTION, SOLUTION INTRAMUSCULAR; INTRAVENOUS EVERY 5 MIN PRN
Status: DISCONTINUED | OUTPATIENT
Start: 2024-10-21 | End: 2024-10-21

## 2024-10-21 RX ORDER — LIDOCAINE HYDROCHLORIDE 20 MG/ML
INJECTION, SOLUTION EPIDURAL; INFILTRATION; INTRACAUDAL; PERINEURAL
Status: DISCONTINUED | OUTPATIENT
Start: 2024-10-21 | End: 2024-10-21

## 2024-10-21 RX ORDER — ONDANSETRON HYDROCHLORIDE 2 MG/ML
4 INJECTION, SOLUTION INTRAVENOUS ONCE AS NEEDED
Status: DISCONTINUED | OUTPATIENT
Start: 2024-10-21 | End: 2024-10-21

## 2024-10-21 RX ORDER — CARVEDILOL 12.5 MG/1
25 TABLET ORAL DAILY
Status: DISCONTINUED | OUTPATIENT
Start: 2024-10-21 | End: 2024-10-21

## 2024-10-21 RX ORDER — SODIUM CHLORIDE 9 MG/ML
INJECTION, SOLUTION INTRAVENOUS CONTINUOUS
Status: DISCONTINUED | OUTPATIENT
Start: 2024-10-21 | End: 2024-10-22

## 2024-10-21 RX ORDER — ACETAMINOPHEN 500 MG
1000 TABLET ORAL EVERY 8 HOURS
Status: DISCONTINUED | OUTPATIENT
Start: 2024-10-21 | End: 2024-10-23 | Stop reason: HOSPADM

## 2024-10-21 RX ORDER — ONDANSETRON HYDROCHLORIDE 2 MG/ML
4 INJECTION, SOLUTION INTRAVENOUS EVERY 6 HOURS PRN
Status: DISCONTINUED | OUTPATIENT
Start: 2024-10-21 | End: 2024-10-23 | Stop reason: HOSPADM

## 2024-10-21 RX ORDER — SODIUM CHLORIDE 9 MG/ML
INJECTION, SOLUTION INTRAVENOUS CONTINUOUS
Status: DISCONTINUED | OUTPATIENT
Start: 2024-10-21 | End: 2024-10-21

## 2024-10-21 RX ORDER — HYDROMORPHONE HYDROCHLORIDE 1 MG/ML
0.2 INJECTION, SOLUTION INTRAMUSCULAR; INTRAVENOUS; SUBCUTANEOUS EVERY 5 MIN PRN
Status: DISCONTINUED | OUTPATIENT
Start: 2024-10-21 | End: 2024-10-21

## 2024-10-21 RX ORDER — OLANZAPINE 5 MG/1
5 TABLET ORAL NIGHTLY
Status: DISCONTINUED | OUTPATIENT
Start: 2024-10-21 | End: 2024-10-23 | Stop reason: HOSPADM

## 2024-10-21 RX ORDER — ONDANSETRON HYDROCHLORIDE 2 MG/ML
4 INJECTION, SOLUTION INTRAVENOUS DAILY PRN
Status: DISCONTINUED | OUTPATIENT
Start: 2024-10-21 | End: 2024-10-21

## 2024-10-21 RX ORDER — ONDANSETRON HYDROCHLORIDE 2 MG/ML
INJECTION, SOLUTION INTRAVENOUS
Status: DISCONTINUED | OUTPATIENT
Start: 2024-10-21 | End: 2024-10-21

## 2024-10-21 RX ORDER — OXYCODONE HYDROCHLORIDE 5 MG/1
10 TABLET ORAL EVERY 6 HOURS PRN
Status: DISCONTINUED | OUTPATIENT
Start: 2024-10-21 | End: 2024-10-22

## 2024-10-21 RX ORDER — FENTANYL CITRATE 50 UG/ML
INJECTION, SOLUTION INTRAMUSCULAR; INTRAVENOUS
Status: DISCONTINUED | OUTPATIENT
Start: 2024-10-21 | End: 2024-10-21

## 2024-10-21 RX ORDER — ROCURONIUM BROMIDE 10 MG/ML
INJECTION, SOLUTION INTRAVENOUS
Status: DISCONTINUED | OUTPATIENT
Start: 2024-10-21 | End: 2024-10-21

## 2024-10-21 RX ORDER — CEFAZOLIN 2 G/1
2 INJECTION, POWDER, FOR SOLUTION INTRAMUSCULAR; INTRAVENOUS
Status: COMPLETED | OUTPATIENT
Start: 2024-10-21 | End: 2024-10-21

## 2024-10-21 RX ADMIN — ONDANSETRON 4 MG: 2 INJECTION INTRAMUSCULAR; INTRAVENOUS at 02:10

## 2024-10-21 RX ADMIN — HYDROMORPHONE HYDROCHLORIDE 0.2 MG: 1 INJECTION, SOLUTION INTRAMUSCULAR; INTRAVENOUS; SUBCUTANEOUS at 03:10

## 2024-10-21 RX ADMIN — FENTANYL CITRATE 50 MCG: 50 INJECTION, SOLUTION INTRAMUSCULAR; INTRAVENOUS at 01:10

## 2024-10-21 RX ADMIN — ACETAMINOPHEN 1000 MG: 500 TABLET ORAL at 09:10

## 2024-10-21 RX ADMIN — PROPOFOL 200 MG: 10 INJECTION, EMULSION INTRAVENOUS at 12:10

## 2024-10-21 RX ADMIN — FENTANYL CITRATE 50 MCG: 50 INJECTION, SOLUTION INTRAMUSCULAR; INTRAVENOUS at 02:10

## 2024-10-21 RX ADMIN — SODIUM CHLORIDE, SODIUM LACTATE, POTASSIUM CHLORIDE, AND CALCIUM CHLORIDE: .6; .31; .03; .02 INJECTION, SOLUTION INTRAVENOUS at 12:10

## 2024-10-21 RX ADMIN — OXYCODONE HYDROCHLORIDE 10 MG: 5 TABLET ORAL at 04:10

## 2024-10-21 RX ADMIN — ROCURONIUM BROMIDE 50 MG: 10 INJECTION, SOLUTION INTRAVENOUS at 12:10

## 2024-10-21 RX ADMIN — LIDOCAINE HYDROCHLORIDE 50 MG: 20 INJECTION, SOLUTION EPIDURAL; INFILTRATION; INTRACAUDAL; PERINEURAL at 02:10

## 2024-10-21 RX ADMIN — OXYCODONE HYDROCHLORIDE 5 MG: 5 TABLET ORAL at 08:10

## 2024-10-21 RX ADMIN — FENTANYL CITRATE 100 MCG: 50 INJECTION, SOLUTION INTRAMUSCULAR; INTRAVENOUS at 12:10

## 2024-10-21 RX ADMIN — SUGAMMADEX 200 MG: 100 INJECTION, SOLUTION INTRAVENOUS at 02:10

## 2024-10-21 RX ADMIN — LIDOCAINE HYDROCHLORIDE 50 MG: 20 INJECTION, SOLUTION EPIDURAL; INFILTRATION; INTRACAUDAL; PERINEURAL at 12:10

## 2024-10-21 RX ADMIN — OLANZAPINE 5 MG: 5 TABLET, FILM COATED ORAL at 08:10

## 2024-10-21 RX ADMIN — SODIUM CHLORIDE: 9 INJECTION, SOLUTION INTRAVENOUS at 04:10

## 2024-10-21 RX ADMIN — CHLORHEXIDINE GLUCONATE 0.12% ORAL RINSE 10 ML: 1.2 LIQUID ORAL at 08:10

## 2024-10-21 RX ADMIN — CEFAZOLIN 2 G: 2 INJECTION, POWDER, FOR SOLUTION INTRAMUSCULAR; INTRAVENOUS at 12:10

## 2024-10-21 RX ADMIN — MEPERIDINE HYDROCHLORIDE 12.5 MG: 25 INJECTION INTRAMUSCULAR; INTRAVENOUS; SUBCUTANEOUS at 03:10

## 2024-10-21 NOTE — CONSULTS
[facility] Medicine  Consult Note    Patient Name: Rosalio Muñoz  MRN: [id number]  Admission Date: 10/21/2024  Hospital Length of Stay: 0 days  Attending Physician: Roseanna Rosas MD   Primary Care Provider: Michell Goyal MD           Patient information was obtained from patient, spouse/SO, and ER records.     Inpatient consult to Hospitalist  Consult performed by: Lavern Herrera FNP  Consult ordered by: Roseanna Rosas MD  Reason for consult: Medical management        Subjective:     Principal Problem: Esophageal cancer, stage IV    Chief Complaint: No chief complaint on file.       HPI: Rosalio Muñoz is a 58 year old male with a PMHx of HTN, CAD, PAD, NSTEMI, and Stage IV esophageal cancer who underwent robotic gastrostomy tube placement and port insertion today by Dr. Rosas.  consulted for medical management. Hx of HTN on amlodipine 5 mg po daily, losartan 100 mg po daily, and carvedilol 25 mg po bid. Patient and wife report he is not taking amlodipine or losartan as directed due to hypotension. The only medication he is taking is the carvedilol with holding parameters.     Past Medical History:   Diagnosis Date    Hypertension     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 10/11/2024       Past Surgical History:   Procedure Laterality Date    COLONOSCOPY N/A 8/25/2023    Procedure: COLONOSCOPY;  Surgeon: Pebbles Spear MD;  Location: [facility];  Service: Endoscopy;  Laterality: N/A;    COLONOSCOPY N/A 10/2/2024    Procedure: COLONOSCOPY  Cardiac clearance received on 09/20/24 per Dr. Lockett, Cardiology.  Note in encounters.  LB;  Surgeon: Sully Farris MD;  Location: [facility];  Service: Endoscopy;  Laterality: N/A;    ESOPHAGOGASTRODUODENOSCOPY N/A 10/2/2024    Procedure: EGD (ESOPHAGOGASTRODUODENOSCOPY);  Surgeon: Sully Farris MD;  Location: [facility];  Service: Endoscopy;  Laterality: N/A;    SURGICAL REMOVAL OF LESION OF FACE  Right 12/1/2023    Procedure: EXCISION, LESION, FACE;  Surgeon: Jose Flaherty MD;  Location[facility] OR;  Service: ENT;  Laterality: Right;  1. Excision facial subcutaneous mass right cheek 3 cm. 2. Intermediate layered closure 3.5 cm    WISDOM TOOTH EXTRACTION      XI ROBOTIC APPENDECTOMY N/A 7/31/2024    Procedure: XI ROBOTIC APPENDECTOMY;  Surgeon: Paulo Saavedra MD;  Location: [facility] OR;  Service: General;  Laterality: N/A;       Review of patient's allergies indicates:  No Known Allergies    No current facility-administered medications on file prior to encounter.     Current Outpatient Medications on File Prior to Encounter   Medication Sig    atorvastatin (LIPITOR) 20 MG tablet Take 1 tablet (20 mg total) by mouth every evening.    carvediloL (COREG) 25 MG tablet Take 1 tablet (25 mg total) by mouth 2 (two) times daily. (Patient taking differently: Take 25 mg by mouth Daily.)    HYDROcodone-acetaminophen (NORCO) 5-325 mg per tablet Take 1 tablet by mouth every 4 (four) hours as needed for Pain.    [DISCONTINUED] losartan (COZAAR) 100 MG tablet Take 1 tablet (100 mg total) by mouth every evening.    amLODIPine (NORVASC) 5 MG tablet Take 5 mg by mouth 2 (two) times daily.    aspirin (ECOTRIN) 81 MG EC tablet Take 1 tablet (81 mg total) by mouth once daily.    LIDOcaine-prilocaine (EMLA) cream Apply topically as needed.    OLANZapine (ZYPREXA) 5 MG tablet Take 1 tablet (5 mg total) by mouth every evening. Take nightly on days 1-3 of each chemotherapy cycle.    prochlorperazine (COMPAZINE) 5 MG tablet Take 2 tablets (10 mg total) by mouth every 6 (six) hours as needed for Nausea.     Family History       Problem Relation (Age of Onset)    Breast cancer Maternal Grandmother    Diabetes Father    Heart disease Father    Hyperlipidemia Mother    Hypertension Father    Kidney disease Father    Prostate cancer Maternal Grandfather          Tobacco Use    Smoking status: Every Day     Current packs/day: 0.00     Types:  Cigars, Cigarettes     Last attempt to quit: 10/4/2022     Years since quittin.0     Passive exposure: Never    Smokeless tobacco: Never    Tobacco comments:     Cigar every afternoon   Substance and Sexual Activity    Alcohol use: Yes     Alcohol/week: 4.0 - 6.0 standard drinks of alcohol     Types: 4 - 6 Cans of beer per week     Comment: occ    Drug use: No    Sexual activity: Yes     Partners: Female     Review of Systems   Constitutional:  Negative for appetite change and unexpected weight change.   Eyes:  Negative for visual disturbance.   Respiratory:  Negative for cough, chest tightness and shortness of breath.    Cardiovascular:  Negative for chest pain.   Gastrointestinal:  Negative for abdominal pain, diarrhea, nausea and vomiting.   Genitourinary:  Negative for difficulty urinating.   Musculoskeletal:  Negative for arthralgias.   Neurological:  Negative for seizures, syncope, speech difficulty, light-headedness, numbness and headaches.   Psychiatric/Behavioral:  Negative for agitation, behavioral problems and confusion.      Objective:     Vital Signs (Most Recent):  Temp: 98.4 °F (36.9 °C) (10/21/24 1622)  Pulse: 86 (10/21/24 1712)  Resp: 17 (10/21/24 1622)  BP: 119/70 (10/21/24 1712)  SpO2: 98 % (10/21/24 1622) Vital Signs (24h Range):  Temp:  [98.2 °F (36.8 °C)-98.4 °F (36.9 °C)] 98.4 °F (36.9 °C)  Pulse:  [] 86  Resp:  [10-25] 17  SpO2:  [92 %-99 %] 98 %  BP: (111-135)/(67-85) 119/70     Weight: 80.3 kg (177 lb 0.5 oz)  Body mass index is 23.36 kg/m².     Physical Exam  Vitals reviewed.   Constitutional:       General: He is not in acute distress.  HENT:      Head: Normocephalic.   Eyes:      Extraocular Movements: Extraocular movements intact.   Cardiovascular:      Rate and Rhythm: Normal rate and regular rhythm.   Pulmonary:      Effort: Pulmonary effort is normal. No respiratory distress.      Breath sounds: Normal breath sounds.      Comments: Port dressing C/D/I  Abdominal:       Palpations: Abdomen is soft.      Comments: G tube noted  Lap sites C/D/I   Genitourinary:     Comments: deferred  Musculoskeletal:      Cervical back: Normal range of motion.      Right lower leg: No edema.      Left lower leg: No edema.   Skin:     General: Skin is warm.      Capillary Refill: Capillary refill takes less than 2 seconds.   Neurological:      General: No focal deficit present.      Mental Status: He is alert and oriented to person, place, and time.   Psychiatric:         Mood and Affect: Mood normal.         Behavior: Behavior normal.         Thought Content: Thought content normal.          Significant Labs: All pertinent labs within the past 24 hours have been reviewed.    Significant Imaging: I have reviewed all pertinent imaging results/findings within the past 24 hours.  Assessment/Plan:     * Esophageal cancer, stage IV  -S/p robotic gastrostomy tube placement and port insertion by Dr. Rosas today.   -Hx of Esophageal cancer, Stage IV with metastatic disease.  -Followed by Dr. Latif outpatient. To initiate chemotherapy, FOLFOX, as outpatient once g-tube and port inserted.  -General surgery primary.  -Plans to start using G-tube tomorrow. Nutritional consult has been placed.   -Pain control.      HTN (hypertension)  Patients blood pressure range in the last 24 hours was: BP  Min: 111/70  Max: 135/74.The patient's inpatient anti-hypertensive regimen is listed below:  Current Antihypertensives  carvediloL tablet 12.5 mg, 2 times daily, Oral    Plan  - BP is controlled, no changes needed to their regimen  - Discussed with patient and wife at bedside, flagged for removal of amlodipine and losartan as it was discontinued due to hypotension. Continue carvedilol 12.5 mg po bid with holding parameters.       VTE Risk Mitigation (From admission, onward)           Ordered     IP VTE HIGH RISK PATIENT  Once         10/21/24 1601     Place sequential compression device  Until discontinued          10/21/24 0736                        Thank you for your consult. I will follow-up with patient. Please contact us if you have any additional questions.    KENDRA Quick  Department of Hospital Medicine   [facility] Tele[facility] ([facility])

## 2024-10-21 NOTE — PLAN OF CARE
[facility] - Telemetry (Hospital)  Initial Discharge Assessment       Primary Care Provider: Michell Goyal MD    Admission Diagnosis: Esophageal cancer, stage IV [C15.9]    Admission Date: 10/21/2024  Expected Discharge Date:     Transition of Care Barriers: None    Payor: BLUE CROSS [employer]EFIT / Plan: [employer] BLUE CROSS [place] / Product Type: Self Funded /     Extended Emergency Contact Information  Primary Emergency Contact: Fanta Muñoz  Address: [address] [place] [address] [place]  Mobile Phone: [phone]  Relation: Spouse    Discharge Plan A: Home Health  Discharge Plan B: Home with family      [facility] [address] [place] [address] [address]  [address]  [facility] [address]  Phone: [phone] Fax: [phone]      Initial Assessment (most recent)       Adult Discharge Assessment - 10/21/24 1815          Discharge Assessment    Assessment Type Discharge Planning Assessment     Confirmed/corrected address, phone number and insurance Yes     Source of Information family     Communicated RUTH with patient/caregiver Date not available/Unable to determine     People in Home spouse     Do you expect to return to your current living situation? Yes     Do you have help at home or someone to help you manage your care at home? Yes     Who are your caregiver(s) and their phone number(s)? wifeFanta     Prior to hospitilization cognitive status: Alert/Oriented     Current cognitive status: Alert/Oriented     Walking or Climbing Stairs Difficulty no     Dressing/Bathing Difficulty no     Equipment Currently Used at Home none     Readmission within 30 days? No     Patient currently being followed by outpatient case management? No     Do you currently have service(s) that help you manage your care at home? No     Do you take prescription medications? Yes     Do you have prescription coverage? Yes     Do you have any problems  affording any of your prescribed medications? No     Is the patient taking medications as prescribed? yes     Who is going to help you get home at discharge? wife     How do you get to doctors appointments? car, drives self;family or friend will provide     Are you on dialysis? No     Do you take coumadin? No     Discharge Plan A Home Health     Discharge Plan B Home with family     DME Needed Upon Discharge  none     Discharge Plan discussed with: Spouse/sig other     Transition of Care Barriers None

## 2024-10-21 NOTE — OP NOTE
[facility]  Surgical Oncology  Operative Note           Date of Procedure: 10/21/2024   Time: 3:10 PM    Procedure: Procedure(s) (LRB):  INSERTION, VENOUS ACCESS PORT (Left)  XI ROBOTIC INSERTION, GASTROSTOMY TUBE (N/A)     Surgeons and Role:     * Roseanna Rosas MD - Primary  Assisting Surgeon: None    Pre-Operative Diagnosis:   Esophageal cancer, stage IV [C15.9]    Post-Operative Diagnosis:   Same    Pre-Operative Variables:  Stage: IV  Adjacent organ involvement: No  Chemotherapy within 90 Days: No  Radiation Therapy within 90 Days: No    Anesthesia: General    Procedure:  Robotic Gastrostomy tube placement  Port placement    Operative Findings:   No evidence of distant intraperitoneal metastases   Right internal jugular vein identified and found to be compressible  Right internal jugular access obtained with ultrasound guidance   Insertion and final placement of port and attached catheter confirmed with fluoroscopic guidance          Indications:  Rosalio Muñoz is a 58 y.o. year old male with newly diagnosed stage IV esophageal cancer with weight loss, who presents for port placement and G tube placement.     Risks and benefits were reviewed including bleeding, infection, pain, scar, damage to surrounding structures, cardiovascular and pulmonary complications, pneumothorax, dislodgement of tube, need for additional procedures, death, and imponderables.  He expressed understanding and gave informed consent to proceed.    Procedure in Detail:    The patient was taken to the operating room positioned in the supine position.  General endotracheal anesthesia was induced and his abdomen was then prepped and draped in the usual sterile fashion.  A time-out was performed identifying the correct patient, procedure, and other pertinent information including administration of antibiotics.  A Veress needle was inserted at Jensen point in the left quadrant for position was confirmed with a saline  meniscus test.  The abdomen was then insufflated to 15mmHg.  An 8mm robotic Optiview port was placed in the left mid clavicular line under direct visualization.  The laparoscope was inserted. There was no evidence of a vascular or enteric injury and the Veress was removed.  Additional trocars were placed in the supraumbilical and right midclavicular lines.  The patient was placed in reverse Trendelenburg position. The patient was docked to the Tapgage robot.     Stomach was evaluated and the location where it would easily reach the left upper quadrant was identified, taking care to stay away from the gastroepiploic artery so as to not Rue in the stomach for use for future conduit..  A pursestring was made with a 3-0 Vicryl and the stomach was suspended along the posterior aspect to the anterior abdominal wall at the location of the identified gastrostomy tube insertion site as indicated by a needle that was inserted through the skin.  The posterior aspect of the gastropexy was performed using the V lock stitch and a  gastrotomy was made using hook electrocautery, confirming entrance into the stomach through visualization of the gastric mucosa.  An incision was made on the anterior abdominal wall and the 20 Ecuadorean gastrostomy tube was inserted.  The pursestring was cinched down and tied and the balloon was inflated with 10 cc of sterile water.  This was then gently retracted towards the anterior abdominal wall.  We then completed the remainder of the gastropexy using that same V lock stitch until the entire stomach was pack seed up against the anterior abdominal wall.  The pneumoperitoneum was decreased down to 8 mmHg and the stitches were then tightened.  The robot was then undocked and the trocars removed and the abdomen desufflated.  The abdominal incisions were irrigated and then closed with 4-0 Monocryl followed by Dermabond.  The G-tube was gently retracted up until it was taut against the anterior abdominal  wall but loose enough that it could still rotate easily.  It was then place with 2-0 nylon at 2 different locations as well as a silk tie to hold the flange at 4 cm.  The tube was then hooked up to gravity bag.  Drapes were removed and we turned our attention towards port placement.    A shoulder roll was placed and the patient's bilateral neck and chest were prepped and draped in a standard surgical fashion. A timeout was performed according to the [facility] Guidelines. The patient was placed in Trendelenburg position and an ultrasound was used to identify the left internal jugular vein as a compressible, nonpulsatile structure lateral to the carotid artery.  Local anesthetic was injected at the planned access site and using intraoperative ultrasound guidance, the left internal jugular vein was accessed with the finder needle without incident with the aspiration of dark red, nonpulsatile blood.  The syringe was removed the guidewire was advanced slowly into the vessel without incident.  The needle was removed and the ultrasound was then used to identify the course of the guidewire, confirming its location within the lumen of the left internal jugular vein.  The guidewire was secured to the drape and attention was then turned to the left chest.    After injection of local anesthetic, a 3 cm incision was made on the left chest inferior to the clavicle and sharp dissection was carried down with Bovie electrocautery to the pectoralis fascia.  A pocket for the port device was created inferiorly along the pectoralis fascia.  The end of the port catheter was attached to the tunneling device and the catheter was tunneled retrograde from the port pocket, over the top of the clavicle, to the jugular venous access site.  Using the Seldinger technique, the dilator was passed over the wire without incident.  The dilator was then placed within the sheath and this was placed over the guidewire into the right internal  jugular vein using fluoroscopic guidance without complication.  Once placement of the sheath was confirmed with fluoroscopy, the guidewire and internal dilator were removed leaving the sheath in place.  At this time, the port catheter was advanced through the sheath into the left internal jugular vein under under direct fluoroscopic visualization.  The sheath was then torn away leaving the catheter in place and the distal tip was positioned at the junction of the superior vena cava and right atrium with fluoroscopic guidance. The catheter was then trimmed and attached to the port device and locked into place. The port was flushed and secured within the port pocket with 2-0 Prolene suture. It was aspirated demonstrating steady return of blood and flushed with normal saline without resistance. Heparinized saline was administered into the port as a final flush.    Fluoroscopy was used to confirm the location of the distal port catheter tip at the SVC/right atrial junction, a smooth course of the catheter and the proper location of the port device on the left chest wall and neck. The port pocket was closed in two layers and the venous access site was closed with a single interrupted 4-0 Monocryl. Dermabond was used for sterile dressing.      All lap, needle, and sponge counts were correct x2. The patient was awakened from anesthesia and taken to the recovery room in stable condition. A postoperative chest x-ray will be taken in the recovery room.    Portions of the record were created with [employer] voice recognition software. This may lead to occasional typographical errors due to the inherent limitations of the software. Read the chart carefully and recognize, using context, where substitutions have occurred. Please do not hesitate to contact me directly if clarification is needed.    Implants:   Implant Name Type Inv. Item Serial No.  Lot No. LRB No. Used Action   PORT POWER CLEAR VIEW - SN/A   PORT POWER CLEAR VIEW N/A [employer] [id number] Left 1 Implanted       Drains: None    Estimated Blood Loss (EBL):  Minimal    Specimens:  None           Condition: Good    Disposition: PACU - hemodynamically stable.               Roseanna Rosas MD      Surgical Oncology      10/21/2024, 3:10 PM

## 2024-10-21 NOTE — ASSESSMENT & PLAN NOTE
-S/p robotic gastrostomy tube placement and port insertion by Dr. Rosas today.   -Hx of Esophageal cancer, Stage IV with metastatic disease.  -Followed by Dr. Latif outpatient. To initiate chemotherapy, FOLFOX, as outpatient once g-tube and port inserted.  -General surgery primary.  -Plans to start using G-tube tomorrow. Nutritional consult has been placed.   -Pain control.

## 2024-10-21 NOTE — PLAN OF CARE
POC reviewed with pt and spouse. Pt verbalizes understanding of POC. No questions at this time.  AAOx4. NADN.  Pt remains free of falls.Standby assist with activity.   G-tube to gravity with mercer bag.  Incision site dressing clean and intact.  PRN pain meds given.  IVF infusing.   No complaints at this time.  Safety measures in place. Will continue to monitor.  Informed pt to call for assistance before getting up. Pt verbalizes understanding.  Hourly rounding and chart check complete.

## 2024-10-21 NOTE — INTERVAL H&P NOTE
Rosalio Muñoz is a 58 y.o. male who presents with metastatic esophageal cancer, here for port placement.    The patient has been examined and the H&P has been reviewed:    I concur with the findings and no changes have occurred since H&P was written.    Surgery risks, benefits and alternative options discussed and understood by patient/family.    -- to OR for robotic G tube and port placement   -- Laterality marked?  No- n/a  -- Abx ppx:  Ancef   -- DVT ppx:  SCDs   -- Consent signed and in the chart.  All questions have been answered        Roseanna Rosas MD      Surgical Oncology          There are no hospital problems to display for this patient.

## 2024-10-21 NOTE — SUBJECTIVE & OBJECTIVE
Past Medical History:   Diagnosis Date    Hypertension     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 10/11/2024       Past Surgical History:   Procedure Laterality Date    COLONOSCOPY N/A 8/25/2023    Procedure: COLONOSCOPY;  Surgeon: Pebbles Spear MD;  Location: [facility];  Service: Endoscopy;  Laterality: N/A;    COLONOSCOPY N/A 10/2/2024    Procedure: COLONOSCOPY  Cardiac clearance received on 09/20/24 per Dr. Lockett, Cardiology.  Note in encounters.  LB;  Surgeon: Sully Farris MD;  Location: [facility];  Service: Endoscopy;  Laterality: N/A;    ESOPHAGOGASTRODUODENOSCOPY N/A 10/2/2024    Procedure: EGD (ESOPHAGOGASTRODUODENOSCOPY);  Surgeon: Sully Farris MD;  Location: [facility];  Service: Endoscopy;  Laterality: N/A;    SURGICAL REMOVAL OF LESION OF FACE Right 12/1/2023    Procedure: EXCISION, LESION, FACE;  Surgeon: Jose Flaherty MD;  Location: [facility] OR;  Service: ENT;  Laterality: Right;  1. Excision facial subcutaneous mass right cheek 3 cm. 2. Intermediate layered closure 3.5 cm    WISDOM TOOTH EXTRACTION      XI ROBOTIC APPENDECTOMY N/A 7/31/2024    Procedure: XI ROBOTIC APPENDECTOMY;  Surgeon: Paulo Saavedra MD;  Location: [facility];  Service: General;  Laterality: N/A;       Review of patient's allergies indicates:  No Known Allergies    No current facility-administered medications on file prior to encounter.     Current Outpatient Medications on File Prior to Encounter   Medication Sig    atorvastatin (LIPITOR) 20 MG tablet Take 1 tablet (20 mg total) by mouth every evening.    carvediloL (COREG) 25 MG tablet Take 1 tablet (25 mg total) by mouth 2 (two) times daily. (Patient taking differently: Take 25 mg by mouth Daily.)    HYDROcodone-acetaminophen (NORCO) 5-325 mg per tablet Take 1 tablet by mouth every 4 (four) hours as needed for Pain.    [DISCONTINUED] losartan (COZAAR) 100 MG tablet Take 1 tablet (100 mg total) by mouth every evening.    amLODIPine (NORVASC) 5 MG tablet  Take 5 mg by mouth 2 (two) times daily.    aspirin (ECOTRIN) 81 MG EC tablet Take 1 tablet (81 mg total) by mouth once daily.    LIDOcaine-prilocaine (EMLA) cream Apply topically as needed.    OLANZapine (ZYPREXA) 5 MG tablet Take 1 tablet (5 mg total) by mouth every evening. Take nightly on days 1-3 of each chemotherapy cycle.    prochlorperazine (COMPAZINE) 5 MG tablet Take 2 tablets (10 mg total) by mouth every 6 (six) hours as needed for Nausea.     Family History       Problem Relation (Age of Onset)    Breast cancer Maternal Grandmother    Diabetes Father    Heart disease Father    Hyperlipidemia Mother    Hypertension Father    Kidney disease Father    Prostate cancer Maternal Grandfather          Tobacco Use    Smoking status: Every Day     Current packs/day: 0.00     Types: Cigars, Cigarettes     Last attempt to quit: 10/4/2022     Years since quittin.0     Passive exposure: Never    Smokeless tobacco: Never    Tobacco comments:     Cigar every afternoon   Substance and Sexual Activity    Alcohol use: Yes     Alcohol/week: 4.0 - 6.0 standard drinks of alcohol     Types: 4 - 6 Cans of beer per week     Comment: occ    Drug use: No    Sexual activity: Yes     Partners: Female     Review of Systems   Constitutional:  Negative for appetite change and unexpected weight change.   Eyes:  Negative for visual disturbance.   Respiratory:  Negative for cough, chest tightness and shortness of breath.    Cardiovascular:  Negative for chest pain.   Gastrointestinal:  Negative for abdominal pain, diarrhea, nausea and vomiting.   Genitourinary:  Negative for difficulty urinating.   Musculoskeletal:  Negative for arthralgias.   Neurological:  Negative for seizures, syncope, speech difficulty, light-headedness, numbness and headaches.   Psychiatric/Behavioral:  Negative for agitation, behavioral problems and confusion.      Objective:     Vital Signs (Most Recent):  Temp: 98.4 °F (36.9 °C) (10/21/24 1622)  Pulse: 86  (10/21/24 1712)  Resp: 17 (10/21/24 1622)  BP: 119/70 (10/21/24 1712)  SpO2: 98 % (10/21/24 1622) Vital Signs (24h Range):  Temp:  [98.2 °F (36.8 °C)-98.4 °F (36.9 °C)] 98.4 °F (36.9 °C)  Pulse:  [] 86  Resp:  [10-25] 17  SpO2:  [92 %-99 %] 98 %  BP: (111-135)/(67-85) 119/70     Weight: 80.3 kg (177 lb 0.5 oz)  Body mass index is 23.36 kg/m².     Physical Exam  Vitals reviewed.   Constitutional:       General: He is not in acute distress.  HENT:      Head: Normocephalic.   Eyes:      Extraocular Movements: Extraocular movements intact.   Cardiovascular:      Rate and Rhythm: Normal rate and regular rhythm.   Pulmonary:      Effort: Pulmonary effort is normal. No respiratory distress.      Breath sounds: Normal breath sounds.      Comments: Port dressing C/D/I  Abdominal:      Palpations: Abdomen is soft.      Comments: G tube noted  Lap sites C/D/I   Genitourinary:     Comments: deferred  Musculoskeletal:      Cervical back: Normal range of motion.      Right lower leg: No edema.      Left lower leg: No edema.   Skin:     General: Skin is warm.      Capillary Refill: Capillary refill takes less than 2 seconds.   Neurological:      General: No focal deficit present.      Mental Status: He is alert and oriented to person, place, and time.   Psychiatric:         Mood and Affect: Mood normal.         Behavior: Behavior normal.         Thought Content: Thought content normal.          Significant Labs: All pertinent labs within the past 24 hours have been reviewed.    Significant Imaging: I have reviewed all pertinent imaging results/findings within the past 24 hours.

## 2024-10-21 NOTE — ANESTHESIA PROCEDURE NOTES
Intubation    Date/Time: 10/21/2024 12:49 PM    Performed by: Geoffrey Garcia CRNA  Authorized by: Robby Alvarenga MD    Intubation:     Induction:  Intravenous    Intubated:  Postinduction    Mask Ventilation:  Easy mask    Attempts:  1    Attempted By:  CRNA    Method of Intubation:  Direct    Blade:  Earl 2    Laryngeal View Grade: Grade I - full view of cords      Difficult Airway Encountered?: No      Complications:  None    Airway Device:  Oral endotracheal tube    Airway Device Size:  8.0    Style/Cuff Inflation:  Cuffed (inflated to minimal occlusive pressure)    Tube secured:  22    Secured at:  The lips    Placement Verified By:  Capnometry    Complicating Factors:  None    Findings Post-Intubation:  BS equal bilateral

## 2024-10-21 NOTE — PLAN OF CARE
10/21/24 1819   Post-Acute Status   Post-Acute Authorization [facility]   Home Health Status Referrals Sent  ([facility])   Discharge Plan   Discharge Plan A Home with family     Referral sent to [facility].  Orders pending.

## 2024-10-21 NOTE — TELEPHONE ENCOUNTER
Called Ms. Muñoz to share that some PEG supplies are on hand in RD office that she could have, so that she knows what to order in the future. Ms. Muñoz says she will come by to  later today.   Signature: Agnes Lema, MPH, RD, LDN

## 2024-10-21 NOTE — TRANSFER OF CARE
"Anesthesia Transfer of Care Note    Patient: Rosalio Muñoz    Procedure(s) Performed: Procedure(s) (LRB):  INSERTION, VENOUS ACCESS PORT (Left)  XI ROBOTIC INSERTION, GASTROSTOMY TUBE (N/A)    Patient location: PACU    Anesthesia Type: general    Transport from OR: Transported from OR on room air with adequate spontaneous ventilation    Post pain: adequate analgesia    Post assessment: no apparent anesthetic complications    Post vital signs: stable    Level of consciousness: responds to stimulation    Nausea/Vomiting: no nausea/vomiting    Complications: none    Transfer of care protocol was followed      Last vitals: Visit Vitals  /74 (BP Location: Right arm, Patient Position: Lying)   Pulse 107   Temp 36.8 °C (98.2 °F) (Temporal)   Resp 20   Ht 6' 1" (1.854 m)   Wt 76.5 kg (168 lb 10.4 oz)   SpO2 (!) 94%   BMI 22.25 kg/m²     "

## 2024-10-21 NOTE — ANESTHESIA PREPROCEDURE EVALUATION
10/21/2024  Rosalio Muñoz is a 58 y.o., male.      Pre-op Assessment    I have reviewed the Patient Summary Reports.     I have reviewed the Nursing Notes. I have reviewed the NPO Status.      Review of Systems  Anesthesia Hx:  No problems with previous Anesthesia                Hematology/Oncology:  Hematology Normal   Oncology Normal                                   Cardiovascular:     Hypertension                                          Renal/:  Renal/ Normal                 Hepatic/GI:  Hepatic/GI Normal                    Neurological:  Neurology Normal                                      Endocrine:  Endocrine Normal            Dermatological:  Skin Normal    Psych:  Psychiatric History                  Physical Exam  General: Well nourished, Cooperative, Alert and Oriented    Airway:  Mallampati: II / II  Mouth Opening: Normal  TM Distance: Normal  Tongue: Normal  Neck ROM: Normal ROM    Dental:  Intact      Patient Active Problem List   Diagnosis    HTN (hypertension)    Umbilical hernia    Tobacco use disorder    Delayed immunizations    Other hyperlipidemia    Hypertensive urgency    Elevated troponin    Hypomagnesemia    Bilateral hearing loss    Alcohol use    Unintentional weight loss    Low libido    Alcohol use disorder    Dysphagia    Personal history of colon polyps, unspecified    Esophageal cancer, stage IV    Secondary adenocarcinoma of retroperitoneum    Secondary liver cancer    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    Cachexia    Iron deficiency anemia due to chronic blood loss     Results for orders placed during the hospital encounter of 10/11/24    Echo    Interpretation Summary    Left Ventricle: The left ventricle is normal in size. Normal wall thickness. There is normal systolic function. Ejection fraction is approximately 55%. There is normal diastolic  function.    Right Ventricle: Normal right ventricular cavity size. Wall thickness is normal. Systolic function is normal.    Aortic Valve: There is mild aortic valve sclerosis.    Pulmonary Artery: The estimated pulmonary artery systolic pressure is 23 mmHg.    IVC/SVC: Normal venous pressure at 3 mmHg.      Anesthesia Plan  Type of Anesthesia, risks & benefits discussed:    Anesthesia Type: Gen ETT  Intra-op Monitoring Plan: Standard ASA Monitors  Post Op Pain Control Plan: multimodal analgesia  Induction:  IV  Airway Plan: Direct  Informed Consent: Informed consent signed with the Patient and all parties understand the risks and agree with anesthesia plan.  All questions answered.   ASA Score: 3  Day of Surgery Review of History & Physical: H&P Update referred to the surgeon/provider.I have interviewed and examined the patient. I have reviewed the patient's H&P dated: There are no significant changes. H&P completed by Anesthesiologist.    Ready For Surgery From Anesthesia Perspective.     .

## 2024-10-21 NOTE — HPI
PULMONARY & CRITICAL CARE PROGRESS NOTE      Today's Date: 2024  Dionne Richards  : 1945  Attending Provider: Adarsh Rodríguez MD  __________________________________________________________________  Ms. Richards is a 79 [facility]  from [facility] Rosalio Muñoz is a 58 year old male with a PMHx of HTN, CAD, PAD, NSTEMI, and Stage IV esophageal cancer who underwent robotic gastrostomy tube placement and port insertion today by Dr. Rosas.  consulted for medical management. Hx of HTN on amlodipine 5 mg po daily, losartan 100 mg po daily, and carvedilol 25 mg po bid. Patient and wife report he is not taking amlodipine or losartan as directed due to hypotension. The only medication he is taking is the carvedilol with holding parameters.    [phone]          Office Fax: [phone]    

## 2024-10-21 NOTE — ASSESSMENT & PLAN NOTE
Patients blood pressure range in the last 24 hours was: BP  Min: 111/70  Max: 135/74.The patient's inpatient anti-hypertensive regimen is listed below:  Current Antihypertensives  carvediloL tablet 12.5 mg, 2 times daily, Oral    Plan  - BP is controlled, no changes needed to their regimen  - Discussed with patient and wife at bedside, flagged for removal of amlodipine and losartan as it was discontinued due to hypotension. Continue carvedilol 12.5 mg po bid with holding parameters.

## 2024-10-21 NOTE — NURSING TRANSFER
Nursing Transfer Note      10/21/2024   1620    Nurse giving handoff:Donna Luong receiving handoff:Betzy    Reason patient is being transferred: Admission    Transfer From: PACU    Transfer via stretcher    Transfer with 2LNC to O2    Transported by Betzy RN    Order for Tele Monitor? No    Additional Lines: Oxygen    Medicines sent: Nozin    Patient belongings transferred with patient: Yes    Chart send with patient: Yes    Notified: spouse    Upon arrival to floor: patient oriented to room, call bell in reach, and bed in lowest position

## 2024-10-22 PROBLEM — E43 SEVERE PROTEIN-CALORIE MALNUTRITION: Status: ACTIVE | Noted: 2024-10-22

## 2024-10-22 LAB
ANION GAP SERPL CALC-SCNC: 5 MMOL/L (ref 8–16)
BASOPHILS # BLD AUTO: 0.06 K/UL (ref 0–0.2)
BASOPHILS NFR BLD: 1 % (ref 0–1.9)
BUN SERPL-MCNC: 5 MG/DL (ref 6–20)
CALCIUM SERPL-MCNC: 8.5 MG/DL (ref 8.7–10.5)
CHLORIDE SERPL-SCNC: 101 MMOL/L (ref 95–110)
CO2 SERPL-SCNC: 29 MMOL/L (ref 23–29)
CREAT SERPL-MCNC: 0.7 MG/DL (ref 0.5–1.4)
DIFFERENTIAL METHOD BLD: ABNORMAL
EOSINOPHIL # BLD AUTO: 0.1 K/UL (ref 0–0.5)
EOSINOPHIL NFR BLD: 1.8 % (ref 0–8)
ERYTHROCYTE [DISTWIDTH] IN BLOOD BY AUTOMATED COUNT: 14.1 % (ref 11.5–14.5)
EST. GFR  (NO RACE VARIABLE): >60 ML/MIN/1.73 M^2
GLUCOSE SERPL-MCNC: 95 MG/DL (ref 70–110)
HCT VFR BLD AUTO: 36 % (ref 40–54)
HGB BLD-MCNC: 12.2 G/DL (ref 14–18)
IMM GRANULOCYTES # BLD AUTO: 0.02 K/UL (ref 0–0.04)
IMM GRANULOCYTES NFR BLD AUTO: 0.3 % (ref 0–0.5)
LYMPHOCYTES # BLD AUTO: 1.7 K/UL (ref 1–4.8)
LYMPHOCYTES NFR BLD: 28 % (ref 18–48)
MAGNESIUM SERPL-MCNC: 1.6 MG/DL (ref 1.6–2.6)
MCH RBC QN AUTO: 33.2 PG (ref 27–31)
MCHC RBC AUTO-ENTMCNC: 33.9 G/DL (ref 32–36)
MCV RBC AUTO: 98 FL (ref 82–98)
MONOCYTES # BLD AUTO: 0.7 K/UL (ref 0.3–1)
MONOCYTES NFR BLD: 11.7 % (ref 4–15)
NEUTROPHILS # BLD AUTO: 3.4 K/UL (ref 1.8–7.7)
NEUTROPHILS NFR BLD: 57.2 % (ref 38–73)
NRBC BLD-RTO: 0 /100 WBC
PLATELET # BLD AUTO: 306 K/UL (ref 150–450)
PMV BLD AUTO: 9.1 FL (ref 9.2–12.9)
POTASSIUM SERPL-SCNC: 3.4 MMOL/L (ref 3.5–5.1)
RBC # BLD AUTO: 3.67 M/UL (ref 4.6–6.2)
SODIUM SERPL-SCNC: 135 MMOL/L (ref 136–145)
WBC # BLD AUTO: 5.99 K/UL (ref 3.9–12.7)

## 2024-10-22 PROCEDURE — 94799 UNLISTED PULMONARY SVC/PX: CPT

## 2024-10-22 PROCEDURE — 25000003 PHARM REV CODE 250: Performed by: SURGERY

## 2024-10-22 PROCEDURE — 36415 COLL VENOUS BLD VENIPUNCTURE: CPT | Performed by: SURGERY

## 2024-10-22 PROCEDURE — 11000001 HC ACUTE MED/SURG PRIVATE ROOM

## 2024-10-22 PROCEDURE — 83735 ASSAY OF MAGNESIUM: CPT | Performed by: SURGERY

## 2024-10-22 PROCEDURE — 25000003 PHARM REV CODE 250: Performed by: INTERNAL MEDICINE

## 2024-10-22 PROCEDURE — 27000221 HC OXYGEN, UP TO 24 HOURS

## 2024-10-22 PROCEDURE — 63600175 PHARM REV CODE 636 W HCPCS: Performed by: INTERNAL MEDICINE

## 2024-10-22 PROCEDURE — 94761 N-INVAS EAR/PLS OXIMETRY MLT: CPT

## 2024-10-22 PROCEDURE — 85025 COMPLETE CBC W/AUTO DIFF WBC: CPT | Performed by: INTERNAL MEDICINE

## 2024-10-22 PROCEDURE — 80048 BASIC METABOLIC PNL TOTAL CA: CPT | Performed by: SURGERY

## 2024-10-22 RX ORDER — MUPIROCIN 20 MG/G
OINTMENT TOPICAL 2 TIMES DAILY
Status: DISCONTINUED | OUTPATIENT
Start: 2024-10-22 | End: 2024-10-22 | Stop reason: SDUPTHER

## 2024-10-22 RX ORDER — ASPIRIN 81 MG/1
81 TABLET ORAL DAILY
Status: DISCONTINUED | OUTPATIENT
Start: 2024-10-23 | End: 2024-10-23 | Stop reason: HOSPADM

## 2024-10-22 RX ORDER — ENOXAPARIN SODIUM 100 MG/ML
40 INJECTION SUBCUTANEOUS EVERY 24 HOURS
Status: DISCONTINUED | OUTPATIENT
Start: 2024-10-22 | End: 2024-10-23 | Stop reason: HOSPADM

## 2024-10-22 RX ORDER — ATORVASTATIN CALCIUM 10 MG/1
20 TABLET, FILM COATED ORAL NIGHTLY
Status: DISCONTINUED | OUTPATIENT
Start: 2024-10-22 | End: 2024-10-23 | Stop reason: HOSPADM

## 2024-10-22 RX ORDER — OXYCODONE HYDROCHLORIDE 5 MG/1
10 TABLET ORAL EVERY 4 HOURS PRN
Status: DISCONTINUED | OUTPATIENT
Start: 2024-10-22 | End: 2024-10-23 | Stop reason: HOSPADM

## 2024-10-22 RX ORDER — MAGNESIUM SULFATE HEPTAHYDRATE 40 MG/ML
2 INJECTION, SOLUTION INTRAVENOUS ONCE
Status: COMPLETED | OUTPATIENT
Start: 2024-10-22 | End: 2024-10-22

## 2024-10-22 RX ADMIN — ENOXAPARIN SODIUM 40 MG: 40 INJECTION SUBCUTANEOUS at 05:10

## 2024-10-22 RX ADMIN — OXYCODONE HYDROCHLORIDE 10 MG: 5 TABLET ORAL at 06:10

## 2024-10-22 RX ADMIN — OXYCODONE HYDROCHLORIDE 10 MG: 5 TABLET ORAL at 01:10

## 2024-10-22 RX ADMIN — MAGNESIUM SULFATE HEPTAHYDRATE 2 G: 40 INJECTION, SOLUTION INTRAVENOUS at 09:10

## 2024-10-22 RX ADMIN — SODIUM CHLORIDE: 9 INJECTION, SOLUTION INTRAVENOUS at 12:10

## 2024-10-22 RX ADMIN — OXYCODONE HYDROCHLORIDE 10 MG: 5 TABLET ORAL at 04:10

## 2024-10-22 RX ADMIN — ACETAMINOPHEN 1000 MG: 500 TABLET ORAL at 05:10

## 2024-10-22 RX ADMIN — OXYCODONE HYDROCHLORIDE 5 MG: 5 TABLET ORAL at 12:10

## 2024-10-22 RX ADMIN — ACETAMINOPHEN 1000 MG: 500 TABLET ORAL at 10:10

## 2024-10-22 RX ADMIN — OXYCODONE HYDROCHLORIDE 10 MG: 5 TABLET ORAL at 09:10

## 2024-10-22 RX ADMIN — CHLORHEXIDINE GLUCONATE 0.12% ORAL RINSE 10 ML: 1.2 LIQUID ORAL at 09:10

## 2024-10-22 RX ADMIN — CHLORHEXIDINE GLUCONATE 0.12% ORAL RINSE 10 ML: 1.2 LIQUID ORAL at 08:10

## 2024-10-22 RX ADMIN — OXYCODONE HYDROCHLORIDE 10 MG: 5 TABLET ORAL at 10:10

## 2024-10-22 NOTE — PROGRESS NOTES
[facility] Tele[facility] ([facility])  General Surgery  Progress Note    Subjective:     History of Present Illness:  No notes on file    Post-Op Info:  Procedure(s) (LRB):  INSERTION, VENOUS ACCESS PORT (Left)  XI ROBOTIC INSERTION, GASTROSTOMY TUBE (N/A)   1 Day Post-Op     Interval History: AFVSS.  DADA.  Feeling well.     Medications:  Continuous Infusions:  Scheduled Meds:   acetaminophen  1,000 mg Oral Q8H    carvediloL  12.5 mg Oral BID    chlorhexidine  10 mL Mouth/Throat BID    magnesium sulfate IVPB  2 g Intravenous Once    OLANZapine  5 mg Oral QHS     PRN Meds:  Current Facility-Administered Medications:     influenza, 0.5 mL, Intramuscular, vaccine x 1 dose    ondansetron, 4 mg, Intravenous, Q6H PRN    oxyCODONE, 10 mg, Oral, Q4H PRN    oxyCODONE, 5 mg, Oral, Q4H PRN     Review of patient's allergies indicates:  No Known Allergies  Objective:     Vital Signs (Most Recent):  Temp: 98 °F (36.7 °C) (10/22/24 0742)  Pulse: 82 (10/22/24 0742)  Resp: 16 (10/22/24 0457)  BP: (!) 101/59 (10/22/24 0742)  SpO2: 97 % (10/22/24 0742) Vital Signs (24h Range):  Temp:  [97.9 °F (36.6 °C)-98.4 °F (36.9 °C)] 98 °F (36.7 °C)  Pulse:  [] 82  Resp:  [10-25] 16  SpO2:  [92 %-99 %] 97 %  BP: (101-135)/(59-85) 101/59     Weight: 80.3 kg (177 lb 0.5 oz)  Body mass index is 23.36 kg/m².    Intake/Output - Last 3 Shifts         10/20 0700  10/21 0659 10/21 0700  10/22 0659 10/22 0700  10/23 0659    P.O.   480    IV Piggyback  900     Total Intake(mL/kg)  900 (11.2) 480 (6)    Urine (mL/kg/hr)   1 (0)    Drains  1150 750    Stool   0    Total Output  1150 751    Net  -250 -271           Stool Occurrence   0 x             Physical Exam  Constitutional:       General: He is not in acute distress.     Appearance: Normal appearance.   HENT:      Head: Normocephalic and atraumatic.   Eyes:      Extraocular Movements: Extraocular movements intact.      Conjunctiva/sclera: Conjunctivae normal.   Cardiovascular:      Rate and Rhythm: Normal  rate and regular rhythm.   Pulmonary:      Effort: Pulmonary effort is normal.   Abdominal:      General: Abdomen is flat. There is no distension.      Palpations: Abdomen is soft. There is no mass.      Tenderness: There is no abdominal tenderness. There is no guarding or rebound.      Hernia: No hernia is present.      Comments: Incisions c/d/I, G tube in place    Musculoskeletal:         General: No swelling, tenderness, deformity or signs of injury. Normal range of motion.   Skin:     General: Skin is warm and dry.      Coloration: Skin is not jaundiced.      Comments: Port dressing in place    Neurological:      General: No focal deficit present.      Mental Status: He is alert and oriented to person, place, and time.   Psychiatric:         Mood and Affect: Mood normal.         Behavior: Behavior normal.         Thought Content: Thought content normal.          Significant Labs:  I have reviewed all pertinent lab results within the past 24 hours.  CBC:   Recent Labs   Lab 10/22/24  0433   WBC 5.99   RBC 3.67*   HGB 12.2*   HCT 36.0*      MCV 98   MCH 33.2*   MCHC 33.9     BMP:   Recent Labs   Lab 10/22/24  0434   GLU 95   *   K 3.4*      CO2 29   BUN 5*   CREATININE 0.7   CALCIUM 8.5*   MG 1.6       Significant Diagnostics:  I have reviewed all pertinent imaging results/findings within the past 24 hours.  Assessment/Plan:     * Esophageal cancer, stage IV  POD #1- s/p port and laparoscopic gastrostomy tube placement     Doing well .    -- start TF today - monitor for refeeding syndrome   -- pain control   -- encourage ambulation and IS  -- appreciate medicine assistance with underlying medical comorbidities  Dispo:  plan for d/c home tomorrow once pain is controlled and he's tolerating tube feeds.     Cachexia  -- starting tube feeds via G tube today     Dysphagia  2/2 esophageal malignancy       Tobacco use disorder  -- appreciate medicine assistance with management     HTN  (hypertension)  -- appreciate medicine assistance with management         Roseanna Rosas MD  General Surgery  [facility] Telemetry ([facility])

## 2024-10-22 NOTE — DISCHARGE INSTRUCTIONS
Enteral Feeding Supplies for Home:   Diet: regular diet and enteral  G-Tube formula type Isosource 1.5  Bolus-  Administer 1 carton of Isosource 1.5  by syringe bolus feed via PEG every 3 hours with 60 ml water flush before and 120 ml after each carton. Recommend 5 cartons daily. Once this is well tolerated, increase to 6 cartons daily spaced every 2.5 hours.

## 2024-10-22 NOTE — PLAN OF CARE
Per [facility] high cost for Isosource 1.5 and supplies. CM contacted [facility] and confirmed agency does have 8 cartons of Isosource 1.5 in stock at this time. Payal with [facility] to send referral form to this CM. CM to update wife, awaiting return call.

## 2024-10-22 NOTE — ASSESSMENT & PLAN NOTE
-S/p robotic gastrostomy tube placement and port insertion by Dr. Rosas 10/21/2024   -Hx of Esophageal cancer, Stage IV with metastatic disease.  -Followed by Dr. Latif outpatient. To initiate chemotherapy, FOLFOX, as outpatient once g-tube and port inserted.  - management per primary team- Surg Onc Dr. Rosas   - Initiated on TF 10/22, nutrition and CM consulted   - pain control per primary

## 2024-10-22 NOTE — NURSING
Order is to start bolus feeding q 3 hrs. Residual 150 ml. Held bolus feeding, MD notified. Will recheck residual in 3-4 hrs.

## 2024-10-22 NOTE — PROGRESS NOTES
[facility] Medicine  Progress Note    Patient Name: Rosalio Muñoz  MRN: [id number]  Patient Class: IP- Inpatient   Admission Date: 10/21/2024  Length of Stay: 1 days  Attending Physician: Roseanna Rosas MD  Primary Care Provider: Michell Goyal MD        Subjective:     Principal Problem:Esophageal cancer, stage IV        HPI:  Rosalio Muñoz is a 58 year old male with a PMHx of HTN, CAD, PAD, NSTEMI, and Stage IV esophageal cancer who underwent robotic gastrostomy tube placement and port insertion today by Dr. Rosas.  consulted for medical management. Hx of HTN on amlodipine 5 mg po daily, losartan 100 mg po daily, and carvedilol 25 mg po bid. Patient and wife report he is not taking amlodipine or losartan as directed due to hypotension. The only medication he is taking is the carvedilol with holding parameters.     Overview/Hospital Course:  Gen Surg initiated tube feedings on 10/22/2024    Interval History: NAEON. Pt seen and examined with nurse at bedside. Reports some abd pain at G tube site. Controlled with current regimen. Denies CP, SOB. BP controlled, currently on room air.     Review of Systems  Objective:     Vital Signs (Most Recent):  Temp: 97.8 °F (36.6 °C) (10/22/24 1208)  Pulse: 77 (10/22/24 1208)  Resp: 18 (10/22/24 1342)  BP: 106/63 (10/22/24 1208)  SpO2: 95 % (10/22/24 1208) Vital Signs (24h Range):  Temp:  [97.8 °F (36.6 °C)-98.4 °F (36.9 °C)] 97.8 °F (36.6 °C)  Pulse:  [] 77  Resp:  [10-25] 18  SpO2:  [92 %-99 %] 95 %  BP: (101-135)/(59-85) 106/63     Weight: 80.3 kg (177 lb 0.5 oz)  Body mass index is 23.36 kg/m².    Intake/Output Summary (Last 24 hours) at 10/22/2024 1347  Last data filed at 10/22/2024 0715  Gross per 24 hour   Intake 1380 ml   Output 1901 ml   Net -521 ml         Physical Exam  Vitals and nursing note reviewed. Exam conducted with a chaperone present.   Constitutional:       General: He is not in acute distress.      Appearance: He is ill-appearing.   Cardiovascular:      Rate and Rhythm: Normal rate and regular rhythm.      Heart sounds: No murmur heard.  Pulmonary:      Effort: Pulmonary effort is normal.      Breath sounds: Normal breath sounds. No wheezing or rales.   Abdominal:      General: Bowel sounds are normal. There is no distension.      Palpations: Abdomen is soft.      Comments: G tube  to gravity and lap sites CDI    Musculoskeletal:      Right lower leg: No edema.      Left lower leg: No edema.   Skin:     Comments: L chest dressing CDI    Neurological:      Mental Status: He is alert. Mental status is at baseline.             Significant Labs: All pertinent labs within the past 24 hours have been reviewed.    Significant Imaging: I have reviewed all pertinent imaging results/findings within the past 24 hours.      Assessment/Plan:      * Esophageal cancer, stage IV  -S/p robotic gastrostomy tube placement and port insertion by Dr. Rosas 10/21/2024   -Hx of Esophageal cancer, Stage IV with metastatic disease.  -Followed by Dr. Latif outpatient. To initiate chemotherapy, FOLFOX, as outpatient once g-tube and port inserted.  - management per primary team- Surg Onc Dr. Rosas   - Initiated on TF 10/22, nutrition and CM consulted   - pain control per primary       HTN (hypertension)  Patients blood pressure range in the last 24 hours was: BP  Min: 101/59  Max: 135/74.The patient's inpatient anti-hypertensive regimen is listed below:  Current Antihypertensives  carvediloL tablet 12.5 mg, 2 times daily, Oral    Plan  - BP is controlled, no changes needed to their regimen  - Continue Coreg at decreased dose with hold parameters in place  - discontinue IV fluids   - monitor BP      Other hyperlipidemia  - resume home statin and asa     Tobacco use disorder  Pt reports he has cut down from smoking 2ppd to smoking 5-6 cigars daily, offered nicotine patch but he declined for now.   smoking cessation counseled        Hypomagnesemia  Patient has Abnormal Magnesium: hypomagnesemia. Will continue to monitor electrolytes closely. Will replace the affected electrolytes and repeat labs to be done after interventions completed. The patient's magnesium results have been reviewed and are listed below.  Recent Labs   Lab 10/22/24  0434   MG 1.6    - IV repletions ordered  - monitor BMP; replete as needed       VTE Risk Mitigation (From admission, onward)           Ordered     enoxaparin injection 40 mg  Every 24 hours         10/22/24 1436     IP VTE HIGH RISK PATIENT  Once         10/21/24 1601     Place sequential compression device  Until discontinued         10/21/24 1601                       Jessica Louise MD  Department of Hospital Medicine   [facility] - Telemetry ([facility])

## 2024-10-22 NOTE — ASSESSMENT & PLAN NOTE
Malnutrition Type:  Context: chronic illness  Level: severe    Related to (etiology):   Physiological causes increasing nutrient needs d/t illness  Alteration in GI tract structure and/or function     Signs and Symptoms (as evidenced by):   Inc loss of subcutaneous fat  Inc muscle loss  Unable or unwilling to eat sufficient energy/protein to maintain a healthy weight     Malnutrition Characteristic Summary:  Energy Intake (Malnutrition): less than 75% for greater than or equal to 1 month  Subcutaneous Fat (Malnutrition): severe depletion  Muscle Mass (Malnutrition): severe depletion    Interventions/Recommendations (treatment strategy):  1. Enteral nutrition management  2. Collaboration by nutrition professional with other providers     Nutrition Diagnosis Status:   New

## 2024-10-22 NOTE — PLAN OF CARE
Nutrition Recommendations/Interventions for malnutrition 10/22/24:   1. Initiate pt onto bolus feeds as currently ordered : Administer 1 carton of Isosource 1.5  by syringe bolus feed via PEG every 3 hours with 60 ml water flush before and 120 ml after each carton. Recommend 5 cartons daily. Once this is well tolerated, increase to 6 cartons daily spaced every 2.5 hours   -Check Mg, K+, Na, Phos and Glu before and during initiation  *High risk for refeeding syndrome   2. Recommend Soft and bite-sized and Boost breeze TID be modified for pleasure feeds as warranted   3. Weight twice weekly  4.  Collaboration by nutrition professional with other providers     Goals:   1. Pt will be initiated onto bolus EN within 24 hrs   2. Pt will tolerate and intake >75% EEN and EPN prior to RD follow up   3. Pt's diet and ONS will be modified prior to RD follow up    KULWANT Soni, RDN, LDN

## 2024-10-22 NOTE — SUBJECTIVE & OBJECTIVE
Interval History: NAEON. Pt seen and examined with nurse at bedside. Reports some abd pain at G tube site. Controlled with current regimen. Denies CP, SOB. BP controlled, currently on room air.     Review of Systems  Objective:     Vital Signs (Most Recent):  Temp: 97.8 °F (36.6 °C) (10/22/24 1208)  Pulse: 77 (10/22/24 1208)  Resp: 18 (10/22/24 1342)  BP: 106/63 (10/22/24 1208)  SpO2: 95 % (10/22/24 1208) Vital Signs (24h Range):  Temp:  [97.8 °F (36.6 °C)-98.4 °F (36.9 °C)] 97.8 °F (36.6 °C)  Pulse:  [] 77  Resp:  [10-25] 18  SpO2:  [92 %-99 %] 95 %  BP: (101-135)/(59-85) 106/63     Weight: 80.3 kg (177 lb 0.5 oz)  Body mass index is 23.36 kg/m².    Intake/Output Summary (Last 24 hours) at 10/22/2024 1347  Last data filed at 10/22/2024 0715  Gross per 24 hour   Intake 1380 ml   Output 1901 ml   Net -521 ml         Physical Exam  Vitals and nursing note reviewed. Exam conducted with a chaperone present.   Constitutional:       General: He is not in acute distress.     Appearance: He is ill-appearing.   Cardiovascular:      Rate and Rhythm: Normal rate and regular rhythm.      Heart sounds: No murmur heard.  Pulmonary:      Effort: Pulmonary effort is normal.      Breath sounds: Normal breath sounds. No wheezing or rales.   Abdominal:      General: Bowel sounds are normal. There is no distension.      Palpations: Abdomen is soft.      Comments: G tube  to gravity and lap sites CDI    Musculoskeletal:      Right lower leg: No edema.      Left lower leg: No edema.   Skin:     Comments: L chest dressing CDI    Neurological:      Mental Status: He is alert. Mental status is at baseline.             Significant Labs: All pertinent labs within the past 24 hours have been reviewed.    Significant Imaging: I have reviewed all pertinent imaging results/findings within the past 24 hours.

## 2024-10-22 NOTE — ASSESSMENT & PLAN NOTE
Patients blood pressure range in the last 24 hours was: BP  Min: 101/59  Max: 135/74.The patient's inpatient anti-hypertensive regimen is listed below:  Current Antihypertensives  carvediloL tablet 12.5 mg, 2 times daily, Oral    Plan  - BP is controlled, no changes needed to their regimen  - Continue Coreg at decreased dose with hold parameters in place  - discontinue IV fluids   - monitor BP

## 2024-10-22 NOTE — PLAN OF CARE
Referral to [facility] for enteral feeding supplies. Message left with liaison, Viki, awaiting return call.

## 2024-10-22 NOTE — ASSESSMENT & PLAN NOTE
POD #1- s/p port and laparoscopic gastrostomy tube placement     Doing well .    -- start TF today - monitor for refeeding syndrome   -- pain control   -- encourage ambulation and IS  -- appreciate medicine assistance with underlying medical comorbidities  Dispo:  plan for d/c home tomorrow once pain is controlled and he's tolerating tube feeds.

## 2024-10-22 NOTE — SUBJECTIVE & OBJECTIVE
Interval History: AFVSS.  DADA.  Feeling well.     Medications:  Continuous Infusions:  Scheduled Meds:   acetaminophen  1,000 mg Oral Q8H    carvediloL  12.5 mg Oral BID    chlorhexidine  10 mL Mouth/Throat BID    magnesium sulfate IVPB  2 g Intravenous Once    OLANZapine  5 mg Oral QHS     PRN Meds:  Current Facility-Administered Medications:     influenza, 0.5 mL, Intramuscular, vaccine x 1 dose    ondansetron, 4 mg, Intravenous, Q6H PRN    oxyCODONE, 10 mg, Oral, Q4H PRN    oxyCODONE, 5 mg, Oral, Q4H PRN     Review of patient's allergies indicates:  No Known Allergies  Objective:     Vital Signs (Most Recent):  Temp: 98 °F (36.7 °C) (10/22/24 0742)  Pulse: 82 (10/22/24 0742)  Resp: 16 (10/22/24 0457)  BP: (!) 101/59 (10/22/24 0742)  SpO2: 97 % (10/22/24 0742) Vital Signs (24h Range):  Temp:  [97.9 °F (36.6 °C)-98.4 °F (36.9 °C)] 98 °F (36.7 °C)  Pulse:  [] 82  Resp:  [10-25] 16  SpO2:  [92 %-99 %] 97 %  BP: (101-135)/(59-85) 101/59     Weight: 80.3 kg (177 lb 0.5 oz)  Body mass index is 23.36 kg/m².    Intake/Output - Last 3 Shifts         10/20 0700  10/21 0659 10/21 0700  10/22 0659 10/22 0700  10/23 0659    P.O.   480    IV Piggyback  900     Total Intake(mL/kg)  900 (11.2) 480 (6)    Urine (mL/kg/hr)   1 (0)    Drains  1150 750    Stool   0    Total Output  1150 751    Net  -250 -271           Stool Occurrence   0 x             Physical Exam  Constitutional:       General: He is not in acute distress.     Appearance: Normal appearance.   HENT:      Head: Normocephalic and atraumatic.   Eyes:      Extraocular Movements: Extraocular movements intact.      Conjunctiva/sclera: Conjunctivae normal.   Cardiovascular:      Rate and Rhythm: Normal rate and regular rhythm.   Pulmonary:      Effort: Pulmonary effort is normal.   Abdominal:      General: Abdomen is flat. There is no distension.      Palpations: Abdomen is soft. There is no mass.      Tenderness: There is no abdominal tenderness. There is no  guarding or rebound.      Hernia: No hernia is present.      Comments: Incisions c/d/I, G tube in place    Musculoskeletal:         General: No swelling, tenderness, deformity or signs of injury. Normal range of motion.   Skin:     General: Skin is warm and dry.      Coloration: Skin is not jaundiced.      Comments: Port dressing in place    Neurological:      General: No focal deficit present.      Mental Status: He is alert and oriented to person, place, and time.   Psychiatric:         Mood and Affect: Mood normal.         Behavior: Behavior normal.         Thought Content: Thought content normal.          Significant Labs:  I have reviewed all pertinent lab results within the past 24 hours.  CBC:   Recent Labs   Lab 10/22/24  0433   WBC 5.99   RBC 3.67*   HGB 12.2*   HCT 36.0*      MCV 98   MCH 33.2*   MCHC 33.9     BMP:   Recent Labs   Lab 10/22/24  0434   GLU 95   *   K 3.4*      CO2 29   BUN 5*   CREATININE 0.7   CALCIUM 8.5*   MG 1.6       Significant Diagnostics:  I have reviewed all pertinent imaging results/findings within the past 24 hours.

## 2024-10-22 NOTE — CONSULTS
[facility] - Telemetry ([facility])  Adult Nutrition  Consult Note    SUMMARY     Recommendations    Recommendation/Intervention:   1. Initiate pt onto bolus feeds as currently ordered : Administer 1 carton of Isosource 1.5  by syringe bolus feed via PEG every 3 hours with 60 ml water flush before and 120 ml after each carton. Recommend 5 cartons daily. Once this is well tolerated, increase to 6 cartons daily spaced every 2.5 hours   -Check Mg, K+, Na, Phos and Glu before and during initiation  *High risk for refeeding syndrome   2. Recommend Soft and bite-sized and Boost breeze TID be modified for pleasure feeds as warranted   3. Weight twice weekly    Goals:   1. Pt will be initiated onto bolus EN within 24 hrs   2. Pt will tolerate and intake >75% EEN and EPN prior to RD follow up   3. Pt's diet and ONS will be modified prior to RD follow up  Nutrition Goal Status: new  Communication of RD Recs: other (comment) (POC, secure chat)    Assessment and Plan    Endocrine  Severe protein-calorie malnutrition  Malnutrition Type:  Context: chronic illness  Level: severe    Related to (etiology):   Physiological causes increasing nutrient needs d/t illness  Alteration in GI tract structure and/or function     Signs and Symptoms (as evidenced by):   Inc loss of subcutaneous fat  Inc muscle loss  Unable or unwilling to eat sufficient energy/protein to maintain a healthy weight     Malnutrition Characteristic Summary:  Energy Intake (Malnutrition): less than 75% for greater than or equal to 1 month  Subcutaneous Fat (Malnutrition): severe depletion  Muscle Mass (Malnutrition): severe depletion    Interventions/Recommendations (treatment strategy):  1. Enteral nutrition management  2. Collaboration by nutrition professional with other providers     Nutrition Diagnosis Status:   New         Malnutrition Assessment (10/22/24):  Malnutrition Context: chronic illness  Malnutrition Level: severe  Skin (Micronutrient): wounds unhealed  (Mack score = 19 (no risk)       Energy Intake (Malnutrition): less than 75% for greater than or equal to 1 month  Subcutaneous Fat (Malnutrition): severe depletion  Muscle Mass (Malnutrition): severe depletion   Orbital Region (Subcutaneous Fat Loss): severe depletion (Dark circles; Hallow look; Depression; Loose skin)  Upper Arm Region (Subcutaneous Fat Loss): moderate depletion (Some depth pinch but not ample; Fingers almost touch)   Farley Region (Muscle Loss): moderate depletion (Slight depression)  Clavicle Bone Region (Muscle Loss): severe depletion (Protruding, prominent bone)  Clavicle and Acromion Bone Region (Muscle Loss): moderate depletion (Acromion process may slightly protrude)  Scapular Bone Region (Muscle Loss): moderate depletion (Mild depression or bone may slightly show)  Dorsal Hand (Muscle Loss): moderate depletion (Slightly depressed)  Patellar Region (Muscle Loss): moderate depletion (Knee cap less prominent, more rounded)  Anterior Thigh Region (Muscle Loss): moderate depletion (Mild depression on inner thigh)                 Reason for Assessment    Reason For Assessment: consult, new tube feeding (Weight loss, swallowing impairment r/t esophageal cancer)  Diagnosis: cancer diagnosis/related complications (Esophageal cancer, stage IV)  General Information Comments:   10/22/24: 58 y.o. Male admitted for Esophageal cancer, stage IV. 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 Pt currently on Soft and bite-sized (IDDSI Level 6) diet and Isosource 1.5 a carton x 5 times/day bolus feeds. RD consulted for wt loss and swallowing impairment r/t esophageal cancer. H&P noted that the pt presented to have an EGD performed that revealed a fungating mass at his GE junction and biopsies confirmed the presence of adenocarcinoma. EMR noted POD 1 s/p port and laparoscopic gastrostomy tube placement, pt  "planned to start TF's today w/ monitoring for refeeding syndrome. Visited pt at bedside, pt was resting in bed. Pt reported that he has difficulty swallowing and has early satiety after 6-7 bites of food, noted tray on bedside table untounched, pt reported that the RN checked PEG site and pt was "too full" to start TF's so he did not want to eat until he was told he could. Pt reported decreased appetite x a few months, wife noticed wt loss and had him schedule an appt w/ a Dr. Pt denied N/V/D and abd pain, stated his UBW was 200 lbs in Jan/Feb. NFPE performed, moderate/severe malnutrition noted. Spoke to RN, reported pt ate some breakfast, RN showed secure chat message w/ MD relating to PO intake, no response. Informed RN and pt to hold off on PO intake and allow as pleasure feeds once he is tolerating TF, per MD/NP, RN and pt expressed understanding. Reviewed chart: LBM 10/21; Skin: WDL, incision abd WDL, chest clean/dry/intact; Mack score: 19 (no risk); Edema: none. Labs, meds, weight reviewed. Note labs 10/22/24: Na (L), K+ (L). Note magnesium sulfate. Weight charted 7/16/24 173 lbs, 10/21/24 177 lbs (BMI 23.36, Normal), +4 lb wt gain x 3 months. RD will continue to follow and monitor pt's nutritional status during admit.    Nutrition Discharge Planning: Administer 1 carton of Isosource 1.5  by syringe bolus feed via PEG every 3 hours with 60 ml water flush before and 120 ml after each carton. Recommend 5 cartons daily. Once this is well tolerated, increase to 6 cartons daily spaced every 2.5 hours.    Nutrition Risk Screen    Nutrition Risk Screen: difficulty chewing/swallowing, reduced oral intake over the last month    Nutrition Related Social Determinants of Health: SDOH: Unable to assess at this time.     Nutrition/Diet History    Spiritual, Cultural Beliefs, Pent[employer]alism Practices, Values that Affect Care: no  Food Allergies: NKFA  Factors Affecting Nutritional Intake: early satiety, altered gastrointestinal " "function, decreased appetite, difficulty/impaired swallowing    Anthropometrics    Temp: 98.1 °F (36.7 °C)  Height Method: Stated  Height: 6' 1" (185.4 cm)  Height (inches): 73 in  Weight Method: Bed Scale  Weight: 80.3 kg (177 lb 0.5 oz)  Weight (lb): 177.03 lb  Ideal Body Weight (IBW), Male: 184 lb  % Ideal Body Weight, Male (lb): 96.21 %  BMI (Calculated): 23.4  BMI Grade: 18.5-24.9 - normal     Wt Readings from Last 15 Encounters:   10/22/24 80.3 kg (177 lb 0.5 oz)   10/18/24 79.4 kg (175 lb)   10/16/24 78 kg (171 lb 15.3 oz)   10/17/24 77.6 kg (171 lb 1.2 oz)   10/11/24 75.8 kg (167 lb)   10/11/24 78 kg (171 lb 15.3 oz)   10/11/24 75.8 kg (167 lb)   10/11/24 78.9 kg (174 lb)   10/10/24 79.1 kg (174 lb 6.1 oz)   10/02/24 76 kg (167 lb 8.8 oz)   09/25/24 79.6 kg (175 lb 9.5 oz)   09/20/24 80.2 kg (176 lb 12.9 oz)   08/20/24 75.4 kg (166 lb 3.6 oz)   08/15/24 76.9 kg (169 lb 8.5 oz)   08/03/24 82.7 kg (182 lb 5.1 oz)     Lab/Procedures/Meds    Pertinent Labs Reviewed: reviewed  Pertinent Labs Comments: Na (L), K+ (L), BUN (L), Calcium (L), Anion gpa (L)  Pertinent Medications Reviewed: reviewed  Pertinent Medications Comments: olanzapine, magnesium sulfate, carvedilol, acetemenophen    BMP  Lab Results   Component Value Date     (L) 10/22/2024    K 3.4 (L) 10/22/2024     10/22/2024    CO2 29 10/22/2024    BUN 5 (L) 10/22/2024    CREATININE 0.7 10/22/2024    CALCIUM 8.5 (L) 10/22/2024    ANIONGAP 5 (L) 10/22/2024    EGFRNORACEVR >60 10/22/2024     Lab Results   Component Value Date    CALCIUM 8.5 (L) 10/22/2024    PHOS 3.4 08/03/2024     Lab Results   Component Value Date    ALBUMIN 3.3 (L) 10/11/2024     Lab Results   Component Value Date    ALT 20 10/11/2024    AST 24 10/11/2024    ALKPHOS 83 10/11/2024    BILITOT 0.5 10/11/2024     No results for input(s): "POCTGLUCOSE" in the last 24 hours.    Lab Results   Component Value Date    HGBA1C 5.1 06/16/2023     Lab Results   Component Value Date    WBC " 5.99 10/22/2024    HGB 12.2 (L) 10/22/2024    HCT 36.0 (L) 10/22/2024    MCV 98 10/22/2024     10/22/2024       Scheduled Meds:   acetaminophen  1,000 mg Oral Q8H    [START ON 10/23/2024] aspirin  81 mg Oral Daily    atorvastatin  20 mg Oral QHS    carvediloL  12.5 mg Oral BID    chlorhexidine  10 mL Mouth/Throat BID    enoxparin  40 mg Subcutaneous Q24H (prophylaxis, 1700)    OLANZapine  5 mg Oral QHS     Continuous Infusions:  PRN Meds:.  Current Facility-Administered Medications:     influenza, 0.5 mL, Intramuscular, vaccine x 1 dose    ondansetron, 4 mg, Intravenous, Q6H PRN    oxyCODONE, 10 mg, Oral, Q4H PRN    oxyCODONE, 5 mg, Oral, Q4H PRN      Physical Findings/Assessment         Estimated/Assessed Needs    Weight Used For Calorie Calculations: 80.3 kg (177 lb 0.5 oz)  Energy Calorie Requirements (kcal): 4945-1108 kcals (25-30 kg/kcals ABW (Cancer)  Energy Need Method: Kcal/kg  Protein Requirements:  g (1.2-1.5 g/kg ABW (Cancer, cachexia)  Weight Used For Protein Calculations: 80.3 kg (177 lb 0.5 oz)  Fluid Requirements (mL): 9068-8390 mL (1 mL/kcal)  Estimated Fluid Requirement Method: RDA Method  RDA Method (mL): 2007  CHO Requirement: 250-301 g (2016-6544 kcals/8)      Nutrition Prescription Ordered    Current Diet Order: Soft and bite-sized (IDDSI Level 6) diet  Nutrition Order Comments: 1 carton of Isosource 1.5  by syringe bolus feed via PEG every 3 hours with 60 ml water flush before and 120 ml after each carton. Recommend 5 cartons daily. Once this is well tolerated, increase to 6 cartons daily spaced every 2.5 hours  Current Nutrition Support Formula Ordered: Isosource 1.5  Current Nutrition Support Frequency Ordered: bolus feeds  Oral Nutrition Supplement: Boost breeze TID    Evaluation of Received Nutrient/Fluid Intake  I/O: (Net since admit):   10/22/24: -521 mL    Enteral Calories (kcal): 1875 (2250 kcals/day with 6 cartons/day)  Enteral Protein (gm): 85 (102 g/day with 6  cartons/day)  Enteral (Free Water) Fluid (mL): 955 (1146 mL with 6 cartons/day)  Free Water Flush Fluid (mL): 900 (total 1855 mL/day with 5 cartons/day + FWF and 2046 mL/day with 6 cartons/day + FWF)  % Kcal Needs: 0% (Will provide 93% with 5 cartons/day and 100% with 6 cartons/day)  % Protein Needs: 0% (Will provide 86% with 5 cartons/day and 100% with 6 cartons/day)    Energy Calories Required: not meeting needs  Protein Required: not meeting needs  Fluid Required: not meeting needs  Total Fluid Intake (mL): 480  Tolerance: other (see comments) (TF not started yet, Tolerating PO intake)  % Intake of Estimated Energy Needs: 0 - 25 %  % Meal Intake: 0 - 25 %    Nutrition Risk    Level of Risk/Frequency of Follow-up: high (F/u x 2 weekly)       Monitor and Evaluation    Food and Nutrient Intake: energy intake, enteral nutrition intake  Food and Nutrient Adminstration: enteral and parenteral nutrition administration  Knowledge/Beliefs/Attitudes: food and nutrition knowledge/skill, beliefs and attitudes  Anthropometric Measurements: weight, weight change, body mass index  Biochemical Data, Medical Tests and Procedures: electrolyte and renal panel, gastrointestinal profile, glucose/endocrine profile  Nutrition-Focused Physical Findings: overall appearance       Nutrition Follow-Up    RD Follow-up?: Yes  Kalie Hyde, BS, RDN, LDN

## 2024-10-22 NOTE — CHAPLAIN
Initial visit with patient.  Visited with patient to determine ways that I might be of support to him.  Pt was doing okay at the time of my visit but did take time to talk about the procedures that have been done so far.  Pt shared that the feeding tube is a little painful but other than that he is doing okay.  I asked pt if he would like for me to pray for him and he wanted me to.  I took time to do this before leaving and spiritual care remains available as needed.    Chaplain Obdulio Bundy M.Div., BCC

## 2024-10-22 NOTE — ASSESSMENT & PLAN NOTE
Patient has Abnormal Magnesium: hypomagnesemia. Will continue to monitor electrolytes closely. Will replace the affected electrolytes and repeat labs to be done after interventions completed. The patient's magnesium results have been reviewed and are listed below.  Recent Labs   Lab 10/22/24  0434   MG 1.6    - IV repletions ordered  - monitor BMP; replete as needed

## 2024-10-22 NOTE — PLAN OF CARE
Pt AAOx4. VSS, able to ween pt off of oxygen. Pt remained free of falls this shift. Pt complained of incisional pain. Medications administered as ordered.  Hourly rounding completed. Pt instructed to call for assistance. POC reviewed. Pt verbalized understanding. From drainage bag emptied 750 ml.

## 2024-10-22 NOTE — ASSESSMENT & PLAN NOTE
Pt reports he has cut down from smoking 2ppd to smoking 5-6 cigars daily, offered nicotine patch but he declined for now.   smoking cessation counseled

## 2024-10-23 ENCOUNTER — DOCUMENTATION ONLY (OUTPATIENT)
Dept: HEMATOLOGY/ONCOLOGY | Facility: CLINIC | Age: 59
End: 2024-10-23
Payer: COMMERCIAL

## 2024-10-23 VITALS
OXYGEN SATURATION: 97 % | RESPIRATION RATE: 16 BRPM | HEART RATE: 83 BPM | BODY MASS INDEX: 23.46 KG/M2 | HEIGHT: 73 IN | DIASTOLIC BLOOD PRESSURE: 72 MMHG | SYSTOLIC BLOOD PRESSURE: 124 MMHG | TEMPERATURE: 98 F | WEIGHT: 177 LBS

## 2024-10-23 LAB
ANION GAP SERPL CALC-SCNC: 7 MMOL/L (ref 8–16)
BUN SERPL-MCNC: 5 MG/DL (ref 6–20)
CALCIUM SERPL-MCNC: 8.6 MG/DL (ref 8.7–10.5)
CHLORIDE SERPL-SCNC: 99 MMOL/L (ref 95–110)
CO2 SERPL-SCNC: 28 MMOL/L (ref 23–29)
CREAT SERPL-MCNC: 0.7 MG/DL (ref 0.5–1.4)
EST. GFR  (NO RACE VARIABLE): >60 ML/MIN/1.73 M^2
GLUCOSE SERPL-MCNC: 97 MG/DL (ref 70–110)
MAGNESIUM SERPL-MCNC: 1.9 MG/DL (ref 1.6–2.6)
POTASSIUM SERPL-SCNC: 3.4 MMOL/L (ref 3.5–5.1)
SODIUM SERPL-SCNC: 134 MMOL/L (ref 136–145)

## 2024-10-23 PROCEDURE — 83735 ASSAY OF MAGNESIUM: CPT | Performed by: SURGERY

## 2024-10-23 PROCEDURE — 25000003 PHARM REV CODE 250: Performed by: SURGERY

## 2024-10-23 PROCEDURE — 63600175 PHARM REV CODE 636 W HCPCS: Performed by: INTERNAL MEDICINE

## 2024-10-23 PROCEDURE — 25000003 PHARM REV CODE 250: Performed by: INTERNAL MEDICINE

## 2024-10-23 PROCEDURE — 94761 N-INVAS EAR/PLS OXIMETRY MLT: CPT

## 2024-10-23 PROCEDURE — 80048 BASIC METABOLIC PNL TOTAL CA: CPT | Performed by: SURGERY

## 2024-10-23 PROCEDURE — 36415 COLL VENOUS BLD VENIPUNCTURE: CPT | Performed by: SURGERY

## 2024-10-23 PROCEDURE — 94799 UNLISTED PULMONARY SVC/PX: CPT

## 2024-10-23 RX ORDER — SODIUM CHLORIDE 9 MG/ML
INJECTION, SOLUTION INTRAVENOUS CONTINUOUS
Status: CANCELLED | OUTPATIENT
Start: 2024-10-23

## 2024-10-23 RX ORDER — OXYCODONE HYDROCHLORIDE 5 MG/1
5 TABLET ORAL EVERY 4 HOURS PRN
Qty: 15 TABLET | Refills: 0 | Status: SHIPPED | OUTPATIENT
Start: 2024-10-23

## 2024-10-23 RX ADMIN — ACETAMINOPHEN 1000 MG: 500 TABLET ORAL at 06:10

## 2024-10-23 RX ADMIN — OXYCODONE HYDROCHLORIDE 10 MG: 5 TABLET ORAL at 06:10

## 2024-10-23 RX ADMIN — OXYCODONE HYDROCHLORIDE 10 MG: 5 TABLET ORAL at 02:10

## 2024-10-23 RX ADMIN — POTASSIUM BICARBONATE 25 MEQ: 978 TABLET, EFFERVESCENT ORAL at 09:10

## 2024-10-23 RX ADMIN — ACETAMINOPHEN 1000 MG: 500 TABLET ORAL at 02:10

## 2024-10-23 RX ADMIN — CHLORHEXIDINE GLUCONATE 0.12% ORAL RINSE 10 ML: 1.2 LIQUID ORAL at 09:10

## 2024-10-23 RX ADMIN — ENOXAPARIN SODIUM 40 MG: 40 INJECTION SUBCUTANEOUS at 05:10

## 2024-10-23 RX ADMIN — ASPIRIN 81 MG: 81 TABLET, COATED ORAL at 09:10

## 2024-10-23 RX ADMIN — OXYCODONE HYDROCHLORIDE 10 MG: 5 TABLET ORAL at 11:10

## 2024-10-23 NOTE — PLAN OF CARE
Discussed poc with pt, pt verbalized understanding    Purposeful rounding every 2hours    VS wnl  Fall precautions in place, remains injury free  Pain and nausea under control with PRN meds    IVFs  Accurate I&Os  Bed locked at lowest position  Call light within reach    Chart check complete  Will cont with POC

## 2024-10-23 NOTE — PLAN OF CARE
[facility] - Med Surg  Discharge Final Note    Primary Care Provider: Michell Goyal MD    Expected Discharge Date:     Final Discharge Note (most recent)       Final Note - 10/23/24 1539          Final Note    Assessment Type Final Discharge Note     Anticipated Discharge Disposition [place] [facility]     Hospital Resources/Appts/Education Provided Post-Acute resouces added to AVS        Post-Acute Status    Post-Acute Authorization Home Health     Home Health Status Set-up Complete/Auth obtained                     Important Message from Medicare             Contact Info       [facility]   Specialty: Home Health Services    [address], [address]  [place] [address]   Phone: [phone]       Next Steps: Follow up    Instructions: home health    [facility]    Address: [address] [place] [address]    Phone: [phone]       Next Steps: Follow up          DC Dispo: home with [facility]  Heme/Onc F/U: Oct 28  Nutrition f/u: Oct 25th    CM notified Anabell with [facility]  of plan for dc today.

## 2024-10-23 NOTE — HOSPITAL COURSE
10/22/2024:  POD #1- doing well.  Started on tube feeds.  CM consulted for HH set up    10/23/2024: POD #2- doing well.  Tolerating TF, labs ok.  Pain controlled.  Appropriate for d/c home

## 2024-10-23 NOTE — PROGRESS NOTES
Met with pt face to face while in hospital. Discussed and scheduled all needed appointments to begin treatment plan per Dr. Latif. Appointment dates were scheduled per pt request though 1st available were offered. He denies any further needs at this time. Encouraged him to reach out with any needs/concerns.

## 2024-10-23 NOTE — PLAN OF CARE
Call from [facility] regarding:    hydrochlorothiazide (HYDRODIURIL) 25 MG tablet 90 tablet 0 1/18/2022     Sig - Route: Take 1 tablet by mouth daily. - Oral      Pt has allergy to:     Lisinopril-hydrochlorothiazide High RASH Blistering rash with large bullae, cleared off the meds and biopsy consistent with bullous drug.     Routed to PCP - would you like to send in an alternate RX due to pt's allergy? Please advise. Pharmacy is set.     [facility] pharmacy can be reached at: [phone], option 2, then enter [phone].   Pt being discharged Home in stable condition. IV removed, catheter intact, pt tolerated well. Tele monitor removed, given to US. Discharge instructions given to pt, pt verbalized understanding.

## 2024-10-23 NOTE — PLAN OF CARE
Problem: Adult Inpatient Plan of Care  Goal: Plan of Care Review  Outcome: Progressing  Goal: Patient-Specific Goal (Individualized)  Outcome: Progressing  Goal: Absence of Hospital-Acquired Illness or Injury  Outcome: Progressing  Goal: Optimal Comfort and Wellbeing  Outcome: Progressing  Goal: Readiness for Transition of Care  Outcome: Progressing     Problem: Wound  Goal: Optimal Coping  Outcome: Progressing  Goal: Optimal Functional Ability  Outcome: Progressing  Goal: Absence of Infection Signs and Symptoms  Outcome: Progressing  Goal: Improved Oral Intake  Outcome: Progressing  Goal: Optimal Pain Control and Function  Outcome: Progressing  Goal: Skin Health and Integrity  Outcome: Progressing  Goal: Optimal Wound Healing  Outcome: Progressing     Problem: Infection  Goal: Absence of Infection Signs and Symptoms  Outcome: Progressing     Problem: Skin Injury Risk Increased  Goal: Skin Health and Integrity  Outcome: Progressing     Problem: Fall Injury Risk  Goal: Absence of Fall and Fall-Related Injury  Outcome: Progressing     Problem: Malnutrition  Goal: Improved Nutritional Intake  Outcome: Progressing     Problem: Oral Intake Inadequate  Goal: Improved Oral Intake  Outcome: Progressing

## 2024-10-23 NOTE — DISCHARGE SUMMARY
[facility] Surg  General Surgery  Discharge Summary      Patient Name: Rosalio Muñoz  MRN: [id number]  Admission Date: 10/21/2024  Hospital Length of Stay: 2 days  Discharge Date and Time:  10/23/2024 3:23 PM  Attending Physician: Marlon Rosas MD   Discharging Provider: Marlon Rosas MD  Primary Care Provider: Michell Goyal MD    HPI:   No notes on file    Procedure(s) (LRB):  INSERTION, VENOUS ACCESS PORT (Left)  XI ROBOTIC INSERTION, GASTROSTOMY TUBE (N/A)      Indwelling Lines/Drains at time of discharge:   Lines/Drains/Airways       Central Venous Catheter Line  Duration             Port A Cath Single Lumen 10/21/24 1502 2 days              Drain  Duration                  Gastrostomy/Enterostomy 10/21/24 1413 Gastrostomy tube w/o balloon LUQ 2 days                  Hospital Course: 10/22/2024:  POD #1- doing well.  Started on tube feeds.  CM consulted for HH set up    10/23/2024: POD #2- doing well.  Tolerating TF, labs ok.  Pain controlled.  Appropriate for d/c home     Goals of Care Treatment Preferences:  Code Status: Full Code      Consults:   Consults (From admission, onward)          Status Ordering Provider     Inpatient consult to Social Work  Once        Provider:  (Not yet assigned)    Completed MARLON ROSAS     Inpatient consult to Registered Dietitian/Nutritionist  Once        Provider:  (Not yet assigned)    Completed MARLON ROSAS     Consult to Case Management/Social Work  Once        Provider:  (Not yet assigned)    Completed MARLON ROSAS     Inpatient consult to Hospitalist  Once        Provider:  Lavern Herrera FNP    Completed MARLON ROSAS            Significant Diagnostic Studies: Labs: BMP:   Recent Labs   Lab 10/22/24  0434 10/23/24  0502   GLU 95 97   * 134*   K 3.4* 3.4*    99   CO2 29 28   BUN 5* 5*   CREATININE 0.7 0.7   CALCIUM 8.5* 8.6*   MG 1.6 1.9   , CMP   Recent Labs   Lab 10/22/24  0434 10/23/24  0502   * 134*   K 3.4*  3.4*    99   CO2 29 28   GLU 95 97   BUN 5* 5*   CREATININE 0.7 0.7   CALCIUM 8.5* 8.6*   ANIONGAP 5* 7*   , CBC   Recent Labs   Lab 10/22/24  0433   WBC 5.99   HGB 12.2*   HCT 36.0*      , and All labs within the past 24 hours have been reviewed  Specimen (24h ago, onward)      None            Pending Diagnostic Studies:       None          Final Active Diagnoses:    Diagnosis Date Noted POA    PRINCIPAL PROBLEM:  Esophageal cancer, stage IV [C15.9] 10/10/2024 Yes    Severe protein-calorie malnutrition [E43] 10/22/2024 Yes    Cachexia [R64] 10/11/2024 Yes    Dysphagia [R13.10] 10/02/2024 Yes    Hypomagnesemia [E83.42] 05/30/2023 Yes    Other hyperlipidemia [E78.49] 03/22/2021 Yes    Tobacco use disorder [F17.200] 12/19/2017 Yes    HTN (hypertension) [I10] 10/25/2017 Yes      Problems Resolved During this Admission:      Discharged Condition: fair    Disposition: [place]-Health Care [facility]    Follow Up:   Follow-up Information       [place], [facility].    Specialty: Home Health Services  Why: home health  Contact information:  [address]  [facility] [address] [phone]               [facility].    Contact information:  Address: [address] [place] [address]    Phone: [phone]                         Patient Instructions:      Diet general     HOME HEALTH ORDERS     Order Specific Question Answer Comments   What Home Health Agency is the patient currently using? [facility]      Call MD for:  temperature >100.4     Call MD for:  persistent nausea and vomiting     Call MD for:  severe uncontrolled pain     Call MD for:  redness, tenderness, or signs of infection (pain, swelling, redness, odor or green/yellow discharge around incision site)     Activity as tolerated     Medications:  Reconciled Home Medications:      Medication List        START taking these medications      oxyCODONE 5 MG immediate  release tablet  Commonly known as: ROXICODONE  Take 1 tablet (5 mg total) by mouth every 4 (four) hours as needed for Pain.            CONTINUE taking these medications      aspirin 81 MG EC tablet  Commonly known as: ECOTRIN  Take 1 tablet (81 mg total) by mouth once daily.     atorvastatin 20 MG tablet  Commonly known as: LIPITOR  Take 1 tablet (20 mg total) by mouth every evening.     carvediloL 25 MG tablet  Commonly known as: COREG  Take 1 tablet (25 mg total) by mouth 2 (two) times daily.     LIDOcaine-prilocaine cream  Commonly known as: EMLA  Apply topically as needed.     OLANZapine 5 MG tablet  Commonly known as: ZyPREXA  Take 1 tablet (5 mg total) by mouth every evening. Take nightly on days 1-3 of each chemotherapy cycle.     prochlorperazine 5 MG tablet  Commonly known as: COMPAZINE  Take 2 tablets (10 mg total) by mouth every 6 (six) hours as needed for Nausea.            STOP taking these medications      amLODIPine 5 MG tablet  Commonly known as: NORVASC     HYDROcodone-acetaminophen 5-325 mg per tablet  Commonly known as: NORCO            Time spent on the discharge of patient: 20 minutes    Roseanna Rosas MD  General Surgery  [facility] - Med Surg

## 2024-10-24 ENCOUNTER — TELEPHONE (OUTPATIENT)
Dept: SURGICAL ONCOLOGY | Facility: CLINIC | Age: 59
End: 2024-10-24
Payer: COMMERCIAL

## 2024-10-24 ENCOUNTER — PATIENT OUTREACH (OUTPATIENT)
Dept: ADMINISTRATIVE | Facility: CLINIC | Age: 59
End: 2024-10-24
Payer: COMMERCIAL

## 2024-10-24 PROCEDURE — G0180 MD CERTIFICATION HHA PATIENT: HCPCS | Mod: ,,, | Performed by: SURGERY

## 2024-10-24 NOTE — TELEPHONE ENCOUNTER
Spoke with patient since discharge. Patient reports doing well. Home health has been out to see patient today. No questions at this time. Patient has my number if he needs anything.

## 2024-10-25 ENCOUNTER — NUTRITION (OUTPATIENT)
Dept: NUTRITION | Facility: CLINIC | Age: 59
End: 2024-10-25
Payer: COMMERCIAL

## 2024-10-25 VITALS — WEIGHT: 168.88 LBS | BODY MASS INDEX: 22.28 KG/M2

## 2024-10-25 DIAGNOSIS — Z93.1 FEEDING BY G-TUBE: ICD-10-CM

## 2024-10-25 DIAGNOSIS — C15.9 ESOPHAGEAL CANCER, STAGE IV: Primary | ICD-10-CM

## 2024-10-25 DIAGNOSIS — R63.4 ABNORMAL WEIGHT LOSS: ICD-10-CM

## 2024-10-25 PROCEDURE — 99999 PR PBB SHADOW E&M-EST. PATIENT-LVL I: CPT | Mod: PBBFAC,,,

## 2024-10-25 NOTE — PROGRESS NOTES
"Oncology Nutrition Assessment for Medical Nutrition Therapy Follow-up Visit  Consultation Time: 15 Minutes  Referring Provider: Hudson Latif MD  Reason for Nutrition Consult: Enteral Formula , Follow Up , New Feeding Tube - Gastrostomy Tube - PEG, Nutrition related questions, and Weight Loss    Nutrition Assessment      Patient Information:    Rosalio Muñoz  : 1965   58 y.o. male    Allergies/Intolerances: No known food allergies  Social Data: lives with spouse/significant Other.   Anthropometrics:     Weight: 76.6 kg (168 lb 14 oz)                                 Height:  6'1" (1.85 m)     BMI: Body mass index is 22.28 kg/m².             Usual BW: 188 lb 3/28/24   Weight Change: loss of 20 lb or 10.6% of weight in 6 months     Supplements/Vitamins:    MVI/Supp: yes - liquid adult multivitamin  Drug/Nutrient interactions: No Activity Level:     Low Active     Form of Activity: activities of daily living , walking      Malnutrition Assessment:   Nutrition Risk: Patient does not meet at least 2 characteristics of the ASPEN/AND criteria at this time, but may be at risk due to significant weight loss in the past 6 months.      Food/Nutrition-related history:    DME/Insurance:  [facility]  & Self Pay   Formula: Isosource 1.5   Rate/Volume/Schedule: Administer 1 carton of Isosource 1.5  by syringe bolus feed via PEG every 3 hours with 60 ml water flush before and 120 ml after each carton. Recommend 5 cartons daily. Once this is well tolerated, increase to 6 cartons daily spaced every 2.5 hours.   Additional Water flushes: none  Provides: 2580 mL/day total volume, 2250 kcals/day, 102 g/day protein  Patient currently taking in 2 cartons of formula per day via PEG and will increase as tolerated.     Diet/PO Recall:   Appetite: Fair  Fluid Intake: Adequate  Diet Recall:  Breakfast: [employer] chocolate protein shake, small portion grits and eggs    Lunch: soup or mashed potatoes   Dinner: shake made " of 3 scoops [facility] protein powder, milk, peanut butter, and [employer] peanut butter cup ice cream  Snacks: 0-1x/day  Drinks: water  ONS: 2/day of protein shakes   Servings of F/V per day: 0-1x/day  Eating out: 0-1x/week.   Cultural/Spiritual/Personal Preferences: Texture specific     GI Symptoms: early satiety, heartburn or weight loss 20 lbs. within the last 6 month(s)   Oncology Nutrition Symptoms: dysphagia, esophagitis, heartburn, weight loss, and feel full quickly              Difficulty chewing or swallowing?  yes - limited to liquid and puree diet     Patient Notes/Reports: Pt referred for a Nutrition Consultation related to Enteral Formula , Follow Up , New Feeding Tube - Gastrostomy Tube - PEG, Nutrition related questions, and Weight Loss. Patient has a medical diagnosis of esophageal cancer. Currently about to start OP GI [id number] (oxaliplatin leucovorin fluorouracil) Q2W chemotherapy on 11/2/24 and might get radiation therapy later. Mr. Muñoz has esophageal cancer stage IV. Mr. Muñoz had his port placement and PEG tube surgery 10/21/24. Ms. Muñoz got some formula from Cancer Services. Submitted patient's [facility] Boost form for him.    Medical Tests and Procedures:  Patient Active Problem List   Diagnosis    HTN (hypertension)    Umbilical hernia    Tobacco use disorder    Delayed immunizations    Other hyperlipidemia    Hypertensive urgency    Elevated troponin    Hypomagnesemia    Bilateral hearing loss    Alcohol use    Unintentional weight loss    Low libido    Alcohol use disorder    Dysphagia    Personal history of colon polyps, unspecified    Esophageal cancer, stage IV    Secondary adenocarcinoma of retroperitoneum    Secondary liver cancer    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    Cachexia    Iron deficiency anemia due to chronic blood loss    Severe protein-calorie malnutrition      Past Medical History:   Diagnosis Date    Hypertension     Malignant neoplasm metastatic to other  site 10/11/2024     Past Surgical History:   Procedure Laterality Date    COLONOSCOPY N/A 8/25/2023    Procedure: COLONOSCOPY;  Surgeon: Pebbles Spear MD;  Location: [facility] ENDO;  Service: Endoscopy;  Laterality: N/A;    COLONOSCOPY N/A 10/2/2024    Procedure: COLONOSCOPY  Cardiac clearance received on 09/20/24 per Dr. Lockett, Cardiology.  Note in encounters.  LB;  Surgeon: Sully Farris MD;  Location: [facility] ENDO;  Service: Endoscopy;  Laterality: N/A;    ESOPHAGOGASTRODUODENOSCOPY N/A 10/2/2024    Procedure: EGD (ESOPHAGOGASTRODUODENOSCOPY);  Surgeon: Sully Farris MD;  Location: [facility];  Service: Endoscopy;  Laterality: N/A;    INSERTION OF VENOUS ACCESS PORT Left 10/21/2024    Procedure: INSERTION, VENOUS ACCESS PORT;  Surgeon: Roseanna Rosas MD;  Location: [facility];  Service: General;  Laterality: Left;    SURGICAL REMOVAL OF LESION OF FACE Right 12/1/2023    Procedure: EXCISION, LESION, FACE;  Surgeon: Jose Flaherty MD;  Location: [facility] OR;  Service: ENT;  Laterality: Right;  1. Excision facial subcutaneous mass right cheek 3 cm. 2. Intermediate layered closure 3.5 cm    WISDOM TOOTH EXTRACTION      XI ROBOTIC APPENDECTOMY N/A 7/31/2024    Procedure: XI ROBOTIC APPENDECTOMY;  Surgeon: Paulo Saavedra MD;  Location: [facility] OR;  Service: General;  Laterality: N/A;    XI ROBOTIC INSERTION, GASTROSTOMY TUBE N/A 10/21/2024    Procedure: XI ROBOTIC INSERTION, GASTROSTOMY TUBE;  Surgeon: Roseanna Rosas MD;  Location: [facility] OR;  Service: General;  Laterality: N/A;       Current Outpatient Medications   Medication Instructions    aspirin (ECOTRIN) 81 mg, Oral, Daily    atorvastatin (LIPITOR) 20 mg, Oral, Nightly    carvediloL (COREG) 25 mg, Oral, 2 times daily    LIDOcaine-prilocaine (EMLA) cream Topical (Top), As needed (PRN)    OLANZapine (ZYPREXA) 5 mg, Oral, Nightly, Take nightly on days 1-3 of each chemotherapy cycle.    oxyCODONE (ROXICODONE) 5 mg, Oral, Every 4 hours PRN     prochlorperazine (COMPAZINE) 10 mg, Oral, Every 6 hours PRN      Labs:  Reviewed - followed by MD funes        Nutrition Diagnosis    Nutrition Problem: inadequate protein/energy intake  Etiology (related to): appetite loss , dysphagia , tumor location, and early satiety   Signs/Symptoms (as evidenced by): weight loss, requiring tube feeding for nutrition support, new cancer diagnosis, and self reported diet questions/concerns     Nutrition Intervention      Estimated Energy/Fluid Requirements:   Weight used: CBW 77 kg  Calories: 5230-0910 kcal/day (25-30 kcal/kg)  Protein: 92 g/day (1.2 g/kg)  Fluid: 4806-9305 mL/day (1 mL/kcal)   Recommendations:   Continue to consume protein shakes.   Continue Centrum liquid multivitamin.   Rate/Volume/Schedule: Administer 1 carton of Isosource 1.5  by syringe bolus feed via PEG every 3 hours with 60 ml water flush before and 120 ml after each carton. Recommend 5 cartons daily. Once this is well tolerated, increase to 6 cartons daily spaced every 2.5 hours.   Additional Water flushes: none  Provides: 2580 mL/day total volume, 2250 kcals/day, 102 g/day protein     Education Needs Satisfied: yes   Education Materials Provided / Reviewed:   Bolus or Syringe Tube Feeding Instructions   Foods That Are Easy to Chew and Swallow   Iron   Nutrition During Your Cancer Treatment  How to Make a High-Calorie Shake    Nutrition Plan  Barriers to Learning: none identified  Patient and/ or Family Verbalizes understanding: yes      Nutrition Monitoring and Evaluation    Monitor: energy intake, rate/formulation of tube feeding, weight, symptom management , and adherence to nutrition recommendations     Goals:   1: Adequate intake of calories and protein to promote weight gain   Indicator: Weight/BMI    2: Weight maintenance throughout treatment.  and No episodes of dehydration requiring emergency hydrations.   Indicator: Diet Recall     Follow up In 2 weeks     Communication to referring provider/care  team: note available in chart  Signature: Agnes Lema, MPH, RD, LDN

## 2024-10-25 NOTE — PATIENT INSTRUCTIONS
Recommendations:   Continue to consume protein shakes.   Continue Centrum liquid multivitamin.   Rate/Volume/Schedule: Administer 1 carton of Isosource 1.5  by syringe bolus feed via PEG every 3 hours with 60 ml water flush before and 120 ml after each carton. Recommend 5 cartons daily. Once this is well tolerated, increase to 6 cartons daily spaced every 2.5 hours.   Additional Water flushes: none  Provides: 2580 mL/day total volume, 2250 kcals/day, 102 g/day protein

## 2024-10-28 ENCOUNTER — DOCUMENTATION ONLY (OUTPATIENT)
Dept: HEMATOLOGY/ONCOLOGY | Facility: CLINIC | Age: 59
End: 2024-10-28

## 2024-10-28 ENCOUNTER — OFFICE VISIT (OUTPATIENT)
Dept: HEMATOLOGY/ONCOLOGY | Facility: CLINIC | Age: 59
End: 2024-10-28
Payer: COMMERCIAL

## 2024-10-28 VITALS
DIASTOLIC BLOOD PRESSURE: 69 MMHG | SYSTOLIC BLOOD PRESSURE: 103 MMHG | OXYGEN SATURATION: 98 % | HEART RATE: 108 BPM | WEIGHT: 168.56 LBS | TEMPERATURE: 98 F | BODY MASS INDEX: 22.83 KG/M2 | HEIGHT: 72 IN

## 2024-10-28 DIAGNOSIS — Z79.899 IMMUNODEFICIENCY DUE TO CHEMOTHERAPY: ICD-10-CM

## 2024-10-28 DIAGNOSIS — C15.9 ESOPHAGEAL CANCER, STAGE IV: Primary | ICD-10-CM

## 2024-10-28 DIAGNOSIS — C78.6 SECONDARY ADENOCARCINOMA OF RETROPERITONEUM: ICD-10-CM

## 2024-10-28 DIAGNOSIS — C79.89 MALIGNANT NEOPLASM METASTATIC TO OTHER SITE: ICD-10-CM

## 2024-10-28 DIAGNOSIS — D84.821 IMMUNODEFICIENCY DUE TO CHEMOTHERAPY: ICD-10-CM

## 2024-10-28 DIAGNOSIS — C78.7 SECONDARY LIVER CANCER: ICD-10-CM

## 2024-10-28 DIAGNOSIS — D50.0 IRON DEFICIENCY ANEMIA DUE TO CHRONIC BLOOD LOSS: ICD-10-CM

## 2024-10-28 DIAGNOSIS — T45.1X5A IMMUNODEFICIENCY DUE TO CHEMOTHERAPY: ICD-10-CM

## 2024-10-28 PROBLEM — Z79.69 IMMUNODEFICIENCY DUE TO CHEMOTHERAPY: Status: ACTIVE | Noted: 2024-10-28

## 2024-10-28 PROCEDURE — 4010F ACE/ARB THERAPY RXD/TAKEN: CPT | Mod: CPTII,S$GLB,, | Performed by: INTERNAL MEDICINE

## 2024-10-28 PROCEDURE — 1111F DSCHRG MED/CURRENT MED MERGE: CPT | Mod: CPTII,S$GLB,, | Performed by: INTERNAL MEDICINE

## 2024-10-28 PROCEDURE — 99215 OFFICE O/P EST HI 40 MIN: CPT | Mod: S$GLB,,, | Performed by: INTERNAL MEDICINE

## 2024-10-28 PROCEDURE — 3074F SYST BP LT 130 MM HG: CPT | Mod: CPTII,S$GLB,, | Performed by: INTERNAL MEDICINE

## 2024-10-28 PROCEDURE — 99999 PR PBB SHADOW E&M-EST. PATIENT-LVL IV: CPT | Mod: PBBFAC,,, | Performed by: INTERNAL MEDICINE

## 2024-10-28 PROCEDURE — 3078F DIAST BP <80 MM HG: CPT | Mod: CPTII,S$GLB,, | Performed by: INTERNAL MEDICINE

## 2024-10-28 PROCEDURE — 1160F RVW MEDS BY RX/DR IN RCRD: CPT | Mod: CPTII,S$GLB,, | Performed by: INTERNAL MEDICINE

## 2024-10-28 PROCEDURE — 1159F MED LIST DOCD IN RCRD: CPT | Mod: CPTII,S$GLB,, | Performed by: INTERNAL MEDICINE

## 2024-10-28 PROCEDURE — 3008F BODY MASS INDEX DOCD: CPT | Mod: CPTII,S$GLB,, | Performed by: INTERNAL MEDICINE

## 2024-10-28 RX ORDER — PROCHLORPERAZINE MALEATE 10 MG
10 TABLET ORAL EVERY 6 HOURS PRN
Qty: 20 TABLET | Refills: 5 | Status: SHIPPED | OUTPATIENT
Start: 2024-10-28

## 2024-10-28 RX ORDER — OLANZAPINE 5 MG/1
5 TABLET ORAL NIGHTLY
Qty: 9 TABLET | Refills: 7 | Status: SHIPPED | OUTPATIENT
Start: 2024-10-28

## 2024-10-30 ENCOUNTER — TELEPHONE (OUTPATIENT)
Dept: HEMATOLOGY/ONCOLOGY | Facility: CLINIC | Age: 59
End: 2024-10-30
Payer: COMMERCIAL

## 2024-11-05 ENCOUNTER — OFFICE VISIT (OUTPATIENT)
Dept: HEMATOLOGY/ONCOLOGY | Facility: CLINIC | Age: 59
End: 2024-11-05
Payer: COMMERCIAL

## 2024-11-05 ENCOUNTER — OFFICE VISIT (OUTPATIENT)
Dept: SURGICAL ONCOLOGY | Facility: CLINIC | Age: 59
End: 2024-11-05
Payer: COMMERCIAL

## 2024-11-05 ENCOUNTER — LAB VISIT (OUTPATIENT)
Dept: LAB | Facility: HOSPITAL | Age: 59
End: 2024-11-05
Attending: INTERNAL MEDICINE
Payer: COMMERCIAL

## 2024-11-05 VITALS
BODY MASS INDEX: 21.83 KG/M2 | HEIGHT: 73 IN | DIASTOLIC BLOOD PRESSURE: 69 MMHG | HEART RATE: 88 BPM | WEIGHT: 164.69 LBS | TEMPERATURE: 98 F | SYSTOLIC BLOOD PRESSURE: 103 MMHG

## 2024-11-05 DIAGNOSIS — C78.6 SECONDARY ADENOCARCINOMA OF RETROPERITONEUM: ICD-10-CM

## 2024-11-05 DIAGNOSIS — Z72.0 TOBACCO ABUSE: ICD-10-CM

## 2024-11-05 DIAGNOSIS — C15.9 ESOPHAGEAL CANCER, STAGE IV: Primary | ICD-10-CM

## 2024-11-05 DIAGNOSIS — D50.0 IRON DEFICIENCY ANEMIA DUE TO CHRONIC BLOOD LOSS: ICD-10-CM

## 2024-11-05 DIAGNOSIS — R63.4 UNINTENTIONAL WEIGHT LOSS: ICD-10-CM

## 2024-11-05 DIAGNOSIS — C15.9 ESOPHAGEAL ADENOCARCINOMA: ICD-10-CM

## 2024-11-05 DIAGNOSIS — Z79.899 IMMUNODEFICIENCY DUE TO CHEMOTHERAPY: ICD-10-CM

## 2024-11-05 DIAGNOSIS — D84.821 IMMUNODEFICIENCY DUE TO CHEMOTHERAPY: ICD-10-CM

## 2024-11-05 DIAGNOSIS — T45.1X5A IMMUNODEFICIENCY DUE TO CHEMOTHERAPY: ICD-10-CM

## 2024-11-05 DIAGNOSIS — C79.89 MALIGNANT NEOPLASM METASTATIC TO OTHER SITE: ICD-10-CM

## 2024-11-05 DIAGNOSIS — C78.7 SECONDARY LIVER CANCER: ICD-10-CM

## 2024-11-05 LAB
ALBUMIN SERPL BCP-MCNC: 3.5 G/DL (ref 3.5–5.2)
ALP SERPL-CCNC: 91 U/L (ref 40–150)
ALT SERPL W/O P-5'-P-CCNC: 14 U/L (ref 10–44)
ANION GAP SERPL CALC-SCNC: 7 MMOL/L (ref 8–16)
AST SERPL-CCNC: 14 U/L (ref 10–40)
BASOPHILS # BLD AUTO: 0.05 K/UL (ref 0–0.2)
BASOPHILS NFR BLD: 0.8 % (ref 0–1.9)
BILIRUB SERPL-MCNC: 0.5 MG/DL (ref 0.1–1)
BUN SERPL-MCNC: 11 MG/DL (ref 6–20)
CALCIUM SERPL-MCNC: 9.6 MG/DL (ref 8.7–10.5)
CHLORIDE SERPL-SCNC: 100 MMOL/L (ref 95–110)
CO2 SERPL-SCNC: 27 MMOL/L (ref 23–29)
CREAT SERPL-MCNC: 0.8 MG/DL (ref 0.5–1.4)
DIFFERENTIAL METHOD BLD: ABNORMAL
EOSINOPHIL # BLD AUTO: 0.1 K/UL (ref 0–0.5)
EOSINOPHIL NFR BLD: 2 % (ref 0–8)
ERYTHROCYTE [DISTWIDTH] IN BLOOD BY AUTOMATED COUNT: 13.3 % (ref 11.5–14.5)
EST. GFR  (NO RACE VARIABLE): >60 ML/MIN/1.73 M^2
GLUCOSE SERPL-MCNC: 109 MG/DL (ref 70–110)
HCT VFR BLD AUTO: 40.2 % (ref 40–54)
HGB BLD-MCNC: 13.8 G/DL (ref 14–18)
IMM GRANULOCYTES # BLD AUTO: 0.01 K/UL (ref 0–0.04)
IMM GRANULOCYTES NFR BLD AUTO: 0.2 % (ref 0–0.5)
LYMPHOCYTES # BLD AUTO: 1.3 K/UL (ref 1–4.8)
LYMPHOCYTES NFR BLD: 19.5 % (ref 18–48)
MCH RBC QN AUTO: 33.3 PG (ref 27–31)
MCHC RBC AUTO-ENTMCNC: 34.3 G/DL (ref 32–36)
MCV RBC AUTO: 97 FL (ref 82–98)
MONOCYTES # BLD AUTO: 0.7 K/UL (ref 0.3–1)
MONOCYTES NFR BLD: 10.6 % (ref 4–15)
NEUTROPHILS # BLD AUTO: 4.4 K/UL (ref 1.8–7.7)
NEUTROPHILS NFR BLD: 66.9 % (ref 38–73)
NRBC BLD-RTO: 0 /100 WBC
PLATELET # BLD AUTO: 369 K/UL (ref 150–450)
PMV BLD AUTO: 9.2 FL (ref 9.2–12.9)
POTASSIUM SERPL-SCNC: 4.6 MMOL/L (ref 3.5–5.1)
PROT SERPL-MCNC: 7.3 G/DL (ref 6–8.4)
RBC # BLD AUTO: 4.15 M/UL (ref 4.6–6.2)
SODIUM SERPL-SCNC: 134 MMOL/L (ref 136–145)
WBC # BLD AUTO: 6.5 K/UL (ref 3.9–12.7)

## 2024-11-05 PROCEDURE — 4010F ACE/ARB THERAPY RXD/TAKEN: CPT | Mod: CPTII,S$GLB,, | Performed by: SURGERY

## 2024-11-05 PROCEDURE — 1159F MED LIST DOCD IN RCRD: CPT | Mod: CPTII,95,, | Performed by: INTERNAL MEDICINE

## 2024-11-05 PROCEDURE — 3074F SYST BP LT 130 MM HG: CPT | Mod: CPTII,S$GLB,, | Performed by: SURGERY

## 2024-11-05 PROCEDURE — 4010F ACE/ARB THERAPY RXD/TAKEN: CPT | Mod: CPTII,95,, | Performed by: INTERNAL MEDICINE

## 2024-11-05 PROCEDURE — 85025 COMPLETE CBC W/AUTO DIFF WBC: CPT | Performed by: INTERNAL MEDICINE

## 2024-11-05 PROCEDURE — 80053 COMPREHEN METABOLIC PANEL: CPT | Performed by: INTERNAL MEDICINE

## 2024-11-05 PROCEDURE — 99999 PR PBB SHADOW E&M-EST. PATIENT-LVL III: CPT | Mod: PBBFAC,,, | Performed by: SURGERY

## 2024-11-05 PROCEDURE — 99024 POSTOP FOLLOW-UP VISIT: CPT | Mod: S$GLB,,, | Performed by: SURGERY

## 2024-11-05 PROCEDURE — 36415 COLL VENOUS BLD VENIPUNCTURE: CPT | Performed by: INTERNAL MEDICINE

## 2024-11-05 PROCEDURE — 99214 OFFICE O/P EST MOD 30 MIN: CPT | Mod: 25,95,, | Performed by: INTERNAL MEDICINE

## 2024-11-05 PROCEDURE — 1111F DSCHRG MED/CURRENT MED MERGE: CPT | Mod: CPTII,95,, | Performed by: INTERNAL MEDICINE

## 2024-11-05 PROCEDURE — 3078F DIAST BP <80 MM HG: CPT | Mod: CPTII,S$GLB,, | Performed by: SURGERY

## 2024-11-05 PROCEDURE — 1160F RVW MEDS BY RX/DR IN RCRD: CPT | Mod: CPTII,95,, | Performed by: INTERNAL MEDICINE

## 2024-11-05 RX ORDER — PROCHLORPERAZINE EDISYLATE 5 MG/ML
10 INJECTION INTRAMUSCULAR; INTRAVENOUS ONCE AS NEEDED
OUTPATIENT
Start: 2024-11-20

## 2024-11-05 RX ORDER — EPINEPHRINE 0.3 MG/.3ML
0.3 INJECTION SUBCUTANEOUS ONCE AS NEEDED
OUTPATIENT
Start: 2024-11-20

## 2024-11-05 RX ORDER — DIPHENHYDRAMINE HYDROCHLORIDE 50 MG/ML
50 INJECTION INTRAMUSCULAR; INTRAVENOUS ONCE AS NEEDED
OUTPATIENT
Start: 2024-12-04

## 2024-11-05 RX ORDER — SODIUM CHLORIDE 0.9 % (FLUSH) 0.9 %
10 SYRINGE (ML) INJECTION
OUTPATIENT
Start: 2024-12-04

## 2024-11-05 RX ORDER — HEPARIN 100 UNIT/ML
500 SYRINGE INTRAVENOUS
Status: CANCELLED | OUTPATIENT
Start: 2024-11-06

## 2024-11-05 RX ORDER — HEPARIN 100 UNIT/ML
500 SYRINGE INTRAVENOUS
OUTPATIENT
Start: 2024-11-20

## 2024-11-05 RX ORDER — EPINEPHRINE 0.3 MG/.3ML
0.3 INJECTION SUBCUTANEOUS ONCE AS NEEDED
Status: CANCELLED | OUTPATIENT
Start: 2024-11-06

## 2024-11-05 RX ORDER — HEPARIN 100 UNIT/ML
500 SYRINGE INTRAVENOUS
Status: CANCELLED | OUTPATIENT
Start: 2024-11-08

## 2024-11-05 RX ORDER — SODIUM CHLORIDE 0.9 % (FLUSH) 0.9 %
10 SYRINGE (ML) INJECTION
Status: CANCELLED | OUTPATIENT
Start: 2024-11-08

## 2024-11-05 RX ORDER — SODIUM CHLORIDE 0.9 % (FLUSH) 0.9 %
10 SYRINGE (ML) INJECTION
OUTPATIENT
Start: 2024-11-20

## 2024-11-05 RX ORDER — PROCHLORPERAZINE EDISYLATE 5 MG/ML
10 INJECTION INTRAMUSCULAR; INTRAVENOUS ONCE AS NEEDED
OUTPATIENT
Start: 2024-12-06

## 2024-11-05 RX ORDER — DIPHENHYDRAMINE HYDROCHLORIDE 50 MG/ML
50 INJECTION INTRAMUSCULAR; INTRAVENOUS ONCE AS NEEDED
OUTPATIENT
Start: 2024-11-20

## 2024-11-05 RX ORDER — PROCHLORPERAZINE EDISYLATE 5 MG/ML
10 INJECTION INTRAMUSCULAR; INTRAVENOUS ONCE AS NEEDED
OUTPATIENT
Start: 2024-11-22

## 2024-11-05 RX ORDER — FLUOROURACIL 50 MG/ML
200 INJECTION, SOLUTION INTRAVENOUS
OUTPATIENT
Start: 2024-12-04

## 2024-11-05 RX ORDER — FLUOROURACIL 50 MG/ML
200 INJECTION, SOLUTION INTRAVENOUS
Status: CANCELLED | OUTPATIENT
Start: 2024-11-06

## 2024-11-05 RX ORDER — SODIUM CHLORIDE 0.9 % (FLUSH) 0.9 %
10 SYRINGE (ML) INJECTION
OUTPATIENT
Start: 2024-12-06

## 2024-11-05 RX ORDER — HEPARIN 100 UNIT/ML
500 SYRINGE INTRAVENOUS
OUTPATIENT
Start: 2024-12-06

## 2024-11-05 RX ORDER — HEPARIN 100 UNIT/ML
500 SYRINGE INTRAVENOUS
OUTPATIENT
Start: 2024-12-04

## 2024-11-05 RX ORDER — PROCHLORPERAZINE EDISYLATE 5 MG/ML
10 INJECTION INTRAMUSCULAR; INTRAVENOUS ONCE AS NEEDED
OUTPATIENT
Start: 2024-12-04

## 2024-11-05 RX ORDER — SODIUM CHLORIDE 0.9 % (FLUSH) 0.9 %
10 SYRINGE (ML) INJECTION
Status: CANCELLED | OUTPATIENT
Start: 2024-11-06

## 2024-11-05 RX ORDER — DIPHENHYDRAMINE HYDROCHLORIDE 50 MG/ML
50 INJECTION INTRAMUSCULAR; INTRAVENOUS ONCE AS NEEDED
Status: CANCELLED | OUTPATIENT
Start: 2024-11-06

## 2024-11-05 RX ORDER — PROCHLORPERAZINE EDISYLATE 5 MG/ML
10 INJECTION INTRAMUSCULAR; INTRAVENOUS ONCE AS NEEDED
OUTPATIENT
Start: 2024-11-08

## 2024-11-05 RX ORDER — HEPARIN 100 UNIT/ML
500 SYRINGE INTRAVENOUS
OUTPATIENT
Start: 2024-11-22

## 2024-11-05 RX ORDER — SODIUM CHLORIDE 0.9 % (FLUSH) 0.9 %
10 SYRINGE (ML) INJECTION
OUTPATIENT
Start: 2024-11-22

## 2024-11-05 RX ORDER — FLUOROURACIL 50 MG/ML
200 INJECTION, SOLUTION INTRAVENOUS
OUTPATIENT
Start: 2024-11-20

## 2024-11-05 RX ORDER — EPINEPHRINE 0.3 MG/.3ML
0.3 INJECTION SUBCUTANEOUS ONCE AS NEEDED
OUTPATIENT
Start: 2024-12-04

## 2024-11-05 RX ORDER — PROCHLORPERAZINE EDISYLATE 5 MG/ML
10 INJECTION INTRAMUSCULAR; INTRAVENOUS ONCE AS NEEDED
Status: CANCELLED | OUTPATIENT
Start: 2024-11-06

## 2024-11-05 NOTE — PROGRESS NOTES
Surgical Oncology Clinic Note      Referring Provider: Dr. Hudson Latif   PCP: Michell Goyal MD    Reason For Visit: Gastroesophageal: esophageal cancer (GE junction)      HISTORY OF PRESENT ILLNESS     Rosalio Muñoz is a 58 y.o. male w/ significant smoking and drinking history who presents today for evaluation and management of a distal esophageal mass found on imaging.      Wanted to establish care with a primary care physician after losing about 25-30 lb over the previous 4 months without effort.  At that time a CT of the abdomen and pelvis with IV contrast was obtained as well as thyroid studies (which were normal).  The CT scan showed severe thickening at the GE junction concerning for an esophageal mass as well as multiple enlarged lymph nodes within the epigastric region concerning for local metastatic disease.  He was also noted to have some scattered subcentimeter hypodensities within the liver too small to characterize.  He was then referred to me for further evaluation.  Of note, he was recently admitted to the hospital for 5 days with a acute appendicitis back in July.    He states that ever since he had COVID about 2 years ago he has had really hard time swallowing pills and that really cold or really hot liquids have burned and really bothered him.  He attributed all that COVID originally.  However does note that over the last 4-5 months he has had increasing issues with swallowing and he is really limited himself now to just liquids and softer foods.  He used to eat ribeye steak regularly and according to his wife has not had any in about 4-5 months.  He states whenever he eats he feels like food gets stuck in his chest and then he feels full very quickly.  He does note that soups and mashed potatoes go down fine but anything more solid than that tends to get stuck.  He has occasionally vomited and even vomited up a liquid diet the other night but states that that is uncommon.  He  has tried to really maintain his weight and has been drinking more [employer] protein shake, ice cream, peanut butter, and whole milk-and has managed to gain back about 5 lb.  He has also decreased his drinking in his gone from drinking 8-10 beers a day to around 4 beers a day.  He does note a longstanding history of reflux but has never had an EGD and was told several years ago the hiatal hernia.    He does have a longstanding history of smoking.  He used to smoke about 2 packs a day from age 17 up until a couple of years ago.  He did quit smoking at that time but started sleeping however quit that when his hypertension got worse, and then he started smoking cigars and he now smokes about 5-6 baby cigars in a day has done that for quite some time.  His last colonoscopy was a little over a year ago, at that time they found around 22 polyps and he was told to get a repeat scope in 6 months but has not had that done yet.  He has no real family history of cancer.    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  Wife is house supervisor at [employer] the [employer].  2 sons - ages 36 and 32.   for 36 years.        INTERVAL HISTORY   10/16/2024:  Patient returns to clinic today.  He has had an EGD which did show a fungating mass at his GE junction, biopsies confirmed the presence of adenocarcinoma.  Subsequent PET-CT scan showed a 2.2 cm hypermetabolic right posterior hepatic lesion concerning for metastatic disease, as well as hypermetabolic gastrohepatic ligament lymph node.  He has met with Dr. Crowley with Radiation Oncology as well as Dr. Latif with Medical Oncology.  He was originally planning to go see Dr. Bran, my partner, in [place] however with a diagnosis of presumed metastatic disease they are deferring that at this time.    11/5/2024:  doing 3 bolus feeds/ day and having a little PO intake.  States he feels bloated for about an hour after doing the  feeds.  Feeling more energy now.      Past Medical History:   Diagnosis Date    Hypertension     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 10/11/2024       Past Surgical History:   Procedure Laterality Date    COLONOSCOPY N/A 8/25/2023    Procedure: COLONOSCOPY;  Surgeon: Pebbles Spear MD;  Location: [facility];  Service: Endoscopy;  Laterality: N/A;    COLONOSCOPY N/A 10/2/2024    Procedure: COLONOSCOPY  Cardiac clearance received on 09/20/24 per Dr. Lockett, Cardiology.  Note in encounters.  LB;  Surgeon: Sully Farris MD;  Location: [facility];  Service: Endoscopy;  Laterality: N/A;    ESOPHAGOGASTRODUODENOSCOPY N/A 10/2/2024    Procedure: EGD (ESOPHAGOGASTRODUODENOSCOPY);  Surgeon: Sully Farris MD;  Location: [facility];  Service: Endoscopy;  Laterality: N/A;    INSERTION OF VENOUS ACCESS PORT Left 10/21/2024    Procedure: INSERTION, VENOUS ACCESS PORT;  Surgeon: Roseanna Rosas MD;  Location: [facility] OR;  Service: General;  Laterality: Left;    SURGICAL REMOVAL OF LESION OF FACE Right 12/1/2023    Procedure: EXCISION, LESION, FACE;  Surgeon: Jose Flaherty MD;  Location: [facility] OR;  Service: ENT;  Laterality: Right;  1. Excision facial subcutaneous mass right cheek 3 cm. 2. Intermediate layered closure 3.5 cm    WISDOM TOOTH EXTRACTION      XI ROBOTIC APPENDECTOMY N/A 7/31/2024    Procedure: XI ROBOTIC APPENDECTOMY;  Surgeon: Paulo Saavedra MD;  Location: [facility] OR;  Service: General;  Laterality: N/A;    XI ROBOTIC INSERTION, GASTROSTOMY TUBE N/A 10/21/2024    Procedure: XI ROBOTIC INSERTION, GASTROSTOMY TUBE;  Surgeon: oRseanna Rosas MD;  Location: [facility] OR;  Service: General;  Laterality: N/A;       Family History   Problem Relation Name Age of Onset    Hyperlipidemia Mother      Hypertension Father      Diabetes Father      Kidney disease Father      Heart disease Father      Breast cancer Maternal Grandmother      Prostate cancer Maternal Grandfather      Colon cancer Neg Hx          Social History     Socioeconomic History    Marital status:     Number of children: 2   Occupational History    Occupation: Industrial electrician   Tobacco Use    Smoking status: Every Day     Current packs/day: 0.00     Types: Cigars, Cigarettes     Last attempt to quit: 10/4/2022     Years since quittin.0     Passive exposure: Never    Smokeless tobacco: Never    Tobacco comments:     Cigar every afternoon   Substance and Sexual Activity    Alcohol use: Yes     Alcohol/week: 4.0 - 6.0 standard drinks of alcohol     Types: 4 - 6 Cans of beer per week     Comment: occ    Drug use: No    Sexual activity: Yes     Partners: Female     Social Drivers of Health     Financial Resource Strain: Low Risk  (10/22/2024)    Overall Financial Resource Strain (CARDIA)     Difficulty of Paying Living Expenses: Not very hard   Food Insecurity: No Food Insecurity (10/22/2024)    Hunger Vital Sign     Worried About Running Out of Food in the Last Year: Never true     Ran Out of Food in the Last Year: Never true   Transportation Needs: No Transportation Needs (10/22/2024)    TRANSPORTATION NEEDS     Transportation : No   Stress: No Stress Concern Present (10/22/2024)    [employer] - Occupational Stress Questionnaire     Feeling of Stress : Not at all   Housing Stability: Low Risk  (10/22/2024)    Housing Stability Vital Sign     Unable to Pay for Housing in the Last Year: No     Homeless in the Last Year: No          Medication List            Accurate as of 2024 10:10 AM. If you have any questions, ask your nurse or doctor.                CHANGE how you take these medications      carvediloL 25 MG tablet  Commonly known as: COREG  Take 1 tablet (25 mg total) by mouth 2 (two) times daily.  What changed: when to take this            CONTINUE taking these medications      aspirin 81 MG EC tablet  Commonly known as: ECOTRIN  Take 1 tablet (81 mg total) by mouth once daily.      atorvastatin 20 MG tablet  Commonly known as: LIPITOR  Take 1 tablet (20 mg total) by mouth every evening.     LIDOcaine-prilocaine cream  Commonly known as: EMLA  Apply topically as needed.     * OLANZapine 5 MG tablet  Commonly known as: ZyPREXA  Take 1 tablet (5 mg total) by mouth every evening. Take nightly on days 1-3 of each chemotherapy cycle.     * OLANZapine 5 MG tablet  Commonly known as: ZyPREXA  Take 1 tablet (5 mg total) by mouth every evening. On days 1-3, 15-17, and 29-31 of each chemotherapy cycle.     oxyCODONE 5 MG immediate release tablet  Commonly known as: ROXICODONE  Take 1 tablet (5 mg total) by mouth every 4 (four) hours as needed for Pain.     * prochlorperazine 5 MG tablet  Commonly known as: COMPAZINE  Take 2 tablets (10 mg total) by mouth every 6 (six) hours as needed for Nausea.     * prochlorperazine 10 MG tablet  Commonly known as: COMPAZINE  Take 1 tablet (10 mg total) by mouth every 6 (six) hours as needed.           * This list has 4 medication(s) that are the same as other medications prescribed for you. Read the directions carefully, and ask your doctor or other care provider to review them with you.                  Review of patient's allergies indicates:  No Known Allergies    ROS    PHYSICAL EXAM     Vitals:    11/05/24 0959   BP: 103/69   Pulse: 88   Temp: 98.3 °F (36.8 °C)     Body mass index is 21.73 kg/m².  ECOG SCORE             Physical Exam     Incisions c/d/I.  G tube in place.  Port incision well healed       DATA REVIEW:  I personally reviewed the following records for this visit: lab work from prior visit, notes from other physicians, computed tomography/CT imaging, surgical pathology results, endoscopy results, radiographic study evaluation, and laboratory results done by primary care physician    LABS       Lab Results   Component Value Date    WBC 6.50 11/05/2024    HGB 13.8 (L) 11/05/2024    HCT 40.2 11/05/2024     11/05/2024     Lab Results   Component  "Value Date    GLU 97 10/23/2024    CALCIUM 8.6 (L) 10/23/2024    ALBUMIN 3.3 (L) 10/11/2024    PROT 7.2 10/11/2024     (L) 10/23/2024    K 3.4 (L) 10/23/2024    CO2 28 10/23/2024    CL 99 10/23/2024    BUN 5 (L) 10/23/2024    CREATININE 0.7 10/23/2024    ALKPHOS 83 10/11/2024    ALT 20 10/11/2024    AST 24 10/11/2024    BILITOT 0.5 10/11/2024       Nutritional:  No results found for: "PREALBUMIN"    Tumor Markers:  No results found for: "CEA", "AMYLASE", "ASPIRATE", "SNJ478", "", "RN6546", "AFP", "AFPTM"  No results found for: ""    PATHOLOGY     10/02/2024:  EGD          Abnormal   1. Esophagus, mass, biopsy:  - Adenocarcinoma, moderately differentiated, see comment.  MMR pending**    2. Stomach, biopsy:  - Antral mucosa with minimal reactive changes.    - Negative for Helicobacter organisms on routine H&E sections.      3. Colon, transverse, polypectomies:  - Colonic mucosa with hyperplastic change and lymphoid aggregate, multiple fragments.      4. Colon, descending, polypectomies:  - Tubular adenoma, multiple fragments.    - Colonic mucosa with hyperplastic change, multiple fragments.      5. Colon, sigmoid, polypectomy:  - Hyperplastic polyp.      6. Rectum, polypectomies:  - Hyperplastic polyp, multiple fragments.      COMMENT: The esophagus mass (specimen 1) shows a malignant epithelial proliferation.  Immunostains show the cells to be positive for CK7 and negative for CDX2, synaptophysin, and chromogranin.  This is consistent with adenocarcinoma.  Part 1 of this case   is reviewed by Dr. TESSA Parson who agrees with the above diagnosis.  Appropriate positive controls are examined.  Specimen 1 will be sent for Claudin 18.2, results will be issued in addendum.    Immunohistochemistry (IHC) Testing for Mismatch Repair (MMR) Proteins:    MLH1 - Intact nuclear expression  MSH2 - Intact nuclear expression  MSH6 - Intact nuclear expression  PMS2 - Intact nuclear expression    Background nonneoplastic " tissue/internal control with intact nuclear expression    IHC Interpretation  No loss of nuclear expression of MMR proteins: low probability of microsatellite instability    There are exceptions to the above IHC interpretations. These results should not be considered in isolation, and clinical correlation with genetic counseling is recommended to assess the need for germline testing.  VC       Comment: Interp By Ioana Gomes MD, Signed on 10/15/2024 at 08:14   Supplemental Diagnosis      Abnormal   HER2 by immunohistochemistry performed on specimen 1: Positive (3+)    Specimen 1 sent for tempus.  Appropriate positive controls are examined         10/18/2024:  IR Liver biopsy   Liver mass (needle biopsy):   Positive for malignancy.  Consistent with metastatic carcinoma   See comment.     Comment:  The patient has biopsy proven esophageal adenocarcinoma (HGS-, 10/2/2024). Ancillary studies (MMR IHC, Her2 IHC, Rdvqcox22.2, and Tempus NGS) have been ordered on the previous biopsy material.     IMAGING     08/07/2023:  CT Chest- Lung screen  FINDINGS:   There are 2 stable tiny benign type subpleural nodules in the right middle lobe measuring up to 3 mm on image 280 series 4.  Stable 3 mm benign-type subpleural nodule in the left upper lobe on image 100.  No suspicious lung nodules are identified.  No lung masses.  No acute infiltrate or pleural effusion identified.  No pathologically enlarged lymph nodes are identified. Coronary artery calcifications are noted. No suspicious lytic or blastic bone marrow lesions are identified.  Limited images through the upper abdomen demonstrate no suspicious mass or acute disease.    07/31/2024:  CT Renal Stone Study  Impression:   1.  Inflammatory changes surround the appendix which, measures 1 cm in diameter.  There are some air bubbles immediately adjacent to the tip of the appendix in the adjacent fat, concerning for early perforation of the appendix.   2. Multiple  dilated air and fluid-filled loops of small bowel noted, concerning for ileus or small-bowel obstruction.   3.  Negative for acute process otherwise.  Negative for renal stone disease or hydronephrosis.   4.  Extensive coronary artery calcifications.  Large hiatal hernia.  Vascular calcifications without aneurysmal change.  Other nonemergent findings as described above.     09/06/2024:  CT Chest, Abdomen, and Pelvis  Impression:   1. Severe thickening at the GE junction which could reflect distal esophageal mass versus less likely esophagitis. Recommend endoscopy with possible tissue sampling.  Multiple enlarged lymph nodes are seen within the epigastric region measuring up to 2.4 cm concerning for metastatic disease.   2.  See above for additional findings and recommendations    10/10/2024:  PET CT Scan  Impression:  Gastroesophageal cancer with metastatic lymphadenopathy and right hepatic lobe metastasis.    ASSESSMENT & PLAN     1. Esophageal cancer, stage IV    2. Unintentional weight loss    3. Tobacco abuse         Rosalio Muñoz is a 58 y.o. male with metastatic esophageal adenocarcinoma s/p robotic G tube and port placement 2 weeks ago.     He is doing well.  Three bolus feeds a day but does have some fullness afterwards.  I have encouraged him to sentinel alarm to try to make sure he is getting 5 bolus feeds day.  We discussed that he likely will not feel hungry a lot but he still needs to take his tube feeds.  We also reviewed his pathology from his IR biopsy which unfortunately does confirmed the presence of metastatic cancer.  Fortunately this is only 1 small foci and we will see how he responds with systemic chemo.  He has PDL1 positive and HER2 positive therefore has to targetable mutations and hopefully will respond well with systemic therapy with trastuzumab in Keytruda.  I also encouraged him to touch base more frequently with Agnes, our oncology nutritionist, especially if he has questions  about increasing his tube feeds or tube feed tolerance.    -- RTC in 1 month for stitch removal   -- increase to 5 feeds/ day       Mr. Muñoz expressed understanding in regards to our discussion today. Many good questions were asked on today's visit, all of which were answered to their satisfaction.    Follow-up: Follow up in about 4 weeks (around 12/3/2024).                  Roseanna Rosas MD MS              Surgical Oncology              [facility] [place], [place]              Office: [phone]

## 2024-11-05 NOTE — ANESTHESIA POSTPROCEDURE EVALUATION
Anesthesia Post Evaluation    Patient: Rosalio Muñoz    Procedure(s) Performed: Procedure(s) (LRB):  INSERTION, VENOUS ACCESS PORT (Left)  XI ROBOTIC INSERTION, GASTROSTOMY TUBE (N/A)    Final Anesthesia Type: general      Patient location during evaluation: PACU  Patient participation: Yes- Able to Participate  Level of consciousness: awake and alert, oriented and awake  Post-procedure vital signs: reviewed and stable  Pain management: adequate  Airway patency: patent    PONV status at discharge: No PONV  Anesthetic complications: no      Cardiovascular status: blood pressure returned to baseline  Respiratory status: unassisted  Hydration status: euvolemic  Follow-up not needed.              Vitals Value Taken Time   /69 11/05/24 0959   Temp 36.8 °C (98.3 °F) 11/05/24 0959   Pulse 88 11/05/24 0959   Resp 16 10/23/24 1712   SpO2 98 % 10/28/24 0908         Event Time   Out of Recovery 16:21:00         Pain/Nick Score: No data recorded

## 2024-11-05 NOTE — PROGRESS NOTES
Subjective:       Patient ID: Rosalio Muñoz is a 58 y.o. male.    Chief Complaint: Results, Chemotherapy, and Esophageal Cancer    HPI:  58-year-old male presents for stage IV metastatic esophageal carcinoma in initiation of cycle 1 day 1OP GASTROESOPHAGEAL pembrolizumab Q6W trastuzumab + MFOLFOX6 (oxaliplatin leucovorin fluorouracil) Q2W     Past Medical History:   Diagnosis Date    Esophageal cancer, stage IV 10/10/2024    Hypertension     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 10/11/2024     Family History   Problem Relation Name Age of Onset    Hyperlipidemia Mother      Hypertension Father      Diabetes Father      Kidney disease Father      Heart disease Father      Breast cancer Maternal Grandmother      Prostate cancer Maternal Grandfather      Colon cancer Neg Hx       Social History     Socioeconomic History    Marital status:     Number of children: 2   Occupational History    Occupation: Industrial electrician   Tobacco Use    Smoking status: Every Day     Current packs/day: 0.00     Types: Cigars, Cigarettes     Last attempt to quit: 10/4/2022     Years since quittin.0     Passive exposure: Never    Smokeless tobacco: Never    Tobacco comments:     Cigar every afternoon   Substance and Sexual Activity    Alcohol use: Yes     Alcohol/week: 4.0 - 6.0 standard drinks of alcohol     Types: 4 - 6 Cans of beer per week     Comment: occ    Drug use: No    Sexual activity: Yes     Partners: Female     Social Drivers of Health     Financial Resource Strain: Medium Risk (2024)    Overall Financial Resource Strain (CARDIA)     Difficulty of Paying Living Expenses: Somewhat hard   Food Insecurity: Patient Declined (2024)    Hunger Vital Sign     Worried About Running Out of Food in the Last Year: Patient declined     Ran Out of Food in the Last Year: Patient declined   Transportation Needs: No Transportation Needs (10/22/2024)    TRANSPORTATION NEEDS     Transportation : No    Physical Activity: Insufficiently Active (11/5/2024)    Exercise Vital Sign     Days of Exercise per Week: 2 days     Minutes of Exercise per Session: 20 min   Stress: No Stress Concern Present (11/5/2024)    [employer] - Occupational Stress Questionnaire     Feeling of Stress : Not at all   Housing Stability: Unknown (11/5/2024)    Housing Stability Vital Sign     Unable to Pay for Housing in the Last Year: Patient declined     Homeless in the Last Year: No     Past Surgical History:   Procedure Laterality Date    COLONOSCOPY N/A 8/25/2023    Procedure: COLONOSCOPY;  Surgeon: Pebbles Spear MD;  Location: [facility];  Service: Endoscopy;  Laterality: N/A;    COLONOSCOPY N/A 10/2/2024    Procedure: COLONOSCOPY  Cardiac clearance received on 09/20/24 per Dr. Lockett, Cardiology.  Note in encounters.  LB;  Surgeon: Sully Farris MD;  Location: [facility];  Service: Endoscopy;  Laterality: N/A;    ESOPHAGOGASTRODUODENOSCOPY N/A 10/2/2024    Procedure: EGD (ESOPHAGOGASTRODUODENOSCOPY);  Surgeon: Sully Farris MD;  Location: [facility];  Service: Endoscopy;  Laterality: N/A;    INSERTION OF VENOUS ACCESS PORT Left 10/21/2024    Procedure: INSERTION, VENOUS ACCESS PORT;  Surgeon: Roseanna Rosas MD;  Location: [facility];  Service: General;  Laterality: Left;    SURGICAL REMOVAL OF LESION OF FACE Right 12/1/2023    Procedure: EXCISION, LESION, FACE;  Surgeon: Jose Flaherty MD;  Location: [facility];  Service: ENT;  Laterality: Right;  1. Excision facial subcutaneous mass right cheek 3 cm. 2. Intermediate layered closure 3.5 cm    WISDOM TOOTH EXTRACTION      XI ROBOTIC APPENDECTOMY N/A 7/31/2024    Procedure: XI ROBOTIC APPENDECTOMY;  Surgeon: Paulo Saavedra MD;  Location: [facility] OR;  Service: General;  Laterality: N/A;    XI ROBOTIC INSERTION, GASTROSTOMY TUBE N/A 10/21/2024    Procedure: XI ROBOTIC INSERTION, GASTROSTOMY TUBE;  Surgeon: Roseanna Rosas MD;  Location: [facility]  "OR;  Service: General;  Laterality: N/A;       Labs:  Lab Results   Component Value Date    WBC 6.50 11/05/2024    HGB 13.8 (L) 11/05/2024    HCT 40.2 11/05/2024    MCV 97 11/05/2024     11/05/2024     BMP  Lab Results   Component Value Date     (L) 11/05/2024    K 4.6 11/05/2024     11/05/2024    CO2 27 11/05/2024    BUN 11 11/05/2024    CREATININE 0.8 11/05/2024    CALCIUM 9.6 11/05/2024    ANIONGAP 7 (L) 11/05/2024    ESTGFRAFRICA >60.0 07/08/2022    EGFRNONAA >60.0 07/08/2022     Lab Results   Component Value Date    ALT 14 11/05/2024    AST 14 11/05/2024    ALKPHOS 91 11/05/2024    BILITOT 0.5 11/05/2024       Lab Results   Component Value Date    IRON 30 (L) 10/11/2024    TIBC 352 10/11/2024    FERRITIN 260 10/11/2024     Lab Results   Component Value Date    WJKUEICB56 445 10/19/2018     No results found for: "FOLATE"  Lab Results   Component Value Date    TSH 3.465 08/20/2024         Review of Systems   Constitutional:  Negative for activity change, appetite change, chills, diaphoresis, fatigue, fever and unexpected weight change.   HENT:  Negative for congestion, dental problem, drooling, ear discharge, ear pain, facial swelling, hearing loss, mouth sores, nosebleeds, postnasal drip, rhinorrhea, sinus pressure, sneezing, sore throat, tinnitus, trouble swallowing and voice change.    Eyes:  Negative for photophobia, pain, discharge, redness, itching and visual disturbance.   Respiratory:  Negative for apnea, cough, choking, chest tightness, shortness of breath, wheezing and stridor.    Cardiovascular:  Negative for chest pain, palpitations and leg swelling.   Gastrointestinal:  Negative for abdominal distention, abdominal pain, anal bleeding, blood in stool, constipation, diarrhea, nausea, rectal pain and vomiting.   Endocrine: Negative for cold intolerance, heat intolerance, polydipsia, polyphagia and polyuria.   Genitourinary:  Negative for decreased urine volume, difficulty urinating, " dysuria, enuresis, flank pain, frequency, genital sores, hematuria, penile discharge, penile pain, penile swelling, scrotal swelling, testicular pain and urgency.   Musculoskeletal:  Negative for arthralgias, back pain, gait problem, joint swelling, myalgias, neck pain and neck stiffness.   Skin:  Negative for color change, pallor, rash and wound.   Allergic/Immunologic: Negative for environmental allergies, food allergies and immunocompromised state.   Neurological:  Negative for dizziness, tremors, seizures, syncope, facial asymmetry, speech difficulty, weakness, light-headedness, numbness and headaches.   Hematological:  Negative for adenopathy. Does not bruise/bleed easily.   Psychiatric/Behavioral:  Negative for agitation, behavioral problems, confusion, decreased concentration, dysphoric mood, hallucinations, self-injury, sleep disturbance and suicidal ideas. The patient is not nervous/anxious and is not hyperactive.        Objective:      Physical Exam  Constitutional:       Appearance: Normal appearance.   Neurological:      Mental Status: He is alert.             Assessment:      1. Esophageal cancer, stage IV    2. Iron deficiency anemia due to chronic blood loss    3. 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    4. Secondary adenocarcinoma of retroperitoneum    5. Secondary liver cancer    6. Immunodeficiency due to chemotherapy           Med Onc Chart Routing      Follow up with physician . Return to clinic in 2 weeks to see me or APAP CBC CMP   Follow up with JORDANA    Infusion scheduling note    Injection scheduling note OP GASTROESOPHAGEAL pembrolizumab Q6W trastuzumab + MFOLFOX6 q.2 weeks (oxaliplatin leucovorin fluorouracil) Q2W   Labs    Imaging    Pharmacy appointment    Other referrals                   Plan:     The patient location is:  Cancer Center  The chief complaint leading to consultation is:  Esophageal cancer    Visit type: audiovisual    Face to Face time with patient: 25 minutes of  total time spent on the encounter, which includes face to face time and non-face to face time preparing to see the patient (eg, review of tests), Obtaining and/or reviewing separately obtained history, Documenting clinical information in the electronic or other health record, Independently interpreting results (not separately reported) and communicating results to the patient/family/caregiver, or Care coordination (not separately reported).         Each patient to whom he or she provides medical services by telemedicine is:  (1) informed of the relationship between the physician and patient and the respective role of any other health care provider with respect to management of the patient; and (2) notified that he or she may decline to receive medical services by telemedicine and may withdraw from such care at any time.    Notes:     Patient to initiate treatment with cycle 1 day 1OP GASTROESOPHAGEAL pembrolizumab Q6W trastuzumab + MFOLFOX6 (oxaliplatin leucovorin fluorouracil) Q2W orders have been signedConsented the patient to the treatment plan and the patient was educated on the planned duration of the treatment and schedule of the treatment administration.  Referral made for advanced care planningAdvance Care Planning     CHELSIE Latif Jr, MD FACP

## 2024-11-06 ENCOUNTER — DOCUMENTATION ONLY (OUTPATIENT)
Dept: HEMATOLOGY/ONCOLOGY | Facility: CLINIC | Age: 59
End: 2024-11-06
Payer: COMMERCIAL

## 2024-11-06 ENCOUNTER — DOCUMENTATION ONLY (OUTPATIENT)
Dept: NUTRITION | Facility: CLINIC | Age: 59
End: 2024-11-06
Payer: COMMERCIAL

## 2024-11-06 ENCOUNTER — INFUSION (OUTPATIENT)
Dept: INFUSION THERAPY | Facility: HOSPITAL | Age: 59
End: 2024-11-06
Attending: INTERNAL MEDICINE
Payer: COMMERCIAL

## 2024-11-06 VITALS
BODY MASS INDEX: 21.83 KG/M2 | HEART RATE: 88 BPM | RESPIRATION RATE: 18 BRPM | OXYGEN SATURATION: 96 % | SYSTOLIC BLOOD PRESSURE: 105 MMHG | WEIGHT: 164.69 LBS | HEIGHT: 73 IN | DIASTOLIC BLOOD PRESSURE: 67 MMHG | TEMPERATURE: 97 F

## 2024-11-06 DIAGNOSIS — C78.6 SECONDARY ADENOCARCINOMA OF RETROPERITONEUM: ICD-10-CM

## 2024-11-06 DIAGNOSIS — C15.9 ESOPHAGEAL CANCER, STAGE IV: Primary | ICD-10-CM

## 2024-11-06 DIAGNOSIS — C79.89 MALIGNANT NEOPLASM METASTATIC TO OTHER SITE: ICD-10-CM

## 2024-11-06 PROCEDURE — 96416 CHEMO PROLONG INFUSE W/PUMP: CPT

## 2024-11-06 PROCEDURE — 96415 CHEMO IV INFUSION ADDL HR: CPT

## 2024-11-06 PROCEDURE — 63600175 PHARM REV CODE 636 W HCPCS: Performed by: INTERNAL MEDICINE

## 2024-11-06 PROCEDURE — 96368 THER/DIAG CONCURRENT INF: CPT

## 2024-11-06 PROCEDURE — 96411 CHEMO IV PUSH ADDL DRUG: CPT

## 2024-11-06 PROCEDURE — 96417 CHEMO IV INFUS EACH ADDL SEQ: CPT

## 2024-11-06 PROCEDURE — 96413 CHEMO IV INFUSION 1 HR: CPT

## 2024-11-06 PROCEDURE — 96367 TX/PROPH/DG ADDL SEQ IV INF: CPT

## 2024-11-06 PROCEDURE — 25000003 PHARM REV CODE 250: Performed by: INTERNAL MEDICINE

## 2024-11-06 RX ORDER — FLUOROURACIL 50 MG/ML
200 INJECTION, SOLUTION INTRAVENOUS
Status: COMPLETED | OUTPATIENT
Start: 2024-11-06 | End: 2024-11-06

## 2024-11-06 RX ADMIN — OXALIPLATIN 173 MG: 5 INJECTION, SOLUTION INTRAVENOUS at 12:11

## 2024-11-06 RX ADMIN — SODIUM CHLORIDE 400 MG: 9 INJECTION, SOLUTION INTRAVENOUS at 09:11

## 2024-11-06 RX ADMIN — DEXTROSE 810 MG: 5 SOLUTION INTRAVENOUS at 12:11

## 2024-11-06 RX ADMIN — FLUOROURACIL 405 MG: 50 INJECTION, SOLUTION INTRAVENOUS at 02:11

## 2024-11-06 RX ADMIN — TRASTUZUMAB-ANNS 450 MG: 420 INJECTION, POWDER, LYOPHILIZED, FOR SOLUTION INTRAVENOUS at 10:11

## 2024-11-06 RX ADMIN — FLUOROURACIL 2435 MG: 50 INJECTION, SOLUTION INTRAVENOUS at 02:11

## 2024-11-06 RX ADMIN — DEXAMETHASONE SODIUM PHOSPHATE 0.25 MG: 4 INJECTION, SOLUTION INTRA-ARTICULAR; INTRALESIONAL; INTRAMUSCULAR; INTRAVENOUS; SOFT TISSUE at 11:11

## 2024-11-06 NOTE — PROGRESS NOTES
Met with pt and wife face to face while present for his first infusion. Pt reports he is doing well at this time with no complaints or needs. Wife reports FMLA papers have been taken care of properly. Pt is requesting to have palliative care appt r/s due to scheduling conflicts; message was sent to palliative staff to reach out to pt/wife to r/s. They deny any further needs/concerns at this time. Encouraged them to reach out with any needs.

## 2024-11-06 NOTE — NURSING
Pt tolerated Keytruxa/Kanjinti + FOLFOX C1D1 well. No adverse reaction noted. Pt education reinforced on chemo regimen, side effects, what to expect, and when to call . Pt verbalized understanding. I reviewed pt calendar w/ pt and understanding verbalized.   Left chest PAC remains accessed with 5FU pump connected infusing at a continuous rate of 2.2ml/hr for 46 hours.  Dressing clean, dry, and intact.

## 2024-11-06 NOTE — PROGRESS NOTES
ARTI met with pt/spouse at infusion chairside to introduce self and assess for any needs/concerns that pt may have r/t start of new treatment. ARTI briefly met with pt/spouse in the lobby this am as spouse was very emotional re: pt starting treatment today. SW provided support and encouragement. SW provided a new pt folder and explained contents. Pt has hearing aids in both ears but can read lips. Pt also has a feeding tube, and can only tolerate small amounts by mouth. Pt stated that he has no immediate concerns at this time. SW educated pt/spouse on [employer]ymeds[employer]org for Dx specific conor and the one-time [facility] grants with receipts. Spouse will infomr SW if interested. No other needs expressed. Pt will call SW at number provided if future needs arise. SW will remain available.

## 2024-11-06 NOTE — PLAN OF CARE
Problem: Adult Inpatient Plan of Care  Goal: Plan of Care Review  Outcome: Progressing  Flowsheets (Taken 11/6/2024 0955)  Plan of Care Reviewed With: patient  Goal: Patient-Specific Goal (Individualized)  Outcome: Progressing  Flowsheets (Taken 11/6/2024 0955)  Individualized Care Needs: blanket, emla cream and ice for mediport access, wife at side  Anxieties, Fears or Concerns: starting chemo today  Patient/Family-Specific Goals (Include Timeframe): tolerate first chemo tx today  Goal: Absence of Hospital-Acquired Illness or Injury  Outcome: Progressing  Intervention: Prevent Infection  Flowsheets (Taken 11/6/2024 0955)  Infection Prevention:   equipment surfaces disinfected   hand hygiene promoted   personal protective equipment utilized   rest/sleep promoted  Goal: Optimal Comfort and Wellbeing  Outcome: Progressing  Intervention: Provide Person-Centered Care  Flowsheets (Taken 11/6/2024 0955)  Trust Relationship/Rapport:   care explained   thoughts/feelings acknowledged   choices provided   emotional support provided   empathic listening provided   questions answered   questions encouraged   reassurance provided     Problem: Chemotherapy Effects  Goal: Safety Maintained  Outcome: Progressing  Intervention: Promote Safe Chemotherapy Delivery  Flowsheets (Taken 11/6/2024 0955)  Infection Prevention:   equipment surfaces disinfected   hand hygiene promoted   personal protective equipment utilized   rest/sleep promoted  Chemotherapy Environmental Safety:   chemotherapy waste containers in room   protective environment maintained   meticulous hand hygiene promoted   patient environment monitored for safety   personal protective equipment utilized  Goal: Absence of Infection  Outcome: Progressing  Intervention: Prevent Infection and Maximize Resistance  Flowsheets (Taken 11/6/2024 0955)  Infection Prevention:   equipment surfaces disinfected   hand hygiene promoted   personal protective equipment utilized    rest/sleep promoted     Problem: Fall Injury Risk  Goal: Absence of Fall and Fall-Related Injury  Outcome: Progressing  Intervention: Promote Injury-Free Environment  Flowsheets (Taken 11/6/2024 1533)  Safety Promotion/Fall Prevention:   assistive device/personal item within reach   patient expresses understanding of fall risk and prevention   room near unit station   nonskid shoes/socks when out of bed   Fall Risk reviewed with patient/family   high risk medications identified   family expresses understanding of fall risk and prevention   instructed to call staff for mobility   medications reviewed   in recliner, wheels locked

## 2024-11-06 NOTE — PROGRESS NOTES
Met with patient in infusion to follow up. Mr. Muñoz is now taking in 4 cartons per day of 1.5 formula via PEG and plans to increase to 5 cartons daily this upcoming week. Spoke with his wife about the regimen she is helping with. Mr. Muñoz is having his first day of chemo today. Will remain available.   Signature: Agnes Lema, MPH, RD, LDN

## 2024-11-08 ENCOUNTER — DOCUMENTATION ONLY (OUTPATIENT)
Dept: HEMATOLOGY/ONCOLOGY | Facility: CLINIC | Age: 59
End: 2024-11-08
Payer: COMMERCIAL

## 2024-11-08 ENCOUNTER — INFUSION (OUTPATIENT)
Dept: INFUSION THERAPY | Facility: HOSPITAL | Age: 59
End: 2024-11-08
Attending: INTERNAL MEDICINE
Payer: COMMERCIAL

## 2024-11-08 ENCOUNTER — EXTERNAL HOME HEALTH (OUTPATIENT)
Dept: HOME HEALTH SERVICES | Facility: HOSPITAL | Age: 59
End: 2024-11-08
Payer: COMMERCIAL

## 2024-11-08 VITALS
OXYGEN SATURATION: 96 % | DIASTOLIC BLOOD PRESSURE: 63 MMHG | TEMPERATURE: 98 F | SYSTOLIC BLOOD PRESSURE: 110 MMHG | RESPIRATION RATE: 16 BRPM | HEART RATE: 77 BPM

## 2024-11-08 DIAGNOSIS — C79.89 MALIGNANT NEOPLASM METASTATIC TO OTHER SITE: ICD-10-CM

## 2024-11-08 DIAGNOSIS — C15.9 ESOPHAGEAL CANCER, STAGE IV: Primary | ICD-10-CM

## 2024-11-08 DIAGNOSIS — C78.6 SECONDARY ADENOCARCINOMA OF RETROPERITONEUM: ICD-10-CM

## 2024-11-08 PROCEDURE — A4216 STERILE WATER/SALINE, 10 ML: HCPCS | Performed by: INTERNAL MEDICINE

## 2024-11-08 PROCEDURE — 63600175 PHARM REV CODE 636 W HCPCS: Performed by: INTERNAL MEDICINE

## 2024-11-08 PROCEDURE — 25000003 PHARM REV CODE 250: Performed by: INTERNAL MEDICINE

## 2024-11-08 RX ORDER — HEPARIN 100 UNIT/ML
500 SYRINGE INTRAVENOUS
Status: DISCONTINUED | OUTPATIENT
Start: 2024-11-08 | End: 2024-11-08 | Stop reason: HOSPADM

## 2024-11-08 RX ORDER — SODIUM CHLORIDE 0.9 % (FLUSH) 0.9 %
10 SYRINGE (ML) INJECTION
Status: DISCONTINUED | OUTPATIENT
Start: 2024-11-08 | End: 2024-11-08 | Stop reason: HOSPADM

## 2024-11-08 RX ADMIN — Medication 10 ML: at 11:11

## 2024-11-08 RX ADMIN — HEPARIN 500 UNITS: 100 SYRINGE at 11:11

## 2024-11-08 NOTE — NURSING
Pt here for 5FU continuous pump d/c. Pump stopped and disconnected.  Existing dressing appeared clean and intact.  Left chestwall mediport  Flushed with 10ml NS and 5 ml heparin solution.  Needle D/C, site without redness, swelling, or drainage noted.  Dressing applied.  Patient tolerated well.  Patient to return to clinic as scheduled.

## 2024-11-08 NOTE — PROGRESS NOTES
"Met with pt and wife face to face while in infusion for pump disconnect. Notified pt of Negative MYRISK result and gave printed copy. Pt reports he is feel well, just some fatigue but denies any other problems. Encouraged him to reach out with with any further needs/concerns.       Oncology Navigation   Intake  Cancer Type: Head and Neck  Type of Referral: Internal  Date of Referral: 10/08/24  Initial Nurse Navigator Contact: 10/08/24  Referral to Initial Contact Timeline (days): 0  First Appointment Available: 10/17/24  Appointment Date: 10/17/24  First Available Date vs. Scheduled Date (days): 0  Multiple appointments: No  Reason if booked > 7 days after scheduling: Specific provider / access     Treatment  Current Status: Active  Date Presented to Tumor Board: 10/11/24    Surgery: Completed  Surgical Oncologist: Ralph  Type of Surgery: feeding tube and PORT placement  Surgery Schedule Date: 10/21/24    Medical Oncologist: Salomon  Consult Date: 10/11/24  Chemotherapy: Planned  Chemotherapy Regimen: mFolfox6  Immunotherapy: Planned  Immunotherapy Name: trastuzumab + pembro  Start Date: 24       Procedures: PET scan  EUS / EGD: EGD  PET Scan Schedule Date: 10/10/24    General Referrals: Chemo Education; Dietitian; Palliative Care  Dietitian Name: Agnes Wong Referral Date: 10/16/24  Palliative Care Referral Date: 10/28/24          Support Systems: Spouse/significant other; Family members  Barriers of Care: Barriers to Care "Assessment completed-no barriers noted"     Acuity  Stage: 2  Systemic Treatment - predicted or initiated: Chemotherapy Regimen with Multiple drugs (+1)  Treatment Tolerability: Minimal symptoms  ECO  Comorbidities in Medical History: 0  Hospitalization Within the Past Month: 1  Other Medical Factors (+2 each): Home medical equipment required (feeding tube)   Needed: 0  Support: 0  Verbalizes Financial Concerns: 0  Transportation: 0  History of noncompliance/frequent " no shows and cancellations: 0  Verbalizes the need for more education: 0  Navigation Acuity: 6     Follow Up  No follow-ups on file.

## 2024-11-12 ENCOUNTER — TELEPHONE (OUTPATIENT)
Dept: NUTRITION | Facility: CLINIC | Age: 59
End: 2024-11-12
Payer: COMMERCIAL

## 2024-11-12 NOTE — TELEPHONE ENCOUNTER
Spoke with patient's wife to schedule nutrition follow up for next week. She shared he takes in 5 cartons per day via PEG of Jevity 1.5, liquid MVI, and some pleasure feeds.   Signature: Agnes Lema, MPH, RD, LDN

## 2024-11-14 NOTE — PROGRESS NOTES
Called to notify pt of appt with Dr. Montgomery on 11/1, per Dr. Montgomery.  No answer; left VM requesting call back.  [place] message also sent to pt regarding appt.   Subjective:       Patient ID: Rosalio Muñoz is a 58 y.o. male.    Chief Complaint: Cancer and Chemotherapy    Primary Oncologist/Hematologist: Dr. Latif    HPI: Mr. Muñoz is a 58 year old male who is following up for his stage IV metastatic esophageal carcinoma. He is here for cycle 1 day 15 of FOLFOX + trastuzumab q 2 weeks + pembrolizumab (keytruda) q 6 weeks.   Cancer Hx: had an EGD which did show a fungating mass at his GE junction, biopsies confirmed the presence of adenocarcinoma. Subsequent PET-CT scan showed a 2.2 cm hypermetabolic right posterior hepatic lesion concerning for metastatic disease, as well as hypermetabolic gastrohepatic ligament lymph node.     Today: Patient has G tube. He states his feedings are going well, 5x/day. He is also eating orally at times as well as staying hydrated. He did has some nausea, relieved with compazine. He denies any vomiting, diarrhea, constipation, fevers, bleeding. He states his weight has leveled off, actually has gained some lbs.     Social History     Socioeconomic History    Marital status:     Number of children: 2   Occupational History    Occupation: Industrial electrician   Tobacco Use    Smoking status: Every Day     Current packs/day: 0.00     Types: Cigars, Cigarettes     Last attempt to quit: 10/4/2022     Years since quittin.1     Passive exposure: Never    Smokeless tobacco: Never    Tobacco comments:     Cigar every afternoon   Substance and Sexual Activity    Alcohol use: Yes     Alcohol/week: 4.0 - 6.0 standard drinks of alcohol     Types: 4 - 6 Cans of beer per week     Comment: occ    Drug use: No    Sexual activity: Yes     Partners: Female     Social Drivers of Health     Financial Resource Strain: Medium Risk (2024)    Overall Financial Resource Strain (CARDIA)     Difficulty of Paying Living Expenses: Somewhat hard   Food Insecurity: Patient Declined (2024)    Hunger Vital Sign     Worried About Running Out of  Food in the Last Year: Patient declined     Ran Out of Food in the Last Year: Patient declined   Transportation Needs: No Transportation Needs (10/22/2024)    TRANSPORTATION NEEDS     Transportation : No   Physical Activity: Insufficiently Active (11/5/2024)    Exercise Vital Sign     Days of Exercise per Week: 2 days     Minutes of Exercise per Session: 20 min   Stress: No Stress Concern Present (11/5/2024)    [employer] - Occupational Stress Questionnaire     Feeling of Stress : Not at all   Housing Stability: Unknown (11/5/2024)    Housing Stability Vital Sign     Unable to Pay for Housing in the Last Year: Patient declined     Homeless in the Last Year: No       Past Medical History:   Diagnosis Date    Esophageal cancer, stage IV 10/10/2024    Hypertension     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 10/11/2024       Family History   Problem Relation Name Age of Onset    Hyperlipidemia Mother      Hypertension Father      Diabetes Father      Kidney disease Father      Heart disease Father      Breast cancer Maternal Grandmother      Prostate cancer Maternal Grandfather      Colon cancer Neg Hx         Past Surgical History:   Procedure Laterality Date    COLONOSCOPY N/A 8/25/2023    Procedure: COLONOSCOPY;  Surgeon: Pebbles Spear MD;  Location: [facility];  Service: Endoscopy;  Laterality: N/A;    COLONOSCOPY N/A 10/2/2024    Procedure: COLONOSCOPY  Cardiac clearance received on 09/20/24 per Dr. Lockett, Cardiology.  Note in encounters.  LB;  Surgeon: Sully Farris MD;  Location: [facility];  Service: Endoscopy;  Laterality: N/A;    ESOPHAGOGASTRODUODENOSCOPY N/A 10/2/2024    Procedure: EGD (ESOPHAGOGASTRODUODENOSCOPY);  Surgeon: Sully Farris MD;  Location: [facility];  Service: Endoscopy;  Laterality: N/A;    INSERTION OF VENOUS ACCESS PORT Left 10/21/2024    Procedure: INSERTION, VENOUS ACCESS PORT;  Surgeon: Roseanna Rosas MD;  [facility];   Service: General;  Laterality: Left;    SURGICAL REMOVAL OF LESION OF FACE Right 12/1/2023    Procedure: EXCISION, LESION, FACE;  Surgeon: Jose Flaherty MD;  Location: [facility] OR;  Service: ENT;  Laterality: Right;  1. Excision facial subcutaneous mass right cheek 3 cm. 2. Intermediate layered closure 3.5 cm    WISDOM TOOTH EXTRACTION      XI ROBOTIC APPENDECTOMY N/A 7/31/2024    Procedure: XI ROBOTIC APPENDECTOMY;  Surgeon: Paulo Saavedra MD;  Location: [facility] OR;  Service: General;  Laterality: N/A;    XI ROBOTIC INSERTION, GASTROSTOMY TUBE N/A 10/21/2024    Procedure: XI ROBOTIC INSERTION, GASTROSTOMY TUBE;  Surgeon: Roseanna Rosas MD;  Location: [facility] OR;  Service: General;  Laterality: N/A;       Review of Systems   Constitutional:  Negative for activity change, appetite change, chills, diaphoresis, fatigue, fever and unexpected weight change.   HENT:  Negative for congestion and nosebleeds.    Respiratory:  Negative for cough and shortness of breath.    Cardiovascular:  Negative for chest pain and leg swelling.   Gastrointestinal:  Negative for abdominal pain, anal bleeding, blood in stool, constipation, diarrhea, nausea and vomiting.        G tube present.    Genitourinary:  Negative for hematuria.   Allergic/Immunologic: Positive for immunocompromised state.         Medication List with Changes/Refills   Current Medications    ACETAMINOPHEN (TYLENOL) 500 MG TABLET    Take 500 mg by mouth every 6 (six) hours as needed for Pain. prn    ASPIRIN (ECOTRIN) 81 MG EC TABLET    Take 1 tablet (81 mg total) by mouth once daily.    ATORVASTATIN (LIPITOR) 20 MG TABLET    Take 1 tablet (20 mg total) by mouth every evening.    CARVEDILOL (COREG) 25 MG TABLET    Take 1 tablet (25 mg total) by mouth 2 (two) times daily.    LIDOCAINE-PRILOCAINE (EMLA) CREAM    Apply topically as needed.    OLANZAPINE (ZYPREXA) 5 MG TABLET    Take 1 tablet (5 mg total) by mouth every evening. Take nightly on days 1-3 of each chemotherapy  cycle.    OLANZAPINE (ZYPREXA) 5 MG TABLET    Take 1 tablet (5 mg total) by mouth every evening. On days 1-3, 15-17, and 29-31 of each chemotherapy cycle.    OXYCODONE (ROXICODONE) 5 MG IMMEDIATE RELEASE TABLET    Take 1 tablet (5 mg total) by mouth every 4 (four) hours as needed for Pain.    PROCHLORPERAZINE (COMPAZINE) 10 MG TABLET    Take 1 tablet (10 mg total) by mouth every 6 (six) hours as needed.    PROCHLORPERAZINE (COMPAZINE) 5 MG TABLET    Take 2 tablets (10 mg total) by mouth every 6 (six) hours as needed for Nausea.     Objective:     Vitals:    11/19/24 0859   BP: 102/73   Pulse: 87   Temp: 97.9 °F (36.6 °C)     Physical Exam  Vitals reviewed.   Constitutional:       General: He is not in acute distress.     Appearance: He is not ill-appearing, toxic-appearing or diaphoretic.   HENT:      Head: Normocephalic and atraumatic.   Cardiovascular:      Rate and Rhythm: Normal rate.   Abdominal:      Comments: G tube present   Musculoskeletal:      Right lower leg: No edema.      Left lower leg: No edema.   Skin:     General: Skin is warm.      Coloration: Skin is not jaundiced or pale.      Findings: No bruising, erythema, lesion or rash.   Neurological:      Mental Status: He is alert.      Gait: Gait normal.   Psychiatric:         Mood and Affect: Mood normal.         Behavior: Behavior normal.         Thought Content: Thought content normal.            Labs/Results:  Lab Results   Component Value Date    WBC 5.38 11/19/2024    RBC 3.88 (L) 11/19/2024    HGB 12.6 (L) 11/19/2024    HCT 37.1 (L) 11/19/2024    MCV 96 11/19/2024    MCH 32.5 (H) 11/19/2024    MCHC 34.0 11/19/2024    RDW 12.9 11/19/2024     11/19/2024    MPV 9.6 11/19/2024    GRAN 3.3 11/19/2024    GRAN 61.6 11/19/2024    LYMPH 1.2 11/19/2024    LYMPH 22.9 11/19/2024    MONO 0.7 11/19/2024    MONO 12.3 11/19/2024    EOS 0.1 11/19/2024    BASO 0.05 11/19/2024    EOSINOPHIL 1.9 11/19/2024    BASOPHIL 0.9 11/19/2024     CMP  Sodium   Date  Value Ref Range Status   11/19/2024 136 136 - 145 mmol/L Final     Potassium   Date Value Ref Range Status   11/19/2024 4.7 3.5 - 5.1 mmol/L Final     Chloride   Date Value Ref Range Status   11/19/2024 102 95 - 110 mmol/L Final     CO2   Date Value Ref Range Status   11/19/2024 25 23 - 29 mmol/L Final     Glucose   Date Value Ref Range Status   11/19/2024 114 (H) 70 - 110 mg/dL Final     BUN   Date Value Ref Range Status   11/19/2024 9 6 - 20 mg/dL Final     Creatinine   Date Value Ref Range Status   11/19/2024 0.7 0.5 - 1.4 mg/dL Final     Calcium   Date Value Ref Range Status   11/19/2024 9.1 8.7 - 10.5 mg/dL Final     Total Protein   Date Value Ref Range Status   11/19/2024 7.1 6.0 - 8.4 g/dL Final     Albumin   Date Value Ref Range Status   11/19/2024 3.1 (L) 3.5 - 5.2 g/dL Final     Total Bilirubin   Date Value Ref Range Status   11/19/2024 0.5 0.1 - 1.0 mg/dL Final     Comment:     For infants and newborns, interpretation of results should be based  on gestational age, weight and in agreement with clinical  observations.    Premature Infant recommended reference ranges:  Up to 24 hours.............<8.0 mg/dL  Up to 48 hours............<12.0 mg/dL  3-5 days..................<15.0 mg/dL  6-29 days.................<15.0 mg/dL       Alkaline Phosphatase   Date Value Ref Range Status   11/19/2024 87 40 - 150 U/L Final     AST   Date Value Ref Range Status   11/19/2024 13 10 - 40 U/L Final     ALT   Date Value Ref Range Status   11/19/2024 15 10 - 44 U/L Final     Anion Gap   Date Value Ref Range Status   11/19/2024 9 8 - 16 mmol/L Final     eGFR   Date Value Ref Range Status   11/19/2024 >60 >60 mL/min/1.73 m^2 Final     Pet scan 10/10/24  Impression:  Gastroesophageal cancer with metastatic lymphadenopathy and right hepatic lobe metastasis.  Assessment:     Problem List Items Addressed This Visit       Esophageal cancer, stage IV - Primary    Relevant Orders    Ferritin    Iron and TIBC    Secondary  adenocarcinoma of retroperitoneum    Secondary liver cancer    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    Relevant Orders    Ferritin    Iron and TIBC    Immunodeficiency due to chemotherapy    Relevant Orders    Ferritin    Iron and TIBC     Plan:     Esophageal cancer, stage IV, Secondary adenocarcinoma of retroperitoneum, Secondary liver cancer, 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, Immunodeficiency due to chemotherapy  --metastatic esophageal adenocarcinoma   --PDL1 + and HER2 +  --continue with cycle 1 day 15 of FOLFOX + trastuzumab q 2 weeks + pembrolizumab (keytruda) q 6 weeks.   --ANC:3.3, plts:289 tbili:0.5 crcl:125.3 ml/min  --continue with zyprexa nightly on days 1-3, 15-15 and 29-31 of chemo cycle.     Follow-Up: 2 weeks with cbc cmp iron/tibc ferritin prior for cycle 1 day 29-standing orders in    Shahrzad Bran PA-C  Hematology Oncology    Route Chart for Scheduling    Med Onc Chart Routing      Follow up with physician    Follow up with JORDANA . 2 weeks with cbc cmp irno/tibc ferritni prior for cytcler 1 day 29   Infusion scheduling note   2 weeks for cycle 1 day 19   Injection scheduling note    Labs CMP, CBC, ferritin and iron and TIBC   Scheduling:  Preferred lab:  Lab interval:     Imaging    Pharmacy appointment    Other referrals                  Treatment Plan Information   OP GASTROESOPHAGEAL pembrolizumab Q6W trastuzumab + MFOLFOX6 (oxaliplatin leucovorin fluorouracil) Q2W Hudson Latif MD   Associated diagnosis: Esophageal cancer, stage IV Stage IVB cTX, cN0, cM1, G2 noted on 10/10/2024  Associated diagnosis: 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   noted on 10/11/2024  Associated diagnosis: Secondary adenocarcinoma of retroperitoneum   noted on 10/11/2024   Line of treatment: First Line  Treatment Goal: Control     Upcoming Treatment Dates - OP GASTROESOPHAGEAL pembrolizumab Q6W trastuzumab + MFOLFOX6 (oxaliplatin leucovorin fluorouracil) Q2W    11/20/2024        Chemotherapy       trastuzumab-anns (KANJINTI) 321 mg in 0.9% NaCl 250 mL chemo infusion       oxaliplatin (ELOXATIN) 85 mg/m2 = 173 mg in D5W 534.6 mL chemo infusion       leucovorin calcium 400 mg/m2 = 810 mg in D5W 250 mL infusion       fluorouraciL injection 405 mg       fluorouracil (Adrucil) 1,200 mg/m2 = 2,435 mg in 0.9% NaCl 100 mL chemo infusion       Antiemetics       palonosetron 0.25mg/dexAMETHasone 12mg in NS IVPB 0.25 mg 50 mL  12/4/2024       Chemotherapy       trastuzumab-anns (KANJINTI) 321 mg in 0.9% NaCl 250 mL chemo infusion       oxaliplatin (ELOXATIN) 85 mg/m2 = 173 mg in D5W 534.6 mL chemo infusion       leucovorin calcium 400 mg/m2 = 810 mg in D5W 250 mL infusion       fluorouraciL injection 405 mg       fluorouracil (Adrucil) 1,200 mg/m2 = 2,435 mg in 0.9% NaCl 100 mL chemo infusion       Antiemetics       palonosetron (ALOXI) 0.25 mg with Dexamethasone (DECADRON) 12 mg in NS 50 mL IVPB  12/18/2024       Chemotherapy       trastuzumab-anns (KANJINTI) 321 mg in 0.9% NaCl 250 mL chemo infusion       oxaliplatin (ELOXATIN) 85 mg/m2 = 173 mg in D5W 534.6 mL chemo infusion       leucovorin calcium 400 mg/m2 = 810 mg in D5W 250 mL infusion       fluorouraciL injection 405 mg       fluorouracil (Adrucil) 1,200 mg/m2 = 2,435 mg in 0.9% NaCl 100 mL chemo infusion       Antiemetics       palonosetron 0.25mg/dexAMETHasone 12mg in NS IVPB 0.25 mg 50 mL       Immunotherapy       pembrolizumab (KEYTRUDA) 400 mg in 0.9% NaCl SolP 116 mL infusion  1/1/2025       Chemotherapy       trastuzumab-anns (KANJINTI) 321 mg in 0.9% NaCl 250 mL chemo infusion       oxaliplatin (ELOXATIN) 85 mg/m2 = 173 mg in D5W 534.6 mL chemo infusion       leucovorin calcium 400 mg/m2 = 810 mg in D5W 250 mL infusion       fluorouraciL injection 405 mg       fluorouracil (Adrucil) 1,200 mg/m2 = 2,435 mg in 0.9% NaCl 100 mL chemo infusion       Antiemetics       palonosetron 0.25mg/dexAMETHasone 12mg in NS IVPB 0.25 mg 50  mL    Supportive Plan Information  OP FERUMOXYTOL Hudson Latif MD   Associated Diagnosis: Iron deficiency anemia due to chronic blood loss   noted on 10/12/2024   Line of treatment: Supportive Care   Treatment goal: Supportive     Upcoming Treatment Dates - OP FERUMOXYTOL    10/12/2024       Medications       FERUMOXYTOL 510 MG/117 ML D5W (READY TO MIX SYSTEM) IVPB 510 mg  10/19/2024       Medications       ferumoxytoL (FERAHEME) 510 mg in D5W 117 mL IVPB

## 2024-11-19 ENCOUNTER — OFFICE VISIT (OUTPATIENT)
Dept: HEMATOLOGY/ONCOLOGY | Facility: CLINIC | Age: 59
End: 2024-11-19
Payer: COMMERCIAL

## 2024-11-19 ENCOUNTER — NUTRITION (OUTPATIENT)
Dept: NUTRITION | Facility: CLINIC | Age: 59
End: 2024-11-19
Payer: COMMERCIAL

## 2024-11-19 ENCOUNTER — LAB VISIT (OUTPATIENT)
Dept: LAB | Facility: HOSPITAL | Age: 59
End: 2024-11-19
Attending: INTERNAL MEDICINE
Payer: COMMERCIAL

## 2024-11-19 VITALS
HEIGHT: 73 IN | SYSTOLIC BLOOD PRESSURE: 102 MMHG | DIASTOLIC BLOOD PRESSURE: 73 MMHG | BODY MASS INDEX: 22.22 KG/M2 | HEART RATE: 87 BPM | WEIGHT: 167.69 LBS | TEMPERATURE: 98 F | OXYGEN SATURATION: 97 %

## 2024-11-19 VITALS — WEIGHT: 168.88 LBS | BODY MASS INDEX: 22.28 KG/M2

## 2024-11-19 DIAGNOSIS — Z93.1 FEEDING BY G-TUBE: ICD-10-CM

## 2024-11-19 DIAGNOSIS — C15.9 ESOPHAGEAL CANCER, STAGE IV: Primary | ICD-10-CM

## 2024-11-19 DIAGNOSIS — Z79.899 IMMUNODEFICIENCY DUE TO CHEMOTHERAPY: ICD-10-CM

## 2024-11-19 DIAGNOSIS — C15.9 ESOPHAGEAL ADENOCARCINOMA: ICD-10-CM

## 2024-11-19 DIAGNOSIS — T45.1X5A IMMUNODEFICIENCY DUE TO CHEMOTHERAPY: ICD-10-CM

## 2024-11-19 DIAGNOSIS — D84.821 IMMUNODEFICIENCY DUE TO CHEMOTHERAPY: ICD-10-CM

## 2024-11-19 DIAGNOSIS — C78.6 SECONDARY ADENOCARCINOMA OF RETROPERITONEUM: ICD-10-CM

## 2024-11-19 DIAGNOSIS — C78.7 SECONDARY LIVER CANCER: ICD-10-CM

## 2024-11-19 DIAGNOSIS — C79.89 MALIGNANT NEOPLASM METASTATIC TO OTHER SITE: ICD-10-CM

## 2024-11-19 LAB
ALBUMIN SERPL BCP-MCNC: 3.1 G/DL (ref 3.5–5.2)
ALP SERPL-CCNC: 87 U/L (ref 40–150)
ALT SERPL W/O P-5'-P-CCNC: 15 U/L (ref 10–44)
ANION GAP SERPL CALC-SCNC: 9 MMOL/L (ref 8–16)
AST SERPL-CCNC: 13 U/L (ref 10–40)
BASOPHILS # BLD AUTO: 0.05 K/UL (ref 0–0.2)
BASOPHILS NFR BLD: 0.9 % (ref 0–1.9)
BILIRUB SERPL-MCNC: 0.5 MG/DL (ref 0.1–1)
BUN SERPL-MCNC: 9 MG/DL (ref 6–20)
CALCIUM SERPL-MCNC: 9.1 MG/DL (ref 8.7–10.5)
CHLORIDE SERPL-SCNC: 102 MMOL/L (ref 95–110)
CO2 SERPL-SCNC: 25 MMOL/L (ref 23–29)
CREAT SERPL-MCNC: 0.7 MG/DL (ref 0.5–1.4)
DIFFERENTIAL METHOD BLD: ABNORMAL
EOSINOPHIL # BLD AUTO: 0.1 K/UL (ref 0–0.5)
EOSINOPHIL NFR BLD: 1.9 % (ref 0–8)
ERYTHROCYTE [DISTWIDTH] IN BLOOD BY AUTOMATED COUNT: 12.9 % (ref 11.5–14.5)
EST. GFR  (NO RACE VARIABLE): >60 ML/MIN/1.73 M^2
GLUCOSE SERPL-MCNC: 114 MG/DL (ref 70–110)
HCT VFR BLD AUTO: 37.1 % (ref 40–54)
HGB BLD-MCNC: 12.6 G/DL (ref 14–18)
IMM GRANULOCYTES # BLD AUTO: 0.02 K/UL (ref 0–0.04)
IMM GRANULOCYTES NFR BLD AUTO: 0.4 % (ref 0–0.5)
LYMPHOCYTES # BLD AUTO: 1.2 K/UL (ref 1–4.8)
LYMPHOCYTES NFR BLD: 22.9 % (ref 18–48)
MCH RBC QN AUTO: 32.5 PG (ref 27–31)
MCHC RBC AUTO-ENTMCNC: 34 G/DL (ref 32–36)
MCV RBC AUTO: 96 FL (ref 82–98)
MONOCYTES # BLD AUTO: 0.7 K/UL (ref 0.3–1)
MONOCYTES NFR BLD: 12.3 % (ref 4–15)
NEUTROPHILS # BLD AUTO: 3.3 K/UL (ref 1.8–7.7)
NEUTROPHILS NFR BLD: 61.6 % (ref 38–73)
NRBC BLD-RTO: 0 /100 WBC
PLATELET # BLD AUTO: 289 K/UL (ref 150–450)
PMV BLD AUTO: 9.6 FL (ref 9.2–12.9)
POTASSIUM SERPL-SCNC: 4.7 MMOL/L (ref 3.5–5.1)
PROT SERPL-MCNC: 7.1 G/DL (ref 6–8.4)
RBC # BLD AUTO: 3.88 M/UL (ref 4.6–6.2)
SODIUM SERPL-SCNC: 136 MMOL/L (ref 136–145)
WBC # BLD AUTO: 5.38 K/UL (ref 3.9–12.7)

## 2024-11-19 PROCEDURE — 99999 PR PBB SHADOW E&M-EST. PATIENT-LVL III: CPT | Mod: PBBFAC,,,

## 2024-11-19 PROCEDURE — 1159F MED LIST DOCD IN RCRD: CPT | Mod: CPTII,S$GLB,,

## 2024-11-19 PROCEDURE — 3078F DIAST BP <80 MM HG: CPT | Mod: CPTII,S$GLB,,

## 2024-11-19 PROCEDURE — 99215 OFFICE O/P EST HI 40 MIN: CPT | Mod: S$GLB,,,

## 2024-11-19 PROCEDURE — 97803 MED NUTRITION INDIV SUBSEQ: CPT | Mod: S$GLB,,,

## 2024-11-19 PROCEDURE — 85025 COMPLETE CBC W/AUTO DIFF WBC: CPT | Performed by: INTERNAL MEDICINE

## 2024-11-19 PROCEDURE — 4010F ACE/ARB THERAPY RXD/TAKEN: CPT | Mod: CPTII,S$GLB,,

## 2024-11-19 PROCEDURE — 1111F DSCHRG MED/CURRENT MED MERGE: CPT | Mod: CPTII,S$GLB,,

## 2024-11-19 PROCEDURE — 3074F SYST BP LT 130 MM HG: CPT | Mod: CPTII,S$GLB,,

## 2024-11-19 PROCEDURE — 36415 COLL VENOUS BLD VENIPUNCTURE: CPT | Performed by: INTERNAL MEDICINE

## 2024-11-19 PROCEDURE — 99999 PR PBB SHADOW E&M-EST. PATIENT-LVL II: CPT | Mod: PBBFAC,,,

## 2024-11-19 PROCEDURE — 80053 COMPREHEN METABOLIC PANEL: CPT | Performed by: INTERNAL MEDICINE

## 2024-11-19 PROCEDURE — 3008F BODY MASS INDEX DOCD: CPT | Mod: CPTII,S$GLB,,

## 2024-11-19 RX ORDER — ACETAMINOPHEN 500 MG
500 TABLET ORAL EVERY 6 HOURS PRN
COMMUNITY

## 2024-11-19 NOTE — PATIENT INSTRUCTIONS
Recommendations:   Continue to consume protein shakes and eating soft foods for pleasure.   Continue Centrum liquid multivitamin.   Rate/Volume/Schedule: Administer 1 carton of Isosource 1.5  by syringe bolus feed via PEG every 3 hours with 60 ml water flush before and 120 ml after each carton. Recommend 5 cartons daily. Once this is well tolerated, increase to 6 cartons daily spaced every 2.5 hours.   Additional Water flushes: none  Provides: 2580 mL/day total volume, 2250 kcals/day, 102 g/day protein

## 2024-11-19 NOTE — PROGRESS NOTES
"Oncology Nutrition Assessment for Medical Nutrition Therapy Follow-up Visit  Consultation Time: 15 Minutes  Referring Provider: Hudson Latif MD  Reason for Nutrition Consult: Enteral Formula , Follow Up , New Feeding Tube - Gastrostomy Tube - PEG, Nutrition related questions, and Weight Loss    Nutrition Assessment      Patient Information:    Rosalio Muñoz  : 1965   58 y.o. male    Allergies/Intolerances: No known food allergies  Social Data: lives with spouse/significant Other.   Anthropometrics:     Weight: 76.6 kg (168 lb 14 oz)                                 Height:  6'1" (1.85 m)     BMI: Body mass index is 22.28 kg/m².             Usual BW: 188 lb 3/28/24   Weight Change: loss of 20 lb or 10.6% of weight in 6 months     Supplements/Vitamins:    MVI/Supp: yes - liquid adult multivitamin  Drug/Nutrient interactions: No Activity Level:     Low Active     Form of Activity: activities of daily living , walking      Malnutrition Assessment:   Nutrition Risk: Patient does not meet at least 2 characteristics of the ASPEN/AND criteria at this time, but may be at risk due to significant weight loss in the past 6 months.      Food/Nutrition-related history:    DME/Insurance:  [facility]  & Self Pay   Formula: Isosource 1.5   Rate/Volume/Schedule: Administer 1 carton of Isosource 1.5  by syringe bolus feed via PEG every 3 hours with 60 ml water flush before and 120 ml after each carton. Recommend 5 cartons daily. Once this is well tolerated, increase to 6 cartons daily spaced every 2.5 hours.   Additional Water flushes: none  Provides: 2580 mL/day total volume, 2250 kcals/day, 102 g/day protein  Patient currently taking in 5 cartons of formula or 1875 kcal per day via PEG.     Diet/PO Recall:   Appetite: Fair  Fluid Intake: Adequate  Diet Recall:  Breakfast: Member's Jr chocolate protein shake, small portion grits and eggs    Lunch: apple pie with ice cream   Dinner: small portion shrimp and " crab stew  Snacks: 0-1x/day  Drinks: water  ONS: 2/day of protein shakes   Servings of F/V per day: 0-1x/day  Eating out: 0-1x/week.   Cultural/Spiritual/Personal Preferences: Texture specific     GI Symptoms: early satiety, heartburn or weight loss 20 lbs. within the last 6 month(s)   Oncology Nutrition Symptoms: dysphagia, esophagitis, heartburn, weight loss, and feel full quickly              Difficulty chewing or swallowing?  yes - limited to liquid and soft diet     Patient Notes/Reports: Pt referred for a Nutrition Consultation related to Enteral Formula , Follow Up , New Feeding Tube - Gastrostomy Tube - PEG, Nutrition related questions, and Weight Loss. Patient has a medical diagnosis of esophageal cancer. Mr. Muñoz completed his first cycle of OP GI mFOLFOX6 (oxaliplatin leucovorin fluorouracil) Q2W chemotherapy and has his 2nd cycle tomorrow. Mr. Muñoz has esophageal cancer stage IV. Mr. Muñoz takes in 5 cartons per day of Jevity 1.5 via PEG and eats small portions of soft food for pleasure. His weight is stable.   Medical Tests and Procedures:  Patient Active Problem List   Diagnosis    HTN (hypertension)    Umbilical hernia    Tobacco use disorder    Delayed immunizations    Other hyperlipidemia    Hypertensive urgency    Elevated troponin    Hypomagnesemia    Bilateral hearing loss    Alcohol use    Unintentional weight loss    Low libido    Alcohol use disorder    Dysphagia    Personal history of colon polyps, unspecified    Esophageal cancer, stage IV    Secondary adenocarcinoma of retroperitoneum    Secondary liver cancer    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    Cachexia    Iron deficiency anemia due to chronic blood loss    Severe protein-calorie malnutrition    Immunodeficiency due to chemotherapy      Past Medical History:   Diagnosis Date    Esophageal cancer, stage IV 10/10/2024    Hypertension     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 10/11/2024     Past Surgical History:    Procedure Laterality Date    COLONOSCOPY N/A 8/25/2023    Procedure: COLONOSCOPY;  Surgeon: Pebbles Spear MD;  Location: [facility] ENDO;  Service: Endoscopy;  Laterality: N/A;    COLONOSCOPY N/A 10/2/2024    Procedure: COLONOSCOPY  Cardiac clearance received on 09/20/24 per Dr. Lockett, Cardiology.  Note in encounters.  LB;  Surgeon: Sully Farris MD;  Location: [facility] ENDO;  Service: Endoscopy;  Laterality: N/A;    ESOPHAGOGASTRODUODENOSCOPY N/A 10/2/2024    Procedure: EGD (ESOPHAGOGASTRODUODENOSCOPY);  Surgeon: Sully Farris MD;  [facility]: [facility];  Service: Endoscopy;  Laterality: N/A;    INSERTION OF VENOUS ACCESS PORT Left 10/21/2024    Procedure: INSERTION, VENOUS ACCESS PORT;  Surgeon: Roseanna Rosas MD;  Location: [facility];  Service: General;  Laterality: Left;    SURGICAL REMOVAL OF LESION OF FACE Right 12/1/2023    Procedure: EXCISION, LESION, FACE;  Surgeon: Jose Flaherty MD;  Location: [facility] OR;  Service: ENT;  Laterality: Right;  1. Excision facial subcutaneous mass right cheek 3 cm. 2. Intermediate layered closure 3.5 cm    WISDOM TOOTH EXTRACTION      XI ROBOTIC APPENDECTOMY N/A 7/31/2024    Procedure: XI ROBOTIC APPENDECTOMY;  Surgeon: Paulo Saavedra MD;  Location: [facility] OR;  Service: General;  Laterality: N/A;    XI ROBOTIC INSERTION, GASTROSTOMY TUBE N/A 10/21/2024    Procedure: XI ROBOTIC INSERTION, GASTROSTOMY TUBE;  Surgeon: Roseanna Rosas MD;  Location: [facility];  Service: General;  Laterality: N/A;       Current Outpatient Medications   Medication Instructions    acetaminophen (TYLENOL) 500 mg, Every 6 hours PRN    aspirin (ECOTRIN) 81 mg, Oral, Daily    atorvastatin (LIPITOR) 20 mg, Oral, Nightly    carvediloL (COREG) 25 mg, Oral, 2 times daily    LIDOcaine-prilocaine (EMLA) cream Topical (Top), As needed (PRN)    OLANZapine (ZYPREXA) 5 mg, Oral, Nightly, Take nightly on days 1-3 of each chemotherapy cycle.    OLANZapine (ZYPREXA) 5 mg, Oral, Nightly, On days 1-3,  15-17, and 29-31 of each chemotherapy cycle.    oxyCODONE (ROXICODONE) 5 mg, Oral, Every 4 hours PRN    prochlorperazine (COMPAZINE) 10 mg, Oral, Every 6 hours PRN    prochlorperazine (COMPAZINE) 10 mg, Oral, Every 6 hours PRN      Labs:  Reviewed - followed by MD funes        Nutrition Diagnosis    Nutrition Problem: inadequate protein/energy intake  Etiology (related to): appetite loss , dysphagia , tumor location, and early satiety   Signs/Symptoms (as evidenced by): weight loss, requiring tube feeding for nutrition support, new cancer diagnosis, and self reported diet questions/concerns     Nutrition Intervention      Estimated Energy/Fluid Requirements:   Weight used: CBW 77 kg  Calories: 0311-3276 kcal/day (25-30 kcal/kg)  Protein: 92 g/day (1.2 g/kg)  Fluid: 1214-5867 mL/day (1 mL/kcal)   Recommendations:   Continue to consume protein shakes and eating soft foods for pleasure.   Continue Centrum liquid multivitamin.   Rate/Volume/Schedule: Administer 1 carton of Isosource 1.5  by syringe bolus feed via PEG every 3 hours with 60 ml water flush before and 120 ml after each carton. Recommend 5 cartons daily. Once this is well tolerated, increase to 6 cartons daily spaced every 2.5 hours.   Additional Water flushes: none  Provides: 2580 mL/day total volume, 2250 kcals/day, 102 g/day protein     Education Needs Satisfied: yes   Education Materials Provided / Reviewed:   Nutrition During Your Cancer Treatment  Nutrition Plan  Barriers to Learning: none identified  Patient and/ or Family Verbalizes understanding: yes      Nutrition Monitoring and Evaluation    Monitor: energy intake, rate/formulation of tube feeding, weight, symptom management , and adherence to nutrition recommendations     Goals:   1: Adequate intake of calories and protein to promote weight gain   Indicator: Weight/BMI    2: Weight maintenance throughout treatment.  and No episodes of dehydration requiring emergency hydrations.   Indicator: Diet  Recall     Follow up In 2 weeks     Communication to referring provider/care team: note available in chart  Signature: Agnes Lema, MPH, RD, LDN

## 2024-11-20 ENCOUNTER — INFUSION (OUTPATIENT)
Dept: INFUSION THERAPY | Facility: HOSPITAL | Age: 59
End: 2024-11-20
Attending: INTERNAL MEDICINE
Payer: COMMERCIAL

## 2024-11-20 VITALS
OXYGEN SATURATION: 98 % | HEART RATE: 75 BPM | TEMPERATURE: 99 F | WEIGHT: 168.19 LBS | SYSTOLIC BLOOD PRESSURE: 111 MMHG | DIASTOLIC BLOOD PRESSURE: 70 MMHG | RESPIRATION RATE: 17 BRPM | BODY MASS INDEX: 22.19 KG/M2

## 2024-11-20 DIAGNOSIS — C79.89 MALIGNANT NEOPLASM METASTATIC TO OTHER SITE: ICD-10-CM

## 2024-11-20 DIAGNOSIS — C78.6 SECONDARY ADENOCARCINOMA OF RETROPERITONEUM: ICD-10-CM

## 2024-11-20 DIAGNOSIS — C15.9 ESOPHAGEAL CANCER, STAGE IV: Primary | ICD-10-CM

## 2024-11-20 PROCEDURE — 96416 CHEMO PROLONG INFUSE W/PUMP: CPT

## 2024-11-20 PROCEDURE — 25000003 PHARM REV CODE 250: Performed by: INTERNAL MEDICINE

## 2024-11-20 PROCEDURE — 96367 TX/PROPH/DG ADDL SEQ IV INF: CPT

## 2024-11-20 PROCEDURE — 63600175 PHARM REV CODE 636 W HCPCS: Mod: JG | Performed by: INTERNAL MEDICINE

## 2024-11-20 PROCEDURE — 96413 CHEMO IV INFUSION 1 HR: CPT

## 2024-11-20 PROCEDURE — 96415 CHEMO IV INFUSION ADDL HR: CPT

## 2024-11-20 PROCEDURE — 96368 THER/DIAG CONCURRENT INF: CPT

## 2024-11-20 PROCEDURE — 63600150 PHARM REV CODE 636: Performed by: INTERNAL MEDICINE

## 2024-11-20 PROCEDURE — 96417 CHEMO IV INFUS EACH ADDL SEQ: CPT

## 2024-11-20 PROCEDURE — 96411 CHEMO IV PUSH ADDL DRUG: CPT

## 2024-11-20 RX ORDER — FLUOROURACIL 50 MG/ML
200 INJECTION, SOLUTION INTRAVENOUS
Status: COMPLETED | OUTPATIENT
Start: 2024-11-20 | End: 2024-11-20

## 2024-11-20 RX ORDER — SODIUM CHLORIDE 0.9 % (FLUSH) 0.9 %
10 SYRINGE (ML) INJECTION
Status: DISCONTINUED | OUTPATIENT
Start: 2024-11-20 | End: 2024-11-20 | Stop reason: HOSPADM

## 2024-11-20 RX ADMIN — TRASTUZUMAB-ANNS 300 MG: 420 INJECTION, POWDER, LYOPHILIZED, FOR SOLUTION INTRAVENOUS at 10:11

## 2024-11-20 RX ADMIN — OXALIPLATIN 173 MG: 5 INJECTION, SOLUTION INTRAVENOUS at 11:11

## 2024-11-20 RX ADMIN — LEUCOVORIN CALCIUM 810 MG: 10 INJECTION INTRAMUSCULAR; INTRAVENOUS at 11:11

## 2024-11-20 RX ADMIN — DEXAMETHASONE SODIUM PHOSPHATE 0.25 MG: 4 INJECTION, SOLUTION INTRA-ARTICULAR; INTRALESIONAL; INTRAMUSCULAR; INTRAVENOUS; SOFT TISSUE at 10:11

## 2024-11-20 RX ADMIN — FLUOROURACIL 405 MG: 50 INJECTION, SOLUTION INTRAVENOUS at 12:11

## 2024-11-20 RX ADMIN — FLUOROURACIL 2435 MG: 50 INJECTION, SOLUTION INTRAVENOUS at 01:11

## 2024-11-20 NOTE — PLAN OF CARE
Problem: Adult Inpatient Plan of Care  Goal: Plan of Care Review  Outcome: Progressing  Goal: Patient-Specific Goal (Individualized)  Outcome: Progressing  Goal: Absence of Hospital-Acquired Illness or Injury  Outcome: Progressing  Goal: Optimal Comfort and Wellbeing  Outcome: Progressing     Problem: Chemotherapy Effects  Goal: Safety Maintained  Outcome: Progressing  Goal: Absence of Infection  Outcome: Progressing

## 2024-11-22 ENCOUNTER — INFUSION (OUTPATIENT)
Dept: INFUSION THERAPY | Facility: HOSPITAL | Age: 59
End: 2024-11-22
Attending: INTERNAL MEDICINE
Payer: COMMERCIAL

## 2024-11-22 VITALS
RESPIRATION RATE: 16 BRPM | HEART RATE: 96 BPM | OXYGEN SATURATION: 98 % | SYSTOLIC BLOOD PRESSURE: 115 MMHG | DIASTOLIC BLOOD PRESSURE: 71 MMHG | TEMPERATURE: 97 F

## 2024-11-22 DIAGNOSIS — C78.6 SECONDARY ADENOCARCINOMA OF RETROPERITONEUM: ICD-10-CM

## 2024-11-22 DIAGNOSIS — C15.9 ESOPHAGEAL CANCER, STAGE IV: Primary | ICD-10-CM

## 2024-11-22 DIAGNOSIS — C79.89 MALIGNANT NEOPLASM METASTATIC TO OTHER SITE: ICD-10-CM

## 2024-11-22 PROCEDURE — 63600175 PHARM REV CODE 636 W HCPCS: Performed by: INTERNAL MEDICINE

## 2024-11-22 RX ORDER — HEPARIN 100 UNIT/ML
500 SYRINGE INTRAVENOUS
Status: DISCONTINUED | OUTPATIENT
Start: 2024-11-22 | End: 2024-11-22 | Stop reason: HOSPADM

## 2024-11-22 RX ORDER — SODIUM CHLORIDE 0.9 % (FLUSH) 0.9 %
10 SYRINGE (ML) INJECTION
Status: DISCONTINUED | OUTPATIENT
Start: 2024-11-22 | End: 2024-11-22 | Stop reason: HOSPADM

## 2024-11-22 RX ADMIN — HEPARIN 500 UNITS: 100 SYRINGE at 11:11

## 2024-11-22 NOTE — NURSING
Pt came in for pump D/C. Pt tolerated home 5FU well and had no complaints. PAC flushed w/ NS and heparin and deaccessed. Pt education reinforced and explained what to expect in the next couple of days. Pt verbalized understanding.   [employer] called requesting a refill of Amlodipine 5 MG and clopidogrel 75 MG.    Please send to [facility] when available.

## 2024-11-25 ENCOUNTER — TELEPHONE (OUTPATIENT)
Dept: HEMATOLOGY/ONCOLOGY | Facility: CLINIC | Age: 59
End: 2024-11-25
Payer: COMMERCIAL

## 2024-11-25 NOTE — TELEPHONE ENCOUNTER
"Call placed to check in on pt post infusion, spoke to pt who reports he is doing well. He denies any major side effects. Reviewed upcoming appts with pt who VU. Pt denies any further needs at this time though I encouraged him to reach out with any needs/concerns.       Oncology Navigation   Intake  Cancer Type: Head and Neck  Type of Referral: Internal  Date of Referral: 10/08/24  Initial Nurse Navigator Contact: 10/08/24  Referral to Initial Contact Timeline (days): 0  First Appointment Available: 10/17/24  Appointment Date: 10/17/24  First Available Date vs. Scheduled Date (days): 0  Multiple appointments: No  Reason if booked > 7 days after scheduling: Specific provider / access     Treatment  Current Status: Active  Date Presented to Tumor Board: 10/11/24    Surgery: Completed  Surgical Oncologist: Ralph  Type of Surgery: feeding tube and PORT placement  Surgery Schedule Date: 10/21/24    Medical Oncologist: Salomon  Consult Date: 10/11/24  Chemotherapy: Planned  Chemotherapy Regimen: [id number]  Immunotherapy: Planned  Immunotherapy Name: trastuzumab + pembro  Start Date: 24       Procedures: PET scan  EUS / EGD: EGD  PET Scan Schedule Date: 10/10/24    General Referrals: Chemo Education; Dietitian; Palliative Care  Dietitian Name: Agnes Wong Referral Date: 10/16/24  Palliative Care Referral Date: 10/28/24          Support Systems: Spouse/significant other; Family members  Barriers of Care: Barriers to Care "Assessment completed-no barriers noted"     Acuity  Stage: 2  Systemic Treatment - predicted or initiated: Chemotherapy Regimen with Multiple drugs (+1)  Treatment Tolerability: Minimal symptoms  ECO  Comorbidities in Medical History: 0  Hospitalization Within the Past Month: 0  Other Medical Factors (+2 each): Home medical equipment required (feeding tube)   Needed: 0  Support: 0  Verbalizes Financial Concerns: 0  Transportation: 0  History of noncompliance/frequent no shows and " cancellations: 0  Verbalizes the need for more education: 0  Navigation Acuity: 3     Follow Up  No follow-ups on file.

## 2024-12-02 NOTE — PROGRESS NOTES
Subjective:       Patient ID: Rosalio Muñoz is a 58 y.o. male.    Chief Complaint: Chemotherapy and Esophageal Cancer (/)    Primary Oncologist/Hematologist: Dr. Latif    HPI: Mr. Muñoz is a 58 year old male who is following up for his stage IV metastatic esophageal carcinoma. He is here for cycle 1 day 29 of FOLFOX + trastuzumab q 2 weeks + pembrolizumab (keytruda) q 6 weeks.   Cancer Hx: had an EGD which did show a fungating mass at his GE junction, biopsies confirmed the presence of adenocarcinoma. Subsequent PET-CT scan showed a 2.2 cm hypermetabolic right posterior hepatic lesion concerning for metastatic disease, as well as hypermetabolic gastrohepatic ligament lymph node.     Today: Patient has G tube. He states his feedings are going well, 5x/day. He is also eating orally at times as well as staying hydrated. He did has some nausea, relieved with compazine. He denies any vomiting, diarrhea, constipation, fevers, bleeding. He states his weight has leveled off.    Social History     Socioeconomic History    Marital status:     Number of children: 2   Occupational History    Occupation: Industrial electrician   Tobacco Use    Smoking status: Every Day     Current packs/day: 0.00     Types: Cigars, Cigarettes     Last attempt to quit: 10/4/2022     Years since quittin.1     Passive exposure: Never    Smokeless tobacco: Never    Tobacco comments:     Cigar every afternoon   Substance and Sexual Activity    Alcohol use: Yes     Alcohol/week: 4.0 - 6.0 standard drinks of alcohol     Types: 4 - 6 Cans of beer per week     Comment: occ    Drug use: No    Sexual activity: Yes     Partners: Female     Social Drivers of Health     Financial Resource Strain: Medium Risk (2024)    Overall Financial Resource Strain (CARDIA)     Difficulty of Paying Living Expenses: Somewhat hard   Food Insecurity: Patient Declined (2024)    Hunger Vital Sign     Worried About Running Out of Food in the Last  Year: Patient declined     Ran Out of Food in the Last Year: Patient declined   Transportation Needs: No Transportation Needs (10/22/2024)    TRANSPORTATION NEEDS     Transportation : No   Physical Activity: Insufficiently Active (11/5/2024)    Exercise Vital Sign     Days of Exercise per Week: 2 days     Minutes of Exercise per Session: 20 min   Stress: No Stress Concern Present (11/5/2024)    [employer] - Occupational Stress Questionnaire     Feeling of Stress : Not at all   Housing Stability: Unknown (11/5/2024)    Housing Stability Vital Sign     Unable to Pay for Housing in the Last Year: Patient declined     Homeless in the Last Year: No       Past Medical History:   Diagnosis Date    Esophageal cancer, stage IV 10/10/2024    Hypertension     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 10/11/2024       Family History   Problem Relation Name Age of Onset    Hyperlipidemia Mother      Hypertension Father      Diabetes Father      Kidney disease Father      Heart disease Father      Breast cancer Maternal Grandmother      Prostate cancer Maternal Grandfather      Colon cancer Neg Hx         Past Surgical History:   Procedure Laterality Date    COLONOSCOPY N/A 8/25/2023    Procedure: COLONOSCOPY;  Surgeon: Pebbles Spear MD;  Location: [facility];  Service: Endoscopy;  Laterality: N/A;    COLONOSCOPY N/A 10/2/2024    Procedure: COLONOSCOPY  Cardiac clearance received on 09/20/24 per Dr. Lockett, Cardiology.  Note in encounters.  LB;  Surgeon: Sully Farris MD;  Location: [facility];  Service: Endoscopy;  Laterality: N/A;    ESOPHAGOGASTRODUODENOSCOPY N/A 10/2/2024    Procedure: EGD (ESOPHAGOGASTRODUODENOSCOPY);  Surgeon: Sully Farris MD;  Location: [facility];  Service: Endoscopy;  Laterality: N/A;    INSERTION OF VENOUS ACCESS PORT Left 10/21/2024    Procedure: INSERTION, VENOUS ACCESS PORT;  Surgeon: Roseanna Rosas MD;  Location: [facility];  Service: General;   Laterality: Left;    SURGICAL REMOVAL OF LESION OF FACE Right 12/1/2023    Procedure: EXCISION, LESION, FACE;  Surgeon: Jose Flaherty MD;  Location: [facility] OR;  Service: ENT;  Laterality: Right;  1. Excision facial subcutaneous mass right cheek 3 cm. 2. Intermediate layered closure 3.5 cm    WISDOM TOOTH EXTRACTION      XI ROBOTIC APPENDECTOMY N/A 7/31/2024    Procedure: XI ROBOTIC APPENDECTOMY;  Surgeon: Paulo Saavedra MD;  Location: [facility] OR;  Service: General;  Laterality: N/A;    XI ROBOTIC INSERTION, GASTROSTOMY TUBE N/A 10/21/2024    Procedure: XI ROBOTIC INSERTION, GASTROSTOMY TUBE;  Surgeon: Roseanna Rosas MD;  Location: [facility] OR;  Service: General;  Laterality: N/A;       Review of Systems   Constitutional:  Negative for activity change, appetite change, chills, diaphoresis, fatigue, fever and unexpected weight change.   HENT:  Negative for congestion and nosebleeds.    Respiratory:  Negative for cough and shortness of breath.    Cardiovascular:  Negative for chest pain and leg swelling.   Gastrointestinal:  Negative for abdominal pain, anal bleeding, blood in stool, constipation, diarrhea, nausea and vomiting.        G tube present.    Genitourinary:  Negative for hematuria.   Allergic/Immunologic: Positive for immunocompromised state.         Medication List with Changes/Refills   Current Medications    ACETAMINOPHEN (TYLENOL) 500 MG TABLET    Take 500 mg by mouth every 6 (six) hours as needed for Pain. prn    ASPIRIN (ECOTRIN) 81 MG EC TABLET    Take 1 tablet (81 mg total) by mouth once daily.    ATORVASTATIN (LIPITOR) 20 MG TABLET    Take 1 tablet (20 mg total) by mouth every evening.    CARVEDILOL (COREG) 25 MG TABLET    Take 1 tablet (25 mg total) by mouth 2 (two) times daily.    LIDOCAINE-PRILOCAINE (EMLA) CREAM    Apply topically as needed.    OLANZAPINE (ZYPREXA) 5 MG TABLET    Take 1 tablet (5 mg total) by mouth every evening. Take nightly on days 1-3 of each chemotherapy cycle.    OLANZAPINE  (ZYPREXA) 5 MG TABLET    Take 1 tablet (5 mg total) by mouth every evening. On days 1-3, 15-17, and 29-31 of each chemotherapy cycle.    OXYCODONE (ROXICODONE) 5 MG IMMEDIATE RELEASE TABLET    Take 1 tablet (5 mg total) by mouth every 4 (four) hours as needed for Pain.    PROCHLORPERAZINE (COMPAZINE) 10 MG TABLET    Take 1 tablet (10 mg total) by mouth every 6 (six) hours as needed.    PROCHLORPERAZINE (COMPAZINE) 5 MG TABLET    Take 2 tablets (10 mg total) by mouth every 6 (six) hours as needed for Nausea.     Objective:     Vitals:    12/03/24 0928   BP: 119/81   Pulse: 94   Temp: 97.5 °F (36.4 °C)       Physical Exam  Vitals reviewed.   Constitutional:       General: He is not in acute distress.     Appearance: He is not ill-appearing, toxic-appearing or diaphoretic.   HENT:      Head: Normocephalic and atraumatic.   Cardiovascular:      Rate and Rhythm: Normal rate.   Abdominal:      Comments: G tube present   Musculoskeletal:      Right lower leg: No edema.      Left lower leg: No edema.   Skin:     General: Skin is warm.      Coloration: Skin is not jaundiced or pale.      Findings: No bruising, erythema, lesion or rash.   Neurological:      Mental Status: He is alert.      Gait: Gait normal.   Psychiatric:         Mood and Affect: Mood normal.         Behavior: Behavior normal.         Thought Content: Thought content normal.            Labs/Results:  Lab Results   Component Value Date    WBC 5.08 12/03/2024    RBC 4.24 (L) 12/03/2024    HGB 13.9 (L) 12/03/2024    HCT 40.3 12/03/2024    MCV 95 12/03/2024    MCH 32.8 (H) 12/03/2024    MCHC 34.5 12/03/2024    RDW 13.8 12/03/2024     12/03/2024    MPV 9.9 12/03/2024    GRAN 3.0 12/03/2024    GRAN 59.6 12/03/2024    LYMPH 1.3 12/03/2024    LYMPH 25.4 12/03/2024    MONO 0.6 12/03/2024    MONO 11.8 12/03/2024    EOS 0.1 12/03/2024    BASO 0.04 12/03/2024    EOSINOPHIL 2.0 12/03/2024    BASOPHIL 0.8 12/03/2024      Latest Reference Range & Units 12/03/24  09:47   Ferritin 20.0 - 300.0 ng/mL 272     CMP  Sodium   Date Value Ref Range Status   12/03/2024 133 (L) 136 - 145 mmol/L Final     Potassium   Date Value Ref Range Status   12/03/2024 4.3 3.5 - 5.1 mmol/L Final     Chloride   Date Value Ref Range Status   12/03/2024 99 95 - 110 mmol/L Final     CO2   Date Value Ref Range Status   12/03/2024 28 23 - 29 mmol/L Final     Glucose   Date Value Ref Range Status   12/03/2024 88 70 - 110 mg/dL Final     BUN   Date Value Ref Range Status   12/03/2024 8 6 - 20 mg/dL Final     Creatinine   Date Value Ref Range Status   12/03/2024 0.7 0.5 - 1.4 mg/dL Final     Calcium   Date Value Ref Range Status   12/03/2024 9.5 8.7 - 10.5 mg/dL Final     Total Protein   Date Value Ref Range Status   12/03/2024 7.4 6.0 - 8.4 g/dL Final     Albumin   Date Value Ref Range Status   12/03/2024 3.6 3.5 - 5.2 g/dL Final     Total Bilirubin   Date Value Ref Range Status   12/03/2024 0.5 0.1 - 1.0 mg/dL Final     Comment:     For infants and newborns, interpretation of results should be based  on gestational age, weight and in agreement with clinical  observations.    Premature Infant recommended reference ranges:  Up to 24 hours.............<8.0 mg/dL  Up to 48 hours............<12.0 mg/dL  3-5 days..................<15.0 mg/dL  6-29 days.................<15.0 mg/dL       Alkaline Phosphatase   Date Value Ref Range Status   12/03/2024 95 40 - 150 U/L Final     AST   Date Value Ref Range Status   12/03/2024 22 10 - 40 U/L Final     ALT   Date Value Ref Range Status   12/03/2024 23 10 - 44 U/L Final     Anion Gap   Date Value Ref Range Status   12/03/2024 6 (L) 8 - 16 mmol/L Final     eGFR   Date Value Ref Range Status   12/03/2024 >60 >60 mL/min/1.73 m^2 Final     Pet scan 10/10/24  Impression:  Gastroesophageal cancer with metastatic lymphadenopathy and right hepatic lobe metastasis.  Assessment:     Problem List Items Addressed This Visit       Esophageal cancer, stage IV - Primary    Secondary  adenocarcinoma of retroperitoneum    Secondary liver cancer    Immunodeficiency due to chemotherapy     Plan:     Esophageal cancer, stage IV, Secondary adenocarcinoma of retroperitoneum, Secondary liver cancer, 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, Immunodeficiency due to chemotherapy  --metastatic esophageal adenocarcinoma   --PDL1 + and HER2 +  --continue with cycle 1 day 29 of FOLFOX + trastuzumab q 2 weeks + pembrolizumab (keytruda) q 6 weeks.   --ANC:3.0, plts:220 tbili:0.5 crcl: ml/min  --continue with zyprexa nightly on days 1-3, 15-15 and 29-31 of chemo cycle.   --continue to follow with nutrition    Follow-Up: 2 weeks with cbc cmp  prior for cycle 2 day 2-standing orders in with primary oncologist. 4 weeks with cbc cmp mag prior for cycle 2 day 15.     Shahrzad Bran PA-C  Hematology Oncology    Route Chart for Scheduling    Med Onc Chart Routing      Follow up with physician . 2 weeks with cbc cmp mag prior for cycle 2 day 1   Follow up with JORDANA . 4 weeks with cbc cmp mag prior for cycle 2 day 15   Infusion scheduling note   2 weeks for cycle 2 day 1. 4 weeks for cycle 2 day 15   Injection scheduling note    Labs   Scheduling:  Preferred lab:  Lab interval:  Standing orders in   Imaging    Pharmacy appointment    Other referrals                  Treatment Plan Information   OP GASTROESOPHAGEAL pembrolizumab Q6W trastuzumab + MFOLFOX6 (oxaliplatin leucovorin fluorouracil) Q2W Hudson Latif MD   Associated diagnosis: Esophageal cancer, stage IV Stage IVB cTX, cN0, cM1, G2 noted on 10/10/2024  Associated diagnosis: 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   noted on 10/11/2024  Associated diagnosis: Secondary adenocarcinoma of retroperitoneum   noted on 10/11/2024   Line of treatment: First Line  Treatment Goal: Control     Upcoming Treatment Dates - OP GASTROESOPHAGEAL pembrolizumab Q6W trastuzumab + MFOLFOX6 (oxaliplatin leucovorin fluorouracil) Q2W    12/4/2024       Chemotherapy        trastuzumab-anns (KANJINTI) 321 mg in 0.9% NaCl 250 mL chemo infusion       oxaliplatin (ELOXATIN) 85 mg/m2 = 173 mg in D5W 534.6 mL chemo infusion       leucovorin calcium 400 mg/m2 = 810 mg in D5W 250 mL infusion       fluorouraciL injection 405 mg       fluorouracil (Adrucil) 1,200 mg/m2 = 2,435 mg in 0.9% NaCl 100 mL chemo infusion       Antiemetics       palonosetron 0.25mg/dexAMETHasone 12mg in NS IVPB 0.25 mg 50 mL  12/18/2024       Chemotherapy       trastuzumab-anns (KANJINTI) 321 mg in 0.9% NaCl 250 mL chemo infusion       oxaliplatin (ELOXATIN) 85 mg/m2 = 173 mg in D5W 534.6 mL chemo infusion       leucovorin calcium 400 mg/m2 = 810 mg in D5W 250 mL infusion       fluorouraciL injection 405 mg       fluorouracil (Adrucil) 1,200 mg/m2 = 2,435 mg in 0.9% NaCl 100 mL chemo infusion       Antiemetics       palonosetron 0.25mg/dexAMETHasone 12mg in NS IVPB 0.25 mg 50 mL       Immunotherapy       pembrolizumab (KEYTRUDA) 400 mg in 0.9% NaCl SolP 116 mL infusion  1/1/2025       Chemotherapy       trastuzumab-anns (KANJINTI) 321 mg in 0.9% NaCl 250 mL chemo infusion       oxaliplatin (ELOXATIN) 85 mg/m2 = 173 mg in D5W 534.6 mL chemo infusion       leucovorin calcium 400 mg/m2 = 810 mg in D5W 250 mL infusion       fluorouraciL injection 405 mg       fluorouracil (Adrucil) 1,200 mg/m2 = 2,435 mg in 0.9% NaCl 100 mL chemo infusion       Antiemetics       palonosetron 0.25mg/dexAMETHasone 12mg in NS IVPB 0.25 mg 50 mL  1/15/2025       Chemotherapy       trastuzumab-anns (KANJINTI) 321 mg in 0.9% NaCl 250 mL chemo infusion       oxaliplatin (ELOXATIN) 85 mg/m2 = 173 mg in D5W 534.6 mL chemo infusion       leucovorin calcium 400 mg/m2 = 810 mg in D5W 250 mL infusion       fluorouraciL injection 405 mg       fluorouracil (Adrucil) 1,200 mg/m2 = 2,435 mg in 0.9% NaCl 100 mL chemo infusion       Antiemetics       palonosetron 0.25mg/dexAMETHasone 12mg in NS IVPB 0.25 mg 50 mL    Supportive Plan Information  OP  FERUMOXYTOL Hudson Latif MD   Associated Diagnosis: Iron deficiency anemia due to chronic blood loss   noted on 10/12/2024   Line of treatment: Supportive Care   Treatment goal: Supportive     Upcoming Treatment Dates - OP FERUMOXYTOL    10/12/2024       Medications       FERUMOXYTOL 510 MG/117 ML D5W (READY TO MIX SYSTEM) IVPB 510 mg  10/19/2024       Medications       ferumoxytoL (FERAHEME) 510 mg in D5W 117 mL IVPB

## 2024-12-03 ENCOUNTER — OFFICE VISIT (OUTPATIENT)
Dept: HEMATOLOGY/ONCOLOGY | Facility: CLINIC | Age: 59
End: 2024-12-03
Payer: COMMERCIAL

## 2024-12-03 ENCOUNTER — LAB VISIT (OUTPATIENT)
Dept: LAB | Facility: HOSPITAL | Age: 59
End: 2024-12-03
Attending: INTERNAL MEDICINE
Payer: COMMERCIAL

## 2024-12-03 VITALS
WEIGHT: 167.88 LBS | OXYGEN SATURATION: 99 % | HEART RATE: 94 BPM | HEIGHT: 73 IN | SYSTOLIC BLOOD PRESSURE: 119 MMHG | DIASTOLIC BLOOD PRESSURE: 81 MMHG | TEMPERATURE: 98 F | BODY MASS INDEX: 22.25 KG/M2

## 2024-12-03 DIAGNOSIS — D84.821 IMMUNODEFICIENCY DUE TO CHEMOTHERAPY: ICD-10-CM

## 2024-12-03 DIAGNOSIS — T45.1X5A IMMUNODEFICIENCY DUE TO CHEMOTHERAPY: ICD-10-CM

## 2024-12-03 DIAGNOSIS — C79.89 MALIGNANT NEOPLASM METASTATIC TO OTHER SITE: ICD-10-CM

## 2024-12-03 DIAGNOSIS — C15.9 ESOPHAGEAL CANCER, STAGE IV: ICD-10-CM

## 2024-12-03 DIAGNOSIS — C78.6 SECONDARY ADENOCARCINOMA OF RETROPERITONEUM: ICD-10-CM

## 2024-12-03 DIAGNOSIS — C15.9 ESOPHAGEAL CANCER, STAGE IV: Primary | ICD-10-CM

## 2024-12-03 DIAGNOSIS — Z79.899 IMMUNODEFICIENCY DUE TO CHEMOTHERAPY: ICD-10-CM

## 2024-12-03 DIAGNOSIS — C78.7 SECONDARY LIVER CANCER: ICD-10-CM

## 2024-12-03 DIAGNOSIS — C15.9 ESOPHAGEAL ADENOCARCINOMA: ICD-10-CM

## 2024-12-03 LAB
ALBUMIN SERPL BCP-MCNC: 3.6 G/DL (ref 3.5–5.2)
ALP SERPL-CCNC: 95 U/L (ref 40–150)
ALT SERPL W/O P-5'-P-CCNC: 23 U/L (ref 10–44)
ANION GAP SERPL CALC-SCNC: 6 MMOL/L (ref 8–16)
AST SERPL-CCNC: 22 U/L (ref 10–40)
BASOPHILS # BLD AUTO: 0.04 K/UL (ref 0–0.2)
BASOPHILS NFR BLD: 0.8 % (ref 0–1.9)
BILIRUB SERPL-MCNC: 0.5 MG/DL (ref 0.1–1)
BUN SERPL-MCNC: 8 MG/DL (ref 6–20)
CALCIUM SERPL-MCNC: 9.5 MG/DL (ref 8.7–10.5)
CHLORIDE SERPL-SCNC: 99 MMOL/L (ref 95–110)
CO2 SERPL-SCNC: 28 MMOL/L (ref 23–29)
CREAT SERPL-MCNC: 0.7 MG/DL (ref 0.5–1.4)
DIFFERENTIAL METHOD BLD: ABNORMAL
EOSINOPHIL # BLD AUTO: 0.1 K/UL (ref 0–0.5)
EOSINOPHIL NFR BLD: 2 % (ref 0–8)
ERYTHROCYTE [DISTWIDTH] IN BLOOD BY AUTOMATED COUNT: 13.8 % (ref 11.5–14.5)
EST. GFR  (NO RACE VARIABLE): >60 ML/MIN/1.73 M^2
FERRITIN SERPL-MCNC: 272 NG/ML (ref 20–300)
GLUCOSE SERPL-MCNC: 88 MG/DL (ref 70–110)
HCT VFR BLD AUTO: 40.3 % (ref 40–54)
HGB BLD-MCNC: 13.9 G/DL (ref 14–18)
IMM GRANULOCYTES # BLD AUTO: 0.02 K/UL (ref 0–0.04)
IMM GRANULOCYTES NFR BLD AUTO: 0.4 % (ref 0–0.5)
IRON SERPL-MCNC: 72 UG/DL (ref 45–160)
LYMPHOCYTES # BLD AUTO: 1.3 K/UL (ref 1–4.8)
LYMPHOCYTES NFR BLD: 25.4 % (ref 18–48)
MAGNESIUM SERPL-MCNC: 1.8 MG/DL (ref 1.6–2.6)
MCH RBC QN AUTO: 32.8 PG (ref 27–31)
MCHC RBC AUTO-ENTMCNC: 34.5 G/DL (ref 32–36)
MCV RBC AUTO: 95 FL (ref 82–98)
MONOCYTES # BLD AUTO: 0.6 K/UL (ref 0.3–1)
MONOCYTES NFR BLD: 11.8 % (ref 4–15)
NEUTROPHILS # BLD AUTO: 3 K/UL (ref 1.8–7.7)
NEUTROPHILS NFR BLD: 59.6 % (ref 38–73)
NRBC BLD-RTO: 0 /100 WBC
PLATELET # BLD AUTO: 220 K/UL (ref 150–450)
PMV BLD AUTO: 9.9 FL (ref 9.2–12.9)
POTASSIUM SERPL-SCNC: 4.3 MMOL/L (ref 3.5–5.1)
PROT SERPL-MCNC: 7.4 G/DL (ref 6–8.4)
RBC # BLD AUTO: 4.24 M/UL (ref 4.6–6.2)
SATURATED IRON: 17 % (ref 20–50)
SODIUM SERPL-SCNC: 133 MMOL/L (ref 136–145)
TOTAL IRON BINDING CAPACITY: 432 UG/DL (ref 250–450)
TRANSFERRIN SERPL-MCNC: 292 MG/DL (ref 200–375)
WBC # BLD AUTO: 5.08 K/UL (ref 3.9–12.7)

## 2024-12-03 PROCEDURE — 36415 COLL VENOUS BLD VENIPUNCTURE: CPT

## 2024-12-03 PROCEDURE — 82728 ASSAY OF FERRITIN: CPT

## 2024-12-03 PROCEDURE — 83735 ASSAY OF MAGNESIUM: CPT | Performed by: INTERNAL MEDICINE

## 2024-12-03 PROCEDURE — 3079F DIAST BP 80-89 MM HG: CPT | Mod: CPTII,S$GLB,,

## 2024-12-03 PROCEDURE — 3074F SYST BP LT 130 MM HG: CPT | Mod: CPTII,S$GLB,,

## 2024-12-03 PROCEDURE — 1159F MED LIST DOCD IN RCRD: CPT | Mod: CPTII,S$GLB,,

## 2024-12-03 PROCEDURE — 83540 ASSAY OF IRON: CPT

## 2024-12-03 PROCEDURE — 4010F ACE/ARB THERAPY RXD/TAKEN: CPT | Mod: CPTII,S$GLB,,

## 2024-12-03 PROCEDURE — 80053 COMPREHEN METABOLIC PANEL: CPT | Performed by: INTERNAL MEDICINE

## 2024-12-03 PROCEDURE — 3008F BODY MASS INDEX DOCD: CPT | Mod: CPTII,S$GLB,,

## 2024-12-03 PROCEDURE — 99999 PR PBB SHADOW E&M-EST. PATIENT-LVL IV: CPT | Mod: PBBFAC,,,

## 2024-12-03 PROCEDURE — 99215 OFFICE O/P EST HI 40 MIN: CPT | Mod: S$GLB,,,

## 2024-12-03 PROCEDURE — 85025 COMPLETE CBC W/AUTO DIFF WBC: CPT | Performed by: INTERNAL MEDICINE

## 2024-12-04 ENCOUNTER — INFUSION (OUTPATIENT)
Dept: INFUSION THERAPY | Facility: HOSPITAL | Age: 59
End: 2024-12-04
Attending: INTERNAL MEDICINE
Payer: COMMERCIAL

## 2024-12-04 ENCOUNTER — OFFICE VISIT (OUTPATIENT)
Dept: PALLIATIVE MEDICINE | Facility: CLINIC | Age: 59
End: 2024-12-04
Payer: COMMERCIAL

## 2024-12-04 VITALS
BODY MASS INDEX: 22.35 KG/M2 | TEMPERATURE: 98 F | OXYGEN SATURATION: 99 % | HEIGHT: 73 IN | RESPIRATION RATE: 18 BRPM | SYSTOLIC BLOOD PRESSURE: 119 MMHG | DIASTOLIC BLOOD PRESSURE: 74 MMHG | WEIGHT: 168.63 LBS | HEART RATE: 94 BPM

## 2024-12-04 DIAGNOSIS — C15.9 ESOPHAGEAL CANCER, STAGE IV: Primary | ICD-10-CM

## 2024-12-04 DIAGNOSIS — C79.89 MALIGNANT NEOPLASM METASTATIC TO OTHER SITE: ICD-10-CM

## 2024-12-04 DIAGNOSIS — Z51.5 PALLIATIVE CARE ENCOUNTER: ICD-10-CM

## 2024-12-04 DIAGNOSIS — C15.9 ESOPHAGEAL ADENOCARCINOMA: ICD-10-CM

## 2024-12-04 DIAGNOSIS — C78.6 SECONDARY ADENOCARCINOMA OF RETROPERITONEUM: ICD-10-CM

## 2024-12-04 PROCEDURE — 96367 TX/PROPH/DG ADDL SEQ IV INF: CPT

## 2024-12-04 PROCEDURE — 63600175 PHARM REV CODE 636 W HCPCS: Performed by: INTERNAL MEDICINE

## 2024-12-04 PROCEDURE — 96415 CHEMO IV INFUSION ADDL HR: CPT

## 2024-12-04 PROCEDURE — 96411 CHEMO IV PUSH ADDL DRUG: CPT

## 2024-12-04 PROCEDURE — 96417 CHEMO IV INFUS EACH ADDL SEQ: CPT

## 2024-12-04 PROCEDURE — 96368 THER/DIAG CONCURRENT INF: CPT

## 2024-12-04 PROCEDURE — 96413 CHEMO IV INFUSION 1 HR: CPT

## 2024-12-04 PROCEDURE — 99999 PR PBB SHADOW E&M-EST. PATIENT-LVL III: CPT | Mod: PBBFAC,,,

## 2024-12-04 PROCEDURE — 25000003 PHARM REV CODE 250: Performed by: INTERNAL MEDICINE

## 2024-12-04 PROCEDURE — 96416 CHEMO PROLONG INFUSE W/PUMP: CPT

## 2024-12-04 RX ORDER — SODIUM CHLORIDE 0.9 % (FLUSH) 0.9 %
10 SYRINGE (ML) INJECTION
Status: DISCONTINUED | OUTPATIENT
Start: 2024-12-04 | End: 2024-12-04 | Stop reason: HOSPADM

## 2024-12-04 RX ORDER — EPINEPHRINE 0.3 MG/.3ML
0.3 INJECTION SUBCUTANEOUS ONCE AS NEEDED
Status: DISCONTINUED | OUTPATIENT
Start: 2024-12-04 | End: 2024-12-04 | Stop reason: HOSPADM

## 2024-12-04 RX ORDER — FLUOROURACIL 50 MG/ML
200 INJECTION, SOLUTION INTRAVENOUS
Status: COMPLETED | OUTPATIENT
Start: 2024-12-04 | End: 2024-12-04

## 2024-12-04 RX ORDER — DIPHENHYDRAMINE HYDROCHLORIDE 50 MG/ML
50 INJECTION INTRAMUSCULAR; INTRAVENOUS ONCE AS NEEDED
Status: DISCONTINUED | OUTPATIENT
Start: 2024-12-04 | End: 2024-12-04 | Stop reason: HOSPADM

## 2024-12-04 RX ORDER — PROCHLORPERAZINE EDISYLATE 5 MG/ML
10 INJECTION INTRAMUSCULAR; INTRAVENOUS ONCE AS NEEDED
Status: DISCONTINUED | OUTPATIENT
Start: 2024-12-04 | End: 2024-12-04 | Stop reason: HOSPADM

## 2024-12-04 RX ADMIN — TRASTUZUMAB-ANNS 300 MG: 420 INJECTION, POWDER, LYOPHILIZED, FOR SOLUTION INTRAVENOUS at 11:12

## 2024-12-04 RX ADMIN — FLUOROURACIL 2435 MG: 50 INJECTION, SOLUTION INTRAVENOUS at 02:12

## 2024-12-04 RX ADMIN — OXALIPLATIN 173 MG: 5 INJECTION, SOLUTION INTRAVENOUS at 12:12

## 2024-12-04 RX ADMIN — LEUCOVORIN CALCIUM 810 MG: 500 INJECTION, POWDER, LYOPHILIZED, FOR SOLUTION INTRAMUSCULAR; INTRAVENOUS at 12:12

## 2024-12-04 RX ADMIN — FLUOROURACIL 405 MG: 50 INJECTION, SOLUTION INTRAVENOUS at 02:12

## 2024-12-04 RX ADMIN — SODIUM CHLORIDE 0.25 MG: 9 INJECTION, SOLUTION INTRAVENOUS at 11:12

## 2024-12-04 NOTE — DISCHARGE INSTRUCTIONS
[facility]  [address] [address]  [address]  [phone] phone     [phone] fax  Hours of Operation: Monday- Friday 8:00am- 5:00pm  After hours phone  [phone]  Hematology / Oncology Physicians on call      [address] NP Phaon Dunbar, NP Khelsea Conley, FNP    Please call with any concerns regarding your appointment today.

## 2024-12-04 NOTE — PATIENT INSTRUCTIONS
"Please bring health care power of  back to the palliative care clinic  We will complete the "witness" part  You do not have to make an appointment to bring the form back.   Call [facility] if you need pain meds.  Use [email or web address] for information about medical marijuana    "

## 2024-12-04 NOTE — PROGRESS NOTES
Palliative Medicine Clinic Note  Consult        Consult Requested By: Dr. Hudson Latif      Reason for Consult: Symptom Management/Advance Care Planning/Goals of Care Discussion    Chief Complaint: Advance Care Planning/Goals of Care Discussion          ASSESSMENT/PLAN:      Plan/Recommendations:    1. Esophageal cancer, stage IV  Assessment & Plan:  Followed by Dr. Latif, Kanu, and Surgonc- Dr. Rosas.    S/P PEG tube placement 10/21/2024 for feeds.   On Mfolfox6/pembrolizumab Q6W/trastuzumab- Palliative Intent.   PET 10/10/2024: Gastroesophageal cancer with metastatic lymphadenopathy and right hepatic lobe metastasis.   Dietitian assisting with TF    Orders:  -     Ambulatory referral/consult to CLIN[facility] Palliative Care    2. Esophageal adenocarcinoma  -     Ambulatory referral/consult to CLINIC Palliative Care    3. Palliative care encounter  Assessment & Plan:    -Code status: Full Code. Pt to have ongoing GOC conversation with his family.   -HCPOA: Wife: Fanta Muñoz: [phone] . 2 adult sons. HCPOA form pending completion.   -GOC:  Continue cancer treatment, symptom management, maintain independence and functional status.  Pt aware treatment intent is palliative.   -See HPI for further details                Advance Care Planning   Advance Directives:   Living Will: No    [place]POST: No    Do Not Resuscitate Status: No    Medical Power of : No    Agent's Name:  SDM: Wife: Fanta Muñoz   Agent's Contact Number:  [phone]    Decision Making:  Patient answered questions and Family answered questions  Goals of Care: The patient, family, and healthcare power of   endorses that what is most important right now is to focus on remaining as independent as possible and symptom/pain control    Accordingly, we have decided that the best plan to meet the patient's goals includes continuing with treatment.     Pt wishes to  continue cancer treatment, symptom management, maintain  independence and functional status.  He is aware that treatment intent is palliative in nature.   He wishes to remain Full Code for now and have ongoing discussions with his family regarding completing a living will.   He wishes to elect his wife Fanta Muñoz as HCPOA. He has 2 adult sons.   He will return completed HCPOA form to PM clinic once completed.                   Follow up: PRN     Plan discussed with: Patient and his wife, Ms. Villarreal.        SUBJECTIVE:      History of Present Illness / Interval History:  Rosalio Muñoz is 58 y.o. male with Metastatic Esophageal Cancer to Liver.  S/P PEG tube placement 10/21/2024 for feeds.   On Mfolfox6/pembrolizumab Q6W/trastuzumab- Palliative Intent.   Followed by Dr. Latif, Kanu, and Surgonc- Dr. Rosas.   Presents to Palliative Care Clinic for physical symptoms, advance care planning,, clarification of goals of care, and additional support..   Please see oncology notes for more details on pt's care.       12/4/24  History obtained from: Patient, his wife, and medical records  Pt attended clinic with his wife, Ms. Villarreal. He was in no acute distress. Vital signs stable on room air.   I explained the role palliative care often plays in supporting patients with serious illness and their families regarding symptom management, advance care planning, and goals of care discussion. Pt and wife verbalized understanding  He reported feeling well overall. Tolerating tube feeds by gravity.   He denied pain, but notes some fatigue.   We discussed advance care planning, goals of care, and code status, Full Code vs. DNR including risks, benefits, and alternatives.  Pt wishes to  continue cancer treatment, symptom management, maintain independence and functional status.  He is aware that treatment intent is palliative in nature.   He wishes to remain Full Code for now and have ongoing discussions with his family regarding completing a living will.   He wishes to  elect his wife Fanta Muñoz as HCPOA. He has 2 adult sons.   He will return completed HCPOA form to PM clinic once completed.     ROS:  Review of Systems   Constitutional:  Positive for activity change, fatigue and unexpected weight change (Unintentional weight loss).   HENT:  Positive for hearing loss (Hearing aids) and trouble swallowing. Negative for voice change.    Gastrointestinal:  Negative for abdominal distention, abdominal pain, constipation and diarrhea.        PEG tube   Psychiatric/Behavioral:  Negative for depressed mood, sleep disturbance and suicidal ideas. The patient is not nervous/anxious.        Review of Symptoms      Symptom Assessment (ESAS 0-10 Scale)  Pain:  0  Dyspnea:  0  Anxiety:  0  Nausea:  0  Depression:  0  Anorexia:  0  Fatigue:  0  Insomnia:  0  Restlessness:  0  Agitation:  0     CAM / Delirium:  Negative  Constipation:  Negative  Diarrhea:  Negative    Anxiety:  Is not nervous/anxious  Constipation:  No constipation    Bowel Management Plan (BMP):  No      Modified Yolie Scale:  0    Performance Status:  90    ECOG Performance Status ndGndrndanddndend:nd nd2nd Living Arrangements:  Lives with spouse and Lives in home    Psychosocial/Cultural:   See Palliative Psychosocial Note: Yes  . 2 adult sons   **Primary  to Follow**  Palliative Care  Consult: No            Medications:    Current Outpatient Medications:     acetaminophen (TYLENOL) 500 MG tablet, Take 500 mg by mouth every 6 (six) hours as needed for Pain. prn, Disp: , Rfl:     LIDOcaine-prilocaine (EMLA) cream, Apply topically as needed., Disp: 5 g, Rfl: 11    OLANZapine (ZYPREXA) 5 MG tablet, Take 1 tablet (5 mg total) by mouth every evening. Take nightly on days 1-3 of each chemotherapy cycle., Disp: 6 tablet, Rfl: 11    OLANZapine (ZYPREXA) 5 MG tablet, Take 1 tablet (5 mg total) by mouth every evening. On days 1-3, 15-17, and 29-31 of each chemotherapy cycle., Disp: 9 tablet, Rfl: 7     prochlorperazine (COMPAZINE) 10 MG tablet, Take 1 tablet (10 mg total) by mouth every 6 (six) hours as needed., Disp: 20 tablet, Rfl: 5    prochlorperazine (COMPAZINE) 5 MG tablet, Take 2 tablets (10 mg total) by mouth every 6 (six) hours as needed for Nausea., Disp: 20 tablet, Rfl: 5    aspirin (ECOTRIN) 81 MG EC tablet, Take 1 tablet (81 mg total) by mouth once daily. (Patient not taking: Reported on 12/4/2024), Disp: 90 tablet, Rfl: 3    atorvastatin (LIPITOR) 20 MG tablet, Take 1 tablet (20 mg total) by mouth every evening. (Patient not taking: Reported on 12/4/2024), Disp: 90 tablet, Rfl: 3    carvediloL (COREG) 25 MG tablet, Take 1 tablet (25 mg total) by mouth 2 (two) times daily. (Patient not taking: Reported on 12/4/2024), Disp: 180 tablet, Rfl: 3    oxyCODONE (ROXICODONE) 5 MG immediate release tablet, Take 1 tablet (5 mg total) by mouth every 4 (four) hours as needed for Pain., Disp: 15 tablet, Rfl: 0      External  database queried on 12/04/2024  by LUIS FERNANDO WEST :      Review of patient's allergies indicates:  No Known Allergies        OBJECTIVE:         Physical Exam:  Vitals:      Physical Exam  Vitals and nursing note reviewed.   Constitutional:       General: He is not in acute distress.     Appearance: He is normal weight. He is ill-appearing.   HENT:      Head: Normocephalic and atraumatic.      Ears:      Comments: [place]     Nose: Nose normal. No congestion or rhinorrhea.      Mouth/Throat:      Mouth: Mucous membranes are moist.      Pharynx: Oropharynx is clear. No oropharyngeal exudate or posterior oropharyngeal erythema.   Eyes:      General: No scleral icterus.        Right eye: No discharge.         Left eye: No discharge.      Conjunctiva/sclera: Conjunctivae normal.      Pupils: Pupils are equal, round, and reactive to light.   Cardiovascular:      Rate and Rhythm: Normal rate and regular rhythm.      Pulses: Normal pulses.      Heart sounds: Normal heart sounds. No murmur  heard.     No gallop.   Pulmonary:      Effort: Pulmonary effort is normal. No respiratory distress.      Breath sounds: Normal breath sounds. No stridor. No wheezing.   Chest:      Chest wall: No tenderness.   Abdominal:      General: Bowel sounds are normal. There is no distension.      Palpations: Abdomen is soft.      Tenderness: There is no abdominal tenderness.      Comments: Peg tube- site clean dry intact    Genitourinary:     Comments: Deferred  Musculoskeletal:         General: No swelling or tenderness. Normal range of motion.      Cervical back: Normal range of motion and neck supple.      Right lower leg: No edema.      Left lower leg: No edema.   Skin:     General: Skin is warm and dry.      Capillary Refill: Capillary refill takes less than 2 seconds.      Coloration: Skin is not jaundiced or pale.      Findings: No rash.   Neurological:      Mental Status: He is alert and oriented to person, place, and time.      Motor: No weakness.   Psychiatric:         Attention and Perception: Attention and perception normal.         Mood and Affect: Mood and affect normal.         Speech: Speech normal.         Behavior: Behavior normal. Behavior is cooperative.         Thought Content: Thought content normal. Thought content does not include suicidal ideation. Thought content does not include suicidal plan.         Cognition and Memory: Cognition and memory normal.         Judgment: Judgment normal.           Labs: BMP  Lab Results   Component Value Date     (L) 12/03/2024    K 4.3 12/03/2024    CL 99 12/03/2024    CO2 28 12/03/2024    BUN 8 12/03/2024    CREATININE 0.7 12/03/2024    CALCIUM 9.5 12/03/2024    ANIONGAP 6 (L) 12/03/2024    EGFRNORACEVR >60 12/03/2024     Lab Results   Component Value Date    WBC 5.08 12/03/2024    HGB 13.9 (L) 12/03/2024    HCT 40.3 12/03/2024    MCV 95 12/03/2024     12/03/2024             Imaging: PET 10/10/2024: Gastroesophageal cancer with metastatic  lymphadenopathy and right hepatic lobe metastasis.        I spent a total of 60 minutes on the day of the visit. This includes face to face time in discussion of goals of care, symptom assessment, coordination of care and emotional support.  This also includes non-face to face time preparing to see the patient (eg, review of tests/imaging), obtaining and/or reviewing separately obtained history, documenting clinical information in the electronic or other health record, independently interpreting results and communicating results to the patient/family/caregiver, or care coordinator.     Additional 20 min time spent on a voluntary advance care planning and /or goals of care discussion, providing emotional support, formulating and communicating prognosis and exploring burden/benefit of various approaches of treatment.       LUIS FERNANDO FERNÁNDEZ NP

## 2024-12-04 NOTE — PLAN OF CARE
Discussed plan of care with pt. Addressed any and ongoing concerns. Pt denies   Problem: Adult Inpatient Plan of Care  Goal: Plan of Care Review  Outcome: Progressing  Goal: Patient-Specific Goal (Individualized)  Outcome: Progressing  Flowsheets (Taken 12/4/2024 1129)  Individualized Care Needs: Warm blanket, pillow , cup for tube feeding and cup for water  Anxieties, Fears or Concerns: No concerns today  Patient/Family-Specific Goals (Include Timeframe): Will tolerate treatment today without adverse affects  Goal: Absence of Hospital-Acquired Illness or Injury  Outcome: Progressing  Intervention: Identify and Manage Fall Risk  Flowsheets (Taken 12/4/2024 1129)  Safety Promotion/Fall Prevention: in recliner, wheels locked  Intervention: Prevent Infection  Flowsheets (Taken 12/4/2024 1129)  Infection Prevention:   equipment surfaces disinfected   hand hygiene promoted   personal protective equipment utilized  Goal: Optimal Comfort and Wellbeing  Outcome: Progressing  Intervention: Provide Person-Centered Care  Flowsheets (Taken 12/4/2024 1129)  Trust Relationship/Rapport:   care explained   questions encouraged   choices provided   reassurance provided   emotional support provided   thoughts/feelings acknowledged   empathic listening provided   questions answered

## 2024-12-06 ENCOUNTER — INFUSION (OUTPATIENT)
Dept: INFUSION THERAPY | Facility: HOSPITAL | Age: 59
End: 2024-12-06
Attending: INTERNAL MEDICINE
Payer: COMMERCIAL

## 2024-12-06 VITALS
RESPIRATION RATE: 16 BRPM | HEART RATE: 98 BPM | DIASTOLIC BLOOD PRESSURE: 67 MMHG | SYSTOLIC BLOOD PRESSURE: 110 MMHG | TEMPERATURE: 98 F

## 2024-12-06 DIAGNOSIS — C79.89 MALIGNANT NEOPLASM METASTATIC TO OTHER SITE: ICD-10-CM

## 2024-12-06 DIAGNOSIS — C78.6 SECONDARY ADENOCARCINOMA OF RETROPERITONEUM: ICD-10-CM

## 2024-12-06 DIAGNOSIS — C15.9 ESOPHAGEAL CANCER, STAGE IV: Primary | ICD-10-CM

## 2024-12-06 PROCEDURE — 63600175 PHARM REV CODE 636 W HCPCS: Performed by: INTERNAL MEDICINE

## 2024-12-06 PROCEDURE — A4216 STERILE WATER/SALINE, 10 ML: HCPCS | Performed by: INTERNAL MEDICINE

## 2024-12-06 PROCEDURE — 25000003 PHARM REV CODE 250: Performed by: INTERNAL MEDICINE

## 2024-12-06 RX ORDER — SODIUM CHLORIDE 0.9 % (FLUSH) 0.9 %
10 SYRINGE (ML) INJECTION
Status: DISCONTINUED | OUTPATIENT
Start: 2024-12-06 | End: 2024-12-06 | Stop reason: HOSPADM

## 2024-12-06 RX ORDER — HEPARIN 100 UNIT/ML
500 SYRINGE INTRAVENOUS
Status: DISCONTINUED | OUTPATIENT
Start: 2024-12-06 | End: 2024-12-06 | Stop reason: HOSPADM

## 2024-12-06 RX ADMIN — HEPARIN 500 UNITS: 100 SYRINGE at 11:12

## 2024-12-06 RX ADMIN — Medication 10 ML: at 11:12

## 2024-12-06 NOTE — NURSING
Chemotherapy pump completed; mediport flushed with saline/heparin; ray needle removed; band-aid to site.

## 2024-12-10 ENCOUNTER — TELEPHONE (OUTPATIENT)
Dept: SURGICAL ONCOLOGY | Facility: CLINIC | Age: 59
End: 2024-12-10
Payer: COMMERCIAL

## 2024-12-10 NOTE — TELEPHONE ENCOUNTER
Pt called back to confirm his appt for tomorrow. Verbalized understanding of time date and location of appt.

## 2024-12-10 NOTE — TELEPHONE ENCOUNTER
Lm on  asking pt to call back to confirm tomorrow;'s appts. Went over [facility] appt too. Gave good call back number

## 2024-12-11 ENCOUNTER — OFFICE VISIT (OUTPATIENT)
Dept: SURGICAL ONCOLOGY | Facility: CLINIC | Age: 59
End: 2024-12-11
Payer: COMMERCIAL

## 2024-12-11 ENCOUNTER — NUTRITION (OUTPATIENT)
Dept: NUTRITION | Facility: CLINIC | Age: 59
End: 2024-12-11
Payer: COMMERCIAL

## 2024-12-11 VITALS
HEART RATE: 71 BPM | SYSTOLIC BLOOD PRESSURE: 132 MMHG | HEIGHT: 73 IN | WEIGHT: 165.88 LBS | BODY MASS INDEX: 21.98 KG/M2 | DIASTOLIC BLOOD PRESSURE: 80 MMHG | TEMPERATURE: 98 F

## 2024-12-11 VITALS — BODY MASS INDEX: 21.81 KG/M2 | WEIGHT: 165.38 LBS

## 2024-12-11 DIAGNOSIS — C15.9 ESOPHAGEAL CANCER, STAGE IV: Primary | ICD-10-CM

## 2024-12-11 DIAGNOSIS — R63.4 UNINTENTIONAL WEIGHT LOSS: ICD-10-CM

## 2024-12-11 DIAGNOSIS — Z72.0 TOBACCO ABUSE: ICD-10-CM

## 2024-12-11 PROBLEM — Z51.5 PALLIATIVE CARE ENCOUNTER: Status: ACTIVE | Noted: 2024-12-11

## 2024-12-11 PROCEDURE — 1159F MED LIST DOCD IN RCRD: CPT | Mod: CPTII,S$GLB,, | Performed by: SURGERY

## 2024-12-11 PROCEDURE — 99999 PR PBB SHADOW E&M-EST. PATIENT-LVL II: CPT | Mod: PBBFAC,,,

## 2024-12-11 PROCEDURE — 99024 POSTOP FOLLOW-UP VISIT: CPT | Mod: S$GLB,,, | Performed by: SURGERY

## 2024-12-11 PROCEDURE — 99999 PR PBB SHADOW E&M-EST. PATIENT-LVL III: CPT | Mod: PBBFAC,,, | Performed by: SURGERY

## 2024-12-11 PROCEDURE — 3075F SYST BP GE 130 - 139MM HG: CPT | Mod: CPTII,S$GLB,, | Performed by: SURGERY

## 2024-12-11 PROCEDURE — 4010F ACE/ARB THERAPY RXD/TAKEN: CPT | Mod: CPTII,S$GLB,, | Performed by: SURGERY

## 2024-12-11 PROCEDURE — 3079F DIAST BP 80-89 MM HG: CPT | Mod: CPTII,S$GLB,, | Performed by: SURGERY

## 2024-12-11 NOTE — PROGRESS NOTES
Surgical Oncology Clinic Note      Referring Provider: Dr. Hudson Latif   PCP: Michell Goyal MD    Reason For Visit: Gastroesophageal: esophageal cancer (GE junction)      HISTORY OF PRESENT ILLNESS     Rosalio Muñoz is a 58 y.o. male w/ significant smoking and drinking history who presents today for evaluation and management of a distal esophageal mass found on imaging.      Wanted to establish care with a primary care physician after losing about 25-30 lb over the previous 4 months without effort.  At that time a CT of the abdomen and pelvis with IV contrast was obtained as well as thyroid studies (which were normal).  The CT scan showed severe thickening at the GE junction concerning for an esophageal mass as well as multiple enlarged lymph nodes within the epigastric region concerning for local metastatic disease.  He was also noted to have some scattered subcentimeter hypodensities within the liver too small to characterize.  He was then referred to me for further evaluation.  Of note, he was recently admitted to the hospital for 5 days with a acute appendicitis back in July.    He states that ever since he had COVID about 2 years ago he has had really hard time swallowing pills and that really cold or really hot liquids have burned and really bothered him.  He attributed all that COVID originally.  However does note that over the last 4-5 months he has had increasing issues with swallowing and he is really limited himself now to just liquids and softer foods.  He used to eat ribeye steak regularly and according to his wife has not had any in about 4-5 months.  He states whenever he eats he feels like food gets stuck in his chest and then he feels full very quickly.  He does note that soups and mashed potatoes go down fine but anything more solid than that tends to get stuck.  He has occasionally vomited and even vomited up a liquid diet the other night but states that that is uncommon.  He  has tried to really maintain his weight and has been drinking more [employer] protein shake, ice cream, peanut butter, and whole milk-and has managed to gain back about 5 lb.  He has also decreased his drinking in his gone from drinking 8-10 beers a day to around 4 beers a day.  He does note a longstanding history of reflux but has never had an EGD and was told several years ago the hiatal hernia.    He does have a longstanding history of smoking.  He used to smoke about 2 packs a day from age 17 up until a couple of years ago.  He did quit smoking at that time but started sleeping however quit that when his hypertension got worse, and then he started smoking cigars and he now smokes about 5-6 baby cigars in a day has done that for quite some time.  His last colonoscopy was a little over a year ago, at that time they found around 22 polyps and he was told to get a repeat scope in 6 months but has not had that done yet.  He has no real family history of cancer.    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  Wife is house supervisor at [employer].  2 sons - ages 36 and 32.   for 36 years.        INTERVAL HISTORY   10/16/2024:  Patient returns to clinic today.  He has had an EGD which did show a fungating mass at his GE junction, biopsies confirmed the presence of adenocarcinoma.  Subsequent PET-CT scan showed a 2.2 cm hypermetabolic right posterior hepatic lesion concerning for metastatic disease, as well as hypermetabolic gastrohepatic ligament lymph node.  He has met with Dr. Crowley with Radiation Oncology as well as Dr. Latif with Medical Oncology.  He was originally planning to go see Dr. Bran, my partner, in [place] however with a diagnosis of presumed metastatic disease they are deferring that at this time.    11/5/2024:  doing 3 bolus feeds/ day and having a little PO intake.  States he feels bloated for about an hour after doing the  feeds.  Feeling more energy now.      12/11/2024:  doing well.  Some fatigue with chemo.  No complaints at this time.     Past Medical History:   Diagnosis Date    Esophageal cancer, stage IV 10/10/2024    Hypertension     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 10/11/2024       Past Surgical History:   Procedure Laterality Date    COLONOSCOPY N/A 8/25/2023    Procedure: COLONOSCOPY;  Surgeon: Pebbles Spear MD;  Location: [facility] ENDO;  Service: Endoscopy;  Laterality: N/A;    COLONOSCOPY N/A 10/2/2024    Procedure: COLONOSCOPY  Cardiac clearance received on 09/20/24 per Dr. Lockett, Cardiology.  Note in encounters.  LB;  Surgeon: Sully Farris MD;  Location: [facility];  Service: Endoscopy;  Laterality: N/A;    ESOPHAGOGASTRODUODENOSCOPY N/A 10/2/2024    Procedure: EGD (ESOPHAGOGASTRODUODENOSCOPY);  Surgeon: Sully Farris MD;  Location: [facility];  Service: Endoscopy;  Laterality: N/A;    INSERTION OF VENOUS ACCESS PORT Left 10/21/2024    Procedure: INSERTION, VENOUS ACCESS PORT;  Surgeon: Roseanna Rosas MD;  Location: [facility] OR;  Service: General;  Laterality: Left;    SURGICAL REMOVAL OF LESION OF FACE Right 12/1/2023    Procedure: EXCISION, LESION, FACE;  Surgeon: Jose Flaherty MD;  Location: [facility] OR;  Service: ENT;  Laterality: Right;  1. Excision facial subcutaneous mass right cheek 3 cm. 2. Intermediate layered closure 3.5 cm    WISDOM TOOTH EXTRACTION      XI ROBOTIC APPENDECTOMY N/A 7/31/2024    Procedure: XI ROBOTIC APPENDECTOMY;  Surgeon: Paulo Saavedra MD;  Location: [facility] OR;  Service: General;  Laterality: N/A;    XI ROBOTIC INSERTION, GASTROSTOMY TUBE N/A 10/21/2024    Procedure: XI ROBOTIC INSERTION, GASTROSTOMY TUBE;  Surgeon: Roseanna Rosas MD;  Location: [facility] OR;  Service: General;  Laterality: N/A;       Family History   Problem Relation Name Age of Onset    Hyperlipidemia Mother      Hypertension Father      Diabetes Father      Kidney disease Father      Heart disease  Father      Breast cancer Maternal Grandmother      Prostate cancer Maternal Grandfather      Colon cancer Neg Hx         Social History     Socioeconomic History    Marital status:     Number of children: 2   Occupational History    Occupation:    Tobacco Use    Smoking status: Every Day     Current packs/day: 0.00     Types: Cigars, Cigarettes     Last attempt to quit: 10/4/2022     Years since quittin.1     Passive exposure: Never    Smokeless tobacco: Never    Tobacco comments:     Cigar every afternoon   Substance and Sexual Activity    Alcohol use: Yes     Alcohol/week: 4.0 - 6.0 standard drinks of alcohol     Types: 4 - 6 Cans of beer per week     Comment: occ    Drug use: No    Sexual activity: Yes     Partners: Female     Social Drivers of Health     Financial Resource Strain: Medium Risk (2024)    Overall Financial Resource Strain (CARDIA)     Difficulty of Paying Living Expenses: Somewhat hard   Food Insecurity: Patient Declined (2024)    Hunger Vital Sign     Worried About Running Out of Food in the Last Year: Patient declined     Ran Out of Food in the Last Year: Patient declined   Transportation Needs: No Transportation Needs (10/22/2024)    TRANSPORTATION NEEDS     Transportation : No   Physical Activity: Insufficiently Active (2024)    Exercise Vital Sign     Days of Exercise per Week: 2 days     Minutes of Exercise per Session: 20 min   Stress: No Stress Concern Present (2024)    [employer] [place] [employer] - Occupational Stress Questionnaire     Feeling of Stress : Not at all   Housing Stability: Unknown (2024)    Housing Stability Vital Sign     Unable to Pay for Housing in the Last Year: Patient declined     Homeless in the Last Year: No          Medication List            Accurate as of 2024 12:31 PM. If you have any questions, ask your nurse or doctor.                CONTINUE taking these medications       acetaminophen 500 MG tablet  Commonly known as: TYLENOL     aspirin 81 MG EC tablet  Commonly known as: ECOTRIN  Take 1 tablet (81 mg total) by mouth once daily.     atorvastatin 20 MG tablet  Commonly known as: LIPITOR  Take 1 tablet (20 mg total) by mouth every evening.     carvediloL 25 MG tablet  Commonly known as: COREG  Take 1 tablet (25 mg total) by mouth 2 (two) times daily.     LIDOcaine-prilocaine cream  Commonly known as: EMLA  Apply topically as needed.     * OLANZapine 5 MG tablet  Commonly known as: ZyPREXA  Take 1 tablet (5 mg total) by mouth every evening. Take nightly on days 1-3 of each chemotherapy cycle.     * OLANZapine 5 MG tablet  Commonly known as: ZyPREXA  Take 1 tablet (5 mg total) by mouth every evening. On days 1-3, 15-17, and 29-31 of each chemotherapy cycle.     oxyCODONE 5 MG immediate release tablet  Commonly known as: ROXICODONE  Take 1 tablet (5 mg total) by mouth every 4 (four) hours as needed for Pain.     * prochlorperazine 5 MG tablet  Commonly known as: COMPAZINE  Take 2 tablets (10 mg total) by mouth every 6 (six) hours as needed for Nausea.     * prochlorperazine 10 MG tablet  Commonly known as: COMPAZINE  Take 1 tablet (10 mg total) by mouth every 6 (six) hours as needed.           * This list has 4 medication(s) that are the same as other medications prescribed for you. Read the directions carefully, and ask your doctor or other care provider to review them with you.                  Review of patient's allergies indicates:  No Known Allergies    ROS    PHYSICAL EXAM     Vitals:    12/11/24 0959   BP: 132/80   Pulse: 71   Temp: 97.9 °F (36.6 °C)     Body mass index is 21.89 kg/m².  ECOG SCORE             Physical Exam     Incisions c/d/I.  G tube in place.  Port incision well healed       DATA REVIEW:  I personally reviewed the following records for this visit: lab work from prior visit, notes from other physicians, computed tomography/CT imaging, surgical pathology  "results, endoscopy results, radiographic study evaluation, and laboratory results done by primary care physician    LABS       Lab Results   Component Value Date    WBC 5.08 12/03/2024    HGB 13.9 (L) 12/03/2024    HCT 40.3 12/03/2024     12/03/2024     Lab Results   Component Value Date    GLU 88 12/03/2024    CALCIUM 9.5 12/03/2024    ALBUMIN 3.6 12/03/2024    PROT 7.4 12/03/2024     (L) 12/03/2024    K 4.3 12/03/2024    CO2 28 12/03/2024    CL 99 12/03/2024    BUN 8 12/03/2024    CREATININE 0.7 12/03/2024    ALKPHOS 95 12/03/2024    ALT 23 12/03/2024    AST 22 12/03/2024    BILITOT 0.5 12/03/2024       Nutritional:  No results found for: "PREALBUMIN"    Tumor Markers:  No results found for: "CEA", "AMYLASE", "ASPIRATE", "LKI280", "", "CY7435", "AFP", "AFPTM"  No results found for: ""    PATHOLOGY     10/02/2024:  EGD          Abnormal   1. Esophagus, mass, biopsy:  - Adenocarcinoma, moderately differentiated, see comment.  MMR pending**    2. Stomach, biopsy:  - Antral mucosa with minimal reactive changes.    - Negative for Helicobacter organisms on routine H&E sections.      3. Colon, transverse, polypectomies:  - Colonic mucosa with hyperplastic change and lymphoid aggregate, multiple fragments.      4. Colon, descending, polypectomies:  - Tubular adenoma, multiple fragments.    - Colonic mucosa with hyperplastic change, multiple fragments.      5. Colon, sigmoid, polypectomy:  - Hyperplastic polyp.      6. Rectum, polypectomies:  - Hyperplastic polyp, multiple fragments.      COMMENT: The esophagus mass (specimen 1) shows a malignant epithelial proliferation.  Immunostains show the cells to be positive for CK7 and negative for CDX2, synaptophysin, and chromogranin.  This is consistent with adenocarcinoma.  Part 1 of this case   is reviewed by Dr. TESSA Parson who agrees with the above diagnosis.  Appropriate positive controls are examined.  Specimen 1 will be sent for Claudin 18.2, results " will be issued in addendum.    Immunohistochemistry (IHC) Testing for Mismatch Repair (MMR) Proteins:    MLH1 - Intact nuclear expression  MSH2 - Intact nuclear expression  MSH6 - Intact nuclear expression  PMS2 - Intact nuclear expression    Background nonneoplastic tissue/internal control with intact nuclear expression    IHC Interpretation  No loss of nuclear expression of MMR proteins: low probability of microsatellite instability    There are exceptions to the above IHC interpretations. These results should not be considered in isolation, and clinical correlation with genetic counseling is recommended to assess the need for germline testing.  VC       Comment: Interp By Ioana Gomes MD, Signed on 10/15/2024 at 08:14   Supplemental Diagnosis      Abnormal   HER2 by immunohistochemistry performed on specimen 1: Positive (3+)    Specimen 1 sent for tempus.  Appropriate positive controls are examined         10/18/2024:  IR Liver biopsy   Liver mass (needle biopsy):   Positive for malignancy.  Consistent with metastatic carcinoma   See comment.     Comment:  The patient has biopsy proven esophageal adenocarcinoma (HGS-, 10/2/2024). Ancillary studies (MMR IHC, Her2 IHC, Eqolsxe01.2, and Tempus NGS) have been ordered on the previous biopsy material.     IMAGING     08/07/2023:  CT Chest- Lung screen  FINDINGS:   There are 2 stable tiny benign type subpleural nodules in the right middle lobe measuring up to 3 mm on image 280 series 4.  Stable 3 mm benign-type subpleural nodule in the left upper lobe on image 100.  No suspicious lung nodules are identified.  No lung masses.  No acute infiltrate or pleural effusion identified.  No pathologically enlarged lymph nodes are identified. Coronary artery calcifications are noted. No suspicious lytic or blastic bone marrow lesions are identified.  Limited images through the upper abdomen demonstrate no suspicious mass or acute disease.    07/31/2024:  CT Renal Stone  Study  Impression:   1.  Inflammatory changes surround the appendix which, measures 1 cm in diameter.  There are some air bubbles immediately adjacent to the tip of the appendix in the adjacent fat, concerning for early perforation of the appendix.   2. Multiple dilated air and fluid-filled loops of small bowel noted, concerning for ileus or small-bowel obstruction.   3.  Negative for acute process otherwise.  Negative for renal stone disease or hydronephrosis.   4.  Extensive coronary artery calcifications.  Large hiatal hernia.  Vascular calcifications without aneurysmal change.  Other nonemergent findings as described above.     09/06/2024:  CT Chest, Abdomen, and Pelvis  Impression:   1. Severe thickening at the GE junction which could reflect distal esophageal mass versus less likely esophagitis. Recommend endoscopy with possible tissue sampling.  Multiple enlarged lymph nodes are seen within the epigastric region measuring up to 2.4 cm concerning for metastatic disease.   2.  See above for additional findings and recommendations    10/10/2024:  PET CT Scan  Impression:  Gastroesophageal cancer with metastatic lymphadenopathy and right hepatic lobe metastasis.    ASSESSMENT & PLAN     1. Esophageal cancer, stage IV    2. Tobacco abuse    3. Unintentional weight loss       Rosalio Muñoz is a 58 y.o. male with metastatic esophageal adenocarcinoma s/p robotic G tube and port placement.     He is doing well.  Tolerating chemo well.      -- Gtube stitches removed in clinic today.  Discussed with him what to do if tube falls out and other precautions.  Pt verbalized understanding.    -- continue TF  -- RTC PRN     Mr. Muñoz expressed understanding in regards to our discussion today. Many good questions were asked on today's visit, all of which were answered to their satisfaction.    Follow-up: Follow up as needed.                  Roseanna Rosas MD MS              Surgical Oncology              [facility] [place]              Office: [phone]

## 2024-12-11 NOTE — ASSESSMENT & PLAN NOTE
-Code status: Full Code. Pt to have ongoing GOC conversation with his family.   -HCPOA: Wife: Fanta Muñoz: [phone] . 2 adult sons. HCPOA form pending completion.   -GOC:  Continue cancer treatment, symptom management, maintain independence and functional status.  Pt aware treatment intent is palliative.   -See HPI for further details

## 2024-12-11 NOTE — PATIENT INSTRUCTIONS
Recommendations:   Continue to consume protein shakes and eating soft foods for pleasure.   Continue Centrum liquid multivitamin.   Rate/Volume/Schedule: Administer 1 carton of Isosource 1.5  by syringe bolus feed via PEG every 3 hours with 60 ml water flush before and 120 ml after each carton. Recommend 5 cartons daily. Once this is well tolerated and if possible, increase to 6 cartons daily spaced every 2.5 hours.   Additional Water flushes: none  Provides: 2580 mL/day total volume, 2250 kcals/day, 102 g/day protein

## 2024-12-11 NOTE — PROGRESS NOTES
"Oncology Nutrition Assessment for Medical Nutrition Therapy Follow-up Visit  Consultation Time: 15 Minutes  Referring Provider: Hudson Latif MD  Reason for Nutrition Consult: Enteral Formula , Follow Up , New Feeding Tube - Gastrostomy Tube - PEG, Nutrition related questions, and Weight Loss    Nutrition Assessment      Patient Information:    Rosalio Muñoz  : 1965   58 y.o. male    Allergies/Intolerances: No known food allergies  Social Data: lives with spouse/significant Other.   Anthropometrics:     Weight: 75 kg (165 lb 5.5 oz)                                 Height:  6'1" (1.85 m)     BMI: Body mass index is 21.81 kg/m².             Usual BW: 188 lb 3/28/24   Weight Change: loss of 23 lb or 12% of weight in 9 months     Supplements/Vitamins:    MVI/Supp: yes - liquid adult multivitamin  Drug/Nutrient interactions: No Activity Level:     Low Active     Form of Activity: activities of daily living , walking      Malnutrition Assessment:   Nutrition Risk: Patient does not meet at least 2 characteristics of the ASPEN/AND criteria at this time, but may be at risk due to significant weight loss in the past 9 months.      Food/Nutrition-related history:    DME/Insurance:  [facility] Self [facility]   Formula: Isosource 1.5   Rate/Volume/Schedule: Administer 1 carton of Isosource 1.5  by syringe bolus feed via PEG every 3 hours with 60 ml water flush before and 120 ml after each carton. Recommend 5 cartons daily. Once this is well tolerated, increase to 6 cartons daily spaced every 2.5 hours.   Additional Water flushes: none  Provides: 2580 mL/day total volume, 2250 kcals/day, 102 g/day protein  Patient currently taking in 5 cartons of formula or 1875 kcal per day via PEG except on days he feels sick from chemo and just takes in 3-4 cartons daily.     Diet/PO Recall:   Appetite: Fair  Fluid Intake: Adequate  Diet Recall:  Breakfast: toast and 2 eggs     Lunch: a few bites of dinner leftovers  "   Dinner: a few bites of dinner meal   Snacks: 0-1x/day  Drinks: water  ONS: 2/day of protein shakes   Servings of F/V per day: 0-1x/day  Eating out: 0-1x/week.   Cultural/Spiritual/Personal Preferences: Texture specific     GI Symptoms: early satiety, heartburn or weight loss 23 lbs. within the last 9 month(s)   Oncology Nutrition Symptoms: dysphagia, esophagitis, heartburn, weight loss, and feel full quickly              Difficulty chewing or swallowing?  yes - limited to liquid and soft diet     Patient Notes/Reports: Pt referred for a Nutrition Consultation related to Enteral Formula , Follow Up , New Feeding Tube - Gastrostomy Tube - PEG, Nutrition related questions, and Weight Loss. Patient has a medical diagnosis of esophageal cancer. Mr. Muñoz completed several cycles of OP GI mFOLFOX6 (oxaliplatin leucovorin fluorouracil) Q2W chemotherapy. Mr. Muñoz has esophageal cancer stage IV. Mr. Muñoz takes in 5 cartons per day of Jevity 1.5 via PEG most days - sometimes 3-4 cartons on days he doesn't feel well due to chemo- and eats small portions of soft food for pleasure. Mr. Muñoz is in better spirits than last month and is thankful it is easier to eat.    Medical Tests and Procedures:  Patient Active Problem List   Diagnosis    HTN (hypertension)    Umbilical hernia    Tobacco use disorder    Delayed immunizations    Other hyperlipidemia    Hypertensive urgency    Elevated troponin    Hypomagnesemia    Bilateral hearing loss    Alcohol use    Unintentional weight loss    Low libido    Alcohol use disorder    Dysphagia    Personal history of colon polyps, unspecified    Esophageal cancer, stage IV    Secondary adenocarcinoma of retroperitoneum    Secondary liver cancer    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    Cachexia    Iron deficiency anemia due to chronic blood loss    Severe protein-calorie malnutrition    Immunodeficiency due to chemotherapy      Past Medical History:   Diagnosis Date     Esophageal cancer, stage IV 10/10/2024    Hypertension     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 10/11/2024     Past Surgical History:   Procedure Laterality Date    COLONOSCOPY N/A 8/25/2023    Procedure: COLONOSCOPY;  Surgeon: Pebbles Spear MD;  Location: [facility] ENDO;  Service: Endoscopy;  Laterality: N/A;    COLONOSCOPY N/A 10/2/2024    Procedure: COLONOSCOPY  Cardiac clearance received on 09/20/24 per Dr. Lockett, Cardiology.  Note in encounters.  LB;  Surgeon: Sully Farris MD;  Location: [facility] ENDO;  Service: Endoscopy;  Laterality: N/A;    ESOPHAGOGASTRODUODENOSCOPY N/A 10/2/2024    Procedure: EGD (ESOPHAGOGASTRODUODENOSCOPY);  Surgeon: Sully Farris MD;  Location[facility];  Service: Endoscopy;  Laterality: N/A;    INSERTION OF VENOUS ACCESS PORT Left 10/21/2024    Procedure: INSERTION, VENOUS ACCESS PORT;  Surgeon: Roseanna Rosas MD;  Location: [facility] OR;  Service: General;  Laterality: Left;    SURGICAL REMOVAL OF LESION OF FACE Right 12/1/2023    Procedure: EXCISION, LESION, FACE;  Surgeon: Jose Flaherty MD;  Location: [facility] OR;  Service: ENT;  Laterality: Right;  1. Excision facial subcutaneous mass right cheek 3 cm. 2. Intermediate layered closure 3.5 cm    WISDOM TOOTH EXTRACTION      XI ROBOTIC APPENDECTOMY N/A 7/31/2024    Procedure: XI ROBOTIC APPENDECTOMY;  Surgeon: Paulo Saavedra MD;  Location: [facility] OR;  Service: General;  Laterality: N/A;    XI ROBOTIC INSERTION, GASTROSTOMY TUBE N/A 10/21/2024    Procedure: XI ROBOTIC INSERTION, GASTROSTOMY TUBE;  Surgeon: Roseanna Rosas MD;  Location: [facility] OR;  Service: General;  Laterality: N/A;       Current Outpatient Medications   Medication Instructions    acetaminophen (TYLENOL) 500 mg, Every 6 hours PRN    aspirin (ECOTRIN) 81 mg, Oral, Daily    atorvastatin (LIPITOR) 20 mg, Oral, Nightly    carvediloL (COREG) 25 mg, Oral, 2 times daily    LIDOcaine-prilocaine (EMLA) cream Topical (Top), As needed (PRN)    OLANZapine  (ZYPREXA) 5 mg, Oral, Nightly, Take nightly on days 1-3 of each chemotherapy cycle.    OLANZapine (ZYPREXA) 5 mg, Oral, Nightly, On days 1-3, 15-17, and 29-31 of each chemotherapy cycle.    oxyCODONE (ROXICODONE) 5 mg, Oral, Every 4 hours PRN    prochlorperazine (COMPAZINE) 10 mg, Oral, Every 6 hours PRN    prochlorperazine (COMPAZINE) 10 mg, Oral, Every 6 hours PRN      Labs:  Reviewed - followed by MD funes        Nutrition Diagnosis    Nutrition Problem: inadequate protein/energy intake  Etiology (related to): appetite loss , dysphagia , tumor location, and early satiety   Signs/Symptoms (as evidenced by): weight loss, requiring tube feeding for nutrition support, new cancer diagnosis, and self reported diet questions/concerns     Nutrition Intervention      Estimated Energy/Fluid Requirements:   Weight used: CBW 75 kg  Calories: 5903-7555 kcal/day (25-30 kcal/kg)  Protein: 90 g/day (1.2 g/kg)  Fluid: 8747-5201 mL/day (1 mL/kcal)   Recommendations:   Continue to consume protein shakes and eating soft foods for pleasure.   Continue Centrum liquid multivitamin.   Rate/Volume/Schedule: Administer 1 carton of Isosource 1.5  by syringe bolus feed via PEG every 3 hours with 60 ml water flush before and 120 ml after each carton. Recommend 5 cartons daily. Once this is well tolerated and if possible, increase to 6 cartons daily spaced every 2.5 hours.   Additional Water flushes: none  Provides: 2580 mL/day total volume, 2250 kcals/day, 102 g/day protein     Education Needs Satisfied: yes   Education Materials Provided / Reviewed:   Nutrition During Your Cancer Treatment  Nutrition Plan  Barriers to Learning: none identified  Patient and/ or Family Verbalizes understanding: yes      Nutrition Monitoring and Evaluation    Monitor: energy intake, rate/formulation of tube feeding, weight, symptom management , and adherence to nutrition recommendations     Goals:   1: Adequate intake of calories and protein to promote weight gain    Indicator: Weight/BMI    2: Weight maintenance throughout treatment.  and No episodes of dehydration requiring emergency hydrations.   Indicator: Diet Recall     Follow up In 2 weeks     Communication to referring provider/care team: note available in chart  Signature: Agnes Lema, MPH, RD, LDN

## 2024-12-11 NOTE — ASSESSMENT & PLAN NOTE
Followed by Dr. Latif, Radon, and Surgonc- Dr. Rosas.    S/P PEG tube placement 10/21/2024 for feeds.   On Mfolfox6/pembrolizumab Q6W/trastuzumab- Palliative Intent.   PET 10/10/2024: Gastroesophageal cancer with metastatic lymphadenopathy and right hepatic lobe metastasis.   Dietitian assisting with TF

## 2024-12-12 NOTE — PROGRESS NOTES
Subjective:       Patient ID: Rosalio Muñoz is a 58 y.o. male.    Chief Complaint: Esophageal Cancer and Chemotherapy    Primary Oncologist/Hematologist: Dr. Latif    HPI: Mr. Muñoz is a 58 year old male who is following up for his stage IV metastatic esophageal carcinoma. He is here for cycle 2 day 1 of FOLFOX + trastuzumab q 2 weeks + pembrolizumab (keytruda) q 6 weeks.   Cancer Hx: had an EGD which did show a fungating mass at his GE junction, biopsies confirmed the presence of adenocarcinoma. Subsequent PET-CT scan showed a 2.2 cm hypermetabolic right posterior hepatic lesion concerning for metastatic disease, as well as hypermetabolic gastrohepatic ligament lymph node.     Today: Patient has G tube. He states his feedings are going well, 5x/day. He is also eating orally at times as well as staying hydrated. He did has some nausea, relieved with compazine. He does have diarrhea and fatigue, mostly the  after chemo, but not requiring imodium. He denies any vomiting, constipation, fevers, bleeding. He is not taking iron supplement.     Social History     Socioeconomic History    Marital status:     Number of children: 2   Occupational History    Occupation:    Tobacco Use    Smoking status: Every Day     Current packs/day: 0.00     Types: Cigars, Cigarettes     Last attempt to quit: 10/4/2022     Years since quittin.2     Passive exposure: Never    Smokeless tobacco: Never    Tobacco comments:     Cigar every afternoon   Substance and Sexual Activity    Alcohol use: Yes     Alcohol/week: 4.0 - 6.0 standard drinks of alcohol     Types: 4 - 6 Cans of beer per week     Comment: occ    Drug use: No    Sexual activity: Yes     Partners: Female     Social Drivers of Health     Financial Resource Strain: Medium Risk (2024)    Overall Financial Resource Strain (CARDIA)     Difficulty of Paying Living Expenses: Somewhat hard   Food Insecurity: Patient Declined  (11/5/2024)    Hunger Vital Sign     Worried About Running Out of Food in the Last Year: Patient declined     Ran Out of Food in the Last Year: Patient declined   Transportation Needs: No Transportation Needs (10/22/2024)    TRANSPORTATION NEEDS     Transportation : No   Physical Activity: Insufficiently Active (11/5/2024)    Exercise Vital Sign     Days of Exercise per Week: 2 days     Minutes of Exercise per Session: 20 min   Stress: No Stress Concern Present (11/5/2024)    [employer] [place] [employer] - Occupational Stress Questionnaire     Feeling of Stress : Not at all   Housing Stability: Unknown (11/5/2024)    Housing Stability Vital Sign     Unable to Pay for Housing in the Last Year: Patient declined     Homeless in the Last Year: No       Past Medical History:   Diagnosis Date    Esophageal cancer, stage IV 10/10/2024    Hypertension     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 10/11/2024    Palliative care encounter 12/11/2024       Family History   Problem Relation Name Age of Onset    Hyperlipidemia Mother      Hypertension Father      Diabetes Father      Kidney disease Father      Heart disease Father      Breast cancer Maternal Grandmother      Prostate cancer Maternal Grandfather      Colon cancer Neg Hx         Past Surgical History:   Procedure Laterality Date    COLONOSCOPY N/A 8/25/2023    Procedure: COLONOSCOPY;  Surgeon: Pebbles Spear MD;  Location: [facility];  Service: Endoscopy;  Laterality: N/A;    COLONOSCOPY N/A 10/2/2024    Procedure: COLONOSCOPY  Cardiac clearance received on 09/20/24 per Dr. Lockett, Cardiology.  Note in encounters.  LB;  Surgeon: Sully Farris MD;  Location: [facility];  Service: Endoscopy;  Laterality: N/A;    ESOPHAGOGASTRODUODENOSCOPY N/A 10/2/2024    Procedure: EGD (ESOPHAGOGASTRODUODENOSCOPY);  Surgeon: Sully Farris MD;  Location: [facility];  Service: Endoscopy;  Laterality: N/A;    INSERTION OF VENOUS ACCESS PORT Left 10/21/2024     Procedure: INSERTION, VENOUS ACCESS PORT;  Surgeon: Roseanna Rosas MD;  Location: [facility] OR;  Service: General;  Laterality: Left;    SURGICAL REMOVAL OF LESION OF FACE Right 12/1/2023    Procedure: EXCISION, LESION, FACE;  Surgeon: Jose Flaherty MD;  Location: [facility] OR;  Service: ENT;  Laterality: Right;  1. Excision facial subcutaneous mass right cheek 3 cm. 2. Intermediate layered closure 3.5 cm    WISDOM TOOTH EXTRACTION      XI ROBOTIC APPENDECTOMY N/A 7/31/2024    Procedure: XI ROBOTIC APPENDECTOMY;  Surgeon: Paulo Saavedra MD;  Location: [facility] OR;  Service: General;  Laterality: N/A;    XI ROBOTIC INSERTION, GASTROSTOMY TUBE N/A 10/21/2024    Procedure: XI ROBOTIC INSERTION, GASTROSTOMY TUBE;  Surgeon: Roseanna Rosas MD;  Location: [facility] OR;  Service: General;  Laterality: N/A;       Review of Systems   Constitutional:  Positive for fatigue. Negative for activity change, appetite change, chills, diaphoresis, fever and unexpected weight change.   HENT:  Negative for congestion and nosebleeds.    Respiratory:  Negative for cough and shortness of breath.    Cardiovascular:  Negative for chest pain and leg swelling.   Gastrointestinal:  Positive for diarrhea. Negative for abdominal pain, anal bleeding, blood in stool, constipation, nausea and vomiting.        G tube present.    Genitourinary:  Negative for hematuria.   Allergic/Immunologic: Positive for immunocompromised state.         Medication List with Changes/Refills   Current Medications    ACETAMINOPHEN (TYLENOL) 500 MG TABLET    Take 500 mg by mouth every 6 (six) hours as needed for Pain. prn    ASPIRIN (ECOTRIN) 81 MG EC TABLET    Take 1 tablet (81 mg total) by mouth once daily.    ATORVASTATIN (LIPITOR) 20 MG TABLET    Take 1 tablet (20 mg total) by mouth every evening.    CARVEDILOL (COREG) 25 MG TABLET    Take 1 tablet (25 mg total) by mouth 2 (two) times daily.    LIDOCAINE-PRILOCAINE (EMLA) CREAM    Apply topically as needed.    OLANZAPINE  (ZYPREXA) 5 MG TABLET    Take 1 tablet (5 mg total) by mouth every evening. Take nightly on days 1-3 of each chemotherapy cycle.    OLANZAPINE (ZYPREXA) 5 MG TABLET    Take 1 tablet (5 mg total) by mouth every evening. On days 1-3, 15-17, and 29-31 of each chemotherapy cycle.    OXYCODONE (ROXICODONE) 5 MG IMMEDIATE RELEASE TABLET    Take 1 tablet (5 mg total) by mouth every 4 (four) hours as needed for Pain.    PROCHLORPERAZINE (COMPAZINE) 10 MG TABLET    Take 1 tablet (10 mg total) by mouth every 6 (six) hours as needed.    PROCHLORPERAZINE (COMPAZINE) 5 MG TABLET    Take 2 tablets (10 mg total) by mouth every 6 (six) hours as needed for Nausea.     Objective:     Vitals:    12/17/24 1038   BP: 114/75   Pulse: 92   Temp: 97.9 °F (36.6 °C)     Physical Exam  Vitals reviewed.   Constitutional:       General: He is not in acute distress.     Appearance: He is not ill-appearing, toxic-appearing or diaphoretic.   HENT:      Head: Normocephalic and atraumatic.   Cardiovascular:      Rate and Rhythm: Normal rate.   Abdominal:      Comments: G tube present   Musculoskeletal:      Right lower leg: No edema.      Left lower leg: No edema.   Skin:     General: Skin is warm.      Coloration: Skin is not jaundiced or pale.      Findings: No bruising, erythema, lesion or rash.   Neurological:      Mental Status: He is alert.      Gait: Gait normal.   Psychiatric:         Mood and Affect: Mood normal.         Behavior: Behavior normal.         Thought Content: Thought content normal.            Labs/Results:  Lab Results   Component Value Date    WBC 4.72 12/17/2024    RBC 3.98 (L) 12/17/2024    HGB 13.1 (L) 12/17/2024    HCT 38.0 (L) 12/17/2024    MCV 96 12/17/2024    MCH 32.9 (H) 12/17/2024    MCHC 34.5 12/17/2024    RDW 15.0 (H) 12/17/2024     12/17/2024    MPV 10.4 12/17/2024    GRAN 2.7 12/17/2024    GRAN 57.3 12/17/2024    LYMPH 1.2 12/17/2024    LYMPH 25.2 12/17/2024    MONO 0.6 12/17/2024    MONO 13.1 12/17/2024     EOS 0.2 12/17/2024    BASO 0.04 12/17/2024    EOSINOPHIL 3.2 12/17/2024    BASOPHIL 0.8 12/17/2024     CMP  Sodium   Date Value Ref Range Status   12/17/2024 134 (L) 136 - 145 mmol/L Final     Potassium   Date Value Ref Range Status   12/17/2024 4.0 3.5 - 5.1 mmol/L Final     Chloride   Date Value Ref Range Status   12/17/2024 101 95 - 110 mmol/L Final     CO2   Date Value Ref Range Status   12/17/2024 24 23 - 29 mmol/L Final     Glucose   Date Value Ref Range Status   12/17/2024 113 (H) 70 - 110 mg/dL Final     BUN   Date Value Ref Range Status   12/17/2024 9 6 - 20 mg/dL Final     Creatinine   Date Value Ref Range Status   12/17/2024 0.7 0.5 - 1.4 mg/dL Final     Calcium   Date Value Ref Range Status   12/17/2024 9.3 8.7 - 10.5 mg/dL Final     Total Protein   Date Value Ref Range Status   12/17/2024 6.7 6.0 - 8.4 g/dL Final     Albumin   Date Value Ref Range Status   12/17/2024 3.2 (L) 3.5 - 5.2 g/dL Final     Total Bilirubin   Date Value Ref Range Status   12/17/2024 0.5 0.1 - 1.0 mg/dL Final     Comment:     For infants and newborns, interpretation of results should be based  on gestational age, weight and in agreement with clinical  observations.    Premature Infant recommended reference ranges:  Up to 24 hours.............<8.0 mg/dL  Up to 48 hours............<12.0 mg/dL  3-5 days..................<15.0 mg/dL  6-29 days.................<15.0 mg/dL       Alkaline Phosphatase   Date Value Ref Range Status   12/17/2024 91 40 - 150 U/L Final     AST   Date Value Ref Range Status   12/17/2024 25 10 - 40 U/L Final     ALT   Date Value Ref Range Status   12/17/2024 30 10 - 44 U/L Final     Anion Gap   Date Value Ref Range Status   12/17/2024 9 8 - 16 mmol/L Final     eGFR   Date Value Ref Range Status   12/17/2024 >60 >60 mL/min/1.73 m^2 Final        Latest Reference Range & Units 12/03/24 09:47   Iron 45 - 160 ug/dL 72   TIBC 250 - 450 ug/dL 432   Saturated Iron 20 - 50 % 17 (L)   Transferrin 200 - 375 mg/dL 292    Ferritin 20.0 - 300.0 ng/mL 272     Pet scan 10/10/24  Impression:  Gastroesophageal cancer with metastatic lymphadenopathy and right hepatic lobe metastasis.  Assessment:     Problem List Items Addressed This Visit       Esophageal cancer, stage IV - Primary    Secondary adenocarcinoma of retroperitoneum    Secondary liver cancer    Iron deficiency anemia due to chronic blood loss    Immunodeficiency due to chemotherapy     Plan:     Esophageal cancer, stage IV, Secondary adenocarcinoma of retroperitoneum, Secondary liver cancer, 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, Immunodeficiency due to chemotherapy  --metastatic esophageal adenocarcinoma   --PDL1 + and HER2 +  --continue with cycle 2 day 1 of FOLFOX + trastuzumab q 2 weeks + pembrolizumab (keytruda) q 6 weeks.   --echo q 3 months. 10/11/24 EF:55%  --ANC:2.7, plts:186 tbili:0.5 crcl:125.3 ml/min AST:25 ALT:30 glucose:113 cret:0.7  --continue with zyprexa nightly on days 1-3, 15-15 and 29-31 of chemo cycle.   --continue to follow with nutrition  --iron: 72, sat: 17%, ferritin: 272-increased    Follow-Up: 2 weeks with cbc cmp mag prior for cycle 2 day 15. 4 weeks with cbc cmp mag tsh prior for cycle 2 day 29    Shahrzad Bran PA-C  Hematology Oncology    Route Chart for Scheduling    Med Onc Chart Routing      Follow up with physician . 2 weeksw [facility] bc cmp mag prirof ro cycle 2 day 15.   Follow up with JORDANA . 4 weeks with cbc cmp mag tsh priro for cycle 2 day 29   Infusion scheduling note   2 weeks. 4 weeks   Injection scheduling note    Labs CMP, CBC, magnesium and TSH   Scheduling:  Preferred lab:  Lab interval:  standing orders in   Imaging    Pharmacy appointment    Other referrals                  Treatment Plan Information   OP GASTROESOPHAGEAL pembrolizumab Q6W trastuzumab + MFOLFOX6 (oxaliplatin leucovorin fluorouracil) Q2W Hudson Latif MD   Associated diagnosis: Esophageal cancer, stage IV Stage IVB cTX, cN0, cM1, G2 noted on  10/10/2024  Associated diagnosis: 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   noted on 10/11/2024  Associated diagnosis: Secondary adenocarcinoma of retroperitoneum   noted on 10/11/2024   Line of treatment: First Line  Treatment Goal: Control     Upcoming Treatment Dates - OP GASTROESOPHAGEAL pembrolizumab Q6W trastuzumab + MFOLFOX6 (oxaliplatin leucovorin fluorouracil) Q2W    12/18/2024       Chemotherapy       trastuzumab-anns (KANJINTI) in 0.9% NaCl chemo infusion       oxaliplatin (ELOXATIN) in D5W 533.6 mL chemo infusion       leucovorin calcium in D5W 250 mL infusion       fluorouraciL injection       fluorouracil chemo infusion       Antiemetics       palonosetron (ALOXI) 0.25 mg with Dexamethasone (DECADRON) 12 mg in NS 50 mL IVPB       Immunotherapy       pembrolizumab (KEYTRUDA) 400 mg in 0.9% NaCl SolP 116 mL infusion  1/1/2025       Chemotherapy       trastuzumab-anns (KANJINTI) in 0.9% NaCl chemo infusion       oxaliplatin (ELOXATIN) in D5W 533.6 mL chemo infusion       leucovorin calcium in D5W 250 mL infusion       fluorouraciL injection       fluorouracil chemo infusion       Antiemetics       palonosetron (ALOXI) 0.25 mg with Dexamethasone (DECADRON) 12 mg in NS 50 mL IVPB  1/15/2025       Chemotherapy       trastuzumab-anns (KANJINTI) in 0.9% NaCl chemo infusion       oxaliplatin (ELOXATIN) in D5W 533.6 mL chemo infusion       leucovorin calcium in D5W 250 mL infusion       fluorouraciL injection       fluorouracil chemo infusion       Antiemetics       palonosetron (ALOXI) 0.25 mg with Dexamethasone (DECADRON) 12 mg in NS 50 mL IVPB  1/29/2025       Chemotherapy       trastuzumab-anns (KANJINTI) in 0.9% NaCl chemo infusion       oxaliplatin (ELOXATIN) in D5W 533.6 mL chemo infusion       leucovorin calcium in D5W 250 mL infusion       fluorouraciL injection       fluorouracil chemo infusion       Antiemetics       palonosetron (ALOXI) 0.25 mg with Dexamethasone (DECADRON) 12 mg in NS 50 mL  IVPB       Immunotherapy       pembrolizumab (KEYTRUDA) 400 mg in 0.9% NaCl SolP 116 mL infusion    Supportive Plan Information  OP FERUMOXYTOL Hudson Latif MD   Associated Diagnosis: Iron deficiency anemia due to chronic blood loss   noted on 10/12/2024   Line of treatment: Supportive Care   Treatment goal: Supportive     Upcoming Treatment Dates - OP FERUMOXYTOL    10/12/2024       Medications       FERUMOXYTOL 510 MG/117 ML D5W (READY TO MIX SYSTEM) IVPB 510 mg  10/19/2024       Medications       ferumoxytoL (FERAHEME) 510 mg in D5W 117 mL IVPB

## 2024-12-17 ENCOUNTER — OFFICE VISIT (OUTPATIENT)
Dept: HEMATOLOGY/ONCOLOGY | Facility: CLINIC | Age: 59
End: 2024-12-17
Payer: COMMERCIAL

## 2024-12-17 ENCOUNTER — LAB VISIT (OUTPATIENT)
Dept: LAB | Facility: HOSPITAL | Age: 59
End: 2024-12-17
Attending: INTERNAL MEDICINE
Payer: COMMERCIAL

## 2024-12-17 ENCOUNTER — PATIENT MESSAGE (OUTPATIENT)
Dept: HEMATOLOGY/ONCOLOGY | Facility: CLINIC | Age: 59
End: 2024-12-17
Payer: COMMERCIAL

## 2024-12-17 ENCOUNTER — TELEPHONE (OUTPATIENT)
Dept: HEMATOLOGY/ONCOLOGY | Facility: CLINIC | Age: 59
End: 2024-12-17

## 2024-12-17 VITALS
HEIGHT: 73 IN | OXYGEN SATURATION: 100 % | DIASTOLIC BLOOD PRESSURE: 75 MMHG | SYSTOLIC BLOOD PRESSURE: 114 MMHG | HEART RATE: 92 BPM | TEMPERATURE: 98 F | WEIGHT: 167.69 LBS | BODY MASS INDEX: 22.22 KG/M2

## 2024-12-17 DIAGNOSIS — C78.7 SECONDARY LIVER CANCER: ICD-10-CM

## 2024-12-17 DIAGNOSIS — D84.821 IMMUNODEFICIENCY DUE TO CHEMOTHERAPY: ICD-10-CM

## 2024-12-17 DIAGNOSIS — D50.0 IRON DEFICIENCY ANEMIA DUE TO CHRONIC BLOOD LOSS: ICD-10-CM

## 2024-12-17 DIAGNOSIS — C78.6 SECONDARY ADENOCARCINOMA OF RETROPERITONEUM: ICD-10-CM

## 2024-12-17 DIAGNOSIS — T45.1X5A IMMUNODEFICIENCY DUE TO CHEMOTHERAPY: ICD-10-CM

## 2024-12-17 DIAGNOSIS — C15.9 ESOPHAGEAL CANCER, STAGE IV: Primary | ICD-10-CM

## 2024-12-17 DIAGNOSIS — C15.9 ESOPHAGEAL ADENOCARCINOMA: ICD-10-CM

## 2024-12-17 DIAGNOSIS — Z79.899 IMMUNODEFICIENCY DUE TO CHEMOTHERAPY: ICD-10-CM

## 2024-12-17 LAB
ALBUMIN SERPL BCP-MCNC: 3.2 G/DL (ref 3.5–5.2)
ALP SERPL-CCNC: 91 U/L (ref 40–150)
ALT SERPL W/O P-5'-P-CCNC: 30 U/L (ref 10–44)
ANION GAP SERPL CALC-SCNC: 9 MMOL/L (ref 8–16)
AST SERPL-CCNC: 25 U/L (ref 10–40)
BASOPHILS # BLD AUTO: 0.04 K/UL (ref 0–0.2)
BASOPHILS NFR BLD: 0.8 % (ref 0–1.9)
BILIRUB SERPL-MCNC: 0.5 MG/DL (ref 0.1–1)
BUN SERPL-MCNC: 9 MG/DL (ref 6–20)
CALCIUM SERPL-MCNC: 9.3 MG/DL (ref 8.7–10.5)
CHLORIDE SERPL-SCNC: 101 MMOL/L (ref 95–110)
CO2 SERPL-SCNC: 24 MMOL/L (ref 23–29)
CREAT SERPL-MCNC: 0.7 MG/DL (ref 0.5–1.4)
DIFFERENTIAL METHOD BLD: ABNORMAL
EOSINOPHIL # BLD AUTO: 0.2 K/UL (ref 0–0.5)
EOSINOPHIL NFR BLD: 3.2 % (ref 0–8)
ERYTHROCYTE [DISTWIDTH] IN BLOOD BY AUTOMATED COUNT: 15 % (ref 11.5–14.5)
EST. GFR  (NO RACE VARIABLE): >60 ML/MIN/1.73 M^2
GLUCOSE SERPL-MCNC: 113 MG/DL (ref 70–110)
HCT VFR BLD AUTO: 38 % (ref 40–54)
HGB BLD-MCNC: 13.1 G/DL (ref 14–18)
IMM GRANULOCYTES # BLD AUTO: 0.02 K/UL (ref 0–0.04)
IMM GRANULOCYTES NFR BLD AUTO: 0.4 % (ref 0–0.5)
LYMPHOCYTES # BLD AUTO: 1.2 K/UL (ref 1–4.8)
LYMPHOCYTES NFR BLD: 25.2 % (ref 18–48)
MAGNESIUM SERPL-MCNC: 1.6 MG/DL (ref 1.6–2.6)
MCH RBC QN AUTO: 32.9 PG (ref 27–31)
MCHC RBC AUTO-ENTMCNC: 34.5 G/DL (ref 32–36)
MCV RBC AUTO: 96 FL (ref 82–98)
MONOCYTES # BLD AUTO: 0.6 K/UL (ref 0.3–1)
MONOCYTES NFR BLD: 13.1 % (ref 4–15)
NEUTROPHILS # BLD AUTO: 2.7 K/UL (ref 1.8–7.7)
NEUTROPHILS NFR BLD: 57.3 % (ref 38–73)
NRBC BLD-RTO: 0 /100 WBC
PLATELET # BLD AUTO: 186 K/UL (ref 150–450)
PMV BLD AUTO: 10.4 FL (ref 9.2–12.9)
POTASSIUM SERPL-SCNC: 4 MMOL/L (ref 3.5–5.1)
PROT SERPL-MCNC: 6.7 G/DL (ref 6–8.4)
RBC # BLD AUTO: 3.98 M/UL (ref 4.6–6.2)
SODIUM SERPL-SCNC: 134 MMOL/L (ref 136–145)
WBC # BLD AUTO: 4.72 K/UL (ref 3.9–12.7)

## 2024-12-17 PROCEDURE — 83735 ASSAY OF MAGNESIUM: CPT | Performed by: INTERNAL MEDICINE

## 2024-12-17 PROCEDURE — 4010F ACE/ARB THERAPY RXD/TAKEN: CPT | Mod: CPTII,S$GLB,,

## 2024-12-17 PROCEDURE — 3008F BODY MASS INDEX DOCD: CPT | Mod: CPTII,S$GLB,,

## 2024-12-17 PROCEDURE — 99215 OFFICE O/P EST HI 40 MIN: CPT | Mod: S$GLB,,,

## 2024-12-17 PROCEDURE — 80053 COMPREHEN METABOLIC PANEL: CPT | Performed by: INTERNAL MEDICINE

## 2024-12-17 PROCEDURE — 3074F SYST BP LT 130 MM HG: CPT | Mod: CPTII,S$GLB,,

## 2024-12-17 PROCEDURE — 1159F MED LIST DOCD IN RCRD: CPT | Mod: CPTII,S$GLB,,

## 2024-12-17 PROCEDURE — 36415 COLL VENOUS BLD VENIPUNCTURE: CPT | Performed by: INTERNAL MEDICINE

## 2024-12-17 PROCEDURE — 85025 COMPLETE CBC W/AUTO DIFF WBC: CPT | Performed by: INTERNAL MEDICINE

## 2024-12-17 PROCEDURE — 99999 PR PBB SHADOW E&M-EST. PATIENT-LVL IV: CPT | Mod: PBBFAC,,,

## 2024-12-17 PROCEDURE — 3078F DIAST BP <80 MM HG: CPT | Mod: CPTII,S$GLB,,

## 2024-12-17 RX ORDER — PROCHLORPERAZINE EDISYLATE 5 MG/ML
10 INJECTION INTRAMUSCULAR; INTRAVENOUS ONCE AS NEEDED
Status: CANCELLED | OUTPATIENT
Start: 2024-12-20

## 2024-12-17 RX ORDER — SODIUM CHLORIDE 0.9 % (FLUSH) 0.9 %
10 SYRINGE (ML) INJECTION
Status: CANCELLED | OUTPATIENT
Start: 2024-12-20

## 2024-12-17 RX ORDER — SODIUM CHLORIDE 0.9 % (FLUSH) 0.9 %
10 SYRINGE (ML) INJECTION
Status: CANCELLED | OUTPATIENT
Start: 2024-12-18

## 2024-12-17 RX ORDER — DIPHENHYDRAMINE HYDROCHLORIDE 50 MG/ML
50 INJECTION INTRAMUSCULAR; INTRAVENOUS ONCE AS NEEDED
Status: CANCELLED | OUTPATIENT
Start: 2024-12-18

## 2024-12-17 RX ORDER — HEPARIN 100 UNIT/ML
500 SYRINGE INTRAVENOUS
Status: CANCELLED | OUTPATIENT
Start: 2024-12-20

## 2024-12-17 RX ORDER — EPINEPHRINE 0.3 MG/.3ML
0.3 INJECTION SUBCUTANEOUS ONCE AS NEEDED
Status: CANCELLED | OUTPATIENT
Start: 2024-12-18

## 2024-12-17 RX ORDER — HEPARIN 100 UNIT/ML
500 SYRINGE INTRAVENOUS
Status: CANCELLED | OUTPATIENT
Start: 2024-12-18

## 2024-12-17 RX ORDER — FLUOROURACIL 50 MG/ML
200 INJECTION, SOLUTION INTRAVENOUS
Status: CANCELLED | OUTPATIENT
Start: 2024-12-18

## 2024-12-17 RX ORDER — PROCHLORPERAZINE EDISYLATE 5 MG/ML
10 INJECTION INTRAMUSCULAR; INTRAVENOUS ONCE AS NEEDED
Status: CANCELLED | OUTPATIENT
Start: 2024-12-18

## 2024-12-17 NOTE — TELEPHONE ENCOUNTER
SW consulted to assist with cruise excuse letter for pt's parents. SW coordinated with provider and letter was given to spouse and scanned into pt's chart. No other needs expressed. SW will remain available.

## 2024-12-18 ENCOUNTER — INFUSION (OUTPATIENT)
Dept: INFUSION THERAPY | Facility: HOSPITAL | Age: 59
End: 2024-12-18
Attending: INTERNAL MEDICINE
Payer: COMMERCIAL

## 2024-12-18 VITALS
TEMPERATURE: 98 F | BODY MASS INDEX: 22.06 KG/M2 | OXYGEN SATURATION: 98 % | SYSTOLIC BLOOD PRESSURE: 113 MMHG | DIASTOLIC BLOOD PRESSURE: 72 MMHG | HEIGHT: 73 IN | HEART RATE: 79 BPM | WEIGHT: 166.44 LBS | RESPIRATION RATE: 16 BRPM

## 2024-12-18 DIAGNOSIS — C15.9 ESOPHAGEAL CANCER, STAGE IV: Primary | ICD-10-CM

## 2024-12-18 DIAGNOSIS — C78.6 SECONDARY ADENOCARCINOMA OF RETROPERITONEUM: ICD-10-CM

## 2024-12-18 DIAGNOSIS — C79.89 MALIGNANT NEOPLASM METASTATIC TO OTHER SITE: ICD-10-CM

## 2024-12-18 PROCEDURE — 96415 CHEMO IV INFUSION ADDL HR: CPT

## 2024-12-18 PROCEDURE — 96368 THER/DIAG CONCURRENT INF: CPT

## 2024-12-18 PROCEDURE — 25000003 PHARM REV CODE 250

## 2024-12-18 PROCEDURE — 96411 CHEMO IV PUSH ADDL DRUG: CPT

## 2024-12-18 PROCEDURE — 96417 CHEMO IV INFUS EACH ADDL SEQ: CPT

## 2024-12-18 PROCEDURE — 96416 CHEMO PROLONG INFUSE W/PUMP: CPT

## 2024-12-18 PROCEDURE — 96413 CHEMO IV INFUSION 1 HR: CPT

## 2024-12-18 PROCEDURE — 63600175 PHARM REV CODE 636 W HCPCS

## 2024-12-18 PROCEDURE — 96367 TX/PROPH/DG ADDL SEQ IV INF: CPT

## 2024-12-18 RX ORDER — FLUOROURACIL 50 MG/ML
200 INJECTION, SOLUTION INTRAVENOUS
Status: COMPLETED | OUTPATIENT
Start: 2024-12-18 | End: 2024-12-18

## 2024-12-18 RX ADMIN — DEXTROSE MONOHYDRATE 168 MG: 50 INJECTION, SOLUTION INTRAVENOUS at 09:12

## 2024-12-18 RX ADMIN — TRASTUZUMAB-ANNS 300 MG: 420 INJECTION, POWDER, LYOPHILIZED, FOR SOLUTION INTRAVENOUS at 07:12

## 2024-12-18 RX ADMIN — FLUOROURACIL 395 MG: 50 INJECTION, SOLUTION INTRAVENOUS at 11:12

## 2024-12-18 RX ADMIN — FLUOROURACIL 2375 MG: 50 INJECTION, SOLUTION INTRAVENOUS at 11:12

## 2024-12-18 RX ADMIN — SODIUM CHLORIDE 400 MG: 9 INJECTION, SOLUTION INTRAVENOUS at 07:12

## 2024-12-18 RX ADMIN — SODIUM CHLORIDE 0.25 MG: 9 INJECTION, SOLUTION INTRAVENOUS at 08:12

## 2024-12-18 RX ADMIN — LEUCOVORIN CALCIUM 790 MG: 350 INJECTION, POWDER, LYOPHILIZED, FOR SUSPENSION INTRAMUSCULAR; INTRAVENOUS at 09:12

## 2024-12-18 NOTE — PLAN OF CARE
Patient tolerated keytruda/kanjinti/folfox well today; no adverse reaction noted.  POC reviewed with pt.  NAD noted upon discharge.   Has f/u appt(s) scheduled per MD request.    Problem: Adult Inpatient Plan of Care  Goal: Plan of Care Review  Outcome: Progressing  Goal: Patient-Specific Goal (Individualized)  Outcome: Progressing  Goal: Absence of Hospital-Acquired Illness or Injury  Outcome: Progressing  Intervention: Identify and Manage Fall Risk  Flowsheets (Taken 12/18/2024 0938)  Safety Promotion/Fall Prevention: in recliner, wheels locked  Goal: Optimal Comfort and Wellbeing  Outcome: Progressing  Intervention: Provide Person-Centered Care  Flowsheets (Taken 12/18/2024 0938)  Trust Relationship/Rapport:   care explained   reassurance provided   choices provided   thoughts/feelings acknowledged   emotional support provided   empathic listening provided   questions answered   questions encouraged

## 2024-12-20 ENCOUNTER — INFUSION (OUTPATIENT)
Dept: INFUSION THERAPY | Facility: HOSPITAL | Age: 59
End: 2024-12-20
Attending: INTERNAL MEDICINE
Payer: COMMERCIAL

## 2024-12-20 VITALS
SYSTOLIC BLOOD PRESSURE: 118 MMHG | DIASTOLIC BLOOD PRESSURE: 64 MMHG | RESPIRATION RATE: 18 BRPM | OXYGEN SATURATION: 95 % | TEMPERATURE: 98 F | HEART RATE: 104 BPM

## 2024-12-20 DIAGNOSIS — C78.6 SECONDARY ADENOCARCINOMA OF RETROPERITONEUM: ICD-10-CM

## 2024-12-20 DIAGNOSIS — C15.9 ESOPHAGEAL CANCER, STAGE IV: Primary | ICD-10-CM

## 2024-12-20 DIAGNOSIS — C79.89 MALIGNANT NEOPLASM METASTATIC TO OTHER SITE: ICD-10-CM

## 2024-12-20 PROCEDURE — 25000003 PHARM REV CODE 250

## 2024-12-20 PROCEDURE — 63600175 PHARM REV CODE 636 W HCPCS

## 2024-12-20 PROCEDURE — A4216 STERILE WATER/SALINE, 10 ML: HCPCS

## 2024-12-20 RX ORDER — SODIUM CHLORIDE 0.9 % (FLUSH) 0.9 %
10 SYRINGE (ML) INJECTION
Status: DISCONTINUED | OUTPATIENT
Start: 2024-12-20 | End: 2024-12-20 | Stop reason: HOSPADM

## 2024-12-20 RX ORDER — HEPARIN 100 UNIT/ML
500 SYRINGE INTRAVENOUS
Status: DISCONTINUED | OUTPATIENT
Start: 2024-12-20 | End: 2024-12-20 | Stop reason: HOSPADM

## 2024-12-20 RX ADMIN — Medication 10 ML: at 09:12

## 2024-12-20 RX ADMIN — HEPARIN 500 UNITS: 100 SYRINGE at 09:12

## 2024-12-26 ENCOUNTER — HOSPITAL ENCOUNTER (OUTPATIENT)
Facility: HOSPITAL | Age: 59
Discharge: HOME | End: 2024-12-27
Attending: EMERGENCY MEDICINE | Admitting: EMERGENCY MEDICINE
Payer: COMMERCIAL

## 2024-12-26 ENCOUNTER — TELEPHONE (OUTPATIENT)
Dept: HEMATOLOGY/ONCOLOGY | Facility: CLINIC | Age: 59
End: 2024-12-26
Payer: COMMERCIAL

## 2024-12-26 ENCOUNTER — OFFICE VISIT (OUTPATIENT)
Dept: HEMATOLOGY/ONCOLOGY | Facility: CLINIC | Age: 59
End: 2024-12-26
Payer: COMMERCIAL

## 2024-12-26 VITALS
HEART RATE: 100 BPM | DIASTOLIC BLOOD PRESSURE: 83 MMHG | WEIGHT: 157.94 LBS | BODY MASS INDEX: 20.93 KG/M2 | SYSTOLIC BLOOD PRESSURE: 115 MMHG | TEMPERATURE: 98 F | OXYGEN SATURATION: 97 % | RESPIRATION RATE: 19 BRPM | HEIGHT: 73 IN

## 2024-12-26 DIAGNOSIS — R10.84 GENERALIZED ABDOMINAL PAIN: ICD-10-CM

## 2024-12-26 DIAGNOSIS — C15.9 ESOPHAGEAL CANCER, STAGE IV: ICD-10-CM

## 2024-12-26 DIAGNOSIS — T45.1X5A IMMUNODEFICIENCY DUE TO CHEMOTHERAPY: ICD-10-CM

## 2024-12-26 DIAGNOSIS — R10.30 INTRACTABLE LOWER ABDOMINAL PAIN: Primary | ICD-10-CM

## 2024-12-26 DIAGNOSIS — D50.0 IRON DEFICIENCY ANEMIA DUE TO CHRONIC BLOOD LOSS: ICD-10-CM

## 2024-12-26 DIAGNOSIS — C78.7 METASTASIS TO LIVER: ICD-10-CM

## 2024-12-26 DIAGNOSIS — C15.9 ESOPHAGEAL CANCER, STAGE IV: Primary | ICD-10-CM

## 2024-12-26 DIAGNOSIS — C79.89 MALIGNANT NEOPLASM METASTATIC TO OTHER SITE: ICD-10-CM

## 2024-12-26 DIAGNOSIS — Z79.899 IMMUNODEFICIENCY DUE TO CHEMOTHERAPY: ICD-10-CM

## 2024-12-26 DIAGNOSIS — C78.6 SECONDARY ADENOCARCINOMA OF RETROPERITONEUM: ICD-10-CM

## 2024-12-26 DIAGNOSIS — D84.821 IMMUNODEFICIENCY DUE TO CHEMOTHERAPY: ICD-10-CM

## 2024-12-26 PROBLEM — R10.9 INTRACTABLE ABDOMINAL PAIN: Status: ACTIVE | Noted: 2024-12-26

## 2024-12-26 PROBLEM — K56.7 ILEUS: Status: ACTIVE | Noted: 2024-12-26

## 2024-12-26 PROBLEM — K52.9 ENTERITIS: Status: ACTIVE | Noted: 2024-12-26

## 2024-12-26 LAB
ALBUMIN SERPL BCP-MCNC: 3.5 G/DL (ref 3.5–5.2)
ALP SERPL-CCNC: 91 U/L (ref 40–150)
ALT SERPL W/O P-5'-P-CCNC: 25 U/L (ref 10–44)
ANION GAP SERPL CALC-SCNC: 12 MMOL/L (ref 8–16)
AST SERPL-CCNC: 29 U/L (ref 10–40)
BASOPHILS # BLD AUTO: 0.04 K/UL (ref 0–0.2)
BASOPHILS NFR BLD: 0.7 % (ref 0–1.9)
BILIRUB SERPL-MCNC: 0.5 MG/DL (ref 0.1–1)
BILIRUB UR QL STRIP: NEGATIVE
BUN SERPL-MCNC: 14 MG/DL (ref 6–20)
CALCIUM SERPL-MCNC: 9.2 MG/DL (ref 8.7–10.5)
CHLORIDE SERPL-SCNC: 101 MMOL/L (ref 95–110)
CLARITY UR: CLEAR
CO2 SERPL-SCNC: 22 MMOL/L (ref 23–29)
COLOR UR: YELLOW
CREAT SERPL-MCNC: 0.8 MG/DL (ref 0.5–1.4)
DIFFERENTIAL METHOD BLD: ABNORMAL
EOSINOPHIL # BLD AUTO: 0.1 K/UL (ref 0–0.5)
EOSINOPHIL NFR BLD: 1 % (ref 0–8)
ERYTHROCYTE [DISTWIDTH] IN BLOOD BY AUTOMATED COUNT: 15.3 % (ref 11.5–14.5)
EST. GFR  (NO RACE VARIABLE): >60 ML/MIN/1.73 M^2
GLUCOSE SERPL-MCNC: 90 MG/DL (ref 70–110)
GLUCOSE UR QL STRIP: NEGATIVE
HCT VFR BLD AUTO: 40.1 % (ref 40–54)
HGB BLD-MCNC: 13.6 G/DL (ref 14–18)
HGB UR QL STRIP: NEGATIVE
IMM GRANULOCYTES # BLD AUTO: 0.02 K/UL (ref 0–0.04)
IMM GRANULOCYTES NFR BLD AUTO: 0.3 % (ref 0–0.5)
KETONES UR QL STRIP: ABNORMAL
LEUKOCYTE ESTERASE UR QL STRIP: NEGATIVE
LIPASE SERPL-CCNC: 33 U/L (ref 4–60)
LYMPHOCYTES # BLD AUTO: 1.2 K/UL (ref 1–4.8)
LYMPHOCYTES NFR BLD: 19.6 % (ref 18–48)
MAGNESIUM SERPL-MCNC: 1.8 MG/DL (ref 1.6–2.6)
MCH RBC QN AUTO: 32.3 PG (ref 27–31)
MCHC RBC AUTO-ENTMCNC: 33.9 G/DL (ref 32–36)
MCV RBC AUTO: 95 FL (ref 82–98)
MONOCYTES # BLD AUTO: 1 K/UL (ref 0.3–1)
MONOCYTES NFR BLD: 15.7 % (ref 4–15)
NEUTROPHILS # BLD AUTO: 3.9 K/UL (ref 1.8–7.7)
NEUTROPHILS NFR BLD: 62.7 % (ref 38–73)
NITRITE UR QL STRIP: NEGATIVE
NRBC BLD-RTO: 0 /100 WBC
OHS QRS DURATION: 80 MS
OHS QTC CALCULATION: 440 MS
PH UR STRIP: 8 [PH] (ref 5–8)
PHOSPHATE SERPL-MCNC: 2.6 MG/DL (ref 2.7–4.5)
PLATELET # BLD AUTO: 241 K/UL (ref 150–450)
PMV BLD AUTO: 10 FL (ref 9.2–12.9)
POTASSIUM SERPL-SCNC: 4.2 MMOL/L (ref 3.5–5.1)
PROT SERPL-MCNC: 7.5 G/DL (ref 6–8.4)
PROT UR QL STRIP: ABNORMAL
RBC # BLD AUTO: 4.21 M/UL (ref 4.6–6.2)
SODIUM SERPL-SCNC: 135 MMOL/L (ref 136–145)
SP GR UR STRIP: >1.03 (ref 1–1.03)
URN SPEC COLLECT METH UR: ABNORMAL
UROBILINOGEN UR STRIP-ACNC: NEGATIVE EU/DL
WBC # BLD AUTO: 6.13 K/UL (ref 3.9–12.7)

## 2024-12-26 PROCEDURE — 84100 ASSAY OF PHOSPHORUS: CPT | Performed by: EMERGENCY MEDICINE

## 2024-12-26 PROCEDURE — 81003 URINALYSIS AUTO W/O SCOPE: CPT | Performed by: EMERGENCY MEDICINE

## 2024-12-26 PROCEDURE — G0378 HOSPITAL OBSERVATION PER HR: HCPCS

## 2024-12-26 PROCEDURE — 25000003 PHARM REV CODE 250: Performed by: EMERGENCY MEDICINE

## 2024-12-26 PROCEDURE — 99214 OFFICE O/P EST MOD 30 MIN: CPT | Mod: S$GLB,,, | Performed by: INTERNAL MEDICINE

## 2024-12-26 PROCEDURE — 63600175 PHARM REV CODE 636 W HCPCS: Performed by: EMERGENCY MEDICINE

## 2024-12-26 PROCEDURE — 99285 EMERGENCY DEPT VISIT HI MDM: CPT | Mod: 25

## 2024-12-26 PROCEDURE — 97165 OT EVAL LOW COMPLEX 30 MIN: CPT

## 2024-12-26 PROCEDURE — 99999 PR PBB SHADOW E&M-EST. PATIENT-LVL V: CPT | Mod: PBBFAC,,, | Performed by: INTERNAL MEDICINE

## 2024-12-26 PROCEDURE — 3074F SYST BP LT 130 MM HG: CPT | Mod: CPTII,S$GLB,, | Performed by: INTERNAL MEDICINE

## 2024-12-26 PROCEDURE — 3008F BODY MASS INDEX DOCD: CPT | Mod: CPTII,S$GLB,, | Performed by: INTERNAL MEDICINE

## 2024-12-26 PROCEDURE — 25500020 PHARM REV CODE 255: Performed by: EMERGENCY MEDICINE

## 2024-12-26 PROCEDURE — 36415 COLL VENOUS BLD VENIPUNCTURE: CPT | Performed by: EMERGENCY MEDICINE

## 2024-12-26 PROCEDURE — 96361 HYDRATE IV INFUSION ADD-ON: CPT

## 2024-12-26 PROCEDURE — A4216 STERILE WATER/SALINE, 10 ML: HCPCS | Performed by: EMERGENCY MEDICINE

## 2024-12-26 PROCEDURE — 83735 ASSAY OF MAGNESIUM: CPT | Performed by: EMERGENCY MEDICINE

## 2024-12-26 PROCEDURE — 96376 TX/PRO/DX INJ SAME DRUG ADON: CPT

## 2024-12-26 PROCEDURE — 96375 TX/PRO/DX INJ NEW DRUG ADDON: CPT

## 2024-12-26 PROCEDURE — 1159F MED LIST DOCD IN RCRD: CPT | Mod: CPTII,S$GLB,, | Performed by: INTERNAL MEDICINE

## 2024-12-26 PROCEDURE — 85025 COMPLETE CBC W/AUTO DIFF WBC: CPT | Performed by: EMERGENCY MEDICINE

## 2024-12-26 PROCEDURE — 96374 THER/PROPH/DIAG INJ IV PUSH: CPT

## 2024-12-26 PROCEDURE — 4010F ACE/ARB THERAPY RXD/TAKEN: CPT | Mod: CPTII,S$GLB,, | Performed by: INTERNAL MEDICINE

## 2024-12-26 PROCEDURE — 3079F DIAST BP 80-89 MM HG: CPT | Mod: CPTII,S$GLB,, | Performed by: INTERNAL MEDICINE

## 2024-12-26 PROCEDURE — 93005 ELECTROCARDIOGRAM TRACING: CPT

## 2024-12-26 PROCEDURE — 83690 ASSAY OF LIPASE: CPT | Performed by: EMERGENCY MEDICINE

## 2024-12-26 PROCEDURE — 1160F RVW MEDS BY RX/DR IN RCRD: CPT | Mod: CPTII,S$GLB,, | Performed by: INTERNAL MEDICINE

## 2024-12-26 PROCEDURE — 96372 THER/PROPH/DIAG INJ SC/IM: CPT | Performed by: EMERGENCY MEDICINE

## 2024-12-26 PROCEDURE — 93010 ELECTROCARDIOGRAM REPORT: CPT | Mod: ,,, | Performed by: INTERNAL MEDICINE

## 2024-12-26 PROCEDURE — 80053 COMPREHEN METABOLIC PANEL: CPT | Performed by: EMERGENCY MEDICINE

## 2024-12-26 RX ORDER — HEPARIN SODIUM 5000 [USP'U]/ML
5000 INJECTION, SOLUTION INTRAVENOUS; SUBCUTANEOUS EVERY 8 HOURS
Status: DISCONTINUED | OUTPATIENT
Start: 2024-12-26 | End: 2024-12-27 | Stop reason: HOSPADM

## 2024-12-26 RX ORDER — ONDANSETRON HYDROCHLORIDE 2 MG/ML
4 INJECTION, SOLUTION INTRAVENOUS EVERY 6 HOURS PRN
Status: DISCONTINUED | OUTPATIENT
Start: 2024-12-26 | End: 2024-12-27 | Stop reason: HOSPADM

## 2024-12-26 RX ORDER — ACETAMINOPHEN 325 MG/1
650 TABLET ORAL EVERY 4 HOURS PRN
Status: DISCONTINUED | OUTPATIENT
Start: 2024-12-26 | End: 2024-12-27 | Stop reason: HOSPADM

## 2024-12-26 RX ORDER — SODIUM CHLORIDE 0.9 % (FLUSH) 0.9 %
10 SYRINGE (ML) INJECTION EVERY 8 HOURS
Status: DISCONTINUED | OUTPATIENT
Start: 2024-12-26 | End: 2024-12-27 | Stop reason: HOSPADM

## 2024-12-26 RX ORDER — DEXTROSE MONOHYDRATE AND SODIUM CHLORIDE 5; .9 G/100ML; G/100ML
INJECTION, SOLUTION INTRAVENOUS CONTINUOUS
Status: DISCONTINUED | OUTPATIENT
Start: 2024-12-26 | End: 2024-12-27 | Stop reason: HOSPADM

## 2024-12-26 RX ORDER — TALC
6 POWDER (GRAM) TOPICAL NIGHTLY PRN
Status: DISCONTINUED | OUTPATIENT
Start: 2024-12-26 | End: 2024-12-27 | Stop reason: HOSPADM

## 2024-12-26 RX ORDER — NALOXONE HCL 0.4 MG/ML
0.02 VIAL (ML) INJECTION
Status: DISCONTINUED | OUTPATIENT
Start: 2024-12-26 | End: 2024-12-27 | Stop reason: HOSPADM

## 2024-12-26 RX ORDER — MORPHINE SULFATE 4 MG/ML
4 INJECTION, SOLUTION INTRAMUSCULAR; INTRAVENOUS
Status: COMPLETED | OUTPATIENT
Start: 2024-12-26 | End: 2024-12-26

## 2024-12-26 RX ORDER — MORPHINE SULFATE 2 MG/ML
2 INJECTION, SOLUTION INTRAMUSCULAR; INTRAVENOUS EVERY 4 HOURS PRN
Status: DISCONTINUED | OUTPATIENT
Start: 2024-12-26 | End: 2024-12-27 | Stop reason: HOSPADM

## 2024-12-26 RX ORDER — ONDANSETRON HYDROCHLORIDE 2 MG/ML
4 INJECTION, SOLUTION INTRAVENOUS
Status: COMPLETED | OUTPATIENT
Start: 2024-12-26 | End: 2024-12-26

## 2024-12-26 RX ORDER — HYDROCODONE BITARTRATE AND ACETAMINOPHEN 7.5; 325 MG/15ML; MG/15ML
10 SOLUTION ORAL EVERY 4 HOURS PRN
Status: DISCONTINUED | OUTPATIENT
Start: 2024-12-26 | End: 2024-12-27 | Stop reason: HOSPADM

## 2024-12-26 RX ORDER — ALUMINUM HYDROXIDE, MAGNESIUM HYDROXIDE, AND SIMETHICONE 1200; 120; 1200 MG/30ML; MG/30ML; MG/30ML
30 SUSPENSION ORAL EVERY 6 HOURS PRN
Status: DISCONTINUED | OUTPATIENT
Start: 2024-12-26 | End: 2024-12-27 | Stop reason: HOSPADM

## 2024-12-26 RX ADMIN — Medication 10 ML: at 09:12

## 2024-12-26 RX ADMIN — MORPHINE SULFATE 4 MG: 4 INJECTION INTRAVENOUS at 10:12

## 2024-12-26 RX ADMIN — ONDANSETRON 4 MG: 2 INJECTION INTRAMUSCULAR; INTRAVENOUS at 04:12

## 2024-12-26 RX ADMIN — SODIUM CHLORIDE 1000 ML: 9 INJECTION, SOLUTION INTRAVENOUS at 10:12

## 2024-12-26 RX ADMIN — HEPARIN SODIUM 5000 UNITS: 5000 INJECTION INTRAVENOUS; SUBCUTANEOUS at 03:12

## 2024-12-26 RX ADMIN — Medication 10 ML: at 03:12

## 2024-12-26 RX ADMIN — FOLIC ACID: 5 INJECTION, SOLUTION INTRAMUSCULAR; INTRAVENOUS; SUBCUTANEOUS at 04:12

## 2024-12-26 RX ADMIN — MORPHINE SULFATE 2 MG: 2 INJECTION, SOLUTION INTRAMUSCULAR; INTRAVENOUS at 08:12

## 2024-12-26 RX ADMIN — ONDANSETRON 4 MG: 2 INJECTION INTRAMUSCULAR; INTRAVENOUS at 10:12

## 2024-12-26 RX ADMIN — HEPARIN SODIUM 5000 UNITS: 5000 INJECTION INTRAVENOUS; SUBCUTANEOUS at 09:12

## 2024-12-26 RX ADMIN — IOHEXOL 100 ML: 350 INJECTION, SOLUTION INTRAVENOUS at 10:12

## 2024-12-26 RX ADMIN — MORPHINE SULFATE 4 MG: 4 INJECTION INTRAVENOUS at 01:12

## 2024-12-26 NOTE — ED PROVIDER NOTES
"SCRIBE #1 NOTE: I, Norma Renata, am scribing for, and in the presence of, Mary Kate Bray MD. I have scribed the entire note.       History     Chief Complaint   Patient presents with    Abdominal Pain     Dr. Latif sent pt for evaluation of abdominal pain and N/V/D and chills since Saturday. Pt finished chemo Friday for esophageal cancer with mets to the liver.  (-) chest pain or SOB      Review of patient's allergies indicates:  No Known Allergies      History of Present Illness     HPI    12/26/2024, 9:36 AM  History obtained from the patient, medical records, and wife      History of Present Illness: Rosalio Muñoz is a 59 y.o. male patient with a PMHx of stage IV esophageal cancer and HTN  who presents to the Emergency Department sent by Dr. Latif in oncology for evaluation of lower abdominal pain which began 5 days ago, 1 day after completing chemo Tx. Symptoms are constant and moderate in severity. Patient states that abdominal pain "feels like gas". No mitigating or exacerbating factors reported. Associated sxs include flatulence and nausea. Affirms regular bowel movements. Patient denies any urinary difficulties. Feeding tube in place. The wife reports he usually receives 4 tube feedings per day; decreased to 2 feedings as of this weekend due to nausea and abdominal pain. Pt reports drinking water and tube feeding this morning without any issues. Affirms port is in place. No further complaints or concerns at this time.       Arrival mode: Personal Transportation    PCP: Michell Goyal MD        Past Medical History:  Past Medical History:   Diagnosis Date    Esophageal cancer, stage IV 10/10/2024    Hypertension     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 10/11/2024    Palliative care encounter 12/11/2024       Past Surgical History:  Past Surgical History:   Procedure Laterality Date    COLONOSCOPY N/A 8/25/2023    Procedure: COLONOSCOPY;  Surgeon: Pebbles Spear MD;  Location: " [facility] ENDO;  Service: Endoscopy;  Laterality: N/A;    COLONOSCOPY N/A 10/2/2024    Procedure: COLONOSCOPY  Cardiac clearance received on 24 per Dr. Lockett, Cardiology.  Note in encounters.  LB;  Surgeon: Sully Farris MD;  Location: [facility];  Service: Endoscopy;  Laterality: N/A;    ESOPHAGOGASTRODUODENOSCOPY N/A 10/2/2024    Procedure: EGD (ESOPHAGOGASTRODUODENOSCOPY);  Surgeon: Sully Farris MD;  Location: [facility];  Service: Endoscopy;  Laterality: N/A;    INSERTION OF VENOUS ACCESS PORT Left 10/21/2024    Procedure: INSERTION, VENOUS ACCESS PORT;  Surgeon: Roseanna Rosas MD;  Location: [facility] OR;  Service: General;  Laterality: Left;    SURGICAL REMOVAL OF LESION OF FACE Right 2023    Procedure: EXCISION, LESION, FACE;  Surgeon: Jose Flaherty MD;  Location: [facility] OR;  Service: ENT;  Laterality: Right;  1. Excision facial subcutaneous mass right cheek 3 cm. 2. Intermediate layered closure 3.5 cm    WISDOM TOOTH EXTRACTION      XI ROBOTIC APPENDECTOMY N/A 2024    Procedure: XI ROBOTIC APPENDECTOMY;  Surgeon: Paulo Saavedra MD;  Location: [facility] OR;  Service: General;  Laterality: N/A;    XI ROBOTIC INSERTION, GASTROSTOMY TUBE N/A 10/21/2024    Procedure: XI ROBOTIC INSERTION, GASTROSTOMY TUBE;  Surgeon: Roseanna Rosas MD;  Location: [facility] OR;  Service: General;  Laterality: N/A;         Family History:  Family History   Problem Relation Name Age of Onset    Hyperlipidemia Mother      Hypertension Father      Diabetes Father      Kidney disease Father      Heart disease Father      Breast cancer Maternal Grandmother      Prostate cancer Maternal Grandfather      Colon cancer Neg Hx         Social History:  Social History     Tobacco Use    Smoking status: Every Day     Current packs/day: 0.00     Types: Cigars, Cigarettes     Last attempt to quit: 10/4/2022     Years since quittin.2     Passive exposure: Never    Smokeless tobacco: Never    Tobacco comments:     Cigar every  afternoon   Substance and Sexual Activity    Alcohol use: Yes     Alcohol/week: 4.0 - 6.0 standard drinks of alcohol     Types: 4 - 6 Cans of beer per week     Comment: occ    Drug use: Yes     Types: Marijuana     Comment: medical    Sexual activity: Yes     Partners: Female        Review of Systems     Review of Systems   Constitutional:  Negative for fever.   HENT:  Negative for sore throat.    Respiratory:  Negative for shortness of breath.    Cardiovascular:  Negative for chest pain.   Gastrointestinal:  Positive for abdominal pain (lower) and nausea.        (+) flatulence   Genitourinary:  Negative for difficulty urinating and dysuria.   Musculoskeletal:  Negative for back pain.   Skin:  Negative for rash.   Neurological:  Negative for weakness.   Hematological:  Does not bruise/bleed easily.   All other systems reviewed and are negative.     Physical Exam     Initial Vitals [12/26/24 0926]   BP Pulse Resp Temp SpO2   116/75 99 18 97.6 °F (36.4 °C) 100 %      MAP       --          Physical Exam  Nursing Notes and Vital Signs Reviewed.  Constitutional: Patient is in no apparent distress. Chronically ill-appearing.   Head: Atraumatic. Normocephalic.  Eyes: PERRL. EOM intact. Conjunctivae are not pale. No scleral icterus.  ENT: Mucous membranes are dry. Oropharynx is clear and symmetric.    Neck: Supple. Full ROM. No lymphadenopathy.  Cardiovascular: Regular rate. Regular rhythm. No murmurs, rubs, or gallops. Distal pulses are 2+ and symmetric.  Pulmonary/Chest: Mediport to left upper chest wall. No respiratory distress. Clear to auscultation bilaterally. No wheezing or rales.  Abdominal: PEG tube in place. Soft and non-distended.  Mild suprapubic tenderness.  No rebound, guarding, or rigidity. Good bowel sounds.  Genitourinary: No CVA tenderness  Musculoskeletal: Moves all extremities. No obvious deformities. No edema. No calf tenderness.  Skin: Warm and dry.  Neurological:  Alert, awake, and appropriate.   "Normal speech.  No acute focal neurological deficits are appreciated.  Psychiatric: Normal affect. Good eye contact. Appropriate in content.     ED Course   Procedures  ED Vital Signs:  Vitals:    12/26/24 0926 12/26/24 1000 12/26/24 1016 12/26/24 1030   BP: 116/75 (!) 156/89  138/86   Pulse: 99 94  82   Resp: 18 18 18 18   Temp: 97.6 °F (36.4 °C)      TempSrc: Oral      SpO2: 100% 100%  97%   Weight: 71.4 kg (157 lb 6.5 oz)      Height: 6' 1" (1.854 m)       12/26/24 1300 12/26/24 1316 12/26/24 1331 12/26/24 1506   BP: (!) 153/76  132/82    Pulse: 85  81 74   Resp: 18 16 16    Temp:   98 °F (36.7 °C)    TempSrc:   Oral    SpO2: 100%  97%    Weight:       Height:        12/26/24 1612 12/26/24 2000 12/26/24 2001 12/26/24 2042   BP: 132/79 (!) 159/76     Pulse: 77 72 88    Resp: 19 18  18   Temp: 98 °F (36.7 °C) 97.8 °F (36.6 °C)     TempSrc: Oral Oral     SpO2: 96% 97%     Weight:       Height:           Abnormal Lab Results:  Labs Reviewed   CBC W/ AUTO DIFFERENTIAL - Abnormal       Result Value    WBC 6.13      RBC 4.21 (*)     Hemoglobin 13.6 (*)     Hematocrit 40.1      MCV 95      MCH 32.3 (*)     MCHC 33.9      RDW 15.3 (*)     Platelets 241      MPV 10.0      Immature Granulocytes 0.3      Gran # (ANC) 3.9      Immature Grans (Abs) 0.02      Lymph # 1.2      Mono # 1.0      Eos # 0.1      Baso # 0.04      nRBC 0      Gran % 62.7      Lymph % 19.6      Mono % 15.7 (*)     Eosinophil % 1.0      Basophil % 0.7      Differential Method Automated     COMPREHENSIVE METABOLIC PANEL - Abnormal    Sodium 135 (*)     Potassium 4.2      Chloride 101      CO2 22 (*)     Glucose 90      BUN 14      Creatinine 0.8      Calcium 9.2      Total Protein 7.5      Albumin 3.5      Total Bilirubin 0.5      Alkaline Phosphatase 91      AST 29      ALT 25      eGFR >60      Anion Gap 12     URINALYSIS, REFLEX TO URINE CULTURE - Abnormal    Specimen UA Urine, Clean Catch      Color, UA Yellow      Appearance, UA Clear      pH, UA 8.0 "      Specific Gravity, UA >1.030 (*)     Protein, UA Trace (*)     Glucose, UA Negative      Ketones, UA Trace (*)     Bilirubin (UA) Negative      Occult Blood UA Negative      Nitrite, UA Negative      Urobilinogen, UA Negative      Leukocytes, UA Negative      Narrative:     Specimen Source->Urine   LIPASE    Lipase 33          All Lab Results:  Results for orders placed or performed during the hospital encounter of 12/26/24   EKG 12-lead    Collection Time: 12/26/24  9:44 AM   Result Value Ref Range    QRS Duration 80 ms    [facility] QTC Calculation 440 ms   CBC W/ AUTO DIFFERENTIAL    Collection Time: 12/26/24  9:54 AM   Result Value Ref Range    WBC 6.13 3.90 - 12.70 K/uL    RBC 4.21 (L) 4.60 - 6.20 M/uL    Hemoglobin 13.6 (L) 14.0 - 18.0 g/dL    Hematocrit 40.1 40.0 - 54.0 %    MCV 95 82 - 98 fL    MCH 32.3 (H) 27.0 - 31.0 pg    MCHC 33.9 32.0 - 36.0 g/dL    RDW 15.3 (H) 11.5 - 14.5 %    Platelets 241 150 - 450 K/uL    MPV 10.0 9.2 - 12.9 fL    Immature Granulocytes 0.3 0.0 - 0.5 %    Gran # (ANC) 3.9 1.8 - 7.7 K/uL    Immature Grans (Abs) 0.02 0.00 - 0.04 K/uL    Lymph # 1.2 1.0 - 4.8 K/uL    Mono # 1.0 0.3 - 1.0 K/uL    Eos # 0.1 0.0 - 0.5 K/uL    Baso # 0.04 0.00 - 0.20 K/uL    nRBC 0 0 /100 WBC    Gran % 62.7 38.0 - 73.0 %    Lymph % 19.6 18.0 - 48.0 %    Mono % 15.7 (H) 4.0 - 15.0 %    Eosinophil % 1.0 0.0 - 8.0 %    Basophil % 0.7 0.0 - 1.9 %    Differential Method Automated    Comp. Metabolic Panel    Collection Time: 12/26/24  9:54 AM   Result Value Ref Range    Sodium 135 (L) 136 - 145 mmol/L    Potassium 4.2 3.5 - 5.1 mmol/L    Chloride 101 95 - 110 mmol/L    CO2 22 (L) 23 - 29 mmol/L    Glucose 90 70 - 110 mg/dL    BUN 14 6 - 20 mg/dL    Creatinine 0.8 0.5 - 1.4 mg/dL    Calcium 9.2 8.7 - 10.5 mg/dL    Total Protein 7.5 6.0 - 8.4 g/dL    Albumin 3.5 3.5 - 5.2 g/dL    Total Bilirubin 0.5 0.1 - 1.0 mg/dL    Alkaline Phosphatase 91 40 - 150 U/L    AST 29 10 - 40 U/L    ALT 25 10 - 44 U/L    eGFR >60 >60  mL/min/1.73 m^2    Anion Gap 12 8 - 16 mmol/L   Lipase    Collection Time: 12/26/24  9:54 AM   Result Value Ref Range    Lipase 33 4 - 60 U/L   Urinalysis, Reflex to Urine Culture Urine, Clean Catch    Collection Time: 12/26/24 12:59 PM    Specimen: Urine   Result Value Ref Range    Specimen UA Urine, Clean Catch     Color, UA Yellow Yellow, Straw, Marge    Appearance, UA Clear Clear    pH, UA 8.0 5.0 - 8.0    Specific Gravity, UA >1.030 (A) 1.005 - 1.030    Protein, UA Trace (A) Negative    Glucose, UA Negative Negative    Ketones, UA Trace (A) Negative    Bilirubin (UA) Negative Negative    Occult Blood UA Negative Negative    Nitrite, UA Negative Negative    Urobilinogen, UA Negative <2.0 EU/dL    Leukocytes, UA Negative Negative   Magnesium    Collection Time: 12/26/24  5:44 PM   Result Value Ref Range    Magnesium 1.8 1.6 - 2.6 mg/dL   Phosphorus    Collection Time: 12/26/24  5:44 PM   Result Value Ref Range    Phosphorus 2.6 (L) 2.7 - 4.5 mg/dL       Imaging Results:  Imaging Results              CT Chest Abdomen Pelvis With IV Contrast (XPD) NO Oral Contrast (Final result)  Result time 12/26/24 11:21:24      Final result by Enrique Storey MD (12/26/24 11:21:24)                   Impression:      1. Findings probably representing enteritis with diarrheal illness.  2. Patient has a known esophageal malignancy with metastatic hepatic lesion and gastrohepatic lymphadenopathy.  The index gastrohepatic lymph node has decreased in size.  No enlarging lesions.  No new evidence of metastatic disease.      Electronically signed by: Enrique Storey  Date:    12/26/2024  Time:    11:21               Narrative:    EXAMINATION:  CT CHEST ABDOMEN PELVIS WITH IV CONTRAST (XPD)    CLINICAL HISTORY:  Recent onset generalized abdominal pain.  Patient has a known esophageal malignancy.    COMPARISON:  PET-CT October 10, 2024.    TECHNIQUE:  Axial CT images were obtained of the CHEST/ABDOMEN/PELVIS with IV contrast.  Iterative  reconstruction technique was used. The CT exam was performed using one or more of the following dose reduction techniques- Automated exposure control, adjustment of the mA and/or kV according to patient size, and/or use of iterative reconstructed technique.    FINDINGS:  Chest:    Lungs: No acute finding.  Mild emphysema.    Aorta: No aneurysm.    Pleural space:No pleural effusion or pneumothorax.    Heart: Normal size. No pericardial effusion.  Coronary artery calcifications are present.    Bones/joints: No acute osseous finding.    Other: No mediastinal or axillary lymphadenopathy.  Thickening of the distal esophagus/proximal stomach redemonstrated    Abdomen/pelvis:    Liver: Known posterior right hepatic metastatic lesion.  There is an additional too small to characterize hypodensity in the right hepatic lobe which may reflect metastatic disease.  No new large hepatic lesion.    Gallbladder: No calcified gallstones.    Bile Ducts: No evidence of dilated ducts.    Pancreas: No mass or peripancreatic fat stranding.    Spleen: Unremarkable.    Adrenals: Unremarkable.    Kidneys/ Ureters: Unremarkable.    Bladder: No evidence of wall thickening.    Reproductive organs: Unremarkable.    GI Tract/Mesentery: Gastrostomy tube in place.  Thickening of the proximal stomach appears improved when compared to prior exam.  Prominent gas-filled small bowel may reflect mild enteritis.  Fluid and gas in the colon may reflect diarrheal illness.    Peritoneal Space: Index gastrohepatic lymph node measures 17 mm, previously 24 mm.    Retroperitoneum: No retroperitoneal adenopathy.    Abdominal wall: Unremarkable.    Vasculature:  Atherosclerosis.  No aortic aneurysm.    Bones: No acute fracture.                                       X-Ray Abdomen Flat And Erect (Final result)  Result time 12/26/24 10:19:15      Final result by Sebastian Scott MD (12/26/24 10:19:15)                   Impression:      Nonobstructive bowel gas  pattern.   .      Electronically signed by: Sebastian Scott MD  Date:    12/26/2024  Time:    10:19               Narrative:    EXAMINATION:  XR ABDOMEN FLAT AND ERECT    CLINICAL HISTORY:  Abdominal Pain;    COMPARISON:  08/03/2024    FINDINGS:  Nonobstructive bowel gas pattern is noted.  Air-filled bowel loops are seen throughout the abdomen without transition.  No radiopaque kidney calculus is identified.  There are multiple calcifications in the pelvis most consistent with phleboliths.  No evidence of organomegaly.  The bones demonstrate mild degenerative changes within the lower lumbar region.  The bones are otherwise intact.  Aorta demonstrates atherosclerotic disease.                                       The EKG was ordered, reviewed, and independently interpreted by the ED provider.  Interpretation time: 9:44  Rate: 93 BPM  Rhythm: normal sinus rhythm  Interpretation: No STEMI.           The Emergency Provider reviewed the vital signs and test results, which are outlined above.     ED Discussion     12:30 PM: Re-evaluated pt.  I have discussed test results, shared treatment plan, and the need for admission with patient/family at bedside. Pt/family express understanding at this time and agree with all information. All questions answered. Pt/family member(s) have no further questions or concerns at this time. Pt is ready for admit.       Medical Decision Making  DDX: 1. SBO 2. Gastroenteritis 3. Dehydration 4. UTI    ECG no acute ischemic changes, xray abdomen normal, CT abdomen/pelvis shows enteritis, lab work reviewed and wbc normal, h/h stable, kidney function and electrolytes normal, lipase normal, given iv fluids, pain and nausea control, hosp med to admit for continued hydration, pain and nausea control      60 y/o male hx of metastatic esophageal cancer patient of Dr. Latif, last had chemo on Friday, seen in clinic today with Dr. Latif has had difficulty tolerating tube feeds over past 5 days with  increasing abd pain, intermittent nausea vomiting, no fever/chills, denies diarrhea.  VSS, labs stable, still awaiting urinalysis, given iv fluids, pain and nausea control which are improved, CT done and shows enteritis, would benefit from observation for hydration, pain and nausea control. Patient with over 500 on bladder scan, will need mercer if unable to urinate. Hematology and oncology consult with Dr. Latif.     Amount and/or Complexity of Data Reviewed  External Data Reviewed: notes.  Labs: ordered. Decision-making details documented in ED Course.  Radiology: ordered. Decision-making details documented in ED Course.  ECG/medicine tests: ordered and independent interpretation performed. Decision-making details documented in ED Course.  Discussion of management or test interpretation with external provider(s): 12:23 PM: Discussed pt's case with Dr. Hudson Latif MD (Hematology and Oncology) who is in agreement with current treatment and mercer placement if needed.    12:24 PM: Discussed case with Chloe Rome NP (Hospital Medicine) who agrees with current care and management of pt and accepts admission.   Admitting Service: Hospital Medicine  Admitting Physician: Dr. Nguyen  Admit to:  Observation, Med/tele      Risk  Prescription drug management.  Decision regarding hospitalization.  Diagnosis or treatment significantly limited by social determinants of health.       Additional MDM:   Smoking Cessation: The patient is a smoker. The patient was counseled on smoking cessation for: 4 minutes. The patient was counseled on tobacco related  health complications.           ED Medication(s):  Medications   sodium chloride 0.9% flush 10 mL (10 mLs Intravenous Given 12/26/24 2108)   melatonin tablet 6 mg (has no administration in time range)   acetaminophen tablet 650 mg (has no administration in time range)   naloxone 0.4 mg/mL injection 0.02 mg (has no administration in time range)   0.9% NaCl 1,000 mL with  mvi, (ADULT) no.4 with vit K 3,300 unit- 150 mcg/10 mL 10 mL, thiamine 100 mg, folic acid 1 mg infusion ( Intravenous New Bag 12/26/24 1649)   dextrose 5 % and 0.9 % NaCl infusion ( Intravenous Not Given 12/26/24 1430)   morphine injection 2 mg (2 mg Intravenous Given 12/26/24 2042)   HYDROcodone-acetaminophen 7.5-325 mg/15 mL oral liquid 10 mL (has no administration in time range)   heparin (porcine) injection 5,000 Units (5,000 Units Subcutaneous Given 12/26/24 2104)   ondansetron injection 4 mg (4 mg Intravenous Given 12/26/24 1650)   aluminum-magnesium hydroxide-simethicone 200-200-20 mg/5 mL suspension 30 mL (has no administration in time range)   morphine injection 4 mg (4 mg Intravenous Given 12/26/24 1016)   ondansetron injection 4 mg (4 mg Intravenous Given 12/26/24 1015)   sodium chloride 0.9% bolus 1,000 mL 1,000 mL (0 mLs Intravenous Stopped 12/26/24 1207)   iohexoL (OMNIPAQUE 350) injection 100 mL (100 mLs Intravenous Given 12/26/24 1047)   morphine injection 4 mg (4 mg Intravenous Given 12/26/24 1316)       Current Discharge Medication List                  Scribe Attestation:   Scribe #1: I performed the above scribed service and the documentation accurately describes the services I performed. I attest to the accuracy of the note.     Attending:   Physician Attestation Statement for Scribe #1: I, Mary Kate Bray MD, personally performed the services described in this documentation, as scribed by Norma Yanez, in my presence, and it is both accurate and complete.           Clinical Impression       ICD-10-CM ICD-9-CM   1. Intractable lower abdominal pain  R10.30 789.09   2. Generalized abdominal pain  R10.84 789.07   3. Esophageal cancer, stage IV  C15.9 150.9       Disposition:   Disposition: Placed in Observation  Condition: Stable       Mary Kate Bray MD  12/26/24 8460

## 2024-12-26 NOTE — PT/OT/SLP EVAL
Occupational Therapy   Evaluation and Discharge Note    Name: Rosalio Muñoz  MRN: [id number]  Admitting Diagnosis: <principal problem not specified>  Recent Surgery: * No surgery found *      Recommendations:     Discharge Recommendations: No Therapy Indicated  Discharge Equipment Recommendations:    Barriers to discharge:       Assessment:     Rosalio Muñoz is a 59 y.o. male with a medical diagnosis of <principal problem not specified>. At this time, patient is functioning at their prior level of function and does not require further acute OT services.     Plan:     During this hospitalization, patient does not require further acute OT services.  Please re-consult if situation changes.    Plan of Care Reviewed with: patient, spouse    Subjective     Patient/Family Comments/goals: return home  PLOF  Occupational Profile:  Living Environment: lives with spouse in 1 story house with no steps  Previous level of function: (I) with adl's and functional mobility  Roles and Routines:still drives  Equipment Used at home: none  Assistance upon Discharge: spouse    Pain/Comfort:  Pain Rating 1: 0/10    Patients cultural, spiritual, Cheondoism conflicts given the current situation:      Objective:     Communicated with: nurse and epic chart review prior to session.  Patient found sitting edge of bed with telemetry upon OT entry to room.    General Precautions: Standard,    Orthopedic Precautions: N/A  Braces: N/A  Respiratory Status: Room air     Occupational Performance:    Bed Mobility:    Patient completed Rolling/Turning to Right with independence  Patient completed Scooting/Bridging with independence  Patient completed Supine to Sit with independence    Functional Mobility/Transfers:  Patient completed Sit <> Stand Transfer with independence  with  no assistive device   Functional Mobility: (I) x 50 x 2 feet with no ae    Activities of Daily Living:  Upper Body Dressing: independence .  Lower Body Dressing:  independence .    Cognitive/Visual Perceptual:  Cognitive/Psychosocial Skills:     -       Oriented to: Person, Place, Time, and Situation   -       Follows Commands/attention:Follows multistep  commands  -       Communication: clear/fluent  -       Memory: No Deficits noted  -       Safety awareness/insight to disability: intact     Physical Exam:  Upper Extremity Range of Motion:     -       Right Upper Extremity: WFL  -       Left Upper Extremity: WFL  Upper Extremity Strength:    -       Right Upper Extremity: WFL  -       Left Upper Extremity: WFL   Strength:    -       Right Upper Extremity: WFL  -       Left Upper Extremity: WFL    AMPAC 6 Click ADL:  AMPAC Total Score: 24    Treatment & Education:  Patient educated on role of OT in acute setting and benefits of OOB activity. Encouraged OOB throughout the course of hospitalization to decrease risk of hospital related debility. Patient stated understanding and in agreement with POC.      Patient left sitting edge of bed with all lines intact, call button in reach, and family present    GOALS:   Multidisciplinary Problems       Occupational Therapy Goals       Not on file                    History:     Past Medical History:   Diagnosis Date    Esophageal cancer, stage IV 10/10/2024    Hypertension     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 10/11/2024    Palliative care encounter 12/11/2024         Past Surgical History:   Procedure Laterality Date    COLONOSCOPY N/A 8/25/2023    Procedure: COLONOSCOPY;  Surgeon: Pebbles Spear MD;  Location: [facility];  Service: Endoscopy;  Laterality: N/A;    COLONOSCOPY N/A 10/2/2024    Procedure: COLONOSCOPY  Cardiac clearance received on 09/20/24 per Dr. Lockett, Cardiology.  Note in encounters.  LB;  Surgeon: Sully Farris MD;  Location: [facility];  Service: Endoscopy;  Laterality: N/A;    ESOPHAGOGASTRODUODENOSCOPY N/A 10/2/2024    Procedure: EGD (ESOPHAGOGASTRODUODENOSCOPY);  Surgeon: Brenton  Sully DESIR MD;  Location: [facility] ENDO;  Service: Endoscopy;  Laterality: N/A;    INSERTION OF VENOUS ACCESS PORT Left 10/21/2024    Procedure: INSERTION, VENOUS ACCESS PORT;  Surgeon: Roseanna Rosas MD;  Location: [facility];  Service: General;  Laterality: Left;    SURGICAL REMOVAL OF LESION OF FACE Right 12/1/2023    Procedure: EXCISION, LESION, FACE;  Surgeon: Jose Flaherty MD;  Location: [facility] OR;  Service: ENT;  Laterality: Right;  1. Excision facial subcutaneous mass right cheek 3 cm. 2. Intermediate layered closure 3.5 cm    WISDOM TOOTH EXTRACTION      XI ROBOTIC APPENDECTOMY N/A 7/31/2024    Procedure: XI ROBOTIC APPENDECTOMY;  Surgeon: Paulo Saavedra MD;  Location: [facility];  Service: General;  Laterality: N/A;    XI ROBOTIC INSERTION, GASTROSTOMY TUBE N/A 10/21/2024    Procedure: XI ROBOTIC INSERTION, GASTROSTOMY TUBE;  Surgeon: Roseanna Rosas MD;  Location: [facility];  Service: General;  Laterality: N/A;       Time Tracking:     OT Date of Treatment: 12/26/24  OT Start Time: 1618  OT Stop Time: 1632  OT Total Time (min): 14 min    Billable Minutes:Evaluation 14 minutes    12/26/2024

## 2024-12-26 NOTE — ASSESSMENT & PLAN NOTE
Sec to Ileus or enteritis  Cont Oral Norco or IV MS for pain control  Cont IVF, gentle TF as tolerated

## 2024-12-26 NOTE — TELEPHONE ENCOUNTER
Pt's wife called to report increased N/V despite use of compazine as well as increasing abd pain that pt describes as gas like; pt has not taken oxycodone at this time. Message of above sent to Dr. Latif who recommended pt come in for visit or report to ED if pt feels urgent. Pt agreed to visit with Dr. Latif at this time; appt scheduled.

## 2024-12-26 NOTE — ASSESSMENT & PLAN NOTE
Concern for cachexia as evidenced by loss of 5% weight over the past 12 months, loss of muscle mass, asthenia, and loss of body fat . Contributing factors include underlying Malignancy. Treatment to include nutrition consult.      Body mass index is 20.77 kg/m².  Albumin   Date Value Ref Range Status   12/26/2024 3.5 3.5 - 5.2 g/dL Final

## 2024-12-26 NOTE — SUBJECTIVE & OBJECTIVE
Past Medical History:   Diagnosis Date    Esophageal cancer, stage IV 10/10/2024    Hypertension     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 10/11/2024    Palliative care encounter 12/11/2024       Past Surgical History:   Procedure Laterality Date    COLONOSCOPY N/A 8/25/2023    Procedure: COLONOSCOPY;  Surgeon: Pebbles Spear MD;  Location: [facility];  Service: Endoscopy;  Laterality: N/A;    COLONOSCOPY N/A 10/2/2024    Procedure: COLONOSCOPY  Cardiac clearance received on 09/20/24 per Dr. Lockett, Cardiology.  Note in encounters.  LB;  Surgeon: Sully Farris MD;  Location: [facility];  Service: Endoscopy;  Laterality: N/A;    ESOPHAGOGASTRODUODENOSCOPY N/A 10/2/2024    Procedure: EGD (ESOPHAGOGASTRODUODENOSCOPY);  Surgeon: Sully Farris MD;  Location: [facility];  Service: Endoscopy;  Laterality: N/A;    INSERTION OF VENOUS ACCESS PORT Left 10/21/2024    Procedure: INSERTION, VENOUS ACCESS PORT;  Surgeon: Roseanna Rosas MD;  Location: [facility];  Service: General;  Laterality: Left;    SURGICAL REMOVAL OF LESION OF FACE Right 12/1/2023    Procedure: EXCISION, LESION, FACE;  Surgeon: Jose Flaherty MD;  Location: [facility];  Service: ENT;  Laterality: Right;  1. Excision facial subcutaneous mass right cheek 3 cm. 2. Intermediate layered closure 3.5 cm    WISDOM TOOTH EXTRACTION      XI ROBOTIC APPENDECTOMY N/A 7/31/2024    Procedure: XI ROBOTIC APPENDECTOMY;  Surgeon: Paulo Saavedra MD;  Location: [facility] OR;  Service: General;  Laterality: N/A;    XI ROBOTIC INSERTION, GASTROSTOMY TUBE N/A 10/21/2024    Procedure: XI ROBOTIC INSERTION, GASTROSTOMY TUBE;  Surgeon: Roseanna Rosas MD;  Location: [facility] OR;  Service: General;  Laterality: N/A;       Review of patient's allergies indicates:  No Known Allergies    No current facility-administered medications on file prior to encounter.     Current Outpatient Medications on File Prior to Encounter   Medication Sig    acetaminophen (TYLENOL) 500 MG  tablet Take 500 mg by mouth every 6 (six) hours as needed for Pain. prn    aspirin (ECOTRIN) 81 MG EC tablet Take 1 tablet (81 mg total) by mouth once daily.    atorvastatin (LIPITOR) 20 MG tablet Take 1 tablet (20 mg total) by mouth every evening.    carvediloL (COREG) 25 MG tablet Take 1 tablet (25 mg total) by mouth 2 (two) times daily.    LIDOcaine-prilocaine (EMLA) cream Apply topically as needed.    OLANZapine (ZYPREXA) 5 MG tablet Take 1 tablet (5 mg total) by mouth every evening. Take nightly on days 1-3 of each chemotherapy cycle.    OLANZapine (ZYPREXA) 5 MG tablet Take 1 tablet (5 mg total) by mouth every evening. On days 1-3, 15-17, and 29-31 of each chemotherapy cycle.    oxyCODONE (ROXICODONE) 5 MG immediate release tablet Take 1 tablet (5 mg total) by mouth every 4 (four) hours as needed for Pain.    prochlorperazine (COMPAZINE) 10 MG tablet Take 1 tablet (10 mg total) by mouth every 6 (six) hours as needed.    prochlorperazine (COMPAZINE) 5 MG tablet Take 2 tablets (10 mg total) by mouth every 6 (six) hours as needed for Nausea.     Family History       Problem Relation (Age of Onset)    Breast cancer Maternal Grandmother    Diabetes Father    Heart disease Father    Hyperlipidemia Mother    Hypertension Father    Kidney disease Father    Prostate cancer Maternal Grandfather          Tobacco Use    Smoking status: Every Day     Current packs/day: 0.00     Types: Cigars, Cigarettes     Last attempt to quit: 10/4/2022     Years since quittin.2     Passive exposure: Never    Smokeless tobacco: Never    Tobacco comments:     Cigar every afternoon   Substance and Sexual Activity    Alcohol use: Yes     Alcohol/week: 4.0 - 6.0 standard drinks of alcohol     Types: 4 - 6 Cans of beer per week     Comment: occ    Drug use: Yes     Types: Marijuana     Comment: medical    Sexual activity: Yes     Partners: Female     Review of Systems   Constitutional:  Positive for activity change and appetite change.  Negative for chills, diaphoresis and fatigue.   HENT: Negative.     Eyes: Negative.    Respiratory: Negative.  Negative for cough and shortness of breath.    Cardiovascular: Negative.    Gastrointestinal:  Positive for abdominal pain, diarrhea, nausea and vomiting.   Endocrine: Negative.    Genitourinary:  Positive for decreased urine volume.   Musculoskeletal: Negative.    Skin: Negative.    Allergic/Immunologic: Negative.    Neurological:  Positive for weakness.   Hematological: Negative.    Psychiatric/Behavioral:  The patient is nervous/anxious.      Objective:     Vital Signs (Most Recent):  Temp: 98 °F (36.7 °C) (12/26/24 1612)  Pulse: 77 (12/26/24 1612)  Resp: 19 (12/26/24 1612)  BP: 132/79 (12/26/24 1612)  SpO2: 96 % (12/26/24 1612) Vital Signs (24h Range):  Temp:  [97.6 °F (36.4 °C)-98 °F (36.7 °C)] 98 °F (36.7 °C)  Pulse:  [] 77  Resp:  [16-19] 19  SpO2:  [96 %-100 %] 96 %  BP: (115-156)/(75-89) 132/79     Weight: 71.4 kg (157 lb 6.5 oz)  Body mass index is 20.77 kg/m².     Physical Exam  Vitals and nursing note reviewed.   Constitutional:       General: He is not in acute distress.     Appearance: He is well-developed. He is ill-appearing. He is not toxic-appearing or diaphoretic.      Comments: Frail, emaciated, middle aged man in pain and discomfort but NAD   HENT:      Head: Normocephalic and atraumatic.      Right Ear: External ear normal.      Left Ear: External ear normal.      Nose: Nose normal. No congestion.      Mouth/Throat:      Mouth: Mucous membranes are moist.      Pharynx: Oropharynx is clear.      Comments: Very narrow oropharynx  Eyes:      General: No scleral icterus.     Conjunctiva/sclera: Conjunctivae normal.      Pupils: Pupils are equal, round, and reactive to light.   Cardiovascular:      Rate and Rhythm: Normal rate and regular rhythm.      Pulses: Normal pulses.      Heart sounds: Normal heart sounds.   Pulmonary:      Effort: Pulmonary effort is normal. No respiratory  distress.      Breath sounds: Normal breath sounds. No wheezing or rales.   Abdominal:      General: Abdomen is flat. Bowel sounds are normal. There is no distension.      Palpations: Abdomen is soft.      Tenderness: There is no abdominal tenderness. There is no guarding.   Genitourinary:     Rectum: Normal.      Comments: deferred  Musculoskeletal:         General: Normal range of motion.      Cervical back: Normal range of motion and neck supple.   Skin:     General: Skin is warm and dry.      Capillary Refill: Capillary refill takes less than 2 seconds.      Coloration: Skin is not jaundiced.   Neurological:      General: No focal deficit present.      Mental Status: He is alert and oriented to person, place, and time.      Deep Tendon Reflexes: Reflexes are normal and symmetric.   Psychiatric:         Mood and Affect: Mood normal.         Behavior: Behavior normal.         Thought Content: Thought content normal.         Judgment: Judgment normal.              CRANIAL NERVES     CN III, IV, VI   Pupils are equal, round, and reactive to light.    Results for orders placed or performed during the hospital encounter of 12/26/24   EKG 12-lead    Collection Time: 12/26/24  9:44 AM   Result Value Ref Range    QRS Duration 80 ms    [facility]HS QTC Calculation 440 ms   CBC W/ AUTO DIFFERENTIAL    Collection Time: 12/26/24  9:54 AM   Result Value Ref Range    WBC 6.13 3.90 - 12.70 K/uL    RBC 4.21 (L) 4.60 - 6.20 M/uL    Hemoglobin 13.6 (L) 14.0 - 18.0 g/dL    Hematocrit 40.1 40.0 - 54.0 %    MCV 95 82 - 98 fL    MCH 32.3 (H) 27.0 - 31.0 pg    MCHC 33.9 32.0 - 36.0 g/dL    RDW 15.3 (H) 11.5 - 14.5 %    Platelets 241 150 - 450 K/uL    MPV 10.0 9.2 - 12.9 fL    Immature Granulocytes 0.3 0.0 - 0.5 %    Gran # (ANC) 3.9 1.8 - 7.7 K/uL    Immature Grans (Abs) 0.02 0.00 - 0.04 K/uL    Lymph # 1.2 1.0 - 4.8 K/uL    Mono # 1.0 0.3 - 1.0 K/uL    Eos # 0.1 0.0 - 0.5 K/uL    Baso # 0.04 0.00 - 0.20 K/uL    nRBC 0 0 /100 WBC    Gran % 62.7  38.0 - 73.0 %    Lymph % 19.6 18.0 - 48.0 %    Mono % 15.7 (H) 4.0 - 15.0 %    Eosinophil % 1.0 0.0 - 8.0 %    Basophil % 0.7 0.0 - 1.9 %    Differential Method Automated    Comp. Metabolic Panel    Collection Time: 12/26/24  9:54 AM   Result Value Ref Range    Sodium 135 (L) 136 - 145 mmol/L    Potassium 4.2 3.5 - 5.1 mmol/L    Chloride 101 95 - 110 mmol/L    CO2 22 (L) 23 - 29 mmol/L    Glucose 90 70 - 110 mg/dL    BUN 14 6 - 20 mg/dL    Creatinine 0.8 0.5 - 1.4 mg/dL    Calcium 9.2 8.7 - 10.5 mg/dL    Total Protein 7.5 6.0 - 8.4 g/dL    Albumin 3.5 3.5 - 5.2 g/dL    Total Bilirubin 0.5 0.1 - 1.0 mg/dL    Alkaline Phosphatase 91 40 - 150 U/L    AST 29 10 - 40 U/L    ALT 25 10 - 44 U/L    eGFR >60 >60 mL/min/1.73 m^2    Anion Gap 12 8 - 16 mmol/L   Lipase    Collection Time: 12/26/24  9:54 AM   Result Value Ref Range    Lipase 33 4 - 60 U/L   Urinalysis, Reflex to Urine Culture Urine, Clean Catch    Collection Time: 12/26/24 12:59 PM    Specimen: Urine   Result Value Ref Range    Specimen UA Urine, Clean Catch     Color, UA Yellow Yellow, Straw, Marge    Appearance, UA Clear Clear    pH, UA 8.0 5.0 - 8.0    Specific Gravity, UA >1.030 (A) 1.005 - 1.030    Protein, UA Trace (A) Negative    Glucose, UA Negative Negative    Ketones, UA Trace (A) Negative    Bilirubin (UA) Negative Negative    Occult Blood UA Negative Negative    Nitrite, UA Negative Negative    Urobilinogen, UA Negative <2.0 EU/dL    Leukocytes, UA Negative Negative        Imaging Results              CT Chest Abdomen Pelvis With IV Contrast (XPD) NO Oral Contrast (Final result)  Result time 12/26/24 11:21:24      Final result by Enrique Storey MD (12/26/24 11:21:24)                   Impression:      1. Findings probably representing enteritis with diarrheal illness.  2. Patient has a known esophageal malignancy with metastatic hepatic lesion and gastrohepatic lymphadenopathy.  The index gastrohepatic lymph node has decreased in size.  No enlarging  lesions.  No new evidence of metastatic disease.      Electronically signed by: Enrique Iqbal  Date:    12/26/2024  Time:    11:21               Narrative:    EXAMINATION:  CT CHEST ABDOMEN PELVIS WITH IV CONTRAST (XPD)    CLINICAL HISTORY:  Recent onset generalized abdominal pain.  Patient has a known esophageal malignancy.    COMPARISON:  PET-CT October 10, 2024.    TECHNIQUE:  Axial CT images were obtained of the CHEST/ABDOMEN/PELVIS with IV contrast.  Iterative reconstruction technique was used. The CT exam was performed using one or more of the following dose reduction techniques- Automated exposure control, adjustment of the mA and/or kV according to patient size, and/or use of iterative reconstructed technique.    FINDINGS:  Chest:    Lungs: No acute finding.  Mild emphysema.    Aorta: No aneurysm.    Pleural space:No pleural effusion or pneumothorax.    Heart: Normal size. No pericardial effusion.  Coronary artery calcifications are present.    Bones/joints: No acute osseous finding.    Other: No mediastinal or axillary lymphadenopathy.  Thickening of the distal esophagus/proximal stomach redemonstrated    Abdomen/pelvis:    Liver: Known posterior right hepatic metastatic lesion.  There is an additional too small to characterize hypodensity in the right hepatic lobe which may reflect metastatic disease.  No new large hepatic lesion.    Gallbladder: No calcified gallstones.    Bile Ducts: No evidence of dilated ducts.    Pancreas: No mass or peripancreatic fat stranding.    Spleen: Unremarkable.    Adrenals: Unremarkable.    Kidneys/ Ureters: Unremarkable.    Bladder: No evidence of wall thickening.    Reproductive organs: Unremarkable.    GI Tract/Mesentery: Gastrostomy tube in place.  Thickening of the proximal stomach appears improved when compared to prior exam.  Prominent gas-filled small bowel may reflect mild enteritis.  Fluid and gas in the colon may reflect diarrheal illness.    Peritoneal Space:  Index gastrohepatic lymph node measures 17 mm, previously 24 mm.    Retroperitoneum: No retroperitoneal adenopathy.    Abdominal wall: Unremarkable.    Vasculature:  Atherosclerosis.  No aortic aneurysm.    Bones: No acute fracture.                                       X-Ray Abdomen Flat And Erect (Final result)  Result time 12/26/24 10:19:15      Final result by Sebastian Scott MD (12/26/24 10:19:15)                   Impression:      Nonobstructive bowel gas pattern.   .      Electronically signed by: Sebastian Scott MD  Date:    12/26/2024  Time:    10:19               Narrative:    EXAMINATION:  XR ABDOMEN FLAT AND ERECT    CLINICAL HISTORY:  Abdominal Pain;    COMPARISON:  08/03/2024    FINDINGS:  Nonobstructive bowel gas pattern is noted.  Air-filled bowel loops are seen throughout the abdomen without transition.  No radiopaque kidney calculus is identified.  There are multiple calcifications in the pelvis most consistent with phleboliths.  No evidence of organomegaly.  The bones demonstrate mild degenerative changes within the lower lumbar region.  The bones are otherwise intact.  Aorta demonstrates atherosclerotic disease.                                     Significant Labs: All pertinent labs within the past 24 hours have been reviewed.    Significant Imaging: I have reviewed all pertinent imaging results/findings within the past 24 hours.

## 2024-12-26 NOTE — PROGRESS NOTES
Subjective:       Patient ID: Rosalio Muñoz is a 59 y.o. male.    Chief Complaint: Results, Esophageal Cancer, and Pain    HPI:  59-year-old male history of stage IV metastatic esophageal carcinoma patient has received 2 cycles of therapy.  Patient reports over the last 5 days increasing abdominal girth tenderness difficulty holding food down in not able to tolerate tube feedings.  Saw the patient urgently in the office for review to see whether not any evidence of progression or mouth positioning of tube ECOG status 2    Past Medical History:   Diagnosis Date    Esophageal cancer, stage IV 10/10/2024    Hypertension     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 10/11/2024    Palliative care encounter 2024     Family History   Problem Relation Name Age of Onset    Hyperlipidemia Mother      Hypertension Father      Diabetes Father      Kidney disease Father      Heart disease Father      Breast cancer Maternal Grandmother      Prostate cancer Maternal Grandfather      Colon cancer Neg Hx       Social History     Socioeconomic History    Marital status:     Number of children: 2   Occupational History    Occupation:    Tobacco Use    Smoking status: Every Day     Current packs/day: 0.00     Types: Cigars, Cigarettes     Last attempt to quit: 10/4/2022     Years since quittin.2     Passive exposure: Never    Smokeless tobacco: Never    Tobacco comments:     Cigar every afternoon   Substance and Sexual Activity    Alcohol use: Yes     Alcohol/week: 4.0 - 6.0 standard drinks of alcohol     Types: 4 - 6 Cans of beer per week     Comment: occ    Drug use: No    Sexual activity: Yes     Partners: Female     Social Drivers of Health     Financial Resource Strain: Medium Risk (2024)    Overall Financial Resource Strain (CARDIA)     Difficulty of Paying Living Expenses: Somewhat hard   Food Insecurity: Patient Declined (2024)    Hunger Vital Sign     Worried About  Running Out of Food in the Last Year: Patient declined     Ran Out of Food in the Last Year: Patient declined   Transportation Needs: No Transportation Needs (10/22/2024)    TRANSPORTATION NEEDS     Transportation : No   Physical Activity: Insufficiently Active (11/5/2024)    Exercise Vital Sign     Days of Exercise per Week: 2 days     Minutes of Exercise per Session: 20 min   Stress: No Stress Concern Present (11/5/2024)    [employer] - Occupational Stress Questionnaire     Feeling of Stress : Not at all   Housing Stability: Unknown (11/5/2024)    Housing Stability Vital Sign     Unable to Pay for Housing in the Last Year: Patient declined     Homeless in the Last Year: No     Past Surgical History:   Procedure Laterality Date    COLONOSCOPY N/A 8/25/2023    Procedure: COLONOSCOPY;  Surgeon: Pebbles Spear MD;  Location: [facility];  Service: Endoscopy;  Laterality: N/A;    COLONOSCOPY N/A 10/2/2024    Procedure: COLONOSCOPY  Cardiac clearance received on 09/20/24 per Dr. Lockett, Cardiology.  Note in encounters.  LB;  Surgeon: Sully Farris MD;  Location: [facility];  Service: Endoscopy;  Laterality: N/A;    ESOPHAGOGASTRODUODENOSCOPY N/A 10/2/2024    Procedure: EGD (ESOPHAGOGASTRODUODENOSCOPY);  Surgeon: Sully Farris MD;  Location: [facility];  Service: Endoscopy;  Laterality: N/A;    INSERTION OF VENOUS ACCESS PORT Left 10/21/2024    Procedure: INSERTION, VENOUS ACCESS PORT;  Surgeon: Roseanna Rosas MD;  Location: [facility];  Service: General;  Laterality: Left;    SURGICAL REMOVAL OF LESION OF FACE Right 12/1/2023    Procedure: EXCISION, LESION, FACE;  Surgeon: Jose Flaherty MD;  Location: [facility];  Service: ENT;  Laterality: Right;  1. Excision facial subcutaneous mass right cheek 3 cm. 2. Intermediate layered closure 3.5 cm    WISDOM TOOTH EXTRACTION      XI ROBOTIC APPENDECTOMY N/A 7/31/2024    Procedure: XI ROBOTIC APPENDECTOMY;  Surgeon: Paulo Saavedra  "MD;  Location: [facility];  Service: General;  Laterality: N/A;    XI ROBOTIC INSERTION, GASTROSTOMY TUBE N/A 10/21/2024    Procedure: XI ROBOTIC INSERTION, GASTROSTOMY TUBE;  Surgeon: Roseanna Rosas MD;  Location: [facility] OR;  Service: General;  Laterality: N/A;       Labs:  Lab Results   Component Value Date    WBC 4.72 12/17/2024    HGB 13.1 (L) 12/17/2024    HCT 38.0 (L) 12/17/2024    MCV 96 12/17/2024     12/17/2024     BMP  Lab Results   Component Value Date     (L) 12/17/2024    K 4.0 12/17/2024     12/17/2024    CO2 24 12/17/2024    BUN 9 12/17/2024    CREATININE 0.7 12/17/2024    CALCIUM 9.3 12/17/2024    ANIONGAP 9 12/17/2024    ESTGFRAFRICA >60.0 07/08/2022    EGFRNONAA >60.0 07/08/2022     Lab Results   Component Value Date    ALT 30 12/17/2024    AST 25 12/17/2024    ALKPHOS 91 12/17/2024    BILITOT 0.5 12/17/2024       Lab Results   Component Value Date    IRON 72 12/03/2024    TIBC 432 12/03/2024    FERRITIN 272 12/03/2024     Lab Results   Component Value Date    DMBXITEE02 445 10/19/2018     No results found for: "FOLATE"  Lab Results   Component Value Date    TSH 3.465 08/20/2024         Review of Systems   Constitutional:  Positive for fatigue. Negative for activity change, appetite change, chills, diaphoresis, fever and unexpected weight change.   HENT:  Negative for congestion, dental problem, drooling, ear discharge, ear pain, facial swelling, hearing loss, mouth sores, nosebleeds, postnasal drip, rhinorrhea, sinus pressure, sneezing, sore throat, tinnitus, trouble swallowing and voice change.    Eyes:  Negative for photophobia, pain, discharge, redness, itching and visual disturbance.   Respiratory:  Negative for apnea, cough, choking, chest tightness, shortness of breath, wheezing and stridor.    Cardiovascular:  Negative for chest pain, palpitations and leg swelling.   Gastrointestinal:  Positive for abdominal distention and abdominal pain. Negative for anal bleeding, blood " in stool, constipation, diarrhea, nausea, rectal pain and vomiting.   Endocrine: Negative for cold intolerance, heat intolerance, polydipsia, polyphagia and polyuria.   Genitourinary:  Negative for decreased urine volume, difficulty urinating, dysuria, enuresis, flank pain, frequency, genital sores, hematuria, penile discharge, penile pain, penile swelling, scrotal swelling, testicular pain and urgency.   Musculoskeletal:  Negative for arthralgias, back pain, gait problem, joint swelling, myalgias, neck pain and neck stiffness.   Skin:  Negative for color change, pallor, rash and wound.   Allergic/Immunologic: Negative for environmental allergies, food allergies and immunocompromised state.   Neurological:  Positive for weakness. Negative for dizziness, tremors, seizures, syncope, facial asymmetry, speech difficulty, light-headedness, numbness and headaches.   Hematological:  Negative for adenopathy. Does not bruise/bleed easily.   Psychiatric/Behavioral:  Negative for agitation, behavioral problems, confusion, decreased concentration, dysphoric mood, hallucinations, self-injury, sleep disturbance and suicidal ideas. The patient is not nervous/anxious and is not hyperactive.        Objective:      Physical Exam  Vitals reviewed.   Constitutional:       General: He is not in acute distress.     Appearance: He is well-developed. He is not diaphoretic.   HENT:      Head: Normocephalic.      Right Ear: External ear normal.      Left Ear: External ear normal.      Nose: Nose normal.      Right Sinus: No maxillary sinus tenderness or frontal sinus tenderness.      Left Sinus: No maxillary sinus tenderness or frontal sinus tenderness.      Mouth/Throat:      Pharynx: No oropharyngeal exudate.   Eyes:      General: Lids are normal. No scleral icterus.        Right eye: No discharge.         Left eye: No discharge.      Extraocular Movements:      Right eye: Normal extraocular motion.      Left eye: Normal extraocular  motion.      Conjunctiva/sclera:      Right eye: Right conjunctiva is not injected. No hemorrhage.     Left eye: Left conjunctiva is not injected. No hemorrhage.     Pupils: Pupils are equal, round, and reactive to light.   Neck:      Thyroid: No thyromegaly.      Vascular: No JVD.      Trachea: No tracheal deviation.   Cardiovascular:      Rate and Rhythm: Normal rate.   Pulmonary:      Effort: Pulmonary effort is normal. No respiratory distress.      Breath sounds: No stridor.   Abdominal:      General: Bowel sounds are normal. There is distension.      Palpations: Abdomen is soft. There is no hepatomegaly, splenomegaly or mass.      Tenderness: There is abdominal tenderness.   Musculoskeletal:         General: No tenderness. Normal range of motion.      Cervical back: Normal range of motion and neck supple.   Lymphadenopathy:      Head:      Right side of head: No posterior auricular or occipital adenopathy.      Left side of head: No posterior auricular or occipital adenopathy.      Cervical: No cervical adenopathy.      Right cervical: No superficial, deep or posterior cervical adenopathy.     Left cervical: No superficial, deep or posterior cervical adenopathy.      Upper Body:      Right upper body: No supraclavicular adenopathy.      Left upper body: No supraclavicular adenopathy.   Skin:     General: Skin is dry.      Findings: No erythema or rash.      Nails: There is no clubbing.   Neurological:      Mental Status: He is alert and oriented to person, place, and time.      Cranial Nerves: No cranial nerve deficit.      Coordination: Coordination normal.   Psychiatric:         Behavior: Behavior normal.         Thought Content: Thought content normal.         Judgment: Judgment normal.             Assessment:      1. Esophageal cancer, stage IV    2. 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    3. Iron deficiency anemia due to chronic blood loss    4. Secondary adenocarcinoma of retroperitoneum    5.  Immunodeficiency due to chemotherapy           Med Onc Chart Routing      Follow up with physician . Referral emergency room ASAP   Follow up with [facility]    Infusion scheduling note    Injection scheduling note    Labs    Imaging    Pharmacy appointment    Other referrals                   Plan:     Patient was seen urgently.  At this time concern over possible progression of disease with increasing abdominal girth.  At this time I would recommend evaluation in the emergency room with CT chest abdomen pelvis compared to previous along with CBC CMP magnesium.  Discussed implications of answered questions with patient will reassess to see whether not affects of chemotherapy or progressive disease noted        Hudson Latif Jr, MD FACP

## 2024-12-26 NOTE — HPI
"Rosalio Muñoz is a 59 y.o. male patient with a PMHx of stage IV esophageal cancer and HTN, sent to ER by Dr. Latif in Oncology for evaluation of lower abdominal pain which began 5 days ago, 1 day after completing chemo Tx. Symptoms are constant and moderate in severity. Patient states that abdominal pain "feels like gas". No mitigating or exacerbating factors reported. Associated sxs include flatulence and nausea. Affirms regular bowel movements. Patient denies any urinary difficulties. Feeding tube in place. The wife reports he usually receives 4 tube feedings per day; decreased to 2 feedings as of this weekend due to nausea and abdominal pain. Pt reports drinking water and tube feeding this morning without any issues. Affirms port is in place. No further complaints or concerns at this time. Salomon Mims saw him in the office this am and sent to ER to see any evidence of progression or malfunction/malpositioning of peg tube. ECOG status 2.     In the ER, PT AAOx4, appears emaciated, in obvious discomfort, given IV Morphine and started on IVF. VSS Afeb, Labs on admit, CBC,CMP, UA unremarkable except Na 135 and HCO3 22. CT Chest/Abd/Pelvis- Findings probably representing enteritis with diarrheal illness.  2. Patient has a known esophageal malignancy with metastatic hepatic lesion and gastrohepatic lymphadenopathy.  The index gastrohepatic lymph node has decreased in size.  No enlarging lesions.  No new evidence of metastatic disease.    Pt is being placed in Obs under [facility] for possible enteritis and intractable abd pain.       "

## 2024-12-26 NOTE — ASSESSMENT & PLAN NOTE
Patient has Non- operative ileus which is adynamic in etiology which is stable. This is a complication of Esophageal Ca.  Will treat conservatively with bowel rest, IV fluids, serial abdominal exams and avoidance of GI paralytics such as narcotics or anti-spasmodics. Monitor patient closely.

## 2024-12-26 NOTE — H&P
"  [facility] Medicine  History & Physical    Patient Name: Rosalio Muñoz  MRN: [id number]  Patient Class: OP- Observation  Admission Date: 12/26/2024  Attending Physician: Fausto Nguyen MD   Primary Care Provider: Michell Goyal MD         Patient information was obtained from patient, spouse/SO, past medical records, and ER records.     Subjective:     Principal Problem:Intractable abdominal pain    Chief Complaint:   Chief Complaint   Patient presents with    Abdominal Pain     Dr. Latif sent pt for evaluation of abdominal pain and N/V/D and chills since Saturday. Pt finished chemo Friday for esophageal cancer with mets to the liver.  (-) chest pain or SOB         HPI: Rosalio Muñoz is a 59 y.o. male patient with a PMHx of stage IV esophageal cancer and HTN, sent to ER by Dr. Latif in Oncology for evaluation of lower abdominal pain which began 5 days ago, 1 day after completing chemo Tx. Symptoms are constant and moderate in severity. Patient states that abdominal pain "feels like gas". No mitigating or exacerbating factors reported. Associated sxs include flatulence and nausea. Affirms regular bowel movements. Patient denies any urinary difficulties. Feeding tube in place. The wife reports he usually receives 4 tube feedings per day; decreased to 2 feedings as of this weekend due to nausea and abdominal pain. Pt reports drinking water and tube feeding this morning without any issues. Affirms port is in place. No further complaints or concerns at this time. Salomon Mims saw him in the office this am and sent to ER to see any evidence of progression or malfunction/malpositioning of peg tube. ECOG status 2.     In the ER, PT AAOx4, appears emaciated, in obvious discomfort, given IV Morphine and started on IVF. VSS Afeb, Labs on admit, CBC,CMP, UA unremarkable except Na 135 and HCO3 22. CT Chest/Abd/Pelvis- Findings probably representing enteritis with diarrheal illness.  2. " Patient has a known esophageal malignancy with metastatic hepatic lesion and gastrohepatic lymphadenopathy.  The index gastrohepatic lymph node has decreased in size.  No enlarging lesions.  No new evidence of metastatic disease.    Pt is being placed in Obs under [facility]osp [facility] for possible enteritis and intractable abd pain.         Past Medical History:   Diagnosis Date    Esophageal cancer, stage IV 10/10/2024    Hypertension     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 10/11/2024    Palliative care encounter 12/11/2024       Past Surgical History:   Procedure Laterality Date    COLONOSCOPY N/A 8/25/2023    Procedure: COLONOSCOPY;  Surgeon: Pebbles Spear MD;  Location: [facility];  Service: Endoscopy;  Laterality: N/A;    COLONOSCOPY N/A 10/2/2024    Procedure: COLONOSCOPY  Cardiac clearance received on 09/20/24 per Dr. Lockett, Cardiology.  Note in encounters.  LB;  Surgeon: Sully Farris MD;  Location: [facility] ENDO;  Service: Endoscopy;  Laterality: N/A;    ESOPHAGOGASTRODUODENOSCOPY N/A 10/2/2024    Procedure: EGD (ESOPHAGOGASTRODUODENOSCOPY);  Surgeon: Sully Farris MD;  Location: [facility];  Service: Endoscopy;  Laterality: N/A;    INSERTION OF VENOUS ACCESS PORT Left 10/21/2024    Procedure: INSERTION, VENOUS ACCESS PORT;  Surgeon: Roseanna Rosas MD;  Location: [facility] OR;  Service: General;  Laterality: Left;    SURGICAL REMOVAL OF LESION OF FACE Right 12/1/2023    Procedure: EXCISION, LESION, FACE;  Surgeon: Jose Flaherty MD;  Location: [facility] OR;  Service: ENT;  Laterality: Right;  1. Excision facial subcutaneous mass right cheek 3 cm. 2. Intermediate layered closure 3.5 cm    WISDOM TOOTH EXTRACTION      XI ROBOTIC APPENDECTOMY N/A 7/31/2024    Procedure: XI ROBOTIC APPENDECTOMY;  Surgeon: Paulo Saavedra MD;  Location: [facility] OR;  Service: General;  Laterality: N/A;    XI ROBOTIC INSERTION, GASTROSTOMY TUBE N/A 10/21/2024    Procedure: XI ROBOTIC INSERTION, GASTROSTOMY TUBE;  Surgeon:  Roseanna Rosas MD;  Location: [facility];  Service: General;  Laterality: N/A;       Review of patient's allergies indicates:  No Known Allergies    No current facility-administered medications on file prior to encounter.     Current Outpatient Medications on File Prior to Encounter   Medication Sig    acetaminophen (TYLENOL) 500 MG tablet Take 500 mg by mouth every 6 (six) hours as needed for Pain. prn    aspirin (ECOTRIN) 81 MG EC tablet Take 1 tablet (81 mg total) by mouth once daily.    atorvastatin (LIPITOR) 20 MG tablet Take 1 tablet (20 mg total) by mouth every evening.    carvediloL (COREG) 25 MG tablet Take 1 tablet (25 mg total) by mouth 2 (two) times daily.    LIDOcaine-prilocaine (EMLA) cream Apply topically as needed.    OLANZapine (ZYPREXA) 5 MG tablet Take 1 tablet (5 mg total) by mouth every evening. Take nightly on days 1-3 of each chemotherapy cycle.    OLANZapine (ZYPREXA) 5 MG tablet Take 1 tablet (5 mg total) by mouth every evening. On days 1-3, 15-17, and 29-31 of each chemotherapy cycle.    oxyCODONE (ROXICODONE) 5 MG immediate release tablet Take 1 tablet (5 mg total) by mouth every 4 (four) hours as needed for Pain.    prochlorperazine (COMPAZINE) 10 MG tablet Take 1 tablet (10 mg total) by mouth every 6 (six) hours as needed.    prochlorperazine (COMPAZINE) 5 MG tablet Take 2 tablets (10 mg total) by mouth every 6 (six) hours as needed for Nausea.     Family History       Problem Relation (Age of Onset)    Breast cancer Maternal Grandmother    Diabetes Father    Heart disease Father    Hyperlipidemia Mother    Hypertension Father    Kidney disease Father    Prostate cancer Maternal Grandfather          Tobacco Use    Smoking status: Every Day     Current packs/day: 0.00     Types: Cigars, Cigarettes     Last attempt to quit: 10/4/2022     Years since quittin.2     Passive exposure: Never    Smokeless tobacco: Never    Tobacco comments:     Cigar every afternoon   Substance and Sexual  Activity    Alcohol use: Yes     Alcohol/week: 4.0 - 6.0 standard drinks of alcohol     Types: 4 - 6 Cans of beer per week     Comment: occ    Drug use: Yes     Types: Marijuana     Comment: medical    Sexual activity: Yes     Partners: Female     Review of Systems   Constitutional:  Positive for activity change and appetite change. Negative for chills, diaphoresis and fatigue.   HENT: Negative.     Eyes: Negative.    Respiratory: Negative.  Negative for cough and shortness of breath.    Cardiovascular: Negative.    Gastrointestinal:  Positive for abdominal pain, diarrhea, nausea and vomiting.   Endocrine: Negative.    Genitourinary:  Positive for decreased urine volume.   Musculoskeletal: Negative.    Skin: Negative.    Allergic/Immunologic: Negative.    Neurological:  Positive for weakness.   Hematological: Negative.    Psychiatric/Behavioral:  The patient is nervous/anxious.      Objective:     Vital Signs (Most Recent):  Temp: 98 °F (36.7 °C) (12/26/24 1612)  Pulse: 77 (12/26/24 1612)  Resp: 19 (12/26/24 1612)  BP: 132/79 (12/26/24 1612)  SpO2: 96 % (12/26/24 1612) Vital Signs (24h Range):  Temp:  [97.6 °F (36.4 °C)-98 °F (36.7 °C)] 98 °F (36.7 °C)  Pulse:  [] 77  Resp:  [16-19] 19  SpO2:  [96 %-100 %] 96 %  BP: (115-156)/(75-89) 132/79     Weight: 71.4 kg (157 lb 6.5 oz)  Body mass index is 20.77 kg/m².     Physical Exam  Vitals and nursing note reviewed.   Constitutional:       General: He is not in acute distress.     Appearance: He is well-developed. He is ill-appearing. He is not toxic-appearing or diaphoretic.      Comments: Frail, emaciated, middle aged man in pain and discomfort but NAD   HENT:      Head: Normocephalic and atraumatic.      Right Ear: External ear normal.      Left Ear: External ear normal.      Nose: Nose normal. No congestion.      Mouth/Throat:      Mouth: Mucous membranes are moist.      Pharynx: Oropharynx is clear.      Comments: Very narrow oropharynx  Eyes:      General: No  scleral icterus.     Conjunctiva/sclera: Conjunctivae normal.      Pupils: Pupils are equal, round, and reactive to light.   Cardiovascular:      Rate and Rhythm: Normal rate and regular rhythm.      Pulses: Normal pulses.      Heart sounds: Normal heart sounds.   Pulmonary:      Effort: Pulmonary effort is normal. No respiratory distress.      Breath sounds: Normal breath sounds. No wheezing or rales.   Abdominal:      General: Abdomen is flat. Bowel sounds are normal. There is no distension.      Palpations: Abdomen is soft.      Tenderness: There is no abdominal tenderness. There is no guarding.   Genitourinary:     Rectum: Normal.      Comments: deferred  Musculoskeletal:         General: Normal range of motion.      Cervical back: Normal range of motion and neck supple.   Skin:     General: Skin is warm and dry.      Capillary Refill: Capillary refill takes less than 2 seconds.      Coloration: Skin is not jaundiced.   Neurological:      General: No focal deficit present.      Mental Status: He is alert and oriented to person, place, and time.      Deep Tendon Reflexes: Reflexes are normal and symmetric.   Psychiatric:         Mood and Affect: Mood normal.         Behavior: Behavior normal.         Thought Content: Thought content normal.         Judgment: Judgment normal.              CRANIAL NERVES     CN III, IV, VI   Pupils are equal, round, and reactive to light.    Results for orders placed or performed during the hospital encounter of 12/26/24   EKG 12-lead    Collection Time: 12/26/24  9:44 AM   Result Value Ref Range    QRS Duration 80 ms    [facility] QTC Calculation 440 ms   CBC W/ AUTO DIFFERENTIAL    Collection Time: 12/26/24  9:54 AM   Result Value Ref Range    WBC 6.13 3.90 - 12.70 K/uL    RBC 4.21 (L) 4.60 - 6.20 M/uL    Hemoglobin 13.6 (L) 14.0 - 18.0 g/dL    Hematocrit 40.1 40.0 - 54.0 %    MCV 95 82 - 98 fL    MCH 32.3 (H) 27.0 - 31.0 pg    MCHC 33.9 32.0 - 36.0 g/dL    RDW 15.3 (H) 11.5 - 14.5 %     Platelets 241 150 - 450 K/uL    MPV 10.0 9.2 - 12.9 fL    Immature Granulocytes 0.3 0.0 - 0.5 %    Gran # (ANC) 3.9 1.8 - 7.7 K/uL    Immature Grans (Abs) 0.02 0.00 - 0.04 K/uL    Lymph # 1.2 1.0 - 4.8 K/uL    Mono # 1.0 0.3 - 1.0 K/uL    Eos # 0.1 0.0 - 0.5 K/uL    Baso # 0.04 0.00 - 0.20 K/uL    nRBC 0 0 /100 WBC    Gran % 62.7 38.0 - 73.0 %    Lymph % 19.6 18.0 - 48.0 %    Mono % 15.7 (H) 4.0 - 15.0 %    Eosinophil % 1.0 0.0 - 8.0 %    Basophil % 0.7 0.0 - 1.9 %    Differential Method Automated    Comp. Metabolic Panel    Collection Time: 12/26/24  9:54 AM   Result Value Ref Range    Sodium 135 (L) 136 - 145 mmol/L    Potassium 4.2 3.5 - 5.1 mmol/L    Chloride 101 95 - 110 mmol/L    CO2 22 (L) 23 - 29 mmol/L    Glucose 90 70 - 110 mg/dL    BUN 14 6 - 20 mg/dL    Creatinine 0.8 0.5 - 1.4 mg/dL    Calcium 9.2 8.7 - 10.5 mg/dL    Total Protein 7.5 6.0 - 8.4 g/dL    Albumin 3.5 3.5 - 5.2 g/dL    Total Bilirubin 0.5 0.1 - 1.0 mg/dL    Alkaline Phosphatase 91 40 - 150 U/L    AST 29 10 - 40 U/L    ALT 25 10 - 44 U/L    eGFR >60 >60 mL/min/1.73 m^2    Anion Gap 12 8 - 16 mmol/L   Lipase    Collection Time: 12/26/24  9:54 AM   Result Value Ref Range    Lipase 33 4 - 60 U/L   Urinalysis, Reflex to Urine Culture Urine, Clean Catch    Collection Time: 12/26/24 12:59 PM    Specimen: Urine   Result Value Ref Range    Specimen UA Urine, Clean Catch     Color, UA Yellow Yellow, Straw, Marge    Appearance, UA Clear Clear    pH, UA 8.0 5.0 - 8.0    Specific Gravity, UA >1.030 (A) 1.005 - 1.030    Protein, UA Trace (A) Negative    Glucose, UA Negative Negative    Ketones, UA Trace (A) Negative    Bilirubin (UA) Negative Negative    Occult Blood UA Negative Negative    Nitrite, UA Negative Negative    Urobilinogen, UA Negative <2.0 EU/dL    Leukocytes, UA Negative Negative        Imaging Results              CT Chest Abdomen Pelvis With IV Contrast (XPD) NO Oral Contrast (Final result)  Result time 12/26/24 11:21:24      Final  result by Enrique Storey MD (12/26/24 11:21:24)                   Impression:      1. Findings probably representing enteritis with diarrheal illness.  2. Patient has a known esophageal malignancy with metastatic hepatic lesion and gastrohepatic lymphadenopathy.  The index gastrohepatic lymph node has decreased in size.  No enlarging lesions.  No new evidence of metastatic disease.      Electronically signed by: Enrique Storey  Date:    12/26/2024  Time:    11:21               Narrative:    EXAMINATION:  CT CHEST ABDOMEN PELVIS WITH IV CONTRAST (XPD)    CLINICAL HISTORY:  Recent onset generalized abdominal pain.  Patient has a known esophageal malignancy.    COMPARISON:  PET-CT October 10, 2024.    TECHNIQUE:  Axial CT images were obtained of the CHEST/ABDOMEN/PELVIS with IV contrast.  Iterative reconstruction technique was used. The CT exam was performed using one or more of the following dose reduction techniques- Automated exposure control, adjustment of the mA and/or kV according to patient size, and/or use of iterative reconstructed technique.    FINDINGS:  Chest:    Lungs: No acute finding.  Mild emphysema.    Aorta: No aneurysm.    Pleural space:No pleural effusion or pneumothorax.    Heart: Normal size. No pericardial effusion.  Coronary artery calcifications are present.    Bones/joints: No acute osseous finding.    Other: No mediastinal or axillary lymphadenopathy.  Thickening of the distal esophagus/proximal stomach redemonstrated    Abdomen/pelvis:    Liver: Known posterior right hepatic metastatic lesion.  There is an additional too small to characterize hypodensity in the right hepatic lobe which may reflect metastatic disease.  No new large hepatic lesion.    Gallbladder: No calcified gallstones.    Bile Ducts: No evidence of dilated ducts.    Pancreas: No mass or peripancreatic fat stranding.    Spleen: Unremarkable.    Adrenals: Unremarkable.    Kidneys/ Ureters: Unremarkable.    Bladder: No  evidence of wall thickening.    Reproductive organs: Unremarkable.    GI Tract/Mesentery: Gastrostomy tube in place.  Thickening of the proximal stomach appears improved when compared to prior exam.  Prominent gas-filled small bowel may reflect mild enteritis.  Fluid and gas in the colon may reflect diarrheal illness.    Peritoneal Space: Index gastrohepatic lymph node measures 17 mm, previously 24 mm.    Retroperitoneum: No retroperitoneal adenopathy.    Abdominal wall: Unremarkable.    Vasculature:  Atherosclerosis.  No aortic aneurysm.    Bones: No acute fracture.                                       X-Ray Abdomen Flat And Erect (Final result)  Result time 12/26/24 10:19:15      Final result by Sebastian Scott MD (12/26/24 10:19:15)                   Impression:      Nonobstructive bowel gas pattern.   .      Electronically signed by: Sebastian Scott MD  Date:    12/26/2024  Time:    10:19               Narrative:    EXAMINATION:  XR ABDOMEN FLAT AND ERECT    CLINICAL HISTORY:  Abdominal Pain;    COMPARISON:  08/03/2024    FINDINGS:  Nonobstructive bowel gas pattern is noted.  Air-filled bowel loops are seen throughout the abdomen without transition.  No radiopaque kidney calculus is identified.  There are multiple calcifications in the pelvis most consistent with phleboliths.  No evidence of organomegaly.  The bones demonstrate mild degenerative changes within the lower lumbar region.  The bones are otherwise intact.  Aorta demonstrates atherosclerotic disease.                                     Significant Labs: All pertinent labs within the past 24 hours have been reviewed.    Significant Imaging: I have reviewed all pertinent imaging results/findings within the past 24 hours.  Assessment/Plan:     * Intractable abdominal pain  Sec to Ileus or enteritis  Cont Oral Norco or IV MS for pain control  Cont IVF, gentle TF as tolerated      Enteritis  As seen on CT abd- no s/s of any sepsis infection  May need IV  Abx- Cipro/Flagyl      Ileus  Patient has Non- operative ileus which is adynamic in etiology which is stable. This is a complication of Esophageal Ca.  Will treat conservatively with bowel rest, IV fluids, serial abdominal exams and avoidance of GI paralytics such as narcotics or anti-spasmodics. Monitor patient closely.       Esophageal cancer, stage IV  Stage 4 Esophageal Ca- on chemo  No further worsening or spread  Cont supportive care      Cachexia  Concern for cachexia as evidenced by loss of 5% weight over the past 12 months, loss of muscle mass, asthenia, and loss of body fat . Contributing factors include underlying Malignancy. Treatment to include nutrition consult.      Body mass index is 20.77 kg/m².  Albumin   Date Value Ref Range Status   12/26/2024 3.5 3.5 - 5.2 g/dL Final           VTE Risk Mitigation (From admission, onward)           Ordered     heparin (porcine) injection 5,000 Units  Every 8 hours         12/26/24 1326     IP VTE HIGH RISK PATIENT  Once         12/26/24 1326     Place sequential compression device  Until discontinued         12/26/24 1326                    On 12/26/2024, patient should be placed in hospital observation services under my care.       Fausto Nguyen MD  Department of Hospital Medicine  [facility] - Med Surg

## 2024-12-27 ENCOUNTER — TELEPHONE (OUTPATIENT)
Dept: HEMATOLOGY/ONCOLOGY | Facility: CLINIC | Age: 59
End: 2024-12-27
Payer: COMMERCIAL

## 2024-12-27 ENCOUNTER — PATIENT MESSAGE (OUTPATIENT)
Dept: INTERNAL MEDICINE | Facility: CLINIC | Age: 59
End: 2024-12-27
Payer: COMMERCIAL

## 2024-12-27 VITALS
BODY MASS INDEX: 21.48 KG/M2 | HEART RATE: 70 BPM | SYSTOLIC BLOOD PRESSURE: 109 MMHG | TEMPERATURE: 98 F | HEIGHT: 73 IN | OXYGEN SATURATION: 94 % | RESPIRATION RATE: 19 BRPM | DIASTOLIC BLOOD PRESSURE: 63 MMHG | WEIGHT: 162.06 LBS

## 2024-12-27 DIAGNOSIS — C15.9 ESOPHAGEAL CANCER, STAGE IV: Primary | ICD-10-CM

## 2024-12-27 PROBLEM — K52.9 ENTERITIS: Status: RESOLVED | Noted: 2024-12-26 | Resolved: 2024-12-27

## 2024-12-27 PROBLEM — R10.9 INTRACTABLE ABDOMINAL PAIN: Status: RESOLVED | Noted: 2024-12-26 | Resolved: 2024-12-27

## 2024-12-27 PROBLEM — K56.7 ILEUS: Status: RESOLVED | Noted: 2024-12-26 | Resolved: 2024-12-27

## 2024-12-27 LAB
ANION GAP SERPL CALC-SCNC: 9 MMOL/L (ref 8–16)
BASOPHILS # BLD AUTO: 0.05 K/UL (ref 0–0.2)
BASOPHILS NFR BLD: 1 % (ref 0–1.9)
BUN SERPL-MCNC: 15 MG/DL (ref 6–20)
CALCIUM SERPL-MCNC: 8.7 MG/DL (ref 8.7–10.5)
CHLORIDE SERPL-SCNC: 104 MMOL/L (ref 95–110)
CO2 SERPL-SCNC: 22 MMOL/L (ref 23–29)
CREAT SERPL-MCNC: 0.7 MG/DL (ref 0.5–1.4)
DIFFERENTIAL METHOD BLD: ABNORMAL
EOSINOPHIL # BLD AUTO: 0.1 K/UL (ref 0–0.5)
EOSINOPHIL NFR BLD: 1.2 % (ref 0–8)
ERYTHROCYTE [DISTWIDTH] IN BLOOD BY AUTOMATED COUNT: 15.2 % (ref 11.5–14.5)
EST. GFR  (NO RACE VARIABLE): >60 ML/MIN/1.73 M^2
GLUCOSE SERPL-MCNC: 102 MG/DL (ref 70–110)
HCT VFR BLD AUTO: 35 % (ref 40–54)
HGB BLD-MCNC: 11.8 G/DL (ref 14–18)
IMM GRANULOCYTES # BLD AUTO: 0.02 K/UL (ref 0–0.04)
IMM GRANULOCYTES NFR BLD AUTO: 0.4 % (ref 0–0.5)
LYMPHOCYTES # BLD AUTO: 1.5 K/UL (ref 1–4.8)
LYMPHOCYTES NFR BLD: 30.1 % (ref 18–48)
MCH RBC QN AUTO: 32.6 PG (ref 27–31)
MCHC RBC AUTO-ENTMCNC: 33.7 G/DL (ref 32–36)
MCV RBC AUTO: 97 FL (ref 82–98)
MONOCYTES # BLD AUTO: 0.7 K/UL (ref 0.3–1)
MONOCYTES NFR BLD: 14.5 % (ref 4–15)
NEUTROPHILS # BLD AUTO: 2.7 K/UL (ref 1.8–7.7)
NEUTROPHILS NFR BLD: 52.8 % (ref 38–73)
NRBC BLD-RTO: 0 /100 WBC
PLATELET # BLD AUTO: 198 K/UL (ref 150–450)
PMV BLD AUTO: 9.9 FL (ref 9.2–12.9)
POTASSIUM SERPL-SCNC: 3.8 MMOL/L (ref 3.5–5.1)
RBC # BLD AUTO: 3.62 M/UL (ref 4.6–6.2)
SODIUM SERPL-SCNC: 135 MMOL/L (ref 136–145)
WBC # BLD AUTO: 5.09 K/UL (ref 3.9–12.7)

## 2024-12-27 PROCEDURE — 85025 COMPLETE CBC W/AUTO DIFF WBC: CPT | Performed by: EMERGENCY MEDICINE

## 2024-12-27 PROCEDURE — 25000003 PHARM REV CODE 250: Performed by: EMERGENCY MEDICINE

## 2024-12-27 PROCEDURE — A4216 STERILE WATER/SALINE, 10 ML: HCPCS | Performed by: EMERGENCY MEDICINE

## 2024-12-27 PROCEDURE — 63600175 PHARM REV CODE 636 W HCPCS: Performed by: EMERGENCY MEDICINE

## 2024-12-27 PROCEDURE — 96361 HYDRATE IV INFUSION ADD-ON: CPT

## 2024-12-27 PROCEDURE — 36415 COLL VENOUS BLD VENIPUNCTURE: CPT | Performed by: EMERGENCY MEDICINE

## 2024-12-27 PROCEDURE — G0378 HOSPITAL OBSERVATION PER HR: HCPCS

## 2024-12-27 PROCEDURE — 80048 BASIC METABOLIC PNL TOTAL CA: CPT | Performed by: EMERGENCY MEDICINE

## 2024-12-27 PROCEDURE — 97116 GAIT TRAINING THERAPY: CPT

## 2024-12-27 PROCEDURE — 99215 OFFICE O/P EST HI 40 MIN: CPT | Mod: ,,, | Performed by: INTERNAL MEDICINE

## 2024-12-27 PROCEDURE — 97162 PT EVAL MOD COMPLEX 30 MIN: CPT

## 2024-12-27 PROCEDURE — 96376 TX/PRO/DX INJ SAME DRUG ADON: CPT

## 2024-12-27 PROCEDURE — 96372 THER/PROPH/DIAG INJ SC/IM: CPT | Performed by: EMERGENCY MEDICINE

## 2024-12-27 RX ORDER — ONDANSETRON 8 MG/1
8 TABLET, ORALLY DISINTEGRATING ORAL 2 TIMES DAILY
Qty: 20 TABLET | Refills: 11 | Status: SHIPPED | OUTPATIENT
Start: 2024-12-27

## 2024-12-27 RX ORDER — METOCLOPRAMIDE 10 MG/1
10 TABLET ORAL EVERY 6 HOURS PRN
Qty: 30 TABLET | Refills: 2 | Status: SHIPPED | OUTPATIENT
Start: 2024-12-27

## 2024-12-27 RX ADMIN — DEXTROSE AND SODIUM CHLORIDE: 5; 900 INJECTION, SOLUTION INTRAVENOUS at 01:12

## 2024-12-27 RX ADMIN — HEPARIN SODIUM 5000 UNITS: 5000 INJECTION INTRAVENOUS; SUBCUTANEOUS at 05:12

## 2024-12-27 RX ADMIN — ONDANSETRON 4 MG: 2 INJECTION INTRAMUSCULAR; INTRAVENOUS at 09:12

## 2024-12-27 RX ADMIN — DEXTROSE AND SODIUM CHLORIDE: 5; 900 INJECTION, SOLUTION INTRAVENOUS at 02:12

## 2024-12-27 RX ADMIN — MORPHINE SULFATE 2 MG: 2 INJECTION, SOLUTION INTRAMUSCULAR; INTRAVENOUS at 09:12

## 2024-12-27 RX ADMIN — Medication 10 ML: at 05:12

## 2024-12-27 NOTE — PLAN OF CARE
Discussed plan of care with patient and this patient was able top, verbalized understanding.  Patient remains free from injury.  Safety and comfort precautions maintained this shift.   Call light and personal belongings within reach, bed in low position with bed wheels locked.  No s/s of acute distress.   Purposeful rounding continued this shift.  Pain levels are controlled per MD order. IVF infusing.  Cardiac monitoring in place.  Diet orders continue.  Vital signs continued per order.  Q2 repositioning   Patient mobility status remains independent  Chart and orders review completed.   Patient education about care completed.     Problem: Adult Inpatient Plan of Care  Goal: Plan of Care Review  Outcome: Progressing  Goal: Patient-Specific Goal (Individualized)  Outcome: Progressing  Goal: Absence of Hospital-Acquired Illness or Injury  Outcome: Progressing  Goal: Optimal Comfort and Wellbeing  Outcome: Progressing  Goal: Readiness for Transition of Care  Outcome: Progressing     Problem: Infection  Goal: Absence of Infection Signs and Symptoms  Outcome: Progressing     Problem: Wound  Goal: Optimal Coping  Outcome: Progressing  Goal: Optimal Functional Ability  Outcome: Progressing  Goal: Absence of Infection Signs and Symptoms  Outcome: Progressing  Goal: Improved Oral Intake  Outcome: Progressing  Goal: Optimal Pain Control and Function  Outcome: Progressing  Goal: Skin Health and Integrity  Outcome: Progressing  Goal: Optimal Wound Healing  Outcome: Progressing

## 2024-12-27 NOTE — CONSULTS
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  The patient was seen in the office with abdominal pain diarrhea was seen and evaluated in the emergency room in recommended for hospitalization.  CT chest abdomen pelvis was accomplished demonstrating responsive disease ECOG status 1 patient did have 500 cc of urine.  But is able to easily urinate at present time no Gayle catheter in place ECOG    Oncology Treatment Plan:   OP GASTROESOPHAGEAL pembrolizumab Q6W trastuzumab + MFOLFOX6 (oxaliplatin leucovorin fluorouracil) Q2W    Medications:  Continuous Infusions:   D5 and 0.9% NaCl   Intravenous Continuous 100 mL/hr at 12/27/24 0255 New Bag at 12/27/24 0255     Scheduled Meds:   0.9% NaCl 1,000 mL with mvi, (ADULT) no.4 with vit K 3,300 unit- 150 mcg/10 mL 10 mL, thiamine 100 mg, folic acid 1 mg infusion   Intravenous Daily    heparin (porcine)  5,000 Units Subcutaneous Q8H    sodium chloride 0.9%  10 mL Intravenous Q8H     PRN Meds:  Current Facility-Administered Medications:     acetaminophen, 650 mg, Oral, Q4H PRN    aluminum-magnesium hydroxide-simethicone, 30 mL, Oral, Q6H PRN    HYDROcodone-acetaminophen 7.5-325 mg/15 mL, 10 mL, Oral, Q4H PRN    melatonin, 6 mg, Oral, Nightly PRN    morphine, 2 mg, Intravenous, Q4H PRN    naloxone, 0.02 mg, Intravenous, PRN    ondansetron, 4 mg, Intravenous, Q6H PRN     Review of patient's allergies indicates:  No Known Allergies      Past Medical History:   Diagnosis Date    Esophageal cancer, stage IV 10/10/2024    Hypertension     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 10/11/2024    Palliative care encounter 12/11/2024     Past Surgical History:   Procedure Laterality Date    COLONOSCOPY N/A 8/25/2023    Procedure: COLONOSCOPY;  Surgeon: Pebbles Spear MD;  Location: [facility];  Service: Endoscopy;  Laterality: N/A;    COLONOSCOPY N/A 10/2/2024    Procedure: COLONOSCOPY  Cardiac clearance received on 09/20/24 per Dr. Lockett, Cardiology.  Note in encounters.  LB;  Surgeon: Sully Farris MD;  Location: [facility];  Service: Endoscopy;  Laterality: N/A;    ESOPHAGOGASTRODUODENOSCOPY N/A 10/2/2024    Procedure: EGD (ESOPHAGOGASTRODUODENOSCOPY);  Surgeon: Sully Farris MD;  Location: [facility];  Service: Endoscopy;  Laterality: N/A;    INSERTION OF VENOUS ACCESS PORT Left 10/21/2024    Procedure: INSERTION, VENOUS ACCESS PORT;  Surgeon: Roseanna Rosas MD;  Location: [facility] OR;  Service: General;  Laterality: Left;    SURGICAL REMOVAL OF LESION OF FACE Right 12/1/2023    Procedure: EXCISION, LESION, FACE;  Surgeon: Jose Flaherty MD;  Location: [facility] OR;  Service: ENT;  Laterality: Right;  1. Excision facial subcutaneous mass right cheek 3 cm. 2. Intermediate layered closure 3.5 cm    WISDOM TOOTH EXTRACTION      XI ROBOTIC APPENDECTOMY N/A 7/31/2024    Procedure: XI ROBOTIC APPENDECTOMY;  Surgeon: Paulo Saavedra MD;  Location: [facility] OR;  Service: General;  Laterality: N/A;    XI ROBOTIC INSERTION, GASTROSTOMY TUBE N/A 10/21/2024    Procedure: XI ROBOTIC INSERTION, GASTROSTOMY TUBE;  Surgeon: Roseanna Rosas MD;  Location: [facility] OR;  Service: General;  Laterality: N/A;     Family History       Problem Relation (Age of Onset)    Breast cancer Maternal Grandmother    Diabetes Father    Heart disease Father    Hyperlipidemia Mother    Hypertension Father    Kidney disease Father    Prostate cancer Maternal Grandfather           Tobacco Use    Smoking status: Every Day     Current packs/day: 0.00     Types: Cigars, Cigarettes     Last attempt to quit: 10/4/2022     Years since quittin.2     Passive exposure: Never    Smokeless tobacco: Never    Tobacco comments:     Cigar every afternoon   Substance and Sexual Activity    Alcohol use: Yes     Alcohol/week: 4.0 - 6.0 standard drinks of alcohol     Types: 4 - 6 Cans of beer per week     Comment: occ    Drug use: Yes     Types: Marijuana     Comment: medical    Sexual activity: Yes     Partners: Female       Review of Systems   Constitutional:  Positive for activity change, appetite change and fatigue. Negative for chills, diaphoresis, fever and unexpected weight change.   HENT:  Negative for congestion, dental problem, drooling, ear discharge, ear pain, facial swelling, hearing loss, mouth sores, nosebleeds, postnasal drip, rhinorrhea, sinus pressure, sneezing, sore throat, tinnitus, trouble swallowing and voice change.    Eyes:  Negative for photophobia, pain, discharge, redness, itching and visual disturbance.   Respiratory:  Negative for apnea, cough, choking, chest tightness, shortness of breath, wheezing and stridor.    Cardiovascular:  Negative for chest pain, palpitations and leg swelling.   Gastrointestinal:  Negative for abdominal distention, abdominal pain, anal bleeding, blood in stool, constipation, diarrhea, nausea, rectal pain and vomiting.   Endocrine: Negative for cold intolerance, heat intolerance, polydipsia, polyphagia and polyuria.   Genitourinary:  Negative for decreased urine volume, difficulty urinating, dysuria, enuresis, flank pain, frequency, genital sores, hematuria, penile discharge, penile pain, penile swelling, scrotal swelling, testicular pain and urgency.   Musculoskeletal:  Negative for arthralgias, back pain, gait problem, joint swelling, myalgias, neck pain and neck stiffness.   Skin:  Negative for color change, pallor, rash and wound.    Allergic/Immunologic: Negative for environmental allergies, food allergies and immunocompromised state.   Neurological:  Positive for weakness. Negative for dizziness, tremors, seizures, syncope, facial asymmetry, speech difficulty, light-headedness, numbness and headaches.   Hematological:  Negative for adenopathy. Does not bruise/bleed easily.   Psychiatric/Behavioral:  Negative for agitation, behavioral problems, confusion, decreased concentration, dysphoric mood, hallucinations, self-injury, sleep disturbance and suicidal ideas. The patient is not nervous/anxious and is not hyperactive.      Objective:     Vital Signs (Most Recent):  Temp: 97.7 °F (36.5 °C) (12/27/24 0424)  Pulse: 75 (12/27/24 0424)  Resp: 17 (12/27/24 0424)  BP: 132/79 (12/27/24 0424)  SpO2: 97 % (12/27/24 0424) Vital Signs (24h Range):  Temp:  [97.6 °F (36.4 °C)-98 °F (36.7 °C)] 97.7 °F (36.5 °C)  Pulse:  [] 75  Resp:  [16-19] 17  SpO2:  [96 %-100 %] 97 %  BP: (115-159)/(75-89) 132/79     Weight: 73.5 kg (162 lb 0.6 oz)  Body mass index is 21.38 kg/m².  Body surface area is 1.95 meters squared.      Intake/Output Summary (Last 24 hours) at 12/27/2024 0613  Last data filed at 12/26/2024 1303  Gross per 24 hour   Intake 1000 ml   Output 400 ml   Net 600 ml        Physical Exam  Vitals reviewed.   Constitutional:       General: He is not in acute distress.     Appearance: He is well-developed. He is not diaphoretic.   HENT:      Head: Normocephalic.      Right Ear: External ear normal.      Left Ear: External ear normal.      Nose: Nose normal.      Right Sinus: No maxillary sinus tenderness or frontal sinus tenderness.      Left Sinus: No maxillary sinus tenderness or frontal sinus tenderness.      Mouth/Throat:      Pharynx: No oropharyngeal exudate.   Eyes:      General: Lids are normal. No scleral icterus.        Right eye: No discharge.         Left eye: No discharge.      Extraocular Movements:      Right eye: Normal extraocular  motion.      Left eye: Normal extraocular motion.      Conjunctiva/sclera:      Right eye: Right conjunctiva is not injected. No hemorrhage.     Left eye: Left conjunctiva is not injected. No hemorrhage.     Pupils: Pupils are equal, round, and reactive to light.   Neck:      Thyroid: No thyromegaly.      Vascular: No JVD.      Trachea: No tracheal deviation.   Cardiovascular:      Rate and Rhythm: Normal rate.   Pulmonary:      Effort: Pulmonary effort is normal. No respiratory distress.      Breath sounds: No stridor.   Abdominal:      General: Bowel sounds are normal.      Palpations: Abdomen is soft. There is no hepatomegaly, splenomegaly or mass.      Tenderness: There is no abdominal tenderness.   Musculoskeletal:         General: No tenderness. Normal range of motion.      Cervical back: Normal range of motion and neck supple.   Lymphadenopathy:      Head:      Right side of head: No posterior auricular or occipital adenopathy.      Left side of head: No posterior auricular or occipital adenopathy.      Cervical: No cervical adenopathy.      Right cervical: No superficial, deep or posterior cervical adenopathy.     Left cervical: No superficial, deep or posterior cervical adenopathy.      Upper Body:      Right upper body: No supraclavicular adenopathy.      Left upper body: No supraclavicular adenopathy.   Skin:     General: Skin is dry.      Findings: No erythema or rash.      Nails: There is no clubbing.   Neurological:      Mental Status: He is alert and oriented to person, place, and time.      Cranial Nerves: No cranial nerve deficit.      Coordination: Coordination normal.   Psychiatric:         Behavior: Behavior normal.         Thought Content: Thought content normal.         Judgment: Judgment normal.          Significant Labs:   BMP:   Recent Labs   Lab 12/26/24  0954 12/26/24  1744 12/27/24  0420   GLU 90  --  102   *  --  135*   K 4.2  --  3.8     --  104   CO2 22*  --  22*   BUN 14   "--  15   CREATININE 0.8  --  0.7   CALCIUM 9.2  --  8.7   MG  --  1.8  --    , CBC:   Recent Labs   Lab 12/26/24  0954 12/27/24  0420   WBC 6.13 5.09   HGB 13.6* 11.8*   HCT 40.1 35.0*    198   , CMP:   Recent Labs   Lab 12/26/24  0954 12/27/24  0420   * 135*   K 4.2 3.8    104   CO2 22* 22*   GLU 90 102   BUN 14 15   CREATININE 0.8 0.7   CALCIUM 9.2 8.7   PROT 7.5  --    ALBUMIN 3.5  --    BILITOT 0.5  --    ALKPHOS 91  --    AST 29  --    ALT 25  --    ANIONGAP 12 9   , Coagulation: No results for input(s): "PT", "INR", "APTT" in the last 48 hours., Haptoglobin: No results for input(s): "HAPTOGLOBIN" in the last 48 hours., Immunology: No results for input(s): "SPEP", "JESSICA", "SVETLANA", "FREELAMBDALI" in the last 48 hours., LDH: No results for input(s): "LDHCSF", "BFSOURCE" in the last 48 hours., LFTs:   Recent Labs   Lab 12/26/24  0954   ALT 25   AST 29   ALKPHOS 91   BILITOT 0.5   PROT 7.5   ALBUMIN 3.5   , Reticulocytes: No results for input(s): "RETIC" in the last 48 hours., Tumor Markers: No results for input(s): "PSA", "CEA", "", "AFPTM", "[id number]", "" in the last 48 hours.    Invalid input(s): "ALGTM", Uric Acid No results for input(s): "URICACID" in the last 48 hours., and Urine Studies:   Recent Labs   Lab 12/26/24  1259   COLORU Yellow   APPEARANCEUA Clear   PHUR 8.0   SPECGRAV >1.030*   PROTEINUA Trace*   GLUCUA Negative   KETONESU Trace*   BILIRUBINUA Negative   OCCULTUA Negative   NITRITE Negative   UROBILINOGEN Negative   LEUKOCYTESUR Negative       Diagnostic Results:  I have reviewed all pertinent imaging results/findings within the past 24 hours.  Assessment/Plan:     * Intractable abdominal pain  Abdominal pain seemed to have resolved this morning markedly less tender than seen in the office yesterday    Enteritis  Pelvis secondary to combination of chemotherapy will need to dose reduced.  Five FU Leucovorin.  Discussed situation with patient.  And answered " questions    Cachexia  Secondary to underlying malignancy.  Hopefully with responsive disease will ameliorate somewhat    Esophageal cancer, stage IV  Review of CT chest abdomen pelvis after 1st cycle demonstrates responsive disease.  Both in liver as well as in retroperitoneum as well as esophageal mass.  Reassurance given sent to patient variant at this time I believe he is most likely suffering toxicity secondary to chemotherapy once toxicity has resolved.  Would agree for discharge and will see you back in the office later this week will dose modifications.  Needed; will review with clinical pharmacist and make dose modifications to next cycle of treatment        Thank you for your consult. I will follow-up with patient. Please contact us if you have any additional questions.    Hudson Latif MD  Hematology/Oncology  [facility]

## 2024-12-27 NOTE — TELEPHONE ENCOUNTER
"SW received provider consult to assess pt needs. Pt is currently inpatient per spouse. No needs at this time, but spouse is aware that hospital SW/ can assist with any d/c planning needs. Spouse was excited about a "rock the treatment box" that pt received from hospital staff that was full of goodies and a gas card. No other needs expressed. SW will remain available.   " Pt. came in for injection.

## 2024-12-27 NOTE — ASSESSMENT & PLAN NOTE
Review of CT chest abdomen pelvis after 1st cycle demonstrates responsive disease.  Both in liver as well as in retroperitoneum as well as esophageal mass.  Reassurance given sent to patient variant at this time I believe he is most likely suffering toxicity secondary to chemotherapy once toxicity has resolved.  Would agree for discharge and will see you back in the office later this week will dose modifications.  Needed; will review with clinical pharmacist and make dose modifications to next cycle of treatment

## 2024-12-27 NOTE — PLAN OF CARE
[facility]  Discharge Final Note    Primary Care Provider: Michell Goyal MD    Expected Discharge Date: 12/27/2024    Final Discharge Note (most recent)       Final Note - 12/27/24 1043          Final Note    Assessment Type Final Discharge Note     Anticipated Discharge Disposition Home or Self Care     Hospital Resources/Appts/Education Provided Appointments scheduled and added to AVS        Post-Acute Status    Discharge Delays None known at this time                   Patient has no d/c needs at this time. Sw to follow up, as needed, for d/c planning purposes.

## 2024-12-27 NOTE — ASSESSMENT & PLAN NOTE
Pelvis secondary to combination of chemotherapy will need to dose reduced.  Five FU Leucovorin.  Discussed situation with patient.  And answered questions

## 2024-12-27 NOTE — HPI
3/25/2019    Jose Riley MD  [address] [address]    RE: Nicholas Gongora       Dear Colleague,    I had the pleasure of seeing Nicholas Gongora in the [facility].    Cardiology Clinic Progress Note  Nicholas Gongora MRN# [id number]   YOB: 1953 Age: 66 year old          Assessment and Plan:     1. Mitral valve prolapse    S/p MVR in 2011    Most recent gradient 3 mmHg    Repeat echocardiogram in 1 year    2. Ascending aortic aneurysm    Echocardiogram measured at ascending aorta at 4.0 cm and aortic root at 4.3 cm    CT angiogram chest noted that the ascending aorta measured 3.7 cm x 3.5 cm in the aortic root is 4.2 cm.    Hypertension management and repeat echocardiogram in 1 year    3. Hypertension    Lisinopril increased to 20 mg at last visit with improved BP control    Continue Metoprolol XL 25 mg daily    Low sodium diet    BMP stable    4. Atrial fibrillation    S/p MAZE in 2011 and left atrial appendage ligation    Not on anticoagulation         History of Presenting Illness:    Nicholas Gongora is a very pleasant 66 year old patient of Dr. Bello who presents today for a follow up to review a basic metabolic panel, blood pressure and CTA.  He has a past medical history notable for schizophrenia, mitral valve prolapse status post mitral valve repair with a [place]. Mars ring in 2011 by Dr. Jonathan Mckeon.  Preoperatively he had a new onset of atrial fibrillation and therefore underwent a bilateral Maze procedure and left atrial appendage removal as well as a PFO repair.    Unfortunately, over the years he has been intermittently homeless.  He now resides in an apartment that [facility] and [place] and he takes up to 3 buses to get to the clinic visits. Due to insurance coverage he has been taking his medications intermittently.    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  The patient was seen in the office with abdominal pain diarrhea was seen and evaluated in the emergency room in recommended for hospitalization.  CT chest abdomen pelvis was accomplished demonstrating responsive disease ECOG status 1 patient did have 500 cc of urine.  But is able to easily urinate at present time no Gayle catheter in place ECOG   mitral valve of 3 mmHg with preserved LVEF at 60%.  There is mild to moderate concentric LVH and the sinus of Valsalva is now dilated at 4.3 and ascending aorta is 4.0 (previously the ascending aorta was measured at 3.6 cm and aortic root 3.8 cm in March 2018).  Due to a significant change from his previous echocardiograms it was recommended that he undergo a CTA chest ascending aorta measured 3.7 cm x 3.5 cm in the aortic root is 4.2 cm.  There was incidental finding of a focal lucency of his lower thoracic vertebrae that could represent a small hemangioma, but other possibilities cannot be ruled out.  It was recommended that he follow-up with his primary care provider for further evaluation and the patient is in the process of this. His blood pressure was also elevated at his last visit and Dr. Bello recommended that he increase his lisinopril from 10 mg to 20 mg daily and return to clinic today with a basic metabolic panel.     Today in clinic the results of the CTA chest were discussed in detail with the patient.  His blood pressure was initially elevated at the visit but upon my recheck it decreased to 136/82.  He is tolerating the increased dose of lisinopril and his BMP was stable.  He declines any shortness of breath on exertion, chest discomfort, palpitations or lower extremity edema.               Review of Systems:   Review of Systems:  Skin:  Negative     Eyes:  Positive for glasses;cataracts  ENT:  Negative    Respiratory:  Negative    Cardiovascular:    Positive for;fatigue  Gastroenterology: Negative    Genitourinary:  not assessed    Musculoskeletal:  Positive for back pain;neck pain  Neurologic:  Positive for numbness or tingling of feet;headaches  Psychiatric:  Positive for sleep disturbances  Heme/Lymph/Imm:  Negative    Endocrine:  Negative              Physical Exam:     Vitals: /82   Pulse 54   Ht 1.829 m (6')   Wt 92.1 kg (203 lb)   BMI 27.53 kg/m     Constitutional:  cooperative,  alert and oriented, well developed, well nourished, in no acute distress thin      Skin:  warm and dry to the touch, no apparent skin lesions or masses noted surgical scars well-healed      Head:  normocephalic, no masses or lesions        Eyes:  pupils equal and round, conjunctivae and lids unremarkable, sclera white, no xanthalasma, EOMS intact, no nystagmus        ENT:  no pallor or cyanosis poor dentition      Neck:  carotid pulses are full and equal bilaterally;no carotid bruit        Chest:  normal breath sounds, clear to auscultation, normal A-P diameter, normal symmetry, normal respiratory excursion, no use of accessory muscles        Cardiac: regular rhythm;no murmurs, gallops or rubs detected occasional premature beats                Vascular: pulses full and equal                                      Extremities and Back:  no edema;normal muscle strength and tone   wears compresson stockings    Neurological:  no gross motor deficits             Medications:     Current Outpatient Medications   Medication Sig Dispense Refill     acetaminophen 500 MG CAPS Take 1 capsule by mouth 3 times daily        Aspirin (ASPIR-81 PO) Take 1 tablet by mouth daily        IRON, FERROUS GLUCONATE, PO Take by mouth 3 times daily        lisinopril (PRINIVIL/ZESTRIL) 20 MG tablet Take 1 tablet (20 mg) by mouth daily 30 tablet 11     metoprolol succinate ER (TOPROL-XL) 25 MG 24 hr tablet Take 1 tablet (25 mg) by mouth daily 30 tablet 11     ziprasidone (GEODON) 80 MG capsule Take 80 mg by mouth daily         Family History   Problem Relation Age of Onset     Myocardial Infarction Mother      Hypertension Mother      Myocardial Infarction Father        Social History     Socioeconomic History     Marital status: Single     Spouse name: Not on file     Number of children: Not on file     Years of education: Not on file     Highest education level: Not on file   Occupational History     Not on file   Social Needs     Financial  resource strain: Not on file     Food insecurity:     Worry: Not on file     Inability: Not on file     Transportation needs:     Medical: Not on file     Non-medical: Not on file   Tobacco Use     Smoking status: Former Smoker     Last attempt to quit: 1995     Years since quittin.1     Smokeless tobacco: Never Used   Substance and Sexual Activity     Alcohol use: No     Drug use: No     Sexual activity: Not on file   Lifestyle     Physical activity:     Days per week: Not on file     Minutes per session: Not on file     Stress: Not on file   Relationships     Social connections:     Talks on phone: Not on file     Gets together: Not on file     Attends Muslim service: Not on file     Active member of club or organization: Not on file     Attends meetings of clubs or organizations: Not on file     Relationship status: Not on file     Intimate partner violence:     Fear of current or ex partner: Not on file     Emotionally abused: Not on file     Physically abused: Not on file     Forced sexual activity: Not on file   Other Topics Concern     Parent/sibling w/ CABG, MI or angioplasty before 65F 55M? No      Service Not Asked     Blood Transfusions Not Asked     Caffeine Concern Yes     Comment: soda     Occupational Exposure Not Asked     Hobby Hazards Not Asked     Sleep Concern Not Asked     Stress Concern Not Asked     Weight Concern Not Asked     Special Diet No     Back Care Not Asked     Exercise Yes     Comment: daily, walking     Bike Helmet Not Asked     Seat Belt Yes     Self-Exams Not Asked   Social History Narrative     Not on file            Past Medical History:     Past Medical History:   Diagnosis Date     Atrial fibrillation (aka AFIB)     s/p MAZE right and left, removal left atrial appendage 2011     Family history of early CAD      GERD (gastroesophageal reflux disease)      Hypertension      MVP (mitral valve prolapse)     s/p mitral valve repair, 32mm St. Mars ring  04/29/2011     Need for SBE (subacute bacterial endocarditis) prophylaxis      PFO (patent foramen ovale)     s/p PFO closure, 04/29/2011     Schizophrenia (H)      Sleep apnea               Past Surgical History:     Past Surgical History:   Procedure Laterality Date     OTHER SURGICAL HISTORY  2011    s/p mitral valve repair, 32mm St. Mars ring 04/29/2011     OTHER SURGICAL HISTORY  2011    s/p MAZE right and left, removal left atrial appendage 04/29/2011     OTHER SURGICAL HISTORY  2011    s/p PFO closure, 04/29/2011     OTHER SURGICAL HISTORY      esophageal surgery for GERD 1998     OTHER SURGICAL HISTORY      laproscopic Nissen fundoplication, 1998     TONSILLECTOMY                Allergies:   Patient has no known allergies.       Data:   All laboratory data reviewed:    Recent Labs   Lab Test 10/17/18 11/09/17   LDL 78  --    HDL 32  --    NHDL 136  --    CHOL 168  --    TRIG 291*  --    TSH  --  1.35       Lab Results   Component Value Date    WBC 8.4 10/17/2018    RBC 4.69 10/17/2018    HGB 14.3 10/17/2018    HCT 43.4 10/17/2018    MCV 92.4 11/20/2017    MCH 28.7 11/20/2017    MCHC 31 11/20/2017    RDW 14.6 11/20/2017     10/17/2018       Lab Results   Component Value Date     03/15/2019    POTASSIUM 3.9 03/15/2019    CHLORIDE 107 03/15/2019    CO2 27 03/15/2019    ANIONGAP 11.9 03/15/2019     03/15/2019    BUN 13 03/15/2019    CR 1.17 03/15/2019    GFRESTIMATED 63 03/15/2019    GFRESTBLACK 76 03/15/2019    COLE 9.0 03/15/2019      Lab Results   Component Value Date    AST 25 11/09/2017    ALT 27 11/09/2017       Lab Results   Component Value Date    A1C 5.5 04/30/2011       Lab Results   Component Value Date    INR 1.42 (H) 05/04/2011    INR 1.13 05/03/2011         Thank you for allowing me to participate in the care of your patient.    Sincerely,     NESTOR HERNANDEZ [facility]

## 2024-12-27 NOTE — TELEPHONE ENCOUNTER
Visited with pt and wife while admitted. Pt reports he is feeling better than he did yesterday though still some pain and queeziness. Pt reports at home his compazine gave no relief but zofran has given some relief while INPT; message sent to Dr. Latif and pharmacy of this for possible med adjustment at d/c. Reviewed Dr. Latif plan for dose reduction of chemo at next cycle, pt and wife VU. Notified that will keep current appts to f/u with Dr. Latif Monday unless pt not d/c over weekend, pt and wife agreeable. They deny any further needs/concerns at this time.

## 2024-12-27 NOTE — ASSESSMENT & PLAN NOTE
Abdominal pain seemed to have resolved this morning markedly less tender than seen in the office yesterday

## 2024-12-27 NOTE — NURSING
Pt remains free of falls/injury. Safety precautions maintained. Pt denies pain/ at time of discharge.  Pleasure feed and G tube feeding tolerated. VSS. No S/S of distress noted at this time. Pt education completed.  Medication will be picked up from [facility] pharmacy.

## 2024-12-27 NOTE — PT/OT/SLP EVAL
Physical Therapy Evaluation and Discharge Note    Patient Name:  Rosalio Muñoz   MRN:  [id number]    Recommendations:     Discharge Recommendations: No Therapy Indicated  Discharge Equipment Recommendations: none   Barriers to discharge: None    Assessment:     Rosalio Muñoz is a 59 y.o. male admitted with a medical diagnosis of Intractable abdominal pain. .  At this time, patient is functioning at their prior level of function and does not require further acute PT services.     Recent Surgery: * No surgery found *      Plan:     During this hospitalization, patient does not require further acute PT services.  Please re-consult if situation changes.      Subjective     Chief Complaint: NONE  Patient/Family Comments/goals: GO HOME   Pain/Comfort:  Pain Rating 1: 0/10  Pain Rating Post-Intervention 1: 0/10    Patients cultural, spiritual, Cheondoism conflicts given the current situation:      Living Environment:   PT LIVES AT HOME WITH WIFE IN A ONE STORY HOME WITH NO STEPS TO ENTER   Prior to admission, patients level of function was IND, DRIVES AND IS SEMI RETIRED.  Equipment used at home: none.  DME owned (not currently used): none.  Upon discharge, patient will have assistance from WIFE .    Objective:     Communicated with  NURSE COLE AND [facility] CHART REVIEW  prior to session.  Patient found supine with peripheral IV, telemetry upon PT entry to room.    General Precautions: Standard,      Orthopedic Precautions:N/A   Braces: N/A  Respiratory Status: Room air    Exams:  Cognitive Exam:  Patient is oriented to Person, Place, Time, and Situation  RLE ROM: WNL  RLE Strength: WNL  LLE ROM: WNL  LLE Strength: WNL    Functional Mobility:  Bed Mobility:     Rolling Left:  independence  Scooting: independence  Supine to Sit: independence  Sit to Supine: independence  Transfers:  Sit to Stand:  independence with no AD  Bed to Chair: independence with  no AD  using  Stand Pivot  Gait: PT GT TRAINED X  COMMUNITY DISTANCES IND    AM-PAC 6 CLICK MOBILITY  Total Score:21       Treatment and Education:  GT. BELT AND  SOCKS DONNED PRIOR TO OOB MOBILITY.  PT GT TRAINED COMMUNITY DISTANCES IND WITH NO LOB   PT RETURNED TO  T/F TO CHAIR FOR OOB. PT LEFT WITH ALL NEEDS MET.       AM-PAC 6 CLICK MOBILITY  Total Score:21     Patient left up in chair with call button in reach.    GOALS:   Multidisciplinary Problems       Physical Therapy Goals       Not on file                    History:     Past Medical History:   Diagnosis Date    Esophageal cancer, stage IV 10/10/2024    Hypertension     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 10/11/2024    Palliative care encounter 12/11/2024       Past Surgical History:   Procedure Laterality Date    COLONOSCOPY N/A 8/25/2023    Procedure: COLONOSCOPY;  Surgeon: Pebbles Spear MD;  Location: [facility];  Service: Endoscopy;  Laterality: N/A;    COLONOSCOPY N/A 10/2/2024    Procedure: COLONOSCOPY  Cardiac clearance received on 09/20/24 per Dr. Lockett, Cardiology.  Note in encounters.  LB;  Surgeon: Sully Farris MD;  Location: [facility];  Service: Endoscopy;  Laterality: N/A;    ESOPHAGOGASTRODUODENOSCOPY N/A 10/2/2024    Procedure: EGD (ESOPHAGOGASTRODUODENOSCOPY);  Surgeon: Sully Farris MD;  Location: [facility];  Service: Endoscopy;  Laterality: N/A;    INSERTION OF VENOUS ACCESS PORT Left 10/21/2024    Procedure: INSERTION, VENOUS ACCESS PORT;  Surgeon: Roseanna Rosas MD;  Location: [facility];  Service: General;  Laterality: Left;    SURGICAL REMOVAL OF LESION OF FACE Right 12/1/2023    Procedure: EXCISION, LESION, FACE;  Surgeon: Jose Flaherty MD;  Location: [facility];  Service: ENT;  Laterality: Right;  1. Excision facial subcutaneous mass right cheek 3 cm. 2. Intermediate layered closure 3.5 cm    WISDOM TOOTH EXTRACTION      XI ROBOTIC APPENDECTOMY N/A 7/31/2024    Procedure: XI ROBOTIC APPENDECTOMY;  Surgeon: Paulo Saavedra MD;  Location: [facility];   Service: General;  Laterality: N/A;    XI ROBOTIC INSERTION, GASTROSTOMY TUBE N/A 10/21/2024    Procedure: XI ROBOTIC INSERTION, GASTROSTOMY TUBE;  Surgeon: Roseanna Rosas MD;  Location: [facility];  Service: General;  Laterality: N/A;       Time Tracking:     PT Received On: 12/27/24  PT Start Time: 0725     PT Stop Time: 0750  PT Total Time (min): 25 min     Billable Minutes: Evaluation 15 and Gait Training 10      12/27/2024

## 2024-12-27 NOTE — SUBJECTIVE & OBJECTIVE
Oncology Treatment Plan:   OP GASTROESOPHAGEAL pembrolizumab Q6W trastuzumab + [id number] (oxaliplatin leucovorin fluorouracil) Q2W    Medications:  Continuous Infusions:   D5 and 0.9% NaCl   Intravenous Continuous 100 mL/hr at 12/27/24 0255 New Bag at 12/27/24 0255     Scheduled Meds:   0.9% NaCl 1,000 mL with mvi, (ADULT) no.4 with vit K 3,300 unit- 150 mcg/10 mL 10 mL, thiamine 100 mg, folic acid 1 mg infusion   Intravenous Daily    heparin (porcine)  5,000 Units Subcutaneous Q8H    sodium chloride 0.9%  10 mL Intravenous Q8H     PRN Meds:  Current Facility-Administered Medications:     acetaminophen, 650 mg, Oral, Q4H PRN    aluminum-magnesium hydroxide-simethicone, 30 mL, Oral, Q6H PRN    HYDROcodone-acetaminophen 7.5-325 mg/15 mL, 10 mL, Oral, Q4H PRN    melatonin, 6 mg, Oral, Nightly PRN    morphine, 2 mg, Intravenous, Q4H PRN    naloxone, 0.02 mg, Intravenous, PRN    ondansetron, 4 mg, Intravenous, Q6H PRN     Review of patient's allergies indicates:  No Known Allergies     Past Medical History:   Diagnosis Date    Esophageal cancer, stage IV 10/10/2024    Hypertension     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 10/11/2024    Palliative care encounter 12/11/2024     Past Surgical History:   Procedure Laterality Date    COLONOSCOPY N/A 8/25/2023    Procedure: COLONOSCOPY;  Surgeon: Pebbles Spear MD;  Location: [facility];  Service: Endoscopy;  Laterality: N/A;    COLONOSCOPY N/A 10/2/2024    Procedure: COLONOSCOPY  Cardiac clearance received on 09/20/24 per Dr. Lockett, Cardiology.  Note in encounters.  LB;  Surgeon: Sully Farris MD;  Location: [facility];  Service: Endoscopy;  Laterality: N/A;    ESOPHAGOGASTRODUODENOSCOPY N/A 10/2/2024    Procedure: EGD (ESOPHAGOGASTRODUODENOSCOPY);  Surgeon: Sully Farris MD;  Location: [facility];  Service: Endoscopy;  Laterality: N/A;    INSERTION OF VENOUS ACCESS PORT Left 10/21/2024    Procedure: INSERTION, VENOUS ACCESS PORT;  Surgeon: Ralph  MD Roseanna;  Location: [facility] OR;  Service: General;  Laterality: Left;    SURGICAL REMOVAL OF LESION OF FACE Right 2023    Procedure: EXCISION, LESION, FACE;  Surgeon: Jose Flaherty MD;  Location: [facility] OR;  Service: ENT;  Laterality: Right;  1. Excision facial subcutaneous mass right cheek 3 cm. 2. Intermediate layered closure 3.5 cm    WISDOM TOOTH EXTRACTION      XI ROBOTIC APPENDECTOMY N/A 2024    Procedure: XI ROBOTIC APPENDECTOMY;  Surgeon: Paulo Saavedra MD;  Location: [facility] OR;  Service: General;  Laterality: N/A;    XI ROBOTIC INSERTION, GASTROSTOMY TUBE N/A 10/21/2024    Procedure: XI ROBOTIC INSERTION, GASTROSTOMY TUBE;  Surgeon: Roseanna Rosas MD;  Location: [facility] OR;  Service: General;  Laterality: N/A;     Family History       Problem Relation (Age of Onset)    Breast cancer Maternal Grandmother    Diabetes Father    Heart disease Father    Hyperlipidemia Mother    Hypertension Father    Kidney disease Father    Prostate cancer Maternal Grandfather          Tobacco Use    Smoking status: Every Day     Current packs/day: 0.00     Types: Cigars, Cigarettes     Last attempt to quit: 10/4/2022     Years since quittin.2     Passive exposure: Never    Smokeless tobacco: Never    Tobacco comments:     Cigar every afternoon   Substance and Sexual Activity    Alcohol use: Yes     Alcohol/week: 4.0 - 6.0 standard drinks of alcohol     Types: 4 - 6 Cans of beer per week     Comment: occ    Drug use: Yes     Types: Marijuana     Comment: medical    Sexual activity: Yes     Partners: Female       Review of Systems   Constitutional:  Positive for activity change, appetite change and fatigue. Negative for chills, diaphoresis, fever and unexpected weight change.   HENT:  Negative for congestion, dental problem, drooling, ear discharge, ear pain, facial swelling, hearing loss, mouth sores, nosebleeds, postnasal drip, rhinorrhea, sinus pressure, sneezing, sore throat, tinnitus, trouble swallowing and  voice change.    Eyes:  Negative for photophobia, pain, discharge, redness, itching and visual disturbance.   Respiratory:  Negative for apnea, cough, choking, chest tightness, shortness of breath, wheezing and stridor.    Cardiovascular:  Negative for chest pain, palpitations and leg swelling.   Gastrointestinal:  Negative for abdominal distention, abdominal pain, anal bleeding, blood in stool, constipation, diarrhea, nausea, rectal pain and vomiting.   Endocrine: Negative for cold intolerance, heat intolerance, polydipsia, polyphagia and polyuria.   Genitourinary:  Negative for decreased urine volume, difficulty urinating, dysuria, enuresis, flank pain, frequency, genital sores, hematuria, penile discharge, penile pain, penile swelling, scrotal swelling, testicular pain and urgency.   Musculoskeletal:  Negative for arthralgias, back pain, gait problem, joint swelling, myalgias, neck pain and neck stiffness.   Skin:  Negative for color change, pallor, rash and wound.   Allergic/Immunologic: Negative for environmental allergies, food allergies and immunocompromised state.   Neurological:  Positive for weakness. Negative for dizziness, tremors, seizures, syncope, facial asymmetry, speech difficulty, light-headedness, numbness and headaches.   Hematological:  Negative for adenopathy. Does not bruise/bleed easily.   Psychiatric/Behavioral:  Negative for agitation, behavioral problems, confusion, decreased concentration, dysphoric mood, hallucinations, self-injury, sleep disturbance and suicidal ideas. The patient is not nervous/anxious and is not hyperactive.      Objective:     Vital Signs (Most Recent):  Temp: 97.7 °F (36.5 °C) (12/27/24 0424)  Pulse: 75 (12/27/24 0424)  Resp: 17 (12/27/24 0424)  BP: 132/79 (12/27/24 0424)  SpO2: 97 % (12/27/24 0424) Vital Signs (24h Range):  Temp:  [97.6 °F (36.4 °C)-98 °F (36.7 °C)] 97.7 °F (36.5 °C)  Pulse:  [] 75  Resp:  [16-19] 17  SpO2:  [96 %-100 %] 97 %  BP:  (115-159)/(75-89) 132/79     Weight: 73.5 kg (162 lb 0.6 oz)  Body mass index is 21.38 kg/m².  Body surface area is 1.95 meters squared.      Intake/Output Summary (Last 24 hours) at 12/27/2024 0613  Last data filed at 12/26/2024 1303  Gross per 24 hour   Intake 1000 ml   Output 400 ml   Net 600 ml        Physical Exam  Vitals reviewed.   Constitutional:       General: He is not in acute distress.     Appearance: He is well-developed. He is not diaphoretic.   HENT:      Head: Normocephalic.      Right Ear: External ear normal.      Left Ear: External ear normal.      Nose: Nose normal.      Right Sinus: No maxillary sinus tenderness or frontal sinus tenderness.      Left Sinus: No maxillary sinus tenderness or frontal sinus tenderness.      Mouth/Throat:      Pharynx: No oropharyngeal exudate.   Eyes:      General: Lids are normal. No scleral icterus.        Right eye: No discharge.         Left eye: No discharge.      Extraocular Movements:      Right eye: Normal extraocular motion.      Left eye: Normal extraocular motion.      Conjunctiva/sclera:      Right eye: Right conjunctiva is not injected. No hemorrhage.     Left eye: Left conjunctiva is not injected. No hemorrhage.     Pupils: Pupils are equal, round, and reactive to light.   Neck:      Thyroid: No thyromegaly.      Vascular: No JVD.      Trachea: No tracheal deviation.   Cardiovascular:      Rate and Rhythm: Normal rate.   Pulmonary:      Effort: Pulmonary effort is normal. No respiratory distress.      Breath sounds: No stridor.   Abdominal:      General: Bowel sounds are normal.      Palpations: Abdomen is soft. There is no hepatomegaly, splenomegaly or mass.      Tenderness: There is no abdominal tenderness.   Musculoskeletal:         General: No tenderness. Normal range of motion.      Cervical back: Normal range of motion and neck supple.   Lymphadenopathy:      Head:      Right side of head: No posterior auricular or occipital adenopathy.      Left  "side of head: No posterior auricular or occipital adenopathy.      Cervical: No cervical adenopathy.      Right cervical: No superficial, deep or posterior cervical adenopathy.     Left cervical: No superficial, deep or posterior cervical adenopathy.      Upper Body:      Right upper body: No supraclavicular adenopathy.      Left upper body: No supraclavicular adenopathy.   Skin:     General: Skin is dry.      Findings: No erythema or rash.      Nails: There is no clubbing.   Neurological:      Mental Status: He is alert and oriented to person, place, and time.      Cranial Nerves: No cranial nerve deficit.      Coordination: Coordination normal.   Psychiatric:         Behavior: Behavior normal.         Thought Content: Thought content normal.         Judgment: Judgment normal.          Significant Labs:   BMP:   Recent Labs   Lab 12/26/24  0954 12/26/24  1744 12/27/24  0420   GLU 90  --  102   *  --  135*   K 4.2  --  3.8     --  104   CO2 22*  --  22*   BUN 14  --  15   CREATININE 0.8  --  0.7   CALCIUM 9.2  --  8.7   MG  --  1.8  --    , CBC:   Recent Labs   Lab 12/26/24  0954 12/27/24  0420   WBC 6.13 5.09   HGB 13.6* 11.8*   HCT 40.1 35.0*    198   , CMP:   Recent Labs   Lab 12/26/24  0954 12/27/24  0420   * 135*   K 4.2 3.8    104   CO2 22* 22*   GLU 90 102   BUN 14 15   CREATININE 0.8 0.7   CALCIUM 9.2 8.7   PROT 7.5  --    ALBUMIN 3.5  --    BILITOT 0.5  --    ALKPHOS 91  --    AST 29  --    ALT 25  --    ANIONGAP 12 9   , Coagulation: No results for input(s): "PT", "INR", "APTT" in the last 48 hours., Haptoglobin: No results for input(s): "HAPTOGLOBIN" in the last 48 hours., Immunology: No results for input(s): "SPEP", "JESSICA", "SVETLANA", "FREELAMBDALI" in the last 48 hours., LDH: No results for input(s): "LDHCSF", "BFSOURCE" in the last 48 hours., LFTs:   Recent Labs   Lab 12/26/24  0954   ALT 25   AST 29   ALKPHOS 91   BILITOT 0.5   PROT 7.5   ALBUMIN 3.5   , Reticulocytes: No " "results for input(s): "RETIC" in the last 48 hours., Tumor Markers: No results for input(s): "PSA", "CEA", "", "AFPTM", "[id number]", "" in the last 48 hours.    Invalid input(s): "ALGTM", Uric Acid No results for input(s): "URICACID" in the last 48 hours., and Urine Studies:   Recent Labs   Lab 12/26/24  1259   COLORU Yellow   APPEARANCEUA Clear   PHUR 8.0   SPECGRAV >1.030*   PROTEINUA Trace*   GLUCUA Negative   KETONESU Trace*   BILIRUBINUA Negative   OCCULTUA Negative   NITRITE Negative   UROBILINOGEN Negative   LEUKOCYTESUR Negative       Diagnostic Results:  I have reviewed all pertinent imaging results/findings within the past 24 hours.  "

## 2024-12-30 ENCOUNTER — TELEPHONE (OUTPATIENT)
Dept: HEMATOLOGY/ONCOLOGY | Facility: CLINIC | Age: 59
End: 2024-12-30
Payer: COMMERCIAL

## 2024-12-30 ENCOUNTER — HOSPITAL ENCOUNTER (OUTPATIENT)
Facility: HOSPITAL | Age: 59
Discharge: HOME | End: 2025-01-01
Attending: EMERGENCY MEDICINE | Admitting: FAMILY MEDICINE
Payer: COMMERCIAL

## 2024-12-30 DIAGNOSIS — R10.9 ABDOMINAL PAIN: ICD-10-CM

## 2024-12-30 DIAGNOSIS — K92.1 GASTROINTESTINAL HEMORRHAGE WITH MELENA: ICD-10-CM

## 2024-12-30 DIAGNOSIS — K92.2 UPPER GI BLEED: ICD-10-CM

## 2024-12-30 DIAGNOSIS — R10.84 GENERALIZED ABDOMINAL PAIN: ICD-10-CM

## 2024-12-30 DIAGNOSIS — Z13.6 SCREENING FOR CARDIOVASCULAR CONDITION: ICD-10-CM

## 2024-12-30 DIAGNOSIS — K26.4 DUODENAL ULCER WITH HEMORRHAGE: Primary | ICD-10-CM

## 2024-12-30 LAB
ABO + RH BLD: NORMAL
ALBUMIN SERPL BCP-MCNC: 3.1 G/DL (ref 3.5–5.2)
ALP SERPL-CCNC: 71 U/L (ref 40–150)
ALT SERPL W/O P-5'-P-CCNC: 21 U/L (ref 10–44)
ANION GAP SERPL CALC-SCNC: 11 MMOL/L (ref 8–16)
AST SERPL-CCNC: 21 U/L (ref 10–40)
BASOPHILS # BLD AUTO: 0.02 K/UL (ref 0–0.2)
BASOPHILS NFR BLD: 0.3 % (ref 0–1.9)
BILIRUB SERPL-MCNC: 0.4 MG/DL (ref 0.1–1)
BILIRUB UR QL STRIP: NEGATIVE
BLD GP AB SCN CELLS X3 SERPL QL: NORMAL
BUN SERPL-MCNC: 20 MG/DL (ref 6–20)
CALCIUM SERPL-MCNC: 8.8 MG/DL (ref 8.7–10.5)
CHLORIDE SERPL-SCNC: 104 MMOL/L (ref 95–110)
CLARITY UR: CLEAR
CO2 SERPL-SCNC: 22 MMOL/L (ref 23–29)
COLOR UR: YELLOW
CREAT SERPL-MCNC: 0.6 MG/DL (ref 0.5–1.4)
DIFFERENTIAL METHOD BLD: ABNORMAL
EOSINOPHIL # BLD AUTO: 0 K/UL (ref 0–0.5)
EOSINOPHIL NFR BLD: 0.5 % (ref 0–8)
ERYTHROCYTE [DISTWIDTH] IN BLOOD BY AUTOMATED COUNT: 15.6 % (ref 11.5–14.5)
EST. GFR  (NO RACE VARIABLE): >60 ML/MIN/1.73 M^2
GLUCOSE SERPL-MCNC: 99 MG/DL (ref 70–110)
GLUCOSE UR QL STRIP: NEGATIVE
HCT VFR BLD AUTO: 27.6 % (ref 40–54)
HGB BLD-MCNC: 9.6 G/DL (ref 14–18)
HGB UR QL STRIP: NEGATIVE
IMM GRANULOCYTES # BLD AUTO: 0.02 K/UL (ref 0–0.04)
IMM GRANULOCYTES NFR BLD AUTO: 0.3 % (ref 0–0.5)
KETONES UR QL STRIP: NEGATIVE
LACTATE SERPL-SCNC: 0.6 MMOL/L (ref 0.5–2.2)
LACTATE SERPL-SCNC: 0.8 MMOL/L (ref 0.5–2.2)
LEUKOCYTE ESTERASE UR QL STRIP: NEGATIVE
LIPASE SERPL-CCNC: 40 U/L (ref 4–60)
LYMPHOCYTES # BLD AUTO: 1.3 K/UL (ref 1–4.8)
LYMPHOCYTES NFR BLD: 20.3 % (ref 18–48)
MCH RBC QN AUTO: 33.7 PG (ref 27–31)
MCHC RBC AUTO-ENTMCNC: 34.8 G/DL (ref 32–36)
MCV RBC AUTO: 97 FL (ref 82–98)
MONOCYTES # BLD AUTO: 0.6 K/UL (ref 0.3–1)
MONOCYTES NFR BLD: 8.6 % (ref 4–15)
NEUTROPHILS # BLD AUTO: 4.6 K/UL (ref 1.8–7.7)
NEUTROPHILS NFR BLD: 70 % (ref 38–73)
NITRITE UR QL STRIP: NEGATIVE
NRBC BLD-RTO: 0 /100 WBC
OB PNL STL: POSITIVE
PH UR STRIP: 7 [PH] (ref 5–8)
PLATELET # BLD AUTO: 213 K/UL (ref 150–450)
PMV BLD AUTO: 10.3 FL (ref 9.2–12.9)
POTASSIUM SERPL-SCNC: 3.8 MMOL/L (ref 3.5–5.1)
PROCALCITONIN SERPL IA-MCNC: 0.23 NG/ML
PROT SERPL-MCNC: 6.4 G/DL (ref 6–8.4)
PROT UR QL STRIP: ABNORMAL
RBC # BLD AUTO: 2.85 M/UL (ref 4.6–6.2)
SODIUM SERPL-SCNC: 137 MMOL/L (ref 136–145)
SP GR UR STRIP: 1.02 (ref 1–1.03)
SPECIMEN OUTDATE: NORMAL
URN SPEC COLLECT METH UR: ABNORMAL
UROBILINOGEN UR STRIP-ACNC: NEGATIVE EU/DL
WBC # BLD AUTO: 6.61 K/UL (ref 3.9–12.7)

## 2024-12-30 PROCEDURE — 25000003 PHARM REV CODE 250: Performed by: EMERGENCY MEDICINE

## 2024-12-30 PROCEDURE — G0378 HOSPITAL OBSERVATION PER HR: HCPCS

## 2024-12-30 PROCEDURE — 87040 BLOOD CULTURE FOR BACTERIA: CPT | Performed by: PHYSICIAN ASSISTANT

## 2024-12-30 PROCEDURE — 25500020 PHARM REV CODE 255: Performed by: EMERGENCY MEDICINE

## 2024-12-30 PROCEDURE — 25000003 PHARM REV CODE 250: Performed by: FAMILY MEDICINE

## 2024-12-30 PROCEDURE — 63600175 PHARM REV CODE 636 W HCPCS: Performed by: FAMILY MEDICINE

## 2024-12-30 PROCEDURE — 81003 URINALYSIS AUTO W/O SCOPE: CPT | Performed by: PHYSICIAN ASSISTANT

## 2024-12-30 PROCEDURE — 86901 BLOOD TYPING SEROLOGIC RH(D): CPT | Performed by: FAMILY MEDICINE

## 2024-12-30 PROCEDURE — 82272 OCCULT BLD FECES 1-3 TESTS: CPT | Performed by: EMERGENCY MEDICINE

## 2024-12-30 PROCEDURE — 83605 ASSAY OF LACTIC ACID: CPT | Performed by: PHYSICIAN ASSISTANT

## 2024-12-30 PROCEDURE — 85025 COMPLETE CBC W/AUTO DIFF WBC: CPT | Performed by: PHYSICIAN ASSISTANT

## 2024-12-30 PROCEDURE — 63600175 PHARM REV CODE 636 W HCPCS: Performed by: PHYSICIAN ASSISTANT

## 2024-12-30 PROCEDURE — 93010 ELECTROCARDIOGRAM REPORT: CPT | Mod: ,,, | Performed by: STUDENT IN AN ORGANIZED HEALTH CARE EDUCATION/TRAINING PROGRAM

## 2024-12-30 PROCEDURE — 25000003 PHARM REV CODE 250: Performed by: PHYSICIAN ASSISTANT

## 2024-12-30 PROCEDURE — 83690 ASSAY OF LIPASE: CPT | Performed by: PHYSICIAN ASSISTANT

## 2024-12-30 PROCEDURE — 36415 COLL VENOUS BLD VENIPUNCTURE: CPT | Performed by: FAMILY MEDICINE

## 2024-12-30 PROCEDURE — 84145 PROCALCITONIN (PCT): CPT | Performed by: PHYSICIAN ASSISTANT

## 2024-12-30 PROCEDURE — 93005 ELECTROCARDIOGRAM TRACING: CPT

## 2024-12-30 PROCEDURE — 63600175 PHARM REV CODE 636 W HCPCS: Performed by: EMERGENCY MEDICINE

## 2024-12-30 PROCEDURE — 80053 COMPREHEN METABOLIC PANEL: CPT | Performed by: PHYSICIAN ASSISTANT

## 2024-12-30 RX ORDER — MORPHINE SULFATE 4 MG/ML
4 INJECTION, SOLUTION INTRAMUSCULAR; INTRAVENOUS
Status: COMPLETED | OUTPATIENT
Start: 2024-12-30 | End: 2024-12-30

## 2024-12-30 RX ORDER — HYDRALAZINE HYDROCHLORIDE 20 MG/ML
10 INJECTION INTRAMUSCULAR; INTRAVENOUS EVERY 6 HOURS PRN
Status: DISCONTINUED | OUTPATIENT
Start: 2024-12-30 | End: 2025-01-01 | Stop reason: HOSPADM

## 2024-12-30 RX ORDER — HYDROMORPHONE HYDROCHLORIDE 1 MG/ML
1 INJECTION, SOLUTION INTRAMUSCULAR; INTRAVENOUS; SUBCUTANEOUS EVERY 6 HOURS PRN
Status: DISCONTINUED | OUTPATIENT
Start: 2024-12-30 | End: 2025-01-01 | Stop reason: HOSPADM

## 2024-12-30 RX ORDER — ONDANSETRON HYDROCHLORIDE 2 MG/ML
4 INJECTION, SOLUTION INTRAVENOUS
Status: COMPLETED | OUTPATIENT
Start: 2024-12-30 | End: 2024-12-30

## 2024-12-30 RX ORDER — ONDANSETRON HYDROCHLORIDE 2 MG/ML
4 INJECTION, SOLUTION INTRAVENOUS EVERY 6 HOURS PRN
Status: DISCONTINUED | OUTPATIENT
Start: 2024-12-30 | End: 2025-01-01 | Stop reason: HOSPADM

## 2024-12-30 RX ORDER — HYDROCODONE BITARTRATE AND ACETAMINOPHEN 5; 325 MG/1; MG/1
1 TABLET ORAL EVERY 6 HOURS PRN
Status: ON HOLD | COMMUNITY
End: 2025-01-01 | Stop reason: HOSPADM

## 2024-12-30 RX ORDER — HYDROMORPHONE HYDROCHLORIDE 1 MG/ML
1 INJECTION, SOLUTION INTRAMUSCULAR; INTRAVENOUS; SUBCUTANEOUS
Status: DISCONTINUED | OUTPATIENT
Start: 2024-12-30 | End: 2024-12-30

## 2024-12-30 RX ORDER — PANTOPRAZOLE SODIUM 40 MG/10ML
40 INJECTION, POWDER, LYOPHILIZED, FOR SOLUTION INTRAVENOUS
Status: COMPLETED | OUTPATIENT
Start: 2024-12-30 | End: 2024-12-30

## 2024-12-30 RX ORDER — SODIUM CHLORIDE 9 MG/ML
INJECTION, SOLUTION INTRAVENOUS CONTINUOUS
Status: DISCONTINUED | OUTPATIENT
Start: 2024-12-30 | End: 2024-12-31

## 2024-12-30 RX ORDER — PANTOPRAZOLE SODIUM 40 MG/10ML
40 INJECTION, POWDER, LYOPHILIZED, FOR SOLUTION INTRAVENOUS ONCE
Status: COMPLETED | OUTPATIENT
Start: 2024-12-30 | End: 2024-12-30

## 2024-12-30 RX ORDER — HYDROCODONE BITARTRATE AND ACETAMINOPHEN 5; 325 MG/1; MG/1
TABLET ORAL
COMMUNITY
End: 2024-12-30

## 2024-12-30 RX ORDER — HYDROMORPHONE HYDROCHLORIDE 1 MG/ML
1 INJECTION, SOLUTION INTRAMUSCULAR; INTRAVENOUS; SUBCUTANEOUS
Status: COMPLETED | OUTPATIENT
Start: 2024-12-30 | End: 2024-12-30

## 2024-12-30 RX ORDER — ACETAMINOPHEN 325 MG/1
650 TABLET ORAL EVERY 6 HOURS PRN
Status: DISCONTINUED | OUTPATIENT
Start: 2024-12-30 | End: 2025-01-01 | Stop reason: HOSPADM

## 2024-12-30 RX ORDER — SUCRALFATE 1 G/10ML
1 SUSPENSION ORAL EVERY 6 HOURS
Status: DISCONTINUED | OUTPATIENT
Start: 2024-12-30 | End: 2025-01-01 | Stop reason: HOSPADM

## 2024-12-30 RX ADMIN — MORPHINE SULFATE 4 MG: 4 INJECTION INTRAVENOUS at 04:12

## 2024-12-30 RX ADMIN — SUCRALFATE ORAL 1 G: 1 SUSPENSION ORAL at 08:12

## 2024-12-30 RX ADMIN — PANTOPRAZOLE SODIUM 40 MG: 40 INJECTION, POWDER, LYOPHILIZED, FOR SOLUTION INTRAVENOUS at 08:12

## 2024-12-30 RX ADMIN — ONDANSETRON 4 MG: 2 INJECTION INTRAMUSCULAR; INTRAVENOUS at 03:12

## 2024-12-30 RX ADMIN — IOHEXOL 100 ML: 350 INJECTION, SOLUTION INTRAVENOUS at 07:12

## 2024-12-30 RX ADMIN — PANTOPRAZOLE SODIUM 40 MG: 40 INJECTION, POWDER, FOR SOLUTION INTRAVENOUS at 06:12

## 2024-12-30 RX ADMIN — SODIUM CHLORIDE: 9 INJECTION, SOLUTION INTRAVENOUS at 09:12

## 2024-12-30 RX ADMIN — PROMETHAZINE HYDROCHLORIDE 12.5 MG: 25 INJECTION INTRAMUSCULAR; INTRAVENOUS at 04:12

## 2024-12-30 RX ADMIN — SODIUM CHLORIDE 1000 ML: 9 INJECTION, SOLUTION INTRAVENOUS at 03:12

## 2024-12-30 RX ADMIN — HYDROMORPHONE HYDROCHLORIDE 1 MG: 1 INJECTION, SOLUTION INTRAMUSCULAR; INTRAVENOUS; SUBCUTANEOUS at 06:12

## 2024-12-30 RX ADMIN — PANTOPRAZOLE SODIUM 8 MG/HR: 40 INJECTION, POWDER, FOR SOLUTION INTRAVENOUS at 10:12

## 2024-12-30 RX ADMIN — PANTOPRAZOLE SODIUM 8 MG/HR: 40 INJECTION, POWDER, FOR SOLUTION INTRAVENOUS at 08:12

## 2024-12-30 NOTE — FIRST PROVIDER EVALUATION
Medical screening examination initiated.  I have conducted a focused provider triage encounter, findings are as follows:    Brief history of present illness:   vomiting, abdominal pain.  History of esophageal cancer with mets to liver    Vitals:    12/30/24 1313   BP: (!) 93/54   BP Location: Right arm   Pulse: (!) 111   Resp: 16   Temp: 98 °F (36.7 °C)   TempSrc: Oral   SpO2: 99%   Weight: 71.4 kg (157 lb 6.4 oz)       Pertinent physical exam:   fatigued    Brief workup plan:   initiate labs fluids Zofran    Preliminary workup initiated; this workup will be continued and followed by the physician or advanced practice provider that is assigned to the patient when roomed.

## 2024-12-30 NOTE — ED PROVIDER NOTES
SCRIBE #1 NOTE: I, Sharri Yepez, am scribing for, and in the presence of, Shweta Mccormack MD. I have scribed the entire note.       History     Chief Complaint   Patient presents with    Abdominal Pain     Pt with history of esophageal cancer with mets to the liver c/o abdominal pain, nausea, vomiting, and diarrhea.  He was recently admitted to this hospital for the same complaints.  Pt had chemotherapy the week before last.     Review of patient's allergies indicates:  No Known Allergies      History of Present Illness     HPI    12/30/2024, 3:43 PM  History obtained from the wife and patient      History of Present Illness: Rosalio Muñoz is aA 59-year-old male patient with a past medical history (PMHx) of stage IV esophageal cancer, hypertension, malignant neoplasm metastatic to other sites, and a palliative care encounter presents to the Emergency Department for evaluation of abdominal pain. The patient had three bouts of diarrhea prior to arrival. His wife reports that the patient also experienced vomiting, which had a brown color with red streaks. The patient reports vomiting approximately 12 hours prior to arrival. The patient was admitted 4 days ago, and Dr. Nguyen treated and discharged him. Symptoms are constant and moderate in severity, with no reported mitigating or exacerbating factors. Associated symptoms include nausea and abdominal pain. The patient denies any fever, chills, shortness of breath, leg swelling, or any other symptoms at this time.  Prior treatment includes taking Narco 5 around 11:45 AM (a quarter to 12) prior to arrival. The patients last chemotherapy treatment was a week before his hospital admission. When asked about his diagnosis of cancer, the patient's wife reports a 40-pound weight loss. The patient recently established care with a new provider, where a CT without contrast was performed. On October 4th, the patient's wife reports that an obstructive mass was noted in the  gastroesophageal (GE) junction. Subsequently, the patient saw Dr. Latif and Dr. Crowley, and PET scans were ordered, which showed lesions on his liver. The patient's wife reports a decrease in the size of the masses, starting at 24 mm. No further complaints or concerns are noted at this time.       Arrival mode: Personal vehicle    PCP: Michell Goyal MD        Past Medical History:  Past Medical History:   Diagnosis Date    Esophageal cancer, stage IV 10/10/2024    Hypertension     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 10/11/2024    Palliative care encounter 12/11/2024       Past Surgical History:  Past Surgical History:   Procedure Laterality Date    COLONOSCOPY N/A 8/25/2023    Procedure: COLONOSCOPY;  Surgeon: Pebbles Spear MD;  Location: [facility];  Service: Endoscopy;  Laterality: N/A;    COLONOSCOPY N/A 10/2/2024    Procedure: COLONOSCOPY  Cardiac clearance received on 09/20/24 per Dr. Lockett, Cardiology.  Note in encounters.  LB;  Surgeon: Sully Farris MD;  Location: [facility];  Service: Endoscopy;  Laterality: N/A;    ESOPHAGOGASTRODUODENOSCOPY N/A 10/2/2024    Procedure: EGD (ESOPHAGOGASTRODUODENOSCOPY);  Surgeon: Sully Farris MD;  Location: [facility];  Service: Endoscopy;  Laterality: N/A;    INSERTION OF VENOUS ACCESS PORT Left 10/21/2024    Procedure: INSERTION, VENOUS ACCESS PORT;  Surgeon: Roseanna Rosas MD;  Location: [facility] OR;  Service: General;  Laterality: Left;    SURGICAL REMOVAL OF LESION OF FACE Right 12/1/2023    Procedure: EXCISION, LESION, FACE;  Surgeon: Jose Flaherty MD;  Location: [facility] OR;  Service: ENT;  Laterality: Right;  1. Excision facial subcutaneous mass right cheek 3 cm. 2. Intermediate layered closure 3.5 cm    WISDOM TOOTH EXTRACTION      XI ROBOTIC APPENDECTOMY N/A 7/31/2024    Procedure: XI ROBOTIC APPENDECTOMY;  Surgeon: Paulo Saavedra MD;  Location: [facility] OR;  Service: General;  Laterality: N/A;    XI ROBOTIC INSERTION, GASTROSTOMY  TUBE N/A 10/21/2024    Procedure: XI ROBOTIC INSERTION, GASTROSTOMY TUBE;  Surgeon: Roseanna Rosas MD;  Location: [facility];  Service: General;  Laterality: N/A;         Family History:  Family History   Problem Relation Name Age of Onset    Hyperlipidemia Mother      Hypertension Father      Diabetes Father      Kidney disease Father      Heart disease Father      Breast cancer Maternal Grandmother      Prostate cancer Maternal Grandfather      Colon cancer Neg Hx         Social History:  Social History     Tobacco Use    Smoking status: Every Day     Current packs/day: 0.00     Types: Cigars, Cigarettes     Last attempt to quit: 10/4/2022     Years since quittin.2     Passive exposure: Never    Smokeless tobacco: Never    Tobacco comments:     Cigar every afternoon   Substance and Sexual Activity    Alcohol use: Yes     Alcohol/week: 4.0 - 6.0 standard drinks of alcohol     Types: 4 - 6 Cans of beer per week     Comment: occ    Drug use: Yes     Types: Marijuana     Comment: medical    Sexual activity: Yes     Partners: Female        Review of Systems     Review of Systems   Constitutional:  Negative for chills and fever.   Respiratory:  Negative for shortness of breath.    Cardiovascular:  Negative for leg swelling.   Gastrointestinal:  Positive for abdominal pain, diarrhea, nausea and vomiting.   All other systems reviewed and are negative.       Physical Exam     Initial Vitals [24 1313]   BP Pulse Resp Temp SpO2   (!) 93/54 (!) 111 16 98 °F (36.7 °C) 99 %      MAP       --          Physical Exam  Nursing Notes and Vital Signs Reviewed.  Constitutional: Patient is chronically ill. Patient is older than appearing. Patient is hard of hearing.    Head: Atraumatic. Normocephalic.  Eyes: PERRL. EOM intact. Conjunctivae are not pale. No scleral icterus.  ENT: Mucous membranes are dry. Oropharynx is clear and symmetric.    Neck: Supple. Full ROM.   Cardiovascular: Regular rate. Regular rhythm. No murmurs,  rubs, or gallops. Pulmonary/Chest: No respiratory distress. Clear to auscultation bilaterally. No wheezing or rales.  Abdominal: Soft and non-distended.  There is no tenderness.  No rebound, guarding, or rigidity.   Genitourinary: No CVA tenderness  Musculoskeletal: Moves all extremities. No obvious deformities. No edema.  Skin: Warm and dry.  Neurological:  Alert, awake, and appropriate.  Normal speech.  No acute focal neurological deficits are appreciated.  Psychiatric: Normal affect. Good eye contact. Appropriate in content.  Rectal exam: negative without mass, lesions or tenderness, stool guaiac positive.     ED Course   Critical Care    Date/Time: 12/30/2024 6:56 PM    Performed by: Shweta Mccormack MD  Authorized by: Shweta Mccormack MD  Direct patient critical care time: 20 minutes  Additional history critical care time: 15 minutes  Ordering / reviewing critical care time: 10 minutes  Documentation critical care time: 5 minutes  Consulting other physicians critical care time: 5 minutes  Consult with family critical care time: 5 minutes  Total critical care time (exclusive of procedural time) : 60 minutes  Critical care time was exclusive of teaching time and separately billable procedures and treating other patients.  Critical care was necessary to treat or prevent imminent or life-threatening deterioration of the following conditions: GI Bleed.  Critical care was time spent personally by me on the following activities: blood draw for specimens, development of treatment plan with patient or surrogate, discussions with consultants, examination of patient, obtaining history from patient or surrogate, interpretation of cardiac output measurements, evaluation of patient's response to treatment, ordering and performing treatments and interventions, ordering and review of laboratory studies, ordering and review of radiographic studies, pulse oximetry, re-evaluation of patient's condition and review of old  charts.        ED Vital Signs:  Vitals:    12/30/24 2337 12/31/24 0021 12/31/24 0036 12/31/24 0110   BP:  (!) 92/54 (!) 100/59    Pulse: 78 78 89 73   Resp:  18     Temp:  98.7 °F (37.1 °C)     TempSrc:  Oral     SpO2:  (!) 94%     Weight:       Height:        12/31/24 0111 12/31/24 0244 12/31/24 0313 12/31/24 0328   BP:  (!) 99/58 (!) 98/58 (!) 99/56   Pulse:  71 73 72   Resp: 16 18 18 16   Temp:  98.1 °F (36.7 °C) 97.9 °F (36.6 °C) 97.6 °F (36.4 °C)   TempSrc:  Oral  Oral   SpO2:  96% 96% 97%   Weight:       Height:        12/31/24 0418 12/31/24 0419 12/31/24 0544 12/31/24 0627   BP:  (!) 90/53 99/88 (!) 100/56   Pulse: 75 69 70 73   Resp:  16 18 18   Temp:  98.6 °F (37 °C) 97.9 °F (36.6 °C) 97.7 °F (36.5 °C)   TempSrc:  Oral Oral Oral   SpO2:  95%  98%   Weight:       Height:        12/31/24 0741 12/31/24 0800 12/31/24 0846   BP:   (!) 95/53   Pulse:  78 68   Resp: 17  17   Temp:   97.4 °F (36.3 °C)   TempSrc:   Oral   SpO2:   96%   Weight:      Height:          Abnormal Lab Results:  Labs Reviewed   CBC W/ AUTO DIFFERENTIAL - Abnormal       Result Value    WBC 6.61      RBC 2.85 (*)     Hemoglobin 9.6 (*)     Hematocrit 27.6 (*)     MCV 97      MCH 33.7 (*)     MCHC 34.8      RDW 15.6 (*)     Platelets 213      MPV 10.3      Immature Granulocytes 0.3      Gran # (ANC) 4.6      Immature Grans (Abs) 0.02      Lymph # 1.3      Mono # 0.6      Eos # 0.0      Baso # 0.02      nRBC 0      Gran % 70.0      Lymph % 20.3      Mono % 8.6      Eosinophil % 0.5      Basophil % 0.3      Differential Method Automated     COMPREHENSIVE METABOLIC PANEL - Abnormal    Sodium 137      Potassium 3.8      Chloride 104      CO2 22 (*)     Glucose 99      BUN 20      Creatinine 0.6      Calcium 8.8      Total Protein 6.4      Albumin 3.1 (*)     Total Bilirubin 0.4      Alkaline Phosphatase 71      AST 21      ALT 21      eGFR >60      Anion Gap 11     URINALYSIS, REFLEX TO URINE CULTURE - Abnormal    Specimen UA Urine, Clean Catch       Color, UA Yellow      Appearance, UA Clear      pH, UA 7.0      Specific Gravity, UA 1.025      Protein, UA Trace (*)     Glucose, UA Negative      Ketones, UA Negative      Bilirubin (UA) Negative      Occult Blood UA Negative      Nitrite, UA Negative      Urobilinogen, UA Negative      Leukocytes, UA Negative      Narrative:     Specimen Source->Urine   OCCULT BLOOD X 1, STOOL - Abnormal    Occult Blood Positive (*)    CLOSTRIDIUM DIFFICILE   LACTIC ACID, PLASMA    Lactate (Lactic Acid) 0.8     PROCALCITONIN    Procalcitonin 0.23     LIPASE    Lipase 40     LACTIC ACID, PLASMA    Lactate (Lactic Acid) 0.6          All Lab Results:  Results for orders placed or performed during the hospital encounter of 12/30/24   EKG 12-lead    Collection Time: 12/30/24  3:21 PM   Result Value Ref Range    QRS Duration 80 ms    [facility] QTC Calculation 446 ms   Blood culture x two cultures. Draw prior to antibiotics.    Collection Time: 12/30/24  4:03 PM    Specimen: Peripheral, Antecubital, Left; Blood   Result Value Ref Range    Blood Culture, Routine No Growth to date    CBC auto differential    Collection Time: 12/30/24  4:03 PM   Result Value Ref Range    WBC 6.61 3.90 - 12.70 K/uL    RBC 2.85 (L) 4.60 - 6.20 M/uL    Hemoglobin 9.6 (L) 14.0 - 18.0 g/dL    Hematocrit 27.6 (L) 40.0 - 54.0 %    MCV 97 82 - 98 fL    MCH 33.7 (H) 27.0 - 31.0 pg    MCHC 34.8 32.0 - 36.0 g/dL    RDW 15.6 (H) 11.5 - 14.5 %    Platelets 213 150 - 450 K/uL    MPV 10.3 9.2 - 12.9 fL    Immature Granulocytes 0.3 0.0 - 0.5 %    Gran # (ANC) 4.6 1.8 - 7.7 K/uL    Immature Grans (Abs) 0.02 0.00 - 0.04 K/uL    Lymph # 1.3 1.0 - 4.8 K/uL    Mono # 0.6 0.3 - 1.0 K/uL    Eos # 0.0 0.0 - 0.5 K/uL    Baso # 0.02 0.00 - 0.20 K/uL    nRBC 0 0 /100 WBC    Gran % 70.0 38.0 - 73.0 %    Lymph % 20.3 18.0 - 48.0 %    Mono % 8.6 4.0 - 15.0 %    Eosinophil % 0.5 0.0 - 8.0 %    Basophil % 0.3 0.0 - 1.9 %    Differential Method Automated    Comprehensive metabolic panel     Collection Time: 12/30/24  4:03 PM   Result Value Ref Range    Sodium 137 136 - 145 mmol/L    Potassium 3.8 3.5 - 5.1 mmol/L    Chloride 104 95 - 110 mmol/L    CO2 22 (L) 23 - 29 mmol/L    Glucose 99 70 - 110 mg/dL    BUN 20 6 - 20 mg/dL    Creatinine 0.6 0.5 - 1.4 mg/dL    Calcium 8.8 8.7 - 10.5 mg/dL    Total Protein 6.4 6.0 - 8.4 g/dL    Albumin 3.1 (L) 3.5 - 5.2 g/dL    Total Bilirubin 0.4 0.1 - 1.0 mg/dL    Alkaline Phosphatase 71 40 - 150 U/L    AST 21 10 - 40 U/L    ALT 21 10 - 44 U/L    eGFR >60 >60 mL/min/1.73 m^2    Anion Gap 11 8 - 16 mmol/L   Lactic acid, plasma #1    Collection Time: 12/30/24  4:03 PM   Result Value Ref Range    Lactate (Lactic Acid) 0.8 0.5 - 2.2 mmol/L   Procalcitonin    Collection Time: 12/30/24  4:03 PM   Result Value Ref Range    Procalcitonin 0.23 <0.25 ng/mL   Lipase    Collection Time: 12/30/24  4:03 PM   Result Value Ref Range    Lipase 40 4 - 60 U/L   Blood culture x two cultures. Draw prior to antibiotics.    Collection Time: 12/30/24  4:04 PM    Specimen: Peripheral, Hand, Right; Blood   Result Value Ref Range    Blood Culture, Routine No Growth to date    Lactic acid, plasma #2    Collection Time: 12/30/24  5:45 PM   Result Value Ref Range    Lactate (Lactic Acid) 0.6 0.5 - 2.2 mmol/L   Occult blood x 1, stool    Collection Time: 12/30/24  6:16 PM    Specimen: Stool   Result Value Ref Range    Occult Blood Positive (A) Negative   Urinalysis, Reflex to Urine Culture Urine, Clean Catch    Collection Time: 12/30/24  6:27 PM    Specimen: Urine   Result Value Ref Range    Specimen UA Urine, Clean Catch     Color, UA Yellow Yellow, Straw, Marge    Appearance, UA Clear Clear    pH, UA 7.0 5.0 - 8.0    Specific Gravity, UA 1.025 1.005 - 1.030    Protein, UA Trace (A) Negative    Glucose, UA Negative Negative    Ketones, UA Negative Negative    Bilirubin (UA) Negative Negative    Occult Blood UA Negative Negative    Nitrite, UA Negative Negative    Urobilinogen, UA Negative <2.0  EU/dL    Leukocytes, UA Negative Negative   Type & Screen    Collection Time: 12/30/24  7:55 PM   Result Value Ref Range    Group & Rh O POS     Indirect Dian NEG     Specimen Outdate 01/02/2025 23:59    Prepare RBC 1 Unit    Collection Time: 12/30/24  7:55 PM   Result Value Ref Range    UNIT NUMBER [id number]     Product Code [id number]     DISPENSE STATUS ISSUED     CODING SYSTEM [id number]     Unit Blood Type Code 5100     Unit Blood Type O POS     Unit Expiration [id number]     CROSSMATCH INTERPRETATION Compatible    Hemoglobin    Collection Time: 12/31/24 12:01 AM   Result Value Ref Range    Hemoglobin 7.6 (L) 14.0 - 18.0 g/dL   Hematocrit    Collection Time: 12/31/24 12:01 AM   Result Value Ref Range    Hematocrit 22.7 (L) 40.0 - 54.0 %   Basic Metabolic Panel    Collection Time: 12/31/24  6:24 AM   Result Value Ref Range    Sodium 137 136 - 145 mmol/L    Potassium 3.8 3.5 - 5.1 mmol/L    Chloride 110 95 - 110 mmol/L    CO2 20 (L) 23 - 29 mmol/L    Glucose 89 70 - 110 mg/dL    BUN 14 6 - 20 mg/dL    Creatinine 0.6 0.5 - 1.4 mg/dL    Calcium 8.2 (L) 8.7 - 10.5 mg/dL    Anion Gap 7 (L) 8 - 16 mmol/L    eGFR >60 >60 mL/min/1.73 m^2   Hemoglobin    Collection Time: 12/31/24  7:44 AM   Result Value Ref Range    Hemoglobin 8.8 (L) 14.0 - 18.0 g/dL   Hematocrit    Collection Time: 12/31/24  7:44 AM   Result Value Ref Range    Hematocrit 27.4 (L) 40.0 - 54.0 %         Imaging Results:  Imaging Results               CT Abdomen Pelvis With IV Contrast NO Oral Contrast (Final result)  Result time 12/30/24 20:00:26      Final result by Karthik Rodriguez MD (12/30/24 20:00:26)                   Impression:      Gastritis or peptic ulcer disease.    Additional areas of concern for possible enteritis.    Small hepatic lesions in this patient with known hepatic metastatic disease, these lesions not well characterized on the basis of this exam.    Esophageal wall thickening in this patient with known esophageal  cancer.    This report was flagged in Epic as abnormal.    All CT scans at this facility are performed  using dose modulation techniques as appropriate to performed exam including the following:  automated exposure control; adjustment of mA and/or kV according to the patients size (this includes techniques or standardized protocols for targeted exams where dose is matched to indication/reason for exam: i.e. extremities or head);  iterative reconstruction technique.      Electronically signed by: Karthik Rodriguez  Date:    12/30/2024  Time:    20:00               Narrative:    EXAMINATION:  CT ABDOMEN PELVIS WITH IV CONTRAST    CLINICAL HISTORY:  Epigastric pain;    TECHNIQUE:  Low dose axial images, sagittal and coronal reformations were obtained from the lung bases to the pubic symphysis.  Contrast was administered.  Images acquired after the administration 100 mL Omnipaque 350 IV contrast.    COMPARISON:  None    FINDINGS:  Heart: Normal in size. No pericardial effusion.    Lung Bases: Well aerated, without consolidation or pleural fluid.    Liver: Small hepatic lesions not well characterized on the basis of this exam.    Gallbladder: No calcified gallstones.    Bile Ducts: No evidence of dilated ducts.    Pancreas: No mass or peripancreatic fat stranding.    Spleen: Unremarkable.    Adrenals: Unremarkable.    Kidneys/ Ureters: No hydronephrosis.  No obstructive uropathy.  No nonobstructive nephrolithiasis.    Bladder: No evidence of wall thickening.    Reproductive organs: Unremarkable.    GI Tract/Mesentery: Fluid-filled small bowel in the left abdomen.  Gastric wall thickening concerning for gastritis or peptic ulcer disease along the pylorus.  Gastrostomy tube in place.  Distally there is nondistended small bowel but without generalized air-fluid levels or generalized dilation to specifically raise concern for obstruction.  Right lower quadrant anastomosis.  The appendix is not well visualized.    Distal  esophageal wall thickening in this patient with a history of esophageal cancer.    Peritoneal Space: No ascites. No free air.    Retroperitoneum: No significant adenopathy.    Abdominal wall: Unremarkable.    Vasculature: Moderate aortoiliac atherosclerosis.  No aneurysm.    Bones: No acute fracture.                                       X-Ray Abdomen Flat And Erect (Final result)  Result time 12/30/24 18:12:25      Final result by Dutch Cantu MD (12/30/24 18:12:25)                   Impression:      As above      Electronically signed by: Dutch Cantu  Date:    12/30/2024  Time:    18:12               Narrative:    EXAMINATION:  XR ABDOMEN FLAT AND ERECT    CLINICAL HISTORY:  Unspecified abdominal pain    TECHNIQUE:  Flat and erect AP views of the abdomen were performed.    COMPARISON:  None    FINDINGS:  Feeding tube in the upper abdomen.  Nonspecific bowel gas pattern with scattered amount of stool and gas.                                       X-Ray Chest AP Portable (Final result)  Result time 12/30/24 14:35:44      Final result by [facility], [facility] (12/30/24 14:35:44)                   Impression:      No lung infiltrates.      Electronically signed by: Ezequiel Alvarenga MD  Date:    12/30/2024  Time:    14:35               Narrative:    EXAMINATION:  XR CHEST AP PORTABLE    CLINICAL HISTORY:  , Sepsis;    COMPARISON:  Chest, 10/21/2024.    FINDINGS:  One view.    MediPort catheter tip at the SVC level.    Heart size is normal. The lung fields are clear. No acute cardiopulmonary infiltrate.                                       The EKG was ordered, reviewed, and independently interpreted by the ED provider.  Interpretation time: 3:21  Rate: 99 BPM  Rhythm: normal sinus rhythm  Interpretation: No ST change. No STEMI.             The Emergency Provider reviewed the vital signs and test results, which are outlined above.     ED Discussion       7:23 PM: Discussed case with Dr. Jim ([facility]  Medicine). Dr. Jim agrees with current care and management of pt and accepts admission.   Admitting Service: Observation  Admitting Physician: Dr. Jim  Admit to: Med Tele         Medical Decision Making  Patient has esophageal cancer with Mets to the liver just discharged a baby 2 weeks ago has been waves of abdominal pain. He weakness and lightheadedness. He has melena with heme positive stool. We would like to admit him for pain control and Protonix and GI consult.     CT scan is pending at this time and will be followed by hospital medicine services    Differential diagnosis;  Gastroenteritis, Bowel obstruction, Colitis, Diverticulitis, Cholecystitis, Appendicitis, Perforated bowel, Herniation, Infectious etiology, UTI, Pyelonephritis,  Biliary obstruction, kidney stone       Amount and/or Complexity of Data Reviewed  Labs: ordered. Decision-making details documented in ED Course.  Radiology: ordered. Decision-making details documented in ED Course.  ECG/medicine tests: ordered and independent interpretation performed. Decision-making details documented in ED Course.    Risk  Prescription drug management.  Decision regarding hospitalization.                ED Medication(s):  Medications   acetaminophen tablet 650 mg (has no administration in time range)   ondansetron injection 4 mg (has no administration in time range)   0.9% NaCl infusion ( Intravenous Verify Only 12/31/24 0641)   pantoprazole (PROTONIX) 40 mg in 0.9% NaCl 100 mL IVPB (MB+) (8 mg/hr Intravenous New Bag 12/31/24 0833)   sucralfate 100 mg/mL suspension 1 g (1 g Oral Given 12/31/24 0636)   HYDROmorphone injection 1 mg (1 mg Intravenous Given 12/31/24 0741)   hydrALAZINE injection 10 mg (has no administration in time range)   albumin human 25% bottle 50 g (has no administration in time range)   sodium chloride 0.9% bolus 1,000 mL 1,000 mL (0 mLs Intravenous Stopped 12/30/24 1627)   ondansetron injection 4 mg (4 mg Intravenous Given 12/30/24 1524)    morphine injection 4 mg (4 mg Intravenous Given 12/30/24 1604)   promethazine (PHENERGAN) 12.5 mg in 0.9% NaCl 50 mL IVPB (0 mg Intravenous Stopped 12/30/24 1625)   pantoprazole injection 40 mg (40 mg Intravenous Given 12/30/24 1839)   HYDROmorphone injection 1 mg (1 mg Intravenous Given 12/30/24 1840)   iohexoL (OMNIPAQUE 350) injection 100 mL (100 mLs Intravenous Given 12/30/24 1908)   pantoprazole injection 40 mg (40 mg Intravenous Given 12/30/24 2019)       Current Discharge Medication List                  Scribe Attestation:   Scribe #1: I performed the above scribed service and the documentation accurately describes the services I performed. I attest to the accuracy of the note.     Attending:   Physician Attestation Statement for Scribe #1: I, Shweta Mccormack MD, personally performed the services described in this documentation, as scribed by Sharri Yepez, in my presence, and it is both accurate and complete.           Clinical Impression       ICD-10-CM ICD-9-CM   1. Gastrointestinal hemorrhage with melena  K92.1 578.1   2. Screening for cardiovascular condition  Z13.6 V81.2   3. Abdominal pain  R10.9 789.00   4. Generalized abdominal pain  R10.84 789.07   5. Upper GI bleed  K92.2 578.9       Disposition:   Disposition: Placed in Observation  Condition: Stable         Shweta Mccormack MD  12/30/24 1945       Shweta Mccormack MD  12/31/24 1010

## 2024-12-30 NOTE — TELEPHONE ENCOUNTER
Wife reports she is bringing pt to ED d/t pain, weakness and diarrhea; need to cancel appt with Dr. Latif today. Updated Dr. Latif pt en route to ED.

## 2024-12-30 NOTE — TELEPHONE ENCOUNTER
"Received call from pt/wife that pt is not feeling well enough to leave house today and would like to switch f/u with Dr. Latif to virtual. Pt also wanting to cancel labs; Last labs drawn 12/27. Pt also requests to cancel infusion for 12/31 as he would like to discuss his plan to take a 'chemo break" with Dr. Latif at f/u today; appt was canceled. Pt/wife deny any further needs at this time though encouraged them to reach out with any needs/concerns.   "

## 2024-12-31 ENCOUNTER — ANESTHESIA (OUTPATIENT)
Dept: ENDOSCOPY | Facility: HOSPITAL | Age: 59
End: 2024-12-31
Payer: COMMERCIAL

## 2024-12-31 ENCOUNTER — ANESTHESIA EVENT (OUTPATIENT)
Dept: ENDOSCOPY | Facility: HOSPITAL | Age: 59
End: 2024-12-31
Payer: COMMERCIAL

## 2024-12-31 ENCOUNTER — TELEPHONE (OUTPATIENT)
Dept: HEMATOLOGY/ONCOLOGY | Facility: CLINIC | Age: 59
End: 2024-12-31
Payer: COMMERCIAL

## 2024-12-31 PROBLEM — K26.4 DUODENAL ULCER WITH HEMORRHAGE: Status: ACTIVE | Noted: 2024-12-30

## 2024-12-31 LAB
ANION GAP SERPL CALC-SCNC: 7 MMOL/L (ref 8–16)
BLD PROD TYP BPU: NORMAL
BLOOD UNIT EXPIRATION DATE: NORMAL
BLOOD UNIT TYPE CODE: 5100
BLOOD UNIT TYPE: NORMAL
BUN SERPL-MCNC: 14 MG/DL (ref 6–20)
CALCIUM SERPL-MCNC: 8.2 MG/DL (ref 8.7–10.5)
CHLORIDE SERPL-SCNC: 110 MMOL/L (ref 95–110)
CO2 SERPL-SCNC: 20 MMOL/L (ref 23–29)
CODING SYSTEM: NORMAL
CREAT SERPL-MCNC: 0.6 MG/DL (ref 0.5–1.4)
CROSSMATCH INTERPRETATION: NORMAL
DISPENSE STATUS: NORMAL
EST. GFR  (NO RACE VARIABLE): >60 ML/MIN/1.73 M^2
GLUCOSE SERPL-MCNC: 89 MG/DL (ref 70–110)
HCT VFR BLD AUTO: 22.7 % (ref 40–54)
HCT VFR BLD AUTO: 27.4 % (ref 40–54)
HGB BLD-MCNC: 7.6 G/DL (ref 14–18)
HGB BLD-MCNC: 8.8 G/DL (ref 14–18)
NUM UNITS TRANS PACKED RBC: NORMAL
POTASSIUM SERPL-SCNC: 3.8 MMOL/L (ref 3.5–5.1)
SODIUM SERPL-SCNC: 137 MMOL/L (ref 136–145)

## 2024-12-31 PROCEDURE — 85014 HEMATOCRIT: CPT | Mod: 91 | Performed by: FAMILY MEDICINE

## 2024-12-31 PROCEDURE — 43239 EGD BIOPSY SINGLE/MULTIPLE: CPT | Mod: 59 | Performed by: INTERNAL MEDICINE

## 2024-12-31 PROCEDURE — 63600175 PHARM REV CODE 636 W HCPCS: Performed by: INTERNAL MEDICINE

## 2024-12-31 PROCEDURE — 25000003 PHARM REV CODE 250: Performed by: INTERNAL MEDICINE

## 2024-12-31 PROCEDURE — 63600175 PHARM REV CODE 636 W HCPCS: Mod: JZ,JG | Performed by: PHYSICIAN ASSISTANT

## 2024-12-31 PROCEDURE — 36415 COLL VENOUS BLD VENIPUNCTURE: CPT | Performed by: FAMILY MEDICINE

## 2024-12-31 PROCEDURE — 27201012 HC FORCEPS, HOT/COLD, DISP: Performed by: INTERNAL MEDICINE

## 2024-12-31 PROCEDURE — 36430 TRANSFUSION BLD/BLD COMPNT: CPT

## 2024-12-31 PROCEDURE — 43239 EGD BIOPSY SINGLE/MULTIPLE: CPT | Mod: 59,,, | Performed by: INTERNAL MEDICINE

## 2024-12-31 PROCEDURE — 37000009 HC ANESTHESIA EA ADD 15 MINS: Performed by: INTERNAL MEDICINE

## 2024-12-31 PROCEDURE — 27201028 HC NEEDLE, SCLERO: Performed by: INTERNAL MEDICINE

## 2024-12-31 PROCEDURE — 63600175 PHARM REV CODE 636 W HCPCS: Performed by: FAMILY MEDICINE

## 2024-12-31 PROCEDURE — P9047 ALBUMIN (HUMAN), 25%, 50ML: HCPCS | Mod: JZ,JG | Performed by: PHYSICIAN ASSISTANT

## 2024-12-31 PROCEDURE — 27201038 HC PROBE, BI-POLAR: Performed by: INTERNAL MEDICINE

## 2024-12-31 PROCEDURE — 85018 HEMOGLOBIN: CPT | Mod: 91 | Performed by: FAMILY MEDICINE

## 2024-12-31 PROCEDURE — 85018 HEMOGLOBIN: CPT | Performed by: FAMILY MEDICINE

## 2024-12-31 PROCEDURE — 43270 EGD LESION ABLATION: CPT | Performed by: INTERNAL MEDICINE

## 2024-12-31 PROCEDURE — 86920 COMPATIBILITY TEST SPIN: CPT | Performed by: FAMILY MEDICINE

## 2024-12-31 PROCEDURE — 85014 HEMATOCRIT: CPT | Performed by: FAMILY MEDICINE

## 2024-12-31 PROCEDURE — P9016 RBC LEUKOCYTES REDUCED: HCPCS | Performed by: FAMILY MEDICINE

## 2024-12-31 PROCEDURE — 43270 EGD LESION ABLATION: CPT | Mod: ,,, | Performed by: INTERNAL MEDICINE

## 2024-12-31 PROCEDURE — 37000008 HC ANESTHESIA 1ST 15 MINUTES: Performed by: INTERNAL MEDICINE

## 2024-12-31 PROCEDURE — G0378 HOSPITAL OBSERVATION PER HR: HCPCS

## 2024-12-31 PROCEDURE — 80048 BASIC METABOLIC PNL TOTAL CA: CPT | Performed by: FAMILY MEDICINE

## 2024-12-31 PROCEDURE — 99214 OFFICE O/P EST MOD 30 MIN: CPT | Mod: ,,, | Performed by: PHYSICIAN ASSISTANT

## 2024-12-31 PROCEDURE — 25000003 PHARM REV CODE 250: Performed by: FAMILY MEDICINE

## 2024-12-31 RX ORDER — EPINEPHRINE 1 MG/ML
INJECTION INTRAMUSCULAR; INTRAVENOUS; SUBCUTANEOUS
Status: COMPLETED | OUTPATIENT
Start: 2024-12-31 | End: 2024-12-31

## 2024-12-31 RX ORDER — LIDOCAINE HYDROCHLORIDE 20 MG/ML
INJECTION, SOLUTION EPIDURAL; INFILTRATION; INTRACAUDAL; PERINEURAL
Status: DISCONTINUED | OUTPATIENT
Start: 2024-12-31 | End: 2024-12-31

## 2024-12-31 RX ORDER — PROPOFOL 10 MG/ML
VIAL (ML) INTRAVENOUS
Status: DISCONTINUED | OUTPATIENT
Start: 2024-12-31 | End: 2024-12-31

## 2024-12-31 RX ORDER — DEXTROMETHORPHAN/PSEUDOEPHED 2.5-7.5/.8
DROPS ORAL
Status: COMPLETED | OUTPATIENT
Start: 2024-12-31 | End: 2024-12-31

## 2024-12-31 RX ORDER — ALBUMIN HUMAN 250 G/1000ML
50 SOLUTION INTRAVENOUS ONCE
Status: COMPLETED | OUTPATIENT
Start: 2024-12-31 | End: 2024-12-31

## 2024-12-31 RX ADMIN — SUCRALFATE ORAL 1 G: 1 SUSPENSION ORAL at 12:12

## 2024-12-31 RX ADMIN — PANTOPRAZOLE SODIUM 8 MG/HR: 40 INJECTION, POWDER, FOR SOLUTION INTRAVENOUS at 03:12

## 2024-12-31 RX ADMIN — SUCRALFATE ORAL 1 G: 1 SUSPENSION ORAL at 06:12

## 2024-12-31 RX ADMIN — HYDROMORPHONE HYDROCHLORIDE 1 MG: 1 INJECTION, SOLUTION INTRAMUSCULAR; INTRAVENOUS; SUBCUTANEOUS at 07:12

## 2024-12-31 RX ADMIN — PROPOFOL 50 MG: 10 INJECTION, EMULSION INTRAVENOUS at 01:12

## 2024-12-31 RX ADMIN — HYDROMORPHONE HYDROCHLORIDE 1 MG: 1 INJECTION, SOLUTION INTRAMUSCULAR; INTRAVENOUS; SUBCUTANEOUS at 06:12

## 2024-12-31 RX ADMIN — PANTOPRAZOLE SODIUM 8 MG/HR: 40 INJECTION, POWDER, FOR SOLUTION INTRAVENOUS at 08:12

## 2024-12-31 RX ADMIN — HYDROMORPHONE HYDROCHLORIDE 1 MG: 1 INJECTION, SOLUTION INTRAMUSCULAR; INTRAVENOUS; SUBCUTANEOUS at 01:12

## 2024-12-31 RX ADMIN — HYDROMORPHONE HYDROCHLORIDE 1 MG: 1 INJECTION, SOLUTION INTRAMUSCULAR; INTRAVENOUS; SUBCUTANEOUS at 02:12

## 2024-12-31 RX ADMIN — PROPOFOL 100 MG: 10 INJECTION, EMULSION INTRAVENOUS at 01:12

## 2024-12-31 RX ADMIN — ALBUMIN (HUMAN) 50 G: 5 SOLUTION INTRAVENOUS at 11:12

## 2024-12-31 RX ADMIN — SODIUM CHLORIDE: 9 INJECTION, SOLUTION INTRAVENOUS at 11:12

## 2024-12-31 RX ADMIN — LIDOCAINE HYDROCHLORIDE 50 MG: 20 INJECTION, SOLUTION EPIDURAL; INFILTRATION; INTRACAUDAL; PERINEURAL at 01:12

## 2024-12-31 RX ADMIN — SUCRALFATE ORAL 1 G: 1 SUSPENSION ORAL at 11:12

## 2024-12-31 NOTE — PLAN OF CARE
Pt aao, vss, pt tx to med surg, report given to Shannan and bs handoff completed with nursing assistant

## 2024-12-31 NOTE — ANESTHESIA PREPROCEDURE EVALUATION
12/31/2024  Rosalio Muñoz is a 59 y.o., male.      Pre-op Assessment    I have reviewed the Patient Summary Reports.     I have reviewed the Nursing Notes. I have reviewed the NPO Status.      Review of Systems  Anesthesia Hx:  No problems with previous Anesthesia                Hematology/Oncology:  Hematology Normal   Oncology Normal                                   Cardiovascular:     Hypertension                                          Renal/:  Renal/ Normal                 Hepatic/GI:  Hepatic/GI Normal                    Neurological:  Neurology Normal                                      Endocrine:  Endocrine Normal            Dermatological:  Skin Normal    Psych:  Psychiatric History                  Physical Exam  General: Well nourished, Cooperative, Alert and Oriented    Airway:  Mallampati: II / II  Mouth Opening: Normal  TM Distance: Normal  Tongue: Normal  Neck ROM: Normal ROM    Dental:  Intact        Anesthesia Plan  Type of Anesthesia, risks & benefits discussed:    Anesthesia Type: MAC  Intra-op Monitoring Plan: Standard ASA Monitors  Post Op Pain Control Plan: multimodal analgesia  Induction:  IV  Airway Plan: Direct  Informed Consent: Informed consent signed with the Patient and all parties understand the risks and agree with anesthesia plan.  All questions answered.   ASA Score: 3  Day of Surgery Review of History & Physical: H&P Update referred to the surgeon/provider.I have interviewed and examined the patient. I have reviewed the patient's H&P dated: There are no significant changes.     Ready For Surgery From Anesthesia Perspective.     .

## 2024-12-31 NOTE — HPI
Patient is a 59-year-old  male with a past medical history (PMHx) of stage IV esophageal cancer, hypertension, malignant neoplasm metastatic to other sites, presents to the Emergency Department for evaluation of abdominal pain.  Patient states that pain is generalized.  Associated symptoms include nausea, vomiting, and diarrhea.  Vomiting reported as coffee-ground with red streaks.  Melena also reported.  Patient endorses lack of appetite.  Currently taking Norco 5 mg at home for pain without relief.  Patient denies any fever or chills.  Currently being treated for metastatic esophageal cancer by Dr. Latif.    In the ED, H&H 9.6/27.6, FOBT positive.  CT scan concerning for gastritis or peptic ulcer disease along with enteritis.  Patient started on Protonix.

## 2024-12-31 NOTE — PHARMACY MED REC
"Admission Medication History     The home medication history was taken by Jaymie Benavidez.    You may go to "Admission" then "Reconcile Home Medications" tabs to review and/or act upon these items.     The home medication list has been updated by the Pharmacy department.   Please read ALL comments highlighted in yellow.   Please address this information as you see fit.    Feel free to contact us if you have any questions or require assistance.      The medications listed below were removed from the home medication list. Please reorder if appropriate:  Patient reports no longer taking the following medication(s):  Tylenol 500 mg  Apirin 81 mg  Coreg 25 mg      Medications listed below were obtained from: Patient/family and Analytic software- Cherelle Joshi      LAST [facility] REC COMPLETED:   Jaymie Benavidez  [id number]    Current Outpatient Medications on File Prior to Encounter   Medication Sig Dispense Refill Last Dose/Taking    HYDROcodone-acetaminophen (NORCO) 5-325 mg per tablet Take 1 tablet by mouth every 6 (six) hours as needed for Pain.   Past Week    metoclopramide HCl (REGLAN) 10 MG tablet Take 1 tablet (10 mg total) by mouth every 6 (six) hours as needed (nausea, vomiting). 30 tablet 2 12/30/2024    ondansetron (ZOFRAN-ODT) 8 MG TbDL Take 1 tablet (8 mg total) by mouth 2 (two) times daily. 20 tablet 11 Past Week    oxyCODONE (ROXICODONE) 5 MG immediate release tablet Take 1 tablet (5 mg total) by mouth every 4 (four) hours as needed for Pain. 15 tablet 0 Past Week    [DISCONTINUED] HYDROcodone-acetaminophen (NORCO) 5-325 mg per tablet    12/30/2024 Morning    LIDOcaine-prilocaine (EMLA) cream Apply topically as needed. 5 g 11 Unknown    OLANZapine (ZYPREXA) 5 MG tablet Take 1 tablet (5 mg total) by mouth every evening. Take nightly on days 1-3 of each chemotherapy cycle. 6 tablet 11 Unknown                           .          "

## 2024-12-31 NOTE — PLAN OF CARE
[facility] - Med Surg  Discharge Assessment    Primary Care Provider: Michell Goyal MD     Discharge Assessment (most recent)       BRIEF DISCHARGE ASSESSMENT - 12/31/24 0841          Discharge Planning    Assessment Type Discharge Planning Brief Assessment     Resource/Environmental Concerns none     Support Systems Spouse/significant other     Assistance Needed Independent     Equipment Currently Used at Home none     Current Living Arrangements home     Patient/Family Anticipates Transition to home with family     Patient/Family Anticipated Services at Transition none     DME Needed Upon Discharge  none     Discharge Plan A Home with family                   Chart review complete. Patient has no d/c needs at this time. Sw to follow up, as needed, for d/c planning purposes.

## 2024-12-31 NOTE — SUBJECTIVE & OBJECTIVE
Past Medical History:   Diagnosis Date    Esophageal cancer, stage IV 10/10/2024    Hypertension     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 10/11/2024    Palliative care encounter 12/11/2024       Past Surgical History:   Procedure Laterality Date    COLONOSCOPY N/A 8/25/2023    Procedure: COLONOSCOPY;  Surgeon: Pebbles Spear MD;  Location: [facility];  Service: Endoscopy;  Laterality: N/A;    COLONOSCOPY N/A 10/2/2024    Procedure: COLONOSCOPY  Cardiac clearance received on 09/20/24 per Dr. Lockett, Cardiology.  Note in encounters.  LB;  Surgeon: Sully Farris MD;  Location: [facility];  Service: Endoscopy;  Laterality: N/A;    ESOPHAGOGASTRODUODENOSCOPY N/A 10/2/2024    Procedure: EGD (ESOPHAGOGASTRODUODENOSCOPY);  Surgeon: Sully Farris MD;  Location: [facility];  Service: Endoscopy;  Laterality: N/A;    INSERTION OF VENOUS ACCESS PORT Left 10/21/2024    Procedure: INSERTION, VENOUS ACCESS PORT;  Surgeon: Roseanna Rosas MD;  Location: [facility] OR;  Service: General;  Laterality: Left;    SURGICAL REMOVAL OF LESION OF FACE Right 12/1/2023    Procedure: EXCISION, LESION, FACE;  Surgeon: Jose Flaherty MD;  Location: [facility] OR;  Service: ENT;  Laterality: Right;  1. Excision facial subcutaneous mass right cheek 3 cm. 2. Intermediate layered closure 3.5 cm    WISDOM TOOTH EXTRACTION      XI ROBOTIC APPENDECTOMY N/A 7/31/2024    Procedure: XI ROBOTIC APPENDECTOMY;  Surgeon: Paulo Saavedra MD;  Location: [facility] OR;  Service: General;  Laterality: N/A;    XI ROBOTIC INSERTION, GASTROSTOMY TUBE N/A 10/21/2024    Procedure: XI ROBOTIC INSERTION, GASTROSTOMY TUBE;  Surgeon: Roseanna Rosas MD;  Location: [facility] OR;  Service: General;  Laterality: N/A;       Review of patient's allergies indicates:  No Known Allergies  Family History       Problem Relation (Age of Onset)    Breast cancer Maternal Grandmother    Diabetes Father    Heart disease Father    Hyperlipidemia Mother    Hypertension Father     Kidney disease Father    Prostate cancer Maternal Grandfather          Tobacco Use    Smoking status: Every Day     Current packs/day: 0.00     Types: Cigars, Cigarettes     Last attempt to quit: 10/4/2022     Years since quittin.2     Passive exposure: Never    Smokeless tobacco: Never    Tobacco comments:     Cigar every afternoon   Substance and Sexual Activity    Alcohol use: Yes     Alcohol/week: 4.0 - 6.0 standard drinks of alcohol     Types: 4 - 6 Cans of beer per week     Comment: occ    Drug use: Yes     Types: Marijuana     Comment: medical    Sexual activity: Yes     Partners: Female     Review of Systems   Constitutional:  Negative for fever.   HENT:  Negative for hearing loss.    Eyes:  Negative for visual disturbance.   Respiratory:  Negative for cough and shortness of breath.    Cardiovascular:  Negative for chest pain and palpitations.   Gastrointestinal:         As per HPI.   Genitourinary:  Negative for difficulty urinating, dysuria, frequency and hematuria.   Musculoskeletal:  Negative for arthralgias and back pain.   Skin:  Negative for color change and rash.   Neurological:  Negative for seizures, syncope, weakness, numbness and headaches.   Hematological:  Does not bruise/bleed easily.   Psychiatric/Behavioral:  The patient is not nervous/anxious.      Objective:     Vital Signs (Most Recent):  Temp: 97.4 °F (36.3 °C) (24)  Pulse: 68 (24)  Resp: 17 (24)  BP: (!) 95/53 (24)  SpO2: 96 % (24) Vital Signs (24h Range):  Temp:  [97.4 °F (36.3 °C)-98.7 °F (37.1 °C)] 97.4 °F (36.3 °C)  Pulse:  [] 68  Resp:  [15-20] 17  SpO2:  [94 %-100 %] 96 %  BP: ()/(53-88) 95/53     Weight: 71.3 kg (157 lb 3 oz) (24)  Body mass index is 20.74 kg/m².      Intake/Output Summary (Last 24 hours) at 2024 0928  Last data filed at 2024 0641  Gross per 24 hour   Intake 2178.53 ml   Output --   Net 2178.53 ml        Lines/Drains/Airways       Central Venous Catheter Line  Duration             Port A Cath Single Lumen 10/21/24 1502 70 days              Drain  Duration                  Gastrostomy/Enterostomy 10/21/24 1413 Gastrostomy tube w/o balloon LUQ 70 days              Peripheral Intravenous Line  Duration                  Peripheral IV - Single Lumen 12/30/24 1615 20 G Yes Anterior;Distal;Right Upper Arm <1 day                     Physical Exam  Constitutional:       General: He is not in acute distress.     Appearance: Normal appearance. He is well-developed.   HENT:      Head: Normocephalic and atraumatic.   Eyes:      Extraocular Movements: Extraocular movements intact.   Cardiovascular:      Rate and Rhythm: Normal rate and regular rhythm.      Heart sounds: Normal heart sounds. No murmur heard.  Pulmonary:      Effort: Pulmonary effort is normal. No respiratory distress.      Breath sounds: Normal breath sounds. No wheezing.   Abdominal:      General: Bowel sounds are normal. There is no distension.      Palpations: Abdomen is soft. There is no mass.      Tenderness: There is no abdominal tenderness.   Musculoskeletal:      Cervical back: Normal range of motion and neck supple.      Right lower leg: No edema.      Left lower leg: No edema.   Skin:     General: Skin is warm and dry.      Findings: No rash.   Neurological:      Mental Status: He is alert and oriented to person, place, and time.      Cranial Nerves: No cranial nerve deficit.   Psychiatric:         Behavior: Behavior normal.          Significant Labs:  CBC:   Recent Labs   Lab 12/30/24  1603 12/31/24  0001 12/31/24  0744   WBC 6.61  --   --    HGB 9.6* 7.6* 8.8*   HCT 27.6* 22.7* 27.4*     --   --      CMP:   Recent Labs   Lab 12/30/24  1603 12/31/24  0624   GLU 99 89   CALCIUM 8.8 8.2*   ALBUMIN 3.1*  --    PROT 6.4  --     137   K 3.8 3.8   CO2 22* 20*    110   BUN 20 14   CREATININE 0.6 0.6   ALKPHOS 71  --    ALT 21  --     AST 21  --    BILITOT 0.4  --        Significant Imaging:  Imaging results within the past 24 hours have been reviewed.

## 2024-12-31 NOTE — CONSULTS
High Risk Wound Care Screen completed due to documented low Mack Score - 13.    Chart reviewed.   Patient admitted with abdominal pain/upper GI bleed and has PMH significant for esophageal cancer with mets to liver, currently on chemo, HTN.    Patient on Isotour bed - recommend Apply Low Air Loss pump to hospital bed  Pressure Injury Prevention Orderset initiated.

## 2024-12-31 NOTE — HPI
The patient has a history of stage IV esophageal cancer treated with chemotherapy. He presented to the ER with nausea, vomiting and diarrhea that started a few days prior. He described the emesis as brown and reported black stool. He is feeling better today without any further vomiting and no stools. He was having lower abdominal pain yesterday which has also resolved. His WBC count is normal. Hgb 9.6 but went to 7.6. One unit of PRBC transfused and Hgb 8.8 now. , BUN 20 and creatinine 0.6. Sodium and potassium normal. CT scan with contrast showed fluid-filled small bowel in the left abdomen. Gastric wall thickening concerning for gastritis or peptic ulcer disease along the pylorus. Gastrostomy tube in place. He was started on a Protonix drip and Carafate. BP low but stable.     EGD (10/02/24): Partially obstructing, rule out malignancy, esophageal tumor was found in the lower third of the esophagus. Gastritis. Duodenitis. Malignant-appearing esophageal stenosis due to distal extension of mass.

## 2024-12-31 NOTE — PROGRESS NOTES
[facility] [place] [facility]  Progress Note    Patient Name: Rosalio Muñoz  MRN: [id number]  Patient Class: OP- Observation   Admission Date: 12/30/2024  Length of Stay: 0 days  Attending Physician: Jessica Louise MD  Primary Care Provider: Michell Goyal MD        Subjective     Principal Problem:Upper GI bleed        HPI:  Patient is a 59-year-old  male with a past medical history (PMHx) of stage IV esophageal cancer, hypertension, malignant neoplasm metastatic to other sites, presents to the Emergency Department for evaluation of abdominal pain.  Patient states that pain is generalized.  Associated symptoms include nausea, vomiting, and diarrhea.  Vomiting reported as coffee-ground with red streaks.  Melena also reported.  Patient endorses lack of appetite.  Currently taking Norco 5 mg at home for pain without relief.  Patient denies any fever or chills.  Currently being treated for metastatic esophageal cancer by Dr. Latif.    In the ED, H&H 9.6/27.6, FOBT positive.  CT scan concerning for gastritis or peptic ulcer disease along with enteritis.  Patient started on Protonix.    Overview/Hospital Course:  GI consulted and planning for EGD on 12/31 for eval.     Interval History: PT admitted overnight. Reports he feels better this AM, abd pain is controled with current pain regimen, denies any pain at this time. Denies any vomiting or diarrhea since admission. Currently NPO pending GI eval/EGD     Review of Systems  Objective:     Vital Signs (Most Recent):  Temp: 97.4 °F (36.3 °C) (12/31/24 0846)  Pulse: 68 (12/31/24 0846)  Resp: 17 (12/31/24 0846)  BP: (!) 95/53 (12/31/24 0846)  SpO2: 96 % (12/31/24 0846) Vital Signs (24h Range):  Temp:  [97.4 °F (36.3 °C)-98.7 °F (37.1 °C)] 97.4 °F (36.3 °C)  Pulse:  [] 68  Resp:  [15-20] 17  SpO2:  [94 %-100 %] 96 %  BP: ()/(53-88) 95/53     Weight: 71.3 kg (157 lb 3 oz)  Body mass index is 20.74 kg/m².    Intake/Output Summary (Last  24 hours) at 12/31/2024 1002  Last data filed at 12/31/2024 0641  Gross per 24 hour   Intake 2178.53 ml   Output --   Net 2178.53 ml         Physical Exam  Vitals and nursing note reviewed.   Constitutional:       General: He is not in acute distress.     Appearance: He is ill-appearing.   Cardiovascular:      Rate and Rhythm: Normal rate and regular rhythm.      Heart sounds: No murmur heard.  Pulmonary:      Effort: Pulmonary effort is normal.      Breath sounds: Normal breath sounds. No wheezing, rhonchi or rales.   Abdominal:      General: Bowel sounds are normal. There is no distension.      Palpations: Abdomen is soft.      Tenderness: There is no abdominal tenderness. There is no guarding or rebound.   Musculoskeletal:      Right lower leg: No edema.      Left lower leg: No edema.   Neurological:      Mental Status: He is alert. Mental status is at baseline.             Significant Labs: All pertinent labs within the past 24 hours have been reviewed.    Significant Imaging: I have reviewed all pertinent imaging results/findings within the past 24 hours.    Assessment and Plan     * Upper GI bleed  Melena and coffee-ground emesis since 12/21, concern for UGIB   GI consulted  Continue PPI drip + carafate  EGD planned fro 12/31  Keep NPO ; continue IVF   Trend H and H q.8h   Transfuse as indicated    Intractable abdominal pain  Possibly secondary to gastritis versus peptic ulcer disease noted on CT abd   Continue PPI   Carafate   Dilaudid p.r.n. while NPO,. Once ok for PO- will resume home Roxicodone       HTN (hypertension)  Patient's blood pressure range in the last 24 hours was: BP  Min: 90/53  Max: 130/69.The patient's inpatient anti-hypertensive regimen is listed below:  Current Antihypertensives  hydrALAZINE injection 10 mg, Every 6 hours PRN, Intravenous    Plan  - BP is controlled, no changes needed to their regimen  - continue IVF as BP low normal     Esophageal cancer, stage IV  Followed by Dr. Latif  as outpatient, Currently receiving chemotherapy         VTE Risk Mitigation (From admission, onward)           Ordered     IP VTE HIGH RISK PATIENT  Once         12/30/24 1939     Place sequential compression device  Until discontinued         12/30/24 1939                    Discharge Planning   RUTH:      Code Status: Full Code   Medical Readiness for Discharge Date:   Discharge Plan A: Home with family                        Jessica Louise MD  Department of Hospital Medicine   [facility] - Med Surg

## 2024-12-31 NOTE — ASSESSMENT & PLAN NOTE
Possibly secondary to gastritis versus peptic ulcer disease   Continue PPI   Carafate   CT scan reviewed   Antonio gamble.

## 2024-12-31 NOTE — H&P (VIEW-ONLY)
[facility]  Gastroenterology  Consult Note    Patient Name: Rosalio Muñoz  MRN: [id number]  Admission Date: 12/30/2024  Hospital Length of Stay: 0 days  Code Status: Full Code   Attending Provider: Jessica Louise MD   Consulting Provider: Juve Collins PA-C  Primary Care Physician: Michell Goyal MD  Principal Problem:Upper GI bleed    Inpatient consult to Gastroenterology  Consult performed by: Juve Collins PA-C  Consult ordered by: Babar Jim MD  Reason for consult: GI bleed      Subjective:     HPI:  The patient has a history of stage IV esophageal cancer treated with chemotherapy. He presented to the ER with nausea, vomiting and diarrhea that started a few days prior. He described the emesis as brown and reported black stool. He is feeling better today without any further vomiting and no stools. He was having lower abdominal pain yesterday which has also resolved. His WBC count is normal. Hgb 9.6 but went to 7.6. One unit of PRBC transfused and Hgb 8.8 now. , BUN 20 and creatinine 0.6. Sodium and potassium normal. CT scan with contrast showed fluid-filled small bowel in the left abdomen. Gastric wall thickening concerning for gastritis or peptic ulcer disease along the pylorus. Gastrostomy tube in place. He was started on a Protonix drip and Carafate. BP low but stable.     EGD (10/02/24): Partially obstructing, rule out malignancy, esophageal tumor was found in the lower third of the esophagus. Gastritis. Duodenitis. Malignant-appearing esophageal stenosis due to distal extension of mass.     Past Medical History:   Diagnosis Date    Esophageal cancer, stage IV 10/10/2024    Hypertension     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 10/11/2024    Palliative care encounter 12/11/2024       Past Surgical History:   Procedure Laterality Date    COLONOSCOPY N/A 8/25/2023    Procedure: COLONOSCOPY;  Surgeon: Pebbles Spear MD;  Location: [facility];  Service: Endoscopy;   Laterality: N/A;    COLONOSCOPY N/A 10/2/2024    Procedure: COLONOSCOPY  Cardiac clearance received on 24 per Dr. Lockett, Cardiology.  Note in encounters.  LB;  Surgeon: Sully Farris MD;  Location: [facility];  Service: Endoscopy;  Laterality: N/A;    ESOPHAGOGASTRODUODENOSCOPY N/A 10/2/2024    Procedure: EGD (ESOPHAGOGASTRODUODENOSCOPY);  Surgeon: Sully Farris MD;  Location: [facility];  Service: Endoscopy;  Laterality: N/A;    INSERTION OF VENOUS ACCESS PORT Left 10/21/2024    Procedure: INSERTION, VENOUS ACCESS PORT;  Surgeon: Roseanna Rosas MD;  Location: [facility] OR;  Service: General;  Laterality: Left;    SURGICAL REMOVAL OF LESION OF FACE Right 2023    Procedure: EXCISION, LESION, FACE;  Surgeon: Jose Flaherty MD;  Location: [facility] OR;  Service: ENT;  Laterality: Right;  1. Excision facial subcutaneous mass right cheek 3 cm. 2. Intermediate layered closure 3.5 cm    WISDOM TOOTH EXTRACTION      XI ROBOTIC APPENDECTOMY N/A 2024    Procedure: XI ROBOTIC APPENDECTOMY;  Surgeon: Paulo Saavedra MD;  Location: [facility] OR;  Service: General;  Laterality: N/A;    XI ROBOTIC INSERTION, GASTROSTOMY TUBE N/A 10/21/2024    Procedure: XI ROBOTIC INSERTION, GASTROSTOMY TUBE;  Surgeon: Roseanna Rosas MD;  Location: [facility] OR;  Service: General;  Laterality: N/A;       Review of patient's allergies indicates:  No Known Allergies  Family History       Problem Relation (Age of Onset)    Breast cancer Maternal Grandmother    Diabetes Father    Heart disease Father    Hyperlipidemia Mother    Hypertension Father    Kidney disease Father    Prostate cancer Maternal Grandfather          Tobacco Use    Smoking status: Every Day     Current packs/day: 0.00     Types: Cigars, Cigarettes     Last attempt to quit: 10/4/2022     Years since quittin.2     Passive exposure: Never    Smokeless tobacco: Never    Tobacco comments:     Cigar every afternoon   Substance and Sexual Activity     Alcohol use: Yes     Alcohol/week: 4.0 - 6.0 standard drinks of alcohol     Types: 4 - 6 Cans of beer per week     Comment: occ    Drug use: Yes     Types: Marijuana     Comment: medical    Sexual activity: Yes     Partners: Female     Review of Systems   Constitutional:  Negative for fever.   HENT:  Negative for hearing loss.    Eyes:  Negative for visual disturbance.   Respiratory:  Negative for cough and shortness of breath.    Cardiovascular:  Negative for chest pain and palpitations.   Gastrointestinal:         As per HPI.   Genitourinary:  Negative for difficulty urinating, dysuria, frequency and hematuria.   Musculoskeletal:  Negative for arthralgias and back pain.   Skin:  Negative for color change and rash.   Neurological:  Negative for seizures, syncope, weakness, numbness and headaches.   Hematological:  Does not bruise/bleed easily.   Psychiatric/Behavioral:  The patient is not nervous/anxious.      Objective:     Vital Signs (Most Recent):  Temp: 97.4 °F (36.3 °C) (12/31/24 0846)  Pulse: 68 (12/31/24 0846)  Resp: 17 (12/31/24 0846)  BP: (!) 95/53 (12/31/24 0846)  SpO2: 96 % (12/31/24 0846) Vital Signs (24h Range):  Temp:  [97.4 °F (36.3 °C)-98.7 °F (37.1 °C)] 97.4 °F (36.3 °C)  Pulse:  [] 68  Resp:  [15-20] 17  SpO2:  [94 %-100 %] 96 %  BP: ()/(53-88) 95/53     Weight: 71.3 kg (157 lb 3 oz) (12/30/24 2050)  Body mass index is 20.74 kg/m².      Intake/Output Summary (Last 24 hours) at 12/31/2024 0955  Last data filed at 12/31/2024 0641  Gross per 24 hour   Intake 2178.53 ml   Output --   Net 2178.53 ml       Lines/Drains/Airways       Central Venous Catheter Line  Duration             Port A Cath Single Lumen 10/21/24 1502 70 days              Drain  Duration                  Gastrostomy/Enterostomy 10/21/24 1413 Gastrostomy tube w/o balloon LUQ 70 days              Peripheral Intravenous Line  Duration                  Peripheral IV - Single Lumen 12/30/24 1615 20 G Yes  Anterior;Distal;Right Upper Arm <1 day                     Physical Exam  Constitutional:       General: He is not in acute distress.     Appearance: Normal appearance. He is well-developed.   HENT:      Head: Normocephalic and atraumatic.   Eyes:      Extraocular Movements: Extraocular movements intact.   Cardiovascular:      Rate and Rhythm: Normal rate and regular rhythm.      Heart sounds: Normal heart sounds. No murmur heard.  Pulmonary:      Effort: Pulmonary effort is normal. No respiratory distress.      Breath sounds: Normal breath sounds. No wheezing.   Abdominal:      General: Bowel sounds are normal. There is no distension.      Palpations: Abdomen is soft. There is no mass.      Tenderness: There is no abdominal tenderness.   Musculoskeletal:      Cervical back: Normal range of motion and neck supple.      Right lower leg: No edema.      Left lower leg: No edema.   Skin:     General: Skin is warm and dry.      Findings: No rash.   Neurological:      Mental Status: He is alert and oriented to person, place, and time.      Cranial Nerves: No cranial nerve deficit.   Psychiatric:         Behavior: Behavior normal.          Significant Labs:  CBC:   Recent Labs   Lab 12/30/24  1603 12/31/24  0001 12/31/24  0744   WBC 6.61  --   --    HGB 9.6* 7.6* 8.8*   HCT 27.6* 22.7* 27.4*     --   --      CMP:   Recent Labs   Lab 12/30/24  1603 12/31/24  0624   GLU 99 89   CALCIUM 8.8 8.2*   ALBUMIN 3.1*  --    PROT 6.4  --     137   K 3.8 3.8   CO2 22* 20*    110   BUN 20 14   CREATININE 0.6 0.6   ALKPHOS 71  --    ALT 21  --    AST 21  --    BILITOT 0.4  --        Significant Imaging:  Imaging results within the past 24 hours have been reviewed.  Assessment/Plan:     Cardiac/Vascular  HTN (hypertension)  Currently hypotensive. Will give one dose of albumin since PRBC not indicated and EGD planned.     Oncology  Esophageal cancer, stage IV  Followed by [facility].     GI  * Upper GI  bleed  Patient reported coffee ground emesis.   He has known esophageal mass but CT also suggestive of gastritis or ulcer.   Will plan for EGD today.   Continue with IV Protonix.   Continue Carafate.         Thank you for your consult. I will follow-up with patient. Please contact us if you have any additional questions.    Juve Collins PA-C  Gastroenterology  [facility]

## 2024-12-31 NOTE — ASSESSMENT & PLAN NOTE
Patient reported coffee ground emesis.   He has known esophageal mass but CT also suggestive of gastritis or ulcer.   Will plan for EGD today.   Continue with IV Protonix.   Continue Carafate.

## 2024-12-31 NOTE — ASSESSMENT & PLAN NOTE
Melena and coffee-ground emesis reported   Concerning for upper GI bleed   GI consult on case   Will start Protonix drip   Carafate   Keep NPO   IVF   Trend H and H q.8h   Transfuse as indicated

## 2024-12-31 NOTE — INTERVAL H&P NOTE
The patient has been examined and the H&P has been reviewed:    I concur with the findings and changes have been noted since the H&P was written: none    Anesthesia/Surgery risks, benefits and alternative options discussed and understood by patient/family.    The risks, benefits and alternatives of the procedure were discussed with the patient in detail. This discussion was had in the presence of endoscopy staff. The risks include, risks of adverse reaction to sedation requiring the use of reversal agents, bleeding requiring blood transfusion, perforation requiring surgical intervention and technical failure. Other risks include aspiration leading to respiratory distress and respiratory failure resulting in endotracheal intubation and mechanical ventilation including death. If anesthesia is being utilized for this procedure, it is up to the anesthesiologist to determine airway safety including elective endotracheal intubation. Questions were answered, they agree to proceed. There was no language barriers.        Active Hospital Problems    Diagnosis  POA    *Upper GI bleed [K92.2]  Yes    Intractable abdominal pain [R10.9]  Yes    Esophageal cancer, stage IV [C15.9]  Yes    HTN (hypertension) [I10]  Yes      Resolved Hospital Problems   No resolved problems to display.

## 2024-12-31 NOTE — SUBJECTIVE & OBJECTIVE
Past Medical History:   Diagnosis Date    Esophageal cancer, stage IV 10/10/2024    Hypertension     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 10/11/2024    Palliative care encounter 12/11/2024       Past Surgical History:   Procedure Laterality Date    COLONOSCOPY N/A 8/25/2023    Procedure: COLONOSCOPY;  Surgeon: Pebbles Spear MD;  Location: [facility];  Service: Endoscopy;  Laterality: N/A;    COLONOSCOPY N/A 10/2/2024    Procedure: COLONOSCOPY  Cardiac clearance received on 09/20/24 per Dr. Lockett, Cardiology.  Note in encounters.  LB;  Surgeon: Sully Farris MD;  Location: [facility];  Service: Endoscopy;  Laterality: N/A;    ESOPHAGOGASTRODUODENOSCOPY N/A 10/2/2024    Procedure: EGD (ESOPHAGOGASTRODUODENOSCOPY);  Surgeon: Sully Farris MD;  Location: [facility];  Service: Endoscopy;  Laterality: N/A;    INSERTION OF VENOUS ACCESS PORT Left 10/21/2024    Procedure: INSERTION, VENOUS ACCESS PORT;  Surgeon: Roseanna Rosas MD;  Location: [facility];  Service: General;  Laterality: Left;    SURGICAL REMOVAL OF LESION OF FACE Right 12/1/2023    Procedure: EXCISION, LESION, FACE;  Surgeon: Jose Flaherty MD;  Location: [facility];  Service: ENT;  Laterality: Right;  1. Excision facial subcutaneous mass right cheek 3 cm. 2. Intermediate layered closure 3.5 cm    WISDOM TOOTH EXTRACTION      XI ROBOTIC APPENDECTOMY N/A 7/31/2024    Procedure: XI ROBOTIC APPENDECTOMY;  Surgeon: Paulo Saavedra MD;  Location: [facility] OR;  Service: General;  Laterality: N/A;    XI ROBOTIC INSERTION, GASTROSTOMY TUBE N/A 10/21/2024    Procedure: XI ROBOTIC INSERTION, GASTROSTOMY TUBE;  Surgeon: Roseanna Rosas MD;  Location: [facility] OR;  Service: General;  Laterality: N/A;       Review of patient's allergies indicates:  No Known Allergies    No current facility-administered medications on file prior to encounter.     Current Outpatient Medications on File Prior to Encounter   Medication Sig    HYDROcodone-acetaminophen  (NORCO) 5-325 mg per tablet Take 1 tablet by mouth every 6 (six) hours as needed for Pain.    metoclopramide HCl (REGLAN) 10 MG tablet Take 1 tablet (10 mg total) by mouth every 6 (six) hours as needed (nausea, vomiting).    ondansetron (ZOFRAN-ODT) 8 MG TbDL Take 1 tablet (8 mg total) by mouth 2 (two) times daily.    oxyCODONE (ROXICODONE) 5 MG immediate release tablet Take 1 tablet (5 mg total) by mouth every 4 (four) hours as needed for Pain.    [DISCONTINUED] HYDROcodone-acetaminophen (NORCO) 5-325 mg per tablet     LIDOcaine-prilocaine (EMLA) cream Apply topically as needed.    OLANZapine (ZYPREXA) 5 MG tablet Take 1 tablet (5 mg total) by mouth every evening. Take nightly on days 1-3 of each chemotherapy cycle.    [DISCONTINUED] acetaminophen (TYLENOL) 500 MG tablet Take 500 mg by mouth every 6 (six) hours as needed for Pain. prn    [DISCONTINUED] aspirin (ECOTRIN) 81 MG EC tablet Take 1 tablet (81 mg total) by mouth once daily.    [DISCONTINUED] atorvastatin (LIPITOR) 20 MG tablet Take 1 tablet (20 mg total) by mouth every evening.    [DISCONTINUED] carvediloL (COREG) 25 MG tablet Take 1 tablet (25 mg total) by mouth 2 (two) times daily.     Family History       Problem Relation (Age of Onset)    Breast cancer Maternal Grandmother    Diabetes Father    Heart disease Father    Hyperlipidemia Mother    Hypertension Father    Kidney disease Father    Prostate cancer Maternal Grandfather          Tobacco Use    Smoking status: Every Day     Current packs/day: 0.00     Types: Cigars, Cigarettes     Last attempt to quit: 10/4/2022     Years since quittin.2     Passive exposure: Never    Smokeless tobacco: Never    Tobacco comments:     Cigar every afternoon   Substance and Sexual Activity    Alcohol use: Yes     Alcohol/week: 4.0 - 6.0 standard drinks of alcohol     Types: 4 - 6 Cans of beer per week     Comment: occ    Drug use: Yes     Types: Marijuana     Comment: medical    Sexual activity: Yes      Partners: Female     Review of Systems   Constitutional:  Positive for appetite change and fatigue. Negative for fever.   HENT:  Negative for sinus pressure.    Eyes:  Negative for visual disturbance.   Respiratory:  Negative for shortness of breath.    Cardiovascular:  Negative for chest pain.   Gastrointestinal:  Positive for abdominal pain, blood in stool, nausea and vomiting.   Genitourinary:  Negative for difficulty urinating.   Musculoskeletal:  Negative for back pain.   Skin:  Negative for rash.   Neurological:  Negative for headaches.   Psychiatric/Behavioral:  Negative for confusion.      Objective:     Vital Signs (Most Recent):  Temp: 98.2 °F (36.8 °C) (12/30/24 1636)  Pulse: 87 (12/30/24 1802)  Resp: 18 (12/30/24 1840)  BP: 119/65 (12/30/24 1802)  SpO2: 100 % (12/30/24 1802) Vital Signs (24h Range):  Temp:  [98 °F (36.7 °C)-98.2 °F (36.8 °C)] 98.2 °F (36.8 °C)  Pulse:  [] 87  Resp:  [15-20] 18  SpO2:  [96 %-100 %] 100 %  BP: ()/(54-70) 119/65     Weight: 71.4 kg (157 lb 6.4 oz)  Body mass index is 20.77 kg/m².     Physical Exam  Constitutional:       General: He is not in acute distress.     Appearance: He is well-developed. He is ill-appearing. He is not diaphoretic.   HENT:      Head: Normocephalic and atraumatic.   Eyes:      Pupils: Pupils are equal, round, and reactive to light.   Cardiovascular:      Rate and Rhythm: Normal rate and regular rhythm.      Heart sounds: Normal heart sounds. No murmur heard.     No friction rub. No gallop.   Pulmonary:      Effort: Pulmonary effort is normal. No respiratory distress.      Breath sounds: Normal breath sounds. No stridor. No wheezing or rales.   Abdominal:      General: Bowel sounds are normal. There is no distension.      Palpations: Abdomen is soft. There is no mass.      Tenderness: There is no abdominal tenderness. There is no guarding.   Skin:     General: Skin is warm.      Findings: No erythema.   Neurological:      Mental Status: He  is alert and oriented to person, place, and time.              CRANIAL NERVES     CN III, IV, VI   Pupils are equal, round, and reactive to light.       Significant Labs:   Results for orders placed or performed during the hospital encounter of 12/30/24   EKG 12-lead    Collection Time: 12/30/24  3:21 PM   Result Value Ref Range    QRS Duration 80 ms    [facility] QTC Calculation 446 ms   CBC auto differential    Collection Time: 12/30/24  4:03 PM   Result Value Ref Range    WBC 6.61 3.90 - 12.70 K/uL    RBC 2.85 (L) 4.60 - 6.20 M/uL    Hemoglobin 9.6 (L) 14.0 - 18.0 g/dL    Hematocrit 27.6 (L) 40.0 - 54.0 %    MCV 97 82 - 98 fL    MCH 33.7 (H) 27.0 - 31.0 pg    MCHC 34.8 32.0 - 36.0 g/dL    RDW 15.6 (H) 11.5 - 14.5 %    Platelets 213 150 - 450 K/uL    MPV 10.3 9.2 - 12.9 fL    Immature Granulocytes 0.3 0.0 - 0.5 %    Gran # (ANC) 4.6 1.8 - 7.7 K/uL    Immature Grans (Abs) 0.02 0.00 - 0.04 K/uL    Lymph # 1.3 1.0 - 4.8 K/uL    Mono # 0.6 0.3 - 1.0 K/uL    Eos # 0.0 0.0 - 0.5 K/uL    Baso # 0.02 0.00 - 0.20 K/uL    nRBC 0 0 /100 WBC    Gran % 70.0 38.0 - 73.0 %    Lymph % 20.3 18.0 - 48.0 %    Mono % 8.6 4.0 - 15.0 %    Eosinophil % 0.5 0.0 - 8.0 %    Basophil % 0.3 0.0 - 1.9 %    Differential Method Automated    Comprehensive metabolic panel    Collection Time: 12/30/24  4:03 PM   Result Value Ref Range    Sodium 137 136 - 145 mmol/L    Potassium 3.8 3.5 - 5.1 mmol/L    Chloride 104 95 - 110 mmol/L    CO2 22 (L) 23 - 29 mmol/L    Glucose 99 70 - 110 mg/dL    BUN 20 6 - 20 mg/dL    Creatinine 0.6 0.5 - 1.4 mg/dL    Calcium 8.8 8.7 - 10.5 mg/dL    Total Protein 6.4 6.0 - 8.4 g/dL    Albumin 3.1 (L) 3.5 - 5.2 g/dL    Total Bilirubin 0.4 0.1 - 1.0 mg/dL    Alkaline Phosphatase 71 40 - 150 U/L    AST 21 10 - 40 U/L    ALT 21 10 - 44 U/L    eGFR >60 >60 mL/min/1.73 m^2    Anion Gap 11 8 - 16 mmol/L   Lactic acid, plasma #1    Collection Time: 12/30/24  4:03 PM   Result Value Ref Range    Lactate (Lactic Acid) 0.8 0.5 - 2.2  mmol/L   Procalcitonin    Collection Time: 12/30/24  4:03 PM   Result Value Ref Range    Procalcitonin 0.23 <0.25 ng/mL   Lipase    Collection Time: 12/30/24  4:03 PM   Result Value Ref Range    Lipase 40 4 - 60 U/L   Lactic acid, plasma #2    Collection Time: 12/30/24  5:45 PM   Result Value Ref Range    Lactate (Lactic Acid) 0.6 0.5 - 2.2 mmol/L   Occult blood x 1, stool    Collection Time: 12/30/24  6:16 PM    Specimen: Stool   Result Value Ref Range    Occult Blood Positive (A) Negative   Urinalysis, Reflex to Urine Culture Urine, Clean Catch    Collection Time: 12/30/24  6:27 PM    Specimen: Urine   Result Value Ref Range    Specimen UA Urine, Clean Catch     Color, UA Yellow Yellow, Straw, Marge    Appearance, UA Clear Clear    pH, UA 7.0 5.0 - 8.0    Specific Gravity, UA 1.025 1.005 - 1.030    Protein, UA Trace (A) Negative    Glucose, UA Negative Negative    Ketones, UA Negative Negative    Bilirubin (UA) Negative Negative    Occult Blood UA Negative Negative    Nitrite, UA Negative Negative    Urobilinogen, UA Negative <2.0 EU/dL    Leukocytes, UA Negative Negative        Significant Imaging: I have reviewed all pertinent imaging results/findings within the past 24 hours.

## 2024-12-31 NOTE — CONSULTS
[facility] Surg  Gastroenterology  Consult Note    Patient Name: Rosalio Muñoz  MRN: [id number]  Admission Date: 12/30/2024  Hospital Length of Stay: 0 days  Code Status: Full Code   Attending Provider: Jessica Louise MD   Consulting Provider: Juve Collins PA-C  Primary Care Physician: Michell Goyal MD  Principal Problem:Upper GI bleed    Inpatient consult to Gastroenterology  Consult performed by: Juve Collins PA-C  Consult ordered by: Babar Jim MD  Reason for consult: GI bleed      Subjective:     HPI:  The patient has a history of stage IV esophageal cancer treated with chemotherapy. He presented to the ER with nausea, vomiting and diarrhea that started a few days prior. He described the emesis as brown and reported black stool. He is feeling better today without any further vomiting and no stools. He was having lower abdominal pain yesterday which has also resolved. His WBC count is normal. Hgb 9.6 but went to 7.6. One unit of PRBC transfused and Hgb 8.8 now. , BUN 20 and creatinine 0.6. Sodium and potassium normal. CT scan with contrast showed fluid-filled small bowel in the left abdomen. Gastric wall thickening concerning for gastritis or peptic ulcer disease along the pylorus. Gastrostomy tube in place. He was started on a Protonix drip and Carafate. BP low but stable.     EGD (10/02/24): Partially obstructing, rule out malignancy, esophageal tumor was found in the lower third of the esophagus. Gastritis. Duodenitis. Malignant-appearing esophageal stenosis due to distal extension of mass.     Past Medical History:   Diagnosis Date    Esophageal cancer, stage IV 10/10/2024    Hypertension     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 10/11/2024    Palliative care encounter 12/11/2024       Past Surgical History:   Procedure Laterality Date    COLONOSCOPY N/A 8/25/2023    Procedure: COLONOSCOPY;  Surgeon: Pebbles Spear MD;  Location: [facility];  Service: Endoscopy;   Laterality: N/A;    COLONOSCOPY N/A 10/2/2024    Procedure: COLONOSCOPY  Cardiac clearance received on 24 per Dr. Lockett, Cardiology.  Note in encounters.  LB;  Surgeon: Sully Farris MD;  Location: [facility];  Service: Endoscopy;  Laterality: N/A;    ESOPHAGOGASTRODUODENOSCOPY N/A 10/2/2024    Procedure: EGD (ESOPHAGOGASTRODUODENOSCOPY);  Surgeon: Sully Farris MD;  Location: [facility];  Service: Endoscopy;  Laterality: N/A;    INSERTION OF VENOUS ACCESS PORT Left 10/21/2024    Procedure: INSERTION, VENOUS ACCESS PORT;  Surgeon: Roseanna Rosas MD;  Location: [facility] OR;  Service: General;  Laterality: Left;    SURGICAL REMOVAL OF LESION OF FACE Right 2023    Procedure: EXCISION, LESION, FACE;  Surgeon: Jose Flaherty MD;  Location: [facility] OR;  Service: ENT;  Laterality: Right;  1. Excision facial subcutaneous mass right cheek 3 cm. 2. Intermediate layered closure 3.5 cm    WISDOM TOOTH EXTRACTION      XI ROBOTIC APPENDECTOMY N/A 2024    Procedure: XI ROBOTIC APPENDECTOMY;  Surgeon: Paulo Saavedra MD;  Location: [facility] OR;  Service: General;  Laterality: N/A;    XI ROBOTIC INSERTION, GASTROSTOMY TUBE N/A 10/21/2024    Procedure: XI ROBOTIC INSERTION, GASTROSTOMY TUBE;  Surgeon: Roseanna Rosas MD;  Location: [facility] OR;  Service: General;  Laterality: N/A;       Review of patient's allergies indicates:  No Known Allergies  Family History       Problem Relation (Age of Onset)    Breast cancer Maternal Grandmother    Diabetes Father    Heart disease Father    Hyperlipidemia Mother    Hypertension Father    Kidney disease Father    Prostate cancer Maternal Grandfather          Tobacco Use    Smoking status: Every Day     Current packs/day: 0.00     Types: Cigars, Cigarettes     Last attempt to quit: 10/4/2022     Years since quittin.2     Passive exposure: Never    Smokeless tobacco: Never    Tobacco comments:     Cigar every afternoon   Substance and Sexual Activity     Alcohol use: Yes     Alcohol/week: 4.0 - 6.0 standard drinks of alcohol     Types: 4 - 6 Cans of beer per week     Comment: occ    Drug use: Yes     Types: Marijuana     Comment: medical    Sexual activity: Yes     Partners: Female     Review of Systems   Constitutional:  Negative for fever.   HENT:  Negative for hearing loss.    Eyes:  Negative for visual disturbance.   Respiratory:  Negative for cough and shortness of breath.    Cardiovascular:  Negative for chest pain and palpitations.   Gastrointestinal:         As per HPI.   Genitourinary:  Negative for difficulty urinating, dysuria, frequency and hematuria.   Musculoskeletal:  Negative for arthralgias and back pain.   Skin:  Negative for color change and rash.   Neurological:  Negative for seizures, syncope, weakness, numbness and headaches.   Hematological:  Does not bruise/bleed easily.   Psychiatric/Behavioral:  The patient is not nervous/anxious.      Objective:     Vital Signs (Most Recent):  Temp: 97.4 °F (36.3 °C) (12/31/24 0846)  Pulse: 68 (12/31/24 0846)  Resp: 17 (12/31/24 0846)  BP: (!) 95/53 (12/31/24 0846)  SpO2: 96 % (12/31/24 0846) Vital Signs (24h Range):  Temp:  [97.4 °F (36.3 °C)-98.7 °F (37.1 °C)] 97.4 °F (36.3 °C)  Pulse:  [] 68  Resp:  [15-20] 17  SpO2:  [94 %-100 %] 96 %  BP: ()/(53-88) 95/53     Weight: 71.3 kg (157 lb 3 oz) (12/30/24 2050)  Body mass index is 20.74 kg/m².      Intake/Output Summary (Last 24 hours) at 12/31/2024 0955  Last data filed at 12/31/2024 0641  Gross per 24 hour   Intake 2178.53 ml   Output --   Net 2178.53 ml       Lines/Drains/Airways       Central Venous Catheter Line  Duration             Port A Cath Single Lumen 10/21/24 1502 70 days              Drain  Duration                  Gastrostomy/Enterostomy 10/21/24 1413 Gastrostomy tube w/o balloon LUQ 70 days              Peripheral Intravenous Line  Duration                  Peripheral IV - Single Lumen 12/30/24 1615 20 G Yes  Anterior;Distal;Right Upper Arm <1 day                     Physical Exam  Constitutional:       General: He is not in acute distress.     Appearance: Normal appearance. He is well-developed.   HENT:      Head: Normocephalic and atraumatic.   Eyes:      Extraocular Movements: Extraocular movements intact.   Cardiovascular:      Rate and Rhythm: Normal rate and regular rhythm.      Heart sounds: Normal heart sounds. No murmur heard.  Pulmonary:      Effort: Pulmonary effort is normal. No respiratory distress.      Breath sounds: Normal breath sounds. No wheezing.   Abdominal:      General: Bowel sounds are normal. There is no distension.      Palpations: Abdomen is soft. There is no mass.      Tenderness: There is no abdominal tenderness.   Musculoskeletal:      Cervical back: Normal range of motion and neck supple.      Right lower leg: No edema.      Left lower leg: No edema.   Skin:     General: Skin is warm and dry.      Findings: No rash.   Neurological:      Mental Status: He is alert and oriented to person, place, and time.      Cranial Nerves: No cranial nerve deficit.   Psychiatric:         Behavior: Behavior normal.          Significant Labs:  CBC:   Recent Labs   Lab 12/30/24  1603 12/31/24  0001 12/31/24  0744   WBC 6.61  --   --    HGB 9.6* 7.6* 8.8*   HCT 27.6* 22.7* 27.4*     --   --      CMP:   Recent Labs   Lab 12/30/24  1603 12/31/24  0624   GLU 99 89   CALCIUM 8.8 8.2*   ALBUMIN 3.1*  --    PROT 6.4  --     137   K 3.8 3.8   CO2 22* 20*    110   BUN 20 14   CREATININE 0.6 0.6   ALKPHOS 71  --    ALT 21  --    AST 21  --    BILITOT 0.4  --        Significant Imaging:  Imaging results within the past 24 hours have been reviewed.  Assessment/Plan:     Cardiac/Vascular  HTN (hypertension)  Currently hypotensive. Will give one dose of albumin since PRBC not indicated and EGD planned.     Oncology  Esophageal cancer, stage IV  Followed by [facility].     GI  * Upper GI  bleed  Patient reported coffee ground emesis.   He has known esophageal mass but CT also suggestive of gastritis or ulcer.   Will plan for EGD today.   Continue with IV Protonix.   Continue Carafate.         Thank you for your consult. I will follow-up with patient. Please contact us if you have any additional questions.    Juve Collins PA-C  Gastroenterology  [facility]

## 2024-12-31 NOTE — PLAN OF CARE
Problem: Skin Injury Risk Increased  Goal: Skin Health and Integrity  Outcome: Progressing     Problem: Adult Inpatient Plan of Care  Goal: Plan of Care Review  Outcome: Progressing  Goal: Patient-Specific Goal (Individualized)  Outcome: Progressing  Goal: Absence of Hospital-Acquired Illness or Injury  Outcome: Progressing  Goal: Optimal Comfort and Wellbeing  Outcome: Progressing  Goal: Readiness for Transition of Care  Outcome: Progressing     Problem: Gastrointestinal Bleeding  Goal: Optimal Coping with Acute Illness  Outcome: Progressing  Goal: Hemostasis  Outcome: Progressing     Problem: Infection  Goal: Absence of Infection Signs and Symptoms  Outcome: Progressing     Problem: Fall Injury Risk  Goal: Absence of Fall and Fall-Related Injury  Outcome: Progressing

## 2024-12-31 NOTE — PLAN OF CARE
Received communication from Dr. Jim, [facility] medicine regarding patient. Presented with abdominal pain but also experiencing coffee ground emesis. His medical hx is notable for metastatic esophageal cancer on chemotherapy. Last EGD 10/2024. Large fungating mass at 40cm.     Agree with IV Protonix 40mg, recommend an additional 40mg IV now followed by Protonix gtt. Also recommend Carafate suspension q 6 hours. He is hemodynamically stable.     Patient will be seen in AM unless he becomes unstable.  service aware to contact GI service if coffee ground emesis worsens.

## 2024-12-31 NOTE — TRANSFER OF CARE
"Anesthesia Transfer of Care Note    Patient: Rosalio Muñoz    Procedure(s) Performed: Procedure(s) (LRB):  EGD (ESOPHAGOGASTRODUODENOSCOPY) (N/A)    Patient location: GI    Anesthesia Type: MAC    Transport from OR: Transported from OR on room air with adequate spontaneous ventilation    Post pain: adequate analgesia    Post assessment: no apparent anesthetic complications    Post vital signs: stable    Level of consciousness: awake and responds to stimulation    Nausea/Vomiting: no nausea/vomiting    Complications: none    Transfer of care protocol was followed      Last vitals: Visit Vitals  /64   Pulse 76   Temp 37.1 °C (98.8 °F)   Resp 18   Ht 6' 1" (1.854 m)   Wt 71.3 kg (157 lb 3 oz)   SpO2 99%   BMI 20.74 kg/m²     "

## 2024-12-31 NOTE — NURSING
Pt remains free of falls/injury. Safety precautions maintained. Pt has not experience any nausea throughout shift.  IV abx given.  Soft diet tolerated. VSS. No S/S of distress noted at this time. Bed alarm refused.  12 hour chart check complete. Will continue to monitor.

## 2024-12-31 NOTE — SUBJECTIVE & OBJECTIVE
Interval History: PT admitted overnight. Reports he feels better this AM, abd pain is controled with current pain regimen, denies any pain at this time. Denies any vomiting or diarrhea since admission. Currently NPO pending GI eval/EGD     Review of Systems  Objective:     Vital Signs (Most Recent):  Temp: 97.4 °F (36.3 °C) (12/31/24 0846)  Pulse: 68 (12/31/24 0846)  Resp: 17 (12/31/24 0846)  BP: (!) 95/53 (12/31/24 0846)  SpO2: 96 % (12/31/24 0846) Vital Signs (24h Range):  Temp:  [97.4 °F (36.3 °C)-98.7 °F (37.1 °C)] 97.4 °F (36.3 °C)  Pulse:  [] 68  Resp:  [15-20] 17  SpO2:  [94 %-100 %] 96 %  BP: ()/(53-88) 95/53     Weight: 71.3 kg (157 lb 3 oz)  Body mass index is 20.74 kg/m².    Intake/Output Summary (Last 24 hours) at 12/31/2024 1002  Last data filed at 12/31/2024 0641  Gross per 24 hour   Intake 2178.53 ml   Output --   Net 2178.53 ml         Physical Exam  Vitals and nursing note reviewed.   Constitutional:       General: He is not in acute distress.     Appearance: He is ill-appearing.   Cardiovascular:      Rate and Rhythm: Normal rate and regular rhythm.      Heart sounds: No murmur heard.  Pulmonary:      Effort: Pulmonary effort is normal.      Breath sounds: Normal breath sounds. No wheezing, rhonchi or rales.   Abdominal:      General: Bowel sounds are normal. There is no distension.      Palpations: Abdomen is soft.      Tenderness: There is no abdominal tenderness. There is no guarding or rebound.   Musculoskeletal:      Right lower leg: No edema.      Left lower leg: No edema.   Neurological:      Mental Status: He is alert. Mental status is at baseline.             Significant Labs: All pertinent labs within the past 24 hours have been reviewed.    Significant Imaging: I have reviewed all pertinent imaging results/findings within the past 24 hours.

## 2024-12-31 NOTE — HOSPITAL COURSE
"GI consulted and planning for EGD on 12/31 for eval.   Patient had no further vomiting or diarrhea during admission.  Abdominal pain controlled with p.o. and IV pain medications.  Underwent EGD on 12/31 which per GI "showed duodenal ulcer with pigmented material s/p epi and thermal therapy. Gastric biopsies taken to r/o H pylori. 2nd portion normal. G-tube in place. Ulcerated esophageal mass at GE junction/distal esophagus - not source of GIB."    Post EGD, H and H fairly stable at 8.3.  Diet was slowly advanced and patient was able to tolerate soft foods without any issues.  Reports abdominal pain is almost resolved, 3/10 intensity.  Denies nausea, vomiting, bloody stool.  Patient appears stable for discharge home today per my exam.  Discussed with GI Dr. Sullivan- patient was deemed stable for discharge from her standpoint as well, she will arrange outpatient GI follow-up.  Patient will continue b.i.d. Protonix suspension x2 months, Carafate x2 weeks per GI recommendations.  Follow up with PCP within 3-5 days.  Oxycodone p.r.n. was refilled for cancer-related pain.  Outpatient follow-up with Dr. Latif as previously scheduled.      "

## 2024-12-31 NOTE — NURSING
Isource 1.5 5 times a day at home feedings. Unable to tolerate feedings for last couple of days, per wife

## 2024-12-31 NOTE — H&P
[facility] - Emergency Dept.  [facility]  History & Physical    Patient Name: Rosalio Muñoz  MRN: [id number]  Patient Class: OP- Observation  Admission Date: 12/30/2024  Attending Physician: Babar Jim MD  Primary Care Provider: Michell Goyal MD         Patient information was obtained from patient and ER records.     Subjective:     Principal Problem:Upper GI bleed    Chief Complaint:   Chief Complaint   Patient presents with    Abdominal Pain     Pt with history of esophageal cancer with mets to the liver c/o abdominal pain, nausea, vomiting, and diarrhea.  He was recently admitted to this hospital for the same complaints.  Pt had chemotherapy the week before last.        HPI: Patient is a 59-year-old  male with a past medical history (PMHx) of stage IV esophageal cancer, hypertension, malignant neoplasm metastatic to other sites, presents to the Emergency Department for evaluation of abdominal pain.  Patient states that pain is generalized.  Associated symptoms include nausea, vomiting, and diarrhea.  Vomiting reported as coffee-ground with red streaks.  Melena also reported.  Patient endorses lack of appetite.  Currently taking Norco 5 mg at home for pain without relief.  Patient denies any fever or chills.  Currently being treated for metastatic esophageal cancer by Dr. Latif.    In the ED, H&H 9.6/27.6, FOBT positive.  CT scan concerning for gastritis or peptic ulcer disease along with enteritis.  Patient started on Protonix.    Past Medical History:   Diagnosis Date    Esophageal cancer, stage IV 10/10/2024    Hypertension     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 10/11/2024    Palliative care encounter 12/11/2024       Past Surgical History:   Procedure Laterality Date    COLONOSCOPY N/A 8/25/2023    Procedure: COLONOSCOPY;  Surgeon: Pebbles Spear MD;  Location: [facility];  Service: Endoscopy;  Laterality: N/A;    COLONOSCOPY N/A 10/2/2024    Procedure: COLONOSCOPY   Cardiac clearance received on 09/20/24 per Dr. Lockett, Cardiology.  Note in encounters.  LB;  Surgeon: Sully Farris MD;  Location: [facility] ENDO;  Service: Endoscopy;  Laterality: N/A;    ESOPHAGOGASTRODUODENOSCOPY N/A 10/2/2024    Procedure: EGD (ESOPHAGOGASTRODUODENOSCOPY);  Surgeon: Sully Farris MD;  Location: [facility] ENDO;  Service: Endoscopy;  Laterality: N/A;    INSERTION OF VENOUS ACCESS PORT Left 10/21/2024    Procedure: INSERTION, VENOUS ACCESS PORT;  Surgeon: Roseanna Rosas MD;  Location: [facility] OR;  Service: General;  Laterality: Left;    SURGICAL REMOVAL OF LESION OF FACE Right 12/1/2023    Procedure: EXCISION, LESION, FACE;  Surgeon: Jose Flaherty MD;  Location: [facility] OR;  Service: ENT;  Laterality: Right;  1. Excision facial subcutaneous mass right cheek 3 cm. 2. Intermediate layered closure 3.5 cm    WISDOM TOOTH EXTRACTION      XI ROBOTIC APPENDECTOMY N/A 7/31/2024    Procedure: XI ROBOTIC APPENDECTOMY;  Surgeon: Paulo Saavedra MD;  Location: [facility] OR;  Service: General;  Laterality: N/A;    XI ROBOTIC INSERTION, GASTROSTOMY TUBE N/A 10/21/2024    Procedure: XI ROBOTIC INSERTION, GASTROSTOMY TUBE;  Surgeon: Roseanna Rosas MD;  Location: [facility] OR;  Service: General;  Laterality: N/A;       Review of patient's allergies indicates:  No Known Allergies    No current facility-administered medications on file prior to encounter.     Current Outpatient Medications on File Prior to Encounter   Medication Sig    HYDROcodone-acetaminophen (NORCO) 5-325 mg per tablet Take 1 tablet by mouth every 6 (six) hours as needed for Pain.    metoclopramide HCl (REGLAN) 10 MG tablet Take 1 tablet (10 mg total) by mouth every 6 (six) hours as needed (nausea, vomiting).    ondansetron (ZOFRAN-ODT) 8 MG TbDL Take 1 tablet (8 mg total) by mouth 2 (two) times daily.    oxyCODONE (ROXICODONE) 5 MG immediate release tablet Take 1 tablet (5 mg total) by mouth every 4 (four) hours as needed for Pain.     [DISCONTINUED] HYDROcodone-acetaminophen (NORCO) 5-325 mg per tablet     LIDOcaine-prilocaine (EMLA) cream Apply topically as needed.    OLANZapine (ZYPREXA) 5 MG tablet Take 1 tablet (5 mg total) by mouth every evening. Take nightly on days 1-3 of each chemotherapy cycle.    [DISCONTINUED] acetaminophen (TYLENOL) 500 MG tablet Take 500 mg by mouth every 6 (six) hours as needed for Pain. prn    [DISCONTINUED] aspirin (ECOTRIN) 81 MG EC tablet Take 1 tablet (81 mg total) by mouth once daily.    [DISCONTINUED] atorvastatin (LIPITOR) 20 MG tablet Take 1 tablet (20 mg total) by mouth every evening.    [DISCONTINUED] carvediloL (COREG) 25 MG tablet Take 1 tablet (25 mg total) by mouth 2 (two) times daily.     Family History       Problem Relation (Age of Onset)    Breast cancer Maternal Grandmother    Diabetes Father    Heart disease Father    Hyperlipidemia Mother    Hypertension Father    Kidney disease Father    Prostate cancer Maternal Grandfather          Tobacco Use    Smoking status: Every Day     Current packs/day: 0.00     Types: Cigars, Cigarettes     Last attempt to quit: 10/4/2022     Years since quittin.2     Passive exposure: Never    Smokeless tobacco: Never    Tobacco comments:     Cigar every afternoon   Substance and Sexual Activity    Alcohol use: Yes     Alcohol/week: 4.0 - 6.0 standard drinks of alcohol     Types: 4 - 6 Cans of beer per week     Comment: occ    Drug use: Yes     Types: Marijuana     Comment: medical    Sexual activity: Yes     Partners: Female     Review of Systems   Constitutional:  Positive for appetite change and fatigue. Negative for fever.   HENT:  Negative for sinus pressure.    Eyes:  Negative for visual disturbance.   Respiratory:  Negative for shortness of breath.    Cardiovascular:  Negative for chest pain.   Gastrointestinal:  Positive for abdominal pain, blood in stool, nausea and vomiting.   Genitourinary:  Negative for difficulty urinating.   Musculoskeletal:   Negative for back pain.   Skin:  Negative for rash.   Neurological:  Negative for headaches.   Psychiatric/Behavioral:  Negative for confusion.      Objective:     Vital Signs (Most Recent):  Temp: 98.2 °F (36.8 °C) (12/30/24 1636)  Pulse: 87 (12/30/24 1802)  Resp: 18 (12/30/24 1840)  BP: 119/65 (12/30/24 1802)  SpO2: 100 % (12/30/24 1802) Vital Signs (24h Range):  Temp:  [98 °F (36.7 °C)-98.2 °F (36.8 °C)] 98.2 °F (36.8 °C)  Pulse:  [] 87  Resp:  [15-20] 18  SpO2:  [96 %-100 %] 100 %  BP: ()/(54-70) 119/65     Weight: 71.4 kg (157 lb 6.4 oz)  Body mass index is 20.77 kg/m².     Physical Exam  Constitutional:       General: He is not in acute distress.     Appearance: He is well-developed. He is ill-appearing. He is not diaphoretic.   HENT:      Head: Normocephalic and atraumatic.   Eyes:      Pupils: Pupils are equal, round, and reactive to light.   Cardiovascular:      Rate and Rhythm: Normal rate and regular rhythm.      Heart sounds: Normal heart sounds. No murmur heard.     No friction rub. No gallop.   Pulmonary:      Effort: Pulmonary effort is normal. No respiratory distress.      Breath sounds: Normal breath sounds. No stridor. No wheezing or rales.   Abdominal:      General: Bowel sounds are normal. There is no distension.      Palpations: Abdomen is soft. There is no mass.      Tenderness: There is no abdominal tenderness. There is no guarding.   Skin:     General: Skin is warm.      Findings: No erythema.   Neurological:      Mental Status: He is alert and oriented to person, place, and time.              CRANIAL NERVES     CN III, IV, VI   Pupils are equal, round, and reactive to light.       Significant Labs:   Results for orders placed or performed during the hospital encounter of 12/30/24   EKG 12-lead    Collection Time: 12/30/24  3:21 PM   Result Value Ref Range    QRS Duration 80 ms    [facility] QTC Calculation 446 ms   CBC auto differential    Collection Time: 12/30/24  4:03 PM   Result  Value Ref Range    WBC 6.61 3.90 - 12.70 K/uL    RBC 2.85 (L) 4.60 - 6.20 M/uL    Hemoglobin 9.6 (L) 14.0 - 18.0 g/dL    Hematocrit 27.6 (L) 40.0 - 54.0 %    MCV 97 82 - 98 fL    MCH 33.7 (H) 27.0 - 31.0 pg    MCHC 34.8 32.0 - 36.0 g/dL    RDW 15.6 (H) 11.5 - 14.5 %    Platelets 213 150 - 450 K/uL    MPV 10.3 9.2 - 12.9 fL    Immature Granulocytes 0.3 0.0 - 0.5 %    Gran # (ANC) 4.6 1.8 - 7.7 K/uL    Immature Grans (Abs) 0.02 0.00 - 0.04 K/uL    Lymph # 1.3 1.0 - 4.8 K/uL    Mono # 0.6 0.3 - 1.0 K/uL    Eos # 0.0 0.0 - 0.5 K/uL    Baso # 0.02 0.00 - 0.20 K/uL    nRBC 0 0 /100 WBC    Gran % 70.0 38.0 - 73.0 %    Lymph % 20.3 18.0 - 48.0 %    Mono % 8.6 4.0 - 15.0 %    Eosinophil % 0.5 0.0 - 8.0 %    Basophil % 0.3 0.0 - 1.9 %    Differential Method Automated    Comprehensive metabolic panel    Collection Time: 12/30/24  4:03 PM   Result Value Ref Range    Sodium 137 136 - 145 mmol/L    Potassium 3.8 3.5 - 5.1 mmol/L    Chloride 104 95 - 110 mmol/L    CO2 22 (L) 23 - 29 mmol/L    Glucose 99 70 - 110 mg/dL    BUN 20 6 - 20 mg/dL    Creatinine 0.6 0.5 - 1.4 mg/dL    Calcium 8.8 8.7 - 10.5 mg/dL    Total Protein 6.4 6.0 - 8.4 g/dL    Albumin 3.1 (L) 3.5 - 5.2 g/dL    Total Bilirubin 0.4 0.1 - 1.0 mg/dL    Alkaline Phosphatase 71 40 - 150 U/L    AST 21 10 - 40 U/L    ALT 21 10 - 44 U/L    eGFR >60 >60 mL/min/1.73 m^2    Anion Gap 11 8 - 16 mmol/L   Lactic acid, plasma #1    Collection Time: 12/30/24  4:03 PM   Result Value Ref Range    Lactate (Lactic Acid) 0.8 0.5 - 2.2 mmol/L   Procalcitonin    Collection Time: 12/30/24  4:03 PM   Result Value Ref Range    Procalcitonin 0.23 <0.25 ng/mL   Lipase    Collection Time: 12/30/24  4:03 PM   Result Value Ref Range    Lipase 40 4 - 60 U/L   Lactic acid, plasma #2    Collection Time: 12/30/24  5:45 PM   Result Value Ref Range    Lactate (Lactic Acid) 0.6 0.5 - 2.2 mmol/L   Occult blood x 1, stool    Collection Time: 12/30/24  6:16 PM    Specimen: Stool   Result Value Ref Range     Occult Blood Positive (A) Negative   Urinalysis, Reflex to Urine Culture Urine, Clean Catch    Collection Time: 12/30/24  6:27 PM    Specimen: Urine   Result Value Ref Range    Specimen UA Urine, Clean Catch     Color, UA Yellow Yellow, Straw, Marge    Appearance, UA Clear Clear    pH, UA 7.0 5.0 - 8.0    Specific Gravity, UA 1.025 1.005 - 1.030    Protein, UA Trace (A) Negative    Glucose, UA Negative Negative    Ketones, UA Negative Negative    Bilirubin (UA) Negative Negative    Occult Blood UA Negative Negative    Nitrite, UA Negative Negative    Urobilinogen, UA Negative <2.0 EU/dL    Leukocytes, UA Negative Negative        Significant Imaging: I have reviewed all pertinent imaging results/findings within the past 24 hours.  Assessment/Plan:     * Upper GI bleed  Melena and coffee-ground emesis reported   Concerning for upper GI bleed   GI consult on case   Will start Protonix drip   Carafate   Keep NPO   IVF   Trend H and H q.8h   Transfuse as indicated    Intractable abdominal pain  Possibly secondary to gastritis versus peptic ulcer disease   Continue PPI   Carafate   CT scan reviewed   Antonio humphries      Esophageal cancer, stage IV  Currently receiving chemotherapy outpatient      HTN (hypertension)  Patient's blood pressure range in the last 24 hours was: BP  Min: 93/54  Max: 130/69.The patient's inpatient anti-hypertensive regimen is listed below:  Current Antihypertensives  hydrALAZINE injection 10 mg, Every 6 hours PRN, Intravenous    Plan  - BP is controlled, no changes needed to their regimen  -       VTE Risk Mitigation (From admission, onward)           Ordered     IP VTE HIGH RISK PATIENT  Once         12/30/24 1939     Place sequential compression device  Until discontinued         12/30/24 1939                       On 12/30/2024, patient should be placed in hospital observation services under my care.             Babar Jim MD  Department of Hospital Medicine  [facility] - Emergency  Dept.

## 2024-12-31 NOTE — ASSESSMENT & PLAN NOTE
Patient's blood pressure range in the last 24 hours was: BP  Min: 93/54  Max: 130/69.The patient's inpatient anti-hypertensive regimen is listed below:  Current Antihypertensives  hydrALAZINE injection 10 mg, Every 6 hours PRN, Intravenous    Plan  - BP is controlled, no changes needed to their regimen  -

## 2024-12-31 NOTE — [employer] PATIENT INSTRUCTIONS
Discharge Summary/Instructions after an Endoscopic Procedure  Patient Name: Rosalio Muñoz  Patient MRN: [id number]  Patient YOB: 1965 Tuesday, December 31, 2024 Maki Sullivan MD  Dear patient,  As a result of recent federal legislation (The Federal Cures Act), you may   receive lab or pathology results from your procedure in your [facility]   account before your physician is able to contact you. Your physician or   their representative will relay the results to you with their   recommendations at their soonest availability.  Thank you,  RESTRICTIONS:  During your procedure today, you received medications for sedation.  These   medications may affect your judgment, balance and coordination.  Therefore,   for 24 hours, you have the following restrictions:   - DO NOT drive a car, operate machinery, make legal/financial decisions,   sign important papers or drink alcohol.    ACTIVITY:  Today: no heavy lifting, straining or running due to procedural   sedation/anesthesia.  The following day: return to full activity including work.  DIET:  Eat and drink normally unless instructed otherwise.     TREATMENT FOR COMMON SIDE EFFECTS:  - Mild abdominal pain, nausea, belching, bloating or excessive gas:  rest,   eat lightly and use a heating pad.  - Sore Throat: treat with throat lozenges and/or gargle with warm salt   water.  - Because air was used during the procedure, expelling large amounts of air   from your rectum or belching is normal.  - If a bowel prep was taken, you may not have a bowel movement for 1-3 days.    This is normal.  SYMPTOMS TO WATCH FOR AND REPORT TO YOUR PHYSICIAN:  1. Abdominal pain or bloating, other than gas cramps.  2. Chest pain.  3. Back pain.  4. Signs of infection such as: chills or fever occurring within 24 hours   after the procedure.  5. Rectal bleeding, which would show as bright red, maroon, or black stools.   (A tablespoon of blood from the rectum is not serious, especially  if   hemorrhoids are present.)  6. Vomiting.  7. Weakness or dizziness.  GO DIRECTLY TO THE NEAREST EMERGENCY ROOM IF YOU HAVE ANY OF THE FOLLOWING:      Difficulty breathing              Chills and/or fever over 101 F   Persistent vomiting and/or vomiting blood   Severe abdominal pain   Severe chest pain   Black, tarry stools   Bleeding- more than one tablespoon   Any other symptom or condition that you feel may need urgent attention  Your doctor recommends these additional instructions:  If any biopsies were taken, your doctors clinic will contact you in 1 to 2   weeks with any results.  - Return patient to [facility] for ongoing care.   - Soft diet.   - Use Protonix (pantoprazole) 40 mg PO BID for 2 months then decrease to   20mg daily thereafter.   - Use sucralfate suspension 1 gram PO QID for 2 weeks.   - Await pathology results.   - Observe patient's clinical course.   - The findings and recommendations were discussed with the patient.   - Communicated with hospital medicine service regarding findings.  For questions, problems or results please call your physician Maki Sullivan MD at Work:  [phone]  If you have any questions about the above instructions, call the GI   department at [phone] or call the endoscopy unit at [phone]   from 7am until 3 pm.  [facility], EMERGENCY ROOM PHONE NUMBER:   [phone]  IF A COMPLICATION OR EMERGENCY SITUATION ARISES AND YOU ARE UNABLE TO REACH   YOUR PHYSICIAN - GO DIRECTLY TO THE EMERGENCY ROOM.  I have read or have had read to me these discharge instructions for my   procedure and have received a written copy.  I understand these   instructions and will follow-up with my physician if I have any questions.     __________________________________       _____________________________________  Nurse Signature                                          Patient/Designated   Responsible Party Signature  MD Maki Jade  MD Laurie  12/31/2024 2:01:31 PM  This report has been verified and signed electronically.  Dear patient,  As a result of recent federal legislation (The Federal Cures Act), you may   receive lab or pathology results from your procedure in your [facility]   account before your physician is able to contact you. Your physician or   their representative will relay the results to you with their   recommendations at their soonest availability.  Thank you,  [employer]

## 2024-12-31 NOTE — ASSESSMENT & PLAN NOTE
Possibly secondary to gastritis versus peptic ulcer disease noted on CT abd   Continue PPI   Carafate   Dilaudid p.r.n. while NPO,. Once ok for PO- will resume home Roxicodone

## 2024-12-31 NOTE — PLAN OF CARE
Dr. Sullivan at  to discuss findings. VSS. Pain noted, medication given, no GI bleeding. Pt to be discharged from unit.

## 2024-12-31 NOTE — BRIEF OP NOTE
Inpatient Endoscopy Brief Operative Note      Admit Date: 12/30/2024    Procedure Date and Time:  12/31/2024 1:50 PM    Attending Physician: Jessica Louise MD     Principal Admitting Diagnoses: Duodenal ulcer with hemorrhage         Discharge Diagnosis: The primary encounter diagnosis was Gastrointestinal hemorrhage with melena. Diagnoses of Screening for cardiovascular condition, Abdominal pain, Generalized abdominal pain, and Upper GI bleed were also pertinent to this visit.     Discharged Condition: Stable    Indication for Admission: Duodenal ulcer with hemorrhage     Procedure Performed: EGD with control of hemorrhage and biopsy    SEE [employer] REPORTS FOR DETAILS.    Pathology (if any):  Specimen (24h ago, onward)       Start     Ordered    12/31/24 1337  Specimen to Pathology, Surgery Gastrointestinal tract  Once        Comments: Pre-op Diagnosis: Upper GI bleed [K92.2]Procedure(s):EGD (ESOPHAGOGASTRODUODENOSCOPY) 1. Gastric Bx (duodenum ulcer) r/o H Pylori     References:    Click here for ordering Quick Tip   Question Answer Comment   Procedure Type: Gastrointestinal tract    Release to patient Immediate        12/31/24 1347                    Estimated Blood Loss: 1 ml.    Discussed with: patient.    Disposition: Return to hospital [facility].    Recommendations:   Start soft foods  Switch Protonix gtt to 40mg PO BID  Continue carafate 1g suspension PO q 6 hours x 2 weeks  No ASA or NSAIDs  Tube feeds as before      Communicated with hospital medicine service regarding the above findings.       Maki Sullivan MD  Inpatient Gastroenterology  [facility]

## 2024-12-31 NOTE — TELEPHONE ENCOUNTER
Met with pt and wife face to face while INPT. Pt reports he is scheduled for EGD today due to hematemesis. Pt reports he is actually feeling better at this time with fluids and meds and actually has an appetite so he if hopeful for results of EGD. Pt denies any further needs/concerns at this time, encouraged them to reach out with any needs.

## 2024-12-31 NOTE — ASSESSMENT & PLAN NOTE
Patient's blood pressure range in the last 24 hours was: BP  Min: 90/53  Max: 130/69.The patient's inpatient anti-hypertensive regimen is listed below:  Current Antihypertensives  hydrALAZINE injection 10 mg, Every 6 hours PRN, Intravenous    Plan  - BP is controlled, no changes needed to their regimen  - continue IVF as BP low normal

## 2024-12-31 NOTE — ASSESSMENT & PLAN NOTE
Melena and coffee-ground emesis since 12/21, concern for UGIB   GI consulted  Continue PPI drip + carafate  EGD planned fro 12/31  Keep NPO ; continue IVF   Trend H and H q.8h   Transfuse as indicated

## 2024-12-31 NOTE — ANESTHESIA POSTPROCEDURE EVALUATION
Anesthesia Post Evaluation    Patient: Rosalio Muñoz    Procedure(s) Performed: Procedure(s) (LRB):  EGD (ESOPHAGOGASTRODUODENOSCOPY) (N/A)    Final Anesthesia Type: MAC      Patient location during evaluation: GI PACU  Patient participation: Yes- Able to Participate  Level of consciousness: awake and alert  Post-procedure vital signs: reviewed and stable  Pain management: adequate  Airway patency: patent    PONV status at discharge: No PONV  Anesthetic complications: no      Cardiovascular status: stable  Respiratory status: unassisted and spontaneous ventilation  Hydration status: euvolemic  Follow-up not needed.              Vitals Value Taken Time   BP  12/31/24 1353   Temp  12/31/24 1353   Pulse  12/31/24 1353   Resp  12/31/24 1353   SpO2  12/31/24 1353         No case tracking events are documented in the log.      Pain/Nick Score: Pain Rating Prior to Med Admin: 5 (12/31/2024  7:41 AM)  Pain Rating Post Med Admin: 0 (12/31/2024  8:11 AM)

## 2025-01-01 PROBLEM — E44.0 MODERATE MALNUTRITION: Status: ACTIVE | Noted: 2024-10-22

## 2025-01-01 LAB
ANION GAP SERPL CALC-SCNC: 8 MMOL/L (ref 8–16)
BASOPHILS # BLD AUTO: 0.03 K/UL (ref 0–0.2)
BASOPHILS NFR BLD: 0.6 % (ref 0–1.9)
BUN SERPL-MCNC: 12 MG/DL (ref 6–20)
CALCIUM SERPL-MCNC: 8.9 MG/DL (ref 8.7–10.5)
CHLORIDE SERPL-SCNC: 102 MMOL/L (ref 95–110)
CO2 SERPL-SCNC: 24 MMOL/L (ref 23–29)
CREAT SERPL-MCNC: 0.6 MG/DL (ref 0.5–1.4)
DIFFERENTIAL METHOD BLD: ABNORMAL
EOSINOPHIL # BLD AUTO: 0.1 K/UL (ref 0–0.5)
EOSINOPHIL NFR BLD: 2.3 % (ref 0–8)
ERYTHROCYTE [DISTWIDTH] IN BLOOD BY AUTOMATED COUNT: 16 % (ref 11.5–14.5)
EST. GFR  (NO RACE VARIABLE): >60 ML/MIN/1.73 M^2
GLUCOSE SERPL-MCNC: 116 MG/DL (ref 70–110)
HCT VFR BLD AUTO: 25.3 % (ref 40–54)
HGB BLD-MCNC: 8.3 G/DL (ref 14–18)
IMM GRANULOCYTES # BLD AUTO: 0.01 K/UL (ref 0–0.04)
IMM GRANULOCYTES NFR BLD AUTO: 0.2 % (ref 0–0.5)
LYMPHOCYTES # BLD AUTO: 1.2 K/UL (ref 1–4.8)
LYMPHOCYTES NFR BLD: 25.7 % (ref 18–48)
MCH RBC QN AUTO: 31.7 PG (ref 27–31)
MCHC RBC AUTO-ENTMCNC: 32.8 G/DL (ref 32–36)
MCV RBC AUTO: 97 FL (ref 82–98)
MONOCYTES # BLD AUTO: 0.5 K/UL (ref 0.3–1)
MONOCYTES NFR BLD: 9.4 % (ref 4–15)
NEUTROPHILS # BLD AUTO: 3 K/UL (ref 1.8–7.7)
NEUTROPHILS NFR BLD: 61.8 % (ref 38–73)
NRBC BLD-RTO: 0 /100 WBC
PLATELET # BLD AUTO: 171 K/UL (ref 150–450)
PMV BLD AUTO: 11.4 FL (ref 9.2–12.9)
POTASSIUM SERPL-SCNC: 3.5 MMOL/L (ref 3.5–5.1)
RBC # BLD AUTO: 2.62 M/UL (ref 4.6–6.2)
SODIUM SERPL-SCNC: 134 MMOL/L (ref 136–145)
WBC # BLD AUTO: 4.79 K/UL (ref 3.9–12.7)

## 2025-01-01 PROCEDURE — G0378 HOSPITAL OBSERVATION PER HR: HCPCS

## 2025-01-01 PROCEDURE — 63600175 PHARM REV CODE 636 W HCPCS: Performed by: INTERNAL MEDICINE

## 2025-01-01 PROCEDURE — 25000003 PHARM REV CODE 250: Performed by: INTERNAL MEDICINE

## 2025-01-01 PROCEDURE — 36415 COLL VENOUS BLD VENIPUNCTURE: CPT | Performed by: INTERNAL MEDICINE

## 2025-01-01 PROCEDURE — 80048 BASIC METABOLIC PNL TOTAL CA: CPT | Performed by: INTERNAL MEDICINE

## 2025-01-01 PROCEDURE — 85025 COMPLETE CBC W/AUTO DIFF WBC: CPT | Performed by: INTERNAL MEDICINE

## 2025-01-01 RX ORDER — SUCRALFATE 1 G/1
1 TABLET ORAL 4 TIMES DAILY
Qty: 56 TABLET | Refills: 0 | Status: SHIPPED | OUTPATIENT
Start: 2025-01-01 | End: 2025-01-15

## 2025-01-01 RX ORDER — OXYCODONE HYDROCHLORIDE 5 MG/1
5 TABLET ORAL EVERY 4 HOURS PRN
Qty: 15 TABLET | Refills: 0 | Status: SHIPPED | OUTPATIENT
Start: 2025-01-01 | End: 2025-01-05

## 2025-01-01 RX ORDER — PANTOPRAZOLE SODIUM 40 MG/10ML
40 INJECTION, POWDER, LYOPHILIZED, FOR SOLUTION INTRAVENOUS 2 TIMES DAILY
Status: DISCONTINUED | OUTPATIENT
Start: 2025-01-01 | End: 2025-01-01 | Stop reason: HOSPADM

## 2025-01-01 RX ORDER — PANTOPRAZOLE SODIUM 40 MG/1
40 FOR SUSPENSION ORAL 2 TIMES DAILY
Qty: 120 PACKET | Refills: 0 | Status: SHIPPED | OUTPATIENT
Start: 2025-01-01 | End: 2025-01-03

## 2025-01-01 RX ADMIN — PANTOPRAZOLE SODIUM 40 MG: 40 INJECTION, POWDER, LYOPHILIZED, FOR SOLUTION INTRAVENOUS at 09:01

## 2025-01-01 RX ADMIN — SUCRALFATE ORAL 1 G: 1 SUSPENSION ORAL at 12:01

## 2025-01-01 RX ADMIN — SUCRALFATE ORAL 1 G: 1 SUSPENSION ORAL at 06:01

## 2025-01-01 RX ADMIN — HYDROMORPHONE HYDROCHLORIDE 1 MG: 1 INJECTION, SOLUTION INTRAMUSCULAR; INTRAVENOUS; SUBCUTANEOUS at 12:01

## 2025-01-01 RX ADMIN — SUCRALFATE ORAL 1 G: 1 SUSPENSION ORAL at 01:01

## 2025-01-01 NOTE — DISCHARGE SUMMARY
"[facility] Medicine  Discharge Summary      Patient Name: Rosalio Muñoz  MRN: [id number]  VINEET: [id number]  Patient Class: OP- Observation  Admission Date: 12/30/2024  Hospital Length of Stay: 0 days  Discharge Date and Time: 1/1/2025  2:49 PM  Attending Physician: Helen att. providers found   Discharging Provider: Jessica Louise MD  Primary Care Provider: Michell Goyal MD    Primary Care Team: Networked reference to record PCT     HPI:   Patient is a 59-year-old  male with a past medical history (PMHx) of stage IV esophageal cancer, hypertension, malignant neoplasm metastatic to other sites, presents to the Emergency Department for evaluation of abdominal pain.  Patient states that pain is generalized.  Associated symptoms include nausea, vomiting, and diarrhea.  Vomiting reported as coffee-ground with red streaks.  Melena also reported.  Patient endorses lack of appetite.  Currently taking Norco 5 mg at home for pain without relief.  Patient denies any fever or chills.  Currently being treated for metastatic esophageal cancer by Dr. Latif.    In the ED, H&H 9.6/27.6, FOBT positive.  CT scan concerning for gastritis or peptic ulcer disease along with enteritis.  Patient started on Protonix.    Procedure(s) (LRB):  EGD (ESOPHAGOGASTRODUODENOSCOPY) (N/A)      Hospital Course:   GI consulted and planning for EGD on 12/31 for eval.   Patient had no further vomiting or diarrhea during admission.  Abdominal pain controlled with p.o. and IV pain medications.  Underwent EGD on 12/31 which per GI "showed duodenal ulcer with pigmented material s/p epi and thermal therapy. Gastric biopsies taken to r/o H pylori. 2nd portion normal. G-tube in place. Ulcerated esophageal mass at GE junction/distal esophagus - not source of GIB."    Post EGD, H and H fairly stable at 8.3.  Diet was slowly advanced and patient was able to tolerate soft foods without any issues.  Reports abdominal pain is " almost resolved, 3/10 intensity.  Denies nausea, vomiting, bloody stool.  Patient appears stable for discharge home today per my exam.  Discussed with GI Dr. Sullivan- patient was deemed stable for discharge from her standpoint as well, she will arrange outpatient GI follow-up.  Patient will continue b.i.d. Protonix suspension x2 months, Carafate x2 weeks per GI recommendations.  Follow up with PCP within 3-5 days.  Oxycodone p.r.n. was refilled for cancer-related pain.  Outpatient follow-up with Dr. Latif as previously scheduled.         Goals of Care Treatment Preferences:  Code Status: Full Code    Health care agent: SDM: Wife: Fanta Muñoz  Health care agent number: [phone]          What is most important right now is to focus on remaining as independent as possible, symptom/pain control.  Accordingly, we have decided that the best plan to meet the patient's goals includes continuing with treatment.      SDOH Screening:  The patient declined to be screened for utility difficulties, food insecurity, transport difficulties, housing insecurity, and interpersonal safety, so no concerns could be identified this admission.     Consults:   Consults (From admission, onward)          Status Ordering Provider     Inpatient consult to Registered Dietitian/Nutritionist  Once        Provider:  (Not yet assigned)    Completed LINDSEY SULLIVAN     Inpatient consult to Gastroenterology  Once        Provider:  (Not yet assigned)    Completed BREANNE GIBSON              Final Active Diagnoses:    Diagnosis Date Noted POA    PRINCIPAL PROBLEM:  Duodenal ulcer with hemorrhage [K26.4] 12/30/2024 Yes    Intractable abdominal pain [R10.9] 12/26/2024 Yes    HTN (hypertension) [I10] 10/25/2017 Yes    Esophageal cancer, stage IV [C15.9] 10/10/2024 Yes    Moderate malnutrition [E44.0] 10/22/2024 Yes      Problems Resolved During this Admission:       Discharged Condition: good    Disposition: Home or Self Care    Follow Up:    Follow-up Information       Michell Goyal MD. Schedule an appointment as soon as possible for a visit in 3 day(s).    Specialty: Family Medicine  Contact information:  [address] [place] [address] [address] [id number] [phone]               Maki Sullivan MD. Schedule an appointment as soon as possible for a visit.    Specialty: Gastroenterology  Contact information:  [address] [place] [address] [address] [place] [address]  [phone]                           Patient Instructions:      Diet Dysphagia Soft     Notify your health care provider if you experience any of the following:  severe uncontrolled pain     Notify your health care provider if you experience any of the following:   Order Comments: Any signs or symptoms of bleeding     Activity as tolerated       Significant Diagnostic Studies:  See hospital course    Pending Diagnostic Studies:       Procedure Component Value Units Date/Time    Specimen to Pathology, Surgery Gastrointestinal tract [[id number]] Collected: 12/31/24 1347    Order Status: Sent Lab Status: In process Updated: 12/31/24 1348    Specimen: Tissue            Medications:  Reconciled Home Medications:      Medication List        START taking these medications      pantoprazole 40 mg suspension  Commonly known as: PROTONIX  Take 1 packet (40 mg total) by mouth 2 (two) times daily.     sucralfate 1 gram tablet  Commonly known as: CARAFATE  Take 1 tablet (1 g total) by mouth 4 (four) times daily. crush and place in 20mL of water for 14 days            CONTINUE taking these medications      LIDOcaine-prilocaine cream  Commonly known as: EMLA  Apply topically as needed.     metoclopramide HCl 10 MG tablet  Commonly known as: REGLAN  Take 1 tablet (10 mg total) by mouth every 6 (six) hours as needed (nausea, vomiting).     OLANZapine 5 MG tablet  Commonly known as: ZyPREXA  Take 1 tablet (5 mg total) by mouth every evening. Take nightly on days 1-3 of each chemotherapy cycle.      ondansetron 8 MG Tbdl  Commonly known as: ZOFRAN-ODT  Take 1 tablet (8 mg total) by mouth 2 (two) times daily.     oxyCODONE 5 MG immediate release tablet  Commonly known as: ROXICODONE  Take 1 tablet (5 mg total) by mouth every 4 (four) hours as needed for Pain.            STOP taking these medications      HYDROcodone-acetaminophen 5-325 mg per tablet  Commonly known as: NORCO              Indwelling Lines/Drains at time of discharge:   Lines/Drains/Airways       Central Venous Catheter Line  Duration             Port A Cath Single Lumen 10/21/24 1502 72 days              Drain  Duration                  Gastrostomy/Enterostomy 10/21/24 1413 Gastrostomy tube w/o balloon LUQ 72 days                    Time spent on the discharge of patient: 28 minutes         Jessica Louise MD  Department of Hospital Medicine  '[facility] - Med Surg

## 2025-01-01 NOTE — ASSESSMENT & PLAN NOTE
Malnutrition Type:  Context: chronic illness  Level: moderate    Related to (etiology):   Physiological causes increasing nutrient needs due to chronic illness    Signs and Symptoms (as evidenced by):   Unintentional weight loss       Malnutrition Characteristic Summary:  Weight Loss (Malnutrition): 20% in 1 year  Energy Intake (Malnutrition): less than or equal to 75% for greater than or equal to 1 month      Interventions (treatment strategy):  1. Texture Modified Diet  2. Enteral Nutrition Management    Nutrition Diagnosis Status:   New

## 2025-01-01 NOTE — NURSING
Pt being discharged home with wife. Explained d/c instructions . Iv and heart monitor d/c . Pt stated he would like wheel chair services . Did explain that pharmacies may be closed

## 2025-01-01 NOTE — PLAN OF CARE
[facility] Med [facility]  Discharge Final Note    Primary Care Provider: Michell Goyal MD    Expected Discharge Date: 1/1/2025    Final Discharge Note (most recent)       Final Note - 01/01/25 1013          Final Note    Assessment Type Final Discharge Note     Anticipated Discharge Disposition Home or Self Care     Hospital Resources/Appts/Education Provided Appointments scheduled and added to AVS                              Contact Info       Michell Goyal MD   Specialty: Family Medicine   Relationship: PCP - General    [address] [place] [address] [address] [place] [address]   Phone: [phone]       Next Steps: Schedule an appointment as soon as possible for a visit in 3 day(s)    Maki Sullivan MD   Specialty: Gastroenterology    [address] [place] [address] [address] [place] [address]   Phone: [phone]       Next Steps: Schedule an appointment as soon as possible for a visit

## 2025-01-01 NOTE — PLAN OF CARE
Nutrition Plan of Care:    Recommendations      1. Continue with Texture Modified Diet per SLP recommendations   2. Continue on Enteral Nutrition Management, home routine and formula    3. Monitor labs and weights     Goals: Patient to consume >75% EEN/EPN  prior to RD follow up  Nutrition Goal Status: progressing towards goal  Communication of RD Recs: other (comment) (Consult, poc)     Assessment and Plan     Endocrine  Moderate malnutrition  Malnutrition Type:  Context: chronic illness  Level: moderate     Related to (etiology):   Physiological causes increasing nutrient needs due to chronic illness     Signs and Symptoms (as evidenced by):   Unintentional weight loss         Malnutrition Characteristic Summary:  Weight Loss (Malnutrition): 20% in 1 year  Energy Intake (Malnutrition): less than or equal to 75% for greater than or equal to 1 month        Interventions (treatment strategy):  1. Texture Modified Diet  2. Enteral Nutrition Management     Nutrition Diagnosis Status:   Omar Araiza MS, RDN, LDN

## 2025-01-01 NOTE — PLAN OF CARE
Discussed poc with pt, pt verbalized understanding    Purposeful rounding     VS wnl  Cardiac monitoring in use, pt is NSR, tele monitor #[id number]    Fall precautions in place, remains injury free  Pt denies c/o anything  Pain  under control with PRN meds    IVFs stopped   Accurate I&Os    Bed locked at lowest position  Call light within reach    Chart check continued  Will cont with POC

## 2025-01-01 NOTE — CONSULTS
[facility] - Med [facility]urg  Adult Nutrition  Consult Note    SUMMARY       Recommendations     1. Continue with Texture Modified Diet per SLP recommendations   2. Continue on Enteral Nutrition Management, home routine and formula    3. Monitor labs and weights    Goals: Patient to consume >75% EEN/EPN  prior to RD follow up  Nutrition Goal Status: progressing towards goal  Communication of RD Recs: other (comment) (Consult, poc)    Assessment and Plan    Endocrine  Moderate malnutrition  Malnutrition Type:  Context: chronic illness  Level: moderate    Related to (etiology):   Physiological causes increasing nutrient needs due to chronic illness    Signs and Symptoms (as evidenced by):   Unintentional weight loss       Malnutrition Characteristic Summary:  Weight Loss (Malnutrition): 20% in 1 year  Energy Intake (Malnutrition): less than or equal to 75% for greater than or equal to 1 month      Interventions (treatment strategy):  1. Texture Modified Diet  2. Enteral Nutrition Management    Nutrition Diagnosis Status:   New         Malnutrition Assessment  Malnutrition Context: chronic illness  Malnutrition Level: moderate      Micronutrient Evaluation Summary: suspected deficiency   Weight Loss (Malnutrition): 20% in 1 year  Energy Intake (Malnutrition): less than or equal to 75% for greater than or equal to 1 month                         Reason for Assessment    Reason For Assessment: consult, identified at risk by screening criteria, other (see comments) (low wisam consult)  Diagnosis: cancer diagnosis/related complications  Patient Active Problem List   Diagnosis    HTN (hypertension)    Umbilical hernia    Tobacco use disorder    Delayed immunizations    Other hyperlipidemia    Hypertensive urgency    Elevated troponin    Hypomagnesemia    Bilateral hearing loss    Alcohol use    Unintentional weight loss    Low libido    Alcohol use disorder    Dysphagia    Personal history of colon polyps, unspecified    Esophageal  "cancer, stage IV    Secondary adenocarcinoma of retroperitoneum    Secondary liver cancer    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    Cachexia    Iron deficiency anemia due to chronic blood loss    Moderate malnutrition    Immunodeficiency due to chemotherapy    Palliative care encounter    Intractable abdominal pain    Duodenal ulcer with hemorrhage     Past Medical History:   Diagnosis Date    Esophageal cancer, stage IV 10/10/2024    Hypertension     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 10/11/2024    Palliative care encounter 12/11/2024       General Information Comments:   1/1/25: Patient is on an IDDSI level 6: soft and bite size oral diet.  Patient also has PEG for nutrition.  Patient admitted with duodenal ulcer with hemorrhage with PMH of esophageal cancer with mets.  Patient admitted with nausea, vomiting, and abdominal pain.  GI intolerance now improving.  S/P EGD procedure. Patient uses Isosource 1.5 via peg for supplemental nutrition.  PO intake noted between % for this current admission.  Weight loss within the past year from 88kg. Labs reviewed.  NKFA.  LBM: 12/30/2024.  RD consulted for low mack score.  Mack score: 17.    Nutrition Discharge Planning: Texture modified diet per SLP recommendations + Enteral Nutrition    Nutrition Risk Screen     Nutrition Related Social Determinants of Health: SDOH: Adequate food in home environment and None Identified      Nutrition/Diet History    Spiritual, Cultural Beliefs, Restorationism Practices, Values that Affect Care: no  Food Allergies: NKFA  Factors Affecting Nutritional Intake: difficulty/impaired swallowing  Nutrition Support Formula Prior to Admit: Isosource 1.5  Nutrtion Support Frequency Prior to Admit: bolus feeds    Anthropometrics    Temp: 98.2 °F (36.8 °C)  Height Method: Stated  Height: 6' 1" (185.4 cm)  Height (inches): 73 in  Weight Method: Bed Scale  Weight: 71.3 kg (157 lb 3 oz)  Weight (lb): 157.19 lb  Ideal Body Weight " (IBW), Male: 184 lb  % Ideal Body Weight, Male (lb): 85.43 %  % Ideal Body Weight Malnutrition: 80-90% - mild deficit  BMI (Calculated): 20.7  BMI Grade: 18.5-24.9 - normal  Weight Loss: unintentional  Usual Body Weight (UBW), k.7 kg (within 1 year)  % Usual Body Weight: 80.55  % Weight Change From Usual Weight: -19.62 %       Lab/Procedures/Meds    Pertinent Labs Reviewed: reviewed  BMP  Lab Results   Component Value Date     (L) 2025    K 3.5 2025     2025    CO2 24 2025    BUN 12 2025    CREATININE 0.6 2025    CALCIUM 8.9 2025    ANIONGAP 8 2025    EGFRNORACEVR >60 2025     Lab Results   Component Value Date    HGBA1C 5.1 2023     Lab Results   Component Value Date    CALCIUM 8.9 2025    PHOS 2.6 (L) 2024       Pertinent Medications Reviewed: reviewed  Scheduled Meds:   pantoprazole  40 mg Intravenous BID    sucralfate  1 g Oral Q6H     Continuous Infusions:  PRN Meds:.  Current Facility-Administered Medications:     acetaminophen, 650 mg, Oral, Q6H PRN    hydrALAZINE, 10 mg, Intravenous, Q6H PRN    HYDROmorphone, 1 mg, Intravenous, Q6H PRN    ondansetron, 4 mg, Intravenous, Q6H PRN    Physical Findings/Assessment         Estimated/Assessed Needs    Weight Used For Calorie Calculations: 71.3 kg (157 lb 3 oz)  Energy Calorie Requirements (kcal): 25-35kcals/kg (1782-2496kcals/day)  Energy Need Method: Kcal/kg  Protein Requirements: 1.5-2.0g/kg (143g/day)  Weight Used For Protein Calculations: 71.3 kg (157 lb 3 oz)  Fluid Requirements (mL): 1ml/kcal  Estimated Fluid Requirement Method: RDA Method  RDA Method (mL): 25  CHO Requirement: 250      Nutrition Prescription Ordered    Current Diet Order: IDDSI level 6 soft and bite size  Current Nutrition Support Formula Ordered: Isosource 1.5  Current Nutrition Support Frequency Ordered: bolus    Evaluation of Received Nutrient/Fluid Intake    % Kcal Needs: %  % Protein Needs:  %  I/O: 1/1/25: +1569ml since admit  Energy Calories Required: meeting needs  Protein Required: not meeting needs  Fluid Required: meeting needs  Comments: LBM: 12/30/24  Tolerance: tolerating  % Intake of Estimated Energy Needs: 50 - 75 %  % Meal Intake: 50 - 75 %    Nutrition Risk    Level of Risk/Frequency of Follow-up: moderate (Follow up 1-2x per week)     Monitor and Evaluation    Food and Nutrient Intake: energy intake, enteral nutrition intake  Food and Nutrient Adminstration: diet order, enteral and parenteral nutrition administration  Physical Activity and Function: factors affecting access to physical activity  Anthropometric Measurements: body mass index, weight change, weight, height/length  Biochemical Data, Medical Tests and Procedures: electrolyte and renal panel, gastrointestinal profile, glucose/endocrine profile, inflammatory profile, lipid profile     Nutrition Follow-Up    RD Follow-up?: Yes  Shahrzad Araiza, MS, RDN, LDN

## 2025-01-02 VITALS
WEIGHT: 157.19 LBS | HEIGHT: 73 IN | TEMPERATURE: 98 F | OXYGEN SATURATION: 97 % | RESPIRATION RATE: 18 BRPM | DIASTOLIC BLOOD PRESSURE: 59 MMHG | SYSTOLIC BLOOD PRESSURE: 103 MMHG | BODY MASS INDEX: 20.83 KG/M2 | HEART RATE: 76 BPM

## 2025-01-03 ENCOUNTER — TELEPHONE (OUTPATIENT)
Dept: HEMATOLOGY/ONCOLOGY | Facility: CLINIC | Age: 60
End: 2025-01-03
Payer: COMMERCIAL

## 2025-01-03 ENCOUNTER — TELEPHONE (OUTPATIENT)
Dept: GASTROENTEROLOGY | Facility: CLINIC | Age: 60
End: 2025-01-03
Payer: COMMERCIAL

## 2025-01-03 ENCOUNTER — PATIENT MESSAGE (OUTPATIENT)
Dept: INTERNAL MEDICINE | Facility: CLINIC | Age: 60
End: 2025-01-03
Payer: COMMERCIAL

## 2025-01-03 LAB
OHS QRS DURATION: 80 MS
OHS QTC CALCULATION: 446 MS

## 2025-01-03 RX ORDER — PANTOPRAZOLE SODIUM 40 MG/1
40 TABLET, DELAYED RELEASE ORAL 2 TIMES DAILY
Qty: 60 TABLET | Refills: 1 | Status: SHIPPED | OUTPATIENT
Start: 2025-01-03 | End: 2025-01-04

## 2025-01-03 NOTE — PROGRESS NOTES
Patient discharged from hospital for GI bleed and unable to fill Pantoprazole as suspension. I changed dosage to tablet form per patient request. Patient is scheduled for hospital follow up with Dr. Goyal on 1/7/25.

## 2025-01-03 NOTE — TELEPHONE ENCOUNTER
Spoke to pt and his wife. They state pt has been in the hospital and was recommended to schedule a hospital follow up with Dr Sullivan.   Advised them, she is not in the clinic only the hospital. However scheduled an appt with Dr Tomi small on 01/10/25. If pathology report comes back sooner and appt is not needed, then the appt can be changed.     They agreed to plan.     Also sent a msg to Dr Sullivan about the pantoprazole RX. Pharmacy can not get the suspension and the pt is ok with getting a tablet form and will either crush and put in his PEG tube or will mix with applesauce and take by mouth. Pt reports he can do either.

## 2025-01-03 NOTE — TELEPHONE ENCOUNTER
"----- Message from Óscar sent at 1/3/2025 11:56 AM CST -----  Name of Who is Calling:PT WIFE         What is the request in detail:Pt wife called in requesting a prescription change for pantoprazole (PROTONIX) 40 mg suspension as they are unable to get ''SUSPENSION" and would prescription switched to tablets.Please advise thank you     [address] [address] [place] [address]  [id number] [address] [address]  Phone: [phone] Fax: [phone]      Can the clinic reply by [facility]:NO       What Number to Call Back if not in [facility]:Telephone Information:[phone]  "

## 2025-01-03 NOTE — TELEPHONE ENCOUNTER
A message has already been sent to Dr Sullivan, who is currently doing procedures. Pending response.

## 2025-01-03 NOTE — TELEPHONE ENCOUNTER
Call placed to schedule hosp f/u with Dr. Latif. Appt scheduled 1/7 per wife request. Wife reports pt is feeling much better since hosp d/c, she denies any current needs at this time. Encouraged her to reach out with any needs/concerns.    patient, trauma team, ER

## 2025-01-04 DIAGNOSIS — K26.4 DUODENAL ULCER WITH HEMORRHAGE: Primary | ICD-10-CM

## 2025-01-04 LAB
BACTERIA BLD CULT: NORMAL

## 2025-01-04 RX ORDER — PANTOPRAZOLE SODIUM 40 MG/1
40 TABLET, DELAYED RELEASE ORAL 2 TIMES DAILY
Qty: 180 TABLET | Refills: 3 | Status: SHIPPED | OUTPATIENT
Start: 2025-01-04 | End: 2026-01-04

## 2025-01-06 DIAGNOSIS — K26.4 DUODENAL ULCER WITH HEMORRHAGE: Primary | ICD-10-CM

## 2025-01-07 ENCOUNTER — OFFICE VISIT (OUTPATIENT)
Dept: HEMATOLOGY/ONCOLOGY | Facility: CLINIC | Age: 60
End: 2025-01-07
Payer: COMMERCIAL

## 2025-01-07 ENCOUNTER — PATIENT MESSAGE (OUTPATIENT)
Dept: PREADMISSION TESTING | Facility: HOSPITAL | Age: 60
End: 2025-01-07
Payer: COMMERCIAL

## 2025-01-07 ENCOUNTER — OFFICE VISIT (OUTPATIENT)
Dept: INTERNAL MEDICINE | Facility: CLINIC | Age: 60
End: 2025-01-07
Payer: COMMERCIAL

## 2025-01-07 VITALS
HEART RATE: 99 BPM | HEIGHT: 73 IN | WEIGHT: 164.88 LBS | DIASTOLIC BLOOD PRESSURE: 68 MMHG | OXYGEN SATURATION: 99 % | SYSTOLIC BLOOD PRESSURE: 112 MMHG | BODY MASS INDEX: 21.85 KG/M2

## 2025-01-07 VITALS
SYSTOLIC BLOOD PRESSURE: 115 MMHG | BODY MASS INDEX: 22.13 KG/M2 | HEIGHT: 73 IN | TEMPERATURE: 98 F | HEART RATE: 100 BPM | WEIGHT: 167 LBS | OXYGEN SATURATION: 98 % | DIASTOLIC BLOOD PRESSURE: 78 MMHG

## 2025-01-07 DIAGNOSIS — C78.6 SECONDARY ADENOCARCINOMA OF RETROPERITONEUM: ICD-10-CM

## 2025-01-07 DIAGNOSIS — C15.9 ESOPHAGEAL CANCER, STAGE IV: ICD-10-CM

## 2025-01-07 DIAGNOSIS — D84.821 IMMUNODEFICIENCY DUE TO CHEMOTHERAPY: ICD-10-CM

## 2025-01-07 DIAGNOSIS — T45.1X5A IMMUNODEFICIENCY DUE TO CHEMOTHERAPY: ICD-10-CM

## 2025-01-07 DIAGNOSIS — K26.4 DUODENAL ULCER WITH HEMORRHAGE: Primary | ICD-10-CM

## 2025-01-07 DIAGNOSIS — Z79.899 IMMUNODEFICIENCY DUE TO CHEMOTHERAPY: ICD-10-CM

## 2025-01-07 DIAGNOSIS — C15.9 ESOPHAGEAL CANCER, STAGE IV: Primary | ICD-10-CM

## 2025-01-07 DIAGNOSIS — D50.0 IRON DEFICIENCY ANEMIA DUE TO CHRONIC BLOOD LOSS: ICD-10-CM

## 2025-01-07 DIAGNOSIS — C79.89 MALIGNANT NEOPLASM METASTATIC TO OTHER SITE: ICD-10-CM

## 2025-01-07 DIAGNOSIS — C78.7 SECONDARY LIVER CANCER: ICD-10-CM

## 2025-01-07 PROCEDURE — 1159F MED LIST DOCD IN RCRD: CPT | Mod: CPTII,S$GLB,, | Performed by: INTERNAL MEDICINE

## 2025-01-07 PROCEDURE — 3078F DIAST BP <80 MM HG: CPT | Mod: CPTII,S$GLB,, | Performed by: FAMILY MEDICINE

## 2025-01-07 PROCEDURE — 3074F SYST BP LT 130 MM HG: CPT | Mod: CPTII,S$GLB,, | Performed by: INTERNAL MEDICINE

## 2025-01-07 PROCEDURE — 99215 OFFICE O/P EST HI 40 MIN: CPT | Mod: S$GLB,,, | Performed by: INTERNAL MEDICINE

## 2025-01-07 PROCEDURE — 3074F SYST BP LT 130 MM HG: CPT | Mod: CPTII,S$GLB,, | Performed by: FAMILY MEDICINE

## 2025-01-07 PROCEDURE — 99999 PR PBB SHADOW E&M-EST. PATIENT-LVL III: CPT | Mod: PBBFAC,,, | Performed by: FAMILY MEDICINE

## 2025-01-07 PROCEDURE — 1160F RVW MEDS BY RX/DR IN RCRD: CPT | Mod: CPTII,S$GLB,, | Performed by: INTERNAL MEDICINE

## 2025-01-07 PROCEDURE — 99496 TRANSJ CARE MGMT HIGH F2F 7D: CPT | Mod: S$GLB,,, | Performed by: FAMILY MEDICINE

## 2025-01-07 PROCEDURE — 3008F BODY MASS INDEX DOCD: CPT | Mod: CPTII,S$GLB,, | Performed by: INTERNAL MEDICINE

## 2025-01-07 PROCEDURE — 3078F DIAST BP <80 MM HG: CPT | Mod: CPTII,S$GLB,, | Performed by: INTERNAL MEDICINE

## 2025-01-07 PROCEDURE — 99999 PR PBB SHADOW E&M-EST. PATIENT-LVL III: CPT | Mod: PBBFAC,,, | Performed by: INTERNAL MEDICINE

## 2025-01-07 RX ORDER — FLUOROURACIL 50 MG/ML
160 INJECTION, SOLUTION INTRAVENOUS
OUTPATIENT
Start: 2025-01-07

## 2025-01-07 RX ORDER — PROCHLORPERAZINE EDISYLATE 5 MG/ML
10 INJECTION INTRAMUSCULAR; INTRAVENOUS ONCE AS NEEDED
OUTPATIENT
Start: 2025-01-21

## 2025-01-07 RX ORDER — DIPHENHYDRAMINE HYDROCHLORIDE 50 MG/ML
50 INJECTION INTRAMUSCULAR; INTRAVENOUS ONCE AS NEEDED
OUTPATIENT
Start: 2025-01-07

## 2025-01-07 RX ORDER — PROCHLORPERAZINE EDISYLATE 5 MG/ML
10 INJECTION INTRAMUSCULAR; INTRAVENOUS ONCE AS NEEDED
OUTPATIENT
Start: 2025-01-07

## 2025-01-07 RX ORDER — SODIUM CHLORIDE 0.9 % (FLUSH) 0.9 %
10 SYRINGE (ML) INJECTION
OUTPATIENT
Start: 2025-01-09

## 2025-01-07 RX ORDER — EPINEPHRINE 0.3 MG/.3ML
0.3 INJECTION SUBCUTANEOUS ONCE AS NEEDED
OUTPATIENT
Start: 2025-01-07

## 2025-01-07 RX ORDER — SODIUM CHLORIDE 0.9 % (FLUSH) 0.9 %
10 SYRINGE (ML) INJECTION
OUTPATIENT
Start: 2025-01-23

## 2025-01-07 RX ORDER — EPINEPHRINE 0.3 MG/.3ML
0.3 INJECTION SUBCUTANEOUS ONCE AS NEEDED
OUTPATIENT
Start: 2025-01-21

## 2025-01-07 RX ORDER — HEPARIN 100 UNIT/ML
500 SYRINGE INTRAVENOUS
OUTPATIENT
Start: 2025-01-23

## 2025-01-07 RX ORDER — DIPHENHYDRAMINE HYDROCHLORIDE 50 MG/ML
50 INJECTION INTRAMUSCULAR; INTRAVENOUS ONCE AS NEEDED
OUTPATIENT
Start: 2025-01-21

## 2025-01-07 RX ORDER — PROCHLORPERAZINE EDISYLATE 5 MG/ML
10 INJECTION INTRAMUSCULAR; INTRAVENOUS ONCE AS NEEDED
OUTPATIENT
Start: 2025-01-23

## 2025-01-07 RX ORDER — SODIUM CHLORIDE 0.9 % (FLUSH) 0.9 %
10 SYRINGE (ML) INJECTION
OUTPATIENT
Start: 2025-01-07

## 2025-01-07 RX ORDER — SODIUM CHLORIDE 0.9 % (FLUSH) 0.9 %
10 SYRINGE (ML) INJECTION
OUTPATIENT
Start: 2025-01-21

## 2025-01-07 RX ORDER — PROCHLORPERAZINE EDISYLATE 5 MG/ML
10 INJECTION INTRAMUSCULAR; INTRAVENOUS ONCE AS NEEDED
OUTPATIENT
Start: 2025-01-09

## 2025-01-07 RX ORDER — FLUOROURACIL 50 MG/ML
160 INJECTION, SOLUTION INTRAVENOUS
OUTPATIENT
Start: 2025-01-21

## 2025-01-07 RX ORDER — HEPARIN 100 UNIT/ML
500 SYRINGE INTRAVENOUS
OUTPATIENT
Start: 2025-01-07

## 2025-01-07 RX ORDER — HEPARIN 100 UNIT/ML
500 SYRINGE INTRAVENOUS
OUTPATIENT
Start: 2025-01-09

## 2025-01-07 RX ORDER — HEPARIN 100 UNIT/ML
500 SYRINGE INTRAVENOUS
OUTPATIENT
Start: 2025-01-21

## 2025-01-07 NOTE — PROGRESS NOTES
Subjective:       Patient ID: Rosalio Muñoz is a 59 y.o. male.    Chief Complaint: Results, Chemotherapy, and Esophageal Cancer    HPI:  59-year-old female history of metastatic esophageal carcinoma patient was recently admitted to the hospital with a bleeding gastric ulcer patient is feeling much better at the present time denies nausea vomiting fevers chills night sweats patient had recent CTs in here for review ECOG status 1    Past Medical History:   Diagnosis Date    Esophageal cancer, stage IV 10/10/2024    Hypertension     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 10/11/2024    Palliative care encounter 2024     Family History   Problem Relation Name Age of Onset    Hyperlipidemia Mother      Hypertension Father      Diabetes Father      Kidney disease Father      Heart disease Father      Breast cancer Maternal Grandmother      Prostate cancer Maternal Grandfather      Colon cancer Neg Hx       Social History     Socioeconomic History    Marital status:     Number of children: 2   Occupational History    Occupation:    Tobacco Use    Smoking status: Every Day     Current packs/day: 0.00     Types: Cigars, Cigarettes     Last attempt to quit: 10/4/2022     Years since quittin.2     Passive exposure: Never    Smokeless tobacco: Never    Tobacco comments:     Cigar every afternoon   Substance and Sexual Activity    Alcohol use: Yes     Alcohol/week: 4.0 - 6.0 standard drinks of alcohol     Types: 4 - 6 Cans of beer per week     Comment: occ    Drug use: Yes     Types: Marijuana     Comment: medical    Sexual activity: Yes     Partners: Female     Social Drivers of Health     Financial Resource Strain: Patient Declined (2024)    Overall Financial Resource Strain (CARDIA)     Difficulty of Paying Living Expenses: Patient declined   Recent Concern: Financial Resource Strain - Medium Risk (2024)    Overall Financial Resource Strain (CARDIA)     Difficulty  of Paying Living Expenses: Somewhat hard   Food Insecurity: Patient Declined (12/30/2024)    Hunger Vital Sign     Worried About Running Out of Food in the Last Year: Patient declined     Ran Out of Food in the Last Year: Patient declined   Transportation Needs: Patient Declined (12/30/2024)    TRANSPORTATION NEEDS     Transportation : Patient declined   Physical Activity: Insufficiently Active (11/5/2024)    Exercise Vital Sign     Days of Exercise per Week: 2 days     Minutes of Exercise per Session: 20 min   Stress: Patient Declined (12/30/2024)    [employer] - Occupational Stress Questionnaire     Feeling of Stress : Patient declined   Housing Stability: Patient Declined (12/30/2024)    Housing Stability Vital Sign     Unable to Pay for Housing in the Last Year: Patient declined     Homeless in the Last Year: Patient declined     Past Surgical History:   Procedure Laterality Date    COLONOSCOPY N/A 8/25/2023    Procedure: COLONOSCOPY;  Surgeon: Pebbles Spear MD;  Location: [facility];  Service: Endoscopy;  Laterality: N/A;    COLONOSCOPY N/A 10/2/2024    Procedure: COLONOSCOPY  Cardiac clearance received on 09/20/24 per Dr. Lockett, Cardiology.  Note in encounters.  LB;  Surgeon: Sully Farris MD;  Location: [facility];  Service: Endoscopy;  Laterality: N/A;    ESOPHAGOGASTRODUODENOSCOPY N/A 10/2/2024    Procedure: EGD (ESOPHAGOGASTRODUODENOSCOPY);  Surgeon: Sully Farris MD;  Location: [facility];  Service: Endoscopy;  Laterality: N/A;    ESOPHAGOGASTRODUODENOSCOPY N/A 12/31/2024    Procedure: EGD (ESOPHAGOGASTRODUODENOSCOPY);  Surgeon: Maki Sullivan MD;  Location: [facility];  Service: Gastroenterology;  Laterality: N/A;    INSERTION OF VENOUS ACCESS PORT Left 10/21/2024    Procedure: INSERTION, VENOUS ACCESS PORT;  Surgeon: Roseanna Rosas MD;  Location: [facility];  Service: General;  Laterality: Left;    SURGICAL REMOVAL OF LESION OF FACE Right 12/1/2023     "Procedure: EXCISION, LESION, FACE;  Surgeon: Jose Flaherty MD;  Location: [facility] OR;  Service: ENT;  Laterality: Right;  1. Excision facial subcutaneous mass right cheek 3 cm. 2. Intermediate layered closure 3.5 cm    WISDOM TOOTH EXTRACTION      XI ROBOTIC APPENDECTOMY N/A 7/31/2024    Procedure: XI ROBOTIC APPENDECTOMY;  Surgeon: Paulo Saavedra MD;  Location: [facility] OR;  Service: General;  Laterality: N/A;    XI ROBOTIC INSERTION, GASTROSTOMY TUBE N/A 10/21/2024    Procedure: XI ROBOTIC INSERTION, GASTROSTOMY TUBE;  Surgeon: Roseanna Rosas MD;  Location: [facility] OR;  Service: General;  Laterality: N/A;       Labs:  Lab Results   Component Value Date    WBC 4.79 01/01/2025    HGB 8.3 (L) 01/01/2025    HCT 25.3 (L) 01/01/2025    MCV 97 01/01/2025     01/01/2025     BMP  Lab Results   Component Value Date     (L) 01/01/2025    K 3.5 01/01/2025     01/01/2025    CO2 24 01/01/2025    BUN 12 01/01/2025    CREATININE 0.6 01/01/2025    CALCIUM 8.9 01/01/2025    ANIONGAP 8 01/01/2025    ESTGFRAFRICA >60.0 07/08/2022    EGFRNONAA >60.0 07/08/2022     Lab Results   Component Value Date    ALT 21 12/30/2024    AST 21 12/30/2024    ALKPHOS 71 12/30/2024    BILITOT 0.4 12/30/2024       Lab Results   Component Value Date    IRON 72 12/03/2024    TIBC 432 12/03/2024    FERRITIN 272 12/03/2024     Lab Results   Component Value Date    DOXSVKQV39 445 10/19/2018     No results found for: "FOLATE"  Lab Results   Component Value Date    TSH 3.465 08/20/2024         Review of Systems   Constitutional:  Positive for fatigue. Negative for activity change, appetite change, chills, diaphoresis, fever and unexpected weight change.   HENT:  Negative for congestion, dental problem, drooling, ear discharge, ear pain, facial swelling, hearing loss, mouth sores, nosebleeds, postnasal drip, rhinorrhea, sinus pressure, sneezing, sore throat, tinnitus, trouble swallowing and voice change.    Eyes:  Negative for photophobia, " pain, discharge, redness, itching and visual disturbance.   Respiratory:  Negative for apnea, cough, choking, chest tightness, shortness of breath, wheezing and stridor.    Cardiovascular:  Negative for chest pain, palpitations and leg swelling.   Gastrointestinal:  Negative for abdominal distention, abdominal pain, anal bleeding, blood in stool, constipation, diarrhea, nausea, rectal pain and vomiting.   Endocrine: Negative for cold intolerance, heat intolerance, polydipsia, polyphagia and polyuria.   Genitourinary:  Negative for decreased urine volume, difficulty urinating, dysuria, enuresis, flank pain, frequency, genital sores, hematuria, penile discharge, penile pain, penile swelling, scrotal swelling, testicular pain and urgency.   Musculoskeletal:  Negative for arthralgias, back pain, gait problem, joint swelling, myalgias, neck pain and neck stiffness.   Skin:  Negative for color change, pallor, rash and wound.   Allergic/Immunologic: Negative for environmental allergies, food allergies and immunocompromised state.   Neurological:  Positive for weakness. Negative for dizziness, tremors, seizures, syncope, facial asymmetry, speech difficulty, light-headedness, numbness and headaches.   Hematological:  Negative for adenopathy. Does not bruise/bleed easily.   Psychiatric/Behavioral:  Negative for agitation, behavioral problems, confusion, decreased concentration, dysphoric mood, hallucinations, self-injury, sleep disturbance and suicidal ideas. The patient is not nervous/anxious and is not hyperactive.        Objective:      Physical Exam  Vitals reviewed.   Constitutional:       General: He is not in acute distress.     Appearance: He is well-developed. He is not diaphoretic.   HENT:      Head: Normocephalic.      Right Ear: External ear normal.      Left Ear: External ear normal.      Nose: Nose normal.      Right Sinus: No maxillary sinus tenderness or frontal sinus tenderness.      Left Sinus: No maxillary  sinus tenderness or frontal sinus tenderness.      Mouth/Throat:      Pharynx: No oropharyngeal exudate.   Eyes:      General: Lids are normal. No scleral icterus.        Right eye: No discharge.         Left eye: No discharge.      Extraocular Movements:      Right eye: Normal extraocular motion.      Left eye: Normal extraocular motion.      Conjunctiva/sclera:      Right eye: Right conjunctiva is not injected. No hemorrhage.     Left eye: Left conjunctiva is not injected. No hemorrhage.     Pupils: Pupils are equal, round, and reactive to light.   Neck:      Thyroid: No thyromegaly.      Vascular: No JVD.      Trachea: No tracheal deviation.   Cardiovascular:      Rate and Rhythm: Normal rate.   Pulmonary:      Effort: Pulmonary effort is normal. No respiratory distress.      Breath sounds: No stridor.   Abdominal:      General: Bowel sounds are normal.      Palpations: Abdomen is soft. There is no hepatomegaly, splenomegaly or mass.      Tenderness: There is no abdominal tenderness.   Musculoskeletal:         General: No tenderness. Normal range of motion.      Cervical back: Normal range of motion and neck supple.   Lymphadenopathy:      Head:      Right side of head: No posterior auricular or occipital adenopathy.      Left side of head: No posterior auricular or occipital adenopathy.      Cervical: No cervical adenopathy.      Right cervical: No superficial, deep or posterior cervical adenopathy.     Left cervical: No superficial, deep or posterior cervical adenopathy.      Upper Body:      Right upper body: No supraclavicular adenopathy.      Left upper body: No supraclavicular adenopathy.   Skin:     General: Skin is dry.      Findings: No erythema or rash.      Nails: There is no clubbing.   Neurological:      Mental Status: He is alert and oriented to person, place, and time.      Cranial Nerves: No cranial nerve deficit.      Coordination: Coordination normal.   Psychiatric:         Behavior: Behavior  normal.         Thought Content: Thought content normal.         Judgment: Judgment normal.             Assessment:      1. Esophageal cancer, stage IV    2. Iron deficiency anemia due to chronic blood loss    3. 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    4. Secondary adenocarcinoma of retroperitoneum    5. Secondary liver cancer    6. Immunodeficiency due to chemotherapy           Med Onc Chart Routing      Follow up with physician . Patient would like to be treated on 01/15/2025.  For next cycle of treatment   Follow up with JORDANA    Infusion scheduling note    Injection scheduling note    Labs    Imaging    Pharmacy appointment    Other referrals                   Plan:      extensive conversation with patient and family dose modifications noted because of pharmacogenomic testing.  In addition patient has had a response demonstrated in reviewed personally photographs taken of CTs.  Will return to clinic in follow-up in 2 weeks after treatment start again on 01/15/2025 at patient's request orders written reviewed        Hudson Latif Jr, MD FACP

## 2025-01-07 NOTE — PROGRESS NOTES
Subjective:       Patient ID: Rosalio Muñoz is a 59 y.o. male.    Chief Complaint: Hospital Follow Up    Transitional Care Note    Family and/or Caretaker present at visit?  Yes.  Diagnostic tests reviewed/disposition: No diagnosic tests pending after this hospitalization.  Disease/illness education: see A/P  Home health/community services discussion/referrals: Patient does not have home health established from hospital visit.  They do not need home health.  If needed, we will set up home health for the patient.   Establishment or re-establishment of referral orders for community resources: No other necessary community resources.   Discussion with other health care providers: No discussion with other health care providers necessary.     History of Present Illness    Patient presents to clinic today for hospital TCC visit. Patient was recently hospitalized for duodenal ulcer with hemorrhage.  - Patient reports improvement since his recent hospital stay. He has been adhering to the prescribed regimen of Protonix twice daily and Carafate as directed by Dr. Sullivan. An endoscopy is scheduled in 2 months, and a virtual appointment with Dr. Krishna, another GI specialist, is set for Friday.  - Patient has been undergoing cancer treatment for approximately 4 months. Recent scans have shown positive results, with reduction in liver lesions, the index lymph node, and the tumor itself. These findings were discovered during the patient's last 2 hospital stays. Regular chemotherapy treatments are scheduled to resume next week.  - Patient reports consuming a fried pork chop with onion rings without any adverse effects. He has been attempting to consume spicier foods at lunchtime to avoid recumbency immediately after eating. He reports cravings for sweet foods such as apple pie ice cream, which he speculates might be related to his chemotherapy medications.  - Patient is currently taking Zyprexa as prescribed by Dr. Latif,  "which may be stimulating his appetite.   - Patient denies receiving a flu vaccine this season or going out in public or being around large crowds.  Patient is otherwise without concerns today.    ROS:  General: -fever, -chills, -fatigue, -weight gain, +WEIGHT LOSS  Eyes: -eye pain, -vision change  ENMT: -hearing loss, -ear pain, -nasal congestion, -rhinorrhea, -dental problem, -trouble swallowing  Cardiovascular: -chest pain, -palpitations, -lower extremity edema  Respiratory: -cough, -trouble breathing  Gastrointestinal: -abdominal pain, -abdominal swelling, -nausea, -vomiting, -diarrhea, -constipation, -blood in stool, +MELENA  Genitourinary: -difficulty urinating, -genital problem  Musculoskeletal: -joint pain, -muscle aches  Integumentary: -rash  Lymphatics: -swollen glands, -easy bruising  Neurologic: -headache, -dizziness, -numbness, -weakness  Psychiatric: -anxiety, -depression, -sleep difficulty       Review of Systems   Constitutional:  Negative for chills, fatigue, fever and unexpected weight change.   Eyes:  Negative for visual disturbance.   Respiratory:  Negative for shortness of breath.    Cardiovascular:  Negative for chest pain.   Musculoskeletal:  Negative for myalgias.   Neurological:  Negative for headaches.         Objective:     Vitals:    01/07/25 1408   BP: 112/68   Pulse: 99   SpO2: 99%   Weight: 74.8 kg (164 lb 14.5 oz)   Height: 6' 1" (1.854 m)          Physical Exam  Vitals reviewed.   Constitutional:       General: He is not in acute distress.     Appearance: He is well-developed.   HENT:      Head: Normocephalic and atraumatic.   Eyes:      General: Lids are normal. No scleral icterus.     Extraocular Movements: Extraocular movements intact.      Conjunctiva/sclera: Conjunctivae normal.      Pupils: Pupils are equal, round, and reactive to light.   Pulmonary:      Effort: Pulmonary effort is normal.   Neurological:      Mental Status: He is alert and oriented to person, place, and time. "      Cranial Nerves: No cranial nerve deficit.      Gait: Gait normal.   Psychiatric:         Mood and Affect: Mood and affect normal.           Assessment:       1. Duodenal ulcer with hemorrhage    2. Esophageal cancer, stage IV        Plan:   1. Duodenal ulcer with hemorrhage  Overview:  EGD 12/31/24: DU in bulb, posterior wall with pigmented material. S/p epi and thermal therapy. Gastric biopsies taken for HP. Normal 2nd portion. Esophageal mass ulceration at 40cm/distal esophagus.       2. Esophageal cancer, stage IV  Overview:  Followed by Hematology/Oncology, continue current treatment plan         Assessment & Plan    DUODENAL ULCER   Monitor patient's improved condition post-hospital stay.   Continue Protonix 40mg twice daily for 2 months, then consider reducing to daily.   Administer Carafate for 2 weeks as prescribed.   Advise patient on dietary restrictions: maintain bland diet, gradually advance, avoid spicy, fried, tomato-based, citrus foods, alcohol, chocolate, coffee, and soda.   Instruct patient not to lie down within 2 hours of eating and avoid overeating.   Keep scheduled follow-up with Dr. Sullivan in 2 months for another endoscopy.   Keep follow up with Dr. Krishna this Friday.    ESOPHAGEAL CANCER:   Followed by Hematology/Oncology, continue current treatment plan    Deferred blood count recheck to upcoming labs with oncologist.   Postponed flu vaccination decision to oncology team's recommendation.   Deferred annual physical exam due to ongoing cancer treatments.   Attributed weight loss to cancer treatments.    INFECTION PREVENTION:   Discussed importance of hand hygiene and avoiding crowds during cancer treatment.   Advised taking precautions to avoid illness: stay away from sick people, consider wearing a mask in public places, and limit exposure to large crowds.    FOLLOW UP:   In[facility] to reschedule annual physical after completion of cancer treatment.    Contact the office if any concerns arise or if oncologist recommends follow-up (e.g., for blood pressure management).       Patient and his wife expressed understanding and agreement with plan.    Follow up if symptoms worsen or fail to improve, for keep routine follow up with PCP.    This note was generated with the assistance of ambient listening technology. Verbal consent was obtained by the patient and accompanying visitor(s) for the recording of patient appointment to facilitate this note. I attest to having reviewed and edited the generated note for accuracy, though some syntax or spelling errors may persist. Please contact the author of this note for any clarification.

## 2025-01-10 ENCOUNTER — PATIENT MESSAGE (OUTPATIENT)
Dept: GASTROENTEROLOGY | Facility: CLINIC | Age: 60
End: 2025-01-10

## 2025-01-10 ENCOUNTER — PATIENT OUTREACH (OUTPATIENT)
Dept: ADMINISTRATIVE | Facility: CLINIC | Age: 60
End: 2025-01-10
Payer: COMMERCIAL

## 2025-01-10 ENCOUNTER — OFFICE VISIT (OUTPATIENT)
Dept: GASTROENTEROLOGY | Facility: CLINIC | Age: 60
End: 2025-01-10
Payer: COMMERCIAL

## 2025-01-10 DIAGNOSIS — Z93.1 GASTROSTOMY STATUS: ICD-10-CM

## 2025-01-10 DIAGNOSIS — K26.4 DUODENAL ULCER WITH HEMORRHAGE: Primary | ICD-10-CM

## 2025-01-10 DIAGNOSIS — C15.9 ESOPHAGEAL CANCER, STAGE IV: ICD-10-CM

## 2025-01-10 NOTE — PATIENT INSTRUCTIONS
01/10/2025    Dear Rosalio Muñoz,    We are writing to express our heartfelt gratitude for choosing us as your healthcare provider and entrusting us with your well-being. Your health and satisfaction are our top priorities, and we are truly honored to have the opportunity to serve you.    At [facility], we strive to provide the best possible care and support for our patients. My assessment and recommendations are as follows:    Duodenal ulcer with hemorrhage  -     Ambulatory referral/consult to Endo Procedure ; Future; Expected date: 02/25/2025    Esophageal cancer, stage IV    Gastrostomy status    Repeat an upper endoscopy at the end of February. Labs next week by Oncology.     We genuinely value your feedback and believe that it plays a crucial role in our pursuit of excellence. We kindly request you to take a few minutes of your time to share your experience with us by posting a Google review. Your honest thoughts and opinions can make a significant difference for others seeking healthcare services and will help us better understand how we can further enhance our patient care.    Your kind words and positive reviews will not only motivate our dedicated team but will also inspire confidence in potential patients who are looking for exceptional healthcare services.    Once again, we extend our sincere appreciation for giving us the opportunity to serve you. If there is anything else we can do to improve your experience or assist you further, please do not hesitate to reach out to us.    Thank you for being part of our [facility] family, and we look forward to continuing to provide you with the best care possible.    Thanks for trusting us with your healthcare needs and using [facility]. If you want to ask us a question, you can do so by replying to this message or by calling [phone].    Sincerely,    Andrei Krishna M.D.    PS: At [facility] we offer virtual visits. Please use your  "[employer] luis to schedule a virtual visit.     To rate your experience with Dr. Krishna please click on the link below:    [email or web address]    To post a review, simply follow these quick steps:  1. Visit [employer] or search for my name on Google.  2. Click on the "Write a review" button on the left-hand side of the page.  3. Rate your experience by choosing the number of stars that reflect your satisfaction.  4. Share your thoughts and insights about your visit - we'd love to hear your feedback!    "

## 2025-01-10 NOTE — PROGRESS NOTES
The patient location is: Home  The chief complaint leading to consultation is:   1. Duodenal ulcer with hemorrhage  Ambulatory referral/consult to Endo Procedure       2. Esophageal cancer, stage IV        3. Gastrostomy status              Visit type: audiovisual    Face to Face time with patient: 15 minutes of total time spent on the encounter, which includes face to face time and non-face to face time preparing to see the patient (eg, review of tests), Obtaining and/or reviewing separately obtained history, Documenting clinical information in the electronic or other health record, Independently interpreting results (not separately reported) and communicating results to the patient/family/caregiver, or Care coordination (not separately reported).   Each patient to whom he or she provides medical services by telemedicine is:  (1) informed of the relationship between the physician and patient and the respective role of any other health care provider with respect to management of the patient; and (2) notified that he or she may decline to receive medical services by telemedicine and may withdraw from such care at any time.    Notes:     [facility]    GI Bleeding: Patient was admitted last month with melena. Symptoms have been present for approximately several weeks. The symptoms are completely resolved. This has been associated with difficulty swallowing, early satiety, hematemesis, melena, nausea, and regurgitation of undigested food.  He denies unexpected weight loss and wheezing.  He has not used nonsteroidal anti-inflammatory drugs on a regular bases; he is not anticoagulated.  The patient currently denies significant abdominal pain or discomfort.  There is a past history of gastrointestinal bleeding.   Patient was recently diagnosed with esophageal cancer and 12 days ago was diagnosed with a bleeding duodenal ulcer that was endoscopically treated. He is doing much better now and is able to  eat and use the feeding tube for nutrition.     Past Medical History:   Diagnosis Date    Esophageal cancer, stage IV 10/10/2024    Hypertension     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 10/11/2024    Palliative care encounter 12/11/2024     Past Surgical History:   Procedure Laterality Date    COLONOSCOPY N/A 8/25/2023    Procedure: COLONOSCOPY;  Surgeon: Pebbles Spear MD;  Location: [facility];  Service: Endoscopy;  Laterality: N/A;    COLONOSCOPY N/A 10/2/2024    Procedure: COLONOSCOPY  Cardiac clearance received on 09/20/24 per Dr. Lockett, Cardiology.  Note in encounters.  LB;  Surgeon: Sully Farris MD;  Location: [facility];  Service: Endoscopy;  Laterality: N/A;    ESOPHAGOGASTRODUODENOSCOPY N/A 10/2/2024    Procedure: EGD (ESOPHAGOGASTRODUODENOSCOPY);  Surgeon: Sully Farris MD;  Location: [facility];  Service: Endoscopy;  Laterality: N/A;    ESOPHAGOGASTRODUODENOSCOPY N/A 12/31/2024    Procedure: EGD (ESOPHAGOGASTRODUODENOSCOPY);  Surgeon: Maki Sullivan MD;  Location: [facility];  Service: Gastroenterology;  Laterality: N/A;    INSERTION OF VENOUS ACCESS PORT Left 10/21/2024    Procedure: INSERTION, VENOUS ACCESS PORT;  Surgeon: Roseanna Rosas MD;  Location: [facility];  Service: General;  Laterality: Left;    SURGICAL REMOVAL OF LESION OF FACE Right 12/1/2023    Procedure: EXCISION, LESION, FACE;  Surgeon: Jose Flaherty MD;  Location: [facility];  Service: ENT;  Laterality: Right;  1. Excision facial subcutaneous mass right cheek 3 cm. 2. Intermediate layered closure 3.5 cm    WISDOM TOOTH EXTRACTION      XI ROBOTIC APPENDECTOMY N/A 7/31/2024    Procedure: XI ROBOTIC APPENDECTOMY;  Surgeon: Paulo Saavedra MD;  Location: [facility] OR;  Service: General;  Laterality: N/A;    XI ROBOTIC INSERTION, GASTROSTOMY TUBE N/A 10/21/2024    Procedure: XI ROBOTIC INSERTION, GASTROSTOMY TUBE;  Surgeon: Roseanna Rosas MD;  Location: [facility] OR;  Service: General;  Laterality: N/A;     Current  Outpatient Medications on File Prior to Visit   Medication Sig Dispense Refill    LIDOcaine-prilocaine (EMLA) cream Apply topically as needed. 5 g 11    metoclopramide HCl (REGLAN) 10 MG tablet Take 1 tablet (10 mg total) by mouth every 6 (six) hours as needed (nausea, vomiting). 30 tablet 2    OLANZapine (ZYPREXA) 5 MG tablet Take 1 tablet (5 mg total) by mouth every evening. Take nightly on days 1-3 of each chemotherapy cycle. 6 tablet 11    ondansetron (ZOFRAN-ODT) 8 MG TbDL Take 1 tablet (8 mg total) by mouth 2 (two) times daily. 20 tablet 11    pantoprazole (PROTONIX) 40 MG tablet Take 1 tablet (40 mg total) by mouth 2 (two) times daily. 180 tablet 3    sucralfate (CARAFATE) 1 gram tablet Take 1 tablet (1 g total) by mouth 4 (four) times daily. crush and place in 20mL of water for 14 days 56 tablet 0     No current facility-administered medications on file prior to visit.     Lab Results   Component Value Date    WBC 4.79 01/01/2025    HGB 8.3 (L) 01/01/2025    HCT 25.3 (L) 01/01/2025    MCV 97 01/01/2025     01/01/2025       BMP  Lab Results   Component Value Date     (L) 01/01/2025    K 3.5 01/01/2025     01/01/2025    CO2 24 01/01/2025    BUN 12 01/01/2025    CREATININE 0.6 01/01/2025    CALCIUM 8.9 01/01/2025    ANIONGAP 8 01/01/2025    EGFRNORACEVR >60 01/01/2025     Lab Results   Component Value Date    ALT 21 12/30/2024    AST 21 12/30/2024    ALKPHOS 71 12/30/2024    BILITOT 0.4 12/30/2024     Duodenal ulcer with hemorrhage  -     Ambulatory referral/consult to Endo Procedure ; Future; Expected date: 02/25/2025    Esophageal cancer, stage IV    Gastrostomy status      Repeat EGD before by the end of February to document healing of the ulcer. Patient is having labs next week by Oncology.     Thanks for letting us participate in the care of your patient.    Andrei Krishna M.D.

## 2025-01-14 ENCOUNTER — HOSPITAL ENCOUNTER (OUTPATIENT)
Dept: PREADMISSION TESTING | Facility: HOSPITAL | Age: 60
Discharge: HOME | End: 2025-01-14
Attending: INTERNAL MEDICINE
Payer: COMMERCIAL

## 2025-01-14 DIAGNOSIS — K26.4 DUODENAL ULCER WITH HEMORRHAGE: ICD-10-CM

## 2025-01-14 DIAGNOSIS — C15.9 ESOPHAGEAL CANCER, STAGE IV: Primary | ICD-10-CM

## 2025-01-14 NOTE — PROGRESS NOTES
Subjective:       Patient ID: Rosalio Muñoz is a 59 y.o. male.    Chief Complaint:   1. Esophageal cancer, stage IV  Stage IVB (cTX, cN0, cM1, G2)       2. Secondary adenocarcinoma of retroperitoneum        3. Secondary liver cancer          Current Treatment:  OP GASTROESOPHAGEAL pembrolizumab Q6W trastuzumab + MFOLFOX6 (oxaliplatin leucovorin fluorouracil) Q2W    Treatment History:    HPI: This is a 59 year old  male with HTN who is seen in Hem/Onc for metastatic esophageal cancer. He was seen initially in 10/2024 with complaints of increasing difficulty swallowing and weight loss. PET in 10/2024 noted a  hypermetabolic distal esophageal and gastric cardia mass with maximum SUV of 19, an enlarged hypermetabolic gastrohepatic ligament lymph nodes measuring 3.1 x 2.3 cm with maximum SUV of 5.8, and a  2.2cm hypermetabolic right posterior hepatic lobe lesion. This was interpreted as gastroesophageal cancer with metastatic lymphadenopathy and right hepatic lobe metastasis. Biopsy proved esophageal adenocarcinoma with metastatic carcinoma in the liver, PDL-1 +, Her2 +.     He was started on FOLFOX/Herceptin every 2 weeks with Keytruda every 6 weeks.     In 12/2024, he was admitted to the hospital for bleeding gastric ulcer    His primary Hematologist/Oncologist is Dr. Latif.    Interval History: Patient presents for follow up on FOLFOX/Herceptin/Keytruda; He is scheduled to receive C2D15 today. He presents in a parked vehicle; a significant other attends the visit with him. He reports feeling much better since receiving a unit of blood while in the hospital for GI bleed. He has no complaints today; he reports a good appetite and normal Bms. He denies n/v/d/c and neuropathy/tinnitus. WBC, plts, ANC WNL. Anemia improved; he states he has more energy. CMP unremarkable. Will proceed with treatment today.     Reviewed labs with patient:   CBC:   Recent Labs   Lab 01/15/25  0828   WBC 7.77   RBC 3.12 L    Hemoglobin 10.3 L   Hematocrit 31.7 L   Platelets 395    H   MCH 33.0 H   MCHC 32.5     CMP:  Recent Labs   Lab 01/15/25  0828   Glucose 99   Calcium 9.5   Albumin 3.4 L   Total Protein 7.1   Sodium 137   Potassium 4.5   CO2 24   Chloride 104   BUN 10   Creatinine 0.8   Alkaline Phosphatase 86   ALT 13   AST 14   Total Bilirubin 0.4     The patient location is: [place]  The chief complaint leading to consultation is: esophageal cancer    Visit type: audiovisual    Face to Face time with patient: 5 minutes  19 minutes of total time spent on the encounter, which includes face to face time and non-face to face time preparing to see the patient (eg, review of tests), Obtaining and/or reviewing separately obtained history, Documenting clinical information in the electronic or other health record, Independently interpreting results (not separately reported) and communicating results to the patient/family/caregiver, or Care coordination (not separately reported).     Each patient to whom he or she provides medical services by telemedicine is:  (1) informed of the relationship between the physician and patient and the respective role of any other health care provider with respect to management of the patient; and (2) notified that he or she may decline to receive medical services by telemedicine and may withdraw from such care at any time.    Social History     Socioeconomic History    Marital status:     Number of children: 2   Occupational History    Occupation:    Tobacco Use    Smoking status: Every Day     Current packs/day: 0.00     Types: Cigars, Cigarettes     Last attempt to quit: 10/4/2022     Years since quittin.2     Passive exposure: Never    Smokeless tobacco: Never    Tobacco comments:     Cigar every afternoon   Substance and Sexual Activity    Alcohol use: Yes     Alcohol/week: 4.0 - 6.0 standard drinks of alcohol     Types: 4 - 6 Cans of beer per week      Comment: occ    Drug use: Yes     Types: Marijuana     Comment: medical    Sexual activity: Yes     Partners: Female     Social Drivers of Health     Financial Resource Strain: Patient Declined (12/30/2024)    Overall Financial Resource Strain (CARDIA)     Difficulty of Paying Living Expenses: Patient declined   Recent Concern: Financial Resource Strain - Medium Risk (11/5/2024)    Overall Financial Resource Strain (CARDIA)     Difficulty of Paying Living Expenses: Somewhat hard   Food Insecurity: Patient Declined (12/30/2024)    Hunger Vital Sign     Worried About Running Out of Food in the Last Year: Patient declined     Ran Out of Food in the Last Year: Patient declined   Transportation Needs: Patient Declined (12/30/2024)    TRANSPORTATION NEEDS     Transportation : Patient declined   Physical Activity: Insufficiently Active (11/5/2024)    Exercise Vital Sign     Days of Exercise per Week: 2 days     Minutes of Exercise per Session: 20 min   Stress: Patient Declined (12/30/2024)    [employer] [place] [employer] - Occupational Stress Questionnaire     Feeling of Stress : Patient declined   Housing Stability: Patient Declined (12/30/2024)    Housing Stability Vital Sign     Unable to Pay for Housing in the Last Year: Patient declined     Homeless in the Last Year: Patient declined     Past Medical History:   Diagnosis Date    Esophageal cancer, stage IV 10/10/2024    Hypertension     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 10/11/2024    Palliative care encounter 12/11/2024     Family History   Problem Relation Name Age of Onset    Hyperlipidemia Mother      Hypertension Father      Diabetes Father      Kidney disease Father      Heart disease Father      Breast cancer Maternal Grandmother      Prostate cancer Maternal Grandfather      Colon cancer Neg Hx       Past Surgical History:   Procedure Laterality Date    COLONOSCOPY N/A 8/25/2023    Procedure: COLONOSCOPY;  Surgeon: Pebbles Spear MD;   Location: [facility];  Service: Endoscopy;  Laterality: N/A;    COLONOSCOPY N/A 10/2/2024    Procedure: COLONOSCOPY  Cardiac clearance received on 09/20/24 per Dr. Lockett, Cardiology.  Note in encounters.  LB;  Surgeon: Sully Farris MD;  Location: [facility];  Service: Endoscopy;  Laterality: N/A;    ESOPHAGOGASTRODUODENOSCOPY N/A 10/2/2024    Procedure: EGD (ESOPHAGOGASTRODUODENOSCOPY);  Surgeon: Sully Farris MD;  Location: [facility];  Service: Endoscopy;  Laterality: N/A;    ESOPHAGOGASTRODUODENOSCOPY N/A 12/31/2024    Procedure: EGD (ESOPHAGOGASTRODUODENOSCOPY);  Surgeon: Maki Sullivan MD;  Location: [facility];  Service: Gastroenterology;  Laterality: N/A;    INSERTION OF VENOUS ACCESS PORT Left 10/21/2024    Procedure: INSERTION, VENOUS ACCESS PORT;  Surgeon: Roseanna Rosas MD;  Location: [facility];  Service: General;  Laterality: Left;    SURGICAL REMOVAL OF LESION OF FACE Right 12/1/2023    Procedure: EXCISION, LESION, FACE;  Surgeon: Jose Flaherty MD;  Location: [facility];  Service: ENT;  Laterality: Right;  1. Excision facial subcutaneous mass right cheek 3 cm. 2. Intermediate layered closure 3.5 cm    WISDOM TOOTH EXTRACTION      XI ROBOTIC APPENDECTOMY N/A 7/31/2024    Procedure: XI ROBOTIC APPENDECTOMY;  Surgeon: Paulo Saavedra MD;  Location: [facility];  Service: General;  Laterality: N/A;    XI ROBOTIC INSERTION, GASTROSTOMY TUBE N/A 10/21/2024    Procedure: XI ROBOTIC INSERTION, GASTROSTOMY TUBE;  Surgeon: Roseanna Rosas MD;  Location: [facility] OR;  Service: General;  Laterality: N/A;     Review of Systems   Constitutional:  Negative for appetite change and fatigue.   HENT:  Negative for mouth sores, rhinorrhea and sore throat.    Eyes: Negative.    Respiratory: Negative.     Cardiovascular: Negative.    Gastrointestinal:  Negative for constipation, diarrhea, nausea and vomiting.   Endocrine: Negative.    Genitourinary: Negative.    Musculoskeletal: Negative.    Integumentary:   Negative.   Allergic/Immunologic: Negative.    Neurological:  Negative for weakness and numbness.   Hematological: Negative.    Psychiatric/Behavioral: Negative.         Medication List with Changes/Refills   Current Medications    LIDOCAINE-PRILOCAINE (EMLA) CREAM    Apply topically as needed.    METOCLOPRAMIDE HCL (REGLAN) 10 MG TABLET    Take 1 tablet (10 mg total) by mouth every 6 (six) hours as needed (nausea, vomiting).    OLANZAPINE (ZYPREXA) 5 MG TABLET    Take 1 tablet (5 mg total) by mouth every evening. Take nightly on days 1-3 of each chemotherapy cycle.    ONDANSETRON (ZOFRAN-ODT) 8 MG TBDL    Take 1 tablet (8 mg total) by mouth 2 (two) times daily.    PANTOPRAZOLE (PROTONIX) 40 MG TABLET    Take 1 tablet (40 mg total) by mouth 2 (two) times daily.    SUCRALFATE (CARAFATE) 1 GRAM TABLET    Take 1 tablet (1 g total) by mouth 4 (four) times daily. crush and place in 20mL of water for 14 days     Objective:   There were no vitals filed for this visit.  Physical Exam     Unable to assess due to virtual visit.    (1) Restricted in physically strenuous activity, ambulatory and able to do work of light nature  Assessment:     Problem List Items Addressed This Visit          Oncology    Esophageal cancer, stage IV - Primary     Stage IVB (cTX, cN0, cM1, G2) currently on FOLFOX/Herceptin/Keytruda.          Secondary adenocarcinoma of retroperitoneum    Secondary liver cancer     Plan:     Esophageal cancer, stage IV    Secondary adenocarcinoma of retroperitoneum    Secondary liver cancer    Labs reviewed.   Ok to proceed with C2D15 of FOLFOX/Herceptin/Keytruda today.  Follow up in 2 weeks with  Mg, CBC, and Comprehensive Metabolic Panel prior to C2D29.    Route Chart for Scheduling    Med Onc Chart Routing      Follow up with physician    Follow up with JORDANA 2 weeks.   Infusion scheduling note   in 2 weeks for C2D29 FOLFOX/Herceptin/Keytruda   Injection scheduling note    Labs CBC, CMP and magnesium    Scheduling:  Preferred lab:  Lab interval:  in 2 weeks   Imaging None      Pharmacy appointment No pharmacy appointment needed      Other referrals       No additional referrals needed             I will review assessment/plan with collaborating physician.      KENDRA Graves

## 2025-01-15 ENCOUNTER — INFUSION (OUTPATIENT)
Dept: INFUSION THERAPY | Facility: HOSPITAL | Age: 60
End: 2025-01-15
Attending: INTERNAL MEDICINE
Payer: COMMERCIAL

## 2025-01-15 ENCOUNTER — OFFICE VISIT (OUTPATIENT)
Dept: HEMATOLOGY/ONCOLOGY | Facility: CLINIC | Age: 60
End: 2025-01-15
Payer: COMMERCIAL

## 2025-01-15 ENCOUNTER — LAB VISIT (OUTPATIENT)
Dept: LAB | Facility: HOSPITAL | Age: 60
End: 2025-01-15
Attending: INTERNAL MEDICINE
Payer: COMMERCIAL

## 2025-01-15 ENCOUNTER — DOCUMENTATION ONLY (OUTPATIENT)
Dept: NUTRITION | Facility: CLINIC | Age: 60
End: 2025-01-15
Payer: COMMERCIAL

## 2025-01-15 VITALS
RESPIRATION RATE: 16 BRPM | SYSTOLIC BLOOD PRESSURE: 116 MMHG | BODY MASS INDEX: 22.04 KG/M2 | TEMPERATURE: 99 F | WEIGHT: 166.31 LBS | HEART RATE: 73 BPM | DIASTOLIC BLOOD PRESSURE: 67 MMHG | HEIGHT: 73 IN | OXYGEN SATURATION: 96 %

## 2025-01-15 DIAGNOSIS — C15.9 ESOPHAGEAL CANCER, STAGE IV: Primary | ICD-10-CM

## 2025-01-15 DIAGNOSIS — C78.7 SECONDARY LIVER CANCER: ICD-10-CM

## 2025-01-15 DIAGNOSIS — C78.6 SECONDARY ADENOCARCINOMA OF RETROPERITONEUM: ICD-10-CM

## 2025-01-15 DIAGNOSIS — C79.89 MALIGNANT NEOPLASM METASTATIC TO OTHER SITE: ICD-10-CM

## 2025-01-15 DIAGNOSIS — C15.9 ESOPHAGEAL ADENOCARCINOMA: ICD-10-CM

## 2025-01-15 LAB
ALBUMIN SERPL BCP-MCNC: 3.4 G/DL (ref 3.5–5.2)
ALP SERPL-CCNC: 86 U/L (ref 40–150)
ALT SERPL W/O P-5'-P-CCNC: 13 U/L (ref 10–44)
ANION GAP SERPL CALC-SCNC: 9 MMOL/L (ref 8–16)
AST SERPL-CCNC: 14 U/L (ref 10–40)
BASOPHILS # BLD AUTO: 0.05 K/UL (ref 0–0.2)
BASOPHILS NFR BLD: 0.6 % (ref 0–1.9)
BILIRUB SERPL-MCNC: 0.4 MG/DL (ref 0.1–1)
BUN SERPL-MCNC: 10 MG/DL (ref 6–20)
CALCIUM SERPL-MCNC: 9.5 MG/DL (ref 8.7–10.5)
CHLORIDE SERPL-SCNC: 104 MMOL/L (ref 95–110)
CO2 SERPL-SCNC: 24 MMOL/L (ref 23–29)
CREAT SERPL-MCNC: 0.8 MG/DL (ref 0.5–1.4)
DIFFERENTIAL METHOD BLD: ABNORMAL
EOSINOPHIL # BLD AUTO: 0.2 K/UL (ref 0–0.5)
EOSINOPHIL NFR BLD: 1.9 % (ref 0–8)
ERYTHROCYTE [DISTWIDTH] IN BLOOD BY AUTOMATED COUNT: 17.3 % (ref 11.5–14.5)
EST. GFR  (NO RACE VARIABLE): >60 ML/MIN/1.73 M^2
GLUCOSE SERPL-MCNC: 99 MG/DL (ref 70–110)
HCT VFR BLD AUTO: 31.7 % (ref 40–54)
HGB BLD-MCNC: 10.3 G/DL (ref 14–18)
IMM GRANULOCYTES # BLD AUTO: 0.03 K/UL (ref 0–0.04)
IMM GRANULOCYTES NFR BLD AUTO: 0.4 % (ref 0–0.5)
LYMPHOCYTES # BLD AUTO: 1.5 K/UL (ref 1–4.8)
LYMPHOCYTES NFR BLD: 19.4 % (ref 18–48)
MCH RBC QN AUTO: 33 PG (ref 27–31)
MCHC RBC AUTO-ENTMCNC: 32.5 G/DL (ref 32–36)
MCV RBC AUTO: 102 FL (ref 82–98)
MONOCYTES # BLD AUTO: 0.9 K/UL (ref 0.3–1)
MONOCYTES NFR BLD: 11.1 % (ref 4–15)
NEUTROPHILS # BLD AUTO: 5.2 K/UL (ref 1.8–7.7)
NEUTROPHILS NFR BLD: 66.6 % (ref 38–73)
NRBC BLD-RTO: 0 /100 WBC
PLATELET # BLD AUTO: 395 K/UL (ref 150–450)
PMV BLD AUTO: 8.8 FL (ref 9.2–12.9)
POTASSIUM SERPL-SCNC: 4.5 MMOL/L (ref 3.5–5.1)
PROT SERPL-MCNC: 7.1 G/DL (ref 6–8.4)
RBC # BLD AUTO: 3.12 M/UL (ref 4.6–6.2)
SODIUM SERPL-SCNC: 137 MMOL/L (ref 136–145)
WBC # BLD AUTO: 7.77 K/UL (ref 3.9–12.7)

## 2025-01-15 PROCEDURE — 96417 CHEMO IV INFUS EACH ADDL SEQ: CPT

## 2025-01-15 PROCEDURE — 96416 CHEMO PROLONG INFUSE W/PUMP: CPT

## 2025-01-15 PROCEDURE — 85025 COMPLETE CBC W/AUTO DIFF WBC: CPT | Performed by: INTERNAL MEDICINE

## 2025-01-15 PROCEDURE — 96411 CHEMO IV PUSH ADDL DRUG: CPT

## 2025-01-15 PROCEDURE — 96367 TX/PROPH/DG ADDL SEQ IV INF: CPT

## 2025-01-15 PROCEDURE — 96415 CHEMO IV INFUSION ADDL HR: CPT

## 2025-01-15 PROCEDURE — 96368 THER/DIAG CONCURRENT INF: CPT

## 2025-01-15 PROCEDURE — 63600175 PHARM REV CODE 636 W HCPCS: Performed by: INTERNAL MEDICINE

## 2025-01-15 PROCEDURE — 96413 CHEMO IV INFUSION 1 HR: CPT

## 2025-01-15 PROCEDURE — 36415 COLL VENOUS BLD VENIPUNCTURE: CPT | Performed by: INTERNAL MEDICINE

## 2025-01-15 PROCEDURE — 80053 COMPREHEN METABOLIC PANEL: CPT | Performed by: INTERNAL MEDICINE

## 2025-01-15 PROCEDURE — 25000003 PHARM REV CODE 250: Performed by: INTERNAL MEDICINE

## 2025-01-15 RX ORDER — FLUOROURACIL 50 MG/ML
160 INJECTION, SOLUTION INTRAVENOUS
Status: COMPLETED | OUTPATIENT
Start: 2025-01-15 | End: 2025-01-15

## 2025-01-15 RX ADMIN — SODIUM CHLORIDE 0.25 MG: 9 INJECTION, SOLUTION INTRAVENOUS at 11:01

## 2025-01-15 RX ADMIN — OXALIPLATIN 134 MG: 5 INJECTION, SOLUTION INTRAVENOUS at 12:01

## 2025-01-15 RX ADMIN — DEXTROSE MONOHYDRATE: 50 INJECTION, SOLUTION INTRAVENOUS at 11:01

## 2025-01-15 RX ADMIN — DEXTROSE MONOHYDRATE 630 MG: 50 INJECTION, SOLUTION INTRAVENOUS at 12:01

## 2025-01-15 RX ADMIN — SODIUM CHLORIDE: 9 INJECTION, SOLUTION INTRAVENOUS at 11:01

## 2025-01-15 RX ADMIN — FLUOROURACIL 315 MG: 50 INJECTION, SOLUTION INTRAVENOUS at 02:01

## 2025-01-15 RX ADMIN — FLUOROURACIL 1890 MG: 50 INJECTION, SOLUTION INTRAVENOUS at 02:01

## 2025-01-15 RX ADMIN — TRASTUZUMAB-ANNS 300 MG: 420 INJECTION, POWDER, LYOPHILIZED, FOR SOLUTION INTRAVENOUS at 10:01

## 2025-01-15 NOTE — PROGRESS NOTES
Spoke with tube fed patient Mr. Muñoz and his wife in infusion. He shared the esophageal tumor has shrunk, so he can eat well now. In the past few days, he has had scrambled eggs with hashbrowns; spaghetti and meat sauce; fried chicken with purple mendez peas and biscuits and gravy; and pork chops with lima beans and biscuits and gravy. He still takes 3 cartons per day in via feeding tube, along with eating by mouth, and he has gained 8 lb in 2 weeks, from 158 lb to 166 lb. Will call later this month to schedule follow up.  Signature: Agnes Carl, MPH, RD, LDN

## 2025-01-15 NOTE — DISCHARGE INSTRUCTIONS
.[facility]  [address] [facility]  [address]  [phone] phone     [phone] fax  Hours of Operation: Monday- Friday 8:00am- 5:00pm  After hours phone  [phone]  Hematology / Oncology Physicians on call    Dr. Salomon Carcamo        Nurse Practitioners:    Magdalena Wilson, DOMINICK Lima, DOMINICK Griffin, DOMINICK Infante, DOMINICK Bran, PA      Please don't hesitate to call if you have any concerns.       .WAYS TO HELP PREVENT INFECTION        WASH YOUR HANDS OFTEN DURING THE DAY, ESPECIALLY BEFORE YOU EAT, AFTER USING THE BATHROOM, AND AFTER TOUCHING ANIMALS    STAY AWAY FROM PEOPLE WHO HAVE ILLNESSES YOU CAN CATCH; SUCH AS COLDS, FLU, CHICKEN POX    TRY TO AVOID CROWDS    STAY AWAY FROM CHILDREN WHO RECENTLY HAVE RECEIVED LIVE VIRUS VACCINES    MAINTAIN GOOD MOUTH CARE    DO NOT SQUEEZE OR SCRATCH PIMPLES    CLEAN CUTS & SCRAPES RIGHT AWAY AND DAILY UNTIL HEALED WITH WARM WATER, SOAP & AN ANTISEPTIC    AVOID CONTACT WITH LITTER BOXES, BIRD CAGES, & FISH TANKS    AVOID STANDING WATER, IE., BIRD BATHS, FLOWER POTS/VASES, OR HUMIDIFIERS    WEAR GLOVES WHEN GARDENING OR CLEANING UP AFTER OTHERS, ESPECIALLY BABIES & SMALL CHILDREN    DO NOT EAT RAW FISH, SEAFOOD, MEAT, OR EGGS   .FALL PREVENTION   Falls often occur due to slipping, tripping or losing your balance. Here are ways to reduce your risk of falling again.   Was there anything that caused your fall that can be fixed, removed or replaced?   Make your home safe by keeping walkways clear of objects you may trip over.   Use non-slip pads under rugs.   Do not walk in poorly lit areas.   Do not stand on chairs or wobbly ladders.   Use caution when reaching overhead or looking upward. This position can cause a loss of balance.   Be sure your shoes fit properly, have non-slip bottoms and are in good condition.   Be cautious when going up and down  stairs, curbs, and when walking on uneven sidewalks.   If your balance is poor, consider using a cane or walker.   If your fall was related to alcohol use, stop or limit alcohol intake.   If your fall was related to use of sleeping medicines, talk to your doctor about this. You may need to reduce your dosage at bedtime if you awaken during the night to go to the bathroom.   To reduce the need for nighttime bathroom trips:   Avoid drinking fluids for several hours before going to bed   Empty your bladder before going to bed   Men can keep a urinal at the bedside   © 6968-5218 [facility], [address] [place] [address], [place] [address]. All rights reserved. This information is not intended as a substitute for professional medical care. Always follow your healthcare professional's instructions.

## 2025-01-16 ENCOUNTER — HOSPITAL ENCOUNTER (OUTPATIENT)
Dept: CARDIOLOGY | Facility: HOSPITAL | Age: 60
Discharge: HOME | End: 2025-01-16
Attending: INTERNAL MEDICINE
Payer: COMMERCIAL

## 2025-01-16 VITALS
DIASTOLIC BLOOD PRESSURE: 69 MMHG | WEIGHT: 164 LBS | SYSTOLIC BLOOD PRESSURE: 115 MMHG | HEIGHT: 73 IN | BODY MASS INDEX: 21.74 KG/M2 | HEART RATE: 101 BPM

## 2025-01-16 DIAGNOSIS — C15.9 ESOPHAGEAL CANCER, STAGE IV: ICD-10-CM

## 2025-01-16 LAB
AORTIC ROOT ANNULUS: 2.97 CM
APICAL FOUR CHAMBER EJECTION FRACTION: 68 %
APICAL TWO CHAMBER EJECTION FRACTION: 58 %
ASCENDING AORTA: 2.92 CM
AV INDEX (PROSTH): 0.8
AV MEAN GRADIENT: 5 MMHG
AV PEAK GRADIENT: 10 MMHG
AV VALVE AREA BY VELOCITY RATIO: 3.3 CM²
AV VALVE AREA: 3 CM²
AV VELOCITY RATIO: 0.88
BSA FOR ECHO PROCEDURE: 1.96 M2
CV ECHO LV RWT: 0.43 CM
DOP CALC AO PEAK VEL: 1.6 M/S
DOP CALC AO VTI: 31.3 CM
DOP CALC LVOT AREA: 3.8 CM2
DOP CALC LVOT DIAMETER: 2.2 CM
DOP CALC LVOT PEAK VEL: 1.4 M/S
DOP CALC LVOT STROKE VOLUME: 95 CM3
DOP CALC RVOT PEAK VEL: 0.81 M/S
DOP CALC RVOT VTI: 14.9 CM
DOP CALCLVOT PEAK VEL VTI: 25 CM
E WAVE DECELERATION TIME: 194 MSEC
E/A RATIO: 1.07
ECHO LV POSTERIOR WALL: 1 CM (ref 0.6–1.1)
FRACTIONAL SHORTENING: 40.4 % (ref 28–44)
GLOBAL LONGITUIDAL STRAIN: 19.8 %
INTERVENTRICULAR SEPTUM: 1 CM (ref 0.6–1.1)
IVRT: 63 MSEC
LA MAJOR: 4.8 CM
LA MINOR: 4.5 CM
LA WIDTH: 3.7 CM
LEFT ATRIUM AREA SYSTOLIC (APICAL 2 CHAMBER): 12.56 CM2
LEFT ATRIUM AREA SYSTOLIC (APICAL 4 CHAMBER): 15.97 CM2
LEFT ATRIUM SIZE: 3 CM
LEFT ATRIUM VOLUME INDEX MOD: 16 ML/M2
LEFT ATRIUM VOLUME INDEX: 22 ML/M2
LEFT ATRIUM VOLUME MOD: 31 ML
LEFT ATRIUM VOLUME: 44 CM3
LEFT INTERNAL DIMENSION IN SYSTOLE: 2.8 CM (ref 2.1–4)
LEFT VENTRICLE DIASTOLIC VOLUME INDEX: 50.67 ML/M2
LEFT VENTRICLE DIASTOLIC VOLUME: 100.33 ML
LEFT VENTRICLE END DIASTOLIC VOLUME APICAL 2 CHAMBER: 68.91 ML
LEFT VENTRICLE END DIASTOLIC VOLUME APICAL 4 CHAMBER: 87.7 ML
LEFT VENTRICLE END SYSTOLIC VOLUME APICAL 2 CHAMBER: 25.91 ML
LEFT VENTRICLE END SYSTOLIC VOLUME APICAL 4 CHAMBER: 37.18 ML
LEFT VENTRICLE MASS INDEX: 83.1 G/M2
LEFT VENTRICLE SYSTOLIC VOLUME INDEX: 14.3 ML/M2
LEFT VENTRICLE SYSTOLIC VOLUME: 28.22 ML
LEFT VENTRICULAR INTERNAL DIMENSION IN DIASTOLE: 4.7 CM (ref 3.5–6)
LEFT VENTRICULAR MASS: 164.5 G
LVED V (TEICH): 100.33 ML
LVES V (TEICH): 28.22 ML
LVOT MG: 4.05 MMHG
LVOT MV: 0.93 CM/S
MV PEAK A VEL: 0.85 M/S
MV PEAK E VEL: 0.91 M/S
MV STENOSIS PRESSURE HALF TIME: 56.39 MS
MV VALVE AREA P 1/2 METHOD: 3.9 CM2
OHS CV RV/LV RATIO: 0.72 CM
OHS LV EJECTION FRACTION SIMPSONS BIPLANE MOD: 64 %
PV MEAN GRADIENT: 1 MMHG
PV PEAK GRADIENT: 4 MMHG
PV PEAK VELOCITY: 1.05 M/S
RA MAJOR: 4.52 CM
RA PRESSURE ESTIMATED: 3 MMHG
RA WIDTH: 3.2 CM
RIGHT VENTRICLE DIASTOLIC BASEL DIMENSION: 3.4 CM
RIGHT VENTRICULAR END-DIASTOLIC DIMENSION: 3.38 CM
SINUS: 2.59 CM
STJ: 2.31 CM
Z-SCORE OF LEFT VENTRICULAR DIMENSION IN END DIASTOLE: -1.96
Z-SCORE OF LEFT VENTRICULAR DIMENSION IN END SYSTOLE: -1.79

## 2025-01-16 PROCEDURE — 93356 MYOCRD STRAIN IMG SPCKL TRCK: CPT

## 2025-01-16 PROCEDURE — 93356 MYOCRD STRAIN IMG SPCKL TRCK: CPT | Mod: ,,, | Performed by: INTERNAL MEDICINE

## 2025-01-16 PROCEDURE — 93306 TTE W/DOPPLER COMPLETE: CPT | Mod: 26,,, | Performed by: INTERNAL MEDICINE

## 2025-01-17 ENCOUNTER — DOCUMENT SCAN (OUTPATIENT)
Dept: HOME HEALTH SERVICES | Facility: HOSPITAL | Age: 60
End: 2025-01-17
Payer: COMMERCIAL

## 2025-01-17 ENCOUNTER — TELEPHONE (OUTPATIENT)
Dept: HEMATOLOGY/ONCOLOGY | Facility: CLINIC | Age: 60
End: 2025-01-17
Payer: COMMERCIAL

## 2025-01-17 ENCOUNTER — INFUSION (OUTPATIENT)
Dept: INFUSION THERAPY | Facility: HOSPITAL | Age: 60
End: 2025-01-17
Attending: INTERNAL MEDICINE
Payer: COMMERCIAL

## 2025-01-17 VITALS
OXYGEN SATURATION: 99 % | TEMPERATURE: 99 F | HEART RATE: 91 BPM | DIASTOLIC BLOOD PRESSURE: 59 MMHG | SYSTOLIC BLOOD PRESSURE: 125 MMHG

## 2025-01-17 DIAGNOSIS — C79.89 MALIGNANT NEOPLASM METASTATIC TO OTHER SITE: ICD-10-CM

## 2025-01-17 DIAGNOSIS — C15.9 ESOPHAGEAL CANCER, STAGE IV: Primary | ICD-10-CM

## 2025-01-17 DIAGNOSIS — C78.6 SECONDARY ADENOCARCINOMA OF RETROPERITONEUM: ICD-10-CM

## 2025-01-17 PROCEDURE — 25000003 PHARM REV CODE 250: Performed by: INTERNAL MEDICINE

## 2025-01-17 PROCEDURE — 63600175 PHARM REV CODE 636 W HCPCS: Performed by: INTERNAL MEDICINE

## 2025-01-17 PROCEDURE — A4216 STERILE WATER/SALINE, 10 ML: HCPCS | Performed by: INTERNAL MEDICINE

## 2025-01-17 RX ORDER — SODIUM CHLORIDE 0.9 % (FLUSH) 0.9 %
10 SYRINGE (ML) INJECTION
Status: DISCONTINUED | OUTPATIENT
Start: 2025-01-17 | End: 2025-01-17 | Stop reason: HOSPADM

## 2025-01-17 RX ORDER — HEPARIN 100 UNIT/ML
500 SYRINGE INTRAVENOUS
Status: DISCONTINUED | OUTPATIENT
Start: 2025-01-17 | End: 2025-01-17 | Stop reason: HOSPADM

## 2025-01-17 RX ADMIN — HEPARIN 500 UNITS: 100 SYRINGE at 12:01

## 2025-01-17 RX ADMIN — SODIUM CHLORIDE, PRESERVATIVE FREE 10 ML: 5 INJECTION INTRAVENOUS at 12:01

## 2025-01-17 NOTE — TELEPHONE ENCOUNTER
Wife called reporting pt had an insurance form that needed to be filled out by Dr. Latif, pt would like to drop off at clinic today. Notified wife Dr. Latif at [facility] today however he could bring to [facility] for him next week. Pt agreeable and will bring to clinic today. (Notified Ca with  of above)

## 2025-01-17 NOTE — DISCHARGE INSTRUCTIONS
[facility]  [address] [address]  [address]  [phone] phone     [phone] fax  Hours of Operation: Monday- Friday 8:00am- 5:00pm  After hours phone  [phone]  Hematology / Oncology Physicians on call      EL[address] NP Phaon Dunbar, NP Khelsea Conley, FNP    Please call with any concerns regarding your appointment today.

## 2025-01-28 NOTE — PROGRESS NOTES
Subjective:       Patient ID: Rosalio Muñoz is a 59 y.o. male.    Chief Complaint: Chemotherapy and Esophageal Cancer    Primary Oncologist/Hematologist: Dr. Latif    HPI: Mr. Muñoz is a 59 year old male who is following up for his stage IV metastatic esophageal carcinoma. He is here for cycle 2 day 29 of FOLFOX + trastuzumab q 2 weeks + pembrolizumab (keytruda) q 6 weeks.   Cancer Hx: had an EGD which did show a fungating mass at his GE junction, biopsies confirmed the presence of adenocarcinoma. Subsequent PET-CT scan showed a 2.2 cm hypermetabolic right posterior hepatic lesion concerning for metastatic disease, as well as hypermetabolic gastrohepatic ligament lymph node.   In 2024, he was admitted to the hospital for bleeding gastric ulcer     Today: he is looking much improved from last time I saw him. He states his appetite is good and he has been staying hydrated. He denies any fevers, illnesses, n/v/d/c, bleeding. He has been eating more milder foods since ulcer. He is to get call on 25 to schedule his next egd.     Social History     Socioeconomic History    Marital status:     Number of children: 2   Occupational History    Occupation:    Tobacco Use    Smoking status: Every Day     Current packs/day: 0.00     Types: Cigars, Cigarettes     Last attempt to quit: 10/4/2022     Years since quittin.3     Passive exposure: Never    Smokeless tobacco: Never    Tobacco comments:     Cigar every afternoon   Substance and Sexual Activity    Alcohol use: Yes     Alcohol/week: 4.0 - 6.0 standard drinks of alcohol     Types: 4 - 6 Cans of beer per week     Comment: occ    Drug use: Yes     Types: Marijuana     Comment: medical    Sexual activity: Yes     Partners: Female     Social Drivers of Health     Financial Resource Strain: Patient Declined (2024)    Overall Financial Resource Strain (CARDIA)     Difficulty of Paying Living Expenses: Patient declined    Recent Concern: Financial Resource Strain - Medium Risk (11/5/2024)    Overall Financial Resource Strain (CARDIA)     Difficulty of Paying Living Expenses: Somewhat hard   Food Insecurity: Patient Declined (12/30/2024)    Hunger Vital Sign     Worried About Running Out of Food in the Last Year: Patient declined     Ran Out of Food in the Last Year: Patient declined   Transportation Needs: Patient Declined (12/30/2024)    TRANSPORTATION NEEDS     Transportation : Patient declined   Physical Activity: Insufficiently Active (11/5/2024)    Exercise Vital Sign     Days of Exercise per Week: 2 days     Minutes of Exercise per Session: 20 min   Stress: Patient Declined (12/30/2024)    [employer] - Occupational Stress Questionnaire     Feeling of Stress : Patient declined   Housing Stability: Patient Declined (12/30/2024)    Housing Stability Vital Sign     Unable to Pay for Housing in the Last Year: Patient declined     Homeless in the Last Year: Patient declined       Past Medical History:   Diagnosis Date    Esophageal cancer, stage IV 10/10/2024    Hypertension     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 10/11/2024    Palliative care encounter 12/11/2024       Family History   Problem Relation Name Age of Onset    Hyperlipidemia Mother      Hypertension Father      Diabetes Father      Kidney disease Father      Heart disease Father      Breast cancer Maternal Grandmother      Prostate cancer Maternal Grandfather      Colon cancer Neg Hx         Past Surgical History:   Procedure Laterality Date    COLONOSCOPY N/A 8/25/2023    Procedure: COLONOSCOPY;  Surgeon: Pebbles Spear MD;  Location: [facility];  Service: Endoscopy;  Laterality: N/A;    COLONOSCOPY N/A 10/2/2024    Procedure: COLONOSCOPY  Cardiac clearance received on 09/20/24 per Dr. Lockett, Cardiology.  Note in encounters.  LB;  Surgeon: Sully Farris MD;  Location: [facility];  Service: Endoscopy;  Laterality: N/A;     ESOPHAGOGASTRODUODENOSCOPY N/A 10/2/2024    Procedure: EGD (ESOPHAGOGASTRODUODENOSCOPY);  Surgeon: Sully Farris MD;  Location: [facility];  Service: Endoscopy;  Laterality: N/A;    ESOPHAGOGASTRODUODENOSCOPY N/A 12/31/2024    Procedure: EGD (ESOPHAGOGASTRODUODENOSCOPY);  Surgeon: Maki Sullivan MD;  Location: [facility] ENDO;  Service: Gastroenterology;  Laterality: N/A;    INSERTION OF VENOUS ACCESS PORT Left 10/21/2024    Procedure: INSERTION, VENOUS ACCESS PORT;  Surgeon: Roseanna Rosas MD;  Location: [facility];  Service: General;  Laterality: Left;    SURGICAL REMOVAL OF LESION OF FACE Right 12/1/2023    Procedure: EXCISION, LESION, FACE;  Surgeon: Jose Flaherty MD;  Location: [facility] OR;  Service: ENT;  Laterality: Right;  1. Excision facial subcutaneous mass right cheek 3 cm. 2. Intermediate layered closure 3.5 cm    WISDOM TOOTH EXTRACTION      XI ROBOTIC APPENDECTOMY N/A 7/31/2024    Procedure: XI ROBOTIC APPENDECTOMY;  Surgeon: Paulo Saavedra MD;  Location: [facility] OR;  Service: General;  Laterality: N/A;    XI ROBOTIC INSERTION, GASTROSTOMY TUBE N/A 10/21/2024    Procedure: XI ROBOTIC INSERTION, GASTROSTOMY TUBE;  Surgeon: Roseanna Rosas MD;  Location: [facility] OR;  Service: General;  Laterality: N/A;       Review of Systems   Constitutional:  Positive for fatigue. Negative for activity change, appetite change, chills, diaphoresis, fever and unexpected weight change.   HENT:  Negative for congestion and nosebleeds.    Respiratory:  Negative for cough and shortness of breath.    Cardiovascular:  Negative for chest pain and leg swelling.   Gastrointestinal:  Negative for abdominal pain, anal bleeding, blood in stool, constipation, diarrhea, nausea and vomiting.        G tube present.    Genitourinary:  Negative for hematuria.   Allergic/Immunologic: Positive for immunocompromised state.         Medication List with Changes/Refills   Current Medications    LIDOCAINE-PRILOCAINE (EMLA) CREAM    Apply  topically as needed.    METOCLOPRAMIDE HCL (REGLAN) 10 MG TABLET    Take 1 tablet (10 mg total) by mouth every 6 (six) hours as needed (nausea, vomiting).    OLANZAPINE (ZYPREXA) 5 MG TABLET    Take 1 tablet (5 mg total) by mouth every evening. Take nightly on days 1-3 of each chemotherapy cycle.    ONDANSETRON (ZOFRAN-ODT) 8 MG TBDL    Take 1 tablet (8 mg total) by mouth 2 (two) times daily.    PANTOPRAZOLE (PROTONIX) 40 MG TABLET    Take 1 tablet (40 mg total) by mouth 2 (two) times daily.     Objective:     Vitals:    01/29/25 0924   BP: 105/64   Pulse: 101   Temp: 98.1 °F (36.7 °C)       Physical Exam  Vitals reviewed.   Constitutional:       General: He is not in acute distress.     Appearance: He is not ill-appearing, toxic-appearing or diaphoretic.   HENT:      Head: Normocephalic and atraumatic.   Cardiovascular:      Rate and Rhythm: Normal rate.   Abdominal:      Comments: G tube present   Musculoskeletal:      Right lower leg: No edema.      Left lower leg: No edema.   Skin:     General: Skin is warm.      Coloration: Skin is not jaundiced or pale.      Findings: No bruising, erythema, lesion or rash.   Neurological:      Mental Status: He is alert.      Gait: Gait normal.   Psychiatric:         Mood and Affect: Mood normal.         Behavior: Behavior normal.         Thought Content: Thought content normal.            Labs/Results:  Lab Results   Component Value Date    WBC 5.42 01/29/2025    RBC 3.50 (L) 01/29/2025    HGB 11.6 (L) 01/29/2025    HCT 35.2 (L) 01/29/2025     (H) 01/29/2025    MCH 33.1 (H) 01/29/2025    MCHC 33.0 01/29/2025    RDW 17.0 (H) 01/29/2025     01/29/2025    MPV 9.4 01/29/2025    GRAN 3.0 01/29/2025    LYMPH 1.5 01/15/2025    LYMPH 19.4 01/15/2025    MONO 0.9 01/15/2025    MONO 11.1 01/15/2025    EOS 0.2 01/15/2025    BASO 0.05 01/15/2025    EOSINOPHIL 1.9 01/15/2025    BASOPHIL 0.6 01/15/2025     CMP  Sodium   Date Value Ref Range Status   01/29/2025 137 136 - 145  mmol/L Final     Potassium   Date Value Ref Range Status   01/29/2025 4.8 3.5 - 5.1 mmol/L Final     Chloride   Date Value Ref Range Status   01/29/2025 104 95 - 110 mmol/L Final     CO2   Date Value Ref Range Status   01/29/2025 26 23 - 29 mmol/L Final     Glucose   Date Value Ref Range Status   01/29/2025 99 70 - 110 mg/dL Final     BUN   Date Value Ref Range Status   01/29/2025 9 6 - 20 mg/dL Final     Creatinine   Date Value Ref Range Status   01/29/2025 0.8 0.5 - 1.4 mg/dL Final     Calcium   Date Value Ref Range Status   01/29/2025 9.9 8.7 - 10.5 mg/dL Final     Total Protein   Date Value Ref Range Status   01/29/2025 7.6 6.0 - 8.4 g/dL Final     Albumin   Date Value Ref Range Status   01/29/2025 3.6 3.5 - 5.2 g/dL Final     Total Bilirubin   Date Value Ref Range Status   01/29/2025 0.6 0.1 - 1.0 mg/dL Final     Comment:     For infants and newborns, interpretation of results should be based  on gestational age, weight and in agreement with clinical  observations.    Premature Infant recommended reference ranges:  Up to 24 hours.............<8.0 mg/dL  Up to 48 hours............<12.0 mg/dL  3-5 days..................<15.0 mg/dL  6-29 days.................<15.0 mg/dL       Alkaline Phosphatase   Date Value Ref Range Status   01/29/2025 96 40 - 150 U/L Final     AST   Date Value Ref Range Status   01/29/2025 17 10 - 40 U/L Final     ALT   Date Value Ref Range Status   01/29/2025 10 10 - 44 U/L Final     Anion Gap   Date Value Ref Range Status   01/29/2025 7 (L) 8 - 16 mmol/L Final     eGFR   Date Value Ref Range Status   01/29/2025 >60 >60 mL/min/1.73 m^2 Final        Latest Reference Range & Units 12/03/24 09:47   Iron 45 - 160 ug/dL 72   TIBC 250 - 450 ug/dL 432   Saturated Iron 20 - 50 % 17 (L)   Transferrin 200 - 375 mg/dL 292   Ferritin 20.0 - 300.0 ng/mL 272     Ct abd pelvis 12/30/24  impression:  Gastritis or peptic ulcer disease.  Additional areas of concern for possible enteritis.  Small hepatic  lesions in this patient with known hepatic metastatic disease, these lesions not well characterized on the basis of this exam.  Esophageal wall thickening in this patient with known esophageal cancer.    Ct c/a/p 12/26/24  Impression:  1. Findings probably representing enteritis with diarrheal illness.  2. Patient has a known esophageal malignancy with metastatic hepatic lesion and gastrohepatic lymphadenopathy.  The index gastrohepatic lymph node has decreased in size.  No enlarging lesions.  No new evidence of metastatic disease.    Pet scan 10/10/24  Impression:  Gastroesophageal cancer with metastatic lymphadenopathy and right hepatic lobe metastasis.  Assessment:     Problem List Items Addressed This Visit       Esophageal cancer, stage IV - Primary    Secondary adenocarcinoma of retroperitoneum    Secondary liver cancer    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    Iron deficiency anemia due to chronic blood loss    Immunodeficiency due to chemotherapy     Plan:     Esophageal cancer, stage IV, Secondary adenocarcinoma of retroperitoneum, Secondary liver cancer, 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, Immunodeficiency due to chemotherapy  --metastatic esophageal adenocarcinoma   --PDL1 + and HER2 +  --continue with cycle 2 day 29 of FOLFOX + trastuzumab q 2 weeks + pembrolizumab (keytruda) q 6 weeks (pembrolizumab on day 29 of each cycle)   --echo q 3 months. 1/16/25 EF:65%  --ANC:3.0, plts:331 tbili:0.6 crcl:109.8 ml/min AST:17 ALT:10 glucose:99 cret:0.8  --continue with zyprexa nightly on days 1-3, 15-15 and 29-31 of chemo cycle.   --continue to follow with nutrition  --iron: 72, sat: 17%, ferritin: 272-increased. S/p dudodenal bleeding ulcer. Scheduled for egd 6 weeks from 1/20/25. Going to call him 2/7/25 to set this up.    Follow-Up: 2 weeks with cbc cmp mag prior for cycle 3 day 1 with primary oncologist-standing orders in     Shahrzad Bran PA-C  Hematology Oncology    Route Chart for  Scheduling    Med Onc Chart Routing      Follow up with physician . 2 weeks with cbc cmp mag priro for cycle 3 day 1.   Follow up with JORDANA    Infusion scheduling note   2 weeks for cycle 3 day 1.   Injection scheduling note    Labs CMP, CBC and magnesium   Scheduling:  Preferred lab:  Lab interval:  standing orders in   Imaging    Pharmacy appointment    Other referrals                  Treatment Plan Information   OP GASTROESOPHAGEAL pembrolizumab Q6W trastuzumab + MFOLFOX6 (oxaliplatin leucovorin fluorouracil) Q2W Hudson Latif MD   Associated diagnosis: Esophageal cancer, stage IV Stage IVB cTX, cN0, cM1, G2 noted on 10/10/2024  Associated diagnosis: 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   noted on 10/11/2024  Associated diagnosis: Secondary adenocarcinoma of retroperitoneum   noted on 10/11/2024   Line of treatment: First Line  Treatment Goal: Control     Upcoming Treatment Dates - OP GASTROESOPHAGEAL pembrolizumab Q6W trastuzumab + MFOLFOX6 (oxaliplatin leucovorin fluorouracil) Q2W    1/29/2025       Chemotherapy       trastuzumab-anns (KANJINTI) 308 mg in 0.9% NaCl 250 mL chemo infusion       oxaliplatin (ELOXATIN) 68 mg/m2 = 135 mg in D5W 527 mL chemo infusion       leucovorin calcium 320 mg/m2 = 635 mg in D5W 250 mL infusion       fluorouraciL injection 320 mg       fluorouracil (Adrucil) 960 mg/m2 = 1,910 mg in 0.9% NaCl 100 mL chemo infusion       Antiemetics       palonosetron 0.25mg/dexAMETHasone 12mg in NS IVPB 0.25 mg 50 mL  2/12/2025       Chemotherapy       trastuzumab-anns (KANJINTI) 303 mg in 0.9% NaCl 250 mL chemo infusion       oxaliplatin (ELOXATIN) 68 mg/m2 = 135 mg in D5W 527 mL chemo infusion       leucovorin calcium 320 mg/m2 = 635 mg in D5W 250 mL infusion       fluorouraciL injection 315 mg       fluorouracil (Adrucil) 960 mg/m2 = 1,900 mg in 0.9% NaCl 100 mL chemo infusion       Antiemetics       palonosetron 0.25mg/dexAMETHasone 12mg in NS IVPB 0.25 mg 50 mL        Immunotherapy       pembrolizumab (KEYTRUDA) 400 mg in 0.9% NaCl SolP 116 mL infusion  2/26/2025       Chemotherapy       trastuzumab-anns (KANJINTI) in 0.9% NaCl chemo infusion       oxaliplatin (ELOXATIN) in D5W 526.2 mL chemo infusion       leucovorin calcium in D5W 250 mL infusion       fluorouraciL injection       fluorouracil chemo infusion       Antiemetics       palonosetron 0.25mg/dexAMETHasone 12mg in NS IVPB 0.25 mg 50 mL  3/12/2025       Chemotherapy       trastuzumab-anns (KANJINTI) in 0.9% NaCl chemo infusion       oxaliplatin (ELOXATIN) in D5W 527 mL chemo infusion       leucovorin calcium in D5W 250 mL infusion       fluorouraciL injection       fluorouracil chemo infusion       Antiemetics       palonosetron (ALOXI) 0.25 mg with Dexamethasone (DECADRON) 12 mg in NS 50 mL IVPB    Supportive Plan Information  OP FERUMOXYTOL Hudson Latif MD   Associated Diagnosis: Iron deficiency anemia due to chronic blood loss   noted on 10/12/2024   Line of treatment: Supportive Care   Treatment goal: Supportive     Upcoming Treatment Dates - OP FERUMOXYTOL    10/19/2024       Medications       FERUMOXYTOL 510 MG/117 ML D5W (READY TO MIX SYSTEM) IVPB 510 mg

## 2025-01-29 ENCOUNTER — LAB VISIT (OUTPATIENT)
Dept: LAB | Facility: HOSPITAL | Age: 60
End: 2025-01-29
Attending: INTERNAL MEDICINE
Payer: COMMERCIAL

## 2025-01-29 ENCOUNTER — OFFICE VISIT (OUTPATIENT)
Dept: HEMATOLOGY/ONCOLOGY | Facility: CLINIC | Age: 60
End: 2025-01-29
Payer: COMMERCIAL

## 2025-01-29 ENCOUNTER — INFUSION (OUTPATIENT)
Dept: INFUSION THERAPY | Facility: HOSPITAL | Age: 60
End: 2025-01-29
Attending: INTERNAL MEDICINE
Payer: COMMERCIAL

## 2025-01-29 VITALS
TEMPERATURE: 98 F | RESPIRATION RATE: 18 BRPM | SYSTOLIC BLOOD PRESSURE: 130 MMHG | DIASTOLIC BLOOD PRESSURE: 75 MMHG | OXYGEN SATURATION: 99 % | HEART RATE: 83 BPM

## 2025-01-29 VITALS
BODY MASS INDEX: 22.53 KG/M2 | HEIGHT: 73 IN | DIASTOLIC BLOOD PRESSURE: 64 MMHG | HEART RATE: 101 BPM | OXYGEN SATURATION: 97 % | WEIGHT: 170 LBS | TEMPERATURE: 98 F | SYSTOLIC BLOOD PRESSURE: 105 MMHG

## 2025-01-29 DIAGNOSIS — D84.821 IMMUNODEFICIENCY DUE TO CHEMOTHERAPY: ICD-10-CM

## 2025-01-29 DIAGNOSIS — C79.89 MALIGNANT NEOPLASM METASTATIC TO OTHER SITE: ICD-10-CM

## 2025-01-29 DIAGNOSIS — C78.6 SECONDARY ADENOCARCINOMA OF RETROPERITONEUM: ICD-10-CM

## 2025-01-29 DIAGNOSIS — D50.0 IRON DEFICIENCY ANEMIA DUE TO CHRONIC BLOOD LOSS: ICD-10-CM

## 2025-01-29 DIAGNOSIS — C15.9 ESOPHAGEAL CANCER, STAGE IV: Primary | ICD-10-CM

## 2025-01-29 DIAGNOSIS — Z79.899 IMMUNODEFICIENCY DUE TO CHEMOTHERAPY: ICD-10-CM

## 2025-01-29 DIAGNOSIS — T45.1X5A IMMUNODEFICIENCY DUE TO CHEMOTHERAPY: ICD-10-CM

## 2025-01-29 DIAGNOSIS — C78.7 SECONDARY LIVER CANCER: ICD-10-CM

## 2025-01-29 DIAGNOSIS — C15.9 ESOPHAGEAL CANCER, STAGE IV: ICD-10-CM

## 2025-01-29 LAB
ALBUMIN SERPL BCP-MCNC: 3.6 G/DL (ref 3.5–5.2)
ALP SERPL-CCNC: 96 U/L (ref 40–150)
ALT SERPL W/O P-5'-P-CCNC: 10 U/L (ref 10–44)
ANION GAP SERPL CALC-SCNC: 7 MMOL/L (ref 8–16)
AST SERPL-CCNC: 17 U/L (ref 10–40)
BILIRUB SERPL-MCNC: 0.6 MG/DL (ref 0.1–1)
BUN SERPL-MCNC: 9 MG/DL (ref 6–20)
CALCIUM SERPL-MCNC: 9.9 MG/DL (ref 8.7–10.5)
CHLORIDE SERPL-SCNC: 104 MMOL/L (ref 95–110)
CO2 SERPL-SCNC: 26 MMOL/L (ref 23–29)
CREAT SERPL-MCNC: 0.8 MG/DL (ref 0.5–1.4)
ERYTHROCYTE [DISTWIDTH] IN BLOOD BY AUTOMATED COUNT: 17 % (ref 11.5–14.5)
EST. GFR  (NO RACE VARIABLE): >60 ML/MIN/1.73 M^2
GLUCOSE SERPL-MCNC: 99 MG/DL (ref 70–110)
HCT VFR BLD AUTO: 35.2 % (ref 40–54)
HGB BLD-MCNC: 11.6 G/DL (ref 14–18)
IMM GRANULOCYTES # BLD AUTO: 0.02 K/UL (ref 0–0.04)
MAGNESIUM SERPL-MCNC: 1.7 MG/DL (ref 1.6–2.6)
MCH RBC QN AUTO: 33.1 PG (ref 27–31)
MCHC RBC AUTO-ENTMCNC: 33 G/DL (ref 32–36)
MCV RBC AUTO: 101 FL (ref 82–98)
NEUTROPHILS # BLD AUTO: 3 K/UL (ref 1.8–7.7)
PLATELET # BLD AUTO: 331 K/UL (ref 150–450)
PMV BLD AUTO: 9.4 FL (ref 9.2–12.9)
POTASSIUM SERPL-SCNC: 4.8 MMOL/L (ref 3.5–5.1)
PROT SERPL-MCNC: 7.6 G/DL (ref 6–8.4)
RBC # BLD AUTO: 3.5 M/UL (ref 4.6–6.2)
SODIUM SERPL-SCNC: 137 MMOL/L (ref 136–145)
TSH SERPL DL<=0.005 MIU/L-ACNC: 1.59 UIU/ML (ref 0.4–4)
WBC # BLD AUTO: 5.42 K/UL (ref 3.9–12.7)

## 2025-01-29 PROCEDURE — 96368 THER/DIAG CONCURRENT INF: CPT

## 2025-01-29 PROCEDURE — 63600175 PHARM REV CODE 636 W HCPCS: Mod: JZ,TB

## 2025-01-29 PROCEDURE — 85027 COMPLETE CBC AUTOMATED: CPT | Performed by: NURSE PRACTITIONER

## 2025-01-29 PROCEDURE — 3078F DIAST BP <80 MM HG: CPT | Mod: CPTII,S$GLB,,

## 2025-01-29 PROCEDURE — 96413 CHEMO IV INFUSION 1 HR: CPT

## 2025-01-29 PROCEDURE — 83735 ASSAY OF MAGNESIUM: CPT | Performed by: NURSE PRACTITIONER

## 2025-01-29 PROCEDURE — 84443 ASSAY THYROID STIM HORMONE: CPT

## 2025-01-29 PROCEDURE — 99999 PR PBB SHADOW E&M-EST. PATIENT-LVL III: CPT | Mod: PBBFAC,,,

## 2025-01-29 PROCEDURE — 25000003 PHARM REV CODE 250

## 2025-01-29 PROCEDURE — 96417 CHEMO IV INFUS EACH ADDL SEQ: CPT

## 2025-01-29 PROCEDURE — 96416 CHEMO PROLONG INFUSE W/PUMP: CPT

## 2025-01-29 PROCEDURE — 96411 CHEMO IV PUSH ADDL DRUG: CPT

## 2025-01-29 PROCEDURE — 25000003 PHARM REV CODE 250: Performed by: INTERNAL MEDICINE

## 2025-01-29 PROCEDURE — 99215 OFFICE O/P EST HI 40 MIN: CPT | Mod: S$GLB,,,

## 2025-01-29 PROCEDURE — 96415 CHEMO IV INFUSION ADDL HR: CPT

## 2025-01-29 PROCEDURE — 3074F SYST BP LT 130 MM HG: CPT | Mod: CPTII,S$GLB,,

## 2025-01-29 PROCEDURE — 36415 COLL VENOUS BLD VENIPUNCTURE: CPT | Performed by: NURSE PRACTITIONER

## 2025-01-29 PROCEDURE — 96367 TX/PROPH/DG ADDL SEQ IV INF: CPT

## 2025-01-29 PROCEDURE — 1159F MED LIST DOCD IN RCRD: CPT | Mod: CPTII,S$GLB,,

## 2025-01-29 PROCEDURE — 36415 COLL VENOUS BLD VENIPUNCTURE: CPT

## 2025-01-29 PROCEDURE — 3008F BODY MASS INDEX DOCD: CPT | Mod: CPTII,S$GLB,,

## 2025-01-29 PROCEDURE — 63600175 PHARM REV CODE 636 W HCPCS: Performed by: INTERNAL MEDICINE

## 2025-01-29 PROCEDURE — 80053 COMPREHEN METABOLIC PANEL: CPT | Performed by: NURSE PRACTITIONER

## 2025-01-29 RX ORDER — FLUOROURACIL 50 MG/ML
160 INJECTION, SOLUTION INTRAVENOUS
Status: COMPLETED | OUTPATIENT
Start: 2025-01-29 | End: 2025-01-29

## 2025-01-29 RX ADMIN — DEXTROSE MONOHYDRATE: 50 INJECTION, SOLUTION INTRAVENOUS at 02:01

## 2025-01-29 RX ADMIN — OXALIPLATIN 135 MG: 5 INJECTION, SOLUTION INTRAVENOUS at 12:01

## 2025-01-29 RX ADMIN — LEUCOVORIN CALCIUM 635 MG: 500 INJECTION, POWDER, LYOPHILIZED, FOR SOLUTION INTRAMUSCULAR; INTRAVENOUS at 12:01

## 2025-01-29 RX ADMIN — SODIUM CHLORIDE 400 MG: 9 INJECTION, SOLUTION INTRAVENOUS at 10:01

## 2025-01-29 RX ADMIN — TRASTUZUMAB-ANNS 300 MG: 420 INJECTION, POWDER, LYOPHILIZED, FOR SOLUTION INTRAVENOUS at 11:01

## 2025-01-29 RX ADMIN — FLUOROURACIL 320 MG: 50 INJECTION, SOLUTION INTRAVENOUS at 02:01

## 2025-01-29 RX ADMIN — FLUOROURACIL 1910 MG: 50 INJECTION, SOLUTION INTRAVENOUS at 02:01

## 2025-01-29 RX ADMIN — SODIUM CHLORIDE 0.25 MG: 9 INJECTION, SOLUTION INTRAVENOUS at 11:01

## 2025-01-29 NOTE — PLAN OF CARE
Problem: Adult Inpatient Plan of Care  Goal: Plan of Care Review  Outcome: Progressing  Flowsheets (Taken 1/29/2025 1052)  Plan of Care Reviewed With: patient  Goal: Patient-Specific Goal (Individualized)  Outcome: Progressing  Flowsheets (Taken 1/29/2025 1052)  Individualized Care Needs: warm blanket, reclining position, wife present at side  Anxieties, Fears or Concerns: none verbalized     Problem: Infection  Goal: Absence of Infection Signs and Symptoms  Outcome: Progressing  Intervention: Prevent or Manage Infection  Flowsheets (Taken 1/29/2025 1052)  Infection Management: aseptic technique maintained

## 2025-01-31 ENCOUNTER — INFUSION (OUTPATIENT)
Dept: INFUSION THERAPY | Facility: HOSPITAL | Age: 60
End: 2025-01-31
Attending: INTERNAL MEDICINE
Payer: COMMERCIAL

## 2025-01-31 VITALS
SYSTOLIC BLOOD PRESSURE: 129 MMHG | TEMPERATURE: 98 F | OXYGEN SATURATION: 99 % | DIASTOLIC BLOOD PRESSURE: 67 MMHG | HEART RATE: 93 BPM | RESPIRATION RATE: 18 BRPM

## 2025-01-31 DIAGNOSIS — C78.6 SECONDARY ADENOCARCINOMA OF RETROPERITONEUM: ICD-10-CM

## 2025-01-31 DIAGNOSIS — C15.9 ESOPHAGEAL CANCER, STAGE IV: Primary | ICD-10-CM

## 2025-01-31 DIAGNOSIS — C79.89 MALIGNANT NEOPLASM METASTATIC TO OTHER SITE: ICD-10-CM

## 2025-01-31 PROCEDURE — A4216 STERILE WATER/SALINE, 10 ML: HCPCS | Performed by: INTERNAL MEDICINE

## 2025-01-31 PROCEDURE — 25000003 PHARM REV CODE 250: Performed by: INTERNAL MEDICINE

## 2025-01-31 PROCEDURE — 63600175 PHARM REV CODE 636 W HCPCS: Performed by: INTERNAL MEDICINE

## 2025-01-31 RX ORDER — HEPARIN 100 UNIT/ML
500 SYRINGE INTRAVENOUS
Status: DISCONTINUED | OUTPATIENT
Start: 2025-01-31 | End: 2025-01-31 | Stop reason: HOSPADM

## 2025-01-31 RX ORDER — SODIUM CHLORIDE 0.9 % (FLUSH) 0.9 %
10 SYRINGE (ML) INJECTION
Status: DISCONTINUED | OUTPATIENT
Start: 2025-01-31 | End: 2025-01-31 | Stop reason: HOSPADM

## 2025-01-31 RX ADMIN — HEPARIN 500 UNITS: 100 SYRINGE at 12:01

## 2025-01-31 RX ADMIN — SODIUM CHLORIDE, PRESERVATIVE FREE 10 ML: 5 INJECTION INTRAVENOUS at 12:01

## 2025-02-07 ENCOUNTER — HOSPITAL ENCOUNTER (OUTPATIENT)
Dept: PREADMISSION TESTING | Facility: HOSPITAL | Age: 60
Discharge: HOME | End: 2025-02-07
Attending: INTERNAL MEDICINE
Payer: COMMERCIAL

## 2025-02-07 ENCOUNTER — TELEPHONE (OUTPATIENT)
Dept: HEMATOLOGY/ONCOLOGY | Facility: CLINIC | Age: 60
End: 2025-02-07
Payer: COMMERCIAL

## 2025-02-07 DIAGNOSIS — K26.4 DUODENAL ULCER WITH HEMORRHAGE: Primary | ICD-10-CM

## 2025-02-07 NOTE — TELEPHONE ENCOUNTER
"Call placed to check in on pt post infusion. Spoke to pt who reports he's doing well; he reports some fatigue. Reviewed upcoming appointments with pt who is agreeable. He denies any other need/concerns at this time though I encouraged him to reach out at any time.     Oncology Navigation   Intake  Cancer Type: Head and Neck  Type of Referral: Internal  Date of Referral: 10/08/24  Initial Nurse Navigator Contact: 10/08/24  Referral to Initial Contact Timeline (days): 0  First Appointment Available: 10/17/24  Appointment Date: 10/17/24  First Available Date vs. Scheduled Date (days): 0  Multiple appointments: No  Reason if booked > 7 days after scheduling: Specific provider / access     Treatment  Current Status: Active  Date Presented to Tumor Board: 10/11/24    Surgery: Completed  Surgical Oncologist: Ralph  Type of Surgery: feeding tube and PORT placement  Surgery Schedule Date: 10/21/24    Medical Oncologist: Salomon  Consult Date: 10/11/24  Chemotherapy: Planned  Chemotherapy Regimen: [id number]  Immunotherapy: Planned  Immunotherapy Name: trastuzumab + pembro  Start Date: 24       Procedures: PET scan  EUS / EGD: EGD  PET Scan Schedule Date: 10/10/24    General Referrals: Chemo Education; Dietitian; Palliative Care  Dietitian Name: Agnes Wong Referral Date: 10/16/24  Palliative Care Referral Date: 10/28/24          Support Systems: Spouse/significant other; Family members  Barriers of Care: Barriers to Care "Assessment completed-no barriers noted"     Acuity  Stage: 2  Systemic Treatment - predicted or initiated: Chemotherapy Regimen with Multiple drugs (+1)  Treatment Tolerability: Minimal symptoms  ECO  Comorbidities in Medical History: 0  Hospitalization Within the Past Month: 0  Other Medical Factors (+2 each): Home medical equipment required (feeding tube)   Needed: 0  Support: 0  Verbalizes Financial Concerns: 0  Transportation: 0  History of noncompliance/frequent no shows and " cancellations: 0  Verbalizes the need for more education: 0  Navigation Acuity: 3     Follow Up  No follow-ups on file.

## 2025-02-12 ENCOUNTER — LAB VISIT (OUTPATIENT)
Dept: LAB | Facility: HOSPITAL | Age: 60
End: 2025-02-12
Attending: INTERNAL MEDICINE
Payer: COMMERCIAL

## 2025-02-12 ENCOUNTER — OFFICE VISIT (OUTPATIENT)
Dept: HEMATOLOGY/ONCOLOGY | Facility: CLINIC | Age: 60
End: 2025-02-12
Payer: COMMERCIAL

## 2025-02-12 ENCOUNTER — INFUSION (OUTPATIENT)
Dept: INFUSION THERAPY | Facility: HOSPITAL | Age: 60
End: 2025-02-12
Attending: INTERNAL MEDICINE
Payer: COMMERCIAL

## 2025-02-12 VITALS
BODY MASS INDEX: 22.5 KG/M2 | DIASTOLIC BLOOD PRESSURE: 80 MMHG | RESPIRATION RATE: 18 BRPM | HEIGHT: 73 IN | WEIGHT: 169.75 LBS | OXYGEN SATURATION: 96 % | TEMPERATURE: 97 F | HEART RATE: 81 BPM | SYSTOLIC BLOOD PRESSURE: 132 MMHG

## 2025-02-12 DIAGNOSIS — C15.9 ESOPHAGEAL CANCER, STAGE IV: Primary | ICD-10-CM

## 2025-02-12 DIAGNOSIS — E44.0 MODERATE MALNUTRITION: ICD-10-CM

## 2025-02-12 DIAGNOSIS — C78.7 SECONDARY LIVER CANCER: ICD-10-CM

## 2025-02-12 DIAGNOSIS — T45.1X5A IMMUNODEFICIENCY DUE TO CHEMOTHERAPY: ICD-10-CM

## 2025-02-12 DIAGNOSIS — R63.4 UNINTENTIONAL WEIGHT LOSS: ICD-10-CM

## 2025-02-12 DIAGNOSIS — C15.9 ESOPHAGEAL ADENOCARCINOMA: ICD-10-CM

## 2025-02-12 DIAGNOSIS — C79.89 MALIGNANT NEOPLASM METASTATIC TO OTHER SITE: ICD-10-CM

## 2025-02-12 DIAGNOSIS — C78.6 SECONDARY ADENOCARCINOMA OF RETROPERITONEUM: ICD-10-CM

## 2025-02-12 DIAGNOSIS — D50.0 IRON DEFICIENCY ANEMIA DUE TO CHRONIC BLOOD LOSS: ICD-10-CM

## 2025-02-12 DIAGNOSIS — Z79.899 IMMUNODEFICIENCY DUE TO CHEMOTHERAPY: ICD-10-CM

## 2025-02-12 DIAGNOSIS — D84.821 IMMUNODEFICIENCY DUE TO CHEMOTHERAPY: ICD-10-CM

## 2025-02-12 LAB
ALBUMIN SERPL BCP-MCNC: 3.8 G/DL (ref 3.5–5.2)
ALP SERPL-CCNC: 95 U/L (ref 40–150)
ALT SERPL W/O P-5'-P-CCNC: 16 U/L (ref 10–44)
ANION GAP SERPL CALC-SCNC: 8 MMOL/L (ref 8–16)
AST SERPL-CCNC: 19 U/L (ref 10–40)
BASOPHILS # BLD AUTO: 0.05 K/UL (ref 0–0.2)
BASOPHILS NFR BLD: 0.9 % (ref 0–1.9)
BILIRUB SERPL-MCNC: 0.6 MG/DL (ref 0.1–1)
BUN SERPL-MCNC: 14 MG/DL (ref 6–20)
CALCIUM SERPL-MCNC: 9.8 MG/DL (ref 8.7–10.5)
CHLORIDE SERPL-SCNC: 104 MMOL/L (ref 95–110)
CO2 SERPL-SCNC: 25 MMOL/L (ref 23–29)
CREAT SERPL-MCNC: 0.8 MG/DL (ref 0.5–1.4)
DIFFERENTIAL METHOD BLD: ABNORMAL
EOSINOPHIL # BLD AUTO: 0.2 K/UL (ref 0–0.5)
EOSINOPHIL NFR BLD: 4.1 % (ref 0–8)
ERYTHROCYTE [DISTWIDTH] IN BLOOD BY AUTOMATED COUNT: 16 % (ref 11.5–14.5)
EST. GFR  (NO RACE VARIABLE): >60 ML/MIN/1.73 M^2
GLUCOSE SERPL-MCNC: 97 MG/DL (ref 70–110)
HCT VFR BLD AUTO: 39.8 % (ref 40–54)
HGB BLD-MCNC: 12.9 G/DL (ref 14–18)
IMM GRANULOCYTES # BLD AUTO: 0.02 K/UL (ref 0–0.04)
IMM GRANULOCYTES NFR BLD AUTO: 0.4 % (ref 0–0.5)
LYMPHOCYTES # BLD AUTO: 1.3 K/UL (ref 1–4.8)
LYMPHOCYTES NFR BLD: 24.7 % (ref 18–48)
MAGNESIUM SERPL-MCNC: 1.7 MG/DL (ref 1.6–2.6)
MCH RBC QN AUTO: 32.8 PG (ref 27–31)
MCHC RBC AUTO-ENTMCNC: 32.4 G/DL (ref 32–36)
MCV RBC AUTO: 101 FL (ref 82–98)
MONOCYTES # BLD AUTO: 1 K/UL (ref 0.3–1)
MONOCYTES NFR BLD: 18.5 % (ref 4–15)
NEUTROPHILS # BLD AUTO: 2.8 K/UL (ref 1.8–7.7)
NEUTROPHILS NFR BLD: 51.4 % (ref 38–73)
NRBC BLD-RTO: 0 /100 WBC
PLATELET # BLD AUTO: 256 K/UL (ref 150–450)
PMV BLD AUTO: 9.9 FL (ref 9.2–12.9)
POTASSIUM SERPL-SCNC: 5.1 MMOL/L (ref 3.5–5.1)
PROT SERPL-MCNC: 7.9 G/DL (ref 6–8.4)
RBC # BLD AUTO: 3.93 M/UL (ref 4.6–6.2)
SODIUM SERPL-SCNC: 137 MMOL/L (ref 136–145)
WBC # BLD AUTO: 5.35 K/UL (ref 3.9–12.7)

## 2025-02-12 PROCEDURE — 63600175 PHARM REV CODE 636 W HCPCS: Performed by: INTERNAL MEDICINE

## 2025-02-12 PROCEDURE — 96416 CHEMO PROLONG INFUSE W/PUMP: CPT

## 2025-02-12 PROCEDURE — 36415 COLL VENOUS BLD VENIPUNCTURE: CPT | Performed by: INTERNAL MEDICINE

## 2025-02-12 PROCEDURE — 80053 COMPREHEN METABOLIC PANEL: CPT | Performed by: INTERNAL MEDICINE

## 2025-02-12 PROCEDURE — 25000003 PHARM REV CODE 250: Performed by: INTERNAL MEDICINE

## 2025-02-12 PROCEDURE — 96367 TX/PROPH/DG ADDL SEQ IV INF: CPT

## 2025-02-12 PROCEDURE — 96413 CHEMO IV INFUSION 1 HR: CPT

## 2025-02-12 PROCEDURE — 96415 CHEMO IV INFUSION ADDL HR: CPT

## 2025-02-12 PROCEDURE — 83735 ASSAY OF MAGNESIUM: CPT | Performed by: INTERNAL MEDICINE

## 2025-02-12 PROCEDURE — 85025 COMPLETE CBC W/AUTO DIFF WBC: CPT | Performed by: INTERNAL MEDICINE

## 2025-02-12 PROCEDURE — 96417 CHEMO IV INFUS EACH ADDL SEQ: CPT

## 2025-02-12 PROCEDURE — 96411 CHEMO IV PUSH ADDL DRUG: CPT

## 2025-02-12 PROCEDURE — 96368 THER/DIAG CONCURRENT INF: CPT

## 2025-02-12 RX ORDER — PROCHLORPERAZINE EDISYLATE 5 MG/ML
10 INJECTION INTRAMUSCULAR; INTRAVENOUS ONCE AS NEEDED
OUTPATIENT
Start: 2025-02-26

## 2025-02-12 RX ORDER — PROCHLORPERAZINE EDISYLATE 5 MG/ML
10 INJECTION INTRAMUSCULAR; INTRAVENOUS ONCE AS NEEDED
OUTPATIENT
Start: 2025-02-28

## 2025-02-12 RX ORDER — HEPARIN 100 UNIT/ML
500 SYRINGE INTRAVENOUS
Status: CANCELLED | OUTPATIENT
Start: 2025-02-12

## 2025-02-12 RX ORDER — DIPHENHYDRAMINE HYDROCHLORIDE 50 MG/ML
50 INJECTION INTRAMUSCULAR; INTRAVENOUS ONCE AS NEEDED
OUTPATIENT
Start: 2025-03-12

## 2025-02-12 RX ORDER — PROCHLORPERAZINE EDISYLATE 5 MG/ML
10 INJECTION INTRAMUSCULAR; INTRAVENOUS ONCE AS NEEDED
Status: CANCELLED | OUTPATIENT
Start: 2025-02-14

## 2025-02-12 RX ORDER — SODIUM CHLORIDE 0.9 % (FLUSH) 0.9 %
10 SYRINGE (ML) INJECTION
Status: CANCELLED | OUTPATIENT
Start: 2025-02-12

## 2025-02-12 RX ORDER — PROCHLORPERAZINE EDISYLATE 5 MG/ML
10 INJECTION INTRAMUSCULAR; INTRAVENOUS ONCE AS NEEDED
OUTPATIENT
Start: 2025-03-14

## 2025-02-12 RX ORDER — DIPHENHYDRAMINE HYDROCHLORIDE 50 MG/ML
50 INJECTION INTRAMUSCULAR; INTRAVENOUS ONCE AS NEEDED
Status: CANCELLED | OUTPATIENT
Start: 2025-02-12

## 2025-02-12 RX ORDER — FLUOROURACIL 50 MG/ML
160 INJECTION, SOLUTION INTRAVENOUS
Status: CANCELLED | OUTPATIENT
Start: 2025-02-12

## 2025-02-12 RX ORDER — EPINEPHRINE 0.3 MG/.3ML
0.3 INJECTION SUBCUTANEOUS ONCE AS NEEDED
Status: CANCELLED | OUTPATIENT
Start: 2025-02-12

## 2025-02-12 RX ORDER — PROCHLORPERAZINE EDISYLATE 5 MG/ML
10 INJECTION INTRAMUSCULAR; INTRAVENOUS ONCE AS NEEDED
Status: CANCELLED | OUTPATIENT
Start: 2025-02-12

## 2025-02-12 RX ORDER — HEPARIN 100 UNIT/ML
500 SYRINGE INTRAVENOUS
OUTPATIENT
Start: 2025-03-12

## 2025-02-12 RX ORDER — EPINEPHRINE 0.3 MG/.3ML
0.3 INJECTION SUBCUTANEOUS ONCE AS NEEDED
OUTPATIENT
Start: 2025-02-26

## 2025-02-12 RX ORDER — FLUOROURACIL 50 MG/ML
160 INJECTION, SOLUTION INTRAVENOUS
OUTPATIENT
Start: 2025-03-12

## 2025-02-12 RX ORDER — SODIUM CHLORIDE 0.9 % (FLUSH) 0.9 %
10 SYRINGE (ML) INJECTION
OUTPATIENT
Start: 2025-03-12

## 2025-02-12 RX ORDER — SODIUM CHLORIDE 0.9 % (FLUSH) 0.9 %
10 SYRINGE (ML) INJECTION
OUTPATIENT
Start: 2025-03-14

## 2025-02-12 RX ORDER — EPINEPHRINE 0.3 MG/.3ML
0.3 INJECTION SUBCUTANEOUS ONCE AS NEEDED
OUTPATIENT
Start: 2025-03-12

## 2025-02-12 RX ORDER — SODIUM CHLORIDE 0.9 % (FLUSH) 0.9 %
10 SYRINGE (ML) INJECTION
OUTPATIENT
Start: 2025-02-26

## 2025-02-12 RX ORDER — PROCHLORPERAZINE EDISYLATE 5 MG/ML
10 INJECTION INTRAMUSCULAR; INTRAVENOUS ONCE AS NEEDED
OUTPATIENT
Start: 2025-03-12

## 2025-02-12 RX ORDER — SODIUM CHLORIDE 0.9 % (FLUSH) 0.9 %
10 SYRINGE (ML) INJECTION
OUTPATIENT
Start: 2025-02-28

## 2025-02-12 RX ORDER — FLUOROURACIL 50 MG/ML
160 INJECTION, SOLUTION INTRAVENOUS
OUTPATIENT
Start: 2025-02-26

## 2025-02-12 RX ORDER — DIPHENHYDRAMINE HYDROCHLORIDE 50 MG/ML
50 INJECTION INTRAMUSCULAR; INTRAVENOUS ONCE AS NEEDED
OUTPATIENT
Start: 2025-02-26

## 2025-02-12 RX ORDER — FLUOROURACIL 50 MG/ML
160 INJECTION, SOLUTION INTRAVENOUS
Status: COMPLETED | OUTPATIENT
Start: 2025-02-12 | End: 2025-02-12

## 2025-02-12 RX ORDER — HEPARIN 100 UNIT/ML
500 SYRINGE INTRAVENOUS
OUTPATIENT
Start: 2025-02-26

## 2025-02-12 RX ORDER — SODIUM CHLORIDE 0.9 % (FLUSH) 0.9 %
10 SYRINGE (ML) INJECTION
Status: CANCELLED | OUTPATIENT
Start: 2025-02-14

## 2025-02-12 RX ORDER — HEPARIN 100 UNIT/ML
500 SYRINGE INTRAVENOUS
OUTPATIENT
Start: 2025-02-28

## 2025-02-12 RX ORDER — HEPARIN 100 UNIT/ML
500 SYRINGE INTRAVENOUS
OUTPATIENT
Start: 2025-03-14

## 2025-02-12 RX ORDER — HEPARIN 100 UNIT/ML
500 SYRINGE INTRAVENOUS
Status: CANCELLED | OUTPATIENT
Start: 2025-02-14

## 2025-02-12 RX ADMIN — DEXAMETHASONE SODIUM PHOSPHATE 0.25 MG: 4 INJECTION, SOLUTION INTRA-ARTICULAR; INTRALESIONAL; INTRAMUSCULAR; INTRAVENOUS; SOFT TISSUE at 10:02

## 2025-02-12 RX ADMIN — FLUOROURACIL 320 MG: 50 INJECTION, SOLUTION INTRAVENOUS at 12:02

## 2025-02-12 RX ADMIN — TRASTUZUMAB-ANNS 300 MG: 420 INJECTION, POWDER, LYOPHILIZED, FOR SOLUTION INTRAVENOUS at 09:02

## 2025-02-12 RX ADMIN — LEUCOVORIN CALCIUM 635 MG: 500 INJECTION, POWDER, LYOPHILIZED, FOR SOLUTION INTRAMUSCULAR; INTRAVENOUS at 10:02

## 2025-02-12 RX ADMIN — OXALIPLATIN 135 MG: 5 INJECTION, SOLUTION INTRAVENOUS at 10:02

## 2025-02-12 RX ADMIN — FLUOROURACIL 1910 MG: 50 INJECTION, SOLUTION INTRAVENOUS at 12:02

## 2025-02-12 NOTE — PLAN OF CARE
Problem: Adult Inpatient Plan of Care  Goal: Plan of Care Review  Outcome: Progressing  Flowsheets (Taken 2/12/2025 0913)  Plan of Care Reviewed With:   patient   spouse  Goal: Patient-Specific Goal (Individualized)  Outcome: Progressing  Flowsheets (Taken 2/12/2025 0913)  Individualized Care Needs: recline with blanket and pillow, wife at side  Anxieties, Fears or Concerns: none today  Goal: Absence of Hospital-Acquired Illness or Injury  Outcome: Progressing  Intervention: Prevent Infection  Flowsheets (Taken 2/12/2025 0913)  Infection Prevention:   equipment surfaces disinfected   hand hygiene promoted   personal protective equipment utilized   rest/sleep promoted  Goal: Optimal Comfort and Wellbeing  Outcome: Progressing  Intervention: Provide Person-Centered Care  Flowsheets (Taken 2/12/2025 0913)  Trust Relationship/Rapport:   care explained   thoughts/feelings acknowledged   choices provided   emotional support provided   empathic listening provided   questions answered   questions encouraged   reassurance provided     Problem: Fall Injury Risk  Goal: Absence of Fall and Fall-Related Injury  Outcome: Progressing  Intervention: Promote Injury-Free Environment  Flowsheets (Taken 2/12/2025 0913)  Safety Promotion/Fall Prevention:   assistive device/personal item within reach   patient expresses understanding of fall risk and prevention   high risk medications identified   in recliner, wheels locked   instructed to call staff for mobility   medications reviewed     Problem: Chemotherapy Effects  Goal: Safety Maintained  Outcome: Progressing  Intervention: Promote Safe Chemotherapy Delivery  Flowsheets (Taken 2/12/2025 0913)  Infection Prevention:   equipment surfaces disinfected   hand hygiene promoted   personal protective equipment utilized   rest/sleep promoted  Chemotherapy Environmental Safety:   chemotherapy waste containers in room   protective environment maintained   meticulous hand hygiene promoted    patient environment monitored for safety   personal protective equipment utilized  Goal: Absence of Infection  Outcome: Progressing  Intervention: Prevent Infection and Maximize Resistance  Flowsheets (Taken 2/12/2025 2064)  Infection Prevention:   equipment surfaces disinfected   hand hygiene promoted   personal protective equipment utilized   rest/sleep promoted

## 2025-02-12 NOTE — PROGRESS NOTES
Subjective:       Patient ID: Rosalio Muñoz is a 59 y.o. male.    Chief Complaint: Results, Chemotherapy, and Esophageal Cancer    HPI:  59-year-old male history of stage IV esophageal carcinoma patient presents for cycle 3 day 1OP GASTROESOPHAGEAL pembrolizumab Q6W trastuzumab + MFOLFOX6 (oxaliplatin leucovorin fluorouracil) Q2W patient is tolerating therapy well patient denies nausea vomiting fevers chills night sweats ECOG status 1    Past Medical History:   Diagnosis Date    Esophageal cancer, stage IV 10/10/2024    Hypertension     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 10/11/2024    Palliative care encounter 2024     Family History   Problem Relation Name Age of Onset    Hyperlipidemia Mother      Hypertension Father      Diabetes Father      Kidney disease Father      Heart disease Father      Breast cancer Maternal Grandmother      Prostate cancer Maternal Grandfather      Colon cancer Neg Hx       Social History     Socioeconomic History    Marital status:     Number of children: 2   Occupational History    Occupation:    Tobacco Use    Smoking status: Every Day     Current packs/day: 0.00     Types: Cigars, Cigarettes     Last attempt to quit: 10/4/2022     Years since quittin.3     Passive exposure: Never    Smokeless tobacco: Never    Tobacco comments:     Cigar every afternoon   Substance and Sexual Activity    Alcohol use: Yes     Alcohol/week: 4.0 - 6.0 standard drinks of alcohol     Types: 4 - 6 Cans of beer per week     Comment: occ    Drug use: Yes     Types: Marijuana     Comment: medical    Sexual activity: Yes     Partners: Female     Social Drivers of Health     Financial Resource Strain: Patient Declined (2025)    Overall Financial Resource Strain (CARDIA)     Difficulty of Paying Living Expenses: Patient declined   Food Insecurity: Patient Declined (2025)    Hunger Vital Sign     Worried About Running Out of Food in the Last Year:  Patient declined     Ran Out of Food in the Last Year: Patient declined   Transportation Needs: Patient Declined (2/12/2025)    PRAPARE - Transportation     Lack of Transportation (Medical): Patient declined     Lack of Transportation (Non-Medical): Patient declined   Physical Activity: Unknown (2/12/2025)    Exercise Vital Sign     Days of Exercise per Week: Patient declined     Minutes of Exercise per Session: 20 min   Stress: No Stress Concern Present (2/12/2025)    [employer] [place] [employer] - Occupational Stress Questionnaire     Feeling of Stress : Only a little   Housing Stability: Patient Declined (2/12/2025)    Housing Stability Vital Sign     Unable to Pay for Housing in the Last Year: Patient declined     Homeless in the Last Year: Patient declined     Past Surgical History:   Procedure Laterality Date    COLONOSCOPY N/A 8/25/2023    Procedure: COLONOSCOPY;  Surgeon: Pebbles Spear MD;  Location: [facility];  Service: Endoscopy;  Laterality: N/A;    COLONOSCOPY N/A 10/2/2024    Procedure: COLONOSCOPY  Cardiac clearance received on 09/20/24 per Dr. Lockett, Cardiology.  Note in encounters.  LB;  Surgeon: Sully Farris MD;  Location: [facility];  Service: Endoscopy;  Laterality: N/A;    ESOPHAGOGASTRODUODENOSCOPY N/A 10/2/2024    Procedure: EGD (ESOPHAGOGASTRODUODENOSCOPY);  Surgeon: Sully Farris MD;  Location: [facility];  Service: Endoscopy;  Laterality: N/A;    ESOPHAGOGASTRODUODENOSCOPY N/A 12/31/2024    Procedure: EGD (ESOPHAGOGASTRODUODENOSCOPY);  Surgeon: Maki Sullivan MD;  Location: [facility];  Service: Gastroenterology;  Laterality: N/A;    INSERTION OF VENOUS ACCESS PORT Left 10/21/2024    Procedure: INSERTION, VENOUS ACCESS PORT;  Surgeon: Roseanna Rosas MD;  Location: [facility];  Service: General;  Laterality: Left;    SURGICAL REMOVAL OF LESION OF FACE Right 12/1/2023    Procedure: EXCISION, LESION, FACE;  Surgeon: Jose Flaherty MD;  Location: [facility];   "Service: ENT;  Laterality: Right;  1. Excision facial subcutaneous mass right cheek 3 cm. 2. Intermediate layered closure 3.5 cm    WISDOM TOOTH EXTRACTION      XI ROBOTIC APPENDECTOMY N/A 7/31/2024    Procedure: XI ROBOTIC APPENDECTOMY;  Surgeon: Paulo Saavedra MD;  Location: [facility] OR;  Service: General;  Laterality: N/A;    XI ROBOTIC INSERTION, GASTROSTOMY TUBE N/A 10/21/2024    Procedure: XI ROBOTIC INSERTION, GASTROSTOMY TUBE;  Surgeon: Roseanna Rosas MD;  Location: [facility] OR;  Service: General;  Laterality: N/A;       Labs:  Lab Results   Component Value Date    WBC 5.35 02/12/2025    HGB 12.9 (L) 02/12/2025    HCT 39.8 (L) 02/12/2025     (H) 02/12/2025     02/12/2025     BMP  Lab Results   Component Value Date     02/12/2025    K 5.1 02/12/2025     02/12/2025    CO2 25 02/12/2025    BUN 14 02/12/2025    CREATININE 0.8 02/12/2025    CALCIUM 9.8 02/12/2025    ANIONGAP 8 02/12/2025    ESTGFRAFRICA >60.0 07/08/2022    EGFRNONAA >60.0 07/08/2022     Lab Results   Component Value Date    ALT 16 02/12/2025    AST 19 02/12/2025    ALKPHOS 95 02/12/2025    BILITOT 0.6 02/12/2025       Lab Results   Component Value Date    IRON 72 12/03/2024    TIBC 432 12/03/2024    FERRITIN 272 12/03/2024     Lab Results   Component Value Date    DCAAAMHN13 445 10/19/2018     No results found for: "FOLATE"  Lab Results   Component Value Date    TSH 1.592 01/29/2025         Review of Systems   Constitutional:  Negative for activity change, appetite change, chills, diaphoresis, fatigue, fever and unexpected weight change.   HENT:  Negative for congestion, dental problem, drooling, ear discharge, ear pain, facial swelling, hearing loss, mouth sores, nosebleeds, postnasal drip, rhinorrhea, sinus pressure, sneezing, sore throat, tinnitus, trouble swallowing and voice change.    Eyes:  Negative for photophobia, pain, discharge, redness, itching and visual disturbance.   Respiratory:  Negative for apnea, cough, " choking, chest tightness, shortness of breath, wheezing and stridor.    Cardiovascular:  Negative for chest pain, palpitations and leg swelling.   Gastrointestinal:  Negative for abdominal distention, abdominal pain, anal bleeding, blood in stool, constipation, diarrhea, nausea, rectal pain and vomiting.   Endocrine: Negative for cold intolerance, heat intolerance, polydipsia, polyphagia and polyuria.   Genitourinary:  Negative for decreased urine volume, difficulty urinating, dysuria, enuresis, flank pain, frequency, genital sores, hematuria, penile discharge, penile pain, penile swelling, scrotal swelling, testicular pain and urgency.   Musculoskeletal:  Negative for arthralgias, back pain, gait problem, joint swelling, myalgias, neck pain and neck stiffness.   Skin:  Negative for color change, pallor, rash and wound.   Allergic/Immunologic: Negative for environmental allergies, food allergies and immunocompromised state.   Neurological:  Negative for dizziness, tremors, seizures, syncope, facial asymmetry, speech difficulty, weakness, light-headedness, numbness and headaches.   Hematological:  Negative for adenopathy. Does not bruise/bleed easily.   Psychiatric/Behavioral:  Negative for agitation, behavioral problems, confusion, decreased concentration, dysphoric mood, hallucinations, self-injury, sleep disturbance and suicidal ideas. The patient is not nervous/anxious and is not hyperactive.        Objective:      Physical Exam  Constitutional:       Appearance: Normal appearance. He is cachectic.   Neurological:      Mental Status: He is alert.             Assessment:      1. Esophageal cancer, stage IV    2. Immunodeficiency due to chemotherapy    3. 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    4. Secondary adenocarcinoma of retroperitoneum    5. Secondary liver cancer    6. Iron deficiency anemia due to chronic blood loss    7. Moderate malnutrition    8. Unintentional weight loss           Med Onc Chart  Routing      Follow up with physician . Return in 2 weeks with CBC CMP magnesium and TSH and serum iron TIBC ferritin   Follow up with JORDANA    Infusion scheduling note    Injection scheduling note OP GASTROESOPHAGEAL pembrolizumab Q6W trastuzumab + MFOLFOX6 (oxaliplatin leucovorin fluorouracil) Q2W   Labs    Imaging    Pharmacy appointment    Other referrals                   Plan:     The patient location is:  Home  The chief complaint leading to consultation is:  Esophageal cancer    Visit type: audiovisual    Face to Face time with patient: 30 minutes of total time spent on the encounter, which includes face to face time and non-face to face time preparing to see the patient (eg, review of tests), Obtaining and/or reviewing separately obtained history, Documenting clinical information in the electronic or other health record, Independently interpreting results (not separately reported) and communicating results to the patient/family/caregiver, or Care coordination (not separately reported).         Each patient to whom he or she provides medical services by telemedicine is:  (1) informed of the relationship between the physician and patient and the respective role of any other health care provider with respect to management of the patient; and (2) notified that he or she may decline to receive medical services by telemedicine and may withdraw from such care at any time.    Notes:   Patient has done remarkably well continues to receive chemotherapy tolerating well denies nausea vomiting fevers chills night sweats will continue with current plan course of action.  At this time will reimage at the end of 12 weeks of therapy.  For response orders written reviewed      Hudson Ltaif Jr, MD FACP

## 2025-02-12 NOTE — NURSING
Pt tolerated Kanjinti/FOLFOX well. No adverse reaction noted. Pt education reinforced on chemo regimen, side effects, what to expect, and when to call . Pt verbalized understanding. I reviewed pt calendar w/ pt and understanding verbalized.   Left chest PAC remains accessed with 5FU pump connected infusing at a continuous rate of 2.2 ml/hr for 46 hours.  Dressing clean, dry, and intact.

## 2025-02-14 ENCOUNTER — INFUSION (OUTPATIENT)
Dept: INFUSION THERAPY | Facility: HOSPITAL | Age: 60
End: 2025-02-14
Attending: INTERNAL MEDICINE
Payer: COMMERCIAL

## 2025-02-14 VITALS
HEART RATE: 99 BPM | RESPIRATION RATE: 18 BRPM | DIASTOLIC BLOOD PRESSURE: 61 MMHG | TEMPERATURE: 98 F | SYSTOLIC BLOOD PRESSURE: 118 MMHG | OXYGEN SATURATION: 98 %

## 2025-02-14 DIAGNOSIS — C79.89 MALIGNANT NEOPLASM METASTATIC TO OTHER SITE: ICD-10-CM

## 2025-02-14 DIAGNOSIS — C78.6 SECONDARY ADENOCARCINOMA OF RETROPERITONEUM: ICD-10-CM

## 2025-02-14 DIAGNOSIS — C15.9 ESOPHAGEAL CANCER, STAGE IV: Primary | ICD-10-CM

## 2025-02-14 PROCEDURE — A4216 STERILE WATER/SALINE, 10 ML: HCPCS | Performed by: INTERNAL MEDICINE

## 2025-02-14 PROCEDURE — 63600175 PHARM REV CODE 636 W HCPCS: Performed by: INTERNAL MEDICINE

## 2025-02-14 PROCEDURE — 25000003 PHARM REV CODE 250: Performed by: INTERNAL MEDICINE

## 2025-02-14 RX ORDER — SODIUM CHLORIDE 0.9 % (FLUSH) 0.9 %
10 SYRINGE (ML) INJECTION
Status: DISCONTINUED | OUTPATIENT
Start: 2025-02-14 | End: 2025-02-14 | Stop reason: HOSPADM

## 2025-02-14 RX ORDER — HEPARIN 100 UNIT/ML
500 SYRINGE INTRAVENOUS
Status: DISCONTINUED | OUTPATIENT
Start: 2025-02-14 | End: 2025-02-14 | Stop reason: HOSPADM

## 2025-02-14 RX ADMIN — HEPARIN SODIUM (PORCINE) LOCK FLUSH IV SOLN 100 UNIT/ML 500 UNITS: 100 SOLUTION at 12:02

## 2025-02-14 RX ADMIN — Medication 10 ML: at 12:02

## 2025-02-14 NOTE — NURSING
Chemotherapy completed; mediport flushed with saline/heparin; ray needle removed. 2x2/band-aid to site. Tolerated well.

## 2025-02-24 ENCOUNTER — ANESTHESIA EVENT (OUTPATIENT)
Dept: ENDOSCOPY | Facility: HOSPITAL | Age: 60
End: 2025-02-24
Payer: COMMERCIAL

## 2025-02-24 ENCOUNTER — ANESTHESIA (OUTPATIENT)
Dept: ENDOSCOPY | Facility: HOSPITAL | Age: 60
End: 2025-02-24
Payer: COMMERCIAL

## 2025-02-24 ENCOUNTER — HOSPITAL ENCOUNTER (OUTPATIENT)
Dept: ENDOSCOPY | Facility: HOSPITAL | Age: 60
Discharge: HOME | End: 2025-02-24
Attending: INTERNAL MEDICINE | Admitting: INTERNAL MEDICINE
Payer: COMMERCIAL

## 2025-02-24 VITALS
SYSTOLIC BLOOD PRESSURE: 107 MMHG | BODY MASS INDEX: 22.53 KG/M2 | OXYGEN SATURATION: 97 % | HEIGHT: 73 IN | DIASTOLIC BLOOD PRESSURE: 72 MMHG | HEART RATE: 73 BPM | RESPIRATION RATE: 20 BRPM | TEMPERATURE: 98 F | WEIGHT: 170 LBS

## 2025-02-24 DIAGNOSIS — C15.9 ESOPHAGEAL CANCER, STAGE IV: ICD-10-CM

## 2025-02-24 DIAGNOSIS — K26.4 DUODENAL ULCER WITH HEMORRHAGE: ICD-10-CM

## 2025-02-24 DIAGNOSIS — Z93.1 GASTROSTOMY STATUS: Primary | ICD-10-CM

## 2025-02-24 PROCEDURE — 63600175 PHARM REV CODE 636 W HCPCS: Performed by: NURSE ANESTHETIST, CERTIFIED REGISTERED

## 2025-02-24 PROCEDURE — 43246 EGD PLACE GASTROSTOMY TUBE: CPT | Mod: ,,, | Performed by: INTERNAL MEDICINE

## 2025-02-24 PROCEDURE — 43246 EGD PLACE GASTROSTOMY TUBE: CPT | Performed by: INTERNAL MEDICINE

## 2025-02-24 PROCEDURE — 37000009 HC ANESTHESIA EA ADD 15 MINS

## 2025-02-24 PROCEDURE — 37000008 HC ANESTHESIA 1ST 15 MINUTES

## 2025-02-24 RX ORDER — PANTOPRAZOLE SODIUM 40 MG/1
40 TABLET, DELAYED RELEASE ORAL DAILY
Qty: 90 TABLET | Refills: 3 | Status: SHIPPED | OUTPATIENT
Start: 2025-02-24 | End: 2026-02-24

## 2025-02-24 RX ORDER — LIDOCAINE HYDROCHLORIDE 10 MG/ML
INJECTION, SOLUTION EPIDURAL; INFILTRATION; INTRACAUDAL; PERINEURAL
Status: DISCONTINUED | OUTPATIENT
Start: 2025-02-24 | End: 2025-02-24

## 2025-02-24 RX ORDER — PROPOFOL 10 MG/ML
VIAL (ML) INTRAVENOUS
Status: DISCONTINUED | OUTPATIENT
Start: 2025-02-24 | End: 2025-02-24

## 2025-02-24 RX ADMIN — LIDOCAINE HYDROCHLORIDE 50 MG: 10 SOLUTION INTRAVENOUS at 09:02

## 2025-02-24 RX ADMIN — PROPOFOL 30 MG: 10 INJECTION, EMULSION INTRAVENOUS at 09:02

## 2025-02-24 RX ADMIN — PROPOFOL 70 MG: 10 INJECTION, EMULSION INTRAVENOUS at 09:02

## 2025-02-24 NOTE — BRIEF OP NOTE
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 Diagnoses of Duodenal ulcer with hemorrhage and Esophageal cancer, stage IV were also pertinent to this visit.     Discharged Condition: Good    Indication for Admission: Duodenal ulcer with hemorrhage     Hospital Course: Patient was admitted for an outppatient procedure and tolerated the procedure well with no complications.    Significant Diagnostic Studies: EGD    See LUMENS report    Pathology (if any):  Specimen (24h ago, onward)      None            Estimated Blood Loss: 1 ml.    Discussed with: patient and spouse.    Disposition: Home.    Follow Up/Patient Instructions:   Patient's Medications   New Prescriptions    No medications on file   Previous Medications    LIDOCAINE-PRILOCAINE (EMLA) CREAM    Apply topically as needed.    METOCLOPRAMIDE HCL (REGLAN) 10 MG TABLET    Take 1 tablet (10 mg total) by mouth every 6 (six) hours as needed (nausea, vomiting).    OLANZAPINE (ZYPREXA) 5 MG TABLET    Take 1 tablet (5 mg total) by mouth every evening. Take nightly on days 1-3 of each chemotherapy cycle.    ONDANSETRON (ZOFRAN-ODT) 8 MG TBDL    Take 1 tablet (8 mg total) by mouth 2 (two) times daily.   Modified Medications    Modified Medication Previous Medication    PANTOPRAZOLE (PROTONIX) 40 MG TABLET pantoprazole (PROTONIX) 40 MG tablet       Take 1 tablet (40 mg total) by mouth once daily.    Take 1 tablet (40 mg total) by mouth 2 (two) times daily.   Discontinued Medications    No medications on file       Discharge Procedure Orders   Diet general     Call MD for:  temperature >100.4     Call MD for:  persistent nausea and vomiting     Call MD for:  severe  uncontrolled pain     Call MD for:  difficulty breathing, headache or visual disturbances     Activity as tolerated        Follow-up Information       Hudson Latif MD Follow up.    Specialty: Hematology and Oncology  Contact information:  [address]  [place] [address] [phone]                                 Maki Sullivan MD  Inpatient Gastroenterology  [facility] [place]

## 2025-02-24 NOTE — TRANSFER OF CARE
"Anesthesia Transfer of Care Note    Patient: Rosalio Muñoz    Procedure(s) Performed: * No procedures listed *    Patient location: PACU    Anesthesia Type: MAC    Transport from OR: Transported from OR on room air with adequate spontaneous ventilation    Post pain: adequate analgesia    Post assessment: no apparent anesthetic complications    Post vital signs: stable    Level of consciousness: responds to stimulation    Nausea/Vomiting: no nausea/vomiting    Complications: none    Transfer of care protocol was followed      Last vitals: Visit Vitals  /75   Pulse 88   Resp 20   Ht 6' 1" (1.854 m)   Wt 77.1 kg (170 lb)   SpO2 100%   BMI 22.43 kg/m²     "

## 2025-02-24 NOTE — PLAN OF CARE
Dr Sullivan at bedside to update patient on results and recs. AVS and post sedation/procedure precautions discussed. Verbalized understanding.

## 2025-02-24 NOTE — ANESTHESIA PREPROCEDURE EVALUATION
02/24/2025  Rosalio Muñoz is a 59 y.o., male.      Pre-op Assessment    I have reviewed the Patient Summary Reports.     I have reviewed the Nursing Notes. I have reviewed the NPO Status.   I have reviewed the Medications.     Review of Systems  Anesthesia Hx:  No problems with previous Anesthesia             Denies Family Hx of Anesthesia complications.    Denies Personal Hx of Anesthesia complications.                    Social:  Smoker, Alcohol Use, Recreational Drugs       Hematology/Oncology:       -- Anemia:                              Oncology Comments: Esophageal cancer, stage IV     EENT/Dental:  EENT/Dental Normal           Cardiovascular:     Hypertension              ECG has been reviewed.                            Pulmonary:  Pulmonary Normal                       Renal/:  Renal/ Normal                 Hepatic/GI:   PUD,   Liver Disease,               Musculoskeletal:  Musculoskeletal Normal                Neurological:  Neurology Normal                                      Endocrine:  Endocrine Normal            Dermatological:  Skin Normal    Psych:  Psychiatric History                  Physical Exam  General: Cooperative, Alert and Oriented    Airway:  Mallampati: II   Mouth Opening: Normal  TM Distance: Normal  Tongue: Normal  Neck ROM: Normal ROM    Chest/Lungs:  Clear to auscultation, Normal Respiratory Rate    Heart:  Rate: Normal  Rhythm: Regular Rhythm        Anesthesia Plan  Type of Anesthesia, risks & benefits discussed:    Anesthesia Type: MAC  Intra-op Monitoring Plan: Standard ASA Monitors  Induction:  IV  Informed Consent: Informed consent signed with the Patient and all parties understand the risks and agree with anesthesia plan.  All questions answered.   ASA Score: 4  Day of Surgery Review of History & Physical: H&P Update referred to the surgeon/provider.I have  interviewed and examined the patient. I have reviewed the patient's H&P dated: There are no significant changes.     Ready For Surgery From Anesthesia Perspective.     .

## 2025-02-24 NOTE — H&P
PRE PROCEDURE H&P    Patient Name: Rosalio Muñoz  MRN: [id number]  : 1965  Date of Procedure:  2025  Referring Physician: Andrei Krishna MD  Primary Physician: Michell Goyal MD  Procedure Physician: Maki Sullivan MD       Planned Procedure: EGD  Diagnosis:   F/U duodenal ulcer    Chief Complaint: Same as above    HPI: Patient is an 59 y.o. male is here for the above. Hx of esophageal cancer hospitalized 24 for UGIB. EGD noted duodenal ulcer that was endoscopically treated. HP negative. He has been compliant with Protonix BID and Carafate. Tolerating PO and pain has resolved. Wife at bedside. No blood thinners.       Past Medical History:   Past Medical History:   Diagnosis Date    Esophageal cancer, stage IV 10/10/2024    Hypertension     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 10/11/2024    Palliative care encounter 2024        Past Surgical History:  Past Surgical History:   Procedure Laterality Date    COLONOSCOPY N/A 2023    Procedure: COLONOSCOPY;  Surgeon: Pebbles Spear MD;  Location: [facility];  Service: Endoscopy;  Laterality: N/A;    COLONOSCOPY N/A 10/2/2024    Procedure: COLONOSCOPY  Cardiac clearance received on 24 per Dr. Lockett, Cardiology.  Note in encounters.  LB;  Surgeon: Sully Farris MD;  Location: [facility];  Service: Endoscopy;  Laterality: N/A;    ESOPHAGOGASTRODUODENOSCOPY N/A 10/2/2024    Procedure: EGD (ESOPHAGOGASTRODUODENOSCOPY);  Surgeon: Sully Farris MD;  Location: [facility];  Service: Endoscopy;  Laterality: N/A;    ESOPHAGOGASTRODUODENOSCOPY N/A 2024    Procedure: EGD (ESOPHAGOGASTRODUODENOSCOPY);  Surgeon: Maki Sullivan MD;  Location: [facility];  Service: Gastroenterology;  Laterality: N/A;    INSERTION OF VENOUS ACCESS PORT Left 10/21/2024    Procedure: INSERTION, VENOUS ACCESS PORT;  Surgeon: Roseanna Rosas MD;  Location: [facility];  Service: General;  Laterality: Left;    SURGICAL REMOVAL OF  LESION OF FACE Right 12/1/2023    Procedure: EXCISION, LESION, FACE;  Surgeon: Jose Flaherty MD;  Location: [facility] OR;  Service: ENT;  Laterality: Right;  1. Excision facial subcutaneous mass right cheek 3 cm. 2. Intermediate layered closure 3.5 cm    WISDOM TOOTH EXTRACTION      XI ROBOTIC APPENDECTOMY N/A 7/31/2024    Procedure: XI ROBOTIC APPENDECTOMY;  Surgeon: Paulo Saavedra MD;  Location: [facility] OR;  Service: General;  Laterality: N/A;    XI ROBOTIC INSERTION, GASTROSTOMY TUBE N/A 10/21/2024    Procedure: XI ROBOTIC INSERTION, GASTROSTOMY TUBE;  Surgeon: Roseanna Rosas MD;  Location: [facility] OR;  Service: General;  Laterality: N/A;        Home Medications:  Prior to Admission medications    Medication Sig Start Date End Date Taking? Authorizing Provider   OLANZapine (ZYPREXA) 5 MG tablet Take 1 tablet (5 mg total) by mouth every evening. Take nightly on days 1-3 of each chemotherapy cycle. 10/11/24  Yes Hudson Latif MD   pantoprazole (PROTONIX) 40 MG tablet Take 1 tablet (40 mg total) by mouth 2 (two) times daily. 1/4/25 1/4/26 Yes Maki Sullivan MD   LIDOcaine-prilocaine (EMLA) cream Apply topically as needed. 10/11/24   Hudson Latif MD   metoclopramide HCl (REGLAN) 10 MG tablet Take 1 tablet (10 mg total) by mouth every 6 (six) hours as needed (nausea, vomiting). 12/27/24   Fausto Nguyen MD   ondansetron (ZOFRAN-ODT) 8 MG TbDL Take 1 tablet (8 mg total) by mouth 2 (two) times daily. 12/27/24   Hudson Latif MD        Allergies:  Review of patient's allergies indicates:  No Known Allergies     Social History:   Social History[1]    Family History:  Family History   Problem Relation Name Age of Onset    Hyperlipidemia Mother      Hypertension Father      Diabetes Father      Kidney disease Father      Heart disease Father      Breast cancer Maternal Grandmother      Prostate cancer Maternal Grandfather      Colon cancer Neg Hx         ROS: No acute cardiac events, no acute respiratory  "complaints.     Physical Exam (all patients):    /75   Pulse 88   Resp 20   Ht 6' 1" (1.854 m)   Wt 77.1 kg (170 lb)   SpO2 100%   BMI 22.43 kg/m²   Lungs: Clear to auscultation bilaterally, respirations unlabored  Heart: Regular rate and rhythm, S1 and S2 normal, no obvious murmurs  Abdomen:         Soft, non-tender, bowel sounds normal, no masses, no organomegaly    Lab Results   Component Value Date    WBC 5.35 02/12/2025     (H) 02/12/2025    RDW 16.0 (H) 02/12/2025     02/12/2025    INR 0.9 10/16/2024    GLU 97 02/12/2025    HGBA1C 5.1 06/16/2023    BUN 14 02/12/2025     02/12/2025    K 5.1 02/12/2025     02/12/2025        SEDATION PLAN: per anesthesia      History reviewed, vital signs satisfactory, cardiopulmonary status satisfactory, sedation options, risks and plans have been discussed with the patient  All their questions were answered and the patient agrees to the sedation procedures as planned and the patient is deemed an appropriate candidate for the sedation as planned.    The risks, benefits and alternatives of the procedure were discussed with the patient in detail. This discussion was had in the presence of his wife and endoscopy staff. The risks include, risks of adverse reaction to sedation requiring the use of reversal agents, bleeding requiring blood transfusion, perforation requiring surgical intervention and technical failure. Other risks include aspiration leading to respiratory distress and respiratory failure resulting in endotracheal intubation and mechanical ventilation including death. If anesthesia is being utilized for this procedure, it is up to the anesthesiologist to determine airway safety including elective endotracheal intubation. Questions were answered, they agree to proceed. There was no language barriers.      Procedure explained to patient, informed consent obtained and placed in chart.    Maki Sullivan  2/24/2025  9:47 AM      "     [1]   Social History  Socioeconomic History    Marital status:     Number of children: 2   Occupational History    Occupation:    Tobacco Use    Smoking status: Every Day     Current packs/day: 0.00     Types: Cigars, Cigarettes     Last attempt to quit: 10/4/2022     Years since quittin.3     Passive exposure: Never    Smokeless tobacco: Never    Tobacco comments:     Cigar every afternoon   Substance and Sexual Activity    Alcohol use: Yes     Alcohol/week: 4.0 - 6.0 standard drinks of alcohol     Types: 4 - 6 Cans of beer per week     Comment: occ    Drug use: Yes     Types: Marijuana     Comment: medical    Sexual activity: Yes     Partners: Female     Social Drivers of Health     Financial Resource Strain: Patient Declined (2025)    Overall Financial Resource Strain (CARDIA)     Difficulty of Paying Living Expenses: Patient declined   Food Insecurity: Patient Declined (2025)    Hunger Vital Sign     Worried About Running Out of Food in the Last Year: Patient declined     Ran Out of Food in the Last Year: Patient declined   Transportation Needs: Patient Declined (2025)    PRAPARE - Transportation     Lack of Transportation (Medical): Patient declined     Lack of Transportation (Non-Medical): Patient declined   Physical Activity: Unknown (2025)    Exercise Vital Sign     Days of Exercise per Week: Patient declined     Minutes of Exercise per Session: 20 min   Stress: No Stress Concern Present (2025)    [employer] [place] [employer] - Occupational Stress Questionnaire     Feeling of Stress : Only a little   Housing Stability: Patient Declined (2025)    Housing Stability Vital Sign     Unable to Pay for Housing in the Last Year: Patient declined     Homeless in the Last Year: Patient declined

## 2025-02-24 NOTE — ANESTHESIA POSTPROCEDURE EVALUATION
Anesthesia Post Evaluation    Patient: Rosalio Muñoz    Procedure(s) Performed: * No procedures listed *    Final Anesthesia Type: MAC      Patient location during evaluation: PACU  Patient participation: Yes- Able to Participate  Level of consciousness: awake and alert  Post-procedure vital signs: reviewed and stable  Pain management: adequate  Airway patency: patent    PONV status at discharge: No PONV  Anesthetic complications: no      Cardiovascular status: blood pressure returned to baseline  Respiratory status: unassisted and room air  Hydration status: euvolemic  Follow-up not needed.              Vitals Value Taken Time   /72 02/24/25 10:13   Temp 36.7 °C (98 °F) 02/24/25 10:13   Pulse 73 02/24/25 10:13   Resp 20 02/24/25 10:13   SpO2 97 % 02/24/25 10:13         Event Time   Out of Recovery 10:21:20         Pain/Nick Score: Nick Score: 10 (2/24/2025 10:13 AM)

## 2025-02-25 ENCOUNTER — LAB VISIT (OUTPATIENT)
Dept: LAB | Facility: HOSPITAL | Age: 60
End: 2025-02-25
Attending: INTERNAL MEDICINE
Payer: COMMERCIAL

## 2025-02-25 ENCOUNTER — PATIENT MESSAGE (OUTPATIENT)
Dept: ADMINISTRATIVE | Facility: OTHER | Age: 60
End: 2025-02-25
Payer: COMMERCIAL

## 2025-02-25 ENCOUNTER — OFFICE VISIT (OUTPATIENT)
Dept: HEMATOLOGY/ONCOLOGY | Facility: CLINIC | Age: 60
End: 2025-02-25
Payer: COMMERCIAL

## 2025-02-25 VITALS
BODY MASS INDEX: 22.78 KG/M2 | WEIGHT: 171.88 LBS | HEART RATE: 98 BPM | TEMPERATURE: 98 F | DIASTOLIC BLOOD PRESSURE: 71 MMHG | HEIGHT: 73 IN | SYSTOLIC BLOOD PRESSURE: 115 MMHG | OXYGEN SATURATION: 98 %

## 2025-02-25 DIAGNOSIS — D50.0 IRON DEFICIENCY ANEMIA DUE TO CHRONIC BLOOD LOSS: ICD-10-CM

## 2025-02-25 DIAGNOSIS — C78.7 SECONDARY LIVER CANCER: ICD-10-CM

## 2025-02-25 DIAGNOSIS — C78.6 SECONDARY ADENOCARCINOMA OF RETROPERITONEUM: ICD-10-CM

## 2025-02-25 DIAGNOSIS — T45.1X5A IMMUNODEFICIENCY DUE TO CHEMOTHERAPY: Primary | ICD-10-CM

## 2025-02-25 DIAGNOSIS — C15.9 ESOPHAGEAL CANCER, STAGE IV: ICD-10-CM

## 2025-02-25 DIAGNOSIS — C79.89 MALIGNANT NEOPLASM METASTATIC TO OTHER SITE: ICD-10-CM

## 2025-02-25 DIAGNOSIS — D84.821 IMMUNODEFICIENCY DUE TO CHEMOTHERAPY: ICD-10-CM

## 2025-02-25 DIAGNOSIS — Z79.899 IMMUNODEFICIENCY DUE TO CHEMOTHERAPY: Primary | ICD-10-CM

## 2025-02-25 DIAGNOSIS — D84.821 IMMUNODEFICIENCY DUE TO CHEMOTHERAPY: Primary | ICD-10-CM

## 2025-02-25 DIAGNOSIS — Z79.899 IMMUNODEFICIENCY DUE TO CHEMOTHERAPY: ICD-10-CM

## 2025-02-25 DIAGNOSIS — T45.1X5A IMMUNODEFICIENCY DUE TO CHEMOTHERAPY: ICD-10-CM

## 2025-02-25 DIAGNOSIS — R63.4 UNINTENTIONAL WEIGHT LOSS: ICD-10-CM

## 2025-02-25 DIAGNOSIS — E44.0 MODERATE MALNUTRITION: ICD-10-CM

## 2025-02-25 LAB
ALBUMIN SERPL BCP-MCNC: 3.6 G/DL (ref 3.5–5.2)
ALP SERPL-CCNC: 111 U/L (ref 40–150)
ALT SERPL W/O P-5'-P-CCNC: 15 U/L (ref 10–44)
ANION GAP SERPL CALC-SCNC: 6 MMOL/L (ref 8–16)
AST SERPL-CCNC: 23 U/L (ref 10–40)
BASOPHILS # BLD AUTO: 0.07 K/UL (ref 0–0.2)
BASOPHILS NFR BLD: 1 % (ref 0–1.9)
BILIRUB SERPL-MCNC: 0.4 MG/DL (ref 0.1–1)
BUN SERPL-MCNC: 12 MG/DL (ref 6–20)
CALCIUM SERPL-MCNC: 9.5 MG/DL (ref 8.7–10.5)
CHLORIDE SERPL-SCNC: 105 MMOL/L (ref 95–110)
CO2 SERPL-SCNC: 26 MMOL/L (ref 23–29)
CREAT SERPL-MCNC: 0.8 MG/DL (ref 0.5–1.4)
DIFFERENTIAL METHOD BLD: ABNORMAL
EOSINOPHIL # BLD AUTO: 0.2 K/UL (ref 0–0.5)
EOSINOPHIL NFR BLD: 3.5 % (ref 0–8)
ERYTHROCYTE [DISTWIDTH] IN BLOOD BY AUTOMATED COUNT: 15.9 % (ref 11.5–14.5)
EST. GFR  (NO RACE VARIABLE): >60 ML/MIN/1.73 M^2
FERRITIN SERPL-MCNC: 82 NG/ML (ref 20–300)
GLUCOSE SERPL-MCNC: 105 MG/DL (ref 70–110)
HCT VFR BLD AUTO: 40.3 % (ref 40–54)
HGB BLD-MCNC: 13.5 G/DL (ref 14–18)
IMM GRANULOCYTES # BLD AUTO: 0.02 K/UL (ref 0–0.04)
IMM GRANULOCYTES NFR BLD AUTO: 0.3 % (ref 0–0.5)
IRON SERPL-MCNC: 63 UG/DL (ref 45–160)
LYMPHOCYTES # BLD AUTO: 1.5 K/UL (ref 1–4.8)
LYMPHOCYTES NFR BLD: 21.8 % (ref 18–48)
MAGNESIUM SERPL-MCNC: 1.8 MG/DL (ref 1.6–2.6)
MCH RBC QN AUTO: 32.9 PG (ref 27–31)
MCHC RBC AUTO-ENTMCNC: 33.5 G/DL (ref 32–36)
MCV RBC AUTO: 98 FL (ref 82–98)
MONOCYTES # BLD AUTO: 1.1 K/UL (ref 0.3–1)
MONOCYTES NFR BLD: 16 % (ref 4–15)
NEUTROPHILS # BLD AUTO: 4 K/UL (ref 1.8–7.7)
NEUTROPHILS NFR BLD: 57.4 % (ref 38–73)
NRBC BLD-RTO: 0 /100 WBC
PLATELET # BLD AUTO: 214 K/UL (ref 150–450)
PMV BLD AUTO: 9.9 FL (ref 9.2–12.9)
POTASSIUM SERPL-SCNC: 4.7 MMOL/L (ref 3.5–5.1)
PROT SERPL-MCNC: 7.8 G/DL (ref 6–8.4)
RBC # BLD AUTO: 4.1 M/UL (ref 4.6–6.2)
SATURATED IRON: 14 % (ref 20–50)
SODIUM SERPL-SCNC: 137 MMOL/L (ref 136–145)
TOTAL IRON BINDING CAPACITY: 456 UG/DL (ref 250–450)
TRANSFERRIN SERPL-MCNC: 308 MG/DL (ref 200–375)
TSH SERPL DL<=0.005 MIU/L-ACNC: 2.4 UIU/ML (ref 0.4–4)
WBC # BLD AUTO: 6.92 K/UL (ref 3.9–12.7)

## 2025-02-25 PROCEDURE — 99215 OFFICE O/P EST HI 40 MIN: CPT | Mod: 25,S$GLB,, | Performed by: NURSE PRACTITIONER

## 2025-02-25 PROCEDURE — 83540 ASSAY OF IRON: CPT | Performed by: INTERNAL MEDICINE

## 2025-02-25 PROCEDURE — 82728 ASSAY OF FERRITIN: CPT | Performed by: INTERNAL MEDICINE

## 2025-02-25 PROCEDURE — 99999 PR PBB SHADOW E&M-EST. PATIENT-LVL III: CPT | Mod: PBBFAC,,, | Performed by: NURSE PRACTITIONER

## 2025-02-25 PROCEDURE — 36415 COLL VENOUS BLD VENIPUNCTURE: CPT | Performed by: INTERNAL MEDICINE

## 2025-02-25 PROCEDURE — 3008F BODY MASS INDEX DOCD: CPT | Mod: CPTII,S$GLB,, | Performed by: NURSE PRACTITIONER

## 2025-02-25 PROCEDURE — 3074F SYST BP LT 130 MM HG: CPT | Mod: CPTII,S$GLB,, | Performed by: NURSE PRACTITIONER

## 2025-02-25 PROCEDURE — 80053 COMPREHEN METABOLIC PANEL: CPT | Performed by: INTERNAL MEDICINE

## 2025-02-25 PROCEDURE — 1160F RVW MEDS BY RX/DR IN RCRD: CPT | Mod: CPTII,S$GLB,, | Performed by: NURSE PRACTITIONER

## 2025-02-25 PROCEDURE — 85025 COMPLETE CBC W/AUTO DIFF WBC: CPT | Performed by: INTERNAL MEDICINE

## 2025-02-25 PROCEDURE — 3078F DIAST BP <80 MM HG: CPT | Mod: CPTII,S$GLB,, | Performed by: NURSE PRACTITIONER

## 2025-02-25 PROCEDURE — 83735 ASSAY OF MAGNESIUM: CPT | Performed by: INTERNAL MEDICINE

## 2025-02-25 PROCEDURE — 84443 ASSAY THYROID STIM HORMONE: CPT | Performed by: INTERNAL MEDICINE

## 2025-02-25 PROCEDURE — 1159F MED LIST DOCD IN RCRD: CPT | Mod: CPTII,S$GLB,, | Performed by: NURSE PRACTITIONER

## 2025-02-25 NOTE — PROGRESS NOTES
Subjective:       Patient ID: Rosalio Muñoz is a 59 y.o. male.    Chief Complaint: review labs. Chemo      Cancer Staging   Esophageal cancer, stage IV  Staging form: Esophagus - Adenocarcinoma, AJCC 8th Edition  - Clinical stage from 10/10/2024: Stage IVB (cTX, cN0, cM1, G2) - Signed by Zacarias Crowley MD on 10/10/2024      HPI: 59 y.o male with stage IV esophageal cancer currently being treated with PEMBROLIZUMAB + TRASTUZUMAB  + mFOLFOX Q 6 weeks presenting today for consideration of chemotherapy. He notes tolerating treatments well without any significant side effects. He notes good appetite and intentional weight gain. Feeding tube in place. Also tolerating PO intake.    Social History[1]    Past Medical History:   Diagnosis Date    Esophageal cancer, stage IV 10/10/2024    Hypertension     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 10/11/2024    Palliative care encounter 12/11/2024       Family History   Problem Relation Name Age of Onset    Hyperlipidemia Mother      Hypertension Father      Diabetes Father      Kidney disease Father      Heart disease Father      Breast cancer Maternal Grandmother      Prostate cancer Maternal Grandfather      Colon cancer Neg Hx         Past Surgical History:   Procedure Laterality Date    COLONOSCOPY N/A 8/25/2023    Procedure: COLONOSCOPY;  Surgeon: Pebbles Spear MD;  Location: [facility];  Service: Endoscopy;  Laterality: N/A;    COLONOSCOPY N/A 10/2/2024    Procedure: COLONOSCOPY  Cardiac clearance received on 09/20/24 per Dr. Lockett, Cardiology.  Note in encounters.  LB;  Surgeon: Sully Farris MD;  Location: [facility];  Service: Endoscopy;  Laterality: N/A;    ESOPHAGOGASTRODUODENOSCOPY N/A 10/2/2024    Procedure: EGD (ESOPHAGOGASTRODUODENOSCOPY);  Surgeon: Sully Farris MD;  Location: [facility];  Service: Endoscopy;  Laterality: N/A;    ESOPHAGOGASTRODUODENOSCOPY N/A 12/31/2024    Procedure: EGD  (ESOPHAGOGASTRODUODENOSCOPY);  Surgeon: Maki Sullivan MD;  Location: [facility] ENDO;  Service: Gastroenterology;  Laterality: N/A;    INSERTION OF VENOUS ACCESS PORT Left 10/21/2024    Procedure: INSERTION, VENOUS ACCESS PORT;  Surgeon: Roseanna Rosas MD;  Location: [facility] OR;  Service: General;  Laterality: Left;    SURGICAL REMOVAL OF LESION OF FACE Right 12/1/2023    Procedure: EXCISION, LESION, FACE;  Surgeon: Jose Flaherty MD;  Location: [facility] OR;  Service: ENT;  Laterality: Right;  1. Excision facial subcutaneous mass right cheek 3 cm. 2. Intermediate layered closure 3.5 cm    WISDOM TOOTH EXTRACTION      XI ROBOTIC APPENDECTOMY N/A 7/31/2024    Procedure: XI ROBOTIC APPENDECTOMY;  Surgeon: Paulo Saavedra MD;  Location: [facility] OR;  Service: General;  Laterality: N/A;    XI ROBOTIC INSERTION, GASTROSTOMY TUBE N/A 10/21/2024    Procedure: XI ROBOTIC INSERTION, GASTROSTOMY TUBE;  Surgeon: Roseanna Rosas MD;  Location: [facility] OR;  Service: General;  Laterality: N/A;       Review of Systems   Constitutional:  Negative for appetite change, chills, fatigue, fever and unexpected weight change.   HENT:  Positive for hearing loss. Negative for congestion, mouth sores, nosebleeds, sore throat, trouble swallowing and voice change.    Respiratory:  Negative for cough, chest tightness, shortness of breath and wheezing.    Cardiovascular:  Negative for chest pain and leg swelling.   Gastrointestinal:  Negative for abdominal distention, abdominal pain, blood in stool, constipation, diarrhea, nausea and vomiting.   Genitourinary:  Negative for difficulty urinating, dysuria and hematuria.   Musculoskeletal:  Negative for arthralgias, back pain and myalgias.   Skin:  Negative for pallor, rash and wound.   Neurological:  Negative for dizziness, syncope, weakness and headaches.   Hematological:  Negative for adenopathy. Does not bruise/bleed easily.   Psychiatric/Behavioral:  The patient is not nervous/anxious.           Medication List with Changes/Refills   Current Medications    LIDOCAINE-PRILOCAINE (EMLA) CREAM    Apply topically as needed.    METOCLOPRAMIDE HCL (REGLAN) 10 MG TABLET    Take 1 tablet (10 mg total) by mouth every 6 (six) hours as needed (nausea, vomiting).    OLANZAPINE (ZYPREXA) 5 MG TABLET    Take 1 tablet (5 mg total) by mouth every evening. Take nightly on days 1-3 of each chemotherapy cycle.    ONDANSETRON (ZOFRAN-ODT) 8 MG TBDL    Take 1 tablet (8 mg total) by mouth 2 (two) times daily.    PANTOPRAZOLE (PROTONIX) 40 MG TABLET    Take 1 tablet (40 mg total) by mouth once daily.     Objective:     Vitals:    02/25/25 0825   BP: 115/71   Pulse: 98   Temp: 97.9 °F (36.6 °C)     Lab Results   Component Value Date    WBC 6.92 02/25/2025    HGB 13.5 (L) 02/25/2025    HCT 40.3 02/25/2025    MCV 98 02/25/2025     02/25/2025       BMP  Lab Results   Component Value Date     02/25/2025    K 4.7 02/25/2025     02/25/2025    CO2 26 02/25/2025    BUN 12 02/25/2025    CREATININE 0.8 02/25/2025    CALCIUM 9.5 02/25/2025    ANIONGAP 6 (L) 02/25/2025    EGFRNORACEVR >60 02/25/2025     Lab Results   Component Value Date    ALT 15 02/25/2025    AST 23 02/25/2025    ALKPHOS 111 02/25/2025    BILITOT 0.4 02/25/2025     Lab Results   Component Value Date    IRON 72 12/03/2024    TRANSFERRIN 292 12/03/2024    TIBC 432 12/03/2024    FESATURATED 17 (L) 12/03/2024          Physical Exam  Vitals reviewed.   Constitutional:       Appearance: He is well-developed.   HENT:      Head: Normocephalic.      Right Ear: External ear normal.      Left Ear: External ear normal.      Nose: Nose normal.   Eyes:      General: Lids are normal. No scleral icterus.        Right eye: No discharge.         Left eye: No discharge.      Conjunctiva/sclera: Conjunctivae normal.   Neck:      Thyroid: No thyroid mass.   Cardiovascular:      Rate and Rhythm: Normal rate and regular rhythm.      Heart sounds: Normal heart sounds.    Pulmonary:      Effort: Pulmonary effort is normal. No respiratory distress.      Breath sounds: Normal breath sounds. No wheezing or rales.   Abdominal:      General: There is no distension.      Comments: LLQ PEG tube   Genitourinary:     Comments: deferred  Musculoskeletal:         General: Normal range of motion.      Cervical back: Normal range of motion.   Skin:     General: Skin is warm and dry.   Neurological:      Mental Status: He is alert and oriented to person, place, and time.   Psychiatric:         Speech: Speech normal.         Behavior: Behavior normal. Behavior is cooperative.         Thought Content: Thought content normal.        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 Gradual improvement in anemia. He notes resolution of previously diagnosed duodenal ulcer.     Okay to proceed with C3D15 mFOLFOX + Trastuzumab + Pembrolizumab in am as planned. F/u 2 weeks with repeat labs for consideration of C3D29.     ECHO UTD, due around 4/16/2025         Secondary adenocarcinoma of retroperitoneum    Continue current treatment          Secondary liver cancer    Continue current treatment          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    Continue current treatment          Iron deficiency anemia due to chronic blood loss    F/u today's pending iron labs               Plan:     Immunodeficiency due to chemotherapy    Esophageal cancer, stage IV    Secondary adenocarcinoma of retroperitoneum    Secondary liver cancer    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    Iron deficiency  anemia due to chronic blood loss            Med Onc Chart Routing      Follow up with physician 2 weeks. Dr. Latif   Follow up with JORDANA    Infusion scheduling note   FOLFOX + KANJINTI + KEYTRUDA   Injection scheduling note    Labs CBC, CMP, TSH and magnesium   Scheduling:  Preferred lab:  Lab interval:     Imaging None      Pharmacy appointment No pharmacy appointment needed      Other referrals       No additional referrals needed             EVE Rogers           [1]  Social History  Socioeconomic History    Marital status:     Number of children: 2   Occupational History    Occupation: Industrial electrician   Tobacco Use    Smoking status: Every Day     Current packs/day: 0.00     Types: Cigars, Cigarettes     Last attempt to quit: 10/4/2022     Years since quittin.3     Passive exposure: Never    Smokeless tobacco: Never    Tobacco comments:     Cigar every afternoon   Substance and Sexual Activity    Alcohol use: Yes     Alcohol/week: 4.0 - 6.0 standard drinks of alcohol     Types: 4 - 6 Cans of beer per week     Comment: occ    Drug use: Yes     Types: Marijuana     Comment: medical    Sexual activity: Yes     Partners: Female     Social Drivers of Health     Financial Resource Strain: Patient Declined (2025)    Overall Financial Resource Strain (CARDIA)     Difficulty of Paying Living Expenses: Patient declined   Food Insecurity: Patient Declined (2025)    Hunger Vital Sign     Worried About Running Out of Food in the Last Year: Patient declined     Ran Out of Food in the Last Year: Patient declined   Transportation Needs: Patient Declined (2025)    PRAPARE - Transportation     Lack of Transportation (Medical): Patient declined     Lack of Transportation (Non-Medical): Patient declined   Physical Activity: Unknown (2025)    Exercise Vital Sign     Days of Exercise per Week: Patient declined     Minutes of Exercise per Session: 20 min   Stress: No  Stress Concern Present (2/12/2025)    [facility] - Occupational Stress Questionnaire     Feeling of Stress : Only a little   Housing Stability: Patient Declined (2/12/2025)    Housing Stability Vital Sign     Unable to Pay for Housing in the Last Year: Patient declined     Homeless in the Last Year: Patient declined

## 2025-02-25 NOTE — ASSESSMENT & PLAN NOTE
Cancer Staging   Esophageal cancer, stage IV  Staging form: Esophagus - Adenocarcinoma, AJCC 8th Edition  - Clinical stage from 10/10/2024: Stage IVB (cTX, cN0, cM1, G2) - Signed by Zacarias Crowley MD on 10/10/2024    Labs reviewed unremarkable. Gradual improvement in anemia. He notes resolution of previously diagnosed duodenal ulcer.     Okay to proceed with C3D15 mFOLFOX + Trastuzumab + Pembrolizumab in am as planned. F/u 2 weeks with repeat labs for consideration of C3D29.     ECHO UTD, due around 4/16/2025

## 2025-02-26 ENCOUNTER — RESULTS FOLLOW-UP (OUTPATIENT)
Dept: HEMATOLOGY/ONCOLOGY | Facility: CLINIC | Age: 60
End: 2025-02-26

## 2025-02-26 ENCOUNTER — INFUSION (OUTPATIENT)
Dept: INFUSION THERAPY | Facility: HOSPITAL | Age: 60
End: 2025-02-26
Attending: INTERNAL MEDICINE
Payer: COMMERCIAL

## 2025-02-26 VITALS
RESPIRATION RATE: 18 BRPM | BODY MASS INDEX: 22.6 KG/M2 | WEIGHT: 170.5 LBS | TEMPERATURE: 98 F | HEART RATE: 81 BPM | DIASTOLIC BLOOD PRESSURE: 76 MMHG | OXYGEN SATURATION: 97 % | SYSTOLIC BLOOD PRESSURE: 124 MMHG | HEIGHT: 73 IN

## 2025-02-26 DIAGNOSIS — C15.9 ESOPHAGEAL CANCER, STAGE IV: Primary | ICD-10-CM

## 2025-02-26 DIAGNOSIS — C79.89 MALIGNANT NEOPLASM METASTATIC TO OTHER SITE: ICD-10-CM

## 2025-02-26 DIAGNOSIS — C78.6 SECONDARY ADENOCARCINOMA OF RETROPERITONEUM: ICD-10-CM

## 2025-02-26 PROCEDURE — 25000003 PHARM REV CODE 250: Performed by: INTERNAL MEDICINE

## 2025-02-26 PROCEDURE — 63600175 PHARM REV CODE 636 W HCPCS: Performed by: INTERNAL MEDICINE

## 2025-02-26 PROCEDURE — 96415 CHEMO IV INFUSION ADDL HR: CPT

## 2025-02-26 PROCEDURE — 96411 CHEMO IV PUSH ADDL DRUG: CPT

## 2025-02-26 PROCEDURE — 96416 CHEMO PROLONG INFUSE W/PUMP: CPT

## 2025-02-26 PROCEDURE — 96367 TX/PROPH/DG ADDL SEQ IV INF: CPT

## 2025-02-26 PROCEDURE — 96368 THER/DIAG CONCURRENT INF: CPT

## 2025-02-26 PROCEDURE — 96413 CHEMO IV INFUSION 1 HR: CPT

## 2025-02-26 PROCEDURE — 96417 CHEMO IV INFUS EACH ADDL SEQ: CPT

## 2025-02-26 RX ORDER — FLUOROURACIL 50 MG/ML
160 INJECTION, SOLUTION INTRAVENOUS
Status: COMPLETED | OUTPATIENT
Start: 2025-02-26 | End: 2025-02-26

## 2025-02-26 RX ADMIN — OXALIPLATIN 136 MG: 5 INJECTION, SOLUTION INTRAVENOUS at 10:02

## 2025-02-26 RX ADMIN — TRASTUZUMAB-ANNS 300 MG: 420 INJECTION, POWDER, LYOPHILIZED, FOR SOLUTION INTRAVENOUS at 09:02

## 2025-02-26 RX ADMIN — LEUCOVORIN CALCIUM 640 MG: 500 INJECTION, POWDER, LYOPHILIZED, FOR SOLUTION INTRAMUSCULAR; INTRAVENOUS at 10:02

## 2025-02-26 RX ADMIN — FLUOROURACIL 320 MG: 50 INJECTION, SOLUTION INTRAVENOUS at 12:02

## 2025-02-26 RX ADMIN — SODIUM CHLORIDE 0.25 MG: 9 INJECTION, SOLUTION INTRAVENOUS at 09:02

## 2025-02-26 RX ADMIN — FLUOROURACIL 1920 MG: 50 INJECTION, SOLUTION INTRAVENOUS at 12:02

## 2025-02-26 NOTE — NURSING
Pt tolerated FOLFOX/Kanjinti well. No adverse reaction noted. Pt education reinforced on chemo regimen, side effects, what to expect, and when to call his provider. Pt verbalized understanding. I reviewed pt calendar w/ pt and understanding verbalized.   Left chest PAC remains accessed with 5FU pump connected infusing at a continuous rate of 2.2 ml/hr for 46 hours.  Dressing clean, dry, and intact.

## 2025-02-26 NOTE — PLAN OF CARE
Problem: Adult Inpatient Plan of Care  Goal: Plan of Care Review  Outcome: Progressing  Flowsheets (Taken 2/26/2025 1138)  Plan of Care Reviewed With:   patient   spouse  Goal: Patient-Specific Goal (Individualized)  Outcome: Progressing  Flowsheets (Taken 2/26/2025 1138)  Individualized Care Needs: reline with pillow and blanket, uses emla cream for mediports access  Anxieties, Fears or Concerns: none today  Goal: Absence of Hospital-Acquired Illness or Injury  Outcome: Progressing  Intervention: Prevent Infection  Flowsheets (Taken 2/26/2025 1138)  Infection Prevention:   equipment surfaces disinfected   hand hygiene promoted   personal protective equipment utilized   rest/sleep promoted  Goal: Optimal Comfort and Wellbeing  Outcome: Progressing  Intervention: Provide Person-Centered Care  Flowsheets (Taken 2/26/2025 1138)  Trust Relationship/Rapport:   care explained   thoughts/feelings acknowledged   choices provided   emotional support provided   empathic listening provided   questions answered   questions encouraged   reassurance provided     Problem: Chemotherapy Effects  Goal: Safety Maintained  Outcome: Progressing  Intervention: Promote Safe Chemotherapy Delivery  Flowsheets (Taken 2/26/2025 1138)  Infection Prevention:   equipment surfaces disinfected   hand hygiene promoted   personal protective equipment utilized   rest/sleep promoted  Chemotherapy Environmental Safety:   chemotherapy waste containers in room   personal protective equipment utilized   protective environment maintained   meticulous hand hygiene promoted   patient environment monitored for safety  Goal: Absence of Infection  Outcome: Progressing  Intervention: Prevent Infection and Maximize Resistance  Flowsheets (Taken 2/26/2025 1138)  Infection Prevention:   equipment surfaces disinfected   hand hygiene promoted   personal protective equipment utilized   rest/sleep promoted     Problem: Fall Injury Risk  Goal: Absence of Fall and  Fall-Related Injury  Outcome: Progressing  Intervention: Promote Injury-Free Environment  Flowsheets (Taken 2/26/2025 1130)  Safety Promotion/Fall Prevention:   assistive device/personal item within reach   patient expresses understanding of fall risk and prevention   medications reviewed   nonskid shoes/socks when out of bed   high risk medications identified   in recliner, wheels locked   instructed to call staff for mobility

## 2025-02-27 ENCOUNTER — PATIENT MESSAGE (OUTPATIENT)
Dept: HEMATOLOGY/ONCOLOGY | Facility: CLINIC | Age: 60
End: 2025-02-27
Payer: COMMERCIAL

## 2025-02-28 ENCOUNTER — INFUSION (OUTPATIENT)
Dept: INFUSION THERAPY | Facility: HOSPITAL | Age: 60
End: 2025-02-28
Attending: INTERNAL MEDICINE
Payer: COMMERCIAL

## 2025-02-28 VITALS
RESPIRATION RATE: 18 BRPM | HEART RATE: 80 BPM | SYSTOLIC BLOOD PRESSURE: 125 MMHG | OXYGEN SATURATION: 98 % | DIASTOLIC BLOOD PRESSURE: 60 MMHG | TEMPERATURE: 98 F

## 2025-02-28 DIAGNOSIS — C15.9 ESOPHAGEAL CANCER, STAGE IV: Primary | ICD-10-CM

## 2025-02-28 DIAGNOSIS — C79.89 MALIGNANT NEOPLASM METASTATIC TO OTHER SITE: ICD-10-CM

## 2025-02-28 DIAGNOSIS — C78.6 SECONDARY ADENOCARCINOMA OF RETROPERITONEUM: ICD-10-CM

## 2025-02-28 PROCEDURE — A4216 STERILE WATER/SALINE, 10 ML: HCPCS | Performed by: INTERNAL MEDICINE

## 2025-02-28 PROCEDURE — 25000003 PHARM REV CODE 250: Performed by: INTERNAL MEDICINE

## 2025-02-28 PROCEDURE — 63600175 PHARM REV CODE 636 W HCPCS: Performed by: INTERNAL MEDICINE

## 2025-02-28 RX ORDER — SODIUM CHLORIDE 0.9 % (FLUSH) 0.9 %
10 SYRINGE (ML) INJECTION
Status: DISCONTINUED | OUTPATIENT
Start: 2025-02-28 | End: 2025-02-28 | Stop reason: HOSPADM

## 2025-02-28 RX ORDER — HEPARIN 100 UNIT/ML
500 SYRINGE INTRAVENOUS
Status: DISCONTINUED | OUTPATIENT
Start: 2025-02-28 | End: 2025-02-28 | Stop reason: HOSPADM

## 2025-02-28 RX ADMIN — SODIUM CHLORIDE, PRESERVATIVE FREE 10 ML: 5 INJECTION INTRAVENOUS at 09:02

## 2025-02-28 RX ADMIN — HEPARIN SODIUM (PORCINE) LOCK FLUSH IV SOLN 100 UNIT/ML 500 UNITS: 100 SOLUTION at 09:02

## 2025-02-28 NOTE — DISCHARGE INSTRUCTIONS
[facility]  [address]  [address]  [phone] phone     [phone] fax  Hours of Operation: Monday- Friday 8:00am- 5:00pm  After hours phone  [phone]  Hematology / Oncology Physicians on call      ELVIA [address] NP Phaon Dunbar, NP Khelsea Conley, FNP    Please call with any concerns regarding your appointment today.

## 2025-03-11 ENCOUNTER — OFFICE VISIT (OUTPATIENT)
Dept: HEMATOLOGY/ONCOLOGY | Facility: CLINIC | Age: 60
End: 2025-03-11
Payer: COMMERCIAL

## 2025-03-11 ENCOUNTER — LAB VISIT (OUTPATIENT)
Dept: LAB | Facility: HOSPITAL | Age: 60
End: 2025-03-11
Attending: INTERNAL MEDICINE
Payer: COMMERCIAL

## 2025-03-11 VITALS
WEIGHT: 170.44 LBS | DIASTOLIC BLOOD PRESSURE: 65 MMHG | OXYGEN SATURATION: 97 % | TEMPERATURE: 97 F | SYSTOLIC BLOOD PRESSURE: 103 MMHG | HEART RATE: 103 BPM | BODY MASS INDEX: 22.59 KG/M2 | HEIGHT: 73 IN

## 2025-03-11 DIAGNOSIS — E44.0 MODERATE MALNUTRITION: ICD-10-CM

## 2025-03-11 DIAGNOSIS — C15.9 ESOPHAGEAL CANCER, STAGE IV: ICD-10-CM

## 2025-03-11 DIAGNOSIS — Z79.899 IMMUNODEFICIENCY DUE TO CHEMOTHERAPY: ICD-10-CM

## 2025-03-11 DIAGNOSIS — C78.6 SECONDARY ADENOCARCINOMA OF RETROPERITONEUM: ICD-10-CM

## 2025-03-11 DIAGNOSIS — C79.89 MALIGNANT NEOPLASM METASTATIC TO OTHER SITE: ICD-10-CM

## 2025-03-11 DIAGNOSIS — T45.1X5A IMMUNODEFICIENCY DUE TO CHEMOTHERAPY: ICD-10-CM

## 2025-03-11 DIAGNOSIS — D84.821 IMMUNODEFICIENCY DUE TO CHEMOTHERAPY: ICD-10-CM

## 2025-03-11 DIAGNOSIS — D50.0 IRON DEFICIENCY ANEMIA DUE TO CHRONIC BLOOD LOSS: Primary | ICD-10-CM

## 2025-03-11 DIAGNOSIS — D50.0 IRON DEFICIENCY ANEMIA DUE TO CHRONIC BLOOD LOSS: ICD-10-CM

## 2025-03-11 DIAGNOSIS — C78.7 SECONDARY LIVER CANCER: ICD-10-CM

## 2025-03-11 DIAGNOSIS — R63.4 UNINTENTIONAL WEIGHT LOSS: ICD-10-CM

## 2025-03-11 LAB
ALBUMIN SERPL BCP-MCNC: 3.7 G/DL (ref 3.5–5.2)
ALP SERPL-CCNC: 100 U/L (ref 40–150)
ALT SERPL W/O P-5'-P-CCNC: 22 U/L (ref 10–44)
ANION GAP SERPL CALC-SCNC: 5 MMOL/L (ref 8–16)
AST SERPL-CCNC: 24 U/L (ref 10–40)
BASOPHILS # BLD AUTO: 0.07 K/UL (ref 0–0.2)
BASOPHILS NFR BLD: 1 % (ref 0–1.9)
BILIRUB SERPL-MCNC: 0.5 MG/DL (ref 0.1–1)
BUN SERPL-MCNC: 14 MG/DL (ref 6–20)
CALCIUM SERPL-MCNC: 9.7 MG/DL (ref 8.7–10.5)
CHLORIDE SERPL-SCNC: 106 MMOL/L (ref 95–110)
CO2 SERPL-SCNC: 24 MMOL/L (ref 23–29)
CREAT SERPL-MCNC: 0.8 MG/DL (ref 0.5–1.4)
DIFFERENTIAL METHOD BLD: ABNORMAL
EOSINOPHIL # BLD AUTO: 0.2 K/UL (ref 0–0.5)
EOSINOPHIL NFR BLD: 2.7 % (ref 0–8)
ERYTHROCYTE [DISTWIDTH] IN BLOOD BY AUTOMATED COUNT: 15.7 % (ref 11.5–14.5)
EST. GFR  (NO RACE VARIABLE): >60 ML/MIN/1.73 M^2
GLUCOSE SERPL-MCNC: 96 MG/DL (ref 70–110)
HCT VFR BLD AUTO: 42.1 % (ref 40–54)
HGB BLD-MCNC: 14.2 G/DL (ref 14–18)
IMM GRANULOCYTES # BLD AUTO: 0.02 K/UL (ref 0–0.04)
IMM GRANULOCYTES NFR BLD AUTO: 0.3 % (ref 0–0.5)
LYMPHOCYTES # BLD AUTO: 1.4 K/UL (ref 1–4.8)
LYMPHOCYTES NFR BLD: 19.8 % (ref 18–48)
MCH RBC QN AUTO: 33 PG (ref 27–31)
MCHC RBC AUTO-ENTMCNC: 33.7 G/DL (ref 32–36)
MCV RBC AUTO: 98 FL (ref 82–98)
MONOCYTES # BLD AUTO: 1.1 K/UL (ref 0.3–1)
MONOCYTES NFR BLD: 15 % (ref 4–15)
NEUTROPHILS # BLD AUTO: 4.4 K/UL (ref 1.8–7.7)
NEUTROPHILS NFR BLD: 61.2 % (ref 38–73)
NRBC BLD-RTO: 0 /100 WBC
PLATELET # BLD AUTO: 212 K/UL (ref 150–450)
PMV BLD AUTO: 9.9 FL (ref 9.2–12.9)
POTASSIUM SERPL-SCNC: 4.9 MMOL/L (ref 3.5–5.1)
PROT SERPL-MCNC: 7.8 G/DL (ref 6–8.4)
RBC # BLD AUTO: 4.3 M/UL (ref 4.6–6.2)
SODIUM SERPL-SCNC: 135 MMOL/L (ref 136–145)
WBC # BLD AUTO: 7.11 K/UL (ref 3.9–12.7)

## 2025-03-11 PROCEDURE — 1160F RVW MEDS BY RX/DR IN RCRD: CPT | Mod: CPTII,S$GLB,, | Performed by: NURSE PRACTITIONER

## 2025-03-11 PROCEDURE — 3008F BODY MASS INDEX DOCD: CPT | Mod: CPTII,S$GLB,, | Performed by: NURSE PRACTITIONER

## 2025-03-11 PROCEDURE — 3074F SYST BP LT 130 MM HG: CPT | Mod: CPTII,S$GLB,, | Performed by: NURSE PRACTITIONER

## 2025-03-11 PROCEDURE — 80053 COMPREHEN METABOLIC PANEL: CPT | Performed by: INTERNAL MEDICINE

## 2025-03-11 PROCEDURE — 1159F MED LIST DOCD IN RCRD: CPT | Mod: CPTII,S$GLB,, | Performed by: NURSE PRACTITIONER

## 2025-03-11 PROCEDURE — 3078F DIAST BP <80 MM HG: CPT | Mod: CPTII,S$GLB,, | Performed by: NURSE PRACTITIONER

## 2025-03-11 PROCEDURE — 85025 COMPLETE CBC W/AUTO DIFF WBC: CPT | Performed by: INTERNAL MEDICINE

## 2025-03-11 PROCEDURE — 99215 OFFICE O/P EST HI 40 MIN: CPT | Mod: 25,S$GLB,, | Performed by: NURSE PRACTITIONER

## 2025-03-11 PROCEDURE — 99999 PR PBB SHADOW E&M-EST. PATIENT-LVL III: CPT | Mod: PBBFAC,,, | Performed by: NURSE PRACTITIONER

## 2025-03-11 PROCEDURE — 36415 COLL VENOUS BLD VENIPUNCTURE: CPT | Performed by: INTERNAL MEDICINE

## 2025-03-11 RX ORDER — FERROUS SULFATE 325(65) MG
325 TABLET ORAL EVERY OTHER DAY
Qty: 60 TABLET | Refills: 2 | Status: SHIPPED | OUTPATIENT
Start: 2025-03-11 | End: 2026-03-11

## 2025-03-11 NOTE — ASSESSMENT & PLAN NOTE
Iron labs 2 weeks prior reflect iron deficiency. Most recent hemoglobin low normal. Will trial oral iron to replete iron levels.

## 2025-03-11 NOTE — PROGRESS NOTES
Subjective:       Patient ID: Rosalio Muñoz is a 59 y.o. male.    Chief Complaint: review labs. Chemo      Cancer Staging   Esophageal cancer, stage IV  Staging form: Esophagus - Adenocarcinoma, AJCC 8th Edition  - Clinical stage from 10/10/2024: Stage IVB (cTX, cN0, cM1, G2) - Signed by Zacarias Crowley MD on 10/10/2024      HPI: 59 y.o male with stage IV esophageal cancer currently being treated with PEMBROLIZUMAB + TRASTUZUMAB  + mFOLFOX Q 6 weeks presenting today for consideration of chemotherapy. He notes tolerating treatments well without any significant side effects. He notes good appetite and intentional weight gain. Feeding tube in place. Also tolerating PO intake. Denies interval clinical concerns.   Social History[1]    Past Medical History:   Diagnosis Date    Esophageal cancer, stage IV 10/10/2024    Hypertension     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 10/11/2024    Palliative care encounter 12/11/2024       Family History   Problem Relation Name Age of Onset    Hyperlipidemia Mother      Hypertension Father      Diabetes Father      Kidney disease Father      Heart disease Father      Breast cancer Maternal Grandmother      Prostate cancer Maternal Grandfather      Colon cancer Neg Hx         Past Surgical History:   Procedure Laterality Date    COLONOSCOPY N/A 8/25/2023    Procedure: COLONOSCOPY;  Surgeon: Pebbles Spear MD;  Location: [facility];  Service: Endoscopy;  Laterality: N/A;    COLONOSCOPY N/A 10/2/2024    Procedure: COLONOSCOPY  Cardiac clearance received on 09/20/24 per Dr. Lockett, Cardiology.  Note in encounters.  LB;  Surgeon: Sully Farris MD;  Location: [facility];  Service: Endoscopy;  Laterality: N/A;    ESOPHAGOGASTRODUODENOSCOPY N/A 10/2/2024    Procedure: EGD (ESOPHAGOGASTRODUODENOSCOPY);  Surgeon: Sully Farris MD;  Location: [facility];  Service: Endoscopy;  Laterality: N/A;    ESOPHAGOGASTRODUODENOSCOPY N/A 12/31/2024    Procedure: EGD  (ESOPHAGOGASTRODUODENOSCOPY);  Surgeon: Maki Sullivan MD;  Location: [facility] ENDO;  Service: Gastroenterology;  Laterality: N/A;    INSERTION OF VENOUS ACCESS PORT Left 10/21/2024    Procedure: INSERTION, VENOUS ACCESS PORT;  Surgeon: Roseanna Rosas MD;  Location: [facility] OR;  Service: General;  Laterality: Left;    SURGICAL REMOVAL OF LESION OF FACE Right 12/1/2023    Procedure: EXCISION, LESION, FACE;  Surgeon: Jose Flaherty MD;  Location: [facility] OR;  Service: ENT;  Laterality: Right;  1. Excision facial subcutaneous mass right cheek 3 cm. 2. Intermediate layered closure 3.5 cm    WISDOM TOOTH EXTRACTION      XI ROBOTIC APPENDECTOMY N/A 7/31/2024    Procedure: XI ROBOTIC APPENDECTOMY;  Surgeon: Paulo Saavedra MD;  Location: [facility] OR;  Service: General;  Laterality: N/A;    XI ROBOTIC INSERTION, GASTROSTOMY TUBE N/A 10/21/2024    Procedure: XI ROBOTIC INSERTION, GASTROSTOMY TUBE;  Surgeon: Roseanna Rosas MD;  Location: [facility] OR;  Service: General;  Laterality: N/A;       Review of Systems   Constitutional:  Negative for appetite change, chills, fatigue, fever and unexpected weight change.   HENT:  Positive for hearing loss. Negative for congestion, mouth sores, nosebleeds, sore throat, trouble swallowing and voice change.    Respiratory:  Negative for cough, chest tightness, shortness of breath and wheezing.    Cardiovascular:  Negative for chest pain and leg swelling.   Gastrointestinal:  Negative for abdominal distention, abdominal pain, blood in stool, constipation, diarrhea, nausea and vomiting.   Genitourinary:  Negative for difficulty urinating, dysuria and hematuria.   Musculoskeletal:  Negative for arthralgias, back pain and myalgias.   Skin:  Negative for pallor, rash and wound.   Neurological:  Negative for dizziness, syncope, weakness and headaches.   Hematological:  Negative for adenopathy. Does not bruise/bleed easily.   Psychiatric/Behavioral:  The patient is not nervous/anxious.           Medication List with Changes/Refills   New Medications    FERROUS SULFATE (FEOSOL) 325 MG (65 MG IRON) TAB TABLET    Take 1 tablet (325 mg total) by mouth every other day.   Current Medications    LIDOCAINE-PRILOCAINE (EMLA) CREAM    Apply topically as needed.    METOCLOPRAMIDE HCL (REGLAN) 10 MG TABLET    Take 1 tablet (10 mg total) by mouth every 6 (six) hours as needed (nausea, vomiting).    OLANZAPINE (ZYPREXA) 5 MG TABLET    Take 1 tablet (5 mg total) by mouth every evening. Take nightly on days 1-3 of each chemotherapy cycle.    ONDANSETRON (ZOFRAN-ODT) 8 MG TBDL    Take 1 tablet (8 mg total) by mouth 2 (two) times daily.    PANTOPRAZOLE (PROTONIX) 40 MG TABLET    Take 1 tablet (40 mg total) by mouth once daily.     Objective:     Vitals:    03/11/25 0858   BP: 103/65   Pulse: 103   Temp: 97.1 °F (36.2 °C)     Lab Results   Component Value Date    WBC 7.11 03/11/2025    HGB 14.2 03/11/2025    HCT 42.1 03/11/2025    MCV 98 03/11/2025     03/11/2025       BMP  Lab Results   Component Value Date     (L) 03/11/2025    K 4.9 03/11/2025     03/11/2025    CO2 24 03/11/2025    BUN 14 03/11/2025    CREATININE 0.8 03/11/2025    CALCIUM 9.7 03/11/2025    ANIONGAP 5 (L) 03/11/2025    EGFRNORACEVR >60 03/11/2025     Lab Results   Component Value Date    ALT 22 03/11/2025    AST 24 03/11/2025    ALKPHOS 100 03/11/2025    BILITOT 0.5 03/11/2025     Lab Results   Component Value Date    IRON 63 02/25/2025    TRANSFERRIN 308 02/25/2025    TIBC 456 (H) 02/25/2025    FESATURATED 14 (L) 02/25/2025          Physical Exam  Vitals reviewed.   Constitutional:       Appearance: He is well-developed.   HENT:      Head: Normocephalic.      Right Ear: External ear normal.      Left Ear: External ear normal.      Nose: Nose normal.   Eyes:      General: Lids are normal. No scleral icterus.        Right eye: No discharge.         Left eye: No discharge.      Conjunctiva/sclera: Conjunctivae normal.   Neck:       Thyroid: No thyroid mass.   Cardiovascular:      Rate and Rhythm: Normal rate and regular rhythm.      Heart sounds: Normal heart sounds.   Pulmonary:      Effort: Pulmonary effort is normal. No respiratory distress.      Breath sounds: Normal breath sounds. No wheezing or rales.   Abdominal:      General: There is no distension.      Comments: LLQ PEG tube   Genitourinary:     Comments: deferred  Musculoskeletal:         General: Normal range of motion.      Cervical back: Normal range of motion.   Skin:     General: Skin is warm and dry.   Neurological:      Mental Status: He is alert and oriented to person, place, and time.   Psychiatric:         Speech: Speech normal.         Behavior: Behavior normal. Behavior is cooperative.         Thought Content: Thought content normal.        Assessment:     Problem List Items Addressed This Visit          Immunology/Multi System    Immunodeficiency due to chemotherapy    Infection precautions            Oncology    Esophageal cancer, stage IV     Cancer Staging   Esophageal cancer, stage IV  Staging form: Esophagus - Adenocarcinoma, AJCC 8th Edition  - Clinical stage from 10/10/2024: Stage IVB (cTX, cN0, cM1, G2) - Signed by Zacarias Crowley MD on 10/10/2024    Labs reviewed unremarkable. Gradual improvement in anemia. Most recent hg normal. Iron labs 2 weeks prior reflect iron deficiency. He notes resolution of previously diagnosed duodenal ulcer.     Okay to proceed with C3D29 mFOLFOX + Trastuzumab + Pembrolizumab in am as planned. F/u 2 weeks with repeat labs for consideration of C4D1. Will trial oral ferrous sulfate EOD for iron repletion. Discussed constipation prevention and best absorption. Consider IV iron if oral iron intolerable.    ECHO UTD, due around 4/16/2025         Secondary adenocarcinoma of retroperitoneum    Continue current treatment          Secondary liver cancer    Continue current treatment          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     Continue current treatment          Iron deficiency anemia due to chronic blood loss - Primary    Iron labs 2 weeks prior reflect iron deficiency. Most recent hemoglobin low normal. Will trial oral iron to replete iron levels.         Relevant Medications    ferrous sulfate (FEOSOL) 325 mg (65 mg iron) Tab tablet         Plan:     Iron deficiency anemia due to chronic blood loss  -     ferrous sulfate (FEOSOL) 325 mg (65 mg iron) Tab tablet; Take 1 tablet (325 mg total) by mouth every other day.  Dispense: 60 tablet; Refill: 2    Immunodeficiency due to chemotherapy    Esophageal cancer, stage IV    Secondary adenocarcinoma of retroperitoneum    Secondary liver cancer    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            Med Onc Chart Routing      Follow up with physician 2 weeks. Dr. Latif   Follow up with JORDANA    Infusion scheduling note   Kanjinti + FOLFOX   Injection scheduling note    Labs CBC, CMP, TSH and magnesium   Scheduling:  Preferred lab:  Lab interval:     Imaging None      Pharmacy appointment No pharmacy appointment needed      Other referrals       No additional referrals needed             EVE Rogers           [1]   Social History  Socioeconomic History    Marital status:     Number of children: 2   Occupational History    Occupation:    Tobacco Use    Smoking status: Every Day     Current packs/day: 0.00     Types: Cigars, Cigarettes     Last attempt to quit: 10/4/2022     Years since quittin.4     Passive exposure: Never    Smokeless tobacco: Never    Tobacco comments:     Cigar every afternoon   Substance and Sexual Activity    Alcohol use: Yes     Alcohol/week: 4.0 - 6.0 standard drinks of alcohol     Types: 4 - 6 Cans of beer per week     Comment: occ    Drug use: Yes     Types: Marijuana     Comment: medical    Sexual activity: Yes     Partners: Female     Social Drivers of Health     Financial Resource Strain: Patient Declined (2025)     Overall Financial Resource Strain (CARDIA)     Difficulty of Paying Living Expenses: Patient declined   Food Insecurity: Patient Declined (2/12/2025)    Hunger Vital Sign     Worried About Running Out of Food in the Last Year: Patient declined     Ran Out of Food in the Last Year: Patient declined   Transportation Needs: Patient Declined (2/12/2025)    PRAPARE - Transportation     Lack of Transportation (Medical): Patient declined     Lack of Transportation (Non-Medical): Patient declined   Physical Activity: Unknown (2/12/2025)    Exercise Vital Sign     Days of Exercise per Week: Patient declined     Minutes of Exercise per Session: 20 min   Stress: No Stress Concern Present (2/12/2025)    [employer] - Occupational Stress Questionnaire     Feeling of Stress : Only a little   Housing Stability: Patient Declined (2/12/2025)    Housing Stability Vital Sign     Unable to Pay for Housing in the Last Year: Patient declined     Homeless in the Last Year: Patient declined

## 2025-03-11 NOTE — ASSESSMENT & PLAN NOTE
Cancer Staging   Esophageal cancer, stage IV  Staging form: Esophagus - Adenocarcinoma, AJCC 8th Edition  - Clinical stage from 10/10/2024: Stage IVB (cTX, cN0, cM1, G2) - Signed by Zacarias Crowley MD on 10/10/2024    Labs reviewed unremarkable. Gradual improvement in anemia. Most recent hg normal. Iron labs 2 weeks prior reflect iron deficiency. He notes resolution of previously diagnosed duodenal ulcer.     Okay to proceed with C3D29 mFOLFOX + Trastuzumab + Pembrolizumab in am as planned. F/u 2 weeks with repeat labs for consideration of C4D1. Will trial oral ferrous sulfate EOD for iron repletion. Discussed constipation prevention and best absorption. Consider IV iron if oral iron intolerable.    ECHO UTD, due around 4/16/2025

## 2025-03-12 ENCOUNTER — INFUSION (OUTPATIENT)
Dept: INFUSION THERAPY | Facility: HOSPITAL | Age: 60
End: 2025-03-12
Attending: INTERNAL MEDICINE
Payer: COMMERCIAL

## 2025-03-12 VITALS
OXYGEN SATURATION: 98 % | WEIGHT: 170.44 LBS | BODY MASS INDEX: 22.48 KG/M2 | DIASTOLIC BLOOD PRESSURE: 66 MMHG | HEART RATE: 83 BPM | TEMPERATURE: 98 F | SYSTOLIC BLOOD PRESSURE: 133 MMHG | RESPIRATION RATE: 17 BRPM

## 2025-03-12 DIAGNOSIS — C78.6 SECONDARY ADENOCARCINOMA OF RETROPERITONEUM: ICD-10-CM

## 2025-03-12 DIAGNOSIS — C79.89 MALIGNANT NEOPLASM METASTATIC TO OTHER SITE: ICD-10-CM

## 2025-03-12 DIAGNOSIS — C15.9 ESOPHAGEAL CANCER, STAGE IV: Primary | ICD-10-CM

## 2025-03-12 PROCEDURE — 25000003 PHARM REV CODE 250: Performed by: INTERNAL MEDICINE

## 2025-03-12 PROCEDURE — 96416 CHEMO PROLONG INFUSE W/PUMP: CPT

## 2025-03-12 PROCEDURE — 96367 TX/PROPH/DG ADDL SEQ IV INF: CPT

## 2025-03-12 PROCEDURE — 96411 CHEMO IV PUSH ADDL DRUG: CPT

## 2025-03-12 PROCEDURE — 63600175 PHARM REV CODE 636 W HCPCS: Performed by: INTERNAL MEDICINE

## 2025-03-12 PROCEDURE — 96368 THER/DIAG CONCURRENT INF: CPT

## 2025-03-12 PROCEDURE — 96417 CHEMO IV INFUS EACH ADDL SEQ: CPT

## 2025-03-12 PROCEDURE — 96413 CHEMO IV INFUSION 1 HR: CPT

## 2025-03-12 PROCEDURE — 96415 CHEMO IV INFUSION ADDL HR: CPT

## 2025-03-12 RX ORDER — HEPARIN 100 UNIT/ML
500 SYRINGE INTRAVENOUS
Status: DISCONTINUED | OUTPATIENT
Start: 2025-03-12 | End: 2025-03-12 | Stop reason: HOSPADM

## 2025-03-12 RX ORDER — FLUOROURACIL 50 MG/ML
160 INJECTION, SOLUTION INTRAVENOUS
Status: COMPLETED | OUTPATIENT
Start: 2025-03-12 | End: 2025-03-12

## 2025-03-12 RX ORDER — SODIUM CHLORIDE 0.9 % (FLUSH) 0.9 %
10 SYRINGE (ML) INJECTION
Status: DISCONTINUED | OUTPATIENT
Start: 2025-03-12 | End: 2025-03-12 | Stop reason: HOSPADM

## 2025-03-12 RX ADMIN — TRASTUZUMAB-ANNS 300 MG: 420 INJECTION, POWDER, LYOPHILIZED, FOR SOLUTION INTRAVENOUS at 09:03

## 2025-03-12 RX ADMIN — OXALIPLATIN 136 MG: 5 INJECTION, SOLUTION INTRAVENOUS at 10:03

## 2025-03-12 RX ADMIN — SODIUM CHLORIDE 400 MG: 9 INJECTION, SOLUTION INTRAVENOUS at 09:03

## 2025-03-12 RX ADMIN — LEUCOVORIN CALCIUM 640 MG: 500 INJECTION, POWDER, LYOPHILIZED, FOR SOLUTION INTRAMUSCULAR; INTRAVENOUS at 10:03

## 2025-03-12 RX ADMIN — SODIUM CHLORIDE 0.25 MG: 9 INJECTION, SOLUTION INTRAVENOUS at 10:03

## 2025-03-12 RX ADMIN — FLUOROURACIL 320 MG: 50 INJECTION, SOLUTION INTRAVENOUS at 12:03

## 2025-03-12 RX ADMIN — FLUOROURACIL 1920 MG: 50 INJECTION, SOLUTION INTRAVENOUS at 12:03

## 2025-03-13 ENCOUNTER — HOSPITAL ENCOUNTER (OUTPATIENT)
Dept: RADIOLOGY | Facility: HOSPITAL | Age: 60
Discharge: HOME | End: 2025-03-13
Attending: NURSE PRACTITIONER
Payer: COMMERCIAL

## 2025-03-13 ENCOUNTER — TELEPHONE (OUTPATIENT)
Dept: HEMATOLOGY/ONCOLOGY | Facility: CLINIC | Age: 60
End: 2025-03-13
Payer: COMMERCIAL

## 2025-03-13 ENCOUNTER — INFUSION (OUTPATIENT)
Dept: INFUSION THERAPY | Facility: HOSPITAL | Age: 60
End: 2025-03-13
Attending: INTERNAL MEDICINE
Payer: COMMERCIAL

## 2025-03-13 ENCOUNTER — OFFICE VISIT (OUTPATIENT)
Dept: HEMATOLOGY/ONCOLOGY | Facility: CLINIC | Age: 60
End: 2025-03-13
Payer: COMMERCIAL

## 2025-03-13 VITALS
WEIGHT: 175.13 LBS | HEART RATE: 116 BPM | TEMPERATURE: 99 F | SYSTOLIC BLOOD PRESSURE: 130 MMHG | BODY MASS INDEX: 23.21 KG/M2 | HEIGHT: 73 IN | OXYGEN SATURATION: 98 % | DIASTOLIC BLOOD PRESSURE: 73 MMHG

## 2025-03-13 VITALS
DIASTOLIC BLOOD PRESSURE: 63 MMHG | RESPIRATION RATE: 18 BRPM | SYSTOLIC BLOOD PRESSURE: 111 MMHG | OXYGEN SATURATION: 95 % | BODY MASS INDEX: 23.21 KG/M2 | WEIGHT: 175.13 LBS | HEIGHT: 73 IN | HEART RATE: 92 BPM | TEMPERATURE: 100 F

## 2025-03-13 DIAGNOSIS — T45.1X5A IMMUNODEFICIENCY DUE TO CHEMOTHERAPY: Primary | ICD-10-CM

## 2025-03-13 DIAGNOSIS — R50.9 FEVER, UNSPECIFIED FEVER CAUSE: ICD-10-CM

## 2025-03-13 DIAGNOSIS — C15.9 ESOPHAGEAL CANCER, STAGE IV: ICD-10-CM

## 2025-03-13 DIAGNOSIS — C15.9 ESOPHAGEAL CANCER, STAGE IV: Primary | ICD-10-CM

## 2025-03-13 DIAGNOSIS — D84.821 IMMUNODEFICIENCY DUE TO CHEMOTHERAPY: Primary | ICD-10-CM

## 2025-03-13 DIAGNOSIS — Z79.899 IMMUNODEFICIENCY DUE TO CHEMOTHERAPY: Primary | ICD-10-CM

## 2025-03-13 PROCEDURE — 99214 OFFICE O/P EST MOD 30 MIN: CPT | Mod: 25,S$GLB,, | Performed by: NURSE PRACTITIONER

## 2025-03-13 PROCEDURE — 99999 PR PBB SHADOW E&M-EST. PATIENT-LVL III: CPT | Mod: PBBFAC,,, | Performed by: NURSE PRACTITIONER

## 2025-03-13 PROCEDURE — 1159F MED LIST DOCD IN RCRD: CPT | Mod: CPTII,S$GLB,, | Performed by: NURSE PRACTITIONER

## 2025-03-13 PROCEDURE — 25000003 PHARM REV CODE 250: Performed by: NURSE PRACTITIONER

## 2025-03-13 PROCEDURE — 71046 X-RAY EXAM CHEST 2 VIEWS: CPT | Mod: TC

## 2025-03-13 PROCEDURE — 71046 X-RAY EXAM CHEST 2 VIEWS: CPT | Mod: 26,,, | Performed by: RADIOLOGY

## 2025-03-13 PROCEDURE — 3008F BODY MASS INDEX DOCD: CPT | Mod: CPTII,S$GLB,, | Performed by: NURSE PRACTITIONER

## 2025-03-13 PROCEDURE — 96360 HYDRATION IV INFUSION INIT: CPT

## 2025-03-13 PROCEDURE — 3078F DIAST BP <80 MM HG: CPT | Mod: CPTII,S$GLB,, | Performed by: NURSE PRACTITIONER

## 2025-03-13 PROCEDURE — 3075F SYST BP GE 130 - 139MM HG: CPT | Mod: CPTII,S$GLB,, | Performed by: NURSE PRACTITIONER

## 2025-03-13 PROCEDURE — 1160F RVW MEDS BY RX/DR IN RCRD: CPT | Mod: CPTII,S$GLB,, | Performed by: NURSE PRACTITIONER

## 2025-03-13 RX ORDER — SODIUM CHLORIDE 0.9 % (FLUSH) 0.9 %
10 SYRINGE (ML) INJECTION
Status: CANCELLED | OUTPATIENT
Start: 2025-03-13

## 2025-03-13 RX ORDER — SODIUM CHLORIDE 0.9 % (FLUSH) 0.9 %
10 SYRINGE (ML) INJECTION
Status: DISCONTINUED | OUTPATIENT
Start: 2025-03-13 | End: 2025-03-13 | Stop reason: HOSPADM

## 2025-03-13 RX ORDER — HEPARIN 100 UNIT/ML
500 SYRINGE INTRAVENOUS
Status: CANCELLED | OUTPATIENT
Start: 2025-03-13

## 2025-03-13 RX ORDER — ACETAMINOPHEN 325 MG/1
650 TABLET ORAL
Status: CANCELLED
Start: 2025-03-13

## 2025-03-13 RX ORDER — ACETAMINOPHEN 325 MG/1
650 TABLET ORAL
Status: COMPLETED | OUTPATIENT
Start: 2025-03-13 | End: 2025-03-13

## 2025-03-13 RX ADMIN — SODIUM CHLORIDE 1000 ML: 9 INJECTION, SOLUTION INTRAVENOUS at 03:03

## 2025-03-13 RX ADMIN — ACETAMINOPHEN 650 MG: 325 TABLET ORAL at 03:03

## 2025-03-13 NOTE — DISCHARGE INSTRUCTIONS
.[facility]  [address] [facility]  [address]  [phone] phone     [phone] fax  Hours of Operation: Monday- Friday 8:00am- 5:00pm  After hours phone  [phone]  Hematology / Oncology Physicians on call    Dr. Salomon Powers           Nurse Practitioners:     Krista Lopez, DOMINICK Lima, JOSE Jorgensen, DOMINICK Infante, DOMINICK Wilson, NP    Please don't hesitate to call if you have any concerns.      FALL PREVENTION   Falls often occur due to slipping, tripping or losing your balance. Here are ways to reduce your risk of falling again.   Was there anything that caused your fall that can be fixed, removed or replaced?   Make your home safe by keeping walkways clear of objects you may trip over.   Use non-slip pads under rugs.   Do not walk in poorly lit areas.   Do not stand on chairs or wobbly ladders.   Use caution when reaching overhead or looking upward. This position can cause a loss of balance.   Be sure your shoes fit properly, have non-slip bottoms and are in good condition.   Be cautious when going up and down stairs, curbs, and when walking on uneven sidewalks.   If your balance is poor, consider using a cane or walker.   If your fall was related to alcohol use, stop or limit alcohol intake.   If your fall was related to use of sleeping medicines, talk to your doctor about this. You may need to reduce your dosage at bedtime if you awaken during the night to go to the bathroom.   To reduce the need for nighttime bathroom trips:   Avoid drinking fluids for several hours before going to bed   Empty your bladder before going to bed   Men can keep a urinal at the bedside   © 4374-9333 [employer], [address] [place] [address], [place] [address]. All rights reserved. This information is not intended as a substitute for professional medical care. Always follow your healthcare  professional's instructions.    WAYS TO HELP PREVENT INFECTION        WASH YOUR HANDS OFTEN DURING THE DAY, ESPECIALLY BEFORE YOU EAT, AFTER USING THE BATHROOM, AND AFTER TOUCHING ANIMALS    STAY AWAY FROM PEOPLE WHO HAVE ILLNESSES YOU CAN CATCH; SUCH AS COLDS, FLU, CHICKEN POX    TRY TO AVOID CROWDS    STAY AWAY FROM CHILDREN WHO RECENTLY HAVE RECEIVED LIVE VIRUS VACCINES    MAINTAIN GOOD MOUTH CARE    DO NOT SQUEEZE OR SCRATCH PIMPLES    CLEAN CUTS & SCRAPES RIGHT AWAY AND DAILY UNTIL HEALED WITH WARM WATER, SOAP & AN ANTISEPTIC    AVOID CONTACT WITH LITTER BOXES, BIRD CAGES, & FISH TANKS    AVOID STANDING WATER, IE., BIRD BATHS, FLOWER POTS/VASES, OR HUMIDIFIERS    WEAR GLOVES WHEN GARDENING OR CLEANING UP AFTER OTHERS, ESPECIALLY BABIES & SMALL CHILDREN    DO NOT EAT RAW FISH, SEAFOOD, MEAT, OR EGGS

## 2025-03-13 NOTE — TELEPHONE ENCOUNTER
Patient scheduled to see Abi Lima today at 2:30 pm with labs prior. Patient daughter confirmed appointment.

## 2025-03-13 NOTE — PROGRESS NOTES
Subjective:       Patient ID: Rosalio Muñoz is a 59 y.o. male.    Chief Complaint: urgent visit for fever     Cancer Staging   Esophageal cancer, stage IV  Staging form: Esophagus - Adenocarcinoma, AJCC 8th Edition  - Clinical stage from 10/10/2024: Stage IVB (cTX, cN0, cM1, G2) - Signed by Zacarias Crowley MD on 10/10/2024      HPI: 59 y.o male with stage IV esophageal cancer currently being treated with PEMBROLIZUMAB + TRASTUZUMAB  + mFOLFOX Q 6 weeks presenting urgently today for evaluation of fever which onset last night. Highest temp 100.5. Taking tylenol intermittently with relief. Associated with body aches and fatigue. Denies urinary complaints, N/V/D, sore throat, congestion. Endorses good appetite. Admits to limited fluid intake.     Social History[1]    Past Medical History:   Diagnosis Date    Esophageal cancer, stage IV 10/10/2024    Hypertension     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 10/11/2024    Palliative care encounter 12/11/2024       Family History   Problem Relation Name Age of Onset    Hyperlipidemia Mother      Hypertension Father      Diabetes Father      Kidney disease Father      Heart disease Father      Breast cancer Maternal Grandmother      Prostate cancer Maternal Grandfather      Colon cancer Neg Hx         Past Surgical History:   Procedure Laterality Date    COLONOSCOPY N/A 8/25/2023    Procedure: COLONOSCOPY;  Surgeon: Pebbles Spear MD;  Location: [facility];  Service: Endoscopy;  Laterality: N/A;    COLONOSCOPY N/A 10/2/2024    Procedure: COLONOSCOPY  Cardiac clearance received on 09/20/24 per Dr. Lockett, Cardiology.  Note in encounters.  LB;  Surgeon: Sully Farris MD;  Location: [facility];  Service: Endoscopy;  Laterality: N/A;    ESOPHAGOGASTRODUODENOSCOPY N/A 10/2/2024    Procedure: EGD (ESOPHAGOGASTRODUODENOSCOPY);  Surgeon: Sully Farris MD;  Location: [facility];  Service: Endoscopy;  Laterality: N/A;    ESOPHAGOGASTRODUODENOSCOPY N/A  12/31/2024    Procedure: EGD (ESOPHAGOGASTRODUODENOSCOPY);  Surgeon: Maki Sullivan MD;  Location: [facility] ENDO;  Service: Gastroenterology;  Laterality: N/A;    INSERTION OF VENOUS ACCESS PORT Left 10/21/2024    Procedure: INSERTION, VENOUS ACCESS PORT;  Surgeon: Roseanna Rosas MD;  Location: [facility] OR;  Service: General;  Laterality: Left;    SURGICAL REMOVAL OF LESION OF FACE Right 12/1/2023    Procedure: EXCISION, LESION, FACE;  Surgeon: Jose Flaherty MD;  Location: [facility] OR;  Service: ENT;  Laterality: Right;  1. Excision facial subcutaneous mass right cheek 3 cm. 2. Intermediate layered closure 3.5 cm    WISDOM TOOTH EXTRACTION      XI ROBOTIC APPENDECTOMY N/A 7/31/2024    Procedure: XI ROBOTIC APPENDECTOMY;  Surgeon: Paulo Saavedra MD;  Location: [facility] OR;  Service: General;  Laterality: N/A;    XI ROBOTIC INSERTION, GASTROSTOMY TUBE N/A 10/21/2024    Procedure: XI ROBOTIC INSERTION, GASTROSTOMY TUBE;  Surgeon: Roseanna Rosas MD;  Location: [facility] OR;  Service: General;  Laterality: N/A;       Review of Systems   Constitutional:  Negative for appetite change, chills, fatigue, fever and unexpected weight change.   HENT:  Positive for hearing loss. Negative for congestion, mouth sores, nosebleeds, sore throat, trouble swallowing and voice change.    Respiratory:  Negative for cough, chest tightness, shortness of breath and wheezing.    Cardiovascular:  Negative for chest pain and leg swelling.   Gastrointestinal:  Negative for abdominal distention, abdominal pain, blood in stool, constipation, diarrhea, nausea and vomiting.   Genitourinary:  Negative for difficulty urinating, dysuria and hematuria.   Musculoskeletal:  Negative for arthralgias, back pain and myalgias.   Skin:  Negative for pallor, rash and wound.   Neurological:  Negative for dizziness, syncope, weakness and headaches.   Hematological:  Negative for adenopathy. Does not bruise/bleed easily.   Psychiatric/Behavioral:  The patient is not  nervous/anxious.          Medication List with Changes/Refills   Current Medications    FERROUS SULFATE (FEOSOL) 325 MG (65 MG IRON) TAB TABLET    Take 1 tablet (325 mg total) by mouth every other day.    LIDOCAINE-PRILOCAINE (EMLA) CREAM    Apply topically as needed.    METOCLOPRAMIDE HCL (REGLAN) 10 MG TABLET    Take 1 tablet (10 mg total) by mouth every 6 (six) hours as needed (nausea, vomiting).    OLANZAPINE (ZYPREXA) 5 MG TABLET    Take 1 tablet (5 mg total) by mouth every evening. Take nightly on days 1-3 of each chemotherapy cycle.    ONDANSETRON (ZOFRAN-ODT) 8 MG TBDL    Take 1 tablet (8 mg total) by mouth 2 (two) times daily.    PANTOPRAZOLE (PROTONIX) 40 MG TABLET    Take 1 tablet (40 mg total) by mouth once daily.     Objective:     Vitals:    03/13/25 1425   BP: 130/73   Pulse: (!) 116   Temp: 98.5 °F (36.9 °C)     Lab Results   Component Value Date    WBC 7.06 03/13/2025    HGB 14.2 03/13/2025    HCT 41.6 03/13/2025    MCV 97 03/13/2025     03/13/2025       BMP  Lab Results   Component Value Date     03/13/2025    K 4.0 03/13/2025     03/13/2025    CO2 24 03/13/2025    BUN 17 03/13/2025    CREATININE 0.9 03/13/2025    CALCIUM 10.0 03/13/2025    ANIONGAP 10 03/13/2025    EGFRNORACEVR >60 03/13/2025     Lab Results   Component Value Date    ALT 21 03/13/2025    AST 21 03/13/2025    ALKPHOS 97 03/13/2025    BILITOT 0.7 03/13/2025     Lab Results   Component Value Date    IRON 63 02/25/2025    TRANSFERRIN 308 02/25/2025    TIBC 456 (H) 02/25/2025    FESATURATED 14 (L) 02/25/2025          Physical Exam  Vitals reviewed.   Constitutional:       Appearance: He is well-developed.   HENT:      Head: Normocephalic.      Right Ear: External ear normal.      Left Ear: External ear normal.      Nose: Nose normal.   Eyes:      General: Lids are normal. No scleral icterus.        Right eye: No discharge.         Left eye: No discharge.      Conjunctiva/sclera: Conjunctivae normal.   Neck:       Thyroid: No thyroid mass.   Cardiovascular:      Rate and Rhythm: Normal rate and regular rhythm.      Heart sounds: Normal heart sounds.   Pulmonary:      Effort: Pulmonary effort is normal. No respiratory distress.      Breath sounds: Normal breath sounds.   Abdominal:      General: There is no distension.      Comments: LLQ PEG tube   Genitourinary:     Comments: deferred  Musculoskeletal:         General: Normal range of motion.      Cervical back: Normal range of motion.   Skin:     General: Skin is warm and dry.   Neurological:      Mental Status: He is alert and oriented to person, place, and time.   Psychiatric:         Speech: Speech normal.         Behavior: Behavior normal. Behavior is cooperative.         Thought Content: Thought content normal.        Assessment:     Problem List Items Addressed This Visit          Immunology/Multi System    Immunodeficiency due to chemotherapy - Primary    Patient with low grade fever onset last night. Taking Tylenol with relief. He endorses body aches, fatigue. Headache prior to tylenol. Highest temperature 100.5.    Infection workup unrevealing thus far. CXR neg. UA neg for infection. Blood cultures pending. He denies other infectious symptoms. Declines flu/covid testing today but agreeable to testing in am with pump removal if fever persists following today's IVFs. Arrange 1L IVFs over 1 hour.    F/u as previously planned. Sooner f/u PRN            Oncology    Esophageal cancer, stage IV    On active chemotherapy. Presenting today for urgent visit              Plan:     Immunodeficiency due to chemotherapy    Esophageal cancer, stage IV    Other orders  -     Cancel: sodium chloride 0.9% bolus 1,000 mL 1,000 mL  -     Cancel: sodium chloride 0.9% flush 10 mL  -     heparin, porcine (PF) 100 unit/mL injection flush 500 Units  -     alteplase injection 2 mg  -     Cancel: acetaminophen tablet 650 mg            Med Onc Chart Routing      Follow up with physician .  Already scheduled   Follow up with JORDANA    Infusion scheduling note    Injection scheduling note    Labs    Imaging    Pharmacy appointment    Other referrals              EVE Rogers             [1]   Social History  Socioeconomic History    Marital status:     Number of children: 2   Occupational History    Occupation:    Tobacco Use    Smoking status: Every Day     Current packs/day: 0.00     Types: Cigars, Cigarettes     Last attempt to quit: 10/4/2022     Years since quittin.4     Passive exposure: Never    Smokeless tobacco: Never    Tobacco comments:     Cigar every afternoon   Substance and Sexual Activity    Alcohol use: Yes     Alcohol/week: 4.0 - 6.0 standard drinks of alcohol     Types: 4 - 6 Cans of beer per week     Comment: occ    Drug use: Yes     Types: Marijuana     Comment: medical    Sexual activity: Yes     Partners: Female     Social Drivers of Health     Financial Resource Strain: Patient Declined (2025)    Overall Financial Resource Strain (CARDIA)     Difficulty of Paying Living Expenses: Patient declined   Food Insecurity: Patient Declined (2025)    Hunger Vital Sign     Worried About Running Out of Food in the Last Year: Patient declined     Ran Out of Food in the Last Year: Patient declined   Transportation Needs: Patient Declined (2025)    PRAPARE - Transportation     Lack of Transportation (Medical): Patient declined     Lack of Transportation (Non-Medical): Patient declined   Physical Activity: Unknown (2025)    Exercise Vital Sign     Days of Exercise per Week: Patient declined     Minutes of Exercise per Session: 20 min   Stress: No Stress Concern Present (2025)    [employer] - Occupational Stress Questionnaire     Feeling of Stress : Only a little   Housing Stability: Patient Declined (2025)    Housing Stability Vital Sign     Unable to Pay for Housing in the Last Year: Patient  declined     Homeless in the Last Year: Patient declined

## 2025-03-13 NOTE — TELEPHONE ENCOUNTER
----- Message from Hudson Latif MD sent at 3/13/2025 10:05 AM CDT -----  Regarding: RE: Fever  Will need CBC CMP can see APAP immediately afterwards  ----- Message -----  From: Brooklyn Pérez MA  Sent: 3/13/2025   9:57 AM CDT  To: Hudson Latif MD  Subject: Fever                                            Patient c/o fever last night temp 100.2 tylenol 100 mg given to patient, this morning temp 100.5 tylenol 500 mg given to patient, current temp 99.8. please advise.  ----- Message -----  From: Nila Vasquez  Sent: 3/13/2025   8:49 AM CDT  To: Sal[facility]ORE Staff    Name of Caller:Patient's wife, HeatherSymptoms:fever is 100.5Best Call Back Number:[phone] Information: Patient was advised to call anytime if his fever was 100.4 or higher. Please call his wife back. Thx. EL

## 2025-03-13 NOTE — ASSESSMENT & PLAN NOTE
Patient with low grade fever onset last night. Taking Tylenol with relief. He endorses body aches, fatigue. Headache prior to tylenol. Highest temperature 100.5.    Infection workup unrevealing thus far. CXR neg. UA neg for infection. Blood cultures pending. He denies other infectious symptoms. Declines flu/covid testing today but agreeable to testing in am with pump removal if fever persists following today's IVFs. Arrange 1L IVFs over 1 hour.    F/u as previously planned. Sooner f/u PRN

## 2025-03-14 ENCOUNTER — INFUSION (OUTPATIENT)
Dept: INFUSION THERAPY | Facility: HOSPITAL | Age: 60
End: 2025-03-14
Attending: INTERNAL MEDICINE
Payer: COMMERCIAL

## 2025-03-14 VITALS
RESPIRATION RATE: 18 BRPM | HEART RATE: 90 BPM | TEMPERATURE: 98 F | DIASTOLIC BLOOD PRESSURE: 69 MMHG | OXYGEN SATURATION: 99 % | SYSTOLIC BLOOD PRESSURE: 121 MMHG

## 2025-03-14 DIAGNOSIS — C78.6 SECONDARY ADENOCARCINOMA OF RETROPERITONEUM: ICD-10-CM

## 2025-03-14 DIAGNOSIS — C79.89 MALIGNANT NEOPLASM METASTATIC TO OTHER SITE: ICD-10-CM

## 2025-03-14 DIAGNOSIS — C15.9 ESOPHAGEAL CANCER, STAGE IV: Primary | ICD-10-CM

## 2025-03-14 PROCEDURE — 63600175 PHARM REV CODE 636 W HCPCS: Performed by: INTERNAL MEDICINE

## 2025-03-14 PROCEDURE — 25000003 PHARM REV CODE 250: Performed by: INTERNAL MEDICINE

## 2025-03-14 PROCEDURE — A4216 STERILE WATER/SALINE, 10 ML: HCPCS | Performed by: INTERNAL MEDICINE

## 2025-03-14 RX ORDER — HEPARIN 100 UNIT/ML
500 SYRINGE INTRAVENOUS
Status: DISCONTINUED | OUTPATIENT
Start: 2025-03-14 | End: 2025-03-14 | Stop reason: HOSPADM

## 2025-03-14 RX ORDER — SODIUM CHLORIDE 0.9 % (FLUSH) 0.9 %
10 SYRINGE (ML) INJECTION
Status: DISCONTINUED | OUTPATIENT
Start: 2025-03-14 | End: 2025-03-14 | Stop reason: HOSPADM

## 2025-03-14 RX ADMIN — HEPARIN SODIUM (PORCINE) LOCK FLUSH IV SOLN 100 UNIT/ML 500 UNITS: 100 SOLUTION at 12:03

## 2025-03-14 RX ADMIN — Medication 10 ML: at 12:03

## 2025-03-14 NOTE — NURSING
Chemotherapy pump discontinued; ray needle flushed with saline/heparin; band-aid applied to site. Tolerated well.

## 2025-03-24 ENCOUNTER — LAB VISIT (OUTPATIENT)
Dept: LAB | Facility: HOSPITAL | Age: 60
End: 2025-03-24
Attending: INTERNAL MEDICINE
Payer: COMMERCIAL

## 2025-03-24 DIAGNOSIS — C15.9 ESOPHAGEAL CANCER, STAGE IV: ICD-10-CM

## 2025-03-24 DIAGNOSIS — C78.7 SECONDARY LIVER CANCER: ICD-10-CM

## 2025-03-24 DIAGNOSIS — D84.821 IMMUNODEFICIENCY DUE TO CHEMOTHERAPY: ICD-10-CM

## 2025-03-24 DIAGNOSIS — C79.89 MALIGNANT NEOPLASM METASTATIC TO OTHER SITE: ICD-10-CM

## 2025-03-24 DIAGNOSIS — R63.4 UNINTENTIONAL WEIGHT LOSS: ICD-10-CM

## 2025-03-24 DIAGNOSIS — C78.6 SECONDARY ADENOCARCINOMA OF RETROPERITONEUM: ICD-10-CM

## 2025-03-24 DIAGNOSIS — Z79.899 IMMUNODEFICIENCY DUE TO CHEMOTHERAPY: ICD-10-CM

## 2025-03-24 DIAGNOSIS — E44.0 MODERATE MALNUTRITION: ICD-10-CM

## 2025-03-24 DIAGNOSIS — D50.0 IRON DEFICIENCY ANEMIA DUE TO CHRONIC BLOOD LOSS: ICD-10-CM

## 2025-03-24 DIAGNOSIS — T45.1X5A IMMUNODEFICIENCY DUE TO CHEMOTHERAPY: ICD-10-CM

## 2025-03-24 LAB
ABSOLUTE EOSINOPHIL (OHS): 0.22 K/UL
ABSOLUTE MONOCYTE (OHS): 1.08 K/UL (ref 0.3–1)
ABSOLUTE NEUTROPHIL COUNT (OHS): 4.48 K/UL (ref 1.8–7.7)
ALBUMIN SERPL BCP-MCNC: 3.7 G/DL (ref 3.5–5.2)
ALP SERPL-CCNC: 112 UNIT/L (ref 40–150)
ALT SERPL W/O P-5'-P-CCNC: 19 UNIT/L (ref 10–44)
ANION GAP (OHS): 12 MMOL/L (ref 8–16)
AST SERPL-CCNC: 22 UNIT/L (ref 11–45)
BASOPHILS # BLD AUTO: 0.07 K/UL
BASOPHILS NFR BLD AUTO: 0.9 %
BILIRUB SERPL-MCNC: 0.4 MG/DL (ref 0.1–1)
BUN SERPL-MCNC: 12 MG/DL (ref 6–20)
CALCIUM SERPL-MCNC: 9.6 MG/DL (ref 8.7–10.5)
CHLORIDE SERPL-SCNC: 103 MMOL/L (ref 95–110)
CO2 SERPL-SCNC: 21 MMOL/L (ref 23–29)
CREAT SERPL-MCNC: 0.7 MG/DL (ref 0.5–1.4)
ERYTHROCYTE [DISTWIDTH] IN BLOOD BY AUTOMATED COUNT: 15.6 % (ref 11.5–14.5)
GFR SERPLBLD CREATININE-BSD FMLA CKD-EPI: >60 ML/MIN/1.73/M2
GLUCOSE SERPL-MCNC: 87 MG/DL (ref 70–110)
HCT VFR BLD AUTO: 42.4 % (ref 40–54)
HGB BLD-MCNC: 14.4 GM/DL (ref 14–18)
IMM GRANULOCYTES # BLD AUTO: 0.01 K/UL (ref 0–0.04)
IMM GRANULOCYTES NFR BLD AUTO: 0.1 % (ref 0–0.5)
LYMPHOCYTES # BLD AUTO: 1.56 K/UL (ref 1–4.8)
MCH RBC QN AUTO: 33 PG (ref 27–50)
MCHC RBC AUTO-ENTMCNC: 34 G/DL (ref 32–36)
MCV RBC AUTO: 97 FL (ref 82–98)
NUCLEATED RBC (/100WBC) (OHS): 0 /100 WBC
PLATELET # BLD AUTO: 222 K/UL (ref 150–450)
PMV BLD AUTO: 10.1 FL (ref 9.2–12.9)
POTASSIUM SERPL-SCNC: 5.1 MMOL/L (ref 3.5–5.1)
PROT SERPL-MCNC: 8.2 GM/DL (ref 6–8.4)
RBC # BLD AUTO: 4.36 M/UL (ref 4.6–6.2)
RELATIVE EOSINOPHIL (OHS): 3 %
RELATIVE LYMPHOCYTE (OHS): 21 % (ref 18–48)
RELATIVE MONOCYTE (OHS): 14.6 % (ref 4–15)
RELATIVE NEUTROPHIL (OHS): 60.4 % (ref 38–73)
SODIUM SERPL-SCNC: 136 MMOL/L (ref 136–145)
WBC # BLD AUTO: 7.42 K/UL (ref 3.9–12.7)

## 2025-03-24 PROCEDURE — 83540 ASSAY OF IRON: CPT

## 2025-03-24 PROCEDURE — 83735 ASSAY OF MAGNESIUM: CPT

## 2025-03-24 PROCEDURE — 84443 ASSAY THYROID STIM HORMONE: CPT

## 2025-03-24 PROCEDURE — 85025 COMPLETE CBC W/AUTO DIFF WBC: CPT

## 2025-03-24 PROCEDURE — 82728 ASSAY OF FERRITIN: CPT

## 2025-03-24 PROCEDURE — 82247 BILIRUBIN TOTAL: CPT

## 2025-03-24 PROCEDURE — 36415 COLL VENOUS BLD VENIPUNCTURE: CPT

## 2025-03-25 ENCOUNTER — OFFICE VISIT (OUTPATIENT)
Dept: HEMATOLOGY/ONCOLOGY | Facility: CLINIC | Age: 60
End: 2025-03-25
Payer: COMMERCIAL

## 2025-03-25 ENCOUNTER — TELEPHONE (OUTPATIENT)
Dept: HEMATOLOGY/ONCOLOGY | Facility: CLINIC | Age: 60
End: 2025-03-25
Payer: COMMERCIAL

## 2025-03-25 DIAGNOSIS — C78.6 SECONDARY ADENOCARCINOMA OF RETROPERITONEUM: ICD-10-CM

## 2025-03-25 DIAGNOSIS — Z79.899 IMMUNODEFICIENCY DUE TO CHEMOTHERAPY: ICD-10-CM

## 2025-03-25 DIAGNOSIS — C79.89 MALIGNANT NEOPLASM METASTATIC TO OTHER SITE: ICD-10-CM

## 2025-03-25 DIAGNOSIS — T45.1X5A IMMUNODEFICIENCY DUE TO CHEMOTHERAPY: ICD-10-CM

## 2025-03-25 DIAGNOSIS — D50.0 IRON DEFICIENCY ANEMIA DUE TO CHRONIC BLOOD LOSS: ICD-10-CM

## 2025-03-25 DIAGNOSIS — D84.821 IMMUNODEFICIENCY DUE TO CHEMOTHERAPY: ICD-10-CM

## 2025-03-25 DIAGNOSIS — C15.9 ESOPHAGEAL CANCER, STAGE IV: ICD-10-CM

## 2025-03-25 DIAGNOSIS — C15.9 ESOPHAGEAL CANCER, STAGE IV: Primary | ICD-10-CM

## 2025-03-25 LAB
FERRITIN SERPL-MCNC: 122 NG/ML (ref 20–300)
IRON SATN MFR SERPL: 11 % (ref 20–50)
IRON SERPL-MCNC: 53 UG/DL (ref 45–160)
MAGNESIUM SERPL-MCNC: 1.9 MG/DL (ref 1.6–2.6)
TIBC SERPL-MCNC: 472 UG/DL (ref 250–450)
TRANSFERRIN SERPL-MCNC: 319 MG/DL (ref 200–375)
TSH SERPL-ACNC: 2.27 UIU/ML (ref 0.4–4)

## 2025-03-25 RX ORDER — SODIUM CHLORIDE 0.9 % (FLUSH) 0.9 %
10 SYRINGE (ML) INJECTION
Status: CANCELLED | OUTPATIENT
Start: 2025-03-26

## 2025-03-25 RX ORDER — HEPARIN 100 UNIT/ML
500 SYRINGE INTRAVENOUS
Status: CANCELLED | OUTPATIENT
Start: 2025-03-26

## 2025-03-25 RX ORDER — PROCHLORPERAZINE EDISYLATE 5 MG/ML
10 INJECTION INTRAMUSCULAR; INTRAVENOUS ONCE AS NEEDED
OUTPATIENT
Start: 2025-04-09

## 2025-03-25 RX ORDER — FLUOROURACIL 50 MG/ML
160 INJECTION, SOLUTION INTRAVENOUS
Status: CANCELLED | OUTPATIENT
Start: 2025-03-26

## 2025-03-25 RX ORDER — PROCHLORPERAZINE EDISYLATE 5 MG/ML
10 INJECTION INTRAMUSCULAR; INTRAVENOUS ONCE AS NEEDED
OUTPATIENT
Start: 2025-04-11

## 2025-03-25 RX ORDER — SODIUM CHLORIDE 0.9 % (FLUSH) 0.9 %
10 SYRINGE (ML) INJECTION
OUTPATIENT
Start: 2025-04-23

## 2025-03-25 RX ORDER — SODIUM CHLORIDE 0.9 % (FLUSH) 0.9 %
10 SYRINGE (ML) INJECTION
OUTPATIENT
Start: 2025-04-11

## 2025-03-25 RX ORDER — EPINEPHRINE 0.3 MG/.3ML
0.3 INJECTION SUBCUTANEOUS ONCE AS NEEDED
Status: CANCELLED | OUTPATIENT
Start: 2025-03-26

## 2025-03-25 RX ORDER — HEPARIN 100 UNIT/ML
500 SYRINGE INTRAVENOUS
OUTPATIENT
Start: 2025-04-25

## 2025-03-25 RX ORDER — PROCHLORPERAZINE EDISYLATE 5 MG/ML
10 INJECTION INTRAMUSCULAR; INTRAVENOUS ONCE AS NEEDED
OUTPATIENT
Start: 2025-03-28

## 2025-03-25 RX ORDER — FLUOROURACIL 50 MG/ML
160 INJECTION, SOLUTION INTRAVENOUS
OUTPATIENT
Start: 2025-04-23

## 2025-03-25 RX ORDER — HEPARIN 100 UNIT/ML
500 SYRINGE INTRAVENOUS
OUTPATIENT
Start: 2025-04-09

## 2025-03-25 RX ORDER — HEPARIN 100 UNIT/ML
500 SYRINGE INTRAVENOUS
OUTPATIENT
Start: 2025-04-11

## 2025-03-25 RX ORDER — DIPHENHYDRAMINE HYDROCHLORIDE 50 MG/ML
50 INJECTION, SOLUTION INTRAMUSCULAR; INTRAVENOUS ONCE AS NEEDED
OUTPATIENT
Start: 2025-04-23

## 2025-03-25 RX ORDER — EPINEPHRINE 0.3 MG/.3ML
0.3 INJECTION SUBCUTANEOUS ONCE AS NEEDED
OUTPATIENT
Start: 2025-04-23

## 2025-03-25 RX ORDER — HEPARIN 100 UNIT/ML
500 SYRINGE INTRAVENOUS
OUTPATIENT
Start: 2025-03-28

## 2025-03-25 RX ORDER — SODIUM CHLORIDE 0.9 % (FLUSH) 0.9 %
10 SYRINGE (ML) INJECTION
OUTPATIENT
Start: 2025-03-28

## 2025-03-25 RX ORDER — FLUOROURACIL 50 MG/ML
160 INJECTION, SOLUTION INTRAVENOUS
OUTPATIENT
Start: 2025-04-09

## 2025-03-25 RX ORDER — EPINEPHRINE 0.3 MG/.3ML
0.3 INJECTION SUBCUTANEOUS ONCE AS NEEDED
OUTPATIENT
Start: 2025-04-09

## 2025-03-25 RX ORDER — PROCHLORPERAZINE EDISYLATE 5 MG/ML
10 INJECTION INTRAMUSCULAR; INTRAVENOUS ONCE AS NEEDED
Status: CANCELLED | OUTPATIENT
Start: 2025-03-26

## 2025-03-25 RX ORDER — DIPHENHYDRAMINE HYDROCHLORIDE 50 MG/ML
50 INJECTION, SOLUTION INTRAMUSCULAR; INTRAVENOUS ONCE AS NEEDED
Status: CANCELLED | OUTPATIENT
Start: 2025-03-26

## 2025-03-25 RX ORDER — PROCHLORPERAZINE EDISYLATE 5 MG/ML
10 INJECTION INTRAMUSCULAR; INTRAVENOUS ONCE AS NEEDED
OUTPATIENT
Start: 2025-04-25

## 2025-03-25 RX ORDER — DIPHENHYDRAMINE HYDROCHLORIDE 50 MG/ML
50 INJECTION, SOLUTION INTRAMUSCULAR; INTRAVENOUS ONCE AS NEEDED
OUTPATIENT
Start: 2025-04-09

## 2025-03-25 RX ORDER — HEPARIN 100 UNIT/ML
500 SYRINGE INTRAVENOUS
OUTPATIENT
Start: 2025-04-23

## 2025-03-25 RX ORDER — OLANZAPINE 5 MG/1
5 TABLET ORAL NIGHTLY
Qty: 6 TABLET | Refills: 11 | Status: SHIPPED | OUTPATIENT
Start: 2025-03-25

## 2025-03-25 RX ORDER — SODIUM CHLORIDE 0.9 % (FLUSH) 0.9 %
10 SYRINGE (ML) INJECTION
OUTPATIENT
Start: 2025-04-09

## 2025-03-25 RX ORDER — SODIUM CHLORIDE 0.9 % (FLUSH) 0.9 %
10 SYRINGE (ML) INJECTION
OUTPATIENT
Start: 2025-04-25

## 2025-03-25 RX ORDER — PROCHLORPERAZINE EDISYLATE 5 MG/ML
10 INJECTION INTRAMUSCULAR; INTRAVENOUS ONCE AS NEEDED
OUTPATIENT
Start: 2025-04-23

## 2025-03-25 NOTE — TELEPHONE ENCOUNTER
Received call from pt to report he attempted to  his Olanzapine and was informed by his pharmacy that Rx was canceled electronically. Will forward to Dr. Latif to advise or send in new rx if needed.

## 2025-03-25 NOTE — PROGRESS NOTES
Subjective:       Patient ID: Rosalio Muñoz is a 59 y.o. male.    Chief Complaint: Results, Chemotherapy, and Esophageal Cancer    HPI:  59-year-old male presents for cycle 4 day 1P GASTROESOPHAGEAL pembrolizumab Q6W trastuzumab + MFOLFOX6 (oxaliplatin leucovorin fluorouracil) Q2W patient is tolerating therapy well patient denies nausea vomiting fevers chills or night sweats.  Last imaging in December 20, 2024 variant ECOG status 1 seen in virtual visit    Past Medical History:   Diagnosis Date    Esophageal cancer, stage IV 10/10/2024    Hypertension     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 10/11/2024    Palliative care encounter 12/11/2024     Family History   Problem Relation Name Age of Onset    Hyperlipidemia Mother      Hypertension Father      Diabetes Father      Kidney disease Father      Heart disease Father      Breast cancer Maternal Grandmother      Prostate cancer Maternal Grandfather      Colon cancer Neg Hx       Social History[1]  Past Surgical History:   Procedure Laterality Date    COLONOSCOPY N/A 8/25/2023    Procedure: COLONOSCOPY;  Surgeon: Pebbles Spear MD;  Location: [facility];  Service: Endoscopy;  Laterality: N/A;    COLONOSCOPY N/A 10/2/2024    Procedure: COLONOSCOPY  Cardiac clearance received on 09/20/24 per Dr. Lockett, Cardiology.  Note in encounters.  LB;  Surgeon: Sully Farris MD;  Location: [facility];  Service: Endoscopy;  Laterality: N/A;    ESOPHAGOGASTRODUODENOSCOPY N/A 10/2/2024    Procedure: EGD (ESOPHAGOGASTRODUODENOSCOPY);  Surgeon: Sully Farris MD;  Location: [facility];  Service: Endoscopy;  Laterality: N/A;    ESOPHAGOGASTRODUODENOSCOPY N/A 12/31/2024    Procedure: EGD (ESOPHAGOGASTRODUODENOSCOPY);  Surgeon: Maki Sullivan MD;  Location: [facility];  Service: Gastroenterology;  Laterality: N/A;    INSERTION OF VENOUS ACCESS PORT Left 10/21/2024    Procedure: INSERTION, VENOUS ACCESS PORT;  Surgeon: Roseanna Rosas MD;  Location: [facility]  "OR;  Service: General;  Laterality: Left;    SURGICAL REMOVAL OF LESION OF FACE Right 12/1/2023    Procedure: EXCISION, LESION, FACE;  Surgeon: Jose Flaherty MD;  Location: [facility] OR;  Service: ENT;  Laterality: Right;  1. Excision facial subcutaneous mass right cheek 3 cm. 2. Intermediate layered closure 3.5 cm    WISDOM TOOTH EXTRACTION      XI ROBOTIC APPENDECTOMY N/A 7/31/2024    Procedure: XI ROBOTIC APPENDECTOMY;  Surgeon: Paulo Saavedra MD;  Location: [facility] OR;  Service: General;  Laterality: N/A;    XI ROBOTIC INSERTION, GASTROSTOMY TUBE N/A 10/21/2024    Procedure: XI ROBOTIC INSERTION, GASTROSTOMY TUBE;  Surgeon: Roseanna Rosas MD;  Location: [facility] OR;  Service: General;  Laterality: N/A;       Labs:  Lab Results   Component Value Date    WBC 7.42 03/24/2025    HGB 14.4 03/24/2025    HCT 42.4 03/24/2025    MCV 97 03/24/2025     03/24/2025     BMP  Lab Results   Component Value Date     03/24/2025    K 5.1 03/24/2025     03/24/2025    CO2 21 (L) 03/24/2025    BUN 12 03/24/2025    CREATININE 0.7 03/24/2025    CALCIUM 9.6 03/24/2025    ANIONGAP 12 03/24/2025    ESTGFRAFRICA >60.0 07/08/2022    EGFRNONAA >60.0 07/08/2022     Lab Results   Component Value Date    ALT 19 03/24/2025    AST 22 03/24/2025    ALKPHOS 112 03/24/2025    BILITOT 0.4 03/24/2025       Lab Results   Component Value Date    IRON 53 03/24/2025    TIBC 472 (H) 03/24/2025    FERRITIN 122.0 03/24/2025     Lab Results   Component Value Date    UWYWYVAM59 445 10/19/2018     No results found for: "FOLATE"  Lab Results   Component Value Date    TSH 2.275 03/24/2025         Review of Systems   Constitutional:  Negative for activity change, appetite change, chills, diaphoresis, fatigue, fever and unexpected weight change.   HENT:  Negative for congestion, dental problem, drooling, ear discharge, ear pain, facial swelling, hearing loss, mouth sores, nosebleeds, postnasal drip, rhinorrhea, sinus pressure, sneezing, sore throat, " tinnitus, trouble swallowing and voice change.    Eyes:  Negative for photophobia, pain, discharge, redness, itching and visual disturbance.   Respiratory:  Negative for apnea, cough, choking, chest tightness, shortness of breath, wheezing and stridor.    Cardiovascular:  Negative for chest pain, palpitations and leg swelling.   Gastrointestinal:  Negative for abdominal distention, abdominal pain, anal bleeding, blood in stool, constipation, diarrhea, nausea, rectal pain and vomiting.   Endocrine: Negative for cold intolerance, heat intolerance, polydipsia, polyphagia and polyuria.   Genitourinary:  Negative for decreased urine volume, difficulty urinating, dysuria, enuresis, flank pain, frequency, genital sores, hematuria, penile discharge, penile pain, penile swelling, scrotal swelling, testicular pain and urgency.   Musculoskeletal:  Negative for arthralgias, back pain, gait problem, joint swelling, myalgias, neck pain and neck stiffness.   Skin:  Negative for color change, pallor, rash and wound.   Allergic/Immunologic: Negative for environmental allergies, food allergies and immunocompromised state.   Neurological:  Negative for dizziness, tremors, seizures, syncope, facial asymmetry, speech difficulty, weakness, light-headedness, numbness and headaches.   Hematological:  Negative for adenopathy. Does not bruise/bleed easily.   Psychiatric/Behavioral:  Negative for agitation, behavioral problems, confusion, decreased concentration, dysphoric mood, hallucinations, self-injury, sleep disturbance and suicidal ideas. The patient is not nervous/anxious and is not hyperactive.        Objective:      Physical Exam  Constitutional:       Appearance: Normal appearance.   Neurological:      Mental Status: He is alert.             Assessment:      1. Esophageal cancer, stage IV    2. Immunodeficiency due to chemotherapy    3. Iron deficiency anemia due to chronic blood loss    4. Malignant neoplasm metastatic to other  site    5. Secondary adenocarcinoma of retroperitoneum           Med Onc Chart Routing      Follow up with physician . Return to clinic in 2 weeks with CBC CMP serum iron TIBC ferritin LDH C-reactive protein and CT chest abdomen pelvis prior to next cycle of treatment   Follow up with JORDANA    Infusion scheduling note    Injection scheduling note P GASTROESOPHAGEAL pembrolizumab Q6W trastuzumab + MFOLFOX6 (oxaliplatin leucovorin fluorouracil) Q2W   Labs    Imaging    Pharmacy appointment    Other referrals                   Plan:     The patient location is:  Home  The chief complaint leading to consultation is:  Esophageal cancer    Visit type: audiovisual    Face to Face time with patient: 30 minutes of total time spent on the encounter, which includes face to face time and non-face to face time preparing to see the patient (eg, review of tests), Obtaining and/or reviewing separately obtained history, Documenting clinical information in the electronic or other health record, Independently interpreting results (not separately reported) and communicating results to the patient/family/caregiver, or Care coordination (not separately reported).         Each patient to whom he or she provides medical services by telemedicine is:  (1) informed of the relationship between the physician and patient and the respective role of any other health care provider with respect to management of the patient; and (2) notified that he or she may decline to receive medical services by telemedicine and may withdraw from such care at any time.    Notes:   Reviewed laboratory studies will proceed with cycle 4 day 1P GASTROESOPHAGEAL pembrolizumab Q6W trastuzumab + MFOLFOX6 (oxaliplatin leucovorin fluorouracil) Q2W will proceed with CT in 2 weeks proximally 4 months from completion of previous imaging studies for response discussed implications of answered questions with him      Hudson Latif Jr, MD FACP         [1]   Social  History  Socioeconomic History    Marital status:     Number of children: 2   Occupational History    Occupation: Industrial electrician   Tobacco Use    Smoking status: Every Day     Current packs/day: 0.00     Types: Cigars, Cigarettes     Last attempt to quit: 10/4/2022     Years since quittin.4     Passive exposure: Never    Smokeless tobacco: Never    Tobacco comments:     Cigar every afternoon   Substance and Sexual Activity    Alcohol use: Yes     Alcohol/week: 4.0 - 6.0 standard drinks of alcohol     Types: 4 - 6 Cans of beer per week     Comment: occ    Drug use: Yes     Types: Marijuana     Comment: medical    Sexual activity: Yes     Partners: Female     Social Drivers of Health     Financial Resource Strain: Patient Declined (2025)    Overall Financial Resource Strain (CARDIA)     Difficulty of Paying Living Expenses: Patient declined   Food Insecurity: Patient Declined (2025)    Hunger Vital Sign     Worried About Running Out of Food in the Last Year: Patient declined     Ran Out of Food in the Last Year: Patient declined   Transportation Needs: Patient Declined (2025)    PRAPARE - Transportation     Lack of Transportation (Medical): Patient declined     Lack of Transportation (Non-Medical): Patient declined   Physical Activity: Unknown (2025)    Exercise Vital Sign     Days of Exercise per Week: Patient declined     Minutes of Exercise per Session: 20 min   Stress: No Stress Concern Present (2025)    [employer] - Occupational Stress Questionnaire     Feeling of Stress : Only a little   Housing Stability: Patient Declined (2025)    Housing Stability Vital Sign     Unable to Pay for Housing in the Last Year: Patient declined     Homeless in the Last Year: Patient declined

## 2025-03-26 ENCOUNTER — TELEPHONE (OUTPATIENT)
Dept: HEMATOLOGY/ONCOLOGY | Facility: CLINIC | Age: 60
End: 2025-03-26
Payer: COMMERCIAL

## 2025-03-26 ENCOUNTER — INFUSION (OUTPATIENT)
Dept: INFUSION THERAPY | Facility: HOSPITAL | Age: 60
End: 2025-03-26
Attending: INTERNAL MEDICINE
Payer: COMMERCIAL

## 2025-03-26 ENCOUNTER — HOSPITAL ENCOUNTER (INPATIENT)
Facility: HOSPITAL | Age: 60
LOS: 1 days | Discharge: HOME | DRG: 872 | End: 2025-03-28
Attending: EMERGENCY MEDICINE | Admitting: STUDENT IN AN ORGANIZED HEALTH CARE EDUCATION/TRAINING PROGRAM
Payer: COMMERCIAL

## 2025-03-26 VITALS
WEIGHT: 170.19 LBS | RESPIRATION RATE: 18 BRPM | TEMPERATURE: 98 F | DIASTOLIC BLOOD PRESSURE: 70 MMHG | HEART RATE: 91 BPM | SYSTOLIC BLOOD PRESSURE: 112 MMHG | BODY MASS INDEX: 22.56 KG/M2 | HEIGHT: 73 IN | OXYGEN SATURATION: 98 %

## 2025-03-26 DIAGNOSIS — Z79.69 PATIENT ON ANTINEOPLASTIC CHEMOTHERAPY REGIMEN: ICD-10-CM

## 2025-03-26 DIAGNOSIS — Z13.6 SCREENING FOR CARDIOVASCULAR CONDITION: ICD-10-CM

## 2025-03-26 DIAGNOSIS — C79.89 MALIGNANT NEOPLASM METASTATIC TO OTHER SITE: ICD-10-CM

## 2025-03-26 DIAGNOSIS — A41.9 SEPSIS, DUE TO UNSPECIFIED ORGANISM, UNSPECIFIED WHETHER ACUTE ORGAN DYSFUNCTION PRESENT: Primary | ICD-10-CM

## 2025-03-26 DIAGNOSIS — C15.9 ESOPHAGEAL CANCER, STAGE IV: Primary | ICD-10-CM

## 2025-03-26 DIAGNOSIS — C78.6 SECONDARY ADENOCARCINOMA OF RETROPERITONEUM: ICD-10-CM

## 2025-03-26 PROBLEM — D84.821 IMMUNODEFICIENCY DUE TO CHEMOTHERAPY: Chronic | Status: ACTIVE | Noted: 2024-10-28

## 2025-03-26 PROBLEM — D50.0 IRON DEFICIENCY ANEMIA DUE TO CHRONIC BLOOD LOSS: Chronic | Status: ACTIVE | Noted: 2024-10-12

## 2025-03-26 PROBLEM — Z79.899 IMMUNODEFICIENCY DUE TO CHEMOTHERAPY: Chronic | Status: ACTIVE | Noted: 2024-10-28

## 2025-03-26 PROBLEM — T45.1X5A IMMUNODEFICIENCY DUE TO CHEMOTHERAPY: Chronic | Status: ACTIVE | Noted: 2024-10-28

## 2025-03-26 PROBLEM — E78.49 OTHER HYPERLIPIDEMIA: Chronic | Status: ACTIVE | Noted: 2021-03-22

## 2025-03-26 PROBLEM — I10 HTN (HYPERTENSION): Chronic | Status: ACTIVE | Noted: 2017-10-25

## 2025-03-26 PROBLEM — R50.9 FEVER: Status: ACTIVE | Noted: 2025-03-26

## 2025-03-26 PROBLEM — R13.10 DYSPHAGIA: Chronic | Status: ACTIVE | Noted: 2024-10-02

## 2025-03-26 LAB
ABSOLUTE EOSINOPHIL (OHS): 0 K/UL
ABSOLUTE MONOCYTE (OHS): 0.72 K/UL (ref 0.3–1)
ABSOLUTE NEUTROPHIL COUNT (OHS): 12.86 K/UL (ref 1.8–7.7)
ALBUMIN SERPL BCP-MCNC: 3.1 G/DL (ref 3.5–5.2)
ALP SERPL-CCNC: 90 UNIT/L (ref 40–150)
ALT SERPL W/O P-5'-P-CCNC: 14 UNIT/L (ref 10–44)
ANION GAP (OHS): 7 MMOL/L (ref 8–16)
AST SERPL-CCNC: 18 UNIT/L (ref 11–45)
BASOPHILS # BLD AUTO: 0.04 K/UL
BASOPHILS NFR BLD AUTO: 0.3 %
BILIRUB SERPL-MCNC: 0.5 MG/DL (ref 0.1–1)
BILIRUB UR QL STRIP.AUTO: NEGATIVE
BUN SERPL-MCNC: 12 MG/DL (ref 6–20)
CALCIUM SERPL-MCNC: 8.4 MG/DL (ref 8.7–10.5)
CHLORIDE SERPL-SCNC: 104 MMOL/L (ref 95–110)
CLARITY UR: CLEAR
CO2 SERPL-SCNC: 20 MMOL/L (ref 23–29)
COLOR UR AUTO: YELLOW
CREAT SERPL-MCNC: 0.7 MG/DL (ref 0.5–1.4)
ERYTHROCYTE [DISTWIDTH] IN BLOOD BY AUTOMATED COUNT: 15.3 % (ref 11.5–14.5)
GFR SERPLBLD CREATININE-BSD FMLA CKD-EPI: >60 ML/MIN/1.73/M2
GLUCOSE SERPL-MCNC: 109 MG/DL (ref 70–110)
GLUCOSE UR QL STRIP: NEGATIVE
HCT VFR BLD AUTO: 37.6 % (ref 40–54)
HGB BLD-MCNC: 13.3 GM/DL (ref 14–18)
HGB UR QL STRIP: NEGATIVE
IMM GRANULOCYTES # BLD AUTO: 0.07 K/UL (ref 0–0.04)
IMM GRANULOCYTES NFR BLD AUTO: 0.5 % (ref 0–0.5)
INFLUENZA A MOLECULAR (OHS): NEGATIVE
INFLUENZA B MOLECULAR (OHS): NEGATIVE
KETONES UR QL STRIP: NEGATIVE
LACTATE SERPL-SCNC: 1.2 MMOL/L (ref 0.5–2.2)
LEUKOCYTE ESTERASE UR QL STRIP: NEGATIVE
LYMPHOCYTES # BLD AUTO: 0.08 K/UL (ref 1–4.8)
MCH RBC QN AUTO: 32.9 PG (ref 27–50)
MCHC RBC AUTO-ENTMCNC: 35.4 G/DL (ref 32–36)
MCV RBC AUTO: 93 FL (ref 82–98)
NITRITE UR QL STRIP: NEGATIVE
NUCLEATED RBC (/100WBC) (OHS): 0 /100 WBC
PH UR STRIP: 6 [PH]
PLATELET # BLD AUTO: 179 K/UL (ref 150–450)
PMV BLD AUTO: 10.4 FL (ref 9.2–12.9)
POCT GLUCOSE: 97 MG/DL (ref 70–110)
POTASSIUM SERPL-SCNC: 3.6 MMOL/L (ref 3.5–5.1)
PROCALCITONIN SERPL-MCNC: 1.63 NG/ML
PROT SERPL-MCNC: 6.8 GM/DL (ref 6–8.4)
PROT UR QL STRIP: NEGATIVE
RBC # BLD AUTO: 4.04 M/UL (ref 4.6–6.2)
RELATIVE EOSINOPHIL (OHS): 0 %
RELATIVE LYMPHOCYTE (OHS): 0.6 % (ref 18–48)
RELATIVE MONOCYTE (OHS): 5.2 % (ref 4–15)
RELATIVE NEUTROPHIL (OHS): 93.4 % (ref 38–73)
SARS-COV-2 RDRP RESP QL NAA+PROBE: NEGATIVE
SODIUM SERPL-SCNC: 131 MMOL/L (ref 136–145)
SP GR UR STRIP: 1.01
TROPONIN I SERPL DL<=0.01 NG/ML-MCNC: 0.02 NG/ML
TROPONIN I SERPL DL<=0.01 NG/ML-MCNC: 0.02 NG/ML
UROBILINOGEN UR STRIP-ACNC: NEGATIVE EU/DL
WBC # BLD AUTO: 13.77 K/UL (ref 3.9–12.7)

## 2025-03-26 PROCEDURE — 93010 ELECTROCARDIOGRAM REPORT: CPT | Mod: ,,, | Performed by: INTERNAL MEDICINE

## 2025-03-26 PROCEDURE — 96417 CHEMO IV INFUS EACH ADDL SEQ: CPT

## 2025-03-26 PROCEDURE — 96415 CHEMO IV INFUSION ADDL HR: CPT

## 2025-03-26 PROCEDURE — G0378 HOSPITAL OBSERVATION PER HR: HCPCS

## 2025-03-26 PROCEDURE — 96416 CHEMO PROLONG INFUSE W/PUMP: CPT

## 2025-03-26 PROCEDURE — 99285 EMERGENCY DEPT VISIT HI MDM: CPT | Mod: 25

## 2025-03-26 PROCEDURE — 96413 CHEMO IV INFUSION 1 HR: CPT

## 2025-03-26 PROCEDURE — 25000003 PHARM REV CODE 250: Performed by: EMERGENCY MEDICINE

## 2025-03-26 PROCEDURE — 87502 INFLUENZA DNA AMP PROBE: CPT | Performed by: EMERGENCY MEDICINE

## 2025-03-26 PROCEDURE — 25000003 PHARM REV CODE 250: Performed by: HOSPITALIST

## 2025-03-26 PROCEDURE — 63600175 PHARM REV CODE 636 W HCPCS: Performed by: INTERNAL MEDICINE

## 2025-03-26 PROCEDURE — 85025 COMPLETE CBC W/AUTO DIFF WBC: CPT | Performed by: EMERGENCY MEDICINE

## 2025-03-26 PROCEDURE — 83605 ASSAY OF LACTIC ACID: CPT | Performed by: EMERGENCY MEDICINE

## 2025-03-26 PROCEDURE — U0002 COVID-19 LAB TEST NON-CDC: HCPCS | Performed by: EMERGENCY MEDICINE

## 2025-03-26 PROCEDURE — 84145 PROCALCITONIN (PCT): CPT | Performed by: EMERGENCY MEDICINE

## 2025-03-26 PROCEDURE — 96375 TX/PRO/DX INJ NEW DRUG ADDON: CPT

## 2025-03-26 PROCEDURE — 96361 HYDRATE IV INFUSION ADD-ON: CPT

## 2025-03-26 PROCEDURE — 84484 ASSAY OF TROPONIN QUANT: CPT | Performed by: EMERGENCY MEDICINE

## 2025-03-26 PROCEDURE — 96367 TX/PROPH/DG ADDL SEQ IV INF: CPT

## 2025-03-26 PROCEDURE — 25000003 PHARM REV CODE 250: Performed by: INTERNAL MEDICINE

## 2025-03-26 PROCEDURE — 63600175 PHARM REV CODE 636 W HCPCS: Performed by: EMERGENCY MEDICINE

## 2025-03-26 PROCEDURE — 36415 COLL VENOUS BLD VENIPUNCTURE: CPT | Mod: 59 | Performed by: EMERGENCY MEDICINE

## 2025-03-26 PROCEDURE — 93005 ELECTROCARDIOGRAM TRACING: CPT

## 2025-03-26 PROCEDURE — 96411 CHEMO IV PUSH ADDL DRUG: CPT

## 2025-03-26 PROCEDURE — 81003 URINALYSIS AUTO W/O SCOPE: CPT | Performed by: EMERGENCY MEDICINE

## 2025-03-26 PROCEDURE — 96368 THER/DIAG CONCURRENT INF: CPT

## 2025-03-26 PROCEDURE — 87040 BLOOD CULTURE FOR BACTERIA: CPT | Performed by: EMERGENCY MEDICINE

## 2025-03-26 PROCEDURE — 80053 COMPREHEN METABOLIC PANEL: CPT | Performed by: EMERGENCY MEDICINE

## 2025-03-26 RX ORDER — IBUPROFEN 200 MG
16 TABLET ORAL
Status: DISCONTINUED | OUTPATIENT
Start: 2025-03-26 | End: 2025-03-28 | Stop reason: HOSPADM

## 2025-03-26 RX ORDER — OLANZAPINE 5 MG/1
5 TABLET ORAL NIGHTLY
Status: DISCONTINUED | OUTPATIENT
Start: 2025-03-26 | End: 2025-03-28 | Stop reason: HOSPADM

## 2025-03-26 RX ORDER — IBUPROFEN 400 MG/1
400 TABLET ORAL
Status: COMPLETED | OUTPATIENT
Start: 2025-03-26 | End: 2025-03-26

## 2025-03-26 RX ORDER — IBUPROFEN 200 MG
24 TABLET ORAL
Status: DISCONTINUED | OUTPATIENT
Start: 2025-03-26 | End: 2025-03-28 | Stop reason: HOSPADM

## 2025-03-26 RX ORDER — HYDROCODONE BITARTRATE AND ACETAMINOPHEN 5; 325 MG/1; MG/1
1 TABLET ORAL EVERY 6 HOURS PRN
Refills: 0 | Status: DISCONTINUED | OUTPATIENT
Start: 2025-03-26 | End: 2025-03-28 | Stop reason: HOSPADM

## 2025-03-26 RX ORDER — SODIUM CHLORIDE 9 MG/ML
INJECTION, SOLUTION INTRAVENOUS CONTINUOUS
Status: DISCONTINUED | OUTPATIENT
Start: 2025-03-26 | End: 2025-03-28

## 2025-03-26 RX ORDER — MORPHINE SULFATE 4 MG/ML
4 INJECTION, SOLUTION INTRAMUSCULAR; INTRAVENOUS
Refills: 0 | Status: COMPLETED | OUTPATIENT
Start: 2025-03-26 | End: 2025-03-26

## 2025-03-26 RX ORDER — ONDANSETRON HYDROCHLORIDE 2 MG/ML
4 INJECTION, SOLUTION INTRAVENOUS
Status: COMPLETED | OUTPATIENT
Start: 2025-03-26 | End: 2025-03-26

## 2025-03-26 RX ORDER — ACETAMINOPHEN 325 MG/1
650 TABLET ORAL EVERY 8 HOURS PRN
Status: DISCONTINUED | OUTPATIENT
Start: 2025-03-26 | End: 2025-03-28 | Stop reason: HOSPADM

## 2025-03-26 RX ORDER — PANTOPRAZOLE SODIUM 40 MG/1
40 TABLET, DELAYED RELEASE ORAL DAILY
Status: DISCONTINUED | OUTPATIENT
Start: 2025-03-27 | End: 2025-03-28 | Stop reason: HOSPADM

## 2025-03-26 RX ORDER — ONDANSETRON HYDROCHLORIDE 2 MG/ML
4 INJECTION, SOLUTION INTRAVENOUS EVERY 8 HOURS PRN
Status: DISCONTINUED | OUTPATIENT
Start: 2025-03-26 | End: 2025-03-28 | Stop reason: HOSPADM

## 2025-03-26 RX ORDER — CEFEPIME HYDROCHLORIDE 2 G/1
2 INJECTION, POWDER, FOR SOLUTION INTRAVENOUS
Status: DISCONTINUED | OUTPATIENT
Start: 2025-03-27 | End: 2025-03-28 | Stop reason: HOSPADM

## 2025-03-26 RX ORDER — IPRATROPIUM BROMIDE AND ALBUTEROL SULFATE 2.5; .5 MG/3ML; MG/3ML
3 SOLUTION RESPIRATORY (INHALATION) EVERY 6 HOURS PRN
Status: DISCONTINUED | OUTPATIENT
Start: 2025-03-26 | End: 2025-03-28 | Stop reason: HOSPADM

## 2025-03-26 RX ORDER — SODIUM CHLORIDE 0.9 % (FLUSH) 0.9 %
10 SYRINGE (ML) INJECTION
Status: DISCONTINUED | OUTPATIENT
Start: 2025-03-26 | End: 2025-03-26 | Stop reason: HOSPADM

## 2025-03-26 RX ORDER — ENOXAPARIN SODIUM 100 MG/ML
40 INJECTION SUBCUTANEOUS EVERY 24 HOURS
Status: DISCONTINUED | OUTPATIENT
Start: 2025-03-27 | End: 2025-03-28 | Stop reason: HOSPADM

## 2025-03-26 RX ORDER — MORPHINE SULFATE 2 MG/ML
2 INJECTION, SOLUTION INTRAMUSCULAR; INTRAVENOUS EVERY 4 HOURS PRN
Status: DISCONTINUED | OUTPATIENT
Start: 2025-03-26 | End: 2025-03-28 | Stop reason: HOSPADM

## 2025-03-26 RX ORDER — CEFEPIME HYDROCHLORIDE 2 G/1
2 INJECTION, POWDER, FOR SOLUTION INTRAVENOUS
Status: COMPLETED | OUTPATIENT
Start: 2025-03-26 | End: 2025-03-26

## 2025-03-26 RX ORDER — ACETAMINOPHEN 325 MG/1
650 TABLET ORAL EVERY 4 HOURS PRN
Status: DISCONTINUED | OUTPATIENT
Start: 2025-03-26 | End: 2025-03-28 | Stop reason: HOSPADM

## 2025-03-26 RX ORDER — GLUCAGON 1 MG
1 KIT INJECTION
Status: DISCONTINUED | OUTPATIENT
Start: 2025-03-26 | End: 2025-03-28 | Stop reason: HOSPADM

## 2025-03-26 RX ORDER — FLUOROURACIL 50 MG/ML
160 INJECTION, SOLUTION INTRAVENOUS
Status: COMPLETED | OUTPATIENT
Start: 2025-03-26 | End: 2025-03-26

## 2025-03-26 RX ORDER — POLYETHYLENE GLYCOL 3350 17 G/17G
17 POWDER, FOR SOLUTION ORAL DAILY PRN
Status: DISCONTINUED | OUTPATIENT
Start: 2025-03-26 | End: 2025-03-28 | Stop reason: HOSPADM

## 2025-03-26 RX ADMIN — ONDANSETRON 4 MG: 2 INJECTION INTRAMUSCULAR; INTRAVENOUS at 06:03

## 2025-03-26 RX ADMIN — MORPHINE SULFATE 4 MG: 4 INJECTION INTRAVENOUS at 06:03

## 2025-03-26 RX ADMIN — SODIUM CHLORIDE: 9 INJECTION, SOLUTION INTRAVENOUS at 10:03

## 2025-03-26 RX ADMIN — HYDROCODONE BITARTRATE AND ACETAMINOPHEN 1 TABLET: 5; 325 TABLET ORAL at 09:03

## 2025-03-26 RX ADMIN — TRASTUZUMAB-ANNS 300 MG: 420 INJECTION, POWDER, LYOPHILIZED, FOR SOLUTION INTRAVENOUS at 08:03

## 2025-03-26 RX ADMIN — SODIUM CHLORIDE 2379 ML: 9 INJECTION, SOLUTION INTRAVENOUS at 05:03

## 2025-03-26 RX ADMIN — SODIUM CHLORIDE 0.25 MG: 9 INJECTION, SOLUTION INTRAVENOUS at 09:03

## 2025-03-26 RX ADMIN — FLUOROURACIL 320 MG: 50 INJECTION, SOLUTION INTRAVENOUS at 11:03

## 2025-03-26 RX ADMIN — IBUPROFEN 400 MG: 400 TABLET ORAL at 06:03

## 2025-03-26 RX ADMIN — OXALIPLATIN 135 MG: 5 INJECTION, SOLUTION INTRAVENOUS at 09:03

## 2025-03-26 RX ADMIN — CEFEPIME 2 G: 2 INJECTION, POWDER, FOR SOLUTION INTRAVENOUS at 05:03

## 2025-03-26 RX ADMIN — FLUOROURACIL 1910 MG: 50 INJECTION, SOLUTION INTRAVENOUS at 12:03

## 2025-03-26 RX ADMIN — LEUCOVORIN CALCIUM 635 MG: 500 INJECTION, POWDER, LYOPHILIZED, FOR SOLUTION INTRAMUSCULAR; INTRAVENOUS at 09:03

## 2025-03-26 NOTE — TELEPHONE ENCOUNTER
Received call from pt's wife. Called to report pt has a fever, chills, weak and doesn't feel good. Advised that pt should report to ED for evaluation. Verbalized understanding. Call ended well.

## 2025-03-26 NOTE — PLAN OF CARE
Patient stated he was feeling well today. Patient tolerated infusion well with no adverse reactions. Patient to return to clinic on 04/07.

## 2025-03-26 NOTE — ED PROVIDER NOTES
SCRIBE #1 NOTE: I, Viktor Myrna, am scribing for, and in the presence of, Jin Burciaga Jr., MD. I have scribed the entire note.       History     Chief Complaint   Patient presents with    Fever     Pt currently has chemo infusing, started fever and bodyaches today     Review of patient's allergies indicates:  No Known Allergies      History of Present Illness     HPI    3/26/2025, 4:40 PM  History obtained from the patient and wife      History of Present Illness: Rosalio Muñoz is a 59 y.o. male patient with a PMHx of HTN and stage 4 esophageal CA who presents to the Emergency Department for evaluation of a fever (Tmax 100.9°) which started today. Wife reports patient was received his 4th cycle of chemo today at the infusion clinic and also wears an infusion pump. She states patient started having a fever along with a cough and generalized myalgias today. Symptoms are constant and moderate in severity. No mitigating or exacerbating factors reported. Patient denies any N/V/D, abdominal pain, SOB, CP, any cardiac hx, and all other sxs at this time. No further complaints or concerns at this time.       Arrival mode: Personal vehicle    PCP: Michell Goyal MD        Past Medical History:  Past Medical History:   Diagnosis Date    Esophageal cancer, stage IV 10/10/2024    Hypertension     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 10/11/2024    Palliative care encounter 12/11/2024       Past Surgical History:  Past Surgical History:   Procedure Laterality Date    COLONOSCOPY N/A 8/25/2023    Procedure: COLONOSCOPY;  Surgeon: Pebbles Spear MD;  Location: [facility];  Service: Endoscopy;  Laterality: N/A;    COLONOSCOPY N/A 10/2/2024    Procedure: COLONOSCOPY  Cardiac clearance received on 09/20/24 per Dr. Lockett, Cardiology.  Note in encounters.  LB;  Surgeon: Sully Farris MD;  [facility];  Service: Endoscopy;  Laterality: N/A;    ESOPHAGOGASTRODUODENOSCOPY N/A 10/2/2024     Procedure: EGD (ESOPHAGOGASTRODUODENOSCOPY);  Surgeon: Sully Farris MD;  Location: [facility];  Service: Endoscopy;  Laterality: N/A;    ESOPHAGOGASTRODUODENOSCOPY N/A 2024    Procedure: EGD (ESOPHAGOGASTRODUODENOSCOPY);  Surgeon: Maki Sullivan MD;  Location: [facility] ENDO;  Service: Gastroenterology;  Laterality: N/A;    INSERTION OF VENOUS ACCESS PORT Left 10/21/2024    Procedure: INSERTION, VENOUS ACCESS PORT;  Surgeon: Roseanna Rosas MD;  Location: [facility];  Service: General;  Laterality: Left;    SURGICAL REMOVAL OF LESION OF FACE Right 2023    Procedure: EXCISION, LESION, FACE;  Surgeon: Jose Flaherty MD;  Location: [facility] OR;  Service: ENT;  Laterality: Right;  1. Excision facial subcutaneous mass right cheek 3 cm. 2. Intermediate layered closure 3.5 cm    WISDOM TOOTH EXTRACTION      XI ROBOTIC APPENDECTOMY N/A 2024    Procedure: XI ROBOTIC APPENDECTOMY;  Surgeon: Paulo Saavedra MD;  Location: [facility] OR;  Service: General;  Laterality: N/A;    XI ROBOTIC INSERTION, GASTROSTOMY TUBE N/A 10/21/2024    Procedure: XI ROBOTIC INSERTION, GASTROSTOMY TUBE;  Surgeon: Roseanna Rosas MD;  Location: [facility];  Service: General;  Laterality: N/A;         Family History:  Family History   Problem Relation Name Age of Onset    Hyperlipidemia Mother      Hypertension Father      Diabetes Father      Kidney disease Father      Heart disease Father      Breast cancer Maternal Grandmother      Prostate cancer Maternal Grandfather      Colon cancer Neg Hx         Social History:  Social History     Tobacco Use    Smoking status: Every Day     Current packs/day: 0.00     Types: Cigars, Cigarettes     Last attempt to quit: 10/4/2022     Years since quittin.4     Passive exposure: Never    Smokeless tobacco: Never    Tobacco comments:     Cigar every afternoon   Substance and Sexual Activity    Alcohol use: Yes     Alcohol/week: 4.0 - 6.0 standard drinks of alcohol     Types: 4 - 6 Cans of beer  per week     Comment: occ    Drug use: Yes     Types: Marijuana     Comment: medical    Sexual activity: Yes     Partners: Female        Review of Systems     Review of Systems   Constitutional:  Positive for fever (Tmax 100.9°).   HENT:  Negative for sore throat.    Respiratory:  Positive for cough. Negative for shortness of breath.    Cardiovascular:  Negative for chest pain.   Gastrointestinal:  Negative for nausea.   Genitourinary:  Negative for dysuria.   Musculoskeletal:  Positive for myalgias (generalized). Negative for back pain.   Skin:  Negative for rash.   Neurological:  Negative for weakness.   Hematological:  Does not bruise/bleed easily.   All other systems reviewed and are negative.       Physical Exam     Initial Vitals [03/26/25 1552]   BP Pulse Resp Temp SpO2   119/62 (!) 138 20 100.2 °F (37.9 °C) 95 %      MAP       --          Physical Exam  Nursing Notes and Vital Signs Reviewed.  Constitutional: Patient is in no acute distress. Well-developed and well-nourished.  Head: Atraumatic. Normocephalic.  Eyes:  EOM intact.  No scleral icterus.  ENT: Mucous membranes are moist.  Nares clear   Neck:  Full ROM. No JVD.  Cardiovascular: Tachycardiac. Regular rhythm No murmurs, rubs, or gallops. Distal pulses are 2+ and symmetric  Pulmonary/Chest: No respiratory distress. Clear to auscultation bilaterally. No wheezing or rales.  Equal chest wall rise bilaterally  Abdominal: Soft and non-distended.  There is no tenderness.  No rebound, guarding, or rigidity. Good bowel sounds.  Genitourinary: No CVA tenderness.  No suprapubic tenderness  Musculoskeletal: Moves all extremities. No obvious deformities.  5 x 5 strength in all extremities   Skin: Warm and dry.  Neurological:  Alert, awake, and appropriate.  Normal speech.  No acute focal neurological deficits are appreciated.  Two through 12 intact bilaterally.  Psychiatric: Normal affect. Good eye contact. Appropriate in content.     ED Course   Critical  "Care    Date/Time: 3/26/2025 7:26 PM    Performed by: Jin Burciaga Jr., MD  Authorized by: Jin Burciaga Jr., MD  Direct patient critical care time: 30 minutes  Additional history critical care time: 15 minutes  Ordering / reviewing critical care time: 10 minutes  Documentation critical care time: 5 minutes  Consulting other physicians critical care time: 5 minutes  Total critical care time (exclusive of procedural time) : 65 minutes  Critical care time was exclusive of teaching time and separately billable procedures and treating other patients.  Critical care was necessary to treat or prevent imminent or life-threatening deterioration of the following conditions: sepsis.  Critical care was time spent personally by me on the following activities: blood draw for specimens, development of treatment plan with patient or surrogate, discussions with consultants, interpretation of cardiac output measurements, evaluation of patient's response to treatment, examination of patient, obtaining history from patient or surrogate, ordering and performing treatments and interventions, ordering and review of laboratory studies, ordering and review of radiographic studies, pulse oximetry, re-evaluation of patient's condition and review of old charts.        ED Vital Signs:  Vitals:    03/26/25 1552 03/26/25 1630 03/26/25 1700 03/26/25 1806   BP: 119/62 (!) 112/57 (!) 116/58 (!) 112/59   Pulse: (!) 138 (!) 127 (!) 130 (!) 123   Resp: 20 19 18 16   Temp: 100.2 °F (37.9 °C)      TempSrc: Oral      SpO2: 95% 96% 95% 95%   Weight: 79.3 kg (174 lb 12.8 oz)      Height: 6' 1" (1.854 m)       03/26/25 1900 03/26/25 1945   BP: (!) 97/52    Pulse: (!) 118    Resp: 18 20   Temp: (!) 100.5 °F (38.1 °C)    TempSrc:     SpO2: (!) 93%    Weight:     Height:         Abnormal Lab Results:  Labs Reviewed   COMPREHENSIVE METABOLIC PANEL - Abnormal       Result Value    Sodium 131 (*)     Potassium 3.6      Chloride 104      CO2 20 (*)     " Glucose 109      BUN 12      Creatinine 0.7      Calcium 8.4 (*)     Protein Total 6.8      Albumin 3.1 (*)     Bilirubin Total 0.5      ALP 90      AST 18      ALT 14      Anion Gap 7 (*)     eGFR >60     PROCALCITONIN - Abnormal    Procalcitonin 1.63 (*)    CBC WITH DIFFERENTIAL - Abnormal    WBC 13.77 (*)     RBC 4.04 (*)     HGB 13.3 (*)     HCT 37.6 (*)     MCV 93      MCH 32.9      MCHC 35.4      RDW 15.3 (*)     Platelet Count 179      MPV 10.4      Nucleated RBC 0      Neut % 93.4 (*)     Lymph % 0.6 (*)     Mono % 5.2      Eos % 0.0      Basophil % 0.3      Imm Grans % 0.5      Neut # 12.86 (*)     Lymph # 0.08 (*)     Mono # 0.72      Eos # 0.00      Baso # 0.04      Imm Grans # 0.07 (*)    INFLUENZA A & B BY MOLECULAR - Normal    INFLUENZA A MOLECULAR Negative      INFLUENZA B MOLECULAR  Negative     LACTIC ACID, PLASMA - Normal    Lactic Acid Level 1.2      Narrative:     Falsely low lactic acid results can be found in samples containing >=13.0 mg/dL total bilirubin and/or >=3.5 mg/dL direct bilirubin.    URINALYSIS, REFLEX TO URINE CULTURE - Normal    Color, UA Yellow      Appearance, UA Clear      pH, UA 6.0      Spec Grav UA 1.015      Protein, UA Negative      Glucose, UA Negative      Ketones, UA Negative      Bilirubin, UA Negative      Blood, UA Negative      Nitrites, UA Negative      Urobilinogen, UA Negative      Leukocyte Esterase, UA Negative     TROPONIN I - Normal    Troponin-I 0.023     SARS-COV-2 RNA AMPLIFICATION, QUAL - Normal    SARS COV-2 Molecular Negative     CULTURE, BLOOD   CULTURE, BLOOD   CBC W/ AUTO DIFFERENTIAL    Narrative:     The following orders were created for panel order CBC auto differential.  Procedure                               Abnormality         Status                     ---------                               -----------         ------                     CBC with Differential[[id number]]       Abnormal            Final result                 Please view  results for these tests on the individual orders.   TROPONIN I        All Lab Results:  Results for orders placed or performed during the hospital encounter of 03/26/25   Influenza A & B by Molecular    Collection Time: 03/26/25  4:42 PM    Specimen: Nasopharyngeal Swab   Result Value Ref Range    INFLUENZA A MOLECULAR Negative Negative    INFLUENZA B MOLECULAR  Negative Negative   COVID-19 Rapid Screening    Collection Time: 03/26/25  4:42 PM   Result Value Ref Range    SARS COV-2 Molecular Negative Negative   Lactic acid, plasma #1    Collection Time: 03/26/25  5:06 PM   Result Value Ref Range    Lactic Acid Level 1.2 0.5 - 2.2 mmol/L   Procalcitonin    Collection Time: 03/26/25  5:06 PM   Result Value Ref Range    Procalcitonin 1.63 (H) <0.25 ng/mL   CBC with Differential    Collection Time: 03/26/25  5:06 PM   Result Value Ref Range    WBC 13.77 (H) 3.90 - 12.70 K/uL    RBC 4.04 (L) 4.60 - 6.20 M/uL    HGB 13.3 (L) 14.0 - 18.0 gm/dL    HCT 37.6 (L) 40.0 - 54.0 %    MCV 93 82 - 98 fL    MCH 32.9 27.0 - 50.0 pg    MCHC 35.4 32.0 - 36.0 g/dL    RDW 15.3 (H) 11.5 - 14.5 %    Platelet Count 179 150 - 450 K/uL    MPV 10.4 9.2 - 12.9 fL    Nucleated RBC 0 <=0 /100 WBC    Neut % 93.4 (H) 38 - 73 %    Lymph % 0.6 (L) 18 - 48 %    Mono % 5.2 4 - 15 %    Eos % 0.0 <=8 %    Basophil % 0.3 <=1.9 %    Imm Grans % 0.5 0.0 - 0.5 %    Neut # 12.86 (H) 1.8 - 7.7 K/uL    Lymph # 0.08 (L) 1 - 4.8 K/uL    Mono # 0.72 0.3 - 1 K/uL    Eos # 0.00 <=0.5 K/uL    Baso # 0.04 <=0.2 K/uL    Imm Grans # 0.07 (H) 0.00 - 0.04 K/uL   Comprehensive metabolic panel    Collection Time: 03/26/25  6:04 PM   Result Value Ref Range    Sodium 131 (L) 136 - 145 mmol/L    Potassium 3.6 3.5 - 5.1 mmol/L    Chloride 104 95 - 110 mmol/L    CO2 20 (L) 23 - 29 mmol/L    Glucose 109 70 - 110 mg/dL    BUN 12 6 - 20 mg/dL    Creatinine 0.7 0.5 - 1.4 mg/dL    Calcium 8.4 (L) 8.7 - 10.5 mg/dL    Protein Total 6.8 6.0 - 8.4 gm/dL    Albumin 3.1 (L) 3.5 - 5.2 g/dL     Bilirubin Total 0.5 0.1 - 1.0 mg/dL    ALP 90 40 - 150 unit/L    AST 18 11 - 45 unit/L    ALT 14 10 - 44 unit/L    Anion Gap 7 (L) 8 - 16 mmol/L    eGFR >60 >60 mL/min/1.73/m2   Troponin I    Collection Time: 03/26/25  6:04 PM   Result Value Ref Range    Troponin-I 0.023 <=0.026 ng/mL   Urinalysis, Reflex to Urine Culture    Collection Time: 03/26/25  6:45 PM    Specimen: Urine   Result Value Ref Range    Color, UA Yellow Straw, Marge, Yellow, Light-Orange    Appearance, UA Clear Clear    pH, UA 6.0 5.0 - 8.0    Spec Grav UA 1.015 1.005 - 1.030    Protein, UA Negative Negative    Glucose, UA Negative Negative    Ketones, UA Negative Negative    Bilirubin, UA Negative Negative    Blood, UA Negative Negative    Nitrites, UA Negative Negative    Urobilinogen, UA Negative <2.0 EU/dL    Leukocyte Esterase, UA Negative Negative         Imaging Results:  Imaging Results              X-Ray Chest AP Portable (Final result)  Result time 03/26/25 17:26:51      Final result by Karthik Rodriguez MD (03/26/25 17:26:51)                   Impression:     No acute abnormality identified.    Finalized on: 3/26/2025 5:26 PM By:  Karthik Rodriguez MD  [facility]# [id number]      2025-03-26 17:29:01.340     [facility]               Narrative:    EXAM: XR CHEST AP PORTABLE    CLINICAL HISTORY: Sepsis.    FINDINGS:  Left chest port catheter in place.    Lungs appear clear.  No pleural fluid or pneumothorax.  No convincing alveolar opacities.                                         The EKG was ordered, reviewed, and independently interpreted by the ED provider.  Interpretation time: 15:58  Rate: 128 BPM  Rhythm: sinus tachycardia  Interpretation: No acute ST changes. No STEMI.    The Emergency Provider reviewed the vital signs and test results, which are outlined above.     ED Discussion       ED Course as of 03/26/25 1951   Wed Mar 26, 2025   1751 5:51 PM: 30mL/kg IVF have been administered within 3 hours of identification of SIRS criteria. Vital  signs were reviewed and a focal sepsis perfusion assessment was performed.  Cardiopulmonary exam unchanged.  Skin is warm to the touch and non mottled.  Cap refill less than 2 seconds with a normal peripheral pulses     [RT]      ED Course User Index  [RT] Jin Burciaga Jr., MD     Medical Decision Making  Differential diagnosis: Sepsis, severe sepsis, septic shock, pneumonia UTI, medication side effect     59-year-old white male esophageal cancer on chemo now with fever meeting SIRS criteria on arrival.  No obvious source.  Sepsis fluids Maxipime given.  Patient was with a leukocytosis along with a mild elevation in procalcitonin. Consulted HM to admit for IV fluids and antibiotics in light of his immunocompromised state.    Amount and/or Complexity of Data Reviewed  Independent Historian: spouse     Details: Wife at bedside provided additional history  External Data Reviewed: notes.  Labs: ordered. Decision-making details documented in ED Course.     Details: UA is negative troponin is normal CMP is benign that has procalcitonin is elevated 1.63.  Lactate is 1.2.  Has a 13,000 white count with left shift.  Flu and COVID are negative.  Blood cultures pending  Radiology: ordered. Decision-making details documented in ED Course.     Details: Chest x-ray clear  ECG/medicine tests: ordered and independent interpretation performed. Decision-making details documented in ED Course.     Details: No STEMI  Discussion of management or test interpretation with external provider(s):     7:29 PM: Discussed case with Ivana Davidson NP ([facility]). Dr. Hope agrees with current care and management of pt and accepts admission.   Admitting Service: Hospital Medicine  Admitting Physician: Dr. Hope  Admit to: Obs tele    7:29 PM: Re-evaluated pt. I have discussed test results, shared treatment plan, and the need for admission with patient and family at bedside. Pt and family express understanding at this time and  agree with all information. All questions answered. Pt and family have no further questions or concerns at this time. Pt is ready for admit.    Risk  OTC drugs.  Prescription drug management.  Parenteral controlled substances.  Decision regarding hospitalization.  Diagnosis or treatment significantly limited by social determinants of health.  Risk Details: Social determinants: Patient with compromised state secondary to his chemo    Critical Care  Total time providing critical care: 65 minutes                 ED Medication(s):  Medications   sodium chloride 0.9% bolus 2,379 mL 2,379 mL (0 mLs Intravenous Stopped 3/26/25 1937)   ceFEPIme injection 2 g (2 g Intravenous Given 3/26/25 1700)   morphine injection 4 mg (4 mg Intravenous Given 3/26/25 1806)   ondansetron injection 4 mg (4 mg Intravenous Given 3/26/25 1806)   ibuprofen tablet 400 mg (400 mg Oral Given 3/26/25 1806)       New Prescriptions    No medications on file               Scribe Attestation:   Scribe #1: I performed the above scribed service and the documentation accurately describes the services I performed. I attest to the accuracy of the note.     Attending:   Physician Attestation Statement for Scribe #1: I, Jin Burciaga Jr., MD, personally performed the services described in this documentation, as scribed by Viktor Norris, in my presence, and it is both accurate and complete.           Clinical Impression       ICD-10-CM ICD-9-CM   1. Sepsis, due to unspecified organism, unspecified whether acute organ dysfunction present  A41.9 038.9     995.91   2. Screening for cardiovascular condition  Z13.6 V81.2   3. Patient on antineoplastic chemotherapy regimen  Z79.899 V58.69       Disposition:   Disposition: Placed in Observation  Condition: Jni Morse Jr., MD  03/26/25 1951

## 2025-03-26 NOTE — DISCHARGE INSTRUCTIONS
..[facility]  [address] [address]  [address]  [phone] phone     [phone] fax  Hours of Operation: Monday- Friday 8:00am- 5:00pm  After hours phone  [phone]  Hematology / Oncology Physicians on call    Dr. Salomon Carcamo        Nurse Practitioners:    Magdalena Wilson, DOMINICK Lima, DOMINICK Griffin, DOMINICK Infante, DOMINICK Bran, PA      Please don't hesitate to call if you have any concerns.

## 2025-03-27 ENCOUNTER — TELEPHONE (OUTPATIENT)
Dept: HEMATOLOGY/ONCOLOGY | Facility: CLINIC | Age: 60
End: 2025-03-27
Payer: COMMERCIAL

## 2025-03-27 LAB
ABSOLUTE EOSINOPHIL (OHS): 0.27 K/UL
ABSOLUTE MONOCYTE (OHS): 0.48 K/UL (ref 0.3–1)
ABSOLUTE NEUTROPHIL COUNT (OHS): 11.37 K/UL (ref 1.8–7.7)
ALBUMIN SERPL BCP-MCNC: 2.7 G/DL (ref 3.5–5.2)
ALP SERPL-CCNC: 80 UNIT/L (ref 40–150)
ALT SERPL W/O P-5'-P-CCNC: 13 UNIT/L (ref 10–44)
ANION GAP (OHS): 4 MMOL/L (ref 8–16)
AST SERPL-CCNC: 17 UNIT/L (ref 11–45)
BASOPHILS # BLD AUTO: 0.06 K/UL
BASOPHILS NFR BLD AUTO: 0.5 %
BILIRUB SERPL-MCNC: 0.7 MG/DL (ref 0.1–1)
BUN SERPL-MCNC: 12 MG/DL (ref 6–20)
CALCIUM SERPL-MCNC: 8 MG/DL (ref 8.7–10.5)
CHLORIDE SERPL-SCNC: 106 MMOL/L (ref 95–110)
CO2 SERPL-SCNC: 21 MMOL/L (ref 23–29)
CREAT SERPL-MCNC: 0.7 MG/DL (ref 0.5–1.4)
ERYTHROCYTE [DISTWIDTH] IN BLOOD BY AUTOMATED COUNT: 15.6 % (ref 11.5–14.5)
GFR SERPLBLD CREATININE-BSD FMLA CKD-EPI: >60 ML/MIN/1.73/M2
GLUCOSE SERPL-MCNC: 101 MG/DL (ref 70–110)
HCT VFR BLD AUTO: 33.4 % (ref 40–54)
HGB BLD-MCNC: 11.4 GM/DL (ref 14–18)
IMM GRANULOCYTES # BLD AUTO: 0.07 K/UL (ref 0–0.04)
IMM GRANULOCYTES NFR BLD AUTO: 0.6 % (ref 0–0.5)
LYMPHOCYTES # BLD AUTO: 0.17 K/UL (ref 1–4.8)
MAGNESIUM SERPL-MCNC: 1.5 MG/DL (ref 1.6–2.6)
MCH RBC QN AUTO: 32.3 PG (ref 27–50)
MCHC RBC AUTO-ENTMCNC: 34.1 G/DL (ref 32–36)
MCV RBC AUTO: 95 FL (ref 82–98)
NUCLEATED RBC (/100WBC) (OHS): 0 /100 WBC
OHS QRS DURATION: 78 MS
OHS QTC CALCULATION: 458 MS
PHOSPHATE SERPL-MCNC: 2.3 MG/DL (ref 2.7–4.5)
PLATELET # BLD AUTO: 144 K/UL (ref 150–450)
PMV BLD AUTO: 10.9 FL (ref 9.2–12.9)
POTASSIUM SERPL-SCNC: 3.6 MMOL/L (ref 3.5–5.1)
PROT SERPL-MCNC: 6.3 GM/DL (ref 6–8.4)
RBC # BLD AUTO: 3.53 M/UL (ref 4.6–6.2)
RELATIVE EOSINOPHIL (OHS): 2.2 %
RELATIVE LYMPHOCYTE (OHS): 1.4 % (ref 18–48)
RELATIVE MONOCYTE (OHS): 3.9 % (ref 4–15)
RELATIVE NEUTROPHIL (OHS): 91.4 % (ref 38–73)
SODIUM SERPL-SCNC: 131 MMOL/L (ref 136–145)
WBC # BLD AUTO: 12.42 K/UL (ref 3.9–12.7)

## 2025-03-27 PROCEDURE — 63600175 PHARM REV CODE 636 W HCPCS: Performed by: HOSPITALIST

## 2025-03-27 PROCEDURE — 25000003 PHARM REV CODE 250: Performed by: HOSPITALIST

## 2025-03-27 PROCEDURE — 36415 COLL VENOUS BLD VENIPUNCTURE: CPT | Performed by: HOSPITALIST

## 2025-03-27 PROCEDURE — G0378 HOSPITAL OBSERVATION PER HR: HCPCS

## 2025-03-27 PROCEDURE — 83735 ASSAY OF MAGNESIUM: CPT | Performed by: HOSPITALIST

## 2025-03-27 PROCEDURE — 96376 TX/PRO/DX INJ SAME DRUG ADON: CPT

## 2025-03-27 PROCEDURE — 63600175 PHARM REV CODE 636 W HCPCS: Performed by: STUDENT IN AN ORGANIZED HEALTH CARE EDUCATION/TRAINING PROGRAM

## 2025-03-27 PROCEDURE — 85025 COMPLETE CBC W/AUTO DIFF WBC: CPT | Performed by: HOSPITALIST

## 2025-03-27 PROCEDURE — 96372 THER/PROPH/DIAG INJ SC/IM: CPT | Performed by: HOSPITALIST

## 2025-03-27 PROCEDURE — 96365 THER/PROPH/DIAG IV INF INIT: CPT

## 2025-03-27 PROCEDURE — 25000003 PHARM REV CODE 250: Performed by: STUDENT IN AN ORGANIZED HEALTH CARE EDUCATION/TRAINING PROGRAM

## 2025-03-27 PROCEDURE — 84100 ASSAY OF PHOSPHORUS: CPT | Performed by: HOSPITALIST

## 2025-03-27 PROCEDURE — 80053 COMPREHEN METABOLIC PANEL: CPT | Performed by: HOSPITALIST

## 2025-03-27 PROCEDURE — 96366 THER/PROPH/DIAG IV INF ADDON: CPT

## 2025-03-27 PROCEDURE — 96361 HYDRATE IV INFUSION ADD-ON: CPT

## 2025-03-27 RX ORDER — LANOLIN ALCOHOL/MO/W.PET/CERES
1 CREAM (GRAM) TOPICAL DAILY
Status: DISCONTINUED | OUTPATIENT
Start: 2025-03-27 | End: 2025-03-28 | Stop reason: HOSPADM

## 2025-03-27 RX ORDER — SODIUM,POTASSIUM PHOSPHATES 280-250MG
2 POWDER IN PACKET (EA) ORAL 3 TIMES DAILY
Status: COMPLETED | OUTPATIENT
Start: 2025-03-27 | End: 2025-03-27

## 2025-03-27 RX ORDER — MAGNESIUM SULFATE HEPTAHYDRATE 40 MG/ML
2 INJECTION, SOLUTION INTRAVENOUS ONCE
Status: COMPLETED | OUTPATIENT
Start: 2025-03-27 | End: 2025-03-27

## 2025-03-27 RX ADMIN — Medication 2 PACKET: at 08:03

## 2025-03-27 RX ADMIN — HYDROCODONE BITARTRATE AND ACETAMINOPHEN 1 TABLET: 5; 325 TABLET ORAL at 07:03

## 2025-03-27 RX ADMIN — ENOXAPARIN SODIUM 40 MG: 40 INJECTION SUBCUTANEOUS at 05:03

## 2025-03-27 RX ADMIN — CEFEPIME 2 G: 2 INJECTION, POWDER, FOR SOLUTION INTRAVENOUS at 05:03

## 2025-03-27 RX ADMIN — CEFEPIME 2 G: 2 INJECTION, POWDER, FOR SOLUTION INTRAVENOUS at 08:03

## 2025-03-27 RX ADMIN — Medication 2 PACKET: at 03:03

## 2025-03-27 RX ADMIN — FERROUS SULFATE TAB 325 MG (65 MG ELEMENTAL FE) 1 EACH: 325 (65 FE) TAB at 08:03

## 2025-03-27 RX ADMIN — CEFEPIME 2 G: 2 INJECTION, POWDER, FOR SOLUTION INTRAVENOUS at 12:03

## 2025-03-27 RX ADMIN — HYDROCODONE BITARTRATE AND ACETAMINOPHEN 1 TABLET: 5; 325 TABLET ORAL at 01:03

## 2025-03-27 RX ADMIN — MAGNESIUM SULFATE HEPTAHYDRATE 2 G: 40 INJECTION, SOLUTION INTRAVENOUS at 08:03

## 2025-03-27 RX ADMIN — PANTOPRAZOLE SODIUM 40 MG: 40 TABLET, DELAYED RELEASE ORAL at 08:03

## 2025-03-27 RX ADMIN — OLANZAPINE 5 MG: 5 TABLET, FILM COATED ORAL at 08:03

## 2025-03-27 NOTE — HPI
Rosalio Muñoz is a 59 y.o. male with a PMH  has a past medical history of Esophageal cancer, stage IV (10/10/2024), Hypertension, 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 (10/11/2024), and Palliative care encounter (12/11/2024). who presented to the ED for further evaluation of acute onset fever and generalized body aches following initiation of chemotherapy today.  Patient currently on infusion pump and was started on his 4th cycle of chemo earlier today and subsequently developed fever with T-max measuring 100.9 F with generalized weakness and body aches prompting patient to come to the ED to be further evaluated.  Associated symptoms also included cough with all other review of systems negative except as noted above.  He reported no known alleviating or aggravating factors noted and stated he tolerated previous chemo those without any adverse effects.  Patient currently follows Dr. Latif from Hematology Oncology and was in his usual state of health prior to onset of symptoms.  Initial workup in the ED revealed patient met SIRS criteria for sepsis with T-max measuring 100.5 F with leukocytosis measuring 13.77, calcium 8.4, lactic acid within normal limits, procalcitonin 1.63, flu/COVID negative, UA negative, chest x-ray negative for acute findings. Patient initiated on Cefepime in setting of immunocompromised state and currently receiving chemotherapy for treatment of stage IV esophageal cancer. Patient admitted to Hospital Medicine under observation for continued medical management.    PCP: Michell Goyal

## 2025-03-27 NOTE — ASSESSMENT & PLAN NOTE
This patient does not have evidence of infective focus  My overall impression is sepsis.  Source: Unknown  Initial workup in the ED revealed patient met SIRS criteria for sepsis with T-max measuring 100.5 F with leukocytosis measuring 13.77, calcium 8.4, lactic acid within normal limits, procalcitonin 1.63, flu/COVID negative, UA negative, chest x-ray negative for acute findings. Patient initiated on Cefepime in setting of immunocompromised state and currently receiving chemotherapy for treatment of stage IV esophageal cancer.   Antibiotics given-   Antibiotics (72h ago, onward)      Start     Stop Route Frequency Ordered    03/27/25 0100  ceFEPIme injection 2 g         -- IV Every 8 hours (non-standard times) 03/26/25 2138          Latest lactate reviewed-  Recent Labs   Lab 03/26/25  1706   LACTATE 1.2     Organ dysfunction indicated by  none currently    Fluid challenge Ideal Body Weight- The patient's ideal body weight is Ideal body weight: 79.9 kg (176 lb 2.4 oz) which will be used to calculate fluid bolus of 30 ml/kg for treatment of septic shock.      Post- resuscitation assessment No - Post resuscitation assessment not needed     Will Not start Pressors- Levophed for MAP of 65    Source control achieved by: Cefepime

## 2025-03-27 NOTE — ASSESSMENT & PLAN NOTE
Febrile to 100.5 yesterday at 7:00 p.m., no fever since then.  White count very slightly elevated and coming down slightly.  He was tachycardic yesterday, heart rate in the 90s yesterday.  No localizing signs or symptoms.  Infectious workup is negative.  He is on cefepime started on admission.  Blood cultures are pending.  Would like to confirm blood culture negativity at least through tonight and continue cefepime for now.  He is not neutropenic

## 2025-03-27 NOTE — ASSESSMENT & PLAN NOTE
Anemia is likely due to Iron deficiency, chronic disease due to Malignancy, and drug toxicity from chemotherapy. Most recent hemoglobin and hematocrit are listed below.  Recent Labs     03/24/25  1149 03/26/25  1706 03/27/25  0624   HGB 14.4 13.3* 11.4*   HCT 42.4 37.6* 33.4*     Plan  -Monitor serial CBC: Daily  -Transfuse PRBC if patient becomes hemodynamically unstable, symptomatic or H/H drops below 7/21.  -Patient has not received any PRBC transfusions to date  -Patient's anemia is currently worsening, potentially secondary to IVF

## 2025-03-27 NOTE — PLAN OF CARE
Discussed poc with pt, pt verbalized understanding    Purposeful rounding every 2hours    Bp low on shift, ffluids running, zyprexa held, MD notified  Cardiac monitoring in use, pt is sinus tachy  Blood glucose monitoring, BG 97   Fall precautions in place, remains injury free  Pt denies c/o nausea and vomiting, pain managed by PRN meds     IVFs  Accurate I&Os  Abx given as prescribed  Bed locked at lowest position  Call light within reach    Chart check complete  Will cont with POC

## 2025-03-27 NOTE — PROGRESS NOTES
[facility]  Progress Note    Patient Name: Rosalio Muñoz  MRN: [id number]  Patient Class: OP- Observation   Admission Date: 3/26/2025  Length of Stay: 0 days  Attending Physician: Lance Sotelo MD  Primary Care Provider: Michell Goyal MD        Subjective     Principal Problem:Fever        HPI:  Rosalio Muñoz is a 59 y.o. male with a PMH  has a past medical history of Esophageal cancer, stage IV (10/10/2024), Hypertension, 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 (10/11/2024), and Palliative care encounter (12/11/2024). who presented to the ED for further evaluation of acute onset fever and generalized body aches following initiation of chemotherapy today.  Patient currently on infusion pump and was started on his 4th cycle of chemo earlier today and subsequently developed fever with T-max measuring 100.9 F with generalized weakness and body aches prompting patient to come to the ED to be further evaluated.  Associated symptoms also included cough with all other review of systems negative except as noted above.  He reported no known alleviating or aggravating factors noted and stated he tolerated previous chemo those without any adverse effects.  Patient currently follows Dr. Latif from [facility] and was in his usual state of health prior to onset of symptoms.  Initial workup in the ED revealed patient met SIRS criteria for sepsis with T-max measuring 100.5 F with leukocytosis measuring 13.77, calcium 8.4, lactic acid within normal limits, procalcitonin 1.63, flu/COVID negative, UA negative, chest x-ray negative for acute findings. Patient initiated on Cefepime in setting of immunocompromised state and currently receiving chemotherapy for treatment of stage IV esophageal cancer. Patient admitted to Hospital Medicine under observation for continued medical management.    PCP: Michell Goyal      Overview/Hospital Course:  3/27: Patient doing well,  blood cultures negative but only incubated briefly so far, symptomatically feeling mildly improved, no fevers so far today, white count coming down slightly        Review of Systems   Constitutional:  Positive for activity change, appetite change, chills and fever.   Respiratory:  Negative for chest tightness and shortness of breath.    Cardiovascular:  Negative for chest pain and palpitations.   Gastrointestinal:  Negative for abdominal distention and abdominal pain.   Endocrine: Negative for cold intolerance.   Genitourinary:  Negative for difficulty urinating.   Musculoskeletal:  Negative for arthralgias.   Neurological: Negative.    Psychiatric/Behavioral:  Negative for agitation and behavioral problems.      Objective:     Vital Signs (Most Recent):  Temp: 97.4 °F (36.3 °C) (03/27/25 0731)  Pulse: 95 (03/27/25 0715)  Resp: 16 (03/27/25 0732)  BP: (!) 106/59 (03/27/25 0715)  SpO2: (!) 94 % (03/27/25 0715) Vital Signs (24h Range):  Temp:  [97.4 °F (36.3 °C)-100.5 °F (38.1 °C)] 97.4 °F (36.3 °C)  Pulse:  [] 95  Resp:  [15-20] 16  SpO2:  [93 %-96 %] 94 %  BP: ()/(51-62) 106/59     Weight: 79.3 kg (174 lb 12.8 oz)  Body mass index is 23.06 kg/m².    Intake/Output Summary (Last 24 hours) at 3/27/2025 1142  Last data filed at 3/27/2025 1021  Gross per 24 hour   Intake 3518.05 ml   Output 1200 ml   Net 2318.05 ml         Physical Exam  Constitutional:       Appearance: Normal appearance.   HENT:      Head: Normocephalic and atraumatic.      Mouth/Throat:      Mouth: Mucous membranes are moist.      Pharynx: Oropharynx is clear.   Eyes:      General: No scleral icterus.  Cardiovascular:      Rate and Rhythm: Normal rate and regular rhythm.      Heart sounds: Normal heart sounds.   Pulmonary:      Effort: Pulmonary effort is normal.      Breath sounds: Normal breath sounds.   Abdominal:      General: Abdomen is flat.      Palpations: Abdomen is soft.   Musculoskeletal:      Right lower leg: No edema.       Left lower leg: No edema.   Skin:     General: Skin is warm and dry.   Neurological:      Mental Status: He is alert. Mental status is at baseline.   Psychiatric:         Mood and Affect: Mood normal.         Thought Content: Thought content normal.               Significant Labs: All pertinent labs within the past 24 hours have been reviewed.    Significant Imaging: I have reviewed all pertinent imaging results/findings within the past 24 hours.      Assessment & Plan  Fever  Febrile to 100.5 yesterday at 7:00 p.m., no fever since then.  White count very slightly elevated and coming down slightly.  He was tachycardic yesterday, heart rate in the 90s yesterday.  No localizing signs or symptoms.  Infectious workup is negative.  He is on cefepime started on admission.  Blood cultures are pending.  Would like to confirm blood culture negativity at least through tonight and continue cefepime for now.  He is not neutropenic    HTN (hypertension)  Chronic, controlled.  Latest blood pressure and vitals reviewed-   Temp:  [97.4 °F (36.3 °C)-100.5 °F (38.1 °C)]   Pulse:  []   Resp:  [15-20]   BP: ()/(51-62)   SpO2:  [93 %-96 %] .   Home meds for hypertension were reviewed and noted below.     While in the hospital, will manage blood pressure as follows; Continue home antihypertensive regimen, Titrate medications as BP tolerates     Will utilize p.r.n. blood pressure medication only if patient's blood pressure greater than  180/110 and he develops symptoms such as worsening chest pain or shortness of breath.    Other hyperlipidemia  Patient is chronically on statin.will continue for now. Last Lipid Panel:   Lab Results   Component Value Date    CHOL 146 06/16/2023    HDL 69 06/16/2023    LDLCALC 65.4 06/16/2023    TRIG 58 06/16/2023    CHOLHDL 47.3 06/16/2023     Plan:  -Continue home medication  -low fat/low calorie diet      Dysphagia  Mass is at the GE junction  He is able to take some p.o. but swelling is  difficult   He has a PEG tube as well through which he receives supplementation    Esophageal cancer, stage IV  Patient currently receiving palliative chemotherapy for treatment of stage IV esophageal cancer and is followed by Dr. Latif outpatient. Patient able to tolerate p.o. intake and has peg for added nutritional supplementation.  Likely his current symptoms are a reaction to chemotherapy, but monitoring and treating for possible sepsis as well.  Placed oncology consult, appreciate input    Iron deficiency anemia due to chronic blood loss  Anemia is likely due to Iron deficiency, chronic disease due to Malignancy, and drug toxicity from chemotherapy . Most recent hemoglobin and hematocrit are listed below.  Recent Labs     03/24/25  1149 03/26/25  1706 03/27/25  0624   HGB 14.4 13.3* 11.4*   HCT 42.4 37.6* 33.4*     Plan  -Monitor serial CBC: Daily  -Transfuse PRBC if patient becomes hemodynamically unstable, symptomatic or H/H drops below 7/21.  -Patient has not received any PRBC transfusions to date  -Patient's anemia is currently worsening, potentially secondary to IVF    Immunodeficiency due to chemotherapy      Sepsis  This patient does not have evidence of infective focus  My overall impression is sepsis.  Source: Unknown  Initial workup in the ED revealed patient met SIRS criteria for sepsis with T-max measuring 100.5 F with leukocytosis measuring 13.77, calcium 8.4, lactic acid within normal limits, procalcitonin 1.63, flu/COVID negative, UA negative, chest x-ray negative for acute findings. Patient initiated on Cefepime in setting of immunocompromised state and currently receiving chemotherapy for treatment of stage IV esophageal cancer.   Antibiotics given-   Antibiotics (72h ago, onward)      Start     Stop Route Frequency Ordered    03/27/25 0100  ceFEPIme injection 2 g         -- IV Every 8 hours (non-standard times) 03/26/25 2138          Latest lactate reviewed-  Recent Labs   Lab 03/26/25  1706    LACTATE 1.2     Organ dysfunction indicated by  none currently    Fluid challenge Ideal Body Weight- The patient's ideal body weight is Ideal body weight: 79.9 kg (176 lb 2.4 oz) which will be used to calculate fluid bolus of 30 ml/kg for treatment of septic shock.      Post- resuscitation assessment No - Post resuscitation assessment not needed     Will Not start Pressors- Levophed for MAP of 65    Source control achieved by: Cefepime    VTE Risk Mitigation (From admission, onward)           Ordered     enoxaparin injection 40 mg  Daily         03/26/25 2135     IP VTE HIGH RISK PATIENT  Once         03/26/25 2135     Place sequential compression device  Until discontinued         03/26/25 2135                    Discharge Planning   RUTH:      Code Status: Full Code   Medical Readiness for Discharge Date:   Discharge Plan A: Home                Lance Sotelo MD  Department of Hospital Medicine   [facility]

## 2025-03-27 NOTE — ASSESSMENT & PLAN NOTE
Chronic, controlled.  Latest blood pressure and vitals reviewed-   Temp:  [97.4 °F (36.3 °C)-100.5 °F (38.1 °C)]   Pulse:  []   Resp:  [15-20]   BP: ()/(51-62)   SpO2:  [93 %-96 %] .   Home meds for hypertension were reviewed and noted below.     While in the hospital, will manage blood pressure as follows; Continue home antihypertensive regimen, Titrate medications as BP tolerates     Will utilize p.r.n. blood pressure medication only if patient's blood pressure greater than  180/110 and he develops symptoms such as worsening chest pain or shortness of breath.

## 2025-03-27 NOTE — SUBJECTIVE & OBJECTIVE
Review of Systems   Constitutional:  Positive for activity change, appetite change, chills and fever.   Respiratory:  Negative for chest tightness and shortness of breath.    Cardiovascular:  Negative for chest pain and palpitations.   Gastrointestinal:  Negative for abdominal distention and abdominal pain.   Endocrine: Negative for cold intolerance.   Genitourinary:  Negative for difficulty urinating.   Musculoskeletal:  Negative for arthralgias.   Neurological: Negative.    Psychiatric/Behavioral:  Negative for agitation and behavioral problems.      Objective:     Vital Signs (Most Recent):  Temp: 97.4 °F (36.3 °C) (03/27/25 0731)  Pulse: 95 (03/27/25 0715)  Resp: 16 (03/27/25 0732)  BP: (!) 106/59 (03/27/25 0715)  SpO2: (!) 94 % (03/27/25 0715) Vital Signs (24h Range):  Temp:  [97.4 °F (36.3 °C)-100.5 °F (38.1 °C)] 97.4 °F (36.3 °C)  Pulse:  [] 95  Resp:  [15-20] 16  SpO2:  [93 %-96 %] 94 %  BP: ()/(51-62) 106/59     Weight: 79.3 kg (174 lb 12.8 oz)  Body mass index is 23.06 kg/m².    Intake/Output Summary (Last 24 hours) at 3/27/2025 1142  Last data filed at 3/27/2025 1021  Gross per 24 hour   Intake 3518.05 ml   Output 1200 ml   Net 2318.05 ml         Physical Exam  Constitutional:       Appearance: Normal appearance.   HENT:      Head: Normocephalic and atraumatic.      Mouth/Throat:      Mouth: Mucous membranes are moist.      Pharynx: Oropharynx is clear.   Eyes:      General: No scleral icterus.  Cardiovascular:      Rate and Rhythm: Normal rate and regular rhythm.      Heart sounds: Normal heart sounds.   Pulmonary:      Effort: Pulmonary effort is normal.      Breath sounds: Normal breath sounds.   Abdominal:      General: Abdomen is flat.      Palpations: Abdomen is soft.   Musculoskeletal:      Right lower leg: No edema.      Left lower leg: No edema.   Skin:     General: Skin is warm and dry.   Neurological:      Mental Status: He is alert. Mental status is at baseline.   Psychiatric:          Mood and Affect: Mood normal.         Thought Content: Thought content normal.               Significant Labs: All pertinent labs within the past 24 hours have been reviewed.    Significant Imaging: I have reviewed all pertinent imaging results/findings within the past 24 hours.

## 2025-03-27 NOTE — ASSESSMENT & PLAN NOTE
Chronic, controlled.  Latest blood pressure and vitals reviewed-   Temp:  [97.8 °F (36.6 °C)-100.5 °F (38.1 °C)]   Pulse:  []   Resp:  [15-20]   BP: ()/(52-71)   SpO2:  [93 %-98 %] .   Home meds for hypertension were reviewed and noted below.     While in the hospital, will manage blood pressure as follows; Continue home antihypertensive regimen, Titrate medications as BP tolerates     Will utilize p.r.n. blood pressure medication only if patient's blood pressure greater than  180/110 and he develops symptoms such as worsening chest pain or shortness of breath.

## 2025-03-27 NOTE — HOSPITAL COURSE
3/27: Patient doing well, blood cultures negative but only incubated briefly so far, symptomatically feeling mildly improved, no fevers so far today, white count coming down slightly  3/28: Patient continuing to do well, eating and drinking well, no white count, no fever, culture still negative, discharging today

## 2025-03-27 NOTE — H&P
[facility] Medicine  History & Physical    Patient Name: Rosalio Muñoz  MRN: [id number]  Patient Class: OP- Observation  Admission Date: 3/26/2025  Attending Physician: Omar Hope MD   Primary Care Provider: Michell Goyal MD         Patient information was obtained from patient, spouse/SO, past medical records, and ER records.     Subjective:     Principal Problem:Fever    Chief Complaint:   Chief Complaint   Patient presents with    Fever     Pt currently has chemo infusing, started fever and bodyaches today        HPI: Rosalio Muoñz is a 59 y.o. male with a PMH  has a past medical history of Esophageal cancer, stage IV (10/10/2024), Hypertension, 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 (10/11/2024), and Palliative care encounter (12/11/2024). who presented to the ED for further evaluation of acute onset fever and generalized body aches following initiation of chemotherapy today.  Patient currently on infusion pump and was started on his 4th cycle of chemo earlier today and subsequently developed fever with T-max measuring 100.9 F with generalized weakness and body aches prompting patient to come to the ED to be further evaluated.  Associated symptoms also included cough with all other review of systems negative except as noted above.  He reported no known alleviating or aggravating factors noted and stated he tolerated previous chemo those without any adverse effects.  Patient currently follows Dr. Latif from [facility] and was in his usual state of health prior to onset of symptoms.  Initial workup in the ED revealed patient met SIRS criteria for sepsis with T-max measuring 100.5 F with leukocytosis measuring 13.77, calcium 8.4, lactic acid within normal limits, procalcitonin 1.63, flu/COVID negative, UA negative, chest x-ray negative for acute findings. Patient initiated on Cefepime in setting of immunocompromised state and currently receiving  chemotherapy for treatment of stage IV esophageal cancer. Patient admitted to [facility] Medicine under observation for continued medical management.    PCP: Michell Goyal      Past Medical History:   Diagnosis Date    Esophageal cancer, stage IV 10/10/2024    Hypertension     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 10/11/2024    Palliative care encounter 12/11/2024       Past Surgical History:   Procedure Laterality Date    COLONOSCOPY N/A 8/25/2023    Procedure: COLONOSCOPY;  Surgeon: Pebbles Spear MD;  Location: [facility] ENDO;  Service: Endoscopy;  Laterality: N/A;    COLONOSCOPY N/A 10/2/2024    Procedure: COLONOSCOPY  Cardiac clearance received on 09/20/24 per Dr. Lockett, Cardiology.  Note in encounters.  LB;  Surgeon: Sully Farris MD;  Location: [facility];  Service: Endoscopy;  Laterality: N/A;    ESOPHAGOGASTRODUODENOSCOPY N/A 10/2/2024    Procedure: EGD (ESOPHAGOGASTRODUODENOSCOPY);  Surgeon: Sully Farris MD;  Location: [facility];  Service: Endoscopy;  Laterality: N/A;    ESOPHAGOGASTRODUODENOSCOPY N/A 12/31/2024    Procedure: EGD (ESOPHAGOGASTRODUODENOSCOPY);  Surgeon: Maki Sullivan MD;  Location: [facility];  Service: Gastroenterology;  Laterality: N/A;    INSERTION OF VENOUS ACCESS PORT Left 10/21/2024    Procedure: INSERTION, VENOUS ACCESS PORT;  Surgeon: Roseanna Rosas MD;  Location: [facility];  Service: General;  Laterality: Left;    SURGICAL REMOVAL OF LESION OF FACE Right 12/1/2023    Procedure: EXCISION, LESION, FACE;  Surgeon: Jose Flaherty MD;  Location: [facility];  Service: ENT;  Laterality: Right;  1. Excision facial subcutaneous mass right cheek 3 cm. 2. Intermediate layered closure 3.5 cm    WISDOM TOOTH EXTRACTION      XI ROBOTIC APPENDECTOMY N/A 7/31/2024    Procedure: XI ROBOTIC APPENDECTOMY;  Surgeon: Paulo Saavedra MD;  Location: [facility] OR;  Service: General;  Laterality: N/A;    XI ROBOTIC INSERTION, GASTROSTOMY TUBE N/A 10/21/2024    Procedure: XI  ROBOTIC INSERTION, GASTROSTOMY TUBE;  Surgeon: Roseanna Rosas MD;  Location: [facility];  Service: General;  Laterality: N/A;       Review of patient's allergies indicates:  No Known Allergies    Current Facility-Administered Medications on File Prior to Encounter   Medication    [COMPLETED] fluorouraciL injection 320 mg    [COMPLETED] leucovorin calcium 320 mg/m2 = 635 mg in D5W 281.8 mL infusion    [COMPLETED] oxaliplatin (ELOXATIN) 68 mg/m2 = 135 mg in D5W 592 mL chemo infusion    [COMPLETED] palonosetron 0.25mg/dexAMETHasone 12mg in NS IVPB 0.25 mg 50 mL    [COMPLETED] trastuzumab-anns (KANJINTI) 300 mg in 0.9% NaCl 299.3 mL chemo infusion    [DISCONTINUED] 0.9% NaCl 100 mL flush bag    [DISCONTINUED] D5W 250 mL flush bag    [DISCONTINUED] fluorouracil (Adrucil) 960 mg/m2 = 1,910 mg in 0.9% NaCl 100 mL chemo infusion    [DISCONTINUED] sodium chloride 0.9% flush 10 mL     Current Outpatient Medications on File Prior to Encounter   Medication Sig    ferrous sulfate (FEOSOL) 325 mg (65 mg iron) Tab tablet Take 1 tablet (325 mg total) by mouth every other day.    LIDOcaine-prilocaine (EMLA) cream Apply topically as needed.    metoclopramide HCl (REGLAN) 10 MG tablet Take 1 tablet (10 mg total) by mouth every 6 (six) hours as needed (nausea, vomiting).    OLANZapine (ZYPREXA) 5 MG tablet Take 1 tablet (5 mg total) by mouth every evening. Take nightly on days 1-3 of each chemotherapy cycle.    ondansetron (ZOFRAN-ODT) 8 MG TbDL Take 1 tablet (8 mg total) by mouth 2 (two) times daily.    pantoprazole (PROTONIX) 40 MG tablet Take 1 tablet (40 mg total) by mouth once daily.     Family History       Problem Relation (Age of Onset)    Breast cancer Maternal Grandmother    Diabetes Father    Heart disease Father    Hyperlipidemia Mother    Hypertension Father    Kidney disease Father    Prostate cancer Maternal Grandfather          Tobacco Use    Smoking status: Every Day     Current packs/day: 0.00     Types: Cigars,  Cigarettes     Last attempt to quit: 10/4/2022     Years since quittin.4     Passive exposure: Never    Smokeless tobacco: Never    Tobacco comments:     Cigar every afternoon   Substance and Sexual Activity    Alcohol use: Yes     Alcohol/week: 4.0 - 6.0 standard drinks of alcohol     Types: 4 - 6 Cans of beer per week     Comment: occ    Drug use: Yes     Types: Marijuana     Comment: medical    Sexual activity: Yes     Partners: Female     Review of Systems   All other systems reviewed and are negative.    Objective:     Vital Signs (Most Recent):  Temp: 99.3 °F (37.4 °C) (25)  Pulse: 104 (25)  Resp: 17 (25)  BP: (!) 96/52 (25)  SpO2: (!) 94 % (25) Vital Signs (24h Range):  Temp:  [97.8 °F (36.6 °C)-100.5 °F (38.1 °C)] 99.3 °F (37.4 °C)  Pulse:  [] 104  Resp:  [15-20] 17  SpO2:  [93 %-98 %] 94 %  BP: ()/(52-71) 96/52     Weight: 79.3 kg (174 lb 12.8 oz)  Body mass index is 23.06 kg/m².     Physical Exam  Vitals reviewed.   Constitutional:       General: He is not in acute distress.     Appearance: He is normal weight. He is ill-appearing. He is not toxic-appearing or diaphoretic.      Comments: ill appearing but non-toxic    HENT:      Head: Normocephalic and atraumatic.      Right Ear: External ear normal.      Left Ear: External ear normal.      Ears:      Comments: Hearing aids in place      Nose: Nose normal. No congestion or rhinorrhea.      Mouth/Throat:      Mouth: Mucous membranes are moist.      Pharynx: Oropharynx is clear. No oropharyngeal exudate or posterior oropharyngeal erythema.   Eyes:      General: No scleral icterus.     Extraocular Movements: Extraocular movements intact.      Conjunctiva/sclera: Conjunctivae normal.      Pupils: Pupils are equal, round, and reactive to light.   Neck:      Vascular: No carotid bruit.   Cardiovascular:      Rate and Rhythm: Regular rhythm. Tachycardia present.      Pulses: Normal pulses.       Heart sounds: Normal heart sounds. No murmur heard.     No friction rub. No gallop.   Pulmonary:      Effort: Pulmonary effort is normal. No respiratory distress.      Breath sounds: Normal breath sounds. No stridor. No wheezing, rhonchi or rales.   Chest:      Chest wall: No tenderness.   Abdominal:      General: Abdomen is flat. Bowel sounds are normal. There is no distension.      Palpations: Abdomen is soft. There is no mass.      Tenderness: There is no abdominal tenderness. There is no right CVA tenderness, left CVA tenderness, guarding or rebound.      Hernia: No hernia is present.      Comments: Peg tube in place    Musculoskeletal:         General: No swelling, tenderness, deformity or signs of injury. Normal range of motion.      Cervical back: Normal range of motion and neck supple. No rigidity or tenderness.      Right lower leg: No edema.      Left lower leg: No edema.   Lymphadenopathy:      Cervical: No cervical adenopathy.   Skin:     General: Skin is warm and dry.      Capillary Refill: Capillary refill takes less than 2 seconds.      Coloration: Skin is not jaundiced or pale.      Findings: No bruising, erythema, lesion or rash.   Neurological:      General: No focal deficit present.      Mental Status: He is alert and oriented to person, place, and time. Mental status is at baseline.      Cranial Nerves: No cranial nerve deficit.      Sensory: No sensory deficit.      Motor: No weakness.      Coordination: Coordination normal.   Psychiatric:         Mood and Affect: Mood normal.         Behavior: Behavior normal.         Thought Content: Thought content normal.         Judgment: Judgment normal.              CRANIAL NERVES     CN III, IV, VI   Pupils are equal, round, and reactive to light.       Significant Labs: All pertinent labs within the past 24 hours have been reviewed.    Significant Imaging: I have reviewed all pertinent imaging results/findings within the past 24 hours.    LABS:  Recent  Results (from the past 24 hours)   Influenza A & B by Molecular    Collection Time: 03/26/25  4:42 PM    Specimen: Nasopharyngeal Swab   Result Value Ref Range    INFLUENZA A MOLECULAR Negative Negative    INFLUENZA B MOLECULAR  Negative Negative   COVID-19 Rapid Screening    Collection Time: 03/26/25  4:42 PM   Result Value Ref Range    SARS COV-2 Molecular Negative Negative   Lactic acid, plasma #1    Collection Time: 03/26/25  5:06 PM   Result Value Ref Range    Lactic Acid Level 1.2 0.5 - 2.2 mmol/L   Procalcitonin    Collection Time: 03/26/25  5:06 PM   Result Value Ref Range    Procalcitonin 1.63 (H) <0.25 ng/mL   CBC with Differential    Collection Time: 03/26/25  5:06 PM   Result Value Ref Range    WBC 13.77 (H) 3.90 - 12.70 K/uL    RBC 4.04 (L) 4.60 - 6.20 M/uL    HGB 13.3 (L) 14.0 - 18.0 gm/dL    HCT 37.6 (L) 40.0 - 54.0 %    MCV 93 82 - 98 fL    MCH 32.9 27.0 - 50.0 pg    MCHC 35.4 32.0 - 36.0 g/dL    RDW 15.3 (H) 11.5 - 14.5 %    Platelet Count 179 150 - 450 K/uL    MPV 10.4 9.2 - 12.9 fL    Nucleated RBC 0 <=0 /100 WBC    Neut % 93.4 (H) 38 - 73 %    Lymph % 0.6 (L) 18 - 48 %    Mono % 5.2 4 - 15 %    Eos % 0.0 <=8 %    Basophil % 0.3 <=1.9 %    Imm Grans % 0.5 0.0 - 0.5 %    Neut # 12.86 (H) 1.8 - 7.7 K/uL    Lymph # 0.08 (L) 1 - 4.8 K/uL    Mono # 0.72 0.3 - 1 K/uL    Eos # 0.00 <=0.5 K/uL    Baso # 0.04 <=0.2 K/uL    Imm Grans # 0.07 (H) 0.00 - 0.04 K/uL   Comprehensive metabolic panel    Collection Time: 03/26/25  6:04 PM   Result Value Ref Range    Sodium 131 (L) 136 - 145 mmol/L    Potassium 3.6 3.5 - 5.1 mmol/L    Chloride 104 95 - 110 mmol/L    CO2 20 (L) 23 - 29 mmol/L    Glucose 109 70 - 110 mg/dL    BUN 12 6 - 20 mg/dL    Creatinine 0.7 0.5 - 1.4 mg/dL    Calcium 8.4 (L) 8.7 - 10.5 mg/dL    Protein Total 6.8 6.0 - 8.4 gm/dL    Albumin 3.1 (L) 3.5 - 5.2 g/dL    Bilirubin Total 0.5 0.1 - 1.0 mg/dL    ALP 90 40 - 150 unit/L    AST 18 11 - 45 unit/L    ALT 14 10 - 44 unit/L    Anion Gap 7 (L) 8 -  16 mmol/L    eGFR >60 >60 mL/min/1.73/m2   Troponin I    Collection Time: 03/26/25  6:04 PM   Result Value Ref Range    Troponin-I 0.023 <=0.026 ng/mL   Urinalysis, Reflex to Urine Culture    Collection Time: 03/26/25  6:45 PM    Specimen: Urine   Result Value Ref Range    Color, UA Yellow Straw, Marge, Yellow, Light-Orange    Appearance, UA Clear Clear    pH, UA 6.0 5.0 - 8.0    Spec Grav UA 1.015 1.005 - 1.030    Protein, UA Negative Negative    Glucose, UA Negative Negative    Ketones, UA Negative Negative    Bilirubin, UA Negative Negative    Blood, UA Negative Negative    Nitrites, UA Negative Negative    Urobilinogen, UA Negative <2.0 EU/dL    Leukocyte Esterase, UA Negative Negative   Troponin I    Collection Time: 03/26/25  9:16 PM   Result Value Ref Range    Troponin-I 0.024 <=0.026 ng/mL       RADIOLOGY  X-Ray Chest AP Portable  Result Date: 3/26/2025  EXAM: XR CHEST AP PORTABLE CLINICAL HISTORY: Sepsis. FINDINGS:  Left chest port catheter in place. Lungs appear clear.  No pleural fluid or pneumothorax.  No convincing alveolar opacities.      No acute abnormality identified. Finalized on: 3/26/2025 5:26 PM By:  Karthik Rodriguez MD [facility]# [id number]      2025-03-26 17:29:01.340     [facility]    X-Ray Chest PA And Lateral  Result Date: 3/13/2025  EXAM:  XR CHEST PA AND LATERAL CLINICAL HISTORY: Malignant neoplasm of esophagus, unspecified, fever COMPARISON: Chest radiograph 12/30/2024 FINDINGS: Stable positioning of a left port catheter.  No confluent airspace opacity or consolidation.  There is no pleural effusion or pneumothorax.  The cardiomediastinal silhouette is within normal size limits.  The hilar and mediastinal structures are within normal limits.  No acute osseous abnormality is evident.      No acute radiographic abnormality in the chest. Finalized on: 3/13/2025 1:17 PM By:  Torres Singh MD [facility]# [id number]      2025-03-13 13:20:02.508     [facility]      EKG    MICROBIOLOGY    [facility]    Assessment/Plan:      * Fever    Sepsis  This patient does not have evidence of infective focus  My overall impression is sepsis.  Source: Unknown  Initial workup in the ED revealed patient met SIRS criteria for sepsis with T-max measuring 100.5 F with leukocytosis measuring 13.77, calcium 8.4, lactic acid within normal limits, procalcitonin 1.63, flu/COVID negative, UA negative, chest x-ray negative for acute findings. Patient initiated on Cefepime in setting of immunocompromised state and currently receiving chemotherapy for treatment of stage IV esophageal cancer.   Antibiotics given-   Antibiotics (72h ago, onward)      Start     Stop Route Frequency Ordered    03/27/25 0100  ceFEPIme injection 2 g         -- IV Every 8 hours (non-standard times) 03/26/25 2138     Latest lactate reviewed-  Recent Labs   Lab 03/26/25  1706   LACTATE 1.2     Organ dysfunction indicated by  none currently    Fluid challenge Ideal Body Weight- The patient's ideal body weight is Ideal body weight: 79.9 kg (176 lb 2.4 oz) which will be used to calculate fluid bolus of 30 ml/kg for treatment of septic shock.      Post- resuscitation assessment No - Post resuscitation assessment not needed     Will Not start Pressors- Levophed for MAP of 65    Source control achieved by: Cefepime      Dysphagia    Immunodeficiency due to chemotherapy    Esophageal cancer, stage IV  Patient currently receiving palliative chemotherapy for treatment of stage IV esophageal cancer and is followed by Dr. aLtif outpatient. Patient able to tolerate p.o. intake and has peg for added nutritional supplementation.  Plan:  -continued treatment of sepsis  -f/u cultures  -f/u hem/onc      HTN (hypertension)  Chronic, controlled.  Latest blood pressure and vitals reviewed-   Temp:  [97.8 °F (36.6 °C)-100.5 °F (38.1 °C)]   Pulse:  []   Resp:  [15-20]   BP: ()/(52-71)   SpO2:  [93 %-98 %] .   Home meds for hypertension were reviewed and noted below.     While in the hospital,  will manage blood pressure as follows; Continue home antihypertensive regimen, Titrate medications as BP tolerates     Will utilize p.r.n. blood pressure medication only if patient's blood pressure greater than  180/110 and he develops symptoms such as worsening chest pain or shortness of breath.      Iron deficiency anemia due to chronic blood loss  Anemia is likely due to Iron deficiency, chronic disease due to Malignancy, and drug toxicity from chemotherapy . Most recent hemoglobin and hematocrit are listed below.  Recent Labs     03/24/25  1149 03/26/25  1706   HGB 14.4 13.3*   HCT 42.4 37.6*     Plan  -Monitor serial CBC: Daily  -Transfuse PRBC if patient becomes hemodynamically unstable, symptomatic or H/H drops below 7/21.  -Patient has not received any PRBC transfusions to date  -Patient's anemia is currently stable      Other hyperlipidemia  Patient is chronically on statin.will continue for now. Last Lipid Panel:   Lab Results   Component Value Date    CHOL 146 06/16/2023    HDL 69 06/16/2023    LDLCALC 65.4 06/16/2023    TRIG 58 06/16/2023    CHOLHDL 47.3 06/16/2023     Plan:  -Continue home medication  -low fat/low calorie diet          VTE Risk Mitigation (From admission, onward)           Ordered     enoxaparin injection 40 mg  Daily         03/26/25 2135     IP VTE HIGH RISK PATIENT  Once         03/26/25 2135     Place sequential compression device  Until discontinued         03/26/25 2135                  //Core Measures   -DVT proph: SCDs, Lovenox   -Code status: Full    -Surrogate: spouse       Components of this note were documented using a voice recognition system and are subject to errors not corrected at the time the document was proof read. Please contact the author for any clarifications.       On 03/26/2025, patient should be placed in hospital observation services under my care.       Omar Hope MD  Department of Hospital Medicine  [facility] - Med S[facility]           Intermediate Repair And Flap Additional Text (Will Appearing After The Standard Complex Repair Text): The intermediate repair was not sufficient to completely close the primary defect. The remaining additional defect was repaired with the flap mentioned below.

## 2025-03-27 NOTE — ASSESSMENT & PLAN NOTE
Mass is at the GE junction  He is able to take some p.o. but swelling is difficult   He has a PEG tube as well through which he receives supplementation

## 2025-03-27 NOTE — SUBJECTIVE & OBJECTIVE
Past Medical History:   Diagnosis Date    Esophageal cancer, stage IV 10/10/2024    Hypertension     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 10/11/2024    Palliative care encounter 12/11/2024       Past Surgical History:   Procedure Laterality Date    COLONOSCOPY N/A 8/25/2023    Procedure: COLONOSCOPY;  Surgeon: Pebbles Spear MD;  Location: [facility] ENDO;  Service: Endoscopy;  Laterality: N/A;    COLONOSCOPY N/A 10/2/2024    Procedure: COLONOSCOPY  Cardiac clearance received on 09/20/24 per Dr. Lockett, Cardiology.  Note in encounters.  LB;  Surgeon: Sully Farris MD;  Location: [facility];  Service: Endoscopy;  Laterality: N/A;    ESOPHAGOGASTRODUODENOSCOPY N/A 10/2/2024    Procedure: EGD (ESOPHAGOGASTRODUODENOSCOPY);  Surgeon: Sully Farris MD;  Location: [facility];  Service: Endoscopy;  Laterality: N/A;    ESOPHAGOGASTRODUODENOSCOPY N/A 12/31/2024    Procedure: EGD (ESOPHAGOGASTRODUODENOSCOPY);  Surgeon: Maki Sullivan MD;  Location: [facility];  Service: Gastroenterology;  Laterality: N/A;    INSERTION OF VENOUS ACCESS PORT Left 10/21/2024    Procedure: INSERTION, VENOUS ACCESS PORT;  Surgeon: Roseanna Rosas MD;  Location: [facility];  Service: General;  Laterality: Left;    SURGICAL REMOVAL OF LESION OF FACE Right 12/1/2023    Procedure: EXCISION, LESION, FACE;  Surgeon: Jose Flaherty MD;  Location: [facility];  Service: ENT;  Laterality: Right;  1. Excision facial subcutaneous mass right cheek 3 cm. 2. Intermediate layered closure 3.5 cm    WISDOM TOOTH EXTRACTION      XI ROBOTIC APPENDECTOMY N/A 7/31/2024    Procedure: XI ROBOTIC APPENDECTOMY;  Surgeon: Paulo Saavedra MD;  Location: [facility] OR;  Service: General;  Laterality: N/A;    XI ROBOTIC INSERTION, GASTROSTOMY TUBE N/A 10/21/2024    Procedure: XI ROBOTIC INSERTION, GASTROSTOMY TUBE;  Surgeon: Roseanna Rosas MD;  Location: [facility] OR;  Service: General;  Laterality: N/A;       Review of patient's allergies indicates:  No Known  Allergies    Current Facility-Administered Medications on File Prior to Encounter   Medication    [COMPLETED] fluorouraciL injection 320 mg    [COMPLETED] leucovorin calcium 320 mg/m2 = 635 mg in D5W 281.8 mL infusion    [COMPLETED] oxaliplatin (ELOXATIN) 68 mg/m2 = 135 mg in D5W 592 mL chemo infusion    [COMPLETED] palonosetron 0.25mg/dexAMETHasone 12mg in NS IVPB 0.25 mg 50 mL    [COMPLETED] trastuzumab-anns (KANJINTI) 300 mg in 0.9% NaCl 299.3 mL chemo infusion    [DISCONTINUED] 0.9% NaCl 100 mL flush bag    [DISCONTINUED] D5W 250 mL flush bag    [DISCONTINUED] fluorouracil (Adrucil) 960 mg/m2 = 1,910 mg in 0.9% NaCl 100 mL chemo infusion    [DISCONTINUED] sodium chloride 0.9% flush 10 mL     Current Outpatient Medications on File Prior to Encounter   Medication Sig    ferrous sulfate (FEOSOL) 325 mg (65 mg iron) Tab tablet Take 1 tablet (325 mg total) by mouth every other day.    LIDOcaine-prilocaine (EMLA) cream Apply topically as needed.    metoclopramide HCl (REGLAN) 10 MG tablet Take 1 tablet (10 mg total) by mouth every 6 (six) hours as needed (nausea, vomiting).    OLANZapine (ZYPREXA) 5 MG tablet Take 1 tablet (5 mg total) by mouth every evening. Take nightly on days 1-3 of each chemotherapy cycle.    ondansetron (ZOFRAN-ODT) 8 MG TbDL Take 1 tablet (8 mg total) by mouth 2 (two) times daily.    pantoprazole (PROTONIX) 40 MG tablet Take 1 tablet (40 mg total) by mouth once daily.     Family History       Problem Relation (Age of Onset)    Breast cancer Maternal Grandmother    Diabetes Father    Heart disease Father    Hyperlipidemia Mother    Hypertension Father    Kidney disease Father    Prostate cancer Maternal Grandfather          Tobacco Use    Smoking status: Every Day     Current packs/day: 0.00     Types: Cigars, Cigarettes     Last attempt to quit: 10/4/2022     Years since quittin.4     Passive exposure: Never    Smokeless tobacco: Never    Tobacco comments:     Cigar every afternoon    Substance and Sexual Activity    Alcohol use: Yes     Alcohol/week: 4.0 - 6.0 standard drinks of alcohol     Types: 4 - 6 Cans of beer per week     Comment: occ    Drug use: Yes     Types: Marijuana     Comment: medical    Sexual activity: Yes     Partners: Female     Review of Systems   All other systems reviewed and are negative.    Objective:     Vital Signs (Most Recent):  Temp: 99.3 °F (37.4 °C) (03/26/25 2102)  Pulse: 104 (03/26/25 2218)  Resp: 17 (03/26/25 2156)  BP: (!) 96/52 (03/26/25 2218)  SpO2: (!) 94 % (03/26/25 2102) Vital Signs (24h Range):  Temp:  [97.8 °F (36.6 °C)-100.5 °F (38.1 °C)] 99.3 °F (37.4 °C)  Pulse:  [] 104  Resp:  [15-20] 17  SpO2:  [93 %-98 %] 94 %  BP: ()/(52-71) 96/52     Weight: 79.3 kg (174 lb 12.8 oz)  Body mass index is 23.06 kg/m².     Physical Exam  Vitals reviewed.   Constitutional:       General: He is not in acute distress.     Appearance: He is normal weight. He is ill-appearing. He is not toxic-appearing or diaphoretic.      Comments: ill appearing but non-toxic    HENT:      Head: Normocephalic and atraumatic.      Right Ear: External ear normal.      Left Ear: External ear normal.      Ears:      Comments: Hearing aids in place      Nose: Nose normal. No congestion or rhinorrhea.      Mouth/Throat:      Mouth: Mucous membranes are moist.      Pharynx: Oropharynx is clear. No oropharyngeal exudate or posterior oropharyngeal erythema.   Eyes:      General: No scleral icterus.     Extraocular Movements: Extraocular movements intact.      Conjunctiva/sclera: Conjunctivae normal.      Pupils: Pupils are equal, round, and reactive to light.   Neck:      Vascular: No carotid bruit.   Cardiovascular:      Rate and Rhythm: Regular rhythm. Tachycardia present.      Pulses: Normal pulses.      Heart sounds: Normal heart sounds. No murmur heard.     No friction rub. No gallop.   Pulmonary:      Effort: Pulmonary effort is normal. No respiratory distress.      Breath  sounds: Normal breath sounds. No stridor. No wheezing, rhonchi or rales.   Chest:      Chest wall: No tenderness.   Abdominal:      General: Abdomen is flat. Bowel sounds are normal. There is no distension.      Palpations: Abdomen is soft. There is no mass.      Tenderness: There is no abdominal tenderness. There is no right CVA tenderness, left CVA tenderness, guarding or rebound.      Hernia: No hernia is present.      Comments: Peg tube in place    Musculoskeletal:         General: No swelling, tenderness, deformity or signs of injury. Normal range of motion.      Cervical back: Normal range of motion and neck supple. No rigidity or tenderness.      Right lower leg: No edema.      Left lower leg: No edema.   Lymphadenopathy:      Cervical: No cervical adenopathy.   Skin:     General: Skin is warm and dry.      Capillary Refill: Capillary refill takes less than 2 seconds.      Coloration: Skin is not jaundiced or pale.      Findings: No bruising, erythema, lesion or rash.   Neurological:      General: No focal deficit present.      Mental Status: He is alert and oriented to person, place, and time. Mental status is at baseline.      Cranial Nerves: No cranial nerve deficit.      Sensory: No sensory deficit.      Motor: No weakness.      Coordination: Coordination normal.   Psychiatric:         Mood and Affect: Mood normal.         Behavior: Behavior normal.         Thought Content: Thought content normal.         Judgment: Judgment normal.              CRANIAL NERVES     CN III, IV, VI   Pupils are equal, round, and reactive to light.       Significant Labs: All pertinent labs within the past 24 hours have been reviewed.    Significant Imaging: I have reviewed all pertinent imaging results/findings within the past 24 hours.    LABS:  Recent Results (from the past 24 hours)   Influenza A & B by Molecular    Collection Time: 03/26/25  4:42 PM    Specimen: Nasopharyngeal Swab   Result Value Ref Range    INFLUENZA A  MOLECULAR Negative Negative    INFLUENZA B MOLECULAR  Negative Negative   COVID-19 Rapid Screening    Collection Time: 03/26/25  4:42 PM   Result Value Ref Range    SARS COV-2 Molecular Negative Negative   Lactic acid, plasma #1    Collection Time: 03/26/25  5:06 PM   Result Value Ref Range    Lactic Acid Level 1.2 0.5 - 2.2 mmol/L   Procalcitonin    Collection Time: 03/26/25  5:06 PM   Result Value Ref Range    Procalcitonin 1.63 (H) <0.25 ng/mL   CBC with Differential    Collection Time: 03/26/25  5:06 PM   Result Value Ref Range    WBC 13.77 (H) 3.90 - 12.70 K/uL    RBC 4.04 (L) 4.60 - 6.20 M/uL    HGB 13.3 (L) 14.0 - 18.0 gm/dL    HCT 37.6 (L) 40.0 - 54.0 %    MCV 93 82 - 98 fL    MCH 32.9 27.0 - 50.0 pg    MCHC 35.4 32.0 - 36.0 g/dL    RDW 15.3 (H) 11.5 - 14.5 %    Platelet Count 179 150 - 450 K/uL    MPV 10.4 9.2 - 12.9 fL    Nucleated RBC 0 <=0 /100 WBC    Neut % 93.4 (H) 38 - 73 %    Lymph % 0.6 (L) 18 - 48 %    Mono % 5.2 4 - 15 %    Eos % 0.0 <=8 %    Basophil % 0.3 <=1.9 %    Imm Grans % 0.5 0.0 - 0.5 %    Neut # 12.86 (H) 1.8 - 7.7 K/uL    Lymph # 0.08 (L) 1 - 4.8 K/uL    Mono # 0.72 0.3 - 1 K/uL    Eos # 0.00 <=0.5 K/uL    Baso # 0.04 <=0.2 K/uL    Imm Grans # 0.07 (H) 0.00 - 0.04 K/uL   Comprehensive metabolic panel    Collection Time: 03/26/25  6:04 PM   Result Value Ref Range    Sodium 131 (L) 136 - 145 mmol/L    Potassium 3.6 3.5 - 5.1 mmol/L    Chloride 104 95 - 110 mmol/L    CO2 20 (L) 23 - 29 mmol/L    Glucose 109 70 - 110 mg/dL    BUN 12 6 - 20 mg/dL    Creatinine 0.7 0.5 - 1.4 mg/dL    Calcium 8.4 (L) 8.7 - 10.5 mg/dL    Protein Total 6.8 6.0 - 8.4 gm/dL    Albumin 3.1 (L) 3.5 - 5.2 g/dL    Bilirubin Total 0.5 0.1 - 1.0 mg/dL    ALP 90 40 - 150 unit/L    AST 18 11 - 45 unit/L    ALT 14 10 - 44 unit/L    Anion Gap 7 (L) 8 - 16 mmol/L    eGFR >60 >60 mL/min/1.73/m2   Troponin I    Collection Time: 03/26/25  6:04 PM   Result Value Ref Range    Troponin-I 0.023 <=0.026 ng/mL   Urinalysis, Reflex to  Urine Culture    Collection Time: 03/26/25  6:45 PM    Specimen: Urine   Result Value Ref Range    Color, UA Yellow Straw, Marge, Yellow, Light-Orange    Appearance, UA Clear Clear    pH, UA 6.0 5.0 - 8.0    Spec Grav UA 1.015 1.005 - 1.030    Protein, UA Negative Negative    Glucose, UA Negative Negative    Ketones, UA Negative Negative    Bilirubin, UA Negative Negative    Blood, UA Negative Negative    Nitrites, UA Negative Negative    Urobilinogen, UA Negative <2.0 EU/dL    Leukocyte Esterase, UA Negative Negative   Troponin I    Collection Time: 03/26/25  9:16 PM   Result Value Ref Range    Troponin-I 0.024 <=0.026 ng/mL       RADIOLOGY  X-Ray Chest AP Portable  Result Date: 3/26/2025  EXAM: XR CHEST AP PORTABLE CLINICAL HISTORY: Sepsis. FINDINGS:  Left chest port catheter in place. Lungs appear clear.  No pleural fluid or pneumothorax.  No convincing alveolar opacities.      No acute abnormality identified. Finalized on: 3/26/2025 5:26 PM By:  Karthik Rodriguez MD [facility]# [id number]      2025-03-26 17:29:01.340     [facility]    X-Ray Chest PA And Lateral  Result Date: 3/13/2025  EXAM:  XR CHEST PA AND LATERAL CLINICAL HISTORY: Malignant neoplasm of esophagus, unspecified, fever COMPARISON: Chest radiograph 12/30/2024 FINDINGS: Stable positioning of a left port catheter.  No confluent airspace opacity or consolidation.  There is no pleural effusion or pneumothorax.  The cardiomediastinal silhouette is within normal size limits.  The hilar and mediastinal structures are within normal limits.  No acute osseous abnormality is evident.      No acute radiographic abnormality in the chest. Finalized on: 3/13/2025 1:17 PM By:  Torres Singh MD [facility]# [id number]      2025-03-13 13:20:02.508     [facility]      EKG    MICROBIOLOGY    MDM

## 2025-03-27 NOTE — ASSESSMENT & PLAN NOTE
Patient is chronically on statin.will continue for now. Last Lipid Panel:   Lab Results   Component Value Date    CHOL 146 06/16/2023    HDL 69 06/16/2023    LDLCALC 65.4 06/16/2023    TRIG 58 06/16/2023    CHOLHDL 47.3 06/16/2023     Plan:  -Continue home medication  -low fat/low calorie diet

## 2025-03-27 NOTE — ASSESSMENT & PLAN NOTE
Anemia is likely due to Iron deficiency, chronic disease due to Malignancy, and drug toxicity from chemotherapy . Most recent hemoglobin and hematocrit are listed below.  Recent Labs     03/24/25  1149 03/26/25  1706   HGB 14.4 13.3*   HCT 42.4 37.6*     Plan  -Monitor serial CBC: Daily  -Transfuse PRBC if patient becomes hemodynamically unstable, symptomatic or H/H drops below 7/21.  -Patient has not received any PRBC transfusions to date  -Patient's anemia is currently stable

## 2025-03-27 NOTE — ASSESSMENT & PLAN NOTE
Patient currently receiving palliative chemotherapy for treatment of stage IV esophageal cancer and is followed by Dr. Latif outpatient. Patient able to tolerate p.o. intake and has peg for added nutritional supplementation.  Likely his current symptoms are a reaction to chemotherapy, but monitoring and treating for possible sepsis as well.  Placed oncology consult, appreciate input

## 2025-03-27 NOTE — PLAN OF CARE
[facility] - Med Surg  Discharge Assessment    Primary Care Provider: Michell Goyal MD     Discharge Assessment (most recent)       BRIEF DISCHARGE ASSESSMENT - 03/27/25 0907          Discharge Planning    Assessment Type Discharge Planning Brief Assessment     Resource/Environmental Concerns none     Support Systems Spouse/significant other     Equipment Currently Used at Home none     Current Living Arrangements home     Patient/Family Anticipates Transition to home     Patient/Family Anticipated Services at Transition none     DME Needed Upon Discharge  none     Discharge Plan A Home

## 2025-03-27 NOTE — ASSESSMENT & PLAN NOTE
Patient currently receiving palliative chemotherapy for treatment of stage IV esophageal cancer and is followed by Dr. Latif outpatient. Patient able to tolerate p.o. intake and has peg for added nutritional supplementation.  Plan:  -continued treatment of sepsis  -f/u cultures  -f/u hem/onc

## 2025-03-27 NOTE — TELEPHONE ENCOUNTER
Met with pt and family face to face while pt INPT. He reports he is feeling better today than yesterday. Chemo pump still in place with schedule to disconnect tomorrow. (Will update infusion staff of current admission.) Pt and family deny any other needs/concerns at this time though I encouraged them to reach out at any time.    Patent

## 2025-03-28 VITALS
RESPIRATION RATE: 16 BRPM | HEART RATE: 80 BPM | HEIGHT: 73 IN | WEIGHT: 174.81 LBS | SYSTOLIC BLOOD PRESSURE: 110 MMHG | OXYGEN SATURATION: 94 % | BODY MASS INDEX: 23.17 KG/M2 | TEMPERATURE: 98 F | DIASTOLIC BLOOD PRESSURE: 56 MMHG

## 2025-03-28 PROBLEM — T45.1X5A CHEMOTHERAPY ADVERSE REACTION: Status: ACTIVE | Noted: 2025-03-28

## 2025-03-28 LAB
ABSOLUTE EOSINOPHIL (OHS): 0.43 K/UL
ABSOLUTE MONOCYTE (OHS): 0.2 K/UL (ref 0.3–1)
ABSOLUTE NEUTROPHIL COUNT (OHS): 5.02 K/UL (ref 1.8–7.7)
ALBUMIN SERPL BCP-MCNC: 2.7 G/DL (ref 3.5–5.2)
ALP SERPL-CCNC: 84 UNIT/L (ref 40–150)
ALT SERPL W/O P-5'-P-CCNC: 15 UNIT/L (ref 10–44)
ANION GAP (OHS): 8 MMOL/L (ref 8–16)
AST SERPL-CCNC: 17 UNIT/L (ref 11–45)
BASOPHILS # BLD AUTO: 0.03 K/UL
BASOPHILS NFR BLD AUTO: 0.5 %
BILIRUB SERPL-MCNC: 0.7 MG/DL (ref 0.1–1)
BUN SERPL-MCNC: 10 MG/DL (ref 6–20)
CALCIUM SERPL-MCNC: 8.6 MG/DL (ref 8.7–10.5)
CHLORIDE SERPL-SCNC: 105 MMOL/L (ref 95–110)
CO2 SERPL-SCNC: 22 MMOL/L (ref 23–29)
CREAT SERPL-MCNC: 0.7 MG/DL (ref 0.5–1.4)
ERYTHROCYTE [DISTWIDTH] IN BLOOD BY AUTOMATED COUNT: 15.5 % (ref 11.5–14.5)
GFR SERPLBLD CREATININE-BSD FMLA CKD-EPI: >60 ML/MIN/1.73/M2
GLUCOSE SERPL-MCNC: 96 MG/DL (ref 70–110)
HCT VFR BLD AUTO: 34.1 % (ref 40–54)
HGB BLD-MCNC: 11.8 GM/DL (ref 14–18)
IMM GRANULOCYTES # BLD AUTO: 0.03 K/UL (ref 0–0.04)
IMM GRANULOCYTES NFR BLD AUTO: 0.5 % (ref 0–0.5)
LYMPHOCYTES # BLD AUTO: 0.51 K/UL (ref 1–4.8)
MAGNESIUM SERPL-MCNC: 1.8 MG/DL (ref 1.6–2.6)
MCH RBC QN AUTO: 33 PG (ref 27–50)
MCHC RBC AUTO-ENTMCNC: 34.6 G/DL (ref 32–36)
MCV RBC AUTO: 95 FL (ref 82–98)
NUCLEATED RBC (/100WBC) (OHS): 0 /100 WBC
PHOSPHATE SERPL-MCNC: 2.2 MG/DL (ref 2.7–4.5)
PLATELET # BLD AUTO: 144 K/UL (ref 150–450)
PMV BLD AUTO: 10.9 FL (ref 9.2–12.9)
POTASSIUM SERPL-SCNC: 4 MMOL/L (ref 3.5–5.1)
PROT SERPL-MCNC: 6.6 GM/DL (ref 6–8.4)
RBC # BLD AUTO: 3.58 M/UL (ref 4.6–6.2)
RELATIVE EOSINOPHIL (OHS): 6.9 %
RELATIVE LYMPHOCYTE (OHS): 8.2 % (ref 18–48)
RELATIVE MONOCYTE (OHS): 3.2 % (ref 4–15)
RELATIVE NEUTROPHIL (OHS): 80.7 % (ref 38–73)
SODIUM SERPL-SCNC: 135 MMOL/L (ref 136–145)
WBC # BLD AUTO: 6.22 K/UL (ref 3.9–12.7)

## 2025-03-28 PROCEDURE — 84100 ASSAY OF PHOSPHORUS: CPT | Performed by: HOSPITALIST

## 2025-03-28 PROCEDURE — 85025 COMPLETE CBC W/AUTO DIFF WBC: CPT | Performed by: HOSPITALIST

## 2025-03-28 PROCEDURE — 36415 COLL VENOUS BLD VENIPUNCTURE: CPT | Performed by: HOSPITALIST

## 2025-03-28 PROCEDURE — 25000003 PHARM REV CODE 250: Performed by: STUDENT IN AN ORGANIZED HEALTH CARE EDUCATION/TRAINING PROGRAM

## 2025-03-28 PROCEDURE — 83735 ASSAY OF MAGNESIUM: CPT | Performed by: HOSPITALIST

## 2025-03-28 PROCEDURE — 25000003 PHARM REV CODE 250: Performed by: HOSPITALIST

## 2025-03-28 PROCEDURE — 21400001 HC TELEMETRY ROOM

## 2025-03-28 PROCEDURE — 96361 HYDRATE IV INFUSION ADD-ON: CPT

## 2025-03-28 PROCEDURE — 63600175 PHARM REV CODE 636 W HCPCS: Performed by: STUDENT IN AN ORGANIZED HEALTH CARE EDUCATION/TRAINING PROGRAM

## 2025-03-28 PROCEDURE — 80053 COMPREHEN METABOLIC PANEL: CPT | Performed by: HOSPITALIST

## 2025-03-28 PROCEDURE — 96376 TX/PRO/DX INJ SAME DRUG ADON: CPT

## 2025-03-28 PROCEDURE — 63600175 PHARM REV CODE 636 W HCPCS: Performed by: HOSPITALIST

## 2025-03-28 RX ORDER — ELECTROLYTES/DEXTROSE
200 SOLUTION, ORAL ORAL
Status: DISCONTINUED | OUTPATIENT
Start: 2025-03-28 | End: 2025-03-28 | Stop reason: HOSPADM

## 2025-03-28 RX ORDER — SODIUM,POTASSIUM PHOSPHATES 280-250MG
2 POWDER IN PACKET (EA) ORAL
Status: DISCONTINUED | OUTPATIENT
Start: 2025-03-28 | End: 2025-03-28 | Stop reason: HOSPADM

## 2025-03-28 RX ORDER — HEPARIN 100 UNIT/ML
5 SYRINGE INTRAVENOUS ONCE
Status: COMPLETED | OUTPATIENT
Start: 2025-03-28 | End: 2025-03-28

## 2025-03-28 RX ADMIN — HYDROCODONE BITARTRATE AND ACETAMINOPHEN 1 TABLET: 5; 325 TABLET ORAL at 08:03

## 2025-03-28 RX ADMIN — Medication 2 PACKET: at 01:03

## 2025-03-28 RX ADMIN — PANTOPRAZOLE SODIUM 40 MG: 40 TABLET, DELAYED RELEASE ORAL at 08:03

## 2025-03-28 RX ADMIN — FERROUS SULFATE TAB 325 MG (65 MG ELEMENTAL FE) 1 EACH: 325 (65 FE) TAB at 08:03

## 2025-03-28 RX ADMIN — HYDROCODONE BITARTRATE AND ACETAMINOPHEN 1 TABLET: 5; 325 TABLET ORAL at 03:03

## 2025-03-28 RX ADMIN — HYDROCODONE BITARTRATE AND ACETAMINOPHEN 1 TABLET: 5; 325 TABLET ORAL at 02:03

## 2025-03-28 RX ADMIN — HEPARIN 500 UNITS: 100 SYRINGE at 12:03

## 2025-03-28 RX ADMIN — CEFEPIME 2 G: 2 INJECTION, POWDER, FOR SOLUTION INTRAVENOUS at 02:03

## 2025-03-28 RX ADMIN — CEFEPIME 2 G: 2 INJECTION, POWDER, FOR SOLUTION INTRAVENOUS at 08:03

## 2025-03-28 RX ADMIN — Medication 2 PACKET: at 08:03

## 2025-03-28 NOTE — NURSING
pump D/C. Pt tolerated home 5FU well and had no complaints. PAC flushed w/ NS and heparin and deaccessed. Pt education reinforced and explained what to expect in the next couple of days. Pt verbalized understanding.

## 2025-03-28 NOTE — ASSESSMENT & PLAN NOTE
Anemia is likely due to Iron deficiency, chronic disease due to Malignancy, and drug toxicity from chemotherapy. Most recent hemoglobin and hematocrit are listed below.  Recent Labs     03/26/25  1706 03/27/25  0624 03/28/25  0546   HGB 13.3* 11.4* 11.8*   HCT 37.6* 33.4* 34.1*

## 2025-03-28 NOTE — DISCHARGE SUMMARY
[facility] Medicine  Discharge Summary      Patient Name: Rosalio Muñoz  MRN: [id number]  [facility]: [id number]  Patient Class: IP- Inpatient  Admission Date: 3/26/2025  Hospital Length of Stay: 0 days  Discharge Date and Time:  03/28/2025 5:01 PM  Attending Physician: Lance Sotelo MD   Discharging Provider: Lance Sotelo MD  Primary Care Provider: Michell Goyal MD    Primary Care Team: Networked reference to record PCT     HPI:   Rosalio Muñoz is a 59 y.o. male with a PMH  has a past medical history of Esophageal cancer, stage IV (10/10/2024), Hypertension, 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 (10/11/2024), and Palliative care encounter (12/11/2024). who presented to the ED for further evaluation of acute onset fever and generalized body aches following initiation of chemotherapy today.  Patient currently on infusion pump and was started on his 4th cycle of chemo earlier today and subsequently developed fever with T-max measuring 100.9 F with generalized weakness and body aches prompting patient to come to the ED to be further evaluated.  Associated symptoms also included cough with all other review of systems negative except as noted above.  He reported no known alleviating or aggravating factors noted and stated he tolerated previous chemo those without any adverse effects.  Patient currently follows Dr. Latif from Hematology Oncology and was in his usual state of health prior to onset of symptoms.  Initial workup in the ED revealed patient met SIRS criteria for sepsis with T-max measuring 100.5 F with leukocytosis measuring 13.77, calcium 8.4, lactic acid within normal limits, procalcitonin 1.63, flu/COVID negative, UA negative, chest x-ray negative for acute findings. Patient initiated on Cefepime in setting of immunocompromised state and currently receiving chemotherapy for treatment of stage IV esophageal cancer. Patient admitted to Hospital Medicine under  observation for continued medical management.    PCP: Michell Goyal      * No surgery found *      Hospital Course:   3/27: Patient doing well, blood cultures negative but only incubated briefly so far, symptomatically feeling mildly improved, no fevers so far today, white count coming down slightly  3/28: Patient continuing to do well, eating and drinking well, no white count, no fever, culture still negative, discharging today     Goals of Care Treatment Preferences:  Code Status: Full Code    Health care agent: SDM: Wife: Fanta Muñoz  Health care agent number: [phone]          What is most important right now is to focus on remaining as independent as possible, symptom/pain control.  Accordingly, we have decided that the best plan to meet the patient's goals includes continuing with treatment.         Consults:   Consults (From admission, onward)          Status Ordering Provider     Inpatient consult to Hematology/Oncology  Once        Provider:  Supa Patel MD    Completed LORAINE JULIO            Assessment & Plan  Fever  Febrile to 100.5 yesterday on the night of admission, white count slightly elevated and coming down.  No localizing signs or symptoms.  Was started on cefepime initially.  Not neutropenic.  Blood cultures collected and has been negative for 2 days now.  We will discharge him today without antibiotics as there is no evidence of infection at this time.  The fever was likely a reaction to the chemotherapy infusion  HTN (hypertension)  Continue home regimen  Other hyperlipidemia  Patient is chronically on statin.will continue for now. Last Lipid Panel:   Lab Results   Component Value Date    CHOL 146 06/16/2023    HDL 69 06/16/2023    LDLCALC 65.4 06/16/2023    TRIG 58 06/16/2023    CHOLHDL 47.3 06/16/2023     Plan:  -Continue home medication  -low fat/low calorie diet      Dysphagia  Mass is at the GE junction  He is able to take some p.o. but swelling is difficult    He has a PEG tube as well through which he receives supplementation    Esophageal cancer, stage IV  Patient currently receiving palliative chemotherapy for treatment of stage IV esophageal cancer and is followed by Dr. Latif outpatient. Patient able to tolerate p.o. intake and has peg for added nutritional supplementation.  Likely his current symptoms are a reaction to chemotherapy  Iron deficiency anemia due to chronic blood loss  Anemia is likely due to Iron deficiency, chronic disease due to Malignancy, and drug toxicity from chemotherapy . Most recent hemoglobin and hematocrit are listed below.  Recent Labs     03/26/25  1706 03/27/25  0624 03/28/25  0546   HGB 13.3* 11.4* 11.8*   HCT 37.6* 33.4* 34.1*         Immunodeficiency due to chemotherapy      Chemotherapy adverse reaction      Final Active Diagnoses:    Diagnosis Date Noted POA    PRINCIPAL PROBLEM:  Fever [R50.9] 03/26/2025 Yes    Chemotherapy adverse reaction [T45.1X5A] 03/28/2025 Yes    Immunodeficiency due to chemotherapy [D84.821, T45.1X5A, Z79.899] 10/28/2024 Not Applicable     Chronic    Iron deficiency anemia due to chronic blood loss [D50.0] 10/12/2024 Yes     Chronic    Esophageal cancer, stage IV [C15.9] 10/10/2024 Yes     Chronic    Dysphagia [R13.10] 10/02/2024 Yes     Chronic    Other hyperlipidemia [E78.49] 03/22/2021 Yes     Chronic    HTN (hypertension) [I10] 10/25/2017 Yes     Chronic      Problems Resolved During this Admission:       Discharged Condition: fair    Disposition: Home or Self Care    Follow Up:    Patient Instructions:   No discharge procedures on file.    Significant Diagnostic Studies: Labs: BMP:   Recent Labs   Lab 03/26/25  1804 03/27/25  0624 03/28/25  0546   * 131* 135*   K 3.6 3.6 4.0    106 105   CO2 20* 21* 22*   BUN 12 12 10   CREATININE 0.7 0.7 0.7   CALCIUM 8.4* 8.0* 8.6*   MG  --  1.5* 1.8    and CBC   Recent Labs   Lab 03/26/25  1706 03/27/25  0624 03/28/25  0546   WBC 13.77* 12.42 6.22   HGB  13.3* 11.4* 11.8*   HCT 37.6* 33.4* 34.1*    144* 144*       Pending Diagnostic Studies:       None           Medications:  Reconciled Home Medications:      Medication List        CONTINUE taking these medications      ferrous sulfate 325 mg (65 mg iron) Tab tablet  Commonly known as: FEOSOL  Take 1 tablet (325 mg total) by mouth every other day.     LIDOcaine-prilocaine cream  Commonly known as: EMLA  Apply topically as needed.     metoclopramide HCl 10 MG tablet  Commonly known as: REGLAN  Take 1 tablet (10 mg total) by mouth every 6 (six) hours as needed (nausea, vomiting).     OLANZapine 5 MG tablet  Commonly known as: ZyPREXA  Take 1 tablet (5 mg total) by mouth every evening. Take nightly on days 1-3 of each chemotherapy cycle.     ondansetron 8 MG Tbdl  Commonly known as: ZOFRAN-ODT  Take 1 tablet (8 mg total) by mouth 2 (two) times daily.     pantoprazole 40 MG tablet  Commonly known as: PROTONIX  Take 1 tablet (40 mg total) by mouth once daily.              Indwelling Lines/Drains at time of discharge:   Lines/Drains/Airways       Central Venous Catheter Line  Duration             Port A Cath Single Lumen 10/21/24 1502 158 days              Drain  Duration                  Gastrostomy/Enterostomy 10/21/24 1413 Gastrostomy tube w/o balloon  days                    Time spent on the discharge of patient: 45 minutes         Lance Sotelo MD  Department of Hospital Medicine  [facility] - Med Surg

## 2025-03-28 NOTE — PLAN OF CARE
Pt discharging to home with wife. No changes to medication at this time. PIV and tele monitor removed.

## 2025-03-28 NOTE — ASSESSMENT & PLAN NOTE
Febrile to 100.5 yesterday on the night of admission, white count slightly elevated and coming down.  No localizing signs or symptoms.  Was started on cefepime initially.  Not neutropenic.  Blood cultures collected and has been negative for 2 days now.  We will discharge him today without antibiotics as there is no evidence of infection at this time.  The fever was likely a reaction to the chemotherapy infusion

## 2025-03-28 NOTE — CONSULTS
Medical Oncology Plan of Care Note    Discussed patient with primary team. 59 M with stage IV esophageal cancer on C4 FOLFOX + trastuzumab+ pembro admitted with fevers currently on infusion pump. Agree with abx currently. Patient is not neutropenic. Recommend waiting 24 hours to ensure cultures are negative, then ok to discharge on oral abx.    Supa Patel MD  Medical Oncology  Director of the [facility]  [facility]

## 2025-03-28 NOTE — PLAN OF CARE
Discussed poc with pt, pt verbalized understanding    Purposeful rounding every 2hours    Vital signs stable on shift, slightly low BP's on shift and afebrile   Cardiac monitoring in use, pt is NSR  Fall precautions in place, remains injury free  Pt denies c/o nausea and vomiting  Pain being managed by PRN medications  Pt states able to reposition self; refusing q2 repositioning     Accurate I&Os  Abx given as prescribed  Bed locked at lowest position  Call light within reach    Chart check complete  Will cont with POC

## 2025-03-28 NOTE — PROGRESS NOTES
[facility]  Hematology/Oncology  Progress Note    Patient Name: Rosalio Muñoz  Admission Date: 3/26/2025  Hospital Length of Stay: 0 days  Code Status: Full Code     Subjective:     Interval History:  Resting comfortably pump DC today no specific complaints he reports no fever since admission family in room with him  9 y.o. male with a PMH  has a past medical history of Esophageal cancer, stage IV (10/10/2024), Hypertension, 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 (10/11/2024), and Palliative care encounter (12/11/2024). who presented to the ED for further evaluation of acute onset fever and generalized body aches following initiation of chemotherapy today.  Patient currently on infusion pump and was started on his 4th cycle of chemo earlier today and subsequently developed fever with T-max measuring 100.9 F with generalized weakness and body aches prompting patient to come to the ED to be further evaluated.  Associated symptoms also included cough with all other review of systems negative except as noted above.  He reported no known alleviating or aggravating factors noted and stated he tolerated previous chemo those without any adverse effects.  Patient currently follows Dr. Latif from Hematology Oncology and was in his usual state of health prior to onset of symptoms.  Initial workup in the ED revealed patient met SIRS criteria for sepsis with T-max measuring 100.5 F with leukocytosis measuring 13.77, calcium 8.4, lactic acid within normal limits, procalcitonin 1.63, flu/COVID negative, UA negative, chest x-ray negative for acute findings. Patient initiated on Cefepime in setting of immunocompromised state and currently receiving chemotherapy for treatment of stage IV esophageal cancer. Patient admitted to Hospital Medicine under observation for continued medical management.     Oncology Treatment Plan:   OP GASTROESOPHAGEAL pembrolizumab Q6W trastuzumab + [id number] (oxaliplatin leucovorin  fluorouracil) Q2W    Medications:  Continuous Infusions:  Scheduled Meds:   ceFEPime IV (PEDS and ADULTS)  2 g Intravenous Q8H    electrolytes-dextrose  200 mL Oral Q4H    enoxparin  40 mg Subcutaneous Daily    ferrous sulfate  1 tablet Oral Daily    OLANZapine  5 mg Oral QHS    pantoprazole  40 mg Oral Daily    potassium, sodium phosphates  2 packet Oral QID (WM & HS)     PRN Meds:  Current Facility-Administered Medications:     acetaminophen, 650 mg, Oral, Q4H PRN    acetaminophen, 650 mg, Oral, Q8H PRN    albuterol-ipratropium, 3 mL, Nebulization, Q6H PRN    dextrose 50%, 12.5 g, Intravenous, PRN    dextrose 50%, 25 g, Intravenous, PRN    glucagon (human recombinant), 1 mg, Intramuscular, PRN    glucose, 16 g, Oral, PRN    glucose, 24 g, Oral, PRN    HYDROcodone-acetaminophen, 1 tablet, Oral, Q6H PRN    morphine, 2 mg, Intravenous, Q4H PRN    ondansetron, 4 mg, Intravenous, Q8H PRN    polyethylene glycol, 17 g, Oral, Daily PRN    promethazine (PHENERGAN) 12.5 mg in 0.9% NaCl 50 mL IVPB, 12.5 mg, Intravenous, Q6H PRN     Review of Systems  Patient reports being hard of hearing  Weakness fatigue chills some achiness which is improving no other complaints he has a decent appetite  Objective:     Vital Signs (Most Recent):  Temp: 98.1 °F (36.7 °C) (03/28/25 0828)  Pulse: 74 (03/28/25 0828)  Resp: 18 (03/28/25 0830)  BP: 114/65 (03/28/25 0828)  SpO2: (!) 94 % (03/28/25 0828) Vital Signs (24h Range):  Temp:  [97.8 °F (36.6 °C)-98.6 °F (37 °C)] 98.1 °F (36.7 °C)  Pulse:  [70-96] 74  Resp:  [16-20] 18  SpO2:  [94 %-96 %] 94 %  BP: ()/(51-69) 114/65     Weight: 79.3 kg (174 lb 12.8 oz)  Body mass index is 23.06 kg/m².  Body surface area is 2.02 meters squared.      Intake/Output Summary (Last 24 hours) at 3/28/2025 1353  Last data filed at 3/28/2025 0400  Gross per 24 hour   Intake --   Output 1900 ml   Net -1900 ml       Physical Exam  VITAL SIGNS:  as above   GENERAL: appears well-built, well-nourished.  No  anxiety, no agitation, and in no distress.  Patient is awake, alert, oriented and cooperative.  HEENT:  Showed no congestion. Trachea is central no obvious icterus or pallor noted no hoarseness. no obvious JVD   NECK:  Supple.  No JVD. No obvious cervical submental or supraclavicular adenopathy.  RS:the visualized portion of  Chest expands well. chest appears symmetric, no audible wheezes.  No dyspnea recognized  ABDOMEN:  abdomen appears undistended.  EXTREMITIES:  Without edema.  NEUROLOGICAL:  The patient is appropriate, higher functions are normal.  No  obvious neurological deficits.  normal judgement normal thought content  No confusion, no speech impediment. Cranial nerves are intact and show no deficit. No gross motor deficits noted   SKIN MUSCULOSKELETAL: no joint or skeletal deformity, no clubbing of nails.  No visible rash ecchymosis or petechiae   Significant Labs:   Lab Results   Component Value Date    WBC 6.22 03/28/2025    HGB 11.8 (L) 03/28/2025    HCT 34.1 (L) 03/28/2025    MCV 95 03/28/2025     (L) 03/28/2025      CMP  Sodium   Date Value Ref Range Status   03/28/2025 135 (L) 136 - 145 mmol/L Final   03/13/2025 136 136 - 145 mmol/L Final     Potassium   Date Value Ref Range Status   03/28/2025 4.0 3.5 - 5.1 mmol/L Final   03/13/2025 4.0 3.5 - 5.1 mmol/L Final     Chloride   Date Value Ref Range Status   03/28/2025 105 95 - 110 mmol/L Final   03/13/2025 102 95 - 110 mmol/L Final     CO2   Date Value Ref Range Status   03/28/2025 22 (L) 23 - 29 mmol/L Final   03/13/2025 24 23 - 29 mmol/L Final     Glucose   Date Value Ref Range Status   03/13/2025 84 70 - 110 mg/dL Final     BUN   Date Value Ref Range Status   03/28/2025 10 6 - 20 mg/dL Final     Creatinine   Date Value Ref Range Status   03/28/2025 0.7 0.5 - 1.4 mg/dL Final     Calcium   Date Value Ref Range Status   03/28/2025 8.6 (L) 8.7 - 10.5 mg/dL Final   03/13/2025 10.0 8.7 - 10.5 mg/dL Final     Total Protein   Date Value Ref Range  Status   03/13/2025 8.2 6.0 - 8.4 g/dL Final     Albumin   Date Value Ref Range Status   03/28/2025 2.7 (L) 3.5 - 5.2 g/dL Final   03/13/2025 3.8 3.5 - 5.2 g/dL Final     Total Bilirubin   Date Value Ref Range Status   03/13/2025 0.7 0.1 - 1.0 mg/dL Final     Comment:     For infants and newborns, interpretation of results should be based  on gestational age, weight and in agreement with clinical  observations.    Premature Infant recommended reference ranges:  Up to 24 hours.............<8.0 mg/dL  Up to 48 hours............<12.0 mg/dL  3-5 days..................<15.0 mg/dL  6-29 days.................<15.0 mg/dL       Bilirubin Total   Date Value Ref Range Status   03/28/2025 0.7 0.1 - 1.0 mg/dL Final     Comment:     For infants and newborns, interpretation of results should be based   on gestational age, weight and in agreement with clinical   observations.    Premature Infant recommended reference ranges:   0-24 hours:  <8.0 mg/dL   24-48 hours: <12.0 mg/dL   3-5 days:    <15.0 mg/dL   6-29 days:   <15.0 mg/dL     Alkaline Phosphatase   Date Value Ref Range Status   03/13/2025 97 40 - 150 U/L Final     ALP   Date Value Ref Range Status   03/28/2025 84 40 - 150 unit/L Final     AST   Date Value Ref Range Status   03/28/2025 17 11 - 45 unit/L Final   03/13/2025 21 10 - 40 U/L Final     ALT   Date Value Ref Range Status   03/28/2025 15 10 - 44 unit/L Final   03/13/2025 21 10 - 44 U/L Final     Anion Gap   Date Value Ref Range Status   03/28/2025 8 8 - 16 mmol/L Final     eGFR   Date Value Ref Range Status   03/28/2025 >60 >60 mL/min/1.73/m2 Final     Comment:     Estimated GFR calculated using the CKD-EPI creatinine (2021) equation.   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, T45.1X5A, Z79.899] 10/28/2024  Not Applicable     Chronic    Iron deficiency anemia due to chronic blood loss [D50.0] 10/12/2024 Yes     Chronic    Esophageal cancer, stage IV [C15.9] 10/10/2024 Yes     Chronic    Dysphagia [R13.10] 10/02/2024 Yes     Chronic    Other hyperlipidemia [E78.49] 03/22/2021 Yes     Chronic    HTN (hypertension) [I10] 10/25/2017 Yes     Chronic      Problems Resolved During this Admission:   Febrile last night but no fever since then without neutropenia  Patient has been cultured should be okay for DC to home follow-up in outpatient setting    Thank you for your consult.      Baylee Garcia MD  Hematology/Oncology  [facility]

## 2025-03-28 NOTE — ASSESSMENT & PLAN NOTE
Patient currently receiving palliative chemotherapy for treatment of stage IV esophageal cancer and is followed by Dr. Latif outpatient. Patient able to tolerate p.o. intake and has peg for added nutritional supplementation.  Likely his current symptoms are a reaction to chemotherapy

## 2025-04-01 ENCOUNTER — PATIENT OUTREACH (OUTPATIENT)
Dept: ADMINISTRATIVE | Facility: CLINIC | Age: 60
End: 2025-04-01
Payer: COMMERCIAL

## 2025-04-01 LAB
BACTERIA BLD CULT: NORMAL
BACTERIA BLD CULT: NORMAL

## 2025-04-07 ENCOUNTER — RESULTS FOLLOW-UP (OUTPATIENT)
Dept: HEMATOLOGY/ONCOLOGY | Facility: CLINIC | Age: 60
End: 2025-04-07

## 2025-04-07 ENCOUNTER — HOSPITAL ENCOUNTER (OUTPATIENT)
Dept: RADIOLOGY | Facility: HOSPITAL | Age: 60
Discharge: HOME | End: 2025-04-07
Attending: INTERNAL MEDICINE
Payer: COMMERCIAL

## 2025-04-07 DIAGNOSIS — C15.9 ESOPHAGEAL CANCER, STAGE IV: ICD-10-CM

## 2025-04-07 PROCEDURE — 74177 CT ABD & PELVIS W/CONTRAST: CPT | Mod: TC

## 2025-04-07 PROCEDURE — 71260 CT THORAX DX C+: CPT | Mod: 26,,, | Performed by: RADIOLOGY

## 2025-04-07 PROCEDURE — 74177 CT ABD & PELVIS W/CONTRAST: CPT | Mod: 26,,, | Performed by: RADIOLOGY

## 2025-04-07 PROCEDURE — A9698 NON-RAD CONTRAST MATERIALNOC: HCPCS | Performed by: INTERNAL MEDICINE

## 2025-04-07 PROCEDURE — 25500020 PHARM REV CODE 255: Performed by: INTERNAL MEDICINE

## 2025-04-07 RX ADMIN — IOHEXOL 1000 ML: 9 SOLUTION ORAL at 01:04

## 2025-04-07 RX ADMIN — IOHEXOL 100 ML: 350 INJECTION, SOLUTION INTRAVENOUS at 01:04

## 2025-04-09 ENCOUNTER — OFFICE VISIT (OUTPATIENT)
Dept: HEMATOLOGY/ONCOLOGY | Facility: CLINIC | Age: 60
End: 2025-04-09
Attending: INTERNAL MEDICINE
Payer: COMMERCIAL

## 2025-04-09 ENCOUNTER — INFUSION (OUTPATIENT)
Dept: INFUSION THERAPY | Facility: HOSPITAL | Age: 60
End: 2025-04-09
Attending: INTERNAL MEDICINE
Payer: COMMERCIAL

## 2025-04-09 ENCOUNTER — DOCUMENTATION ONLY (OUTPATIENT)
Dept: NUTRITION | Facility: CLINIC | Age: 60
End: 2025-04-09
Payer: COMMERCIAL

## 2025-04-09 ENCOUNTER — HOSPITAL ENCOUNTER (INPATIENT)
Facility: HOSPITAL | Age: 60
LOS: 1 days | Discharge: HOME | DRG: 864 | End: 2025-04-11
Attending: EMERGENCY MEDICINE | Admitting: FAMILY MEDICINE
Payer: COMMERCIAL

## 2025-04-09 VITALS
DIASTOLIC BLOOD PRESSURE: 75 MMHG | BODY MASS INDEX: 22.76 KG/M2 | OXYGEN SATURATION: 97 % | HEIGHT: 73 IN | TEMPERATURE: 98 F | WEIGHT: 171.75 LBS | SYSTOLIC BLOOD PRESSURE: 130 MMHG | HEART RATE: 85 BPM | RESPIRATION RATE: 16 BRPM

## 2025-04-09 VITALS
SYSTOLIC BLOOD PRESSURE: 112 MMHG | DIASTOLIC BLOOD PRESSURE: 70 MMHG | HEIGHT: 73 IN | HEART RATE: 91 BPM | OXYGEN SATURATION: 97 % | BODY MASS INDEX: 23.06 KG/M2

## 2025-04-09 DIAGNOSIS — C78.6 SECONDARY ADENOCARCINOMA OF RETROPERITONEUM: ICD-10-CM

## 2025-04-09 DIAGNOSIS — D50.0 IRON DEFICIENCY ANEMIA DUE TO CHRONIC BLOOD LOSS: Primary | Chronic | ICD-10-CM

## 2025-04-09 DIAGNOSIS — R00.0 TACHYCARDIA: ICD-10-CM

## 2025-04-09 DIAGNOSIS — R65.10 SIRS (SYSTEMIC INFLAMMATORY RESPONSE SYNDROME): Primary | ICD-10-CM

## 2025-04-09 DIAGNOSIS — R06.00 DYSPNEA: ICD-10-CM

## 2025-04-09 DIAGNOSIS — Z13.6 SCREENING FOR CARDIOVASCULAR CONDITION: ICD-10-CM

## 2025-04-09 DIAGNOSIS — C15.9 ESOPHAGEAL CANCER, STAGE IV: Primary | ICD-10-CM

## 2025-04-09 DIAGNOSIS — R79.0 LOW BLOOD MAGNESIUM: ICD-10-CM

## 2025-04-09 DIAGNOSIS — C15.9 ESOPHAGEAL CANCER, STAGE IV: Chronic | ICD-10-CM

## 2025-04-09 DIAGNOSIS — R07.9 CHEST PAIN: ICD-10-CM

## 2025-04-09 DIAGNOSIS — C79.89 MALIGNANT NEOPLASM METASTATIC TO OTHER SITE: ICD-10-CM

## 2025-04-09 LAB
ABSOLUTE EOSINOPHIL (OHS): 0.01 K/UL
ABSOLUTE MONOCYTE (OHS): 0.64 K/UL (ref 0.3–1)
ABSOLUTE NEUTROPHIL COUNT (OHS): 11.92 K/UL (ref 1.8–7.7)
ALBUMIN SERPL BCP-MCNC: 3.3 G/DL (ref 3.5–5.2)
ALP SERPL-CCNC: 126 UNIT/L (ref 40–150)
ALT SERPL W/O P-5'-P-CCNC: 21 UNIT/L (ref 10–44)
ANION GAP (OHS): 9 MMOL/L (ref 8–16)
AST SERPL-CCNC: 20 UNIT/L (ref 11–45)
BASOPHILS # BLD AUTO: 0.04 K/UL
BASOPHILS NFR BLD AUTO: 0.3 %
BILIRUB SERPL-MCNC: 0.6 MG/DL (ref 0.1–1)
BILIRUB UR QL STRIP.AUTO: NEGATIVE
BUN SERPL-MCNC: 13 MG/DL (ref 6–20)
CALCIUM SERPL-MCNC: 9.3 MG/DL (ref 8.7–10.5)
CHLORIDE SERPL-SCNC: 101 MMOL/L (ref 95–110)
CLARITY UR: CLEAR
CO2 SERPL-SCNC: 20 MMOL/L (ref 23–29)
COLOR UR AUTO: YELLOW
CREAT SERPL-MCNC: 0.7 MG/DL (ref 0.5–1.4)
ERYTHROCYTE [DISTWIDTH] IN BLOOD BY AUTOMATED COUNT: 15.9 % (ref 11.5–14.5)
GFR SERPLBLD CREATININE-BSD FMLA CKD-EPI: >60 ML/MIN/1.73/M2
GLUCOSE SERPL-MCNC: 113 MG/DL (ref 70–110)
GLUCOSE UR QL STRIP: NEGATIVE
HCT VFR BLD AUTO: 37 % (ref 40–54)
HGB BLD-MCNC: 13 GM/DL (ref 14–18)
HGB UR QL STRIP: NEGATIVE
IMM GRANULOCYTES # BLD AUTO: 0.07 K/UL (ref 0–0.04)
IMM GRANULOCYTES NFR BLD AUTO: 0.5 % (ref 0–0.5)
INFLUENZA A MOLECULAR (OHS): NEGATIVE
INFLUENZA B MOLECULAR (OHS): NEGATIVE
KETONES UR QL STRIP: NEGATIVE
LACTATE SERPL-SCNC: 1.3 MMOL/L (ref 0.5–2.2)
LACTATE SERPL-SCNC: 1.7 MMOL/L (ref 0.5–2.2)
LEUKOCYTE ESTERASE UR QL STRIP: NEGATIVE
LYMPHOCYTES # BLD AUTO: 0.09 K/UL (ref 1–4.8)
MAGNESIUM SERPL-MCNC: 1.4 MG/DL (ref 1.6–2.6)
MCH RBC QN AUTO: 32.6 PG (ref 27–31)
MCHC RBC AUTO-ENTMCNC: 35.1 G/DL (ref 32–36)
MCV RBC AUTO: 93 FL (ref 82–98)
NITRITE UR QL STRIP: NEGATIVE
NUCLEATED RBC (/100WBC) (OHS): 0 /100 WBC
PH UR STRIP: 8 [PH]
PLATELET # BLD AUTO: 177 K/UL (ref 150–450)
PMV BLD AUTO: 10.3 FL (ref 9.2–12.9)
POTASSIUM SERPL-SCNC: 4.1 MMOL/L (ref 3.5–5.1)
PROCALCITONIN SERPL-MCNC: 0.84 NG/ML
PROT SERPL-MCNC: 7.9 GM/DL (ref 6–8.4)
PROT UR QL STRIP: NEGATIVE
RBC # BLD AUTO: 3.99 M/UL (ref 4.6–6.2)
RELATIVE EOSINOPHIL (OHS): 0.1 %
RELATIVE LYMPHOCYTE (OHS): 0.7 % (ref 18–48)
RELATIVE MONOCYTE (OHS): 5 % (ref 4–15)
RELATIVE NEUTROPHIL (OHS): 93.4 % (ref 38–73)
SARS-COV-2 RDRP RESP QL NAA+PROBE: NEGATIVE
SODIUM SERPL-SCNC: 130 MMOL/L (ref 136–145)
SP GR UR STRIP: 1.02
UROBILINOGEN UR STRIP-ACNC: NEGATIVE EU/DL
WBC # BLD AUTO: 12.77 K/UL (ref 3.9–12.7)

## 2025-04-09 PROCEDURE — G0378 HOSPITAL OBSERVATION PER HR: HCPCS

## 2025-04-09 PROCEDURE — 1160F RVW MEDS BY RX/DR IN RCRD: CPT | Mod: CPTII,S$GLB,,

## 2025-04-09 PROCEDURE — 99999 PR PBB SHADOW E&M-EST. PATIENT-LVL IV: CPT | Mod: PBBFAC,,,

## 2025-04-09 PROCEDURE — 85025 COMPLETE CBC W/AUTO DIFF WBC: CPT | Performed by: EMERGENCY MEDICINE

## 2025-04-09 PROCEDURE — 93005 ELECTROCARDIOGRAM TRACING: CPT

## 2025-04-09 PROCEDURE — 3008F BODY MASS INDEX DOCD: CPT | Mod: CPTII,S$GLB,,

## 2025-04-09 PROCEDURE — 96413 CHEMO IV INFUSION 1 HR: CPT

## 2025-04-09 PROCEDURE — 81003 URINALYSIS AUTO W/O SCOPE: CPT | Performed by: EMERGENCY MEDICINE

## 2025-04-09 PROCEDURE — 96367 TX/PROPH/DG ADDL SEQ IV INF: CPT

## 2025-04-09 PROCEDURE — U0002 COVID-19 LAB TEST NON-CDC: HCPCS | Performed by: EMERGENCY MEDICINE

## 2025-04-09 PROCEDURE — 96417 CHEMO IV INFUS EACH ADDL SEQ: CPT

## 2025-04-09 PROCEDURE — 84145 PROCALCITONIN (PCT): CPT | Performed by: EMERGENCY MEDICINE

## 2025-04-09 PROCEDURE — 83735 ASSAY OF MAGNESIUM: CPT | Performed by: EMERGENCY MEDICINE

## 2025-04-09 PROCEDURE — 1111F DSCHRG MED/CURRENT MED MERGE: CPT | Mod: CPTII,S$GLB,,

## 2025-04-09 PROCEDURE — 3078F DIAST BP <80 MM HG: CPT | Mod: CPTII,S$GLB,,

## 2025-04-09 PROCEDURE — 63600175 PHARM REV CODE 636 W HCPCS: Performed by: INTERNAL MEDICINE

## 2025-04-09 PROCEDURE — 96411 CHEMO IV PUSH ADDL DRUG: CPT

## 2025-04-09 PROCEDURE — 83605 ASSAY OF LACTIC ACID: CPT | Mod: 91 | Performed by: STUDENT IN AN ORGANIZED HEALTH CARE EDUCATION/TRAINING PROGRAM

## 2025-04-09 PROCEDURE — 87040 BLOOD CULTURE FOR BACTERIA: CPT | Performed by: EMERGENCY MEDICINE

## 2025-04-09 PROCEDURE — 99215 OFFICE O/P EST HI 40 MIN: CPT | Mod: S$GLB,,,

## 2025-04-09 PROCEDURE — 96365 THER/PROPH/DIAG IV INF INIT: CPT

## 2025-04-09 PROCEDURE — 96416 CHEMO PROLONG INFUSE W/PUMP: CPT

## 2025-04-09 PROCEDURE — 96368 THER/DIAG CONCURRENT INF: CPT

## 2025-04-09 PROCEDURE — 3074F SYST BP LT 130 MM HG: CPT | Mod: CPTII,S$GLB,,

## 2025-04-09 PROCEDURE — 96415 CHEMO IV INFUSION ADDL HR: CPT

## 2025-04-09 PROCEDURE — G2211 COMPLEX E/M VISIT ADD ON: HCPCS | Mod: S$GLB,,,

## 2025-04-09 PROCEDURE — 36415 COLL VENOUS BLD VENIPUNCTURE: CPT | Performed by: EMERGENCY MEDICINE

## 2025-04-09 PROCEDURE — 1159F MED LIST DOCD IN RCRD: CPT | Mod: CPTII,S$GLB,,

## 2025-04-09 PROCEDURE — 80053 COMPREHEN METABOLIC PANEL: CPT | Performed by: EMERGENCY MEDICINE

## 2025-04-09 PROCEDURE — 93010 ELECTROCARDIOGRAM REPORT: CPT | Mod: ,,, | Performed by: INTERNAL MEDICINE

## 2025-04-09 PROCEDURE — 63600175 PHARM REV CODE 636 W HCPCS: Performed by: EMERGENCY MEDICINE

## 2025-04-09 PROCEDURE — 25000003 PHARM REV CODE 250: Performed by: EMERGENCY MEDICINE

## 2025-04-09 PROCEDURE — 96365 THER/PROPH/DIAG IV INF INIT: CPT | Mod: 59

## 2025-04-09 PROCEDURE — 99285 EMERGENCY DEPT VISIT HI MDM: CPT | Mod: 25

## 2025-04-09 PROCEDURE — 96361 HYDRATE IV INFUSION ADD-ON: CPT

## 2025-04-09 PROCEDURE — 25000003 PHARM REV CODE 250: Performed by: INTERNAL MEDICINE

## 2025-04-09 PROCEDURE — 87502 INFLUENZA DNA AMP PROBE: CPT | Performed by: EMERGENCY MEDICINE

## 2025-04-09 PROCEDURE — 83605 ASSAY OF LACTIC ACID: CPT | Performed by: EMERGENCY MEDICINE

## 2025-04-09 RX ORDER — HEPARIN 100 UNIT/ML
500 SYRINGE INTRAVENOUS
OUTPATIENT
Start: 2025-04-09

## 2025-04-09 RX ORDER — LANOLIN ALCOHOL/MO/W.PET/CERES
1 CREAM (GRAM) TOPICAL EVERY OTHER DAY
Status: DISCONTINUED | OUTPATIENT
Start: 2025-04-10 | End: 2025-04-11 | Stop reason: HOSPADM

## 2025-04-09 RX ORDER — GLUCAGON 1 MG
1 KIT INJECTION
Status: DISCONTINUED | OUTPATIENT
Start: 2025-04-09 | End: 2025-04-11 | Stop reason: HOSPADM

## 2025-04-09 RX ORDER — TALC
6 POWDER (GRAM) TOPICAL NIGHTLY PRN
Status: DISCONTINUED | OUTPATIENT
Start: 2025-04-10 | End: 2025-04-11 | Stop reason: HOSPADM

## 2025-04-09 RX ORDER — PROCHLORPERAZINE EDISYLATE 5 MG/ML
2.5 INJECTION INTRAMUSCULAR; INTRAVENOUS EVERY 8 HOURS PRN
Status: DISCONTINUED | OUTPATIENT
Start: 2025-04-10 | End: 2025-04-11 | Stop reason: HOSPADM

## 2025-04-09 RX ORDER — PANTOPRAZOLE SODIUM 40 MG/1
40 TABLET, DELAYED RELEASE ORAL DAILY
Status: DISCONTINUED | OUTPATIENT
Start: 2025-04-10 | End: 2025-04-11 | Stop reason: HOSPADM

## 2025-04-09 RX ORDER — ACETAMINOPHEN 650 MG/20.3ML
650 LIQUID ORAL
Status: COMPLETED | OUTPATIENT
Start: 2025-04-09 | End: 2025-04-09

## 2025-04-09 RX ORDER — IBUPROFEN 200 MG
24 TABLET ORAL
Status: DISCONTINUED | OUTPATIENT
Start: 2025-04-09 | End: 2025-04-11 | Stop reason: HOSPADM

## 2025-04-09 RX ORDER — EPINEPHRINE 0.3 MG/.3ML
0.3 INJECTION SUBCUTANEOUS ONCE AS NEEDED
Status: DISCONTINUED | OUTPATIENT
Start: 2025-04-09 | End: 2025-04-09 | Stop reason: HOSPADM

## 2025-04-09 RX ORDER — EPINEPHRINE 0.3 MG/.3ML
0.3 INJECTION SUBCUTANEOUS ONCE AS NEEDED
OUTPATIENT
Start: 2025-04-09

## 2025-04-09 RX ORDER — PROCHLORPERAZINE EDISYLATE 5 MG/ML
10 INJECTION INTRAMUSCULAR; INTRAVENOUS ONCE AS NEEDED
Status: DISCONTINUED | OUTPATIENT
Start: 2025-04-09 | End: 2025-04-09 | Stop reason: HOSPADM

## 2025-04-09 RX ORDER — OLANZAPINE 5 MG/1
5 TABLET, FILM COATED ORAL NIGHTLY
Status: DISCONTINUED | OUTPATIENT
Start: 2025-04-09 | End: 2025-04-11 | Stop reason: HOSPADM

## 2025-04-09 RX ORDER — CEFEPIME HYDROCHLORIDE 2 G/1
2 INJECTION, POWDER, FOR SOLUTION INTRAVENOUS
Status: DISCONTINUED | OUTPATIENT
Start: 2025-04-10 | End: 2025-04-11 | Stop reason: HOSPADM

## 2025-04-09 RX ORDER — ONDANSETRON HYDROCHLORIDE 2 MG/ML
4 INJECTION, SOLUTION INTRAVENOUS EVERY 8 HOURS PRN
Status: DISCONTINUED | OUTPATIENT
Start: 2025-04-10 | End: 2025-04-11 | Stop reason: HOSPADM

## 2025-04-09 RX ORDER — DIPHENHYDRAMINE HYDROCHLORIDE 50 MG/ML
50 INJECTION, SOLUTION INTRAMUSCULAR; INTRAVENOUS ONCE AS NEEDED
Status: DISCONTINUED | OUTPATIENT
Start: 2025-04-09 | End: 2025-04-09 | Stop reason: HOSPADM

## 2025-04-09 RX ORDER — SODIUM CHLORIDE 0.9 % (FLUSH) 0.9 %
10 SYRINGE (ML) INJECTION
OUTPATIENT
Start: 2025-04-09

## 2025-04-09 RX ORDER — HYDRALAZINE HYDROCHLORIDE 20 MG/ML
5 INJECTION INTRAMUSCULAR; INTRAVENOUS EVERY 6 HOURS PRN
Status: DISCONTINUED | OUTPATIENT
Start: 2025-04-10 | End: 2025-04-11 | Stop reason: HOSPADM

## 2025-04-09 RX ORDER — SODIUM CHLORIDE 0.9 % (FLUSH) 0.9 %
10 SYRINGE (ML) INJECTION EVERY 12 HOURS PRN
Status: DISCONTINUED | OUTPATIENT
Start: 2025-04-09 | End: 2025-04-11 | Stop reason: HOSPADM

## 2025-04-09 RX ORDER — NALOXONE HCL 0.4 MG/ML
0.02 VIAL (ML) INJECTION
Status: DISCONTINUED | OUTPATIENT
Start: 2025-04-09 | End: 2025-04-11 | Stop reason: HOSPADM

## 2025-04-09 RX ORDER — MAGNESIUM SULFATE HEPTAHYDRATE 40 MG/ML
2 INJECTION, SOLUTION INTRAVENOUS ONCE
Status: COMPLETED | OUTPATIENT
Start: 2025-04-09 | End: 2025-04-10

## 2025-04-09 RX ORDER — FLUOROURACIL 50 MG/ML
160 INJECTION, SOLUTION INTRAVENOUS
Status: COMPLETED | OUTPATIENT
Start: 2025-04-09 | End: 2025-04-09

## 2025-04-09 RX ORDER — ACETAMINOPHEN 325 MG/1
650 TABLET ORAL EVERY 6 HOURS PRN
Status: DISCONTINUED | OUTPATIENT
Start: 2025-04-10 | End: 2025-04-11 | Stop reason: HOSPADM

## 2025-04-09 RX ORDER — POLYETHYLENE GLYCOL 3350 17 G/17G
17 POWDER, FOR SOLUTION ORAL 2 TIMES DAILY PRN
Status: DISCONTINUED | OUTPATIENT
Start: 2025-04-10 | End: 2025-04-11 | Stop reason: HOSPADM

## 2025-04-09 RX ORDER — IBUPROFEN 200 MG
16 TABLET ORAL
Status: DISCONTINUED | OUTPATIENT
Start: 2025-04-09 | End: 2025-04-11 | Stop reason: HOSPADM

## 2025-04-09 RX ADMIN — FLUOROURACIL 325 MG: 50 INJECTION, SOLUTION INTRAVENOUS at 01:04

## 2025-04-09 RX ADMIN — SODIUM CHLORIDE, POTASSIUM CHLORIDE, SODIUM LACTATE AND CALCIUM CHLORIDE 2355 ML: 600; 310; 30; 20 INJECTION, SOLUTION INTRAVENOUS at 08:04

## 2025-04-09 RX ADMIN — MAGNESIUM SULFATE HEPTAHYDRATE 2 G: 40 INJECTION, SOLUTION INTRAVENOUS at 11:04

## 2025-04-09 RX ADMIN — TRASTUZUMAB-ANNS 300 MG: 420 INJECTION, POWDER, LYOPHILIZED, FOR SOLUTION INTRAVENOUS at 09:04

## 2025-04-09 RX ADMIN — ACETAMINOPHEN 650 MG: 650 SOLUTION ORAL at 08:04

## 2025-04-09 RX ADMIN — FLUOROURACIL 1940 MG: 50 INJECTION, SOLUTION INTRAVENOUS at 01:04

## 2025-04-09 RX ADMIN — OXALIPLATIN 137 MG: 5 INJECTION, SOLUTION INTRAVENOUS at 10:04

## 2025-04-09 RX ADMIN — SODIUM CHLORIDE 0.25 MG: 9 INJECTION, SOLUTION INTRAVENOUS at 10:04

## 2025-04-09 RX ADMIN — SODIUM CHLORIDE: 9 INJECTION, SOLUTION INTRAVENOUS at 10:04

## 2025-04-09 RX ADMIN — MEROPENEM 2 G: 2 INJECTION, POWDER, FOR SOLUTION INTRAVENOUS at 09:04

## 2025-04-09 RX ADMIN — LEUCOVORIN CALCIUM 640 MG: 500 INJECTION, POWDER, LYOPHILIZED, FOR SOLUTION INTRAMUSCULAR; INTRAVENOUS at 10:04

## 2025-04-09 NOTE — PROGRESS NOTES
Subjective:     Patient ID:?Rosalio Muñoz is a 59 y.o. male.?? MR#: [id number]   ?   PRIMARY ONCOLOGIST: Dr. Latif  ?   CHIEF COMPLAINT: lab review/assessment for chemo/esophageal ca?????   ?   ONCOLOGIC DIAGNOSIS:  Cancer Staging   Esophageal cancer, stage IV  Staging form: Esophagus - Adenocarcinoma, AJCC 8th Edition  - Clinical stage from 10/10/2024: Stage IVB (cTX, cN0, cM1, G2) - Signed by Zacarias Crowley MD on 10/10/2024    ?   CURRENT TREATMENT: OP GASTROESOPHAGEAL pembrolizumab Q6W trastuzumab + MFOLFOX6 (oxaliplatin leucovorin fluorouracil) Q2W       HPI  Mr. Muñoz is a pleasant 59-year-old gentleman with esophageal cancer who presents today with his wife for lab review and assessment prior to Kanjinti/Folfox. Pt states overall he is feeling well and voices no complaints today. His wife notes recently hospitalized after developing fever of 101.5--discussed/reiterated signs/symptoms of infection and when to notify clinic.         Oncology History   Esophageal cancer, stage IV   10/10/2024 Initial Diagnosis    Esophageal cancer, stage IV     10/10/2024 Cancer Staged    Staging form: Esophagus - Adenocarcinoma, AJCC 8th Edition  - Clinical stage from 10/10/2024: Stage IVB (cTX, cN0, cM1, G2)     10/12/2024 - 10/12/2024 Chemotherapy    Treatment Summary   Plan Name: OP GI mFOLFOX6 (oxaliplatin leucovorin fluorouracil) Q2W  Treatment Goal: Control  Status: Inactive  Start Date:   End Date: 10/11/2024  Provider: Hudson Latif MD  Chemotherapy: fluorouraciL injection 400 mg, 200 mg/m2 = 400 mg (50 % of original dose 400 mg/m2), Intravenous, Clinic/HOD 1 time, 0 of 12 cycles  Dose modification: 200 mg/m2 (50 % of original dose 400 mg/m2, Cycle 12, Reason: Original Dose Changed, Comment: DPYD IM)  oxaliplatin (ELOXATIN) 85 mg/m2 = 170 mg in D5W 534 mL chemo infusion, 85 mg/m2 = 170 mg, Intravenous, Clinic/HOD 1 time, 0 of 12 cycles  fluorouracil (Adrucil) 1,200 mg/m2 = 2,400 mg in 0.9% NaCl 100 mL  chemo infusion, 1,200 mg/m2 = 2,400 mg (50 % of original dose 2,400 mg/m2), Intravenous, Over 46 hours, 0 of 12 cycles  Dose modification: 1,200 mg/m2 (50 % of original dose 2,400 mg/m2, Cycle 12, Reason: Original Dose Changed, Comment: DPYD IM)     10/28/2024 -  Chemotherapy    Treatment Summary   Plan Name: OP GASTROESOPHAGEAL pembrolizumab Q6W trastuzumab + MFOLFOX6 (oxaliplatin leucovorin fluorouracil) Q2W  Treatment Goal: Control  Status: Active  Start Date: 10/28/2024  End Date: 8/13/2027 (Planned)  Provider: Hudson Latif MD  Chemotherapy: fluorouraciL injection 405 mg, 200 mg/m2 = 405 mg (50 % of original dose 400 mg/m2), Intravenous, Clinic/HOD 1 time, 4 of 24 cycles  Dose modification: 200 mg/m2 (50 % of original dose 400 mg/m2, Cycle 1, Reason: Original Dose Changed, Comment: pgx), 160 mg/m2 (80 % of original dose 400 mg/m2, Cycle 2, Reason: Dose Not Tolerated, Comment: enteritis hospitalization)  Administration: 405 mg (11/6/2024), 405 mg (11/20/2024), 405 mg (12/4/2024), 395 mg (12/18/2024), 315 mg (1/15/2025), 320 mg (1/29/2025), 320 mg (2/12/2025), 320 mg (2/26/2025), 320 mg (3/12/2025), 320 mg (3/26/2025)  oxaliplatin (ELOXATIN) 85 mg/m2 = 173 mg in D5W 599.6 mL chemo infusion, 85 mg/m2 = 173 mg, Intravenous, Clinic/HOD 1 time, 4 of 24 cycles  Dose modification: 68 mg/m2 (80 % of original dose 85 mg/m2, Cycle 2, Reason: Dose Not Tolerated, Comment: enteritis hospitalization)  Administration: 173 mg (11/6/2024), 173 mg (11/20/2024), 173 mg (12/4/2024), 168 mg (12/18/2024), 134 mg (1/15/2025), 135 mg (1/29/2025), 135 mg (2/12/2025), 136 mg (2/26/2025), 136 mg (3/12/2025), 135 mg (3/26/2025)  fluorouracil (Adrucil) 1,200 mg/m2 = 2,435 mg in 0.9% NaCl 100 mL chemo infusion, 1,200 mg/m2 = 2,435 mg (50 % of original dose 2,400 mg/m2), Intravenous, Over 46 hours, 4 of 24 cycles  Dose modification: 1,200 mg/m2 (50 % of original dose 2,400 mg/m2, Cycle 1, Reason: Original Dose Changed, Comment: pgx), 960  mg/m2 (80 % of original dose 2,400 mg/m2, Cycle 2, Reason: Dose Not Tolerated, Comment: enteritis hospitalization)  Administration: 2,435 mg (11/6/2024), 2,435 mg (11/20/2024), 2,435 mg (12/4/2024), 2,375 mg (12/18/2024), 1,890 mg (1/15/2025), 1,910 mg (1/29/2025), 1,910 mg (2/12/2025), 1,920 mg (2/26/2025), 1,920 mg (3/12/2025), 1,910 mg (3/26/2025)  trastuzumab-anns (KANJINTI) 450 mg in 0.9% NaCl 306.4 mL chemo infusion, 482 mg, Intravenous, Clinic/HOD 1 time, 4 of 24 cycles  Administration: 450 mg (11/6/2024), 300 mg (11/20/2024), 300 mg (12/4/2024), 300 mg (12/18/2024), 300 mg (1/15/2025), 300 mg (1/29/2025), 300 mg (2/12/2025), 300 mg (2/26/2025), 300 mg (3/12/2025), 300 mg (3/26/2025)     Secondary adenocarcinoma of retroperitoneum   10/11/2024 Initial Diagnosis    Secondary adenocarcinoma of retroperitoneum     10/12/2024 - 10/12/2024 Chemotherapy    Treatment Summary   Plan Name: OP GI mFOLFOX6 (oxaliplatin leucovorin fluorouracil) Q2W  Treatment Goal: Control  Status: Inactive  Start Date:   End Date: 10/11/2024  Provider: Hudson Latif MD  Chemotherapy: fluorouraciL injection 400 mg, 200 mg/m2 = 400 mg (50 % of original dose 400 mg/m2), Intravenous, Clinic/HOD 1 time, 0 of 12 cycles  Dose modification: 200 mg/m2 (50 % of original dose 400 mg/m2, Cycle 12, Reason: Original Dose Changed, Comment: DPYD IM)  oxaliplatin (ELOXATIN) 85 mg/m2 = 170 mg in D5W 534 mL chemo infusion, 85 mg/m2 = 170 mg, Intravenous, Clinic/HOD 1 time, 0 of 12 cycles  fluorouracil (Adrucil) 1,200 mg/m2 = 2,400 mg in 0.9% NaCl 100 mL chemo infusion, 1,200 mg/m2 = 2,400 mg (50 % of original dose 2,400 mg/m2), Intravenous, Over 46 hours, 0 of 12 cycles  Dose modification: 1,200 mg/m2 (50 % of original dose 2,400 mg/m2, Cycle 12, Reason: Original Dose Changed, Comment: DPYD IM)     10/28/2024 -  Chemotherapy    Treatment Summary   Plan Name: OP GASTROESOPHAGEAL pembrolizumab Q6W trastuzumab + MFOLFOX6 (oxaliplatin leucovorin  fluorouracil) Q2W  Treatment Goal: Control  Status: Active  Start Date: 10/28/2024  End Date: 8/13/2027 (Planned)  Provider: Hudson Latif MD  Chemotherapy: fluorouraciL injection 405 mg, 200 mg/m2 = 405 mg (50 % of original dose 400 mg/m2), Intravenous, Clinic/HOD 1 time, 4 of 24 cycles  Dose modification: 200 mg/m2 (50 % of original dose 400 mg/m2, Cycle 1, Reason: Original Dose Changed, Comment: pgx), 160 mg/m2 (80 % of original dose 400 mg/m2, Cycle 2, Reason: Dose Not Tolerated, Comment: enteritis hospitalization)  Administration: 405 mg (11/6/2024), 405 mg (11/20/2024), 405 mg (12/4/2024), 395 mg (12/18/2024), 315 mg (1/15/2025), 320 mg (1/29/2025), 320 mg (2/12/2025), 320 mg (2/26/2025), 320 mg (3/12/2025), 320 mg (3/26/2025)  oxaliplatin (ELOXATIN) 85 mg/m2 = 173 mg in D5W 599.6 mL chemo infusion, 85 mg/m2 = 173 mg, Intravenous, Clinic/HOD 1 time, 4 of 24 cycles  Dose modification: 68 mg/m2 (80 % of original dose 85 mg/m2, Cycle 2, Reason: Dose Not Tolerated, Comment: enteritis hospitalization)  Administration: 173 mg (11/6/2024), 173 mg (11/20/2024), 173 mg (12/4/2024), 168 mg (12/18/2024), 134 mg (1/15/2025), 135 mg (1/29/2025), 135 mg (2/12/2025), 136 mg (2/26/2025), 136 mg (3/12/2025), 135 mg (3/26/2025)  fluorouracil (Adrucil) 1,200 mg/m2 = 2,435 mg in 0.9% NaCl 100 mL chemo infusion, 1,200 mg/m2 = 2,435 mg (50 % of original dose 2,400 mg/m2), Intravenous, Over 46 hours, 4 of 24 cycles  Dose modification: 1,200 mg/m2 (50 % of original dose 2,400 mg/m2, Cycle 1, Reason: Original Dose Changed, Comment: pgx), 960 mg/m2 (80 % of original dose 2,400 mg/m2, Cycle 2, Reason: Dose Not Tolerated, Comment: enteritis hospitalization)  Administration: 2,435 mg (11/6/2024), 2,435 mg (11/20/2024), 2,435 mg (12/4/2024), 2,375 mg (12/18/2024), 1,890 mg (1/15/2025), 1,910 mg (1/29/2025), 1,910 mg (2/12/2025), 1,920 mg (2/26/2025), 1,920 mg (3/12/2025), 1,910 mg (3/26/2025)  trastuzumab-anns (KANJINTI) 450 mg in 0.9%  NaCl 306.4 mL chemo infusion, 482 mg, Intravenous, Clinic/HOD 1 time, 4 of 24 cycles  Administration: 450 mg (11/6/2024), 300 mg (11/20/2024), 300 mg (12/4/2024), 300 mg (12/18/2024), 300 mg (1/15/2025), 300 mg (1/29/2025), 300 mg (2/12/2025), 300 mg (2/26/2025), 300 mg (3/12/2025), 300 mg (3/26/2025)     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   10/11/2024 Initial Diagnosis    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     10/12/2024 - 10/12/2024 Chemotherapy    Treatment Summary   Plan Name: OP GI mFOLFOX6 (oxaliplatin leucovorin fluorouracil) Q2W  Treatment Goal: Control  Status: Inactive  Start Date:   End Date: 10/11/2024  Provider: Hudson Latif MD  Chemotherapy: fluorouraciL injection 400 mg, 200 mg/m2 = 400 mg (50 % of original dose 400 mg/m2), Intravenous, Clinic/HOD 1 time, 0 of 12 cycles  Dose modification: 200 mg/m2 (50 % of original dose 400 mg/m2, Cycle 12, Reason: Original Dose Changed, Comment: DPYD IM)  oxaliplatin (ELOXATIN) 85 mg/m2 = 170 mg in D5W 534 mL chemo infusion, 85 mg/m2 = 170 mg, Intravenous, Clinic/HOD 1 time, 0 of 12 cycles  fluorouracil (Adrucil) 1,200 mg/m2 = 2,400 mg in 0.9% NaCl 100 mL chemo infusion, 1,200 mg/m2 = 2,400 mg (50 % of original dose 2,400 mg/m2), Intravenous, Over 46 hours, 0 of 12 cycles  Dose modification: 1,200 mg/m2 (50 % of original dose 2,400 mg/m2, Cycle 12, Reason: Original Dose Changed, Comment: DPYD IM)     10/28/2024 -  Chemotherapy    Treatment Summary   Plan Name: OP GASTROESOPHAGEAL pembrolizumab Q6W trastuzumab + MFOLFOX6 (oxaliplatin leucovorin fluorouracil) Q2W  Treatment Goal: Control  Status: Active  Start Date: 10/28/2024  End Date: 8/13/2027 (Planned)  Provider: Hudson Latif MD  Chemotherapy: fluorouraciL injection 405 mg, 200 mg/m2 = 405 mg (50 % of original dose 400 mg/m2), Intravenous, Clinic/HOD 1 time, 4 of 24 cycles  Dose modification: 200 mg/m2 (50 % of original dose 400 mg/m2, Cycle 1, Reason: Original Dose Changed, Comment:  pgx), 160 mg/m2 (80 % of original dose 400 mg/m2, Cycle 2, Reason: Dose Not Tolerated, Comment: enteritis hospitalization)  Administration: 405 mg (11/6/2024), 405 mg (11/20/2024), 405 mg (12/4/2024), 395 mg (12/18/2024), 315 mg (1/15/2025), 320 mg (1/29/2025), 320 mg (2/12/2025), 320 mg (2/26/2025), 320 mg (3/12/2025), 320 mg (3/26/2025)  oxaliplatin (ELOXATIN) 85 mg/m2 = 173 mg in D5W 599.6 mL chemo infusion, 85 mg/m2 = 173 mg, Intravenous, Clinic/HOD 1 time, 4 of 24 cycles  Dose modification: 68 mg/m2 (80 % of original dose 85 mg/m2, Cycle 2, Reason: Dose Not Tolerated, Comment: enteritis hospitalization)  Administration: 173 mg (11/6/2024), 173 mg (11/20/2024), 173 mg (12/4/2024), 168 mg (12/18/2024), 134 mg (1/15/2025), 135 mg (1/29/2025), 135 mg (2/12/2025), 136 mg (2/26/2025), 136 mg (3/12/2025), 135 mg (3/26/2025)  fluorouracil (Adrucil) 1,200 mg/m2 = 2,435 mg in 0.9% NaCl 100 mL chemo infusion, 1,200 mg/m2 = 2,435 mg (50 % of original dose 2,400 mg/m2), Intravenous, Over 46 hours, 4 of 24 cycles  Dose modification: 1,200 mg/m2 (50 % of original dose 2,400 mg/m2, Cycle 1, Reason: Original Dose Changed, Comment: pgx), 960 mg/m2 (80 % of original dose 2,400 mg/m2, Cycle 2, Reason: Dose Not Tolerated, Comment: enteritis hospitalization)  Administration: 2,435 mg (11/6/2024), 2,435 mg (11/20/2024), 2,435 mg (12/4/2024), 2,375 mg (12/18/2024), 1,890 mg (1/15/2025), 1,910 mg (1/29/2025), 1,910 mg (2/12/2025), 1,920 mg (2/26/2025), 1,920 mg (3/12/2025), 1,910 mg (3/26/2025)  trastuzumab-anns (KANJINTI) 450 mg in 0.9% NaCl 306.4 mL chemo infusion, 482 mg, Intravenous, Clinic/HOD 1 time, 4 of 24 cycles  Administration: 450 mg (11/6/2024), 300 mg (11/20/2024), 300 mg (12/4/2024), 300 mg (12/18/2024), 300 mg (1/15/2025), 300 mg (1/29/2025), 300 mg (2/12/2025), 300 mg (2/26/2025), 300 mg (3/12/2025), 300 mg (3/26/2025)      Genetic Testing    Patient has genetic testing done for MYRISK.                                               Results revealed patient has the following mutation(s):  GENETIC RESULT: NEGATIVE - NO CLINICALLY SIGNIFICANT MUTATION IDENTIFIED        Social History[1]   Family History   Problem Relation Name Age of Onset    Hyperlipidemia Mother      Hypertension Father      Diabetes Father      Kidney disease Father      Heart disease Father      Breast cancer Maternal Grandmother      Prostate cancer Maternal Grandfather      Colon cancer Neg Hx        Past Surgical History:   Procedure Laterality Date    COLONOSCOPY N/A 8/25/2023    Procedure: COLONOSCOPY;  Surgeon: Pebbles Spear MD;  Location: [facility] ENDO;  Service: Endoscopy;  Laterality: N/A;    COLONOSCOPY N/A 10/2/2024    Procedure: COLONOSCOPY  Cardiac clearance received on 09/20/24 per Dr. Lockett, Cardiology.  Note in encounters.  LB;  Surgeon: Sully Farris MD;  Location: [facility];  Service: Endoscopy;  Laterality: N/A;    ESOPHAGOGASTRODUODENOSCOPY N/A 10/2/2024    Procedure: EGD (ESOPHAGOGASTRODUODENOSCOPY);  Surgeon: Sully Farris MD;  Location: [facility];  Service: Endoscopy;  Laterality: N/A;    ESOPHAGOGASTRODUODENOSCOPY N/A 12/31/2024    Procedure: EGD (ESOPHAGOGASTRODUODENOSCOPY);  Surgeon: Maki Sullivan MD;  Location: [facility];  Service: Gastroenterology;  Laterality: N/A;    INSERTION OF VENOUS ACCESS PORT Left 10/21/2024    Procedure: INSERTION, VENOUS ACCESS PORT;  Surgeon: Roseanna Rosas MD;  Location: [facility];  Service: General;  Laterality: Left;    SURGICAL REMOVAL OF LESION OF FACE Right 12/1/2023    Procedure: EXCISION, LESION, FACE;  Surgeon: Jose Flaherty MD;  Location: [facility];  Service: ENT;  Laterality: Right;  1. Excision facial subcutaneous mass right cheek 3 cm. 2. Intermediate layered closure 3.5 cm    WISDOM TOOTH EXTRACTION      XI ROBOTIC APPENDECTOMY N/A 7/31/2024    Procedure: XI ROBOTIC APPENDECTOMY;  Surgeon: Paulo Saavedra MD;  Location: [facility] OR;  Service: General;  Laterality: N/A;    XI  ROBOTIC INSERTION, GASTROSTOMY TUBE N/A 10/21/2024    Procedure: XI ROBOTIC INSERTION, GASTROSTOMY TUBE;  Surgeon: Roseanna Rosas MD;  Location: [facility];  Service: General;  Laterality: N/A;        Review of Systems   Constitutional:  Negative for appetite change, chills, fatigue, fever and unexpected weight change.   HENT:  Positive for hearing loss. Negative for congestion, mouth sores, nosebleeds, sore throat, trouble swallowing and voice change.    Respiratory:  Negative for cough, chest tightness, shortness of breath and wheezing.    Cardiovascular:  Negative for chest pain and leg swelling.   Gastrointestinal:  Negative for abdominal distention, abdominal pain, blood in stool, constipation, diarrhea, nausea and vomiting.   Genitourinary:  Negative for difficulty urinating, dysuria and hematuria.   Musculoskeletal:  Negative for arthralgias, back pain and myalgias.   Skin:  Negative for pallor, rash and wound.   Neurological:  Negative for dizziness, syncope, weakness and headaches.   Hematological:  Negative for adenopathy. Does not bruise/bleed easily.   Psychiatric/Behavioral:  The patient is not nervous/anxious.        ?   A comprehensive 14-point review of systems was reviewed with patient and was negative other than as specified above.   ?     Objective:      Physical Exam  Constitutional:       Appearance: Normal appearance.   Cardiovascular:      Rate and Rhythm: Normal rate.   Pulmonary:      Effort: Pulmonary effort is normal.   Abdominal:      General: Abdomen is flat.   Neurological:      Mental Status: He is alert.   Psychiatric:         Mood and Affect: Mood normal.         Behavior: Behavior normal.           ?   Vitals:    04/09/25 0855   BP: 112/70   Pulse: 91      ?       ?   Laboratory:  ?   No visits with results within 1 Day(s) from this visit.   Latest known visit with results is:   Lab Visit on 04/07/2025   Component Date Value Ref Range Status    Lactate Dehydrogenase 04/07/2025 138   110 - 260 U/L Final    CRP 04/07/2025 2.5  <=8.2 mg/L Final    Sodium 04/07/2025 135 (L)  136 - 145 mmol/L Final    Potassium 04/07/2025 4.5  3.5 - 5.1 mmol/L Final    Chloride 04/07/2025 102  95 - 110 mmol/L Final    CO2 04/07/2025 24  23 - 29 mmol/L Final    Glucose 04/07/2025 95  70 - 110 mg/dL Final    BUN 04/07/2025 15  6 - 20 mg/dL Final    Creatinine 04/07/2025 0.8  0.5 - 1.4 mg/dL Final    Calcium 04/07/2025 9.7  8.7 - 10.5 mg/dL Final    Protein Total 04/07/2025 8.8 (H)  6.0 - 8.4 gm/dL Final    Albumin 04/07/2025 3.5  3.5 - 5.2 g/dL Final    Bilirubin Total 04/07/2025 0.4  0.1 - 1.0 mg/dL Final    ALP 04/07/2025 148  40 - 150 unit/L Final    AST 04/07/2025 23  11 - 45 unit/L Final    ALT 04/07/2025 22  10 - 44 unit/L Final    Anion Gap 04/07/2025 9  8 - 16 mmol/L Final    eGFR 04/07/2025 >60  >60 mL/min/1.73/m2 Final    Ferritin 04/07/2025 169.0  20.0 - 300.0 ng/mL Final    Iron Level 04/07/2025 54  45 - 160 ug/dL Final    Transferrin 04/07/2025 308  200 - 375 mg/dL Final    Iron Binding Capacity Total 04/07/2025 456 (H)  250 - 450 ug/dL Final    Iron Saturation 04/07/2025 12 (L)  20 - 50 % Final    WBC 04/07/2025 6.47  3.90 - 12.70 K/uL Final    RBC 04/07/2025 4.28 (L)  4.60 - 6.20 M/uL Final    HGB 04/07/2025 14.2  14.0 - 18.0 gm/dL Final    HCT 04/07/2025 41.0  40.0 - 54.0 % Final    MCV 04/07/2025 96  82 - 98 fL Final    MCH 04/07/2025 33.2 (H)  27.0 - 31.0 pg Final    MCHC 04/07/2025 34.6  32.0 - 36.0 g/dL Final    RDW 04/07/2025 16.0 (H)  11.5 - 14.5 % Final    Platelet Count 04/07/2025 232  150 - 450 K/uL Final    MPV 04/07/2025 10.0  9.2 - 12.9 fL Final    Nucleated RBC 04/07/2025 0  <=0 /100 WBC Final    Neut % 04/07/2025 51.4  38 - 73 % Final    Lymph % 04/07/2025 25.2  18 - 48 % Final    Mono % 04/07/2025 15.1 (H)  4 - 15 % Final    Eos % 04/07/2025 6.6  <=8 % Final    Basophil % 04/07/2025 1.4  <=1.9 % Final    Imm Grans % 04/07/2025 0.3  0.0 - 0.5 % Final    Neut # 04/07/2025 3.32  1.8 - 7.7  K/uL Final    Lymph # 04/07/2025 1.63  1 - 4.8 K/uL Final    Mono # 04/07/2025 0.98  0.3 - 1 K/uL Final    Eos # 04/07/2025 0.43  <=0.5 K/uL Final    Baso # 04/07/2025 0.09  <=0.2 K/uL Final    Imm Grans # 04/07/2025 0.02  0.00 - 0.04 K/uL Final      ?   Imaging:    No results found for this or any previous visit (from the past 2160 hours).     Results for orders placed or performed during the hospital encounter of 04/07/25 (from the past 2160 hours)   CT Chest Abdomen Pelvis With IV Contrast (XPD) Routine Oral Contrast    Narrative    EXAMINATION:  CT CHEST ABDOMEN PELVIS WITH IV CONTRAST (XPD)    CLINICAL HISTORY:  Metastatic disease evaluation;Malignant neoplasm of esophagus, unspecified    TECHNIQUE:  Low-dose CT of the chest with contrast and CT abdomen and pelvis with and without contrast was acquired helically from the lung apices through the ischial tuberosities status post administration of 100 cc of Omnipaque 350. Oral contrast was administered.  Axial and reformatted images were reviewed.    COMPARISON:  12/30/2024 abdomen and pelvis CT and CT of the chest abdomen and pelvis from 12/26/2024    FINDINGS:  CHEST    There are small ground-glass opacity seen scattered throughout both lungs primarily in the subpleural regions.  Findings may be infectious or inflammatory in nature.  There are a couple of stable noncalcified pulmonary nodule seen within the right middle lobe series 6 image 305 and series 6, image 301.  There is also a stable noncalcified pulmonary nodule seen within the right lower lobe subpleural region series 6, image 271.    Mild to moderate vascular calcification seen involving the aorta.  There is no pericardial effusion.  No enlarged mediastinal, hilar or axillary lymph nodes are identified.    ABDOMEN    Liver/gallbladder/biliary: There is a stable hypodensity within the posterior segment of the right hepatic lobe series 5, image 233 that measures approximately 8 mm in size.  The  gallbladder is present and unremarkable.  No biliary ductal dilation.    Pancreas: The pancreas is unremarkable in appearance.    Spleen: The spleen is not enlarged.    Adrenals: Unremarkable    Kidneys: The kidneys are equally perfused and demonstrate no solid masses. No nephrolithiasis. Vascular calcifications are noted.    Bowel/Mesentery: There is no evidence of bowel obstruction. A percutaneous gastric tube is in place.  The proximal small bowel is dilated.  Gastrohepatic ligament adenopathy again noted and similar in appearance to the prior study.    Retroperitoneum: No adenopathy.  Prominent vascular calcification seen involving the aorta and iliac arteries.    PELVIS    Genitourinary/Reproductive organs: Unremarkable    Adenopathy: None    Free Fluid: No free fluid    Osseus Structures/Soft tissues: No suspicious appearing osseus lesions. No significant soft tissue abnormality.      Impression    Stable appearance of a few subcentimeter noncalcified pulmonary nodules.  Interval development of some minimal areas of ground-glass opacity which may be sequela of an infectious or inflammatory etiology.    Stable small subcentimeter hypodensity within the posterior segment of the right hepatic lobe.  Enlarged lymph nodes along the course of the gastrohepatic ligament also again noted.      Electronically signed by: Jim Quarles DO  Date:    04/07/2025  Time:    14:01        ?   Assessment/Plan:     Problem List Items Addressed This Visit       Esophageal cancer, stage IV (Chronic)     Cancer Staging   Esophageal cancer, stage IV  Staging form: Esophagus - Adenocarcinoma, AJCC 8th Edition  - Clinical stage from 10/10/2024: Stage IVB (cTX, cN0, cM1, G2) - Signed by Zacarias Crowley MD on 10/10/2024      CT CAP 04/07/25--stable    Labs reviewed, no concerning findings  Last ECHO 01/16/25, next due on or about 04/16  Ok to proceed with Kanjinti/Folfox today    F/u in two weeks with labs prior  kanjinti/folfox/keytruda         Iron deficiency anemia due to chronic blood loss - Primary (Chronic)    Lab Results   Component Value Date    IRON 54 2025    TRANSFERRIN 308 2025    TIBC 456 (H) 2025    LABIRON 12 (L) 2025    FESATURATED 14 (L) 2025      Lab Results   Component Value Date    FERRITIN 169.0 2025     593 mg total iron deficit  Plan for repletion with Venofer 200mg x 3 doses to be coordinated with tx   Reviewed risks and benefits of IV iron therapy including possible allergy anaphylaxis, electrolyte abnormality, fatigue, headache. Benefits likely outweigh the risks                     Med Onc Chart Routing      Follow up with physician 2 weeks. with labs/ECHO prior FOLFOX/kanjinti/keytruda   Follow up with JORDANA    Infusion scheduling note New or changed treatment   FOLFOX/kanjinti/keytruda; if possible coordinate Venofer x 3 with pump d/c   Injection scheduling note    Labs CBC, CMP, magnesium and TSH   Scheduling:  Preferred lab:  Lab interval:     Imaging ECHO      Pharmacy appointment    Other referrals                     EVE Parada  Hematology/Oncology         [1]   Social History  Socioeconomic History    Marital status:     Number of children: 2   Occupational History    Occupation: Industrial electrician   Tobacco Use    Smoking status: Every Day     Current packs/day: 0.00     Types: Cigars, Cigarettes     Last attempt to quit: 10/4/2022     Years since quittin.5     Passive exposure: Never    Smokeless tobacco: Never    Tobacco comments:     Cigar every afternoon   Substance and Sexual Activity    Alcohol use: Yes     Alcohol/week: 4.0 - 6.0 standard drinks of alcohol     Types: 4 - 6 Cans of beer per week     Comment: occ    Drug use: Yes     Types: Marijuana     Comment: medical    Sexual activity: Yes     Partners: Female     Social Drivers of Health     Financial Resource Strain: Patient Declined (3/28/2025)    Overall Financial  Resource Strain (CARDIA)     Difficulty of Paying Living Expenses: Patient declined   Food Insecurity: Patient Declined (3/28/2025)    Hunger Vital Sign     Worried About Running Out of Food in the Last Year: Patient declined     Ran Out of Food in the Last Year: Patient declined   Transportation Needs: Patient Declined (2/12/2025)    PRAPARE - Transportation     Lack of Transportation (Medical): Patient declined     Lack of Transportation (Non-Medical): Patient declined   Physical Activity: Unknown (2/12/2025)    Exercise Vital Sign     Days of Exercise per Week: Patient declined     Minutes of Exercise per Session: 20 min   Stress: Patient Declined (3/28/2025)    [employer] - Occupational Stress Questionnaire     Feeling of Stress : Patient declined   Housing Stability: Patient Declined (3/28/2025)    Housing Stability Vital Sign     Unable to Pay for Housing in the Last Year: Patient declined     Homeless in the Last Year: Patient declined

## 2025-04-09 NOTE — ASSESSMENT & PLAN NOTE
Lab Results   Component Value Date    IRON 54 04/07/2025    TRANSFERRIN 308 04/07/2025    TIBC 456 (H) 04/07/2025    LABIRON 12 (L) 04/07/2025    FESATURATED 14 (L) 02/25/2025      Lab Results   Component Value Date    FERRITIN 169.0 04/07/2025     593 mg total iron deficit  Plan for repletion with Venofer 200mg x 3 doses to be coordinated with tx   Reviewed risks and benefits of IV iron therapy including possible allergy anaphylaxis, electrolyte abnormality, fatigue, headache. Benefits likely outweigh the risks

## 2025-04-09 NOTE — DISCHARGE INSTRUCTIONS
.[facility]  [address] [facility]  [address]  [phone] phone     [phone] fax  Hours of Operation: Monday- Friday 8:00am- 5:00pm  After hours phone  [phone]  Hematology / Oncology Physicians on call    Dr. Salomon Carcamo        Nurse Practitioners:    Magdalena Wilson, DOMINICK Lima, DOMINICK Griffin, DOMINICK Infante, DOMINICK Bran, PA      Please don't hesitate to call if you have any concerns.

## 2025-04-09 NOTE — PROGRESS NOTES
Spoke with Mr. Muñoz while rounding in infusion. He takes in 3 cartons daily of Jevity 1.5 and drinks 2 Boost Plus supplements per day. He tracks his caloric needs and metabolic rate with [employer] and has gained over 10 lb since December. He eats by mouth as well, for example dinner last night of roast, vegetables, and rice and gravy and breakfast this morning of a sausage biscuit and eggs and a favorite snack of cookies topped with a large spoon of peanut butter. Ms. Muñoz has been delighted with [facility] and [facility] cafeteria for helping with affordability and helping her  to gain weight. Will remain available to  And Ms. Muñoz as needed.  Signature: Agnes Carl, MPH, RD, LDN

## 2025-04-09 NOTE — ASSESSMENT & PLAN NOTE
Cancer Staging   Esophageal cancer, stage IV  Staging form: Esophagus - Adenocarcinoma, AJCC 8th Edition  - Clinical stage from 10/10/2024: Stage IVB (cTX, cN0, cM1, G2) - Signed by Zacarias Crowley MD on 10/10/2024      CT CAP 04/07/25--stable    Labs reviewed, no concerning findings  Last ECHO 01/16/25, next due on or about 04/16  Ok to proceed with Kanjinti/Folfox today    F/u in two weeks with labs prior kanjinti/folfox/keytruda

## 2025-04-09 NOTE — PLAN OF CARE
Problem: Adult Inpatient Plan of Care  Goal: Plan of Care Review  Outcome: Progressing  Flowsheets (Taken 4/9/2025 1100)  Plan of Care Reviewed With:   patient   spouse  Goal: Patient-Specific Goal (Individualized)  Outcome: Progressing  Flowsheets (Taken 4/9/2025 1100)  Individualized Care Needs: patient likes feet elevated, pillow, and water  Anxieties, Fears or Concerns: Patient reports feeling tired.  Patient/Family-Specific Goals (Include Timeframe): Patient tolerated treatment well with no adverse reactions.     Problem: Chemotherapy Effects  Goal: Neurotoxicity Symptom Control  Outcome: Progressing  Goal: Absence of Infection  Outcome: Progressing     Problem: Fall Injury Risk  Goal: Absence of Fall and Fall-Related Injury  Outcome: Progressing

## 2025-04-10 LAB
ABSOLUTE EOSINOPHIL (OHS): 0.44 K/UL
ABSOLUTE MONOCYTE (OHS): 0.5 K/UL (ref 0.3–1)
ABSOLUTE NEUTROPHIL COUNT (OHS): 10.08 K/UL (ref 1.8–7.7)
ALBUMIN SERPL BCP-MCNC: 2.8 G/DL (ref 3.5–5.2)
ALP SERPL-CCNC: 106 UNIT/L (ref 40–150)
ALT SERPL W/O P-5'-P-CCNC: 18 UNIT/L (ref 10–44)
ANION GAP (OHS): 7 MMOL/L (ref 8–16)
AST SERPL-CCNC: 18 UNIT/L (ref 11–45)
BASOPHILS # BLD AUTO: 0.08 K/UL
BASOPHILS NFR BLD AUTO: 0.7 %
BILIRUB SERPL-MCNC: 0.9 MG/DL (ref 0.1–1)
BUN SERPL-MCNC: 13 MG/DL (ref 6–20)
CALCIUM SERPL-MCNC: 8.6 MG/DL (ref 8.7–10.5)
CHLORIDE SERPL-SCNC: 102 MMOL/L (ref 95–110)
CO2 SERPL-SCNC: 22 MMOL/L (ref 23–29)
CREAT SERPL-MCNC: 0.7 MG/DL (ref 0.5–1.4)
ERYTHROCYTE [DISTWIDTH] IN BLOOD BY AUTOMATED COUNT: 16 % (ref 11.5–14.5)
GFR SERPLBLD CREATININE-BSD FMLA CKD-EPI: >60 ML/MIN/1.73/M2
GLUCOSE SERPL-MCNC: 101 MG/DL (ref 70–110)
HCT VFR BLD AUTO: 33.8 % (ref 40–54)
HGB BLD-MCNC: 11.6 GM/DL (ref 14–18)
IMM GRANULOCYTES # BLD AUTO: 0.05 K/UL (ref 0–0.04)
IMM GRANULOCYTES NFR BLD AUTO: 0.4 % (ref 0–0.5)
LACTATE SERPL-SCNC: 1.1 MMOL/L (ref 0.5–2.2)
LACTATE SERPL-SCNC: 2.6 MMOL/L (ref 0.5–2.2)
LACTATE SERPL-SCNC: 3 MMOL/L (ref 0.5–2.2)
LYMPHOCYTES # BLD AUTO: 0.28 K/UL (ref 1–4.8)
MAGNESIUM SERPL-MCNC: 1.7 MG/DL (ref 1.6–2.6)
MCH RBC QN AUTO: 32 PG (ref 27–31)
MCHC RBC AUTO-ENTMCNC: 34.3 G/DL (ref 32–36)
MCV RBC AUTO: 93 FL (ref 82–98)
NUCLEATED RBC (/100WBC) (OHS): 0 /100 WBC
OHS QRS DURATION: 72 MS
OHS QTC CALCULATION: 432 MS
PHOSPHATE SERPL-MCNC: 2.7 MG/DL (ref 2.7–4.5)
PLATELET # BLD AUTO: 165 K/UL (ref 150–450)
PMV BLD AUTO: 10.4 FL (ref 9.2–12.9)
POTASSIUM SERPL-SCNC: 3.9 MMOL/L (ref 3.5–5.1)
PROT SERPL-MCNC: 7.1 GM/DL (ref 6–8.4)
RBC # BLD AUTO: 3.62 M/UL (ref 4.6–6.2)
RELATIVE EOSINOPHIL (OHS): 3.8 %
RELATIVE LYMPHOCYTE (OHS): 2.4 % (ref 18–48)
RELATIVE MONOCYTE (OHS): 4.4 % (ref 4–15)
RELATIVE NEUTROPHIL (OHS): 88.3 % (ref 38–73)
SODIUM SERPL-SCNC: 131 MMOL/L (ref 136–145)
WBC # BLD AUTO: 11.43 K/UL (ref 3.9–12.7)

## 2025-04-10 PROCEDURE — 80053 COMPREHEN METABOLIC PANEL: CPT | Performed by: STUDENT IN AN ORGANIZED HEALTH CARE EDUCATION/TRAINING PROGRAM

## 2025-04-10 PROCEDURE — 85025 COMPLETE CBC W/AUTO DIFF WBC: CPT | Performed by: STUDENT IN AN ORGANIZED HEALTH CARE EDUCATION/TRAINING PROGRAM

## 2025-04-10 PROCEDURE — 27000207 HC ISOLATION

## 2025-04-10 PROCEDURE — 21400001 HC TELEMETRY ROOM

## 2025-04-10 PROCEDURE — 96375 TX/PRO/DX INJ NEW DRUG ADDON: CPT

## 2025-04-10 PROCEDURE — 83735 ASSAY OF MAGNESIUM: CPT | Performed by: STUDENT IN AN ORGANIZED HEALTH CARE EDUCATION/TRAINING PROGRAM

## 2025-04-10 PROCEDURE — 63600175 PHARM REV CODE 636 W HCPCS: Performed by: STUDENT IN AN ORGANIZED HEALTH CARE EDUCATION/TRAINING PROGRAM

## 2025-04-10 PROCEDURE — 25000003 PHARM REV CODE 250: Performed by: FAMILY MEDICINE

## 2025-04-10 PROCEDURE — 25000003 PHARM REV CODE 250: Performed by: STUDENT IN AN ORGANIZED HEALTH CARE EDUCATION/TRAINING PROGRAM

## 2025-04-10 PROCEDURE — 84100 ASSAY OF PHOSPHORUS: CPT | Performed by: STUDENT IN AN ORGANIZED HEALTH CARE EDUCATION/TRAINING PROGRAM

## 2025-04-10 PROCEDURE — 83605 ASSAY OF LACTIC ACID: CPT | Mod: 91 | Performed by: STUDENT IN AN ORGANIZED HEALTH CARE EDUCATION/TRAINING PROGRAM

## 2025-04-10 PROCEDURE — 96366 THER/PROPH/DIAG IV INF ADDON: CPT

## 2025-04-10 PROCEDURE — 36415 COLL VENOUS BLD VENIPUNCTURE: CPT | Performed by: STUDENT IN AN ORGANIZED HEALTH CARE EDUCATION/TRAINING PROGRAM

## 2025-04-10 RX ORDER — ENOXAPARIN SODIUM 100 MG/ML
40 INJECTION SUBCUTANEOUS EVERY 24 HOURS
Status: DISCONTINUED | OUTPATIENT
Start: 2025-04-10 | End: 2025-04-11 | Stop reason: HOSPADM

## 2025-04-10 RX ORDER — MUPIROCIN 20 MG/G
OINTMENT TOPICAL 2 TIMES DAILY
Status: DISCONTINUED | OUTPATIENT
Start: 2025-04-10 | End: 2025-04-11 | Stop reason: HOSPADM

## 2025-04-10 RX ADMIN — ACETAMINOPHEN 650 MG: 325 TABLET ORAL at 02:04

## 2025-04-10 RX ADMIN — CEFEPIME 2 G: 2 INJECTION, POWDER, FOR SOLUTION INTRAVENOUS at 12:04

## 2025-04-10 RX ADMIN — MUPIROCIN: 20 OINTMENT TOPICAL at 09:04

## 2025-04-10 RX ADMIN — SODIUM CHLORIDE 1000 ML: 9 INJECTION, SOLUTION INTRAVENOUS at 09:04

## 2025-04-10 RX ADMIN — OLANZAPINE 5 MG: 5 TABLET, FILM COATED ORAL at 09:04

## 2025-04-10 RX ADMIN — ENOXAPARIN SODIUM 40 MG: 40 INJECTION SUBCUTANEOUS at 06:04

## 2025-04-10 RX ADMIN — OLANZAPINE 5 MG: 5 TABLET, FILM COATED ORAL at 12:04

## 2025-04-10 RX ADMIN — CEFEPIME 2 G: 2 INJECTION, POWDER, FOR SOLUTION INTRAVENOUS at 05:04

## 2025-04-10 RX ADMIN — FERROUS SULFATE TAB 325 MG (65 MG ELEMENTAL FE) 1 EACH: 325 (65 FE) TAB at 09:04

## 2025-04-10 RX ADMIN — PANTOPRAZOLE SODIUM 40 MG: 40 TABLET, DELAYED RELEASE ORAL at 09:04

## 2025-04-10 RX ADMIN — ACETAMINOPHEN 650 MG: 325 TABLET ORAL at 06:04

## 2025-04-10 RX ADMIN — CEFEPIME 2 G: 2 INJECTION, POWDER, FOR SOLUTION INTRAVENOUS at 09:04

## 2025-04-10 NOTE — ASSESSMENT & PLAN NOTE
59 y.o. 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 Presented to the ED for evaluation of generalized body aches and fever.  Fevers ranged from .  Denies any respiratory symptoms, nausea, vomiting, diarrhea, constipation, chest pain, shortness of breath, leg swelling, urinary symptoms.  He underwent chemotherapy for Esophageal cancer 04/09/25     ED course notable for febrile on vitals to 100.7 °F, hypotension, tachycardia.  He underwent fluid resuscitation and was administered Meropenem.  Labs notable for WBC 12.77, hemoglobin 13, sodium 130, magnesium 1.4, lactic acid 3. CXR negative, flu/COVID negative.    PLAN:  -Trend lactate for normalization  -If persistent concerns for infection, consider further imaging  -Follow up blood cultures, deescalate antibiotics as appropriate  Antibiotics (From admission, onward)      Start     Stop Route Frequency Ordered    04/10/25 0500  ceFEPIme injection 2 g         -- IV Every 8 hours (non-standard times) 04/09/25 2302

## 2025-04-10 NOTE — HPI
Mr. Rosalio Muñoz is a 59 y.o. 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  Fevers ranged from .  Denies any respiratory symptoms, nausea, vomiting, diarrhea, constipation, chest pain, shortness of breath, leg swelling, urinary symptoms.  He underwent chemotherapy for Esophageal cancer yesterday afternoon.    ED course notable for febrile on vitals to 100.7 °F, hypotension, tachycardia.  He underwent fluid resuscitation and was administered Meropenem.  Labs notable for WBC 12.77, hemoglobin 13, sodium 130, magnesium 1.4, lactic acid 3. CXR negative, flu/COVID negative.

## 2025-04-10 NOTE — ASSESSMENT & PLAN NOTE
Followed by Heme-Onc and undergoing chemotherapy treatments    Patient has metastatic cancer of Esophageal primary. The cancer has metastasized to other site. The patient is under the care of an outpatient oncologist. The patient is undergoing active chemotherapy.Their staging information is listed below.    Cancer Staging   Esophageal cancer, stage IV  Staging form: Esophagus - Adenocarcinoma, AJCC 8th Edition  - Clinical stage from 10/10/2024: Stage IVB (cTX, cN0, cM1, G2) - Signed by Zacarias Crowley MD on 10/10/2024

## 2025-04-10 NOTE — SUBJECTIVE & OBJECTIVE
Past Medical History:   Diagnosis Date    Esophageal cancer, stage IV 10/10/2024    Hypertension     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 10/11/2024    Palliative care encounter 12/11/2024       Past Surgical History:   Procedure Laterality Date    COLONOSCOPY N/A 8/25/2023    Procedure: COLONOSCOPY;  Surgeon: Pebbles Spear MD;  Location: [facility] ENDO;  Service: Endoscopy;  Laterality: N/A;    COLONOSCOPY N/A 10/2/2024    Procedure: COLONOSCOPY  Cardiac clearance received on 09/20/24 per Dr. Lockett, Cardiology.  Note in encounters.  LB;  Surgeon: Sully Farris MD;  [facility]: [facility];  Service: Endoscopy;  Laterality: N/A;    ESOPHAGOGASTRODUODENOSCOPY N/A 10/2/2024    Procedure: EGD (ESOPHAGOGASTRODUODENOSCOPY);  Surgeon: Sully Farris MD;  [facility]: [facility];  Service: Endoscopy;  Laterality: N/A;    ESOPHAGOGASTRODUODENOSCOPY N/A 12/31/2024    Procedure: EGD (ESOPHAGOGASTRODUODENOSCOPY);  Surgeon: Maki Sullivan MD;  Location: [facility];  Service: Gastroenterology;  Laterality: N/A;    INSERTION OF VENOUS ACCESS PORT Left 10/21/2024    Procedure: INSERTION, VENOUS ACCESS PORT;  Surgeon: Roseanna Rosas MD;  Location: [facility];  Service: General;  Laterality: Left;    SURGICAL REMOVAL OF LESION OF FACE Right 12/1/2023    Procedure: EXCISION, LESION, FACE;  Surgeon: Jose Flaherty MD;  Location: [facility];  Service: ENT;  Laterality: Right;  1. Excision facial subcutaneous mass right cheek 3 cm. 2. Intermediate layered closure 3.5 cm    WISDOM TOOTH EXTRACTION      XI ROBOTIC APPENDECTOMY N/A 7/31/2024    Procedure: XI ROBOTIC APPENDECTOMY;  Surgeon: Paulo Saavedra MD;  Location: [facility] OR;  Service: General;  Laterality: N/A;    XI ROBOTIC INSERTION, GASTROSTOMY TUBE N/A 10/21/2024    Procedure: XI ROBOTIC INSERTION, GASTROSTOMY TUBE;  Surgeon: Roseanna Rosas MD;  Location: [facility] OR;  Service: General;  Laterality: N/A;       Review of patient's allergies indicates:  No Known  Allergies    Current Facility-Administered Medications on File Prior to Encounter   Medication    [COMPLETED] 0.9% NaCl 100 mL flush bag    [COMPLETED] fluorouraciL injection 325 mg    [COMPLETED] leucovorin calcium 320 mg/m2 = 640 mg in D5W 282 mL infusion    [COMPLETED] oxaliplatin (ELOXATIN) 68 mg/m2 = 137 mg in D5W 592.4 mL chemo infusion    [COMPLETED] palonosetron 0.25mg/dexAMETHasone 12mg in NS IVPB 0.25 mg 50 mL    [COMPLETED] trastuzumab-anns (KANJINTI) 300 mg in 0.9% NaCl 299.3 mL chemo infusion    [DISCONTINUED] D5W 250 mL flush bag    [DISCONTINUED] diphenhydrAMINE injection 50 mg    [DISCONTINUED] EPINEPHrine (EPIPEN) 0.3 mg/0.3 mL pen injection 0.3 mg    [DISCONTINUED] fluorouracil (Adrucil) 960 mg/m2 = 1,940 mg in 0.9% NaCl 100 mL chemo infusion    [DISCONTINUED] hydrocortisone sodium succinate injection 100 mg    [DISCONTINUED] prochlorperazine injection Soln 10 mg     Current Outpatient Medications on File Prior to Encounter   Medication Sig    ferrous sulfate (FEOSOL) 325 mg (65 mg iron) Tab tablet Take 1 tablet (325 mg total) by mouth every other day.    LIDOcaine-prilocaine (EMLA) cream Apply topically as needed.    metoclopramide HCl (REGLAN) 10 MG tablet Take 1 tablet (10 mg total) by mouth every 6 (six) hours as needed (nausea, vomiting).    OLANZapine (ZYPREXA) 5 MG tablet Take 1 tablet (5 mg total) by mouth every evening. Take nightly on days 1-3 of each chemotherapy cycle.    ondansetron (ZOFRAN-ODT) 8 MG TbDL Take 1 tablet (8 mg total) by mouth 2 (two) times daily.    pantoprazole (PROTONIX) 40 MG tablet Take 1 tablet (40 mg total) by mouth once daily.     Family History       Problem Relation (Age of Onset)    Breast cancer Maternal Grandmother    Diabetes Father    Heart disease Father    Hyperlipidemia Mother    Hypertension Father    Kidney disease Father    Prostate cancer Maternal Grandfather          Tobacco Use    Smoking status: Every Day     Current packs/day: 0.00     Types:  Cigars, Cigarettes     Last attempt to quit: 10/4/2022     Years since quittin.5     Passive exposure: Never    Smokeless tobacco: Never    Tobacco comments:     Cigar every afternoon   Substance and Sexual Activity    Alcohol use: Yes     Alcohol/week: 4.0 - 6.0 standard drinks of alcohol     Types: 4 - 6 Cans of beer per week     Comment: occ    Drug use: Yes     Types: Marijuana     Comment: medical    Sexual activity: Yes     Partners: Female     Review of Systems   Constitutional:  Positive for fatigue and fever. Negative for chills.   HENT:  Negative for congestion and rhinorrhea.    Respiratory:  Negative for cough and shortness of breath.    Cardiovascular:  Negative for chest pain, palpitations and leg swelling.   Gastrointestinal:  Negative for constipation, diarrhea, nausea and vomiting.   Genitourinary:  Negative for difficulty urinating and dysuria.   Musculoskeletal:  Positive for myalgias. Negative for arthralgias and back pain.   Neurological:  Negative for dizziness and light-headedness.     Objective:     Vital Signs (Most Recent):  Temp: 98.6 °F (37 °C) (04/10/25 045)  Pulse: 99 (04/10/25 045)  Resp: 19 (04/10/25 0454)  BP: (!) 106/57 (04/10/25 0454)  SpO2: (!) 94 % (04/10/25 0454) Vital Signs (24h Range):  Temp:  [97 °F (36.1 °C)-100.7 °F (38.2 °C)] 98.6 °F (37 °C)  Pulse:  [] 99  Resp:  [16-20] 19  SpO2:  [93 %-98 %] 94 %  BP: ()/(50-75) 106/57     Weight: 78.5 kg (173 lb 1 oz)  Body mass index is 22.83 kg/m².     Physical Exam  Vitals and nursing note reviewed.   Constitutional:       General: He is not in acute distress.     Appearance: He is not ill-appearing, toxic-appearing or diaphoretic.   HENT:      Head: Normocephalic and atraumatic.      Mouth/Throat:      Mouth: Mucous membranes are moist.   Eyes:      General: No scleral icterus.        Right eye: No discharge.         Left eye: No discharge.   Cardiovascular:      Rate and Rhythm: Normal rate and regular rhythm.       Heart sounds: Normal heart sounds.   Pulmonary:      Effort: Pulmonary effort is normal. No respiratory distress.      Breath sounds: No wheezing, rhonchi or rales.   Abdominal:      General: Bowel sounds are normal. There is no distension.      Tenderness: There is no abdominal tenderness. There is no guarding.   Musculoskeletal:      Cervical back: No rigidity.      Right lower leg: No edema.      Left lower leg: No edema.   Skin:     General: Skin is warm and dry.      Coloration: Skin is not jaundiced.   Neurological:      Mental Status: He is alert and oriented to person, place, and time. Mental status is at baseline.   Psychiatric:         Mood and Affect: Mood normal.         Behavior: Behavior normal.                Significant Labs: All pertinent labs within the past 24 hours have been reviewed.  Recent Results (from the past 24 hours)   Comprehensive metabolic panel    Collection Time: 04/09/25  7:54 PM   Result Value Ref Range    Sodium 130 (L) 136 - 145 mmol/L    Potassium 4.1 3.5 - 5.1 mmol/L    Chloride 101 95 - 110 mmol/L    CO2 20 (L) 23 - 29 mmol/L    Glucose 113 (H) 70 - 110 mg/dL    BUN 13 6 - 20 mg/dL    Creatinine 0.7 0.5 - 1.4 mg/dL    Calcium 9.3 8.7 - 10.5 mg/dL    Protein Total 7.9 6.0 - 8.4 gm/dL    Albumin 3.3 (L) 3.5 - 5.2 g/dL    Bilirubin Total 0.6 0.1 - 1.0 mg/dL     40 - 150 unit/L    AST 20 11 - 45 unit/L    ALT 21 10 - 44 unit/L    Anion Gap 9 8 - 16 mmol/L    eGFR >60 >60 mL/min/1.73/m2   Lactic acid, plasma #1    Collection Time: 04/09/25  7:54 PM   Result Value Ref Range    Lactic Acid Level 1.7 0.5 - 2.2 mmol/L   Procalcitonin    Collection Time: 04/09/25  7:54 PM   Result Value Ref Range    Procalcitonin 0.84 (H) <0.25 ng/mL   CBC with Differential    Collection Time: 04/09/25  7:54 PM   Result Value Ref Range    WBC 12.77 (H) 3.90 - 12.70 K/uL    RBC 3.99 (L) 4.60 - 6.20 M/uL    HGB 13.0 (L) 14.0 - 18.0 gm/dL    HCT 37.0 (L) 40.0 - 54.0 %    MCV 93 82 - 98 fL    MCH  32.6 (H) 27.0 - 31.0 pg    MCHC 35.1 32.0 - 36.0 g/dL    RDW 15.9 (H) 11.5 - 14.5 %    Platelet Count 177 150 - 450 K/uL    MPV 10.3 9.2 - 12.9 fL    Nucleated RBC 0 <=0 /100 WBC    Neut % 93.4 (H) 38 - 73 %    Lymph % 0.7 (L) 18 - 48 %    Mono % 5.0 4 - 15 %    Eos % 0.1 <=8 %    Basophil % 0.3 <=1.9 %    Imm Grans % 0.5 0.0 - 0.5 %    Neut # 11.92 (H) 1.8 - 7.7 K/uL    Lymph # 0.09 (L) 1 - 4.8 K/uL    Mono # 0.64 0.3 - 1 K/uL    Eos # 0.01 <=0.5 K/uL    Baso # 0.04 <=0.2 K/uL    Imm Grans # 0.07 (H) 0.00 - 0.04 K/uL   Magnesium    Collection Time: 04/09/25  7:54 PM   Result Value Ref Range    Magnesium  1.4 (L) 1.6 - 2.6 mg/dL   Influenza A & B by Molecular    Collection Time: 04/09/25  8:06 PM    Specimen: Nasal Swab   Result Value Ref Range    INFLUENZA A MOLECULAR Negative Negative    INFLUENZA B MOLECULAR  Negative Negative   COVID-19 Rapid Screening    Collection Time: 04/09/25  8:06 PM   Result Value Ref Range    SARS COV-2 Molecular Negative Negative   Urinalysis, Reflex to Urine Culture    Collection Time: 04/09/25  8:21 PM    Specimen: Urine   Result Value Ref Range    Color, UA Yellow Straw, Marge, Yellow, Light-Orange    Appearance, UA Clear Clear    pH, UA 8.0 5.0 - 8.0    Spec Grav UA 1.020 1.005 - 1.030    Protein, UA Negative Negative    Glucose, UA Negative Negative    Ketones, UA Negative Negative    Bilirubin, UA Negative Negative    Blood, UA Negative Negative    Nitrites, UA Negative Negative    Urobilinogen, UA Negative <2.0 EU/dL    Leukocyte Esterase, UA Negative Negative   Lactic Acid, Plasma    Collection Time: 04/09/25  9:12 PM   Result Value Ref Range    Lactic Acid Level 1.3 0.5 - 2.2 mmol/L   Lactic acid, plasma #2    Collection Time: 04/09/25 11:52 PM   Result Value Ref Range    Lactic Acid Level 3.0 (H) 0.5 - 2.2 mmol/L   Lactic Acid, Plasma    Collection Time: 04/10/25  2:16 AM   Result Value Ref Range    Lactic Acid Level 2.6 (H) 0.5 - 2.2 mmol/L       Significant Imaging: I have  reviewed all pertinent imaging results/findings within the past 24 hours.  X-Ray Chest AP Portable   Final Result      1.  Negative for acute process involving the chest.   2.  Stable findings as noted above.      Finalized on: 4/9/2025 7:44 PM By:  Lito Trevino MD   [facility]# [id number]      2025-04-09 19:46:39.311     [facility]

## 2025-04-10 NOTE — ASSESSMENT & PLAN NOTE
Anemia is likely due to  chronic disease due to Malignancy. Most recent hemoglobin and hematocrit are listed below.  Recent Labs     04/07/25  1203 04/09/25  1954   HGB 14.2 13.0*   HCT 41.0 37.0*     Plan  - Monitor serial CBC: Daily  - Transfuse PRBC if patient becomes hemodynamically unstable, symptomatic or H/H drops below 7/21.  - Patient has not received any PRBC transfusions to date  - continue home iron supplementation

## 2025-04-10 NOTE — PLAN OF CARE
Discussed poc with pt, pt verbalized understanding    Purposeful rounding every 2hours    VS wnl  Cardiac monitoring in use, pt is NSR, tele monitor # [id number]  Fall precautions in place, remains injury free  Pt denies c/o nausea  Pain under control with PRN meds        Abx given as prescribed  Bed locked at lowest position  Call light within reach    Chart check complete  Will cont with POC

## 2025-04-10 NOTE — ASSESSMENT & PLAN NOTE
Patient has Abnormal Magnesium: hypomagnesemia. Will continue to monitor electrolytes closely. Will replace the affected electrolytes and repeat labs to be done after interventions completed. The patient's magnesium results have been reviewed and are listed below.  Recent Labs   Lab 04/09/25 1954   MG 1.4*

## 2025-04-10 NOTE — PLAN OF CARE
[facility] - Med Surg  Initial Discharge Assessment       Primary Care Provider: Michell Goyal MD    Admission Diagnosis: Screening for cardiovascular condition [Z13.6]  Tachycardia [R00.0]    Admission Date: 4/9/2025  Expected Discharge Date: per attending         Payor: BLUE CROSS [employer] BENEFIT / Plan: [employer] BLUE CROSS [place] / Product Type: Self Funded /     Extended Emergency Contact Information  Primary Emergency Contact: Fanta Muñoz  Address: [address] [place] [address] [id number] [place] [address]  Mobile Phone: [phone]  Relation: Spouse    Discharge Plan A: Home         [address] [address] [facility]  [address] [facility]  [facility] [address]  Phone: [phone] Fax: [phone]      Initial Assessment (most recent)       Adult Discharge Assessment - 04/10/25 0858          Discharge Assessment    Assessment Type Discharge Planning Assessment     Confirmed/corrected address, phone number and insurance Yes     Confirmed Demographics Correct on Facesheet     Source of Information health record     Communicated RUTH with patient/caregiver Date not available/Unable to determine     Reason For Admission fever     People in Home spouse     Do you expect to return to your current living situation? Yes     Do you have help at home or someone to help you manage your care at home? Yes     Who are your caregiver(s) and their phone number(s)? spouse     Prior to hospitilization cognitive status: Alert/Oriented     Current cognitive status: Alert/Oriented     Walking or Climbing Stairs Difficulty no     Dressing/Bathing Difficulty no     Equipment Currently Used at Home none     Readmission within 30 days? Yes     Patient currently being followed by outpatient case management? No     Do you currently have service(s) that help you manage your care at home? No     Do you take prescription medications? Yes     Do you have prescription  coverage? Yes     Coverage bcbs     Do you have any problems affording any of your prescribed medications? No     Is the patient taking medications as prescribed? yes     Who is going to help you get home at discharge? spouse     How do you get to doctors appointments? family or friend will provide     Are you on dialysis? No     Do you take coumadin? No     Discharge Plan A Home                     Readmission Assessment (most recent)       Readmission Assessment - 04/10/25 0856          Readmission    Was this a planned readmission? No     Why were you hospitalized in the last 30 days? fever     Why were you readmitted? Related to previous admission     When you left the hospital where did you go? Home with Family     Tell me about what happened between when you left the hospital and the day you returned. ran a fever     When did you start not feeling well? day ago     Did you try to manage your symptoms your self? Yes     What did you do? took meds     Did you call anyone? No     Why? came to the er     Did you try to see or did see a doctor or nurse before you came? No     Did you have  a follow-up appointment on discharge? Yes     Did you go? Yes

## 2025-04-10 NOTE — PLAN OF CARE
Discussed poc with pt, pt verbalized understanding    Purposeful rounding every 2hours    VS wnl  Fall precautions in place, remains injury free  Pt denies c/o any pain or discomfort at this time and patient will continue to be monitored  Pain and nausea under control with PRN meds    IVFs  Accurate I&Os  Abx given as prescribed  Bed locked at lowest position  Call light within reach    Chart check complete  Will cont with POC

## 2025-04-10 NOTE — ED PROVIDER NOTES
Emergency Medicine Provider Note - 4/9/2025       SCRIBE NOTE: IBeverley, am scribing for, and in the presence of Susannah Barragan DO, FACEP     History     Chief Complaint   Patient presents with    Fever    Generalized Body Aches     Generalized body aches and fever started today, patient is receiving chemo for esophageal cancer with liver mets.       Allergies:  Review of patient's allergies indicates:  No Known Allergies    History of Present Illness   HPI    4/9/2025, 7:13 PM  The history is provided by the wife and patient    Rosalio Muñoz is a 59 y.o. male with a PMHx of HTN and esophageal cancer presenting to the ED for fever and generalized body aches which began this afternoon. He gets chemo for his esophageal cancer, and his most recent chemo was today. He had a fever of 101 at his house and was brought here to make sure there was nothing wrong. 2 weeks ago he had a similar case, but there was no resolution. As for his general pain, he rates it a 6/10 and claims it feels steady. Associated symptoms include nausea and weakness. Patient denies any diarrhea, vomiting, dysuria, abdominal pain, or rash. He had his feeding tube put in last October.       Arrival mode: Private Vehicle     PCP: Michell Goyal MD     Past Medical History:  Past Medical History:   Diagnosis Date    Esophageal cancer, stage IV 10/10/2024    Hypertension     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 10/11/2024    Palliative care encounter 12/11/2024       Past Surgical History:  Past Surgical History:   Procedure Laterality Date    COLONOSCOPY N/A 8/25/2023    Procedure: COLONOSCOPY;  Surgeon: Pebbles Spear MD;  Location: [facility];  Service: Endoscopy;  Laterality: N/A;    COLONOSCOPY N/A 10/2/2024    Procedure: COLONOSCOPY  Cardiac clearance received on 09/20/24 per Dr. Lockett, Cardiology.  Note in encounters.  LB;  Surgeon: Sully Farris MD;  Location: [facility];  Service: Endoscopy;   Laterality: N/A;    ESOPHAGOGASTRODUODENOSCOPY N/A 10/2/2024    Procedure: EGD (ESOPHAGOGASTRODUODENOSCOPY);  Surgeon: Sully Farris MD;  Location: [facility];  Service: Endoscopy;  Laterality: N/A;    ESOPHAGOGASTRODUODENOSCOPY N/A 2024    Procedure: EGD (ESOPHAGOGASTRODUODENOSCOPY);  Surgeon: Maki Sullivan MD;  Location: [facility] ENDO;  Service: Gastroenterology;  Laterality: N/A;    INSERTION OF VENOUS ACCESS PORT Left 10/21/2024    Procedure: INSERTION, VENOUS ACCESS PORT;  Surgeon: Roseanna Rosas MD;  Location: [facility] OR;  Service: General;  Laterality: Left;    SURGICAL REMOVAL OF LESION OF FACE Right 2023    Procedure: EXCISION, LESION, FACE;  Surgeon: Jose Flaherty MD;  Location: [facility] OR;  Service: ENT;  Laterality: Right;  1. Excision facial subcutaneous mass right cheek 3 cm. 2. Intermediate layered closure 3.5 cm    WISDOM TOOTH EXTRACTION      XI ROBOTIC APPENDECTOMY N/A 2024    Procedure: XI ROBOTIC APPENDECTOMY;  Surgeon: Paulo Saavedra MD;  Location: [facility] OR;  Service: General;  Laterality: N/A;    XI ROBOTIC INSERTION, GASTROSTOMY TUBE N/A 10/21/2024    Procedure: XI ROBOTIC INSERTION, GASTROSTOMY TUBE;  Surgeon: Roseanna Rosas MD;  Location: [facility] OR;  Service: General;  Laterality: N/A;         Family History:  Family History   Problem Relation Name Age of Onset    Hyperlipidemia Mother      Hypertension Father      Diabetes Father      Kidney disease Father      Heart disease Father      Breast cancer Maternal Grandmother      Prostate cancer Maternal Grandfather      Colon cancer Neg Hx         Social History:  Social History     Tobacco Use    Smoking status: Every Day     Current packs/day: 0.00     Types: Cigars, Cigarettes     Last attempt to quit: 10/4/2022     Years since quittin.5     Passive exposure: Never    Smokeless tobacco: Never    Tobacco comments:     Cigar every afternoon   Substance and Sexual Activity    Alcohol use: Yes     Alcohol/week: 4.0  - 6.0 standard drinks of alcohol     Types: 4 - 6 Cans of beer per week     Comment: occ    Drug use: Yes     Types: Marijuana     Comment: medical    Sexual activity: Yes     Partners: Female       I have reviewed the Past Medical History, Past Surgical History, Family History and Social History as documented above.      Review of Systems   Review of Systems   Constitutional:  Positive for fever.   HENT:  Negative for sore throat.    Respiratory:  Negative for shortness of breath.    Cardiovascular:  Negative for chest pain.   Gastrointestinal:  Positive for nausea. Negative for abdominal pain, diarrhea and vomiting.   Genitourinary:  Negative for dysuria.   Musculoskeletal:  Positive for myalgias (generalized). Negative for back pain.   Skin:  Negative for rash.   Neurological:  Positive for weakness.   Hematological:  Does not bruise/bleed easily.   All other systems reviewed and are negative.     Physical Exam     Initial Vitals [04/09/25 1841]   BP Pulse Resp Temp SpO2   (!) 87/53 (!) 148 20 99.1 °F (37.3 °C) (!) 93 %      MAP       --          Physical Exam    Nursing Notes and Vital Signs Reviewed.  Constitutional:Ill appearing.  Warm to touch.  Head: Atraumatic. Normocephalic.  Eyes: PERRL. EOM intact. Conjunctivae are not pale. No scleral icterus.  ENT: Mucous membranes are moist. Oropharynx is clear and symmetric.    Neck: Supple. Full ROM. No lymphadenopathy.  Cardiovascular: Tachycardiac. Regular rhythm. No murmurs, rubs, or gallops. Distal pulses are 2+ and symmetric.  Pulmonary/Chest: No respiratory distress. Clear to auscultation bilaterally. No wheezing or rales.  Abdominal: Soft and non-distended.  There is no tenderness.  No rebound, guarding, or rigidity. Good bowel sounds. Has feeding tube and port access.  Genitourinary: N/A  Musculoskeletal: Moves all extremities. No obvious deformities. No edema. No calf tenderness.  Skin: Warm and dry.  Neurological:  Alert, awake, and appropriate.  Normal  "speech.  No acute focal neurological deficits are appreciated.  Psychiatric: Normal affect. Good eye contact. Appropriate in content.     ED Course   ED Procedures:  Critical Care    Date/Time: 4/9/2025 7:13 PM    Performed by: Susannah Barragan DO  Authorized by: Wilmar Farris DO  Direct patient critical care time: 21 minutes  Ordering / reviewing critical care time: 5 minutes  Documentation critical care time: 10 minutes  Consulting other physicians critical care time: 5 minutes  Total critical care time (exclusive of procedural time) : 41 minutes  Critical care time was exclusive of separately billable procedures and treating other patients.  Critical care was necessary to treat or prevent imminent or life-threatening deterioration of the following conditions: dehydration and metabolic crisis (Electrolyte abnromality).  Critical care was time spent personally by me on the following activities: blood draw for specimens, discussions with consultants, development of treatment plan with patient or surrogate, interpretation of cardiac output measurements, evaluation of patient's response to treatment, examination of patient, obtaining history from patient or surrogate, ordering and performing treatments and interventions, ordering and review of laboratory studies, ordering and review of radiographic studies, pulse oximetry, re-evaluation of patient's condition, review of old charts and vascular access procedures.          ED Vital Signs:  Vitals:    04/09/25 1841 04/09/25 1905 04/09/25 1912 04/09/25 2000   BP: (!) 87/53  (!) 110/57 (!) 92/53   Pulse: (!) 148 (!) 128 (!) 128 (!) 123   Resp: 20  20 20   Temp: 99.1 °F (37.3 °C)  99.1 °F (37.3 °C)    TempSrc:       SpO2: (!) 93%  95% 95%   Weight: 78.5 kg (173 lb)  78.5 kg (173 lb 1 oz)    Height:   6' 1" (1.854 m)     04/09/25 2100 04/09/25 2115 04/09/25 2200 04/09/25 2235   BP: (!) 90/53 (!) 90/50 (!) 88/50 (!) 95/54   Pulse: (!) 116 (!) 113 104 103   Resp: 20 " 20 20 16   Temp: (!) 100.7 °F (38.2 °C) 99.1 °F (37.3 °C)  98.6 °F (37 °C)   TempSrc: Oral Oral  Oral   SpO2: 95% 95% 95% (!) 94%   Weight:       Height:        04/09/25 2238 04/10/25 0155   BP:  (!) 116/56   Pulse: 100 91   Resp:  18   Temp:  98.7 °F (37.1 °C)   TempSrc:  Oral   SpO2:  96%   Weight:     Height:         All Lab Results:  Results for orders placed or performed during the hospital encounter of 04/09/25   Comprehensive metabolic panel    Collection Time: 04/09/25  7:54 PM   Result Value Ref Range    Sodium 130 (L) 136 - 145 mmol/L    Potassium 4.1 3.5 - 5.1 mmol/L    Chloride 101 95 - 110 mmol/L    CO2 20 (L) 23 - 29 mmol/L    Glucose 113 (H) 70 - 110 mg/dL    BUN 13 6 - 20 mg/dL    Creatinine 0.7 0.5 - 1.4 mg/dL    Calcium 9.3 8.7 - 10.5 mg/dL    Protein Total 7.9 6.0 - 8.4 gm/dL    Albumin 3.3 (L) 3.5 - 5.2 g/dL    Bilirubin Total 0.6 0.1 - 1.0 mg/dL     40 - 150 unit/L    AST 20 11 - 45 unit/L    ALT 21 10 - 44 unit/L    Anion Gap 9 8 - 16 mmol/L    eGFR >60 >60 mL/min/1.73/m2   Lactic acid, plasma #1    Collection Time: 04/09/25  7:54 PM   Result Value Ref Range    Lactic Acid Level 1.7 0.5 - 2.2 mmol/L   Procalcitonin    Collection Time: 04/09/25  7:54 PM   Result Value Ref Range    Procalcitonin 0.84 (H) <0.25 ng/mL   CBC with Differential    Collection Time: 04/09/25  7:54 PM   Result Value Ref Range    WBC 12.77 (H) 3.90 - 12.70 K/uL    RBC 3.99 (L) 4.60 - 6.20 M/uL    HGB 13.0 (L) 14.0 - 18.0 gm/dL    HCT 37.0 (L) 40.0 - 54.0 %    MCV 93 82 - 98 fL    MCH 32.6 (H) 27.0 - 31.0 pg    MCHC 35.1 32.0 - 36.0 g/dL    RDW 15.9 (H) 11.5 - 14.5 %    Platelet Count 177 150 - 450 K/uL    MPV 10.3 9.2 - 12.9 fL    Nucleated RBC 0 <=0 /100 WBC    Neut % 93.4 (H) 38 - 73 %    Lymph % 0.7 (L) 18 - 48 %    Mono % 5.0 4 - 15 %    Eos % 0.1 <=8 %    Basophil % 0.3 <=1.9 %    Imm Grans % 0.5 0.0 - 0.5 %    Neut # 11.92 (H) 1.8 - 7.7 K/uL    Lymph # 0.09 (L) 1 - 4.8 K/uL    Mono # 0.64 0.3 - 1 K/uL    Eos  # 0.01 <=0.5 K/uL    Baso # 0.04 <=0.2 K/uL    Imm Grans # 0.07 (H) 0.00 - 0.04 K/uL   Magnesium    Collection Time: 04/09/25  7:54 PM   Result Value Ref Range    Magnesium  1.4 (L) 1.6 - 2.6 mg/dL   Influenza A & B by Molecular    Collection Time: 04/09/25  8:06 PM    Specimen: Nasal Swab   Result Value Ref Range    INFLUENZA A MOLECULAR Negative Negative    INFLUENZA B MOLECULAR  Negative Negative   COVID-19 Rapid Screening    Collection Time: 04/09/25  8:06 PM   Result Value Ref Range    SARS COV-2 Molecular Negative Negative   Urinalysis, Reflex to Urine Culture    Collection Time: 04/09/25  8:21 PM    Specimen: Urine   Result Value Ref Range    Color, UA Yellow Straw, Marge, Yellow, Light-Orange    Appearance, UA Clear Clear    pH, UA 8.0 5.0 - 8.0    Spec Grav UA 1.020 1.005 - 1.030    Protein, UA Negative Negative    Glucose, UA Negative Negative    Ketones, UA Negative Negative    Bilirubin, UA Negative Negative    Blood, UA Negative Negative    Nitrites, UA Negative Negative    Urobilinogen, UA Negative <2.0 EU/dL    Leukocyte Esterase, UA Negative Negative   Lactic Acid, Plasma    Collection Time: 04/09/25  9:12 PM   Result Value Ref Range    Lactic Acid Level 1.3 0.5 - 2.2 mmol/L   Lactic acid, plasma #2    Collection Time: 04/09/25 11:52 PM   Result Value Ref Range    Lactic Acid Level 3.0 (H) 0.5 - 2.2 mmol/L   Lactic Acid, Plasma    Collection Time: 04/10/25  2:16 AM   Result Value Ref Range    Lactic Acid Level 2.6 (H) 0.5 - 2.2 mmol/L          The EKG was ordered, reviewed, and independently interpreted by the ED provider at 18:42:  Rate: 143 BPM  Rhythm: sinus tachycardia  Interpretation: Right atrial enlargement. No STEMI.  When compared to EKG performed 03/26/25, there are no significant change    ECG Results              EKG 12-lead (Preliminary result)  Result time 04/09/25 19:18:12      Wet Read by Susannah Barragan DO (04/09/25 19:18:12, [facility] - Emergency Dept., Emergency Medicine)    Rate  143.  Sinus tachycardia.  Normal axis.  No ST segment elevation.  No STEMI.  Nonspecific changes                                    Imaging Results:  Imaging Results              X-Ray Chest AP Portable (Final result)  Result time 04/09/25 19:44:31      Final result by Lito Trevino MD (04/09/25 19:44:31)                   Impression:      1.  Negative for acute process involving the chest.  2.  Stable findings as noted above.    Finalized on: 4/9/2025 7:44 PM By:  Lito Trevino MD  [facility]# [id number]      2025-04-09 19:46:[id number]     [facility]               Narrative:    EXAM:  XR CHEST AP PORTABLE    CLINICAL INDICATION:  Fever.  Sepsis    FINDINGS:  Frontal views of the chest were obtained.    Comparisons are made to studies dating back to 01/08/2022.  EKG leads overlie the chest.     The lungs are clear. The cardiac silhouette size is normal. The trachea is midline and the mediastinal width is normal. Negative for focal infiltrate, effusion or pneumothorax. Pulmonary vasculature is normal. Negative for osseous abnormalities.  Stable right jugular chest port.  There are atherosclerotic calcifications of the aortic knob and tortuosity of the aorta.  There are degenerative changes of the spine.  Convex left curvature the midthoracic spine.  Cardiophrenic fat pads.                                              The Emergency Provider reviewed the vital signs and test results, which are outlined above.     ED Discussion   ED Medication(s):  Medications   sodium chloride 0.9% flush 10 mL (has no administration in time range)   naloxone 0.4 mg/mL injection 0.02 mg (has no administration in time range)   glucose chewable tablet 16 g (has no administration in time range)   glucose chewable tablet 24 g (has no administration in time range)   dextrose 50% injection 12.5 g (has no administration in time range)   dextrose 50% injection 25 g (has no administration in time range)   glucagon (human recombinant) injection 1 mg (has no  administration in time range)   pantoprazole EC tablet 40 mg (has no administration in time range)   ferrous sulfate tablet 1 each (has no administration in time range)   hydrALAZINE injection 5 mg (has no administration in time range)   polyethylene glycol packet 17 g (has no administration in time range)   acetaminophen tablet 650 mg (has no administration in time range)   prochlorperazine injection Soln 2.5 mg (has no administration in time range)   ondansetron injection 4 mg (has no administration in time range)   melatonin tablet 6 mg (has no administration in time range)   ceFEPIme injection 2 g (has no administration in time range)   OLANZapine tablet 5 mg (5 mg Oral Given 4/10/25 0045)   lactated ringers bolus 2,355 mL (0 mLs Intravenous Stopped 4/9/25 2142)   meropenem 2 g in 0.9% NaCl 100 mL IVPB (MB+) (0 g Intravenous Stopped 4/9/25 2245)   acetaminophen oral solution 650 mg (650 mg Per G Tube Given 4/9/25 2058)   magnesium sulfate 2g in water 50mL IVPB (premix) (0 g Intravenous Stopped 4/10/25 0124)       ED Course as of 04/10/25 0320   Wed Apr 09, 2025 1914 SIRs criteria noted.  Potential sepsis.  Sepsis bundle, fluids 30 ml/kg, and Merrem Ordered.   [LB]   2100 Secure chat:  Ivana Davidson, NP: med/surg Dr. Farris. [LB]   2142 Only 670 fluids given.  Defer fluid re-assessment to hospital medicine team. Note:  No source of infection identified.  [LB]   2152 Patient brought up to floor.  [LB]      ED Course User Index  [LB] Susannah Barragan,             9:02 PM: Discussed case with Dr. Farris ([facility]). Dr. Farris agrees with current care and management of pt and accepts admission.   Admitting Service: Hospital Medicine  Admitting Physician: Dr. Farris  Admit to:  Obs med/surg    9:02 PM: Re-evaluated pt. I have discussed test results, shared treatment plan, and the need for admission with patient/family/caretaker at bedside. Pt and/or family/caretaker express understanding at this  time and agree with all information. All questions answered. Pt/caretaker/family member(s) have no further questions or concerns at this time. Pt is ready for admit.        MIPS Measures     Smoker? No     Hypertension: Patient has a known history of hypertension.       Medical Decision Making                 Medical Decision Making  Differential diagnosis: Neutropenic fever, pneumonia, urinary tract infection    ED course: Patient is currently receiving chemotherapy.  Urinalysis negative.  Influenza negative.  COVID negative.  Sodium 130.  BUN 13.  Creatinine 0.7.  Lactic 1.7.  White cell count 12.77.  Hemoglobin/hematocrit 13/37.0.  Mag 1.4.  Replaced with 2 g magnesium sulfate.  Chest x-ray: No acute process involving the chest.  Patient given 30 mL/kilos fluid bolus, Merrem 2 g, and acetaminophen.  Although patient has SIRS criteria, no active source of infection identified.  Urinalysis leukocyte esterase and nitrite negative.  Patient was brought up to the floor prior to completion of fluids.  Defer fluid reassessment to hospital medicine team.    Amount and/or Complexity of Data Reviewed  Labs: ordered. Decision-making details documented in ED Course.  Radiology: ordered. Decision-making details documented in ED Course.  ECG/medicine tests: ordered and independent interpretation performed. Decision-making details documented in ED Course.    Risk  OTC drugs.  Prescription drug management.  Drug therapy requiring intensive monitoring for toxicity.  Decision regarding hospitalization.    Critical Care  Total time providing critical care: 41 minutes        Prescription Management: I performed a review of the patient's current Rx medication list as input by nursing staff.    Current Discharge Medication List        CONTINUE these medications which have NOT CHANGED    Details   ferrous sulfate (FEOSOL) 325 mg (65 mg iron) Tab tablet Take 1 tablet (325 mg total) by mouth every other day.  Qty: 60 tablet, Refills: 2  "   Associated Diagnoses: Iron deficiency anemia due to chronic blood loss      LIDOcaine-prilocaine (EMLA) cream Apply topically as needed.  Qty: 5 g, Refills: 11    Associated Diagnoses: Esophageal cancer, stage IV; Secondary adenocarcinoma of retroperitoneum; 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      metoclopramide HCl (REGLAN) 10 MG tablet Take 1 tablet (10 mg total) by mouth every 6 (six) hours as needed (nausea, vomiting).  Qty: 30 tablet, Refills: 2      OLANZapine (ZYPREXA) 5 MG tablet Take 1 tablet (5 mg total) by mouth every evening. Take nightly on days 1-3 of each chemotherapy cycle.  Qty: 6 tablet, Refills: 11    Associated Diagnoses: Esophageal cancer, stage IV; Secondary adenocarcinoma of retroperitoneum; 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      ondansetron (ZOFRAN-ODT) 8 MG TbDL Take 1 tablet (8 mg total) by mouth 2 (two) times daily.  Qty: 20 tablet, Refills: 11    Associated Diagnoses: Esophageal cancer, stage IV      pantoprazole (PROTONIX) 40 MG tablet Take 1 tablet (40 mg total) by mouth once daily.  Qty: 90 tablet, Refills: 3    Associated Diagnoses: Duodenal ulcer with hemorrhage              Discussed case verbally with:N/A    Referrals:  No orders of the defined types were placed in this encounter.      Portions of this note may have been created with voice recognition software. Occasional "wrong-word" or "sound-a-like" substitutions may have occurred due to the inherent limitations of voice recognition software. Please, read the note carefully and recognize, using context, where substitutions have occurred.          Clinical Impression       ICD-10-CM ICD-9-CM   1. SIRS (systemic inflammatory response syndrome)  R65.10 995.90   2. Tachycardia  R00.0 785.0   3. Screening for cardiovascular condition  Z13.6 V81.2         4 Low blood magnesium  R79.0 790.6         ED Disposition  Disposition:   Disposition: Placed in Observation  Condition: Fair      Scribe Attestation:   Scribe " #1: I, Beverley Naidu,  performed the above scribed service and the documentation accurately describes the services I performed. I attest to the accuracy of the note.     Attending:   Physician Attestation Statement for Scribe #1: I, Susannah Barragan DO, [facility], personally performed the services described in this documentation, as scribed by Beverley Naidu , in my presence, and it is both accurate and complete.                   Susannah Barragan DO  04/10/25 0320

## 2025-04-10 NOTE — H&P
[facility] Medicine  History & Physical    Patient Name: Rosalio Muñoz  MRN: [id number]  Patient Class: OP- Observation  Admission Date: 4/9/2025  Attending Physician: Wilmar Farris DO   Primary Care Provider: Michell Goyal MD         Patient information was obtained from patient, past medical records, and ER records.     Subjective:     Principal Problem:Fever    Chief Complaint:   Chief Complaint   Patient presents with    Fever    Generalized Body Aches     Generalized body aches and fever started today, patient is receiving chemo for esophageal cancer with liver mets.        HPI: Mr. Rosalio Muñoz is a 59 y.o. 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  Fevers ranged from .  Denies any respiratory symptoms, nausea, vomiting, diarrhea, constipation, chest pain, shortness of breath, leg swelling, urinary symptoms.  He underwent chemotherapy for Esophageal cancer yesterday afternoon.    ED course notable for febrile on vitals to 100.7 °F, hypotension, tachycardia.  He underwent fluid resuscitation and was administered Meropenem.  Labs notable for WBC 12.77, hemoglobin 13, sodium 130, magnesium 1.4, lactic acid 3. CXR negative, flu/COVID negative.    Past Medical History:   Diagnosis Date    Esophageal cancer, stage IV 10/10/2024    Hypertension     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 10/11/2024    Palliative care encounter 12/11/2024       Past Surgical History:   Procedure Laterality Date    COLONOSCOPY N/A 8/25/2023    Procedure: COLONOSCOPY;  Surgeon: Pebbles Spear MD;  Location: [facility];  Service: Endoscopy;  Laterality: N/A;    COLONOSCOPY N/A 10/2/2024    Procedure: COLONOSCOPY  Cardiac clearance received on 09/20/24 per Dr. Lockett, Cardiology.  Note in encounters.  LB;  Surgeon:  Sully Farris MD;  Location: [facility];  Service: Endoscopy;  Laterality: N/A;    ESOPHAGOGASTRODUODENOSCOPY N/A 10/2/2024    Procedure: EGD (ESOPHAGOGASTRODUODENOSCOPY);  Surgeon: Sully Farris MD;  Location: [facility] ENDO;  Service: Endoscopy;  Laterality: N/A;    ESOPHAGOGASTRODUODENOSCOPY N/A 12/31/2024    Procedure: EGD (ESOPHAGOGASTRODUODENOSCOPY);  Surgeon: Maki Sullivan MD;  Location: [facility];  Service: Gastroenterology;  Laterality: N/A;    INSERTION OF VENOUS ACCESS PORT Left 10/21/2024    Procedure: INSERTION, VENOUS ACCESS PORT;  Surgeon: Roseanna Rosas MD;  Location: [facility];  Service: General;  Laterality: Left;    SURGICAL REMOVAL OF LESION OF FACE Right 12/1/2023    Procedure: EXCISION, LESION, FACE;  Surgeon: Jose Flaherty MD;  Location: [facility];  Service: ENT;  Laterality: Right;  1. Excision facial subcutaneous mass right cheek 3 cm. 2. Intermediate layered closure 3.5 cm    WISDOM TOOTH EXTRACTION      XI ROBOTIC APPENDECTOMY N/A 7/31/2024    Procedure: XI ROBOTIC APPENDECTOMY;  Surgeon: Paulo Saavedra MD;  Location: [facility];  Service: General;  Laterality: N/A;    XI ROBOTIC INSERTION, GASTROSTOMY TUBE N/A 10/21/2024    Procedure: XI ROBOTIC INSERTION, GASTROSTOMY TUBE;  Surgeon: Roseanna Rosas MD;  Location: [facility];  Service: General;  Laterality: N/A;       Review of patient's allergies indicates:  No Known Allergies    Current Facility-Administered Medications on File Prior to Encounter   Medication    [COMPLETED] 0.9% NaCl 100 mL flush bag    [COMPLETED] fluorouraciL injection 325 mg    [COMPLETED] leucovorin calcium 320 mg/m2 = 640 mg in D5W 282 mL infusion    [COMPLETED] oxaliplatin (ELOXATIN) 68 mg/m2 = 137 mg in D5W 592.4 mL chemo infusion    [COMPLETED] palonosetron 0.25mg/dexAMETHasone 12mg in NS IVPB 0.25 mg 50 mL    [COMPLETED] trastuzumab-anns (KANJINTI) 300 mg in 0.9% NaCl 299.3 mL chemo infusion    [DISCONTINUED] D5W 250 mL flush bag    [DISCONTINUED]  diphenhydrAMINE injection 50 mg    [DISCONTINUED] EPINEPHrine (EPIPEN) 0.3 mg/0.3 mL pen injection 0.3 mg    [DISCONTINUED] fluorouracil (Adrucil) 960 mg/m2 = 1,940 mg in 0.9% NaCl 100 mL chemo infusion    [DISCONTINUED] hydrocortisone sodium succinate injection 100 mg    [DISCONTINUED] prochlorperazine injection Soln 10 mg     Current Outpatient Medications on File Prior to Encounter   Medication Sig    ferrous sulfate (FEOSOL) 325 mg (65 mg iron) Tab tablet Take 1 tablet (325 mg total) by mouth every other day.    LIDOcaine-prilocaine (EMLA) cream Apply topically as needed.    metoclopramide HCl (REGLAN) 10 MG tablet Take 1 tablet (10 mg total) by mouth every 6 (six) hours as needed (nausea, vomiting).    OLANZapine (ZYPREXA) 5 MG tablet Take 1 tablet (5 mg total) by mouth every evening. Take nightly on days 1-3 of each chemotherapy cycle.    ondansetron (ZOFRAN-ODT) 8 MG TbDL Take 1 tablet (8 mg total) by mouth 2 (two) times daily.    pantoprazole (PROTONIX) 40 MG tablet Take 1 tablet (40 mg total) by mouth once daily.     Family History       Problem Relation (Age of Onset)    Breast cancer Maternal Grandmother    Diabetes Father    Heart disease Father    Hyperlipidemia Mother    Hypertension Father    Kidney disease Father    Prostate cancer Maternal Grandfather          Tobacco Use    Smoking status: Every Day     Current packs/day: 0.00     Types: Cigars, Cigarettes     Last attempt to quit: 10/4/2022     Years since quittin.5     Passive exposure: Never    Smokeless tobacco: Never    Tobacco comments:     Cigar every afternoon   Substance and Sexual Activity    Alcohol use: Yes     Alcohol/week: 4.0 - 6.0 standard drinks of alcohol     Types: 4 - 6 Cans of beer per week     Comment: occ    Drug use: Yes     Types: Marijuana     Comment: medical    Sexual activity: Yes     Partners: Female     Review of Systems   Constitutional:  Positive for fatigue and fever. Negative for chills.   HENT:  Negative for  congestion and rhinorrhea.    Respiratory:  Negative for cough and shortness of breath.    Cardiovascular:  Negative for chest pain, palpitations and leg swelling.   Gastrointestinal:  Negative for constipation, diarrhea, nausea and vomiting.   Genitourinary:  Negative for difficulty urinating and dysuria.   Musculoskeletal:  Positive for myalgias. Negative for arthralgias and back pain.   Neurological:  Negative for dizziness and light-headedness.     Objective:     Vital Signs (Most Recent):  Temp: 98.6 °F (37 °C) (04/10/25 0454)  Pulse: 99 (04/10/25 0454)  Resp: 19 (04/10/25 0454)  BP: (!) 106/57 (04/10/25 0454)  SpO2: (!) 94 % (04/10/25 0454) Vital Signs (24h Range):  Temp:  [97 °F (36.1 °C)-100.7 °F (38.2 °C)] 98.6 °F (37 °C)  Pulse:  [] 99  Resp:  [16-20] 19  SpO2:  [93 %-98 %] 94 %  BP: ()/(50-75) 106/57     Weight: 78.5 kg (173 lb 1 oz)  Body mass index is 22.83 kg/m².     Physical Exam  Vitals and nursing note reviewed.   Constitutional:       General: He is not in acute distress.     Appearance: He is not ill-appearing, toxic-appearing or diaphoretic.   HENT:      Head: Normocephalic and atraumatic.      Mouth/Throat:      Mouth: Mucous membranes are moist.   Eyes:      General: No scleral icterus.        Right eye: No discharge.         Left eye: No discharge.   Cardiovascular:      Rate and Rhythm: Normal rate and regular rhythm.      Heart sounds: Normal heart sounds.   Pulmonary:      Effort: Pulmonary effort is normal. No respiratory distress.      Breath sounds: No wheezing, rhonchi or rales.   Abdominal:      General: Bowel sounds are normal. There is no distension.      Tenderness: There is no abdominal tenderness. There is no guarding.   Musculoskeletal:      Cervical back: No rigidity.      Right lower leg: No edema.      Left lower leg: No edema.   Skin:     General: Skin is warm and dry.      Coloration: Skin is not jaundiced.   Neurological:      Mental Status: He is alert and  oriented to person, place, and time. Mental status is at baseline.   Psychiatric:         Mood and Affect: Mood normal.         Behavior: Behavior normal.                Significant Labs: All pertinent labs within the past 24 hours have been reviewed.  Recent Results (from the past 24 hours)   Comprehensive metabolic panel    Collection Time: 04/09/25  7:54 PM   Result Value Ref Range    Sodium 130 (L) 136 - 145 mmol/L    Potassium 4.1 3.5 - 5.1 mmol/L    Chloride 101 95 - 110 mmol/L    CO2 20 (L) 23 - 29 mmol/L    Glucose 113 (H) 70 - 110 mg/dL    BUN 13 6 - 20 mg/dL    Creatinine 0.7 0.5 - 1.4 mg/dL    Calcium 9.3 8.7 - 10.5 mg/dL    Protein Total 7.9 6.0 - 8.4 gm/dL    Albumin 3.3 (L) 3.5 - 5.2 g/dL    Bilirubin Total 0.6 0.1 - 1.0 mg/dL     40 - 150 unit/L    AST 20 11 - 45 unit/L    ALT 21 10 - 44 unit/L    Anion Gap 9 8 - 16 mmol/L    eGFR >60 >60 mL/min/1.73/m2   Lactic acid, plasma #1    Collection Time: 04/09/25  7:54 PM   Result Value Ref Range    Lactic Acid Level 1.7 0.5 - 2.2 mmol/L   Procalcitonin    Collection Time: 04/09/25  7:54 PM   Result Value Ref Range    Procalcitonin 0.84 (H) <0.25 ng/mL   CBC with Differential    Collection Time: 04/09/25  7:54 PM   Result Value Ref Range    WBC 12.77 (H) 3.90 - 12.70 K/uL    RBC 3.99 (L) 4.60 - 6.20 M/uL    HGB 13.0 (L) 14.0 - 18.0 gm/dL    HCT 37.0 (L) 40.0 - 54.0 %    MCV 93 82 - 98 fL    MCH 32.6 (H) 27.0 - 31.0 pg    MCHC 35.1 32.0 - 36.0 g/dL    RDW 15.9 (H) 11.5 - 14.5 %    Platelet Count 177 150 - 450 K/uL    MPV 10.3 9.2 - 12.9 fL    Nucleated RBC 0 <=0 /100 WBC    Neut % 93.4 (H) 38 - 73 %    Lymph % 0.7 (L) 18 - 48 %    Mono % 5.0 4 - 15 %    Eos % 0.1 <=8 %    Basophil % 0.3 <=1.9 %    Imm Grans % 0.5 0.0 - 0.5 %    Neut # 11.92 (H) 1.8 - 7.7 K/uL    Lymph # 0.09 (L) 1 - 4.8 K/uL    Mono # 0.64 0.3 - 1 K/uL    Eos # 0.01 <=0.5 K/uL    Baso # 0.04 <=0.2 K/uL    Imm Grans # 0.07 (H) 0.00 - 0.04 K/uL   Magnesium    Collection Time: 04/09/25   7:54 PM   Result Value Ref Range    Magnesium  1.4 (L) 1.6 - 2.6 mg/dL   Influenza A & B by Molecular    Collection Time: 04/09/25  8:06 PM    Specimen: Nasal Swab   Result Value Ref Range    INFLUENZA A MOLECULAR Negative Negative    INFLUENZA B MOLECULAR  Negative Negative   COVID-19 Rapid Screening    Collection Time: 04/09/25  8:06 PM   Result Value Ref Range    SARS COV-2 Molecular Negative Negative   Urinalysis, Reflex to Urine Culture    Collection Time: 04/09/25  8:21 PM    Specimen: Urine   Result Value Ref Range    Color, UA Yellow Straw, Marge, Yellow, Light-Orange    Appearance, UA Clear Clear    pH, UA 8.0 5.0 - 8.0    Spec Grav UA 1.020 1.005 - 1.030    Protein, UA Negative Negative    Glucose, UA Negative Negative    Ketones, UA Negative Negative    Bilirubin, UA Negative Negative    Blood, UA Negative Negative    Nitrites, UA Negative Negative    Urobilinogen, UA Negative <2.0 EU/dL    Leukocyte Esterase, UA Negative Negative   Lactic Acid, Plasma    Collection Time: 04/09/25  9:12 PM   Result Value Ref Range    Lactic Acid Level 1.3 0.5 - 2.2 mmol/L   Lactic acid, plasma #2    Collection Time: 04/09/25 11:52 PM   Result Value Ref Range    Lactic Acid Level 3.0 (H) 0.5 - 2.2 mmol/L   Lactic Acid, Plasma    Collection Time: 04/10/25  2:16 AM   Result Value Ref Range    Lactic Acid Level 2.6 (H) 0.5 - 2.2 mmol/L       Significant Imaging: I have reviewed all pertinent imaging results/findings within the past 24 hours.  X-Ray Chest AP Portable   Final Result      1.  Negative for acute process involving the chest.   2.  Stable findings as noted above.      Finalized on: 4/9/2025 7:44 PM By:  Ltio Trevino MD   [facility]# [id number]      2025-04-09 19:46:[id number]     [facility]          Assessment/Plan:     Assessment & Plan  Fever  Immunodeficiency due to chemotherapy  59 y.o. 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 Presented to the ED for evaluation of generalized  body aches and fever.  Fevers ranged from .  Denies any respiratory symptoms, nausea, vomiting, diarrhea, constipation, chest pain, shortness of breath, leg swelling, urinary symptoms.  He underwent chemotherapy for Esophageal cancer 04/09/25     ED course notable for febrile on vitals to 100.7 °F, hypotension, tachycardia.  He underwent fluid resuscitation and was administered Meropenem.  Labs notable for WBC 12.77, hemoglobin 13, sodium 130, magnesium 1.4, lactic acid 3. CXR negative, flu/COVID negative.    PLAN:  -Trend lactate for normalization  -If persistent concerns for infection, consider further imaging  -Follow up blood cultures, deescalate antibiotics as appropriate  Antibiotics (From admission, onward)      Start     Stop Route Frequency Ordered    04/10/25 0500  ceFEPIme injection 2 g         -- IV Every 8 hours (non-standard times) 04/09/25 2302              Hypomagnesemia  Patient has Abnormal Magnesium: hypomagnesemia. Will continue to monitor electrolytes closely. Will replace the affected electrolytes and repeat labs to be done after interventions completed. The patient's magnesium results have been reviewed and are listed below.  Recent Labs   Lab 04/09/25 1954   MG 1.4*      Esophageal cancer, stage IV  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  Followed by Heme-Onc and undergoing chemotherapy treatments    Patient has metastatic cancer of Esophageal primary. The cancer has metastasized to other site. The patient is under the care of an outpatient oncologist. The patient is undergoing active chemotherapy.Their staging information is listed below.    Cancer Staging   Esophageal cancer, stage IV  Staging form: Esophagus - Adenocarcinoma, AJCC 8th Edition  - Clinical stage from 10/10/2024: Stage IVB (cTX, cN0, cM1, G2) - Signed by Zacarias Crowley MD on 10/10/2024      Iron deficiency anemia due to chronic blood loss  Anemia is likely due to  chronic disease due to Malignancy. Most  recent hemoglobin and hematocrit are listed below.  Recent Labs     04/07/25  1203 04/09/25 1954   HGB 14.2 13.0*   HCT 41.0 37.0*     Plan  - Monitor serial CBC: Daily  - Transfuse PRBC if patient becomes hemodynamically unstable, symptomatic or H/H drops below 7/21.  - Patient has not received any PRBC transfusions to date  - continue home iron supplementation    VTE Risk Mitigation (From admission, onward)           Ordered     enoxaparin injection 40 mg  Daily         04/10/25 0636     IP VTE HIGH RISK PATIENT  Once         04/10/25 0636     Place sequential compression device  Until discontinued         04/09/25 2302                       On 04/10/2025, patient should be placed in hospital observation services under my care.           Wilmar Farris DO  Department of Hospital Medicine  [facility]    Voice recognition software was used in the creation of this note/communication and any sound-alike/typographical errors which may have occurred despite initial review prior to signing should be taken in context when interpreting.  If such errors prevent a clear understanding of the note/communication, please contact the provider/office for clarification.

## 2025-04-11 VITALS
DIASTOLIC BLOOD PRESSURE: 59 MMHG | SYSTOLIC BLOOD PRESSURE: 108 MMHG | RESPIRATION RATE: 20 BRPM | TEMPERATURE: 98 F | BODY MASS INDEX: 22.66 KG/M2 | HEART RATE: 80 BPM | OXYGEN SATURATION: 93 % | WEIGHT: 171 LBS | HEIGHT: 73 IN

## 2025-04-11 LAB
ABSOLUTE EOSINOPHIL (OHS): 0.65 K/UL
ABSOLUTE MONOCYTE (OHS): 0.2 K/UL (ref 0.3–1)
ABSOLUTE NEUTROPHIL COUNT (OHS): 3.83 K/UL (ref 1.8–7.7)
ALBUMIN SERPL BCP-MCNC: 2.7 G/DL (ref 3.5–5.2)
ALP SERPL-CCNC: 107 UNIT/L (ref 40–150)
ALT SERPL W/O P-5'-P-CCNC: 21 UNIT/L (ref 10–44)
ANION GAP (OHS): 7 MMOL/L (ref 8–16)
AORTIC ROOT ANNULUS: 3.08 CM
ASCENDING AORTA: 3.49 CM
AST SERPL-CCNC: 21 UNIT/L (ref 11–45)
AV INDEX (PROSTH): 0.71
AV MEAN GRADIENT: 4 MMHG
AV PEAK GRADIENT: 7 MMHG
AV VALVE AREA BY VELOCITY RATIO: 2.9 CM²
AV VALVE AREA: 2.5 CM²
AV VELOCITY RATIO: 0.85
BASOPHILS # BLD AUTO: 0.05 K/UL
BASOPHILS NFR BLD AUTO: 1 %
BILIRUB SERPL-MCNC: 0.7 MG/DL (ref 0.1–1)
BSA FOR ECHO PROCEDURE: 2 M2
BUN SERPL-MCNC: 11 MG/DL (ref 6–20)
CALCIUM SERPL-MCNC: 8.5 MG/DL (ref 8.7–10.5)
CHLORIDE SERPL-SCNC: 105 MMOL/L (ref 95–110)
CO2 SERPL-SCNC: 21 MMOL/L (ref 23–29)
CREAT SERPL-MCNC: 0.7 MG/DL (ref 0.5–1.4)
CV ECHO LV RWT: 0.26 CM
DOP CALC AO PEAK VEL: 1.3 M/S
DOP CALC AO VTI: 29.9 CM
DOP CALC LVOT AREA: 3.5 CM2
DOP CALC LVOT DIAMETER: 2.1 CM
DOP CALC LVOT PEAK VEL: 1.1 M/S
DOP CALC LVOT STROKE VOLUME: 73.7 CM3
DOP CALC RVOT PEAK VEL: 0.68 M/S
DOP CALC RVOT VTI: 12 CM
DOP CALCLVOT PEAK VEL VTI: 21.3 CM
E WAVE DECELERATION TIME: 193 MSEC
E/A RATIO: 1.07
E/E' RATIO: 7 M/S
ECHO LV POSTERIOR WALL: 0.7 CM (ref 0.6–1.1)
ERYTHROCYTE [DISTWIDTH] IN BLOOD BY AUTOMATED COUNT: 16.2 % (ref 11.5–14.5)
FRACTIONAL SHORTENING: 37.7 % (ref 28–44)
GFR SERPLBLD CREATININE-BSD FMLA CKD-EPI: >60 ML/MIN/1.73/M2
GLUCOSE SERPL-MCNC: 99 MG/DL (ref 70–110)
HCT VFR BLD AUTO: 35.7 % (ref 40–54)
HGB BLD-MCNC: 12 GM/DL (ref 14–18)
IMM GRANULOCYTES # BLD AUTO: 0.01 K/UL (ref 0–0.04)
IMM GRANULOCYTES NFR BLD AUTO: 0.2 % (ref 0–0.5)
INTERVENTRICULAR SEPTUM: 0.9 CM (ref 0.6–1.1)
IVC DIAMETER: 2.05 CM
IVRT: 88 MSEC
LA MAJOR: 5.7 CM
LA MINOR: 5.4 CM
LA WIDTH: 3.9 CM
LEFT ATRIUM AREA SYSTOLIC (APICAL 2 CHAMBER): 19.56 CM2
LEFT ATRIUM AREA SYSTOLIC (APICAL 4 CHAMBER): 22.88 CM2
LEFT ATRIUM SIZE: 3.8 CM
LEFT ATRIUM VOLUME INDEX MOD: 36 ML/M2
LEFT ATRIUM VOLUME INDEX: 35 ML/M2
LEFT ATRIUM VOLUME MOD: 72 ML
LEFT ATRIUM VOLUME: 70 CM3
LEFT INTERNAL DIMENSION IN SYSTOLE: 3.3 CM (ref 2.1–4)
LEFT VENTRICLE DIASTOLIC VOLUME INDEX: 66.17 ML/M2
LEFT VENTRICLE DIASTOLIC VOLUME: 133 ML
LEFT VENTRICLE END SYSTOLIC VOLUME APICAL 2 CHAMBER: 62.47 ML
LEFT VENTRICLE END SYSTOLIC VOLUME APICAL 4 CHAMBER: 68.75 ML
LEFT VENTRICLE MASS INDEX: 74.7 G/M2
LEFT VENTRICLE SYSTOLIC VOLUME INDEX: 22.4 ML/M2
LEFT VENTRICLE SYSTOLIC VOLUME: 45 ML
LEFT VENTRICULAR INTERNAL DIMENSION IN DIASTOLE: 5.3 CM (ref 3.5–6)
LEFT VENTRICULAR MASS: 150.1 G
LV LATERAL E/E' RATIO: 6.5 M/S
LV SEPTAL E/E' RATIO: 7.6 M/S
LVED V (TEICH): 133.33 ML
LVES V (TEICH): 45.33 ML
LVOT MG: 2.5 MMHG
LVOT MV: 0.75 CM/S
LYMPHOCYTES # BLD AUTO: 0.5 K/UL (ref 1–4.8)
MAGNESIUM SERPL-MCNC: 1.6 MG/DL (ref 1.6–2.6)
MCH RBC QN AUTO: 32.3 PG (ref 27–31)
MCHC RBC AUTO-ENTMCNC: 33.6 G/DL (ref 32–36)
MCV RBC AUTO: 96 FL (ref 82–98)
MV PEAK A VEL: 0.85 M/S
MV PEAK E VEL: 0.91 M/S
MV STENOSIS PRESSURE HALF TIME: 55.94 MS
MV VALVE AREA P 1/2 METHOD: 3.93 CM2
NUCLEATED RBC (/100WBC) (OHS): 0 /100 WBC
OHS CV RV/LV RATIO: 0.74 CM
PHOSPHATE SERPL-MCNC: 2.2 MG/DL (ref 2.7–4.5)
PISA TR MAX VEL: 2.7 M/S
PLATELET # BLD AUTO: 165 K/UL (ref 150–450)
PMV BLD AUTO: 10.6 FL (ref 9.2–12.9)
POTASSIUM SERPL-SCNC: 3.9 MMOL/L (ref 3.5–5.1)
PROT SERPL-MCNC: 7.2 GM/DL (ref 6–8.4)
PV MEAN GRADIENT: 1 MMHG
PV PEAK GRADIENT: 3 MMHG
PV PEAK VELOCITY: 0.81 M/S
RA MAJOR: 4.57 CM
RA PRESSURE ESTIMATED: 3 MMHG
RA WIDTH: 4.1 CM
RBC # BLD AUTO: 3.72 M/UL (ref 4.6–6.2)
RELATIVE EOSINOPHIL (OHS): 12.4 %
RELATIVE LYMPHOCYTE (OHS): 9.5 % (ref 18–48)
RELATIVE MONOCYTE (OHS): 3.8 % (ref 4–15)
RELATIVE NEUTROPHIL (OHS): 73.1 % (ref 38–73)
RIGHT VENTRICLE DIASTOLIC BASEL DIMENSION: 3.9 CM
RIGHT VENTRICULAR END-DIASTOLIC DIMENSION: 3.85 CM
RV TB RVSP: 6 MMHG
SODIUM SERPL-SCNC: 133 MMOL/L (ref 136–145)
STJ: 2.99 CM
TDI LATERAL: 0.14 M/S
TDI SEPTAL: 0.12 M/S
TDI: 0.13 M/S
TR MAX PG: 29 MMHG
TRICUSPID ANNULAR PLANE SYSTOLIC EXCURSION: 2.02 CM
TV REST PULMONARY ARTERY PRESSURE: 32 MMHG
WBC # BLD AUTO: 5.24 K/UL (ref 3.9–12.7)
Z-SCORE OF LEFT VENTRICULAR DIMENSION IN END DIASTOLE: -1.05
Z-SCORE OF LEFT VENTRICULAR DIMENSION IN END SYSTOLE: -0.72

## 2025-04-11 PROCEDURE — 25000003 PHARM REV CODE 250: Performed by: STUDENT IN AN ORGANIZED HEALTH CARE EDUCATION/TRAINING PROGRAM

## 2025-04-11 PROCEDURE — 85025 COMPLETE CBC W/AUTO DIFF WBC: CPT | Performed by: STUDENT IN AN ORGANIZED HEALTH CARE EDUCATION/TRAINING PROGRAM

## 2025-04-11 PROCEDURE — 25000003 PHARM REV CODE 250: Performed by: FAMILY MEDICINE

## 2025-04-11 PROCEDURE — 63600175 PHARM REV CODE 636 W HCPCS: Performed by: FAMILY MEDICINE

## 2025-04-11 PROCEDURE — 80053 COMPREHEN METABOLIC PANEL: CPT | Performed by: STUDENT IN AN ORGANIZED HEALTH CARE EDUCATION/TRAINING PROGRAM

## 2025-04-11 PROCEDURE — 63600175 PHARM REV CODE 636 W HCPCS: Performed by: STUDENT IN AN ORGANIZED HEALTH CARE EDUCATION/TRAINING PROGRAM

## 2025-04-11 PROCEDURE — 84100 ASSAY OF PHOSPHORUS: CPT | Performed by: STUDENT IN AN ORGANIZED HEALTH CARE EDUCATION/TRAINING PROGRAM

## 2025-04-11 PROCEDURE — 83735 ASSAY OF MAGNESIUM: CPT | Performed by: STUDENT IN AN ORGANIZED HEALTH CARE EDUCATION/TRAINING PROGRAM

## 2025-04-11 PROCEDURE — 36415 COLL VENOUS BLD VENIPUNCTURE: CPT | Performed by: STUDENT IN AN ORGANIZED HEALTH CARE EDUCATION/TRAINING PROGRAM

## 2025-04-11 RX ORDER — HEPARIN 100 UNIT/ML
5 SYRINGE INTRAVENOUS
Status: DISCONTINUED | OUTPATIENT
Start: 2025-04-11 | End: 2025-04-11 | Stop reason: HOSPADM

## 2025-04-11 RX ORDER — LEVOFLOXACIN 750 MG/1
750 TABLET, FILM COATED ORAL DAILY
Qty: 5 TABLET | Refills: 0 | Status: SHIPPED | OUTPATIENT
Start: 2025-04-11 | End: 2025-04-16

## 2025-04-11 RX ADMIN — ACETAMINOPHEN 650 MG: 325 TABLET ORAL at 06:04

## 2025-04-11 RX ADMIN — HEPARIN 500 UNITS: 100 SYRINGE at 11:04

## 2025-04-11 RX ADMIN — MUPIROCIN: 20 OINTMENT TOPICAL at 09:04

## 2025-04-11 RX ADMIN — CEFEPIME 2 G: 2 INJECTION, POWDER, FOR SOLUTION INTRAVENOUS at 06:04

## 2025-04-11 RX ADMIN — PANTOPRAZOLE SODIUM 40 MG: 40 TABLET, DELAYED RELEASE ORAL at 09:04

## 2025-04-11 NOTE — ASSESSMENT & PLAN NOTE
59 y.o. 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 Presented to the ED for evaluation of generalized body aches and fever.  Fevers ranged from .  Denies any respiratory symptoms, nausea, vomiting, diarrhea, constipation, chest pain, shortness of breath, leg swelling, urinary symptoms.  He underwent chemotherapy for Esophageal cancer 04/09/25     ED course notable for febrile on vitals to 100.7 °F, hypotension, tachycardia.  He underwent fluid resuscitation and was administered Meropenem.  Labs notable for WBC 12.77, hemoglobin 13, sodium 130, magnesium 1.4, lactic acid 3. CXR negative, flu/COVID negative.    PLAN:  -Trend lactate for normalization  -If persistent concerns for infection, consider further imaging  -Follow up blood cultures, deescalate antibiotics as appropriate  Antibiotics (From admission, onward)      None

## 2025-04-11 NOTE — PLAN OF CARE
Discussed poc with pt, pt verbalized understanding     Purposeful rounding every 2hours     VS wnl  Cardiac monitoring in use, pt is NSR, tele monitor # [id number]  Fall precautions in place, remains injury free  Pt denies c/o nausea  Pain under control with PRN meds           Abx given as prescribed  Bed locked at lowest position  Call light within reach     Chart check complete  Pt is ready for discharge

## 2025-04-11 NOTE — PLAN OF CARE
[facility] - Med Surg  Discharge Final Note    Primary Care Provider: Michell Goyal MD    Expected Discharge Date: 4/11/2025    Final Discharge Note (most recent)       Final Note - 04/11/25 0937          Final Note    Assessment Type Final Discharge Note     Anticipated Discharge Disposition Home or Self Care     Hospital Resources/Appts/Education Provided Appointments scheduled and added to AVS

## 2025-04-11 NOTE — HOSPITAL COURSE
Patient was admitted for fever of unknown source.  Infectious workup negative.  Patient was afebrile.  He was initiated on IV cefepime on admission.  Given immunocompromise status patient was continued on p.o. Levaquin upon discharge empirically.  Outpatient follow-up with Hematology.

## 2025-04-11 NOTE — DISCHARGE SUMMARY
[facility] Medicine  Discharge Summary      Patient Name: Rosalio Muñoz  MRN: [id number]  VINEET: [id number]  Patient Class: IP- Inpatient  Admission Date: 4/9/2025  Hospital Length of Stay: 1 days  Discharge Date and Time: 4/11/2025 11:55 AM  Attending Physician: No att. providers found   Discharging Provider: Babar Jim MD  Primary Care Provider: Michell Goyal MD    Primary Care Team: Networked reference to record PCT     HPI:   Mr. Rosalio Muñoz is a 59 y.o. 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  Fevers ranged from .  Denies any respiratory symptoms, nausea, vomiting, diarrhea, constipation, chest pain, shortness of breath, leg swelling, urinary symptoms.  He underwent chemotherapy for Esophageal cancer yesterday afternoon.    ED course notable for febrile on vitals to 100.7 °F, hypotension, tachycardia.  He underwent fluid resuscitation and was administered Meropenem.  Labs notable for WBC 12.77, hemoglobin 13, sodium 130, magnesium 1.4, lactic acid 3. CXR negative, flu/COVID negative.    * No surgery found *      Hospital Course:   Patient was admitted for fever of unknown source.  Infectious workup negative.  Patient was afebrile.  He was initiated on IV cefepime on admission.  Given immunocompromise status patient was continued on p.o. Levaquin upon discharge empirically.  Outpatient follow-up with Hematology.     Goals of Care Treatment Preferences:  Code Status: Full Code    Health care agent: SDM: Wife: Fanta Muñoz  Health care agent number: [phone]          What is most important right now is to focus on remaining as independent as possible, symptom/pain control.  Accordingly, we have decided that the best plan to meet the patient's goals includes continuing with treatment.         Consults:      Assessment & Plan  Fever  Immunodeficiency due to chemotherapy  59 y.o. 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 Presented to the ED for evaluation of generalized body aches and fever.  Fevers ranged from .  Denies any respiratory symptoms, nausea, vomiting, diarrhea, constipation, chest pain, shortness of breath, leg swelling, urinary symptoms.  He underwent chemotherapy for Esophageal cancer 04/09/25     ED course notable for febrile on vitals to 100.7 °F, hypotension, tachycardia.  He underwent fluid resuscitation and was administered Meropenem.  Labs notable for WBC 12.77, hemoglobin 13, sodium 130, magnesium 1.4, lactic acid 3. CXR negative, flu/COVID negative.    PLAN:  -Trend lactate for normalization  -If persistent concerns for infection, consider further imaging  -Follow up blood cultures, deescalate antibiotics as appropriate  Antibiotics (From admission, onward)      None              Hypomagnesemia  Patient has Abnormal Magnesium: hypomagnesemia. Will continue to monitor electrolytes closely. Will replace the affected electrolytes and repeat labs to be done after interventions completed. The patient's magnesium results have been reviewed and are listed below.  Recent Labs   Lab 04/11/25  0605   MG 1.6      Esophageal cancer, stage IV  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  Followed by Heme-Onc and undergoing chemotherapy treatments    Patient has metastatic cancer of Esophageal primary. The cancer has metastasized to other site. The patient is under the care of an outpatient oncologist. The patient is undergoing active chemotherapy.Their staging information is listed below.    Cancer Staging   Esophageal cancer, stage IV  Staging form: Esophagus - Adenocarcinoma, AJCC 8th Edition  - Clinical stage from 10/10/2024: Stage IVB (cTX, cN0, cM1, G2) - Signed by Zacarias Crowley MD on 10/10/2024      Iron deficiency anemia due to chronic  blood loss  Anemia is likely due to  chronic disease due to Malignancy. Most recent hemoglobin and hematocrit are listed below.  Recent Labs     04/09/25  1954 04/10/25  0613 04/11/25  0605   HGB 13.0* 11.6* 12.0*   HCT 37.0* 33.8* 35.7*     Plan  - Monitor serial CBC: Daily  - Transfuse PRBC if patient becomes hemodynamically unstable, symptomatic or H/H drops below 7/21.  - Patient has not received any PRBC transfusions to date  - continue home iron supplementation  Final Active Diagnoses:    Diagnosis Date Noted POA    PRINCIPAL PROBLEM:  Fever [R50.9] 03/26/2025 Yes    Immunodeficiency due to chemotherapy [D84.821, T45.1X5A, Z79.69] 10/28/2024 Not Applicable     Chronic    Iron deficiency anemia due to chronic blood loss [D50.0] 10/12/2024 Yes     Chronic    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 [C79.89] 10/11/2024 Yes    Esophageal cancer, stage IV [C15.9] 10/10/2024 Yes     Chronic    Hypomagnesemia [E83.42] 05/30/2023 Yes      Problems Resolved During this Admission:       Discharged Condition: good    Disposition: Home or Self Care    Follow Up:   Follow-up Information       Hudson Latif MD Follow up in 1 week(s).    Specialty: Hematology and Oncology  Contact information:  [address]  [place] [address] [phone]                           Patient Instructions:   No discharge procedures on file.    Significant Diagnostic Studies: N/A    Pending Diagnostic Studies:       None           Medications:  Reconciled Home Medications:      Medication List        START taking these medications      levoFLOXacin 750 MG tablet  Commonly known as: LEVAQUIN  Take 1 tablet (750 mg total) by mouth once daily. for 5 days            CONTINUE taking these medications      ferrous sulfate 325 mg (65 mg iron) Tab tablet  Commonly known as: FEOSOL  Take 1 tablet (325 mg total) by mouth every other day.     LIDOcaine-prilocaine cream  Commonly known as: EMLA  Apply topically as needed.      metoclopramide HCl 10 MG tablet  Commonly known as: REGLAN  Take 1 tablet (10 mg total) by mouth every 6 (six) hours as needed (nausea, vomiting).     OLANZapine 5 MG tablet  Commonly known as: ZyPREXA  Take 1 tablet (5 mg total) by mouth every evening. Take nightly on days 1-3 of each chemotherapy cycle.     ondansetron 8 MG Tbdl  Commonly known as: ZOFRAN-ODT  Take 1 tablet (8 mg total) by mouth 2 (two) times daily.     pantoprazole 40 MG tablet  Commonly known as: PROTONIX  Take 1 tablet (40 mg total) by mouth once daily.              Indwelling Lines/Drains at time of discharge:   Lines/Drains/Airways       Central Venous Catheter Line  Duration             Port A Cath Single Lumen 10/21/24 1502 171 days              Drain  Duration                  Gastrostomy/Enterostomy 10/21/24 1413 Gastrostomy tube w/o balloon  days                    Time spent on the discharge of patient: 37 minutes         Babar Jim MD  Department of Hospital Medicine  [facility] - Med S[facility]

## 2025-04-11 NOTE — ASSESSMENT & PLAN NOTE
Patient has Abnormal Magnesium: hypomagnesemia. Will continue to monitor electrolytes closely. Will replace the affected electrolytes and repeat labs to be done after interventions completed. The patient's magnesium results have been reviewed and are listed below.  Recent Labs   Lab 04/11/25  0605   MG 1.6

## 2025-04-11 NOTE — ASSESSMENT & PLAN NOTE
Anemia is likely due to  chronic disease due to Malignancy. Most recent hemoglobin and hematocrit are listed below.  Recent Labs     04/09/25  1954 04/10/25  0613 04/11/25  0605   HGB 13.0* 11.6* 12.0*   HCT 37.0* 33.8* 35.7*     Plan  - Monitor serial CBC: Daily  - Transfuse PRBC if patient becomes hemodynamically unstable, symptomatic or H/H drops below 7/21.  - Patient has not received any PRBC transfusions to date  - continue home iron supplementation

## 2025-04-14 ENCOUNTER — PATIENT OUTREACH (OUTPATIENT)
Dept: ADMINISTRATIVE | Facility: CLINIC | Age: 60
End: 2025-04-14
Payer: COMMERCIAL

## 2025-04-15 LAB
BACTERIA BLD CULT: NORMAL
BACTERIA BLD CULT: NORMAL

## 2025-04-17 ENCOUNTER — HOSPITAL ENCOUNTER (EMERGENCY)
Facility: HOSPITAL | Age: 60
Discharge: HOME | End: 2025-04-17
Attending: EMERGENCY MEDICINE
Payer: COMMERCIAL

## 2025-04-17 VITALS
TEMPERATURE: 99 F | OXYGEN SATURATION: 96 % | HEART RATE: 78 BPM | RESPIRATION RATE: 18 BRPM | DIASTOLIC BLOOD PRESSURE: 66 MMHG | SYSTOLIC BLOOD PRESSURE: 118 MMHG

## 2025-04-17 DIAGNOSIS — T85.528A DISLODGED GASTROSTOMY TUBE: Primary | ICD-10-CM

## 2025-04-17 PROCEDURE — 63600175 PHARM REV CODE 636 W HCPCS: Performed by: EMERGENCY MEDICINE

## 2025-04-17 PROCEDURE — 99284 EMERGENCY DEPT VISIT MOD MDM: CPT | Mod: 25

## 2025-04-17 PROCEDURE — 96375 TX/PRO/DX INJ NEW DRUG ADDON: CPT

## 2025-04-17 PROCEDURE — 43762 RPLC GTUBE NO REVJ TRC: CPT

## 2025-04-17 PROCEDURE — 96374 THER/PROPH/DIAG INJ IV PUSH: CPT

## 2025-04-17 PROCEDURE — 25500020 PHARM REV CODE 255: Performed by: EMERGENCY MEDICINE

## 2025-04-17 PROCEDURE — 96376 TX/PRO/DX INJ SAME DRUG ADON: CPT

## 2025-04-17 RX ORDER — MORPHINE SULFATE 4 MG/ML
4 INJECTION, SOLUTION INTRAMUSCULAR; INTRAVENOUS
Refills: 0 | Status: COMPLETED | OUTPATIENT
Start: 2025-04-17 | End: 2025-04-17

## 2025-04-17 RX ORDER — DIATRIZOATE MEGLUMINE AND DIATRIZOATE SODIUM 660; 100 MG/ML; MG/ML
120 SOLUTION ORAL; RECTAL
Status: COMPLETED | OUTPATIENT
Start: 2025-04-17 | End: 2025-04-17

## 2025-04-17 RX ORDER — ONDANSETRON HYDROCHLORIDE 2 MG/ML
4 INJECTION, SOLUTION INTRAVENOUS
Status: COMPLETED | OUTPATIENT
Start: 2025-04-17 | End: 2025-04-17

## 2025-04-17 RX ADMIN — ONDANSETRON 4 MG: 2 INJECTION INTRAMUSCULAR; INTRAVENOUS at 10:04

## 2025-04-17 RX ADMIN — MORPHINE SULFATE 4 MG: 4 INJECTION INTRAVENOUS at 11:04

## 2025-04-17 RX ADMIN — MORPHINE SULFATE 4 MG: 4 INJECTION INTRAVENOUS at 10:04

## 2025-04-17 RX ADMIN — DIATRIZOATE MEGLUMINE AND DIATRIZOATE SODIUM 30 ML: 600; 100 SOLUTION ORAL; RECTAL at 11:04

## 2025-04-18 NOTE — ED PROVIDER NOTES
SCRIBE #1 NOTE: I, Sheila Pennington, am scribing for, and in the presence of, Storm Gerardo MD. I have scribed the entire note.       History     Chief Complaint   Patient presents with    Feeding Tube came out      Pt. C/o coughing at home and his feeding tube coming out. Pt states the tube has been out for approx 40 min      Review of patient's allergies indicates:  No Known Allergies      History of Present Illness     HPI    4/17/2025, 10:37 PM  History obtained from the patient, medical records, and wife      History of Present Illness: Rosalio Muñoz is a 59 y.o. male patient with a PMHx of HTN, esophageal cancer stage 4, and malignant neoplasm who presents to the Emergency Department for feeding tube coming out. Pt states he was coughing at home and his feeding tube popped out. The feeding tube has been out for about an hour now, and the gastrostomy site is sore. The tube was placed in his abdomen on his left side in October. Pt's wife states when he started chemo for his esophogeal cancer, he was not able to eat by mouth. Now, the pt is able to eat by mouth. Symptoms are constant and moderate in severity. No mitigating or exacerbating factors reported. No prior Tx specified.  No further complaints or concerns at this time.       Arrival mode: Personal Transportation    PCP: Michell Goyal MD        Past Medical History:  Past Medical History:   Diagnosis Date    Esophageal cancer, stage IV 10/10/2024    Hypertension     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 10/11/2024    Palliative care encounter 12/11/2024       Past Surgical History:  Past Surgical History:   Procedure Laterality Date    COLONOSCOPY N/A 8/25/2023    Procedure: COLONOSCOPY;  Surgeon: Pebbles Spear MD;  Location: [facility];  Service: Endoscopy;  Laterality: N/A;    COLONOSCOPY N/A 10/2/2024    Procedure: COLONOSCOPY  Cardiac clearance received on 09/20/24 per Dr. Lockett, Cardiology.  Note in encounters.  LB;   Surgeon: Sully Farris MD;  Location: [facility];  Service: Endoscopy;  Laterality: N/A;    ESOPHAGOGASTRODUODENOSCOPY N/A 10/2/2024    Procedure: EGD (ESOPHAGOGASTRODUODENOSCOPY);  Surgeon: Sully Farris MD;  Location: [facility];  Service: Endoscopy;  Laterality: N/A;    ESOPHAGOGASTRODUODENOSCOPY N/A 2024    Procedure: EGD (ESOPHAGOGASTRODUODENOSCOPY);  Surgeon: Maki Sullivan MD;  Location: [facility];  Service: Gastroenterology;  Laterality: N/A;    INSERTION OF VENOUS ACCESS PORT Left 10/21/2024    Procedure: INSERTION, VENOUS ACCESS PORT;  Surgeon: Roseanna Rosas MD;  Location: [facility];  Service: General;  Laterality: Left;    SURGICAL REMOVAL OF LESION OF FACE Right 2023    Procedure: EXCISION, LESION, FACE;  Surgeon: Jose Flaherty MD;  Location: [facility];  Service: ENT;  Laterality: Right;  1. Excision facial subcutaneous mass right cheek 3 cm. 2. Intermediate layered closure 3.5 cm    WISDOM TOOTH EXTRACTION      XI ROBOTIC APPENDECTOMY N/A 2024    Procedure: XI ROBOTIC APPENDECTOMY;  Surgeon: Paulo Saavedra MD;  Location: [facility];  Service: General;  Laterality: N/A;    XI ROBOTIC INSERTION, GASTROSTOMY TUBE N/A 10/21/2024    Procedure: XI ROBOTIC INSERTION, GASTROSTOMY TUBE;  Surgeon: Roseanna Rosas MD;  Location: [facility];  Service: General;  Laterality: N/A;         Family History:  Family History   Problem Relation Name Age of Onset    Hyperlipidemia Mother      Hypertension Father      Diabetes Father      Kidney disease Father      Heart disease Father      Breast cancer Maternal Grandmother      Prostate cancer Maternal Grandfather      Colon cancer Neg Hx         Social History:  Social History     Tobacco Use    Smoking status: Every Day     Current packs/day: 0.00     Types: Cigars, Cigarettes     Last attempt to quit: 10/4/2022     Years since quittin.5     Passive exposure: Never    Smokeless tobacco: Never    Tobacco comments:     Cigar every  afternoon   Substance and Sexual Activity    Alcohol use: Yes     Alcohol/week: 4.0 - 6.0 standard drinks of alcohol     Types: 4 - 6 Cans of beer per week     Comment: occ    Drug use: Yes     Types: Marijuana     Comment: medical    Sexual activity: Yes     Partners: Female        Review of Systems     Review of Systems   Constitutional:  Negative for fever.   HENT:  Negative for sore throat.    Respiratory:  Negative for cough and shortness of breath.    Cardiovascular:  Negative for chest pain.   Gastrointestinal:  Negative for nausea.        Dislodged gastrostomy tube   Genitourinary:  Negative for dysuria.   Musculoskeletal:  Negative for back pain.   Skin:  Negative for rash.   Neurological:  Negative for weakness.   Hematological:  Does not bruise/bleed easily.   All other systems reviewed and are negative.       Physical Exam     Initial Vitals [04/17/25 2201]   BP Pulse Resp Temp SpO2   127/68 89 18 98.5 °F (36.9 °C) 97 %      MAP       --          Physical Exam  Nursing Notes and Vital Signs Reviewed.  Constitutional: Patient is in no acute distress. Well-developed and well-nourished.  Head: Atraumatic. Normocephalic.  Eyes: PERRL. EOM intact. Conjunctivae are not pale. No scleral icterus.  ENT: Mucous membranes are moist. Oropharynx is clear and symmetric.    Neck: Supple. Full ROM. No lymphadenopathy.  Cardiovascular: Regular rate. Regular rhythm. No murmurs, rubs, or gallops. Distal pulses are 2+ and symmetric.  Pulmonary/Chest: No respiratory distress. Clear to auscultation bilaterally. No wheezing or rales.  Abdominal: Gastrostomy in LUQ. Feeding tube has been dislodged. No bleeding or superficial signs of infection around site. No abdominal tenderness.   Genitourinary: No CVA tenderness.  Musculoskeletal: Moves all extremities. No obvious deformities. No edema. No calf tenderness.  Skin: Warm and dry.  Neurological:  Alert, awake, and appropriate.  Normal speech.  No acute focal neurological  "deficits are appreciated.  Psychiatric: Normal affect. Good eye contact. Appropriate in content.     ED Course   Feeding Tube    Date/Time: 4/17/2025 10:54 PM  Location procedure was performed: [facility] EMERGENCY DEPARTMENT    Performed by: Storm Gerardo MD  Authorized by: Storm Gerardo MD  Consent: Verbal consent obtained  Risks and benefits: risks, benefits and alternatives were discussed  Consent given by: patient  Patient understanding: patient states understanding of the procedure being performed  Patient consent: the patient's understanding of the procedure matches consent given  Procedure consent: procedure consent matches procedure scheduled  Relevant documents: relevant documents present and verified  Test results: test results available and properly labeled  Site marked: the operative site was marked  Imaging studies: imaging studies available  Required items: required blood products, implants, devices, and special equipment available  Patient identity confirmed: verbally with patient  Time out: Immediately prior to procedure a "time out" was called to verify the correct patient, procedure, equipment, support staff and site/side marked as required.  Indications: tube dislodged  Preparation: Patient was prepped and draped in the usual sterile fashion.    Sedation:  Patient sedated: no    Local anesthesia used: no    Anesthesia:  Local anesthesia used: no  Tube type: gastrostomy  Patient position: supine  Procedure type: replacement  Tube size: 20 Fr.  Endoscope used: no  Bulb inflation volume: 20 (ml)  Bulb inflation fluid: normal saline  Placement/position confirmation: x-ray  Tube placement difficulty: none  Complications: No  Specimens: No  Implants: No  Patient tolerance: patient tolerated the procedure well with no immediate complications        ED Vital Signs:  Vitals:    04/17/25 2201 04/17/25 2240 04/17/25 2246 04/17/25 2301   BP: 127/68 (!) 144/84     Pulse: 89 86     Resp: 18 18 18 20   Temp: 98.5 " °F (36.9 °C)      TempSrc: Oral      SpO2: 97% 97%      04/17/25 2304 04/17/25 2328   BP: 123/67 118/66   Pulse: 86 78   Resp: 20 18   Temp:     TempSrc:     SpO2: 96% 96%       Abnormal Lab Results:  Labs Reviewed - No data to display     All Lab Results:    Imaging Results:  Imaging Results              XR Gastric tube check, non-radiologist performed (Preliminary result)  Result time 04/17/25 23:16:18      Wet Read by Storm Gerardo MD (04/17/25 23:16:18, [facility] - Emergency Dept., Emergency Medicine)    Successful G tube replacement                                    The Emergency Provider reviewed the vital signs and test results, which are outlined above.     ED Discussion     10:50 PM: Dr. Gerardo replaced the feeding tube where it was dislodged.     11:16 PM: Reassessed pt at this time. Discussed with patient and/or family/caretaker all pertinent ED information and results. Discussed pt dx and plan of tx. Gave the patient all f/u and return to the ED instructions. All questions and concerns were addressed at this time. Patient and/or family/caretaker expresses understanding of information and instructions, and is comfortable with plan to discharge. Pt is stable for discharge.     I discussed with patient and/or family/caretaker that evaluation in the ED does not suggest any emergent or life threatening medical conditions requiring immediate intervention beyond what was provided in the ED, and I believe patient is safe for discharge.  Regardless, an unremarkable evaluation in the ED does not preclude the development or presence of a serious of life threatening condition. As such, I instructed that the patient is to return immediately for any worsening or change in current symptoms.       Medical Decision Making  Amount and/or Complexity of Data Reviewed  Radiology: ordered and independent interpretation performed. Decision-making details documented in ED Course.  Discussion of management or test interpretation  with external provider(s): 10:50 PM: Dr. Gerardo replaced the feeding tube where it was dislodged.     11:16 PM: Reassessed pt at this time. Discussed with patient and/or family/caretaker all pertinent ED information and results. Discussed pt dx and plan of tx. Gave the patient all f/u and return to the ED instructions. All questions and concerns were addressed at this time. Patient and/or family/caretaker expresses understanding of information and instructions, and is comfortable with plan to discharge. Pt is stable for discharge.     I discussed with patient and/or family/caretaker that evaluation in the ED does not suggest any emergent or life threatening medical conditions requiring immediate intervention beyond what was provided in the ED, and I believe patient is safe for discharge.  Regardless, an unremarkable evaluation in the ED does not preclude the development or presence of a serious of life threatening condition. As such, I instructed that the patient is to return immediately for any worsening or change in current symptoms.    Risk  Prescription drug management.  Risk Details: DDx includes gastrostomy tube malfunction, gastrostomy tube dislodged                ED Medication(s):  Medications   morphine injection 4 mg (4 mg Intravenous Given 4/17/25 2246)   ondansetron injection 4 mg (4 mg Intravenous Given 4/17/25 2246)   morphine injection 4 mg (4 mg Intravenous Given 4/17/25 2301)   diatrizoate meglumineand-diatrizoate sodium (GASTROVIEW) solution 120 mL (30 mLs Per G Tube Given 4/17/25 2308)       Discharge Medication List as of 4/17/2025 11:17 PM           Follow-up Information       Andrei Krishna MD. Schedule an appointment as soon as possible for a visit in 1 week.    Specialty: Gastroenterology  Why: For re-evaluation and further treatment  Contact information:  [id number] [facility] [id number] [phone]               [facility] - Emergency Dept.. Go today.    Specialty: Emergency  Medicine  Why: If symptoms worsen, For re-evaluation and further treatment  Contact information:  [address] [facility] [address] [phone]                               Scribe Attestation:   Scribe #1: I performed the above scribed service and the documentation accurately describes the services I performed. I attest to the accuracy of the note.     Attending:   Physician Attestation Statement for Scribe #1: I, Storm Gerardo MD, personally performed the services described in this documentation, as scribed by Sheila Pennington, in my presence, and it is both accurate and complete.           Clinical Impression       ICD-10-CM ICD-9-CM   1. Dislodged gastrostomy tube  T85.528A 996.59       Disposition:   Disposition: Discharged  Condition: Stable        Storm Gerardo MD  04/18/25 0121

## 2025-04-21 ENCOUNTER — OFFICE VISIT (OUTPATIENT)
Dept: HEMATOLOGY/ONCOLOGY | Facility: CLINIC | Age: 60
End: 2025-04-21
Payer: COMMERCIAL

## 2025-04-21 ENCOUNTER — LAB VISIT (OUTPATIENT)
Dept: LAB | Facility: HOSPITAL | Age: 60
End: 2025-04-21
Attending: INTERNAL MEDICINE
Payer: COMMERCIAL

## 2025-04-21 VITALS
HEIGHT: 73 IN | SYSTOLIC BLOOD PRESSURE: 106 MMHG | BODY MASS INDEX: 22.12 KG/M2 | TEMPERATURE: 98 F | DIASTOLIC BLOOD PRESSURE: 71 MMHG | WEIGHT: 166.88 LBS | HEART RATE: 95 BPM | OXYGEN SATURATION: 95 %

## 2025-04-21 DIAGNOSIS — D84.821 IMMUNODEFICIENCY DUE TO CHEMOTHERAPY: Chronic | ICD-10-CM

## 2025-04-21 DIAGNOSIS — C15.9 ESOPHAGEAL CANCER, STAGE IV: ICD-10-CM

## 2025-04-21 DIAGNOSIS — D84.821 IMMUNODEFICIENCY DUE TO CHEMOTHERAPY: ICD-10-CM

## 2025-04-21 DIAGNOSIS — T45.1X5A IMMUNODEFICIENCY DUE TO CHEMOTHERAPY: ICD-10-CM

## 2025-04-21 DIAGNOSIS — E44.0 MODERATE MALNUTRITION: ICD-10-CM

## 2025-04-21 DIAGNOSIS — C79.89 MALIGNANT NEOPLASM METASTATIC TO OTHER SITE: ICD-10-CM

## 2025-04-21 DIAGNOSIS — C78.6 SECONDARY ADENOCARCINOMA OF RETROPERITONEUM: ICD-10-CM

## 2025-04-21 DIAGNOSIS — Z79.69 IMMUNODEFICIENCY DUE TO CHEMOTHERAPY: ICD-10-CM

## 2025-04-21 DIAGNOSIS — D50.0 IRON DEFICIENCY ANEMIA DUE TO CHRONIC BLOOD LOSS: ICD-10-CM

## 2025-04-21 DIAGNOSIS — T45.1X5A IMMUNODEFICIENCY DUE TO CHEMOTHERAPY: Chronic | ICD-10-CM

## 2025-04-21 DIAGNOSIS — K94.23 PEG TUBE MALFUNCTION: ICD-10-CM

## 2025-04-21 DIAGNOSIS — C78.7 SECONDARY LIVER CANCER: ICD-10-CM

## 2025-04-21 DIAGNOSIS — R63.4 UNINTENTIONAL WEIGHT LOSS: ICD-10-CM

## 2025-04-21 DIAGNOSIS — Z79.69 IMMUNODEFICIENCY DUE TO CHEMOTHERAPY: Chronic | ICD-10-CM

## 2025-04-21 DIAGNOSIS — C15.9 ESOPHAGEAL CANCER, STAGE IV: Primary | Chronic | ICD-10-CM

## 2025-04-21 LAB
ABSOLUTE EOSINOPHIL (OHS): 0.36 K/UL
ABSOLUTE MONOCYTE (OHS): 1.04 K/UL (ref 0.3–1)
ABSOLUTE NEUTROPHIL COUNT (OHS): 4.2 K/UL (ref 1.8–7.7)
ALBUMIN SERPL BCP-MCNC: 3.7 G/DL (ref 3.5–5.2)
ALP SERPL-CCNC: 192 UNIT/L (ref 40–150)
ALT SERPL W/O P-5'-P-CCNC: 30 UNIT/L (ref 10–44)
ANION GAP (OHS): 8 MMOL/L (ref 8–16)
AST SERPL-CCNC: 26 UNIT/L (ref 11–45)
BASOPHILS # BLD AUTO: 0.09 K/UL
BASOPHILS NFR BLD AUTO: 1.3 %
BILIRUB SERPL-MCNC: 0.7 MG/DL (ref 0.1–1)
BUN SERPL-MCNC: 11 MG/DL (ref 6–20)
CALCIUM SERPL-MCNC: 9.8 MG/DL (ref 8.7–10.5)
CHLORIDE SERPL-SCNC: 103 MMOL/L (ref 95–110)
CO2 SERPL-SCNC: 23 MMOL/L (ref 23–29)
CREAT SERPL-MCNC: 0.8 MG/DL (ref 0.5–1.4)
ERYTHROCYTE [DISTWIDTH] IN BLOOD BY AUTOMATED COUNT: 17 % (ref 11.5–14.5)
GFR SERPLBLD CREATININE-BSD FMLA CKD-EPI: >60 ML/MIN/1.73/M2
GLUCOSE SERPL-MCNC: 91 MG/DL (ref 70–110)
HCT VFR BLD AUTO: 44.3 % (ref 40–54)
HGB BLD-MCNC: 15.3 GM/DL (ref 14–18)
IMM GRANULOCYTES # BLD AUTO: 0.02 K/UL (ref 0–0.04)
IMM GRANULOCYTES NFR BLD AUTO: 0.3 % (ref 0–0.5)
LYMPHOCYTES # BLD AUTO: 1.41 K/UL (ref 1–4.8)
MAGNESIUM SERPL-MCNC: 1.8 MG/DL (ref 1.6–2.6)
MCH RBC QN AUTO: 32.9 PG (ref 27–31)
MCHC RBC AUTO-ENTMCNC: 34.5 G/DL (ref 32–36)
MCV RBC AUTO: 95 FL (ref 82–98)
NUCLEATED RBC (/100WBC) (OHS): 0 /100 WBC
PLATELET # BLD AUTO: 225 K/UL (ref 150–450)
PMV BLD AUTO: 9 FL (ref 9.2–12.9)
POTASSIUM SERPL-SCNC: 4.6 MMOL/L (ref 3.5–5.1)
PROT SERPL-MCNC: 9.3 GM/DL (ref 6–8.4)
RBC # BLD AUTO: 4.65 M/UL (ref 4.6–6.2)
RELATIVE EOSINOPHIL (OHS): 5.1 %
RELATIVE LYMPHOCYTE (OHS): 19.8 % (ref 18–48)
RELATIVE MONOCYTE (OHS): 14.6 % (ref 4–15)
RELATIVE NEUTROPHIL (OHS): 58.9 % (ref 38–73)
SODIUM SERPL-SCNC: 134 MMOL/L (ref 136–145)
TSH SERPL-ACNC: 2.32 UIU/ML (ref 0.4–4)
WBC # BLD AUTO: 7.12 K/UL (ref 3.9–12.7)

## 2025-04-21 PROCEDURE — 83735 ASSAY OF MAGNESIUM: CPT

## 2025-04-21 PROCEDURE — 1159F MED LIST DOCD IN RCRD: CPT | Mod: CPTII,S$GLB,, | Performed by: INTERNAL MEDICINE

## 2025-04-21 PROCEDURE — 80053 COMPREHEN METABOLIC PANEL: CPT

## 2025-04-21 PROCEDURE — 3074F SYST BP LT 130 MM HG: CPT | Mod: CPTII,S$GLB,, | Performed by: INTERNAL MEDICINE

## 2025-04-21 PROCEDURE — 99214 OFFICE O/P EST MOD 30 MIN: CPT | Mod: S$GLB,,, | Performed by: INTERNAL MEDICINE

## 2025-04-21 PROCEDURE — 36415 COLL VENOUS BLD VENIPUNCTURE: CPT

## 2025-04-21 PROCEDURE — 84443 ASSAY THYROID STIM HORMONE: CPT

## 2025-04-21 PROCEDURE — 85025 COMPLETE CBC W/AUTO DIFF WBC: CPT

## 2025-04-21 PROCEDURE — 3078F DIAST BP <80 MM HG: CPT | Mod: CPTII,S$GLB,, | Performed by: INTERNAL MEDICINE

## 2025-04-21 PROCEDURE — 1111F DSCHRG MED/CURRENT MED MERGE: CPT | Mod: CPTII,S$GLB,, | Performed by: INTERNAL MEDICINE

## 2025-04-21 PROCEDURE — 99999 PR PBB SHADOW E&M-EST. PATIENT-LVL III: CPT | Mod: PBBFAC,,, | Performed by: INTERNAL MEDICINE

## 2025-04-21 PROCEDURE — 3008F BODY MASS INDEX DOCD: CPT | Mod: CPTII,S$GLB,, | Performed by: INTERNAL MEDICINE

## 2025-04-21 NOTE — PROGRESS NOTES
Subjective:       Patient ID: Rosalio Muñoz is a 59 y.o. male.    Chief Complaint: Results and Esophageal Cancer    HPI:  59-year-old male stage IV esophageal carcinoma patient has recent hospitalization non neutropenic.  Patient presents for review most recent imaging demonstrating stable findings recent had PEG tube malfunction reinserted    Past Medical History:   Diagnosis Date    Esophageal cancer, stage IV 10/10/2024    Hypertension     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 10/11/2024    Palliative care encounter 12/11/2024     Family History   Problem Relation Name Age of Onset    Hyperlipidemia Mother      Hypertension Father      Diabetes Father      Kidney disease Father      Heart disease Father      Breast cancer Maternal Grandmother      Prostate cancer Maternal Grandfather      Colon cancer Neg Hx       Social History[1]  Past Surgical History:   Procedure Laterality Date    COLONOSCOPY N/A 8/25/2023    Procedure: COLONOSCOPY;  Surgeon: Pebbles Spear MD;  Location: [facility];  Service: Endoscopy;  Laterality: N/A;    COLONOSCOPY N/A 10/2/2024    Procedure: COLONOSCOPY  Cardiac clearance received on 09/20/24 per Dr. Lockett, Cardiology.  Note in encounters.  LB;  Surgeon: Sully Farris MD;  Location: [facility];  Service: Endoscopy;  Laterality: N/A;    ESOPHAGOGASTRODUODENOSCOPY N/A 10/2/2024    Procedure: EGD (ESOPHAGOGASTRODUODENOSCOPY);  Surgeon: Sully Farris MD;  Location: [facility];  Service: Endoscopy;  Laterality: N/A;    ESOPHAGOGASTRODUODENOSCOPY N/A 12/31/2024    Procedure: EGD (ESOPHAGOGASTRODUODENOSCOPY);  Surgeon: Maki Sullivan MD;  Location: [facility];  Service: Gastroenterology;  Laterality: N/A;    INSERTION OF VENOUS ACCESS PORT Left 10/21/2024    Procedure: INSERTION, VENOUS ACCESS PORT;  Surgeon: Roseanna Rosas MD;  Location: [facility] OR;  Service: General;  Laterality: Left;    SURGICAL REMOVAL OF LESION OF FACE Right 12/1/2023    Procedure:  "EXCISION, LESION, FACE;  Surgeon: Jose Flaherty MD;  Location: [facility] OR;  Service: ENT;  Laterality: Right;  1. Excision facial subcutaneous mass right cheek 3 cm. 2. Intermediate layered closure 3.5 cm    WISDOM TOOTH EXTRACTION      XI ROBOTIC APPENDECTOMY N/A 7/31/2024    Procedure: XI ROBOTIC APPENDECTOMY;  Surgeon: Paulo Saavedra MD;  Location: [facility] OR;  Service: General;  Laterality: N/A;    XI ROBOTIC INSERTION, GASTROSTOMY TUBE N/A 10/21/2024    Procedure: XI ROBOTIC INSERTION, GASTROSTOMY TUBE;  Surgeon: Roseanna Rosas MD;  Location: [facility] OR;  Service: General;  Laterality: N/A;       Labs:  Lab Results   Component Value Date    WBC 7.12 04/21/2025    HGB 15.3 04/21/2025    HCT 44.3 04/21/2025    MCV 95 04/21/2025     04/21/2025     BMP  Lab Results   Component Value Date     (L) 04/21/2025    K 4.6 04/21/2025     04/21/2025    CO2 23 04/21/2025    BUN 11 04/21/2025    CREATININE 0.8 04/21/2025    CALCIUM 9.8 04/21/2025    ANIONGAP 8 04/21/2025    ESTGFRAFRICA >60.0 07/08/2022    EGFRNONAA >60.0 07/08/2022     Lab Results   Component Value Date    ALT 30 04/21/2025    AST 26 04/21/2025    ALKPHOS 192 (H) 04/21/2025    BILITOT 0.7 04/21/2025       Lab Results   Component Value Date    IRON 54 04/07/2025    TIBC 456 (H) 04/07/2025    FERRITIN 169.0 04/07/2025     Lab Results   Component Value Date    [id number] 445 10/19/2018     No results found for: "FOLATE"  Lab Results   Component Value Date    TSH 2.275 03/24/2025         Review of Systems   Constitutional:  Positive for fatigue. Negative for activity change, appetite change, chills, diaphoresis, fever and unexpected weight change.   HENT:  Negative for congestion, dental problem, drooling, ear discharge, ear pain, facial swelling, hearing loss, mouth sores, nosebleeds, postnasal drip, rhinorrhea, sinus pressure, sneezing, sore throat, tinnitus, trouble swallowing and voice change.    Eyes:  Negative for photophobia, pain, " discharge, redness, itching and visual disturbance.   Respiratory:  Negative for apnea, cough, choking, chest tightness, shortness of breath, wheezing and stridor.    Cardiovascular:  Negative for chest pain, palpitations and leg swelling.   Gastrointestinal:  Negative for abdominal distention, abdominal pain, anal bleeding, blood in stool, constipation, diarrhea, nausea, rectal pain and vomiting.   Endocrine: Negative for cold intolerance, heat intolerance, polydipsia, polyphagia and polyuria.   Genitourinary:  Negative for decreased urine volume, difficulty urinating, dysuria, enuresis, flank pain, frequency, genital sores, hematuria, penile discharge, penile pain, penile swelling, scrotal swelling, testicular pain and urgency.   Musculoskeletal:  Negative for arthralgias, back pain, gait problem, joint swelling, myalgias, neck pain and neck stiffness.   Skin:  Negative for color change, pallor, rash and wound.   Allergic/Immunologic: Negative for environmental allergies, food allergies and immunocompromised state.   Neurological:  Positive for weakness. Negative for dizziness, tremors, seizures, syncope, facial asymmetry, speech difficulty, light-headedness, numbness and headaches.   Hematological:  Negative for adenopathy. Does not bruise/bleed easily.   Psychiatric/Behavioral:  Negative for agitation, behavioral problems, confusion, decreased concentration, dysphoric mood, hallucinations, self-injury, sleep disturbance and suicidal ideas. The patient is not nervous/anxious and is not hyperactive.        Objective:      Physical Exam  Vitals reviewed.   Constitutional:       General: He is not in acute distress.     Appearance: He is well-developed. He is not diaphoretic.   HENT:      Head: Normocephalic.      Right Ear: External ear normal.      Left Ear: External ear normal.      Nose: Nose normal.      Right Sinus: No maxillary sinus tenderness or frontal sinus tenderness.      Left Sinus: No maxillary sinus  tenderness or frontal sinus tenderness.      Mouth/Throat:      Pharynx: No oropharyngeal exudate.   Eyes:      General: Lids are normal. No scleral icterus.        Right eye: No discharge.         Left eye: No discharge.      Extraocular Movements:      Right eye: Normal extraocular motion.      Left eye: Normal extraocular motion.      Conjunctiva/sclera:      Right eye: Right conjunctiva is not injected. No hemorrhage.     Left eye: Left conjunctiva is not injected. No hemorrhage.     Pupils: Pupils are equal, round, and reactive to light.   Neck:      Thyroid: No thyromegaly.      Vascular: No JVD.      Trachea: No tracheal deviation.   Cardiovascular:      Rate and Rhythm: Normal rate.   Pulmonary:      Effort: Pulmonary effort is normal. No respiratory distress.      Breath sounds: No stridor.   Abdominal:      General: Bowel sounds are normal.      Palpations: Abdomen is soft. There is no hepatomegaly, splenomegaly or mass.      Tenderness: There is no abdominal tenderness.   Musculoskeletal:         General: No tenderness. Normal range of motion.      Cervical back: Normal range of motion and neck supple.   Lymphadenopathy:      Head:      Right side of head: No posterior auricular or occipital adenopathy.      Left side of head: No posterior auricular or occipital adenopathy.      Cervical: No cervical adenopathy.      Right cervical: No superficial, deep or posterior cervical adenopathy.     Left cervical: No superficial, deep or posterior cervical adenopathy.      Upper Body:      Right upper body: No supraclavicular adenopathy.      Left upper body: No supraclavicular adenopathy.   Skin:     General: Skin is dry.      Findings: No erythema or rash.      Nails: There is no clubbing.   Neurological:      Mental Status: He is alert and oriented to person, place, and time.      Cranial Nerves: No cranial nerve deficit.      Motor: Weakness present.      Coordination: Coordination normal.   Psychiatric:          Behavior: Behavior normal.         Thought Content: Thought content normal.         Judgment: Judgment normal.             Assessment:      1. Esophageal cancer, stage IV    2. 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    3. Secondary adenocarcinoma of retroperitoneum    4. Secondary liver cancer    5. Moderate malnutrition    6. Immunodeficiency due to chemotherapy    7. PEG tube malfunction           Med Onc Chart Routing      Follow up with physician . Return to clinic to see in proximally 2 weeks CBC CMP serum iron TIBC ferritin LDH C-reactive protein prior for next and continuation of systemic chemotherapy uncoupled visits   Follow up with JORDANA    Infusion scheduling note    Injection scheduling note    Labs    Imaging    Pharmacy appointment    Other referrals                   Plan:     Recent hospitalizations reviewed non neutropenic fever.  At this time patient has stage IV metastatic esophageal cancer currently receiving systemic therapy.  He has decided and I concur that a 2 week break is necessary for him to recover from hospitalizations.  Has had his PEG tube fall out reinserted.  With recent imaging studies demonstrating stable findings will go ahead and continue follow-up with him in proximally 2 weeks with standing labs done prior before initiation next round of systemic therapy        Hudson Latif Jr, MD FACP         [1]   Social History  Socioeconomic History    Marital status:     Number of children: 2   Occupational History    Occupation:    Tobacco Use    Smoking status: Every Day     Current packs/day: 0.00     Types: Cigars, Cigarettes     Last attempt to quit: 10/4/2022     Years since quittin.5     Passive exposure: Never    Smokeless tobacco: Never    Tobacco comments:     Cigar every afternoon   Substance and Sexual Activity    Alcohol use: Yes     Alcohol/week: 4.0 - 6.0 standard drinks of alcohol     Types: 4 - 6 Cans of beer per week     Comment: occ     Drug use: Yes     Types: Marijuana     Comment: medical    Sexual activity: Yes     Partners: Female     Social Drivers of Health     Financial Resource Strain: Patient Declined (3/28/2025)    Overall Financial Resource Strain (CARDIA)     Difficulty of Paying Living Expenses: Patient declined   Food Insecurity: Patient Declined (3/28/2025)    Hunger Vital Sign     Worried About Running Out of Food in the Last Year: Patient declined     Ran Out of Food in the Last Year: Patient declined   Transportation Needs: Patient Declined (2/12/2025)    PRAPARE - Transportation     Lack of Transportation (Medical): Patient declined     Lack of Transportation (Non-Medical): Patient declined   Physical Activity: Unknown (2/12/2025)    Exercise Vital Sign     Days of Exercise per Week: Patient declined     Minutes of Exercise per Session: 20 min   Stress: Patient Declined (3/28/2025)    [employer] - Occupational Stress Questionnaire     Feeling of Stress : Patient declined   Housing Stability: Patient Declined (3/28/2025)    Housing Stability Vital Sign     Unable to Pay for Housing in the Last Year: Patient declined     Homeless in the Last Year: Patient declined

## 2025-05-01 ENCOUNTER — LAB VISIT (OUTPATIENT)
Dept: LAB | Facility: HOSPITAL | Age: 60
End: 2025-05-01
Attending: INTERNAL MEDICINE
Payer: COMMERCIAL

## 2025-05-01 ENCOUNTER — RESULTS FOLLOW-UP (OUTPATIENT)
Dept: HEMATOLOGY/ONCOLOGY | Facility: CLINIC | Age: 60
End: 2025-05-01

## 2025-05-01 DIAGNOSIS — C79.89 MALIGNANT NEOPLASM METASTATIC TO OTHER SITE: ICD-10-CM

## 2025-05-01 DIAGNOSIS — C78.6 SECONDARY ADENOCARCINOMA OF RETROPERITONEUM: ICD-10-CM

## 2025-05-01 DIAGNOSIS — T45.1X5A IMMUNODEFICIENCY DUE TO CHEMOTHERAPY: ICD-10-CM

## 2025-05-01 DIAGNOSIS — C15.9 ESOPHAGEAL CANCER, STAGE IV: ICD-10-CM

## 2025-05-01 DIAGNOSIS — Z79.69 IMMUNODEFICIENCY DUE TO CHEMOTHERAPY: ICD-10-CM

## 2025-05-01 DIAGNOSIS — E44.0 MODERATE MALNUTRITION: ICD-10-CM

## 2025-05-01 DIAGNOSIS — D84.821 IMMUNODEFICIENCY DUE TO CHEMOTHERAPY: ICD-10-CM

## 2025-05-01 DIAGNOSIS — C78.7 SECONDARY LIVER CANCER: ICD-10-CM

## 2025-05-01 DIAGNOSIS — K94.23 PEG TUBE MALFUNCTION: ICD-10-CM

## 2025-05-01 LAB
ABSOLUTE EOSINOPHIL (OHS): 0.24 K/UL
ABSOLUTE MONOCYTE (OHS): 0.92 K/UL (ref 0.3–1)
ABSOLUTE NEUTROPHIL COUNT (OHS): 2.25 K/UL (ref 1.8–7.7)
ALBUMIN SERPL BCP-MCNC: 3.4 G/DL (ref 3.5–5.2)
ALP SERPL-CCNC: 182 UNIT/L (ref 40–150)
ALT SERPL W/O P-5'-P-CCNC: 20 UNIT/L (ref 10–44)
ANION GAP (OHS): 7 MMOL/L (ref 8–16)
AST SERPL-CCNC: 22 UNIT/L (ref 11–45)
BASOPHILS # BLD AUTO: 0.1 K/UL
BASOPHILS NFR BLD AUTO: 1.9 %
BILIRUB SERPL-MCNC: 0.5 MG/DL (ref 0.1–1)
BUN SERPL-MCNC: 11 MG/DL (ref 6–20)
CALCIUM SERPL-MCNC: 9.3 MG/DL (ref 8.7–10.5)
CHLORIDE SERPL-SCNC: 102 MMOL/L (ref 95–110)
CO2 SERPL-SCNC: 26 MMOL/L (ref 23–29)
CREAT SERPL-MCNC: 0.8 MG/DL (ref 0.5–1.4)
CRP SERPL-MCNC: 4.8 MG/L
ERYTHROCYTE [DISTWIDTH] IN BLOOD BY AUTOMATED COUNT: 16.7 % (ref 11.5–14.5)
FERRITIN SERPL-MCNC: 150 NG/ML (ref 20–300)
GFR SERPLBLD CREATININE-BSD FMLA CKD-EPI: >60 ML/MIN/1.73/M2
GLUCOSE SERPL-MCNC: 108 MG/DL (ref 70–110)
HCT VFR BLD AUTO: 41.9 % (ref 40–54)
HGB BLD-MCNC: 14.2 GM/DL (ref 14–18)
IMM GRANULOCYTES # BLD AUTO: 0.01 K/UL (ref 0–0.04)
IMM GRANULOCYTES NFR BLD AUTO: 0.2 % (ref 0–0.5)
IRON SATN MFR SERPL: 14 % (ref 20–50)
IRON SERPL-MCNC: 58 UG/DL (ref 45–160)
LDH SERPL-CCNC: 133 U/L (ref 110–260)
LYMPHOCYTES # BLD AUTO: 1.73 K/UL (ref 1–4.8)
MCH RBC QN AUTO: 32.6 PG (ref 27–31)
MCHC RBC AUTO-ENTMCNC: 33.9 G/DL (ref 32–36)
MCV RBC AUTO: 96 FL (ref 82–98)
NUCLEATED RBC (/100WBC) (OHS): 0 /100 WBC
PLATELET # BLD AUTO: 174 K/UL (ref 150–450)
PMV BLD AUTO: 11.9 FL (ref 9.2–12.9)
POTASSIUM SERPL-SCNC: 4.5 MMOL/L (ref 3.5–5.1)
PROT SERPL-MCNC: 8.6 GM/DL (ref 6–8.4)
RBC # BLD AUTO: 4.35 M/UL (ref 4.6–6.2)
RELATIVE EOSINOPHIL (OHS): 4.6 %
RELATIVE LYMPHOCYTE (OHS): 33 % (ref 18–48)
RELATIVE MONOCYTE (OHS): 17.5 % (ref 4–15)
RELATIVE NEUTROPHIL (OHS): 42.8 % (ref 38–73)
SODIUM SERPL-SCNC: 135 MMOL/L (ref 136–145)
TIBC SERPL-MCNC: 414 UG/DL (ref 250–450)
TRANSFERRIN SERPL-MCNC: 280 MG/DL (ref 200–375)
WBC # BLD AUTO: 5.25 K/UL (ref 3.9–12.7)

## 2025-05-01 PROCEDURE — 86140 C-REACTIVE PROTEIN: CPT

## 2025-05-01 PROCEDURE — 36415 COLL VENOUS BLD VENIPUNCTURE: CPT | Mod: PO

## 2025-05-01 PROCEDURE — 84075 ASSAY ALKALINE PHOSPHATASE: CPT

## 2025-05-01 PROCEDURE — 85025 COMPLETE CBC W/AUTO DIFF WBC: CPT

## 2025-05-01 PROCEDURE — 83540 ASSAY OF IRON: CPT

## 2025-05-01 PROCEDURE — 83615 LACTATE (LD) (LDH) ENZYME: CPT

## 2025-05-01 PROCEDURE — 82728 ASSAY OF FERRITIN: CPT

## 2025-05-05 ENCOUNTER — HOSPITAL ENCOUNTER (INPATIENT)
Facility: HOSPITAL | Age: 60
LOS: 2 days | Discharge: HOME | DRG: 872 | End: 2025-05-08
Attending: FAMILY MEDICINE | Admitting: STUDENT IN AN ORGANIZED HEALTH CARE EDUCATION/TRAINING PROGRAM
Payer: COMMERCIAL

## 2025-05-05 ENCOUNTER — INFUSION (OUTPATIENT)
Dept: INFUSION THERAPY | Facility: HOSPITAL | Age: 60
End: 2025-05-05
Attending: INTERNAL MEDICINE
Payer: COMMERCIAL

## 2025-05-05 ENCOUNTER — OFFICE VISIT (OUTPATIENT)
Dept: HEMATOLOGY/ONCOLOGY | Facility: CLINIC | Age: 60
End: 2025-05-05
Payer: COMMERCIAL

## 2025-05-05 VITALS
SYSTOLIC BLOOD PRESSURE: 142 MMHG | WEIGHT: 165.56 LBS | TEMPERATURE: 98 F | DIASTOLIC BLOOD PRESSURE: 74 MMHG | HEART RATE: 78 BPM | BODY MASS INDEX: 21.84 KG/M2 | OXYGEN SATURATION: 99 % | RESPIRATION RATE: 18 BRPM

## 2025-05-05 DIAGNOSIS — C78.6 SECONDARY ADENOCARCINOMA OF RETROPERITONEUM: ICD-10-CM

## 2025-05-05 DIAGNOSIS — D50.0 IRON DEFICIENCY ANEMIA DUE TO CHRONIC BLOOD LOSS: Chronic | ICD-10-CM

## 2025-05-05 DIAGNOSIS — Z13.6 SCREENING FOR CARDIOVASCULAR CONDITION: ICD-10-CM

## 2025-05-05 DIAGNOSIS — G62.0 PERIPHERAL NEUROPATHY DUE TO AND NOT CONCURRENT WITH CHEMOTHERAPY: ICD-10-CM

## 2025-05-05 DIAGNOSIS — C15.9 ESOPHAGEAL CANCER, STAGE IV: Primary | ICD-10-CM

## 2025-05-05 DIAGNOSIS — C15.9 ESOPHAGEAL CANCER, STAGE IV: Primary | Chronic | ICD-10-CM

## 2025-05-05 DIAGNOSIS — T45.1X5A IMMUNODEFICIENCY DUE TO CHEMOTHERAPY: Chronic | ICD-10-CM

## 2025-05-05 DIAGNOSIS — R07.9 CHEST PAIN: ICD-10-CM

## 2025-05-05 DIAGNOSIS — C79.89 MALIGNANT NEOPLASM METASTATIC TO OTHER SITE: ICD-10-CM

## 2025-05-05 DIAGNOSIS — D84.821 IMMUNODEFICIENCY DUE TO CHEMOTHERAPY: Chronic | ICD-10-CM

## 2025-05-05 DIAGNOSIS — Z79.69 IMMUNODEFICIENCY DUE TO CHEMOTHERAPY: Chronic | ICD-10-CM

## 2025-05-05 DIAGNOSIS — T45.1X5S PERIPHERAL NEUROPATHY DUE TO AND NOT CONCURRENT WITH CHEMOTHERAPY: ICD-10-CM

## 2025-05-05 DIAGNOSIS — E87.1 HYPONATREMIA: ICD-10-CM

## 2025-05-05 DIAGNOSIS — C78.7 SECONDARY LIVER CANCER: ICD-10-CM

## 2025-05-05 DIAGNOSIS — A41.9 SEPSIS, DUE TO UNSPECIFIED ORGANISM, UNSPECIFIED WHETHER ACUTE ORGAN DYSFUNCTION PRESENT: Primary | ICD-10-CM

## 2025-05-05 DIAGNOSIS — D69.6 THROMBOCYTOPENIA: ICD-10-CM

## 2025-05-05 DIAGNOSIS — E83.42 HYPOMAGNESEMIA: ICD-10-CM

## 2025-05-05 DIAGNOSIS — D64.9 ANEMIA, UNSPECIFIED TYPE: ICD-10-CM

## 2025-05-05 DIAGNOSIS — D84.9 IMMUNOCOMPROMISED: ICD-10-CM

## 2025-05-05 LAB
ABSOLUTE EOSINOPHIL (OHS): 0.05 K/UL
ABSOLUTE MONOCYTE (OHS): 0.48 K/UL (ref 0.3–1)
ABSOLUTE NEUTROPHIL COUNT (OHS): 10.34 K/UL (ref 1.8–7.7)
ALBUMIN SERPL BCP-MCNC: 3.4 G/DL (ref 3.5–5.2)
ALP SERPL-CCNC: 168 UNIT/L (ref 40–150)
ALT SERPL W/O P-5'-P-CCNC: 18 UNIT/L (ref 10–44)
ANION GAP (OHS): 10 MMOL/L (ref 8–16)
AST SERPL-CCNC: 18 UNIT/L (ref 11–45)
BASOPHILS # BLD AUTO: 0.05 K/UL
BASOPHILS NFR BLD AUTO: 0.4 %
BILIRUB SERPL-MCNC: 0.9 MG/DL (ref 0.1–1)
BILIRUB UR QL STRIP.AUTO: NEGATIVE
BUN SERPL-MCNC: 13 MG/DL (ref 6–20)
CALCIUM SERPL-MCNC: 9.3 MG/DL (ref 8.7–10.5)
CHLORIDE SERPL-SCNC: 100 MMOL/L (ref 95–110)
CLARITY UR: CLEAR
CO2 SERPL-SCNC: 19 MMOL/L (ref 23–29)
COLOR UR AUTO: YELLOW
CREAT SERPL-MCNC: 0.7 MG/DL (ref 0.5–1.4)
ERYTHROCYTE [DISTWIDTH] IN BLOOD BY AUTOMATED COUNT: 16.2 % (ref 11.5–14.5)
GFR SERPLBLD CREATININE-BSD FMLA CKD-EPI: >60 ML/MIN/1.73/M2
GLUCOSE SERPL-MCNC: 117 MG/DL (ref 70–110)
GLUCOSE UR QL STRIP: NEGATIVE
GROUP A STREP MOLECULAR (OHS): NEGATIVE
HCT VFR BLD AUTO: 38.6 % (ref 40–54)
HGB BLD-MCNC: 13.4 GM/DL (ref 14–18)
HGB UR QL STRIP: NEGATIVE
HOLD SPECIMEN: NORMAL
IMM GRANULOCYTES # BLD AUTO: 0.05 K/UL (ref 0–0.04)
IMM GRANULOCYTES NFR BLD AUTO: 0.4 % (ref 0–0.5)
INFLUENZA A MOLECULAR (OHS): NEGATIVE
INFLUENZA B MOLECULAR (OHS): NEGATIVE
KETONES UR QL STRIP: NEGATIVE
LACTATE SERPL-SCNC: 1.3 MMOL/L (ref 0.5–2.2)
LEUKOCYTE ESTERASE UR QL STRIP: NEGATIVE
LYMPHOCYTES # BLD AUTO: 0.21 K/UL (ref 1–4.8)
MAGNESIUM SERPL-MCNC: 1.4 MG/DL (ref 1.6–2.6)
MCH RBC QN AUTO: 32.4 PG (ref 27–31)
MCHC RBC AUTO-ENTMCNC: 34.7 G/DL (ref 32–36)
MCV RBC AUTO: 93 FL (ref 82–98)
NITRITE UR QL STRIP: NEGATIVE
NUCLEATED RBC (/100WBC) (OHS): 0 /100 WBC
PH UR STRIP: 7 [PH]
PLATELET # BLD AUTO: 5 K/UL (ref 150–450)
PLATELET BLD QL SMEAR: ABNORMAL
PMV BLD AUTO: ABNORMAL FL
POTASSIUM SERPL-SCNC: 4.2 MMOL/L (ref 3.5–5.1)
PROCALCITONIN SERPL-MCNC: 1.28 NG/ML
PROT SERPL-MCNC: 8.7 GM/DL (ref 6–8.4)
PROT UR QL STRIP: ABNORMAL
RBC # BLD AUTO: 4.14 M/UL (ref 4.6–6.2)
RELATIVE EOSINOPHIL (OHS): 0.4 %
RELATIVE LYMPHOCYTE (OHS): 1.9 % (ref 18–48)
RELATIVE MONOCYTE (OHS): 4.3 % (ref 4–15)
RELATIVE NEUTROPHIL (OHS): 92.6 % (ref 38–73)
SARS-COV-2 RDRP RESP QL NAA+PROBE: NEGATIVE
SODIUM SERPL-SCNC: 129 MMOL/L (ref 136–145)
SP GR UR STRIP: 1.02
UROBILINOGEN UR STRIP-ACNC: NEGATIVE EU/DL
WBC # BLD AUTO: 11.18 K/UL (ref 3.9–12.7)

## 2025-05-05 PROCEDURE — 96415 CHEMO IV INFUSION ADDL HR: CPT

## 2025-05-05 PROCEDURE — 96365 THER/PROPH/DIAG IV INF INIT: CPT

## 2025-05-05 PROCEDURE — 96367 TX/PROPH/DG ADDL SEQ IV INF: CPT

## 2025-05-05 PROCEDURE — 83605 ASSAY OF LACTIC ACID: CPT | Performed by: PHYSICIAN ASSISTANT

## 2025-05-05 PROCEDURE — 81003 URINALYSIS AUTO W/O SCOPE: CPT | Performed by: PHYSICIAN ASSISTANT

## 2025-05-05 PROCEDURE — 96413 CHEMO IV INFUSION 1 HR: CPT

## 2025-05-05 PROCEDURE — 84145 PROCALCITONIN (PCT): CPT | Performed by: PHYSICIAN ASSISTANT

## 2025-05-05 PROCEDURE — U0002 COVID-19 LAB TEST NON-CDC: HCPCS | Performed by: FAMILY MEDICINE

## 2025-05-05 PROCEDURE — 98006 SYNCH AUDIO-VIDEO EST MOD 30: CPT | Mod: 25,95,, | Performed by: INTERNAL MEDICINE

## 2025-05-05 PROCEDURE — 87040 BLOOD CULTURE FOR BACTERIA: CPT | Performed by: PHYSICIAN ASSISTANT

## 2025-05-05 PROCEDURE — 85025 COMPLETE CBC W/AUTO DIFF WBC: CPT | Performed by: PHYSICIAN ASSISTANT

## 2025-05-05 PROCEDURE — 83735 ASSAY OF MAGNESIUM: CPT | Performed by: FAMILY MEDICINE

## 2025-05-05 PROCEDURE — 25000003 PHARM REV CODE 250: Performed by: INTERNAL MEDICINE

## 2025-05-05 PROCEDURE — 25000003 PHARM REV CODE 250: Performed by: FAMILY MEDICINE

## 2025-05-05 PROCEDURE — 87651 STREP A DNA AMP PROBE: CPT | Performed by: FAMILY MEDICINE

## 2025-05-05 PROCEDURE — 93005 ELECTROCARDIOGRAM TRACING: CPT

## 2025-05-05 PROCEDURE — 96417 CHEMO IV INFUS EACH ADDL SEQ: CPT

## 2025-05-05 PROCEDURE — 63600175 PHARM REV CODE 636 W HCPCS: Performed by: PHYSICIAN ASSISTANT

## 2025-05-05 PROCEDURE — 99291 CRITICAL CARE FIRST HOUR: CPT

## 2025-05-05 PROCEDURE — 96411 CHEMO IV PUSH ADDL DRUG: CPT

## 2025-05-05 PROCEDURE — 82040 ASSAY OF SERUM ALBUMIN: CPT | Performed by: PHYSICIAN ASSISTANT

## 2025-05-05 PROCEDURE — 25000003 PHARM REV CODE 250: Performed by: PHYSICIAN ASSISTANT

## 2025-05-05 PROCEDURE — 87502 INFLUENZA DNA AMP PROBE: CPT | Performed by: PHYSICIAN ASSISTANT

## 2025-05-05 PROCEDURE — 93010 ELECTROCARDIOGRAM REPORT: CPT | Mod: ,,, | Performed by: INTERNAL MEDICINE

## 2025-05-05 PROCEDURE — 96416 CHEMO PROLONG INFUSE W/PUMP: CPT

## 2025-05-05 PROCEDURE — 96375 TX/PRO/DX INJ NEW DRUG ADDON: CPT

## 2025-05-05 PROCEDURE — 63600175 PHARM REV CODE 636 W HCPCS: Performed by: INTERNAL MEDICINE

## 2025-05-05 PROCEDURE — 96368 THER/DIAG CONCURRENT INF: CPT

## 2025-05-05 RX ORDER — PROCHLORPERAZINE EDISYLATE 5 MG/ML
10 INJECTION INTRAMUSCULAR; INTRAVENOUS ONCE AS NEEDED
OUTPATIENT
Start: 2025-05-20

## 2025-05-05 RX ORDER — DIPHENHYDRAMINE HYDROCHLORIDE 50 MG/ML
50 INJECTION, SOLUTION INTRAMUSCULAR; INTRAVENOUS ONCE AS NEEDED
OUTPATIENT
Start: 2025-05-18

## 2025-05-05 RX ORDER — PROCHLORPERAZINE EDISYLATE 5 MG/ML
10 INJECTION INTRAMUSCULAR; INTRAVENOUS ONCE AS NEEDED
OUTPATIENT
Start: 2025-06-17

## 2025-05-05 RX ORDER — SODIUM CHLORIDE 0.9 % (FLUSH) 0.9 %
10 SYRINGE (ML) INJECTION
OUTPATIENT
Start: 2025-06-17

## 2025-05-05 RX ORDER — FLUOROURACIL 50 MG/ML
160 INJECTION, SOLUTION INTRAVENOUS
OUTPATIENT
Start: 2025-05-18

## 2025-05-05 RX ORDER — GABAPENTIN 100 MG/1
100 CAPSULE ORAL 3 TIMES DAILY
Status: DISCONTINUED | OUTPATIENT
Start: 2025-05-05 | End: 2025-05-08 | Stop reason: HOSPADM

## 2025-05-05 RX ORDER — PROCHLORPERAZINE EDISYLATE 5 MG/ML
10 INJECTION INTRAMUSCULAR; INTRAVENOUS ONCE AS NEEDED
OUTPATIENT
Start: 2025-06-03

## 2025-05-05 RX ORDER — SODIUM CHLORIDE 0.9 % (FLUSH) 0.9 %
10 SYRINGE (ML) INJECTION
OUTPATIENT
Start: 2025-06-01

## 2025-05-05 RX ORDER — SODIUM CHLORIDE 0.9 % (FLUSH) 0.9 %
10 SYRINGE (ML) INJECTION
OUTPATIENT
Start: 2025-06-03

## 2025-05-05 RX ORDER — PROCHLORPERAZINE EDISYLATE 5 MG/ML
10 INJECTION INTRAMUSCULAR; INTRAVENOUS ONCE AS NEEDED
OUTPATIENT
Start: 2025-06-01

## 2025-05-05 RX ORDER — HEPARIN 100 UNIT/ML
500 SYRINGE INTRAVENOUS
OUTPATIENT
Start: 2025-06-01

## 2025-05-05 RX ORDER — HEPARIN 100 UNIT/ML
500 SYRINGE INTRAVENOUS
OUTPATIENT
Start: 2025-05-18

## 2025-05-05 RX ORDER — ACETAMINOPHEN 500 MG
1000 TABLET ORAL
Status: COMPLETED | OUTPATIENT
Start: 2025-05-05 | End: 2025-05-05

## 2025-05-05 RX ORDER — OLANZAPINE 5 MG/1
5 TABLET, FILM COATED ORAL DAILY
Status: DISCONTINUED | OUTPATIENT
Start: 2025-05-05 | End: 2025-05-08 | Stop reason: HOSPADM

## 2025-05-05 RX ORDER — DIPHENHYDRAMINE HYDROCHLORIDE 50 MG/ML
50 INJECTION, SOLUTION INTRAMUSCULAR; INTRAVENOUS ONCE AS NEEDED
OUTPATIENT
Start: 2025-06-01

## 2025-05-05 RX ORDER — EPINEPHRINE 0.3 MG/.3ML
0.3 INJECTION SUBCUTANEOUS ONCE AS NEEDED
OUTPATIENT
Start: 2025-06-15

## 2025-05-05 RX ORDER — FLUOROURACIL 50 MG/ML
160 INJECTION, SOLUTION INTRAVENOUS
OUTPATIENT
Start: 2025-06-01

## 2025-05-05 RX ORDER — DIPHENHYDRAMINE HYDROCHLORIDE 50 MG/ML
50 INJECTION, SOLUTION INTRAMUSCULAR; INTRAVENOUS ONCE AS NEEDED
OUTPATIENT
Start: 2025-06-15

## 2025-05-05 RX ORDER — MAGNESIUM SULFATE HEPTAHYDRATE 40 MG/ML
2 INJECTION, SOLUTION INTRAVENOUS ONCE
Status: COMPLETED | OUTPATIENT
Start: 2025-05-06 | End: 2025-05-06

## 2025-05-05 RX ORDER — SODIUM CHLORIDE 0.9 % (FLUSH) 0.9 %
10 SYRINGE (ML) INJECTION
OUTPATIENT
Start: 2025-06-15

## 2025-05-05 RX ORDER — SODIUM CHLORIDE 0.9 % (FLUSH) 0.9 %
10 SYRINGE (ML) INJECTION
OUTPATIENT
Start: 2025-05-20

## 2025-05-05 RX ORDER — HEPARIN 100 UNIT/ML
500 SYRINGE INTRAVENOUS
OUTPATIENT
Start: 2025-05-20

## 2025-05-05 RX ORDER — HEPARIN 100 UNIT/ML
500 SYRINGE INTRAVENOUS
OUTPATIENT
Start: 2025-06-17

## 2025-05-05 RX ORDER — HEPARIN 100 UNIT/ML
500 SYRINGE INTRAVENOUS
OUTPATIENT
Start: 2025-06-03

## 2025-05-05 RX ORDER — FLUOROURACIL 50 MG/ML
160 INJECTION, SOLUTION INTRAVENOUS
Status: COMPLETED | OUTPATIENT
Start: 2025-05-05 | End: 2025-05-05

## 2025-05-05 RX ORDER — GABAPENTIN 100 MG/1
100 CAPSULE ORAL 3 TIMES DAILY
Qty: 90 CAPSULE | Refills: 2 | Status: SHIPPED | OUTPATIENT
Start: 2025-05-05 | End: 2026-05-05

## 2025-05-05 RX ORDER — SODIUM CHLORIDE 0.9 % (FLUSH) 0.9 %
10 SYRINGE (ML) INJECTION
OUTPATIENT
Start: 2025-05-18

## 2025-05-05 RX ORDER — PROCHLORPERAZINE EDISYLATE 5 MG/ML
10 INJECTION INTRAMUSCULAR; INTRAVENOUS ONCE AS NEEDED
OUTPATIENT
Start: 2025-05-18

## 2025-05-05 RX ORDER — EPINEPHRINE 0.3 MG/.3ML
0.3 INJECTION SUBCUTANEOUS ONCE AS NEEDED
OUTPATIENT
Start: 2025-05-18

## 2025-05-05 RX ORDER — FLUOROURACIL 50 MG/ML
160 INJECTION, SOLUTION INTRAVENOUS
OUTPATIENT
Start: 2025-06-15

## 2025-05-05 RX ORDER — PROCHLORPERAZINE EDISYLATE 5 MG/ML
10 INJECTION INTRAMUSCULAR; INTRAVENOUS ONCE AS NEEDED
OUTPATIENT
Start: 2025-06-15

## 2025-05-05 RX ORDER — CEFEPIME HYDROCHLORIDE 1 G/1
1 INJECTION, POWDER, FOR SOLUTION INTRAMUSCULAR; INTRAVENOUS
Status: COMPLETED | OUTPATIENT
Start: 2025-05-05 | End: 2025-05-05

## 2025-05-05 RX ORDER — EPINEPHRINE 0.3 MG/.3ML
0.3 INJECTION SUBCUTANEOUS ONCE AS NEEDED
OUTPATIENT
Start: 2025-06-01

## 2025-05-05 RX ORDER — SODIUM CHLORIDE 0.9 % (FLUSH) 0.9 %
10 SYRINGE (ML) INJECTION
Status: DISCONTINUED | OUTPATIENT
Start: 2025-05-05 | End: 2025-05-05 | Stop reason: HOSPADM

## 2025-05-05 RX ORDER — HEPARIN 100 UNIT/ML
500 SYRINGE INTRAVENOUS
OUTPATIENT
Start: 2025-06-15

## 2025-05-05 RX ADMIN — OLANZAPINE 5 MG: 5 TABLET, FILM COATED ORAL at 11:05

## 2025-05-05 RX ADMIN — PIPERACILLIN AND TAZOBACTAM 4.5 G: 4; .5 INJECTION, POWDER, LYOPHILIZED, FOR SOLUTION INTRAVENOUS at 11:05

## 2025-05-05 RX ADMIN — TRASTUZUMAB-ANNS 300 MG: 420 INJECTION, POWDER, LYOPHILIZED, FOR SOLUTION INTRAVENOUS at 09:05

## 2025-05-05 RX ADMIN — MAGNESIUM SULFATE HEPTAHYDRATE 2 G: 40 INJECTION, SOLUTION INTRAVENOUS at 11:05

## 2025-05-05 RX ADMIN — GABAPENTIN 100 MG: 100 CAPSULE ORAL at 11:05

## 2025-05-05 RX ADMIN — FLUOROURACIL 1890 MG: 50 INJECTION, SOLUTION INTRAVENOUS at 12:05

## 2025-05-05 RX ADMIN — DEXAMETHASONE SODIUM PHOSPHATE 0.25 MG: 4 INJECTION, SOLUTION INTRA-ARTICULAR; INTRALESIONAL; INTRAMUSCULAR; INTRAVENOUS; SOFT TISSUE at 09:05

## 2025-05-05 RX ADMIN — SODIUM CHLORIDE 400 MG: 9 INJECTION, SOLUTION INTRAVENOUS at 08:05

## 2025-05-05 RX ADMIN — DEXTROSE MONOHYDRATE 630 MG: 50 INJECTION, SOLUTION INTRAVENOUS at 10:05

## 2025-05-05 RX ADMIN — CEFEPIME 1 G: 1 INJECTION, POWDER, FOR SOLUTION INTRAMUSCULAR; INTRAVENOUS at 10:05

## 2025-05-05 RX ADMIN — FLUOROURACIL 315 MG: 50 INJECTION, SOLUTION INTRAVENOUS at 12:05

## 2025-05-05 RX ADMIN — OXALIPLATIN 100 MG: 5 INJECTION, SOLUTION INTRAVENOUS at 10:05

## 2025-05-05 RX ADMIN — ACETAMINOPHEN 1000 MG: 500 TABLET ORAL at 11:05

## 2025-05-05 NOTE — PROGRESS NOTES
Subjective:       Patient ID: Rosalio Muñoz is a 59 y.o. male.    Chief Complaint: Results, Esophageal Cancer, and Chemotherapy    HPI:  59-year-old male history of stage IV metastatic esophageal carcinoma patient continues on FOLFOX Keytruda and Herceptin.  Patient is tolerating therapy reasonably well except for grade 1 peripheral neuropathy of upper extremities.  ECOG status 1 seen in virtual visit    Past Medical History:   Diagnosis Date    Esophageal cancer, stage IV 10/10/2024    Hypertension     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 10/11/2024    Palliative care encounter 12/11/2024     Family History   Problem Relation Name Age of Onset    Hyperlipidemia Mother      Hypertension Father      Diabetes Father      Kidney disease Father      Heart disease Father      Breast cancer Maternal Grandmother      Prostate cancer Maternal Grandfather      Colon cancer Neg Hx       Social History[1]  Past Surgical History:   Procedure Laterality Date    COLONOSCOPY N/A 8/25/2023    Procedure: COLONOSCOPY;  Surgeon: Pebbles Spear MD;  Location: [facility];  Service: Endoscopy;  Laterality: N/A;    COLONOSCOPY N/A 10/2/2024    Procedure: COLONOSCOPY  Cardiac clearance received on 09/20/24 per Dr. Lockett, Cardiology.  Note in encounters.  LB;  Surgeon: Sully Farris MD;  Location: [facility];  Service: Endoscopy;  Laterality: N/A;    ESOPHAGOGASTRODUODENOSCOPY N/A 10/2/2024    Procedure: EGD (ESOPHAGOGASTRODUODENOSCOPY);  Surgeon: Sully Farris MD;  Location: [facility];  Service: Endoscopy;  Laterality: N/A;    ESOPHAGOGASTRODUODENOSCOPY N/A 12/31/2024    Procedure: EGD (ESOPHAGOGASTRODUODENOSCOPY);  Surgeon: Maki Sullivan MD;  Location: [facility];  Service: Gastroenterology;  Laterality: N/A;    INSERTION OF VENOUS ACCESS PORT Left 10/21/2024    Procedure: INSERTION, VENOUS ACCESS PORT;  Surgeon: Roseanna Rosas MD;  Location: [facility];  Service: General;  Laterality: Left;     "SURGICAL REMOVAL OF LESION OF FACE Right 12/1/2023    Procedure: EXCISION, LESION, FACE;  Surgeon: Jose Flaherty MD;  Location: [facility] OR;  Service: ENT;  Laterality: Right;  1. Excision facial subcutaneous mass right cheek 3 cm. 2. Intermediate layered closure 3.5 cm    WISDOM TOOTH EXTRACTION      XI ROBOTIC APPENDECTOMY N/A 7/31/2024    Procedure: XI ROBOTIC APPENDECTOMY;  Surgeon: Paulo Saavedra MD;  Location: [facility] OR;  Service: General;  Laterality: N/A;    XI ROBOTIC INSERTION, GASTROSTOMY TUBE N/A 10/21/2024    Procedure: XI ROBOTIC INSERTION, GASTROSTOMY TUBE;  Surgeon: Roseanna Rosas MD;  Location: [facility] OR;  Service: General;  Laterality: N/A;       Labs:  Lab Results   Component Value Date    WBC 5.25 05/01/2025    HGB 14.2 05/01/2025    HCT 41.9 05/01/2025    MCV 96 05/01/2025     05/01/2025     BMP  Lab Results   Component Value Date     (L) 05/01/2025    K 4.5 05/01/2025     05/01/2025    CO2 26 05/01/2025    BUN 11 05/01/2025    CREATININE 0.8 05/01/2025    CALCIUM 9.3 05/01/2025    ANIONGAP 7 (L) 05/01/2025    ESTGFRAFRICA >60.0 07/08/2022    EGFRNONAA >60.0 07/08/2022     Lab Results   Component Value Date    ALT 20 05/01/2025    AST 22 05/01/2025    ALKPHOS 182 (H) 05/01/2025    BILITOT 0.5 05/01/2025       Lab Results   Component Value Date    IRON 58 05/01/2025    TIBC 414 05/01/2025    FERRITIN 150.0 05/01/2025     Lab Results   Component Value Date    [id number] 445 10/19/2018     No results found for: "FOLATE"  Lab Results   Component Value Date    TSH 2.322 04/21/2025         Review of Systems   Constitutional:  Negative for activity change, appetite change, chills, diaphoresis, fatigue, fever and unexpected weight change.   HENT:  Negative for congestion, dental problem, drooling, ear discharge, ear pain, facial swelling, hearing loss, mouth sores, nosebleeds, postnasal drip, rhinorrhea, sinus pressure, sneezing, sore throat, tinnitus, trouble swallowing and voice " change.    Eyes:  Negative for photophobia, pain, discharge, redness, itching and visual disturbance.   Respiratory:  Negative for apnea, cough, choking, chest tightness, shortness of breath, wheezing and stridor.    Cardiovascular:  Negative for chest pain, palpitations and leg swelling.   Gastrointestinal:  Negative for abdominal distention, abdominal pain, anal bleeding, blood in stool, constipation, diarrhea, nausea, rectal pain and vomiting.   Endocrine: Negative for cold intolerance, heat intolerance, polydipsia, polyphagia and polyuria.   Genitourinary:  Negative for decreased urine volume, difficulty urinating, dysuria, enuresis, flank pain, frequency, genital sores, hematuria, penile discharge, penile pain, penile swelling, scrotal swelling, testicular pain and urgency.   Musculoskeletal:  Negative for arthralgias, back pain, gait problem, joint swelling, myalgias, neck pain and neck stiffness.   Skin:  Negative for color change, pallor, rash and wound.   Allergic/Immunologic: Negative for environmental allergies, food allergies and immunocompromised state.   Neurological:  Positive for numbness. Negative for dizziness, tremors, seizures, syncope, facial asymmetry, speech difficulty, weakness, light-headedness and headaches.   Hematological:  Negative for adenopathy. Does not bruise/bleed easily.   Psychiatric/Behavioral:  Negative for agitation, behavioral problems, confusion, decreased concentration, dysphoric mood, hallucinations, self-injury, sleep disturbance and suicidal ideas. The patient is not nervous/anxious and is not hyperactive.        Objective:      Physical Exam  Constitutional:       Appearance: Normal appearance.   Neurological:      Mental Status: He is alert.             Assessment:      1. Esophageal cancer, stage IV    2. Iron deficiency anemia due to chronic blood loss    3. 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    4. Secondary adenocarcinoma of retroperitoneum    5. Secondary  liver cancer    6. Immunodeficiency due to chemotherapy    7. Peripheral neuropathy due to and not concurrent with chemotherapy           Med Onc Chart Routing      Follow up with physician . Return to clinic in 2 to three-week for next cycle of treatment.  CBC CMP TSH serum iron TIBC ferritin   Follow up with JORDANA    Infusion scheduling note    Injection scheduling note IV iron q.2 weeks OP GASTROESOPHAGEAL pembrolizumab Q6W trastuzumab + MFOLFOX6 (oxaliplatin leucovorin fluorouracil) Q2W   Labs    Imaging   2D echo Q 12 weeks   Pharmacy appointment    Other referrals                   Plan:     The patient location is:  Home  The chief complaint leading to consultation is:  Esophageal cancer    Visit type: audiovisual    Face to Face time with patient: 30 minutes of total time spent on the encounter, which includes face to face time and non-face to face time preparing to see the patient (eg, review of tests), Obtaining and/or reviewing separately obtained history, Documenting clinical information in the electronic or other health record, Independently interpreting results (not separately reported) and communicating results to the patient/family/caregiver, or Care coordination (not separately reported).         Each patient to whom he or she provides medical services by telemedicine is:  (1) informed of the relationship between the physician and patient and the respective role of any other health care provider with respect to management of the patient; and (2) notified that he or she may decline to receive medical services by telemedicine and may withdraw from such care at any time.    Notes:    Reviewed information with patient patient has peripheral neuropathy decided to dose reduce oxaliplatin additional 20% variant had previously been reduced because of pharmacogenomic data.  At this time proceed with next cycle of treatment last PET scan [place] CAT scan in 4 7 demonstrating stable findings.  Continue to treat  will most likely reimage in 2025.  Orders written reviewed continue with weekly iron.      Hudson Latif Jr, MD FACP         [1]   Social History  Socioeconomic History    Marital status:     Number of children: 2   Occupational History    Occupation:    Tobacco Use    Smoking status: Every Day     Current packs/day: 0.00     Types: Cigars, Cigarettes     Last attempt to quit: 10/4/2022     Years since quittin.5     Passive exposure: Never    Smokeless tobacco: Never    Tobacco comments:     Cigar every afternoon   Substance and Sexual Activity    Alcohol use: Yes     Alcohol/week: 4.0 - 6.0 standard drinks of alcohol     Types: 4 - 6 Cans of beer per week     Comment: occ    Drug use: Yes     Types: Marijuana     Comment: medical    Sexual activity: Yes     Partners: Female     Social Drivers of Health     Financial Resource Strain: Patient Declined (3/28/2025)    Overall Financial Resource Strain (CARDIA)     Difficulty of Paying Living Expenses: Patient declined   Food Insecurity: Patient Declined (3/28/2025)    Hunger Vital Sign     Worried About Running Out of Food in the Last Year: Patient declined     Ran Out of Food in the Last Year: Patient declined   Transportation Needs: Patient Declined (2025)    PRAPARE - Transportation     Lack of Transportation (Medical): Patient declined     Lack of Transportation (Non-Medical): Patient declined   Physical Activity: Unknown (2025)    Exercise Vital Sign     Days of Exercise per Week: Patient declined     Minutes of Exercise per Session: 20 min   Stress: Patient Declined (3/28/2025)    [employer] - Occupational Stress Questionnaire     Feeling of Stress : Patient declined   Housing Stability: Patient Declined (3/28/2025)    Housing Stability Vital Sign     Unable to Pay for Housing in the Last Year: Patient declined     Homeless in the Last Year: Patient declined

## 2025-05-05 NOTE — PLAN OF CARE
Problem: Adult Inpatient Plan of Care  Goal: Plan of Care Review  Outcome: Progressing  Goal: Patient-Specific Goal (Individualized)  Outcome: Progressing     Problem: Chemotherapy Effects  Goal: Neurotoxicity Symptom Control  Outcome: Progressing  Goal: Absence of Infection  Outcome: Progressing     Problem: Fall Injury Risk  Goal: Absence of Fall and Fall-Related Injury  Outcome: Progressing

## 2025-05-06 PROBLEM — Z43.1 ENCOUNTER FOR PEG (PERCUTANEOUS ENDOSCOPIC GASTROSTOMY): Status: ACTIVE | Noted: 2024-12-11

## 2025-05-06 PROBLEM — F17.200 TOBACCO DEPENDENCY: Status: ACTIVE | Noted: 2025-05-06

## 2025-05-06 PROBLEM — D69.6 THROMBOCYTOPENIA: Status: ACTIVE | Noted: 2025-05-06

## 2025-05-06 PROBLEM — I95.9 HYPOTENSION: Status: ACTIVE | Noted: 2025-05-06

## 2025-05-06 PROBLEM — A41.9 SEPSIS: Status: ACTIVE | Noted: 2025-05-06

## 2025-05-06 LAB
ABO + RH BLD: NORMAL
ABSOLUTE EOSINOPHIL (OHS): 0.44 K/UL
ABSOLUTE MONOCYTE (OHS): 0.47 K/UL (ref 0.3–1)
ABSOLUTE NEUTROPHIL COUNT (OHS): 7.1 K/UL (ref 1.8–7.7)
ANION GAP (OHS): 6 MMOL/L (ref 8–16)
BASOPHILS # BLD AUTO: 0.05 K/UL
BASOPHILS NFR BLD AUTO: 0.6 %
BLD PROD TYP BPU: NORMAL
BLOOD UNIT EXPIRATION DATE: NORMAL
BLOOD UNIT TYPE CODE: 5100
BUN SERPL-MCNC: 13 MG/DL (ref 6–20)
CALCIUM SERPL-MCNC: 8.4 MG/DL (ref 8.7–10.5)
CHLORIDE SERPL-SCNC: 106 MMOL/L (ref 95–110)
CO2 SERPL-SCNC: 20 MMOL/L (ref 23–29)
CREAT SERPL-MCNC: 0.7 MG/DL (ref 0.5–1.4)
CROSSMATCH INTERPRETATION: NORMAL
CRP SERPL-MCNC: 91.07 MG/L
CYCLIC CITRULLINATED PEPTIDE (CCP) (OHS): <0.5 U/ML
DISPENSE STATUS: NORMAL
ERYTHROCYTE [DISTWIDTH] IN BLOOD BY AUTOMATED COUNT: 16.3 % (ref 11.5–14.5)
ERYTHROCYTE [SEDIMENTATION RATE] IN BLOOD: 76 MM/HR
GFR SERPLBLD CREATININE-BSD FMLA CKD-EPI: >60 ML/MIN/1.73/M2
GLUCOSE SERPL-MCNC: 95 MG/DL (ref 70–110)
HCT VFR BLD AUTO: 33.3 % (ref 40–54)
HGB BLD-MCNC: 11.6 GM/DL (ref 14–18)
IMM GRANULOCYTES # BLD AUTO: 0.05 K/UL (ref 0–0.04)
IMM GRANULOCYTES NFR BLD AUTO: 0.6 % (ref 0–0.5)
INDIRECT COOMBS: NORMAL
LACTATE SERPL-SCNC: 0.8 MMOL/L (ref 0.5–2.2)
LYMPHOCYTES # BLD AUTO: 0.23 K/UL (ref 1–4.8)
MAGNESIUM SERPL-MCNC: 1.8 MG/DL (ref 1.6–2.6)
MCH RBC QN AUTO: 32.8 PG (ref 27–31)
MCHC RBC AUTO-ENTMCNC: 34.8 G/DL (ref 32–36)
MCV RBC AUTO: 94 FL (ref 82–98)
NUCLEATED RBC (/100WBC) (OHS): 0 /100 WBC
PLATELET # BLD AUTO: 32 K/UL (ref 150–450)
PLATELET # BLD AUTO: 6 K/UL (ref 150–450)
PLATELET BLD QL SMEAR: ABNORMAL
PLATELET BLD QL SMEAR: ABNORMAL
PMV BLD AUTO: 11.8 FL (ref 9.2–12.9)
PMV BLD AUTO: ABNORMAL FL
POTASSIUM SERPL-SCNC: 3.8 MMOL/L (ref 3.5–5.1)
RBC # BLD AUTO: 3.54 M/UL (ref 4.6–6.2)
RELATIVE EOSINOPHIL (OHS): 5.3 %
RELATIVE LYMPHOCYTE (OHS): 2.8 % (ref 18–48)
RELATIVE MONOCYTE (OHS): 5.6 % (ref 4–15)
RELATIVE NEUTROPHIL (OHS): 85.1 % (ref 38–73)
RH BLD: NORMAL
RHEUMATOID FACT SERPL-ACNC: <13 IU/ML
SODIUM SERPL-SCNC: 132 MMOL/L (ref 136–145)
SPECIMEN OUTDATE: NORMAL
TSH SERPL-ACNC: 1.05 UIU/ML (ref 0.4–4)
UNIT NUMBER: NORMAL
WBC # BLD AUTO: 8.34 K/UL (ref 3.9–12.7)

## 2025-05-06 PROCEDURE — 86235 NUCLEAR ANTIGEN ANTIBODY: CPT | Performed by: NURSE PRACTITIONER

## 2025-05-06 PROCEDURE — P9035 PLATELET PHERES LEUKOREDUCED: HCPCS | Performed by: FAMILY MEDICINE

## 2025-05-06 PROCEDURE — 25000003 PHARM REV CODE 250: Performed by: NURSE PRACTITIONER

## 2025-05-06 PROCEDURE — 97161 PT EVAL LOW COMPLEX 20 MIN: CPT

## 2025-05-06 PROCEDURE — 84443 ASSAY THYROID STIM HORMONE: CPT | Performed by: NURSE PRACTITIONER

## 2025-05-06 PROCEDURE — 97165 OT EVAL LOW COMPLEX 30 MIN: CPT

## 2025-05-06 PROCEDURE — 85652 RBC SED RATE AUTOMATED: CPT | Performed by: NURSE PRACTITIONER

## 2025-05-06 PROCEDURE — 63600175 PHARM REV CODE 636 W HCPCS: Performed by: PHYSICIAN ASSISTANT

## 2025-05-06 PROCEDURE — 83605 ASSAY OF LACTIC ACID: CPT | Performed by: PHYSICIAN ASSISTANT

## 2025-05-06 PROCEDURE — 96361 HYDRATE IV INFUSION ADD-ON: CPT

## 2025-05-06 PROCEDURE — 86225 DNA ANTIBODY NATIVE: CPT | Performed by: NURSE PRACTITIONER

## 2025-05-06 PROCEDURE — 86200 CCP ANTIBODY: CPT | Performed by: NURSE PRACTITIONER

## 2025-05-06 PROCEDURE — 83735 ASSAY OF MAGNESIUM: CPT | Performed by: HOSPITALIST

## 2025-05-06 PROCEDURE — 25000003 PHARM REV CODE 250: Performed by: STUDENT IN AN ORGANIZED HEALTH CARE EDUCATION/TRAINING PROGRAM

## 2025-05-06 PROCEDURE — 96366 THER/PROPH/DIAG IV INF ADDON: CPT

## 2025-05-06 PROCEDURE — 25000003 PHARM REV CODE 250: Performed by: FAMILY MEDICINE

## 2025-05-06 PROCEDURE — A4216 STERILE WATER/SALINE, 10 ML: HCPCS | Performed by: NURSE PRACTITIONER

## 2025-05-06 PROCEDURE — 86900 BLOOD TYPING SEROLOGIC ABO: CPT | Performed by: FAMILY MEDICINE

## 2025-05-06 PROCEDURE — 21400001 HC TELEMETRY ROOM

## 2025-05-06 PROCEDURE — 80048 BASIC METABOLIC PNL TOTAL CA: CPT | Performed by: HOSPITALIST

## 2025-05-06 PROCEDURE — 63600175 PHARM REV CODE 636 W HCPCS: Performed by: HOSPITALIST

## 2025-05-06 PROCEDURE — 86431 RHEUMATOID FACTOR QUANT: CPT | Performed by: NURSE PRACTITIONER

## 2025-05-06 PROCEDURE — 63600175 PHARM REV CODE 636 W HCPCS: Performed by: NURSE PRACTITIONER

## 2025-05-06 PROCEDURE — 63600175 PHARM REV CODE 636 W HCPCS: Performed by: FAMILY MEDICINE

## 2025-05-06 PROCEDURE — 36415 COLL VENOUS BLD VENIPUNCTURE: CPT | Performed by: NURSE PRACTITIONER

## 2025-05-06 PROCEDURE — 85049 AUTOMATED PLATELET COUNT: CPT | Performed by: FAMILY MEDICINE

## 2025-05-06 PROCEDURE — 11000001 HC ACUTE MED/SURG PRIVATE ROOM

## 2025-05-06 PROCEDURE — 86039 ANTINUCLEAR ANTIBODIES (ANA): CPT | Performed by: NURSE PRACTITIONER

## 2025-05-06 PROCEDURE — 85025 COMPLETE CBC W/AUTO DIFF WBC: CPT | Performed by: HOSPITALIST

## 2025-05-06 PROCEDURE — 86141 C-REACTIVE PROTEIN HS: CPT | Performed by: NURSE PRACTITIONER

## 2025-05-06 PROCEDURE — 36415 COLL VENOUS BLD VENIPUNCTURE: CPT | Performed by: HOSPITALIST

## 2025-05-06 PROCEDURE — 25000003 PHARM REV CODE 250: Performed by: PHYSICIAN ASSISTANT

## 2025-05-06 PROCEDURE — 30233R1 TRANSFUSION OF NONAUTOLOGOUS PLATELETS INTO PERIPHERAL VEIN, PERCUTANEOUS APPROACH: ICD-10-PCS | Performed by: EMERGENCY MEDICINE

## 2025-05-06 PROCEDURE — 36430 TRANSFUSION BLD/BLD COMPNT: CPT

## 2025-05-06 RX ORDER — ACETAMINOPHEN 325 MG/1
650 TABLET ORAL EVERY 4 HOURS PRN
Status: DISCONTINUED | OUTPATIENT
Start: 2025-05-06 | End: 2025-05-08 | Stop reason: HOSPADM

## 2025-05-06 RX ORDER — CEFEPIME HYDROCHLORIDE 2 G/1
2 INJECTION, POWDER, FOR SOLUTION INTRAVENOUS
Status: DISCONTINUED | OUTPATIENT
Start: 2025-05-06 | End: 2025-05-08 | Stop reason: HOSPADM

## 2025-05-06 RX ORDER — PANTOPRAZOLE SODIUM 40 MG/1
40 TABLET, DELAYED RELEASE ORAL DAILY
Status: DISCONTINUED | OUTPATIENT
Start: 2025-05-06 | End: 2025-05-08 | Stop reason: HOSPADM

## 2025-05-06 RX ORDER — SODIUM CHLORIDE 0.9 % (FLUSH) 0.9 %
10 SYRINGE (ML) INJECTION EVERY 12 HOURS PRN
Status: DISCONTINUED | OUTPATIENT
Start: 2025-05-06 | End: 2025-05-08 | Stop reason: HOSPADM

## 2025-05-06 RX ORDER — GLUCAGON 1 MG
1 KIT INJECTION
Status: DISCONTINUED | OUTPATIENT
Start: 2025-05-06 | End: 2025-05-07

## 2025-05-06 RX ORDER — IBUPROFEN 200 MG
16 TABLET ORAL
Status: DISCONTINUED | OUTPATIENT
Start: 2025-05-06 | End: 2025-05-07

## 2025-05-06 RX ORDER — INSULIN ASPART 100 [IU]/ML
0-5 INJECTION, SOLUTION INTRAVENOUS; SUBCUTANEOUS
Status: DISCONTINUED | OUTPATIENT
Start: 2025-05-06 | End: 2025-05-07

## 2025-05-06 RX ORDER — SODIUM CHLORIDE 9 MG/ML
INJECTION, SOLUTION INTRAVENOUS CONTINUOUS
Status: ACTIVE | OUTPATIENT
Start: 2025-05-06 | End: 2025-05-06

## 2025-05-06 RX ORDER — HEPARIN 100 UNIT/ML
5 SYRINGE INTRAVENOUS ONCE
Status: COMPLETED | OUTPATIENT
Start: 2025-05-06 | End: 2025-05-06

## 2025-05-06 RX ORDER — HYDROCODONE BITARTRATE AND ACETAMINOPHEN 500; 5 MG/1; MG/1
TABLET ORAL
Status: DISCONTINUED | OUTPATIENT
Start: 2025-05-06 | End: 2025-05-08 | Stop reason: HOSPADM

## 2025-05-06 RX ORDER — SODIUM CHLORIDE 0.9 % (FLUSH) 0.9 %
10 SYRINGE (ML) INJECTION EVERY 8 HOURS
Status: DISCONTINUED | OUTPATIENT
Start: 2025-05-06 | End: 2025-05-08 | Stop reason: HOSPADM

## 2025-05-06 RX ORDER — HYDROCODONE BITARTRATE AND ACETAMINOPHEN 5; 325 MG/1; MG/1
1 TABLET ORAL EVERY 6 HOURS PRN
Refills: 0 | Status: DISCONTINUED | OUTPATIENT
Start: 2025-05-06 | End: 2025-05-08 | Stop reason: HOSPADM

## 2025-05-06 RX ORDER — IBUPROFEN 200 MG
24 TABLET ORAL
Status: DISCONTINUED | OUTPATIENT
Start: 2025-05-06 | End: 2025-05-07

## 2025-05-06 RX ORDER — MUPIROCIN 20 MG/G
OINTMENT TOPICAL 2 TIMES DAILY
Status: DISCONTINUED | OUTPATIENT
Start: 2025-05-06 | End: 2025-05-08 | Stop reason: HOSPADM

## 2025-05-06 RX ORDER — NALOXONE HCL 0.4 MG/ML
0.02 VIAL (ML) INJECTION
Status: DISCONTINUED | OUTPATIENT
Start: 2025-05-06 | End: 2025-05-08 | Stop reason: HOSPADM

## 2025-05-06 RX ORDER — TALC
6 POWDER (GRAM) TOPICAL NIGHTLY PRN
Status: DISCONTINUED | OUTPATIENT
Start: 2025-05-06 | End: 2025-05-08 | Stop reason: HOSPADM

## 2025-05-06 RX ORDER — POLYETHYLENE GLYCOL 3350 17 G/17G
17 POWDER, FOR SOLUTION ORAL DAILY
Status: DISCONTINUED | OUTPATIENT
Start: 2025-05-06 | End: 2025-05-08 | Stop reason: HOSPADM

## 2025-05-06 RX ORDER — SIMETHICONE 80 MG
1 TABLET,CHEWABLE ORAL 4 TIMES DAILY PRN
Status: DISCONTINUED | OUTPATIENT
Start: 2025-05-06 | End: 2025-05-08 | Stop reason: HOSPADM

## 2025-05-06 RX ORDER — ONDANSETRON HYDROCHLORIDE 2 MG/ML
4 INJECTION, SOLUTION INTRAVENOUS EVERY 8 HOURS PRN
Status: DISCONTINUED | OUTPATIENT
Start: 2025-05-06 | End: 2025-05-08 | Stop reason: HOSPADM

## 2025-05-06 RX ADMIN — GABAPENTIN 100 MG: 100 CAPSULE ORAL at 09:05

## 2025-05-06 RX ADMIN — MUPIROCIN: 20 OINTMENT TOPICAL at 08:05

## 2025-05-06 RX ADMIN — GABAPENTIN 100 MG: 100 CAPSULE ORAL at 08:05

## 2025-05-06 RX ADMIN — OLANZAPINE 5 MG: 5 TABLET, FILM COATED ORAL at 08:05

## 2025-05-06 RX ADMIN — PIPERACILLIN AND TAZOBACTAM 4.5 G: 4; .5 INJECTION, POWDER, LYOPHILIZED, FOR SOLUTION INTRAVENOUS at 10:05

## 2025-05-06 RX ADMIN — SODIUM CHLORIDE 2000 ML: 9 INJECTION, SOLUTION INTRAVENOUS at 01:05

## 2025-05-06 RX ADMIN — CEFEPIME 2 G: 2 INJECTION, POWDER, FOR SOLUTION INTRAVENOUS at 04:05

## 2025-05-06 RX ADMIN — MUPIROCIN: 20 OINTMENT TOPICAL at 09:05

## 2025-05-06 RX ADMIN — Medication 10 ML: at 10:05

## 2025-05-06 RX ADMIN — PANTOPRAZOLE SODIUM 40 MG: 40 TABLET, DELAYED RELEASE ORAL at 08:05

## 2025-05-06 RX ADMIN — SODIUM CHLORIDE: 9 INJECTION, SOLUTION INTRAVENOUS at 10:05

## 2025-05-06 RX ADMIN — HEPARIN 500 UNITS: 100 SYRINGE at 11:05

## 2025-05-06 NOTE — PT/OT/SLP EVAL
Physical Therapy Evaluation and Discharge Note    Patient Name:  Rosalio Muñoz   MRN:  [id number]    Recommendations:     Discharge Recommendations: No Therapy Indicated  Discharge Equipment Recommendations: none   Barriers to discharge: None    Assessment:     Rosalio Muñoz is a 59 y.o. male admitted with a medical diagnosis of Tobacco dependency. .  At this time, patient is functioning at their prior level of function and does not require further acute PT services.     Recent Surgery: * No surgery found *    Plan:     During this hospitalization, patient does not require further acute PT services.  Please re-consult if situation changes.      Subjective     Chief Complaint: NONE, EAGER TO PARTICIPATE  Patient/Family Comments/goals: NONE STATED  Pain/Comfort:  Pain Rating 1: 3/10  Location - Side 1: Bilateral  Location - Orientation 1: generalized  Location 1:  (SHOULDERS AND KNEE'S-CHRONIC PAIN)  Pain Addressed 1: Reposition (ACTIVITY PACING)    Patients cultural, spiritual, Gnosticism conflicts given the current situation:      Living Environment:  PT LIVES WITH WIFE 1 STORY HOUSE 1 STEP TO ENTER  Prior to admission, patients level of function was AMBULATORY IN COMMUNITY INDEP, DRIVES, DISABLED, INDEP WITH ADL'S.    Equipment used at home: NONE.    DME owned (not currently used): none.    Upon discharge, patient will have assistance from WIFE.    Objective:     Communicated with [place] prior to session.  Patient found supine with telemetry, G/J tube (PORT A CATH) upon PT entry to room.    General Precautions: Standard, HARD OF HEARING-HEARING AIDES DONNED BUT PT STILL APPEARS TO NEED TO READ LIPS  Orthopedic Precautions:N/A   Braces: N/A  Respiratory Status: Room air    Exams:  Cognitive Exam:  Patient is oriented to Person, Place, Time, and Situation  Postural Exam:  Patient presented with the following abnormalities:    -       Rounded shoulders  Sensation:    -       Intact  RLE ROM: WNL  RLE  "Strength: WNL  LLE ROM: WNL  LLE Strength: WNL    Functional Mobility:  Bed Mobility:     Rolling Left:  independence  Scooting: independence  Supine to Sit: independence  Transfers:     Sit to Stand:  independence with no AD  Bed to Chair: independence with  no AD  using  Step Transfer  Gait: PT ' NO AD INDEP, NO LOB OR SOB ON ROOM AIR  Balance: GOOD SITTING AND STANDING BALANCE    AM-PAC 6 CLICK MOBILITY  Total Score:21     Treatment and Education:  PT EDUCATION:  - ROLE OF P.T. IN ACUTE CARE HOSPITAL SETTING  - ENCOURAGED TO INCREASE TIME OOB IN CHAIR TO TOLERANCE   - EDUCATED IN AND ENCOURAGED TO CONTINUE THERAPUETIC EXERCISES THROUGHOUT THE DAY TO INCREASE ACTIVITY TOLERANCE AND DECREASE RISK FOR PNEUMONIA AND BLOOD CLOTS: HIP FLEX/EXT, HIP ABD/ADD, QUAD SET, HEEL SLIDE, AP  - RISK FOR FALLS DUE TO GENERALIZED WEAKNESS, EDUCATED ON "CALL DON'T FALL", ENCOURAGED TO CALL FOR ASSISTANCE WITH ALL NEEDS     AM-PAC 6 CLICK MOBILITY  Total Score:21     Patient left up in chair with all lines intact, call button in reach, chair alarm on, and FAMILY present.    GOALS:   Multidisciplinary Problems       Physical Therapy Goals       Not on file                    DME Justifications:  No DME recommended requiring DME justifications    History:     Past Medical History:   Diagnosis Date    Esophageal cancer, stage IV 10/10/2024    Hypertension     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 10/11/2024    Palliative care encounter 12/11/2024       Past Surgical History:   Procedure Laterality Date    COLONOSCOPY N/A 8/25/2023    Procedure: COLONOSCOPY;  Surgeon: Pebbles Spear MD;  Location: [facility];  Service: Endoscopy;  Laterality: N/A;    COLONOSCOPY N/A 10/2/2024    Procedure: COLONOSCOPY  Cardiac clearance received on 09/20/24 per Dr. Lockett, Cardiology.  Note in encounters.  LB;  Surgeon: Sully Farris MD;  Location: [facility];  Service: Endoscopy;  Laterality: N/A;    ESOPHAGOGASTRODUODENOSCOPY " N/A 10/2/2024    Procedure: EGD (ESOPHAGOGASTRODUODENOSCOPY);  Surgeon: Sully Farris MD;  Location: [facility];  Service: Endoscopy;  Laterality: N/A;    ESOPHAGOGASTRODUODENOSCOPY N/A 12/31/2024    Procedure: EGD (ESOPHAGOGASTRODUODENOSCOPY);  Surgeon: Maki Sullivan MD;  Location: [facility] ENDO;  Service: Gastroenterology;  Laterality: N/A;    INSERTION OF VENOUS ACCESS PORT Left 10/21/2024    Procedure: INSERTION, VENOUS ACCESS PORT;  Surgeon: Roseanna Rosas MD;  Location: [facility] OR;  Service: General;  Laterality: Left;    SURGICAL REMOVAL OF LESION OF FACE Right 12/1/2023    Procedure: EXCISION, LESION, FACE;  Surgeon: Jose Flaherty MD;  Location: [facility] OR;  Service: ENT;  Laterality: Right;  1. Excision facial subcutaneous mass right cheek 3 cm. 2. Intermediate layered closure 3.5 cm    WISDOM TOOTH EXTRACTION      XI ROBOTIC APPENDECTOMY N/A 7/31/2024    Procedure: XI ROBOTIC APPENDECTOMY;  Surgeon: Paulo Saavedra MD;  Location: [facility] OR;  Service: General;  Laterality: N/A;    XI ROBOTIC INSERTION, GASTROSTOMY TUBE N/A 10/21/2024    Procedure: XI ROBOTIC INSERTION, GASTROSTOMY TUBE;  Surgeon: Roseanna Rosas MD;  Location: [facility];  Service: General;  Laterality: N/A;       Time Tracking:     PT Received On: 05/06/25  PT Start Time: 1045     PT Stop Time: 1110  PT Total Time (min): 25 min     Billable Minutes: Evaluation 25 05/06/2025

## 2025-05-06 NOTE — SUBJECTIVE & OBJECTIVE
Past Medical History:   Diagnosis Date    Esophageal cancer, stage IV 10/10/2024    Hypertension     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 10/11/2024    Palliative care encounter 12/11/2024     Past Surgical History:   Procedure Laterality Date    COLONOSCOPY N/A 8/25/2023    Procedure: COLONOSCOPY;  Surgeon: Pebbles Spear MD;  Location: [facility] ENDO;  Service: Endoscopy;  Laterality: N/A;    COLONOSCOPY N/A 10/2/2024    Procedure: COLONOSCOPY  Cardiac clearance received on 09/20/24 per Dr. Lockett, Cardiology.  Note in encounters.  LB;  Surgeon: Sully Farris MD;  Location: [facility];  Service: Endoscopy;  Laterality: N/A;    ESOPHAGOGASTRODUODENOSCOPY N/A 10/2/2024    Procedure: EGD (ESOPHAGOGASTRODUODENOSCOPY);  Surgeon: Sully Farris MD;  Location: [facility];  Service: Endoscopy;  Laterality: N/A;    ESOPHAGOGASTRODUODENOSCOPY N/A 12/31/2024    Procedure: EGD (ESOPHAGOGASTRODUODENOSCOPY);  Surgeon: Maki Sullivan MD;  Location: [facility];  Service: Gastroenterology;  Laterality: N/A;    INSERTION OF VENOUS ACCESS PORT Left 10/21/2024    Procedure: INSERTION, VENOUS ACCESS PORT;  Surgeon: Roseanna Rosas MD;  Location: [facility];  Service: General;  Laterality: Left;    SURGICAL REMOVAL OF LESION OF FACE Right 12/1/2023    Procedure: EXCISION, LESION, FACE;  Surgeon: Jose Flaherty MD;  Location: [facility];  Service: ENT;  Laterality: Right;  1. Excision facial subcutaneous mass right cheek 3 cm. 2. Intermediate layered closure 3.5 cm    WISDOM TOOTH EXTRACTION      XI ROBOTIC APPENDECTOMY N/A 7/31/2024    Procedure: XI ROBOTIC APPENDECTOMY;  Surgeon: Paulo Saavedra MD;  Location: [facility] OR;  Service: General;  Laterality: N/A;    XI ROBOTIC INSERTION, GASTROSTOMY TUBE N/A 10/21/2024    Procedure: XI ROBOTIC INSERTION, GASTROSTOMY TUBE;  Surgeon: Roseanna Rosas MD;  Location: [facility] OR;  Service: General;  Laterality: N/A;     Review of patient's allergies indicates:  No Known  Allergies    Family History       Problem Relation (Age of Onset)    Breast cancer Maternal Grandmother    Diabetes Father    Heart disease Father    Hyperlipidemia Mother    Hypertension Father    Kidney disease Father    Prostate cancer Maternal Grandfather          Tobacco Use    Smoking status: Every Day     Current packs/day: 0.00     Types: Cigars, Cigarettes     Last attempt to quit: 10/4/2022     Years since quittin.5     Passive exposure: Never    Smokeless tobacco: Never    Tobacco comments:     Cigar every afternoon   Substance and Sexual Activity    Alcohol use: Yes     Alcohol/week: 4.0 - 6.0 standard drinks of alcohol     Types: 4 - 6 Cans of beer per week     Comment: occ    Drug use: Yes     Types: Marijuana     Comment: medical    Sexual activity: Yes     Partners: Female       Review of Systems   Constitutional:  Positive for fatigue. Negative for chills and fever.   Respiratory:  Negative for cough, chest tightness and shortness of breath.    Cardiovascular:  Negative for chest pain and leg swelling.   Gastrointestinal:  Negative for abdominal pain, constipation, diarrhea, nausea and vomiting.   Genitourinary:  Negative for dysuria.   Musculoskeletal:  Positive for arthralgias and myalgias.   Neurological:  Positive for weakness and headaches. Negative for dizziness.   Psychiatric/Behavioral:  Negative for agitation and confusion.      Objective:     Vital Signs (Most Recent):  Temp: 98.4 °F (36.9 °C) (25 0255)  Pulse: 91 (25 0402)  Resp: 16 (25 040)  BP: 110/68 (25 040)  SpO2: 97 % (25) Vital Signs (24h Range):  Temp:  [97.9 °F (36.6 °C)-100.3 °F (37.9 °C)] 98.4 °F (36.9 °C)  Pulse:  [] 91  Resp:  [16-22] 16  SpO2:  [92 %-99 %] 97 %  BP: ()/(51-80) 110/68     Weight: 75.2 kg (165 lb 12.8 oz)  Body mass index is 21.87 kg/m².  Intake/Output Summary (Last 24 hours) at 2025 0426  Last data filed at 2025 0229  Gross per 24 hour   Intake 2407  ml   Output --   Net 2406 ml     Physical Exam  Constitutional:       General: He is not in acute distress.  HENT:      Head: Normocephalic.      Mouth/Throat:      Mouth: Mucous membranes are moist.   Eyes:      Extraocular Movements: Extraocular movements intact.   Cardiovascular:      Rate and Rhythm: Normal rate and regular rhythm.      Pulses: Normal pulses.   Pulmonary:      Effort: Pulmonary effort is normal. No respiratory distress.      Breath sounds: No wheezing.      Comments: On room air  Abdominal:      General: There is no distension.      Palpations: Abdomen is soft.      Tenderness: There is no abdominal tenderness.      Comments: PEG in place   Musculoskeletal:         General: No swelling.      Cervical back: Neck supple.   Skin:     General: Skin is warm.      Coloration: Skin is not jaundiced.      Findings: No bruising.   Neurological:      Mental Status: He is alert and oriented to person, place, and time.   Psychiatric:         Mood and Affect: Mood normal.         Behavior: Behavior normal.       Lines/Drains/Airways       Central Venous Catheter Line  Duration             Port A Cath Single Lumen 10/21/24 1502 196 days              Drain  Duration                  Gastrostomy/Enterostomy 10/21/24 1413 Gastrostomy tube w/o balloon  days              Peripheral Intravenous Line  Duration                  Peripheral IV - Single Lumen 05/05/25 2257 20 G Left Antecubital <1 day         Peripheral IV - Single Lumen 05/06/25 0249 18 G Anterior;Distal;Right Forearm <1 day                  Significant Labs:    CBC/Anemia Profile:  Recent Labs   Lab 05/05/25 2257 05/06/25  0113   WBC 11.18  --    HGB 13.4*  --    HCT 38.6*  --    PLT 5* 6*   MCV 93  --    RDW 16.2*  --      Chemistries:  Recent Labs   Lab 05/05/25 2033   *   K 4.2      CO2 19*   BUN 13   CREATININE 0.7   CALCIUM 9.3   ALBUMIN 3.4*   PROT 8.7*   BILITOT 0.9   ALKPHOS 168*   ALT 18   AST 18   MG 1.4*     Lactic  Acid:   Recent Labs   Lab 05/05/25 2033 05/06/25  0113   LACTATE 1.3 0.8     Urine Studies:   Recent Labs   Lab 05/05/25  2145   COLORU Yellow   APPEARANCEUA Clear   PHUR 7.0   SPECGRAV 1.020   PROTEINUA Trace*   GLUCUA Negative   BILIRUBINUA Negative   OCCULTUA Negative   NITRITE Negative   UROBILINOGEN Negative   LEUKOCYTESUR Negative     Significant Imaging:   Imaging Results              X-Ray Chest AP Portable (Final result)  Result time 05/05/25 21:54:33      Final result by Lito Trevino MD (05/05/25 21:54:33)                   Impression:      1.  Negative for acute process involving the chest.  2.  Stable findings as noted above.    Finalized on: 5/5/2025 9:54 PM By:  Lito Trevino MD  [facility]# [id number]      2025-05-05 21:56:37.951     [facility]               Narrative:    EXAM:  XR CHEST AP PORTABLE    CLINICAL INDICATION:  Fever.  Sepsis    FINDINGS:  Frontal views of the chest were obtained.    Comparisons are made to 04/09/2025.     The lungs are clear. The cardiac silhouette size is normal. The trachea is midline and the mediastinal width is normal. Negative for focal infiltrate, effusion or pneumothorax. Pulmonary vasculature is normal. Negative for osseous abnormalities.  Stable ectatic chest port.  Ectatic and tortuous aorta.  With aortic arch calcifications.  Marginal spondylosis.  Gastrostomy tube in place.

## 2025-05-06 NOTE — ASSESSMENT & PLAN NOTE
This patient does not have evidence of infective focus. Procalcitonin 1.28 PLT 5>6 Lactic 1.3>0.8  My overall impression is sepsis.  Source: Unknown and immunocompromised on chemotherapy  Antibiotics given-   Antibiotics (72h ago, onward)      Start     Stop Route Frequency Ordered    05/06/25 0900  mupirocin 2 % ointment         05/11/25 0859 Nasl 2 times daily 05/06/25 0525    05/05/25 2030  piperacillin-tazobactam (ZOSYN) 4.5 g in D5W 100 mL IVPB (MB+)  (ED Adult Sepsis Treatment)         05/06/25 2329 IV Every 8 hours (non-standard times) 05/05/25 2026          Latest lactate reviewed-  Recent Labs   Lab 05/06/25  0113   LACTATE 0.8     Organ dysfunction indicated by Thrombocytopenia     Fluid challenge Actual Body Weight- The patient's actual body weight will be used to calculate the 30 ml/kg fluid bolus.     Post- resuscitation assessment No - Post resuscitation assessment not needed       Will Not start Pressors- Levophed for MAP of 65  Source control achieved by: IV antibiotics - Zosyn and Cefepime

## 2025-05-06 NOTE — ASSESSMENT & PLAN NOTE
Patient has Abnormal Magnesium: hypomagnesemia. Will continue to monitor electrolytes closely. Will replace the affected electrolytes and repeat labs to be done after interventions completed. The patient's magnesium results have been reviewed and are listed below.  Mag 1.4 upon admission- replaced in ED  Recent Labs   Lab 05/06/25  0828   MG 1.8

## 2025-05-06 NOTE — SUBJECTIVE & OBJECTIVE
Past Medical History:   Diagnosis Date    Esophageal cancer, stage IV 10/10/2024    Hypertension     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 10/11/2024    Palliative care encounter 12/11/2024       Past Surgical History:   Procedure Laterality Date    COLONOSCOPY N/A 8/25/2023    Procedure: COLONOSCOPY;  Surgeon: Pebbles Spear MD;  Location: [facility] ENDO;  Service: Endoscopy;  Laterality: N/A;    COLONOSCOPY N/A 10/2/2024    Procedure: COLONOSCOPY  Cardiac clearance received on 09/20/24 per Dr. Lockett, Cardiology.  Note in encounters.  LB;  Surgeon: Sully Farris MD;  Location: [facility];  Service: Endoscopy;  Laterality: N/A;    ESOPHAGOGASTRODUODENOSCOPY N/A 10/2/2024    Procedure: EGD (ESOPHAGOGASTRODUODENOSCOPY);  Surgeon: Sully Farris MD;  [facility]: [facility];  Service: Endoscopy;  Laterality: N/A;    ESOPHAGOGASTRODUODENOSCOPY N/A 12/31/2024    Procedure: EGD (ESOPHAGOGASTRODUODENOSCOPY);  Surgeon: Maki Sullivan MD;  Location: [facility];  Service: Gastroenterology;  Laterality: N/A;    INSERTION OF VENOUS ACCESS PORT Left 10/21/2024    Procedure: INSERTION, VENOUS ACCESS PORT;  Surgeon: Roseanna Rosas MD;  Location: [facility];  Service: General;  Laterality: Left;    SURGICAL REMOVAL OF LESION OF FACE Right 12/1/2023    Procedure: EXCISION, LESION, FACE;  Surgeon: Jose Flaherty MD;  Location: [facility];  Service: ENT;  Laterality: Right;  1. Excision facial subcutaneous mass right cheek 3 cm. 2. Intermediate layered closure 3.5 cm    WISDOM TOOTH EXTRACTION      XI ROBOTIC APPENDECTOMY N/A 7/31/2024    Procedure: XI ROBOTIC APPENDECTOMY;  Surgeon: Paulo Saavedra MD;  Location: [facility] OR;  Service: General;  Laterality: N/A;    XI ROBOTIC INSERTION, GASTROSTOMY TUBE N/A 10/21/2024    Procedure: XI ROBOTIC INSERTION, GASTROSTOMY TUBE;  Surgeon: Roseanna Rosas MD;  Location: [facility] OR;  Service: General;  Laterality: N/A;       Review of patient's allergies indicates:  No Known  Allergies    Current Facility-Administered Medications on File Prior to Encounter   Medication    [COMPLETED] fluorouraciL injection 315 mg    [COMPLETED] leucovorin calcium 320 mg/m2 = 630 mg in D5W 281.5 mL infusion    [COMPLETED] oxaliplatin (ELOXATIN) 51 mg/m2 = 100 mg in D5W 585 mL chemo infusion    [COMPLETED] palonosetron 0.25mg/dexAMETHasone 12mg in NS IVPB 0.25 mg 50 mL    [DISCONTINUED] 0.9% NaCl 100 mL flush bag    [DISCONTINUED] D5W 250 mL flush bag    [DISCONTINUED] fluorouracil (Adrucil) 960 mg/m2 = 1,890 mg in 0.9% NaCl 100 mL chemo infusion    [DISCONTINUED] sodium chloride 0.9% flush 10 mL     Current Outpatient Medications on File Prior to Encounter   Medication Sig    ferrous sulfate (FEOSOL) 325 mg (65 mg iron) Tab tablet Take 1 tablet (325 mg total) by mouth every other day.    gabapentin (NEURONTIN) 100 MG capsule Take 1 capsule (100 mg total) by mouth 3 (three) times daily.    LIDOcaine-prilocaine (EMLA) cream Apply topically as needed.    metoclopramide HCl (REGLAN) 10 MG tablet Take 1 tablet (10 mg total) by mouth every 6 (six) hours as needed (nausea, vomiting).    OLANZapine (ZYPREXA) 5 MG tablet Take 1 tablet (5 mg total) by mouth every evening. Take nightly on days 1-3 of each chemotherapy cycle.    ondansetron (ZOFRAN-ODT) 8 MG TbDL Take 1 tablet (8 mg total) by mouth 2 (two) times daily.    pantoprazole (PROTONIX) 40 MG tablet Take 1 tablet (40 mg total) by mouth once daily.     Family History       Problem Relation (Age of Onset)    Breast cancer Maternal Grandmother    Diabetes Father    Heart disease Father    Hyperlipidemia Mother    Hypertension Father    Kidney disease Father    Prostate cancer Maternal Grandfather          Tobacco Use    Smoking status: Every Day     Current packs/day: 0.00     Types: Cigars, Cigarettes     Last attempt to quit: 10/4/2022     Years since quittin.5     Passive exposure: Never    Smokeless tobacco: Never    Tobacco comments:     Cigar every  afternoon   Substance and Sexual Activity    Alcohol use: Yes     Alcohol/week: 4.0 - 6.0 standard drinks of alcohol     Types: 4 - 6 Cans of beer per week     Comment: occ    Drug use: Yes     Types: Marijuana     Comment: medical    Sexual activity: Yes     Partners: Female     Review of Systems   Constitutional:  Positive for activity change and fever.   Respiratory:  Negative for shortness of breath and wheezing.    Cardiovascular:  Negative for chest pain, palpitations and leg swelling.   Gastrointestinal:  Negative for diarrhea, nausea and vomiting.   Genitourinary:  Negative for difficulty urinating, flank pain, frequency and urgency.   Musculoskeletal:  Positive for arthralgias and myalgias.   Skin:  Negative for color change and wound.   Neurological:  Negative for dizziness, weakness and headaches.   Psychiatric/Behavioral: Negative.       Objective:     Vital Signs (Most Recent):  Temp: 98.3 °F (36.8 °C) (05/06/25 0808)  Pulse: 86 (05/06/25 0948)  Resp: 20 (05/06/25 0808)  BP: (!) 97/54 (05/06/25 0808)  SpO2: 95 % (05/06/25 0808) Vital Signs (24h Range):  Temp:  [97.9 °F (36.6 °C)-100.3 °F (37.9 °C)] 98.3 °F (36.8 °C)  Pulse:  [] 86  Resp:  [16-22] 20  SpO2:  [92 %-99 %] 95 %  BP: ()/(51-80) 97/54     Weight: 75.2 kg (165 lb 12.6 oz)  Body mass index is 21.87 kg/m².     Physical Exam  HENT:      Nose: Nose normal.      Mouth/Throat:      Mouth: Mucous membranes are dry.      Pharynx: Oropharynx is clear.   Cardiovascular:      Rate and Rhythm: Normal rate and regular rhythm.      Pulses: Normal pulses.      Heart sounds: Normal heart sounds.   Pulmonary:      Effort: Pulmonary effort is normal.      Breath sounds: Normal breath sounds.   Abdominal:      General: Bowel sounds are normal.      Palpations: Abdomen is soft.      Comments: Gastrostomy tube present   Musculoskeletal:         General: Normal range of motion.   Skin:     General: Skin is warm and dry.   Neurological:      Mental  Status: He is alert and oriented to person, place, and time.   Psychiatric:         Mood and Affect: Mood normal.                Significant Labs: All pertinent labs within the past 24 hours have been reviewed.    Significant Imaging: I have reviewed all pertinent imaging results/findings within the past 24 hours.

## 2025-05-06 NOTE — HPI
Rosalio Muñoz is a 59 y.o. male patient with a PMHx of HTN, malignant neoplasm metastatic to other site, and esophageal cancer (stage IV), who presents to the Emergency Department for evaluation of a fever (Tmax 101.4) which onset gradually after getting home from chemotherapy on 5/5/2025. Symptoms are constant and moderate in severity. No mitigating or exacerbating factors reported. Associated symptoms include hip pain, shoulder pain, and knee pain. Patient denies , shortness for breath, palpitations, nausea, diarrhea, vomiting, abdominal pain, urinary symptoms and all other symptoms at this time.  Pain persists despite prior treatment of a oxycodone around 2:00 pm. No further complaints or concerns at this time.  Patient reports smoking 5-6 cigars (small) per day and drinks 1-2 beers per week.  ER workup showed: Labs:  H/H13.4/38.6, Na 129, CO2 19, glucose 117, Mag 1.4, , protein 8.7, albumin 3.4, procalcitonin 0.28, and lactic 1.3.  Chest x-ray negative for acute process.  EKG pending.  Platelets transfused and magnesium replaced in ED. Hematology/oncology consulted.  Patient desires to be full code at this time with Fanta Muñoz (wife) at [phone] as the surrogate decision maker.  Hospital medicine contacted for admission with patient placed inpatient for further evaluation of hypomagnesemia.

## 2025-05-06 NOTE — ASSESSMENT & PLAN NOTE
- Patient noted to have a percutaneous endoscopic gastrostomy tube in place. I have personally inspected the tube.Tube was placed prior to this admission There are no signs of drainage or infection around the site. The tube is patent. Medications have not converted to liquid form if available.  Routine care to be done by wound care and nursing staff.   - PEG placement given dysphagia, and esophageal cancer back in October  - States that he recently started tolerating more oral intake  - Would benefit from SLP eval this admission

## 2025-05-06 NOTE — H&P
[facility] Medicine  History & Physical    Patient Name: Rosalio Muñoz  MRN: [id number]  Patient Class: IP- Inpatient  Admission Date: 5/5/2025  Attending Physician: Dr. Von Staples    Primary Care Provider: Michell Goyal MD         Patient information was obtained from patient and ER records.     Subjective:     Principal Problem:Tobacco dependency    Chief Complaint:   Chief Complaint   Patient presents with    Fever     States fever, body aches , on chemo. Infusion running now         HPI:  Rosalio Muñoz is a 59 y.o. male patient with a PMHx of HTN, malignant neoplasm metastatic to other site, and esophageal cancer (stage IV), who presents to the Emergency Department for evaluation of a fever (Tmax 101.4) which onset gradually after getting home from chemotherapy on 5/5/2025. Symptoms are constant and moderate in severity. No mitigating or exacerbating factors reported. Associated symptoms include hip pain, shoulder pain, and knee pain. Patient denies , shortness for breath, palpitations, nausea, diarrhea, vomiting, abdominal pain, urinary symptoms and all other symptoms at this time.  Pain persists despite prior treatment of a oxycodone around 2:00 pm. No further complaints or concerns at this time.  Patient reports smoking 5-6 cigars (small) per day and drinks 1-2 beers per week.  ER workup showed: Labs:  H/H13.4/38.6, Na 129, CO2 19, glucose 117, Mag 1.4, , protein 8.7, albumin 3.4, procalcitonin 0.28, and lactic 1.3.  Chest x-ray negative for acute process.  EKG pending.  Platelets transfused and magnesium replaced in ED. Hematology/oncology consulted.  Patient desires to be full code at this time with Fanta Muñoz (wife) at [phone] as the surrogate decision maker.  Hospital medicine contacted for admission with patient placed inpatient for further evaluation of hypomagnesemia.         Past Medical History:   Diagnosis Date    Esophageal cancer, stage IV  10/10/2024    Hypertension     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 10/11/2024    Palliative care encounter 12/11/2024       Past Surgical History:   Procedure Laterality Date    COLONOSCOPY N/A 8/25/2023    Procedure: COLONOSCOPY;  Surgeon: Pebbles Spear MD;  Location: [facility];  Service: Endoscopy;  Laterality: N/A;    COLONOSCOPY N/A 10/2/2024    Procedure: COLONOSCOPY  Cardiac clearance received on 09/20/24 per Dr. Lockett, Cardiology.  Note in encounters.  LB;  Surgeon: Sully Farris MD;  Location: [facility];  Service: Endoscopy;  Laterality: N/A;    ESOPHAGOGASTRODUODENOSCOPY N/A 10/2/2024    Procedure: EGD (ESOPHAGOGASTRODUODENOSCOPY);  Surgeon: Sully Farris MD;  Location: [facility];  Service: Endoscopy;  Laterality: N/A;    ESOPHAGOGASTRODUODENOSCOPY N/A 12/31/2024    Procedure: EGD (ESOPHAGOGASTRODUODENOSCOPY);  Surgeon: Maki Sullivan MD;  Location: [facility];  Service: Gastroenterology;  Laterality: N/A;    INSERTION OF VENOUS ACCESS PORT Left 10/21/2024    Procedure: INSERTION, VENOUS ACCESS PORT;  Surgeon: Roseanna Rosas MD;  Location: [facility];  Service: General;  Laterality: Left;    SURGICAL REMOVAL OF LESION OF FACE Right 12/1/2023    Procedure: EXCISION, LESION, FACE;  Surgeon: Jose Flaherty MD;  Location: [facility] OR;  Service: ENT;  Laterality: Right;  1. Excision facial subcutaneous mass right cheek 3 cm. 2. Intermediate layered closure 3.5 cm    WISDOM TOOTH EXTRACTION      XI ROBOTIC APPENDECTOMY N/A 7/31/2024    Procedure: XI ROBOTIC APPENDECTOMY;  Surgeon: Paulo Saavedra MD;  Location: [facility] OR;  Service: General;  Laterality: N/A;    XI ROBOTIC INSERTION, GASTROSTOMY TUBE N/A 10/21/2024    Procedure: XI ROBOTIC INSERTION, GASTROSTOMY TUBE;  Surgeon: Roseanna oRsas MD;  Location: [facility] OR;  Service: General;  Laterality: N/A;       Review of patient's allergies indicates:  No Known Allergies    Current Facility-Administered Medications on File Prior to  Encounter   Medication    [COMPLETED] fluorouraciL injection 315 mg    [COMPLETED] leucovorin calcium 320 mg/m2 = 630 mg in D5W 281.5 mL infusion    [COMPLETED] oxaliplatin (ELOXATIN) 51 mg/m2 = 100 mg in D5W 585 mL chemo infusion    [COMPLETED] palonosetron 0.25mg/dexAMETHasone 12mg in NS IVPB 0.25 mg 50 mL    [DISCONTINUED] 0.9% NaCl 100 mL flush bag    [DISCONTINUED] D5W 250 mL flush bag    [DISCONTINUED] fluorouracil (Adrucil) 960 mg/m2 = 1,890 mg in 0.9% NaCl 100 mL chemo infusion    [DISCONTINUED] sodium chloride 0.9% flush 10 mL     Current Outpatient Medications on File Prior to Encounter   Medication Sig    ferrous sulfate (FEOSOL) 325 mg (65 mg iron) Tab tablet Take 1 tablet (325 mg total) by mouth every other day.    gabapentin (NEURONTIN) 100 MG capsule Take 1 capsule (100 mg total) by mouth 3 (three) times daily.    LIDOcaine-prilocaine (EMLA) cream Apply topically as needed.    metoclopramide HCl (REGLAN) 10 MG tablet Take 1 tablet (10 mg total) by mouth every 6 (six) hours as needed (nausea, vomiting).    OLANZapine (ZYPREXA) 5 MG tablet Take 1 tablet (5 mg total) by mouth every evening. Take nightly on days 1-3 of each chemotherapy cycle.    ondansetron (ZOFRAN-ODT) 8 MG TbDL Take 1 tablet (8 mg total) by mouth 2 (two) times daily.    pantoprazole (PROTONIX) 40 MG tablet Take 1 tablet (40 mg total) by mouth once daily.     Family History       Problem Relation (Age of Onset)    Breast cancer Maternal Grandmother    Diabetes Father    Heart disease Father    Hyperlipidemia Mother    Hypertension Father    Kidney disease Father    Prostate cancer Maternal Grandfather          Tobacco Use    Smoking status: Every Day     Current packs/day: 0.00     Types: Cigars, Cigarettes     Last attempt to quit: 10/4/2022     Years since quittin.5     Passive exposure: Never    Smokeless tobacco: Never    Tobacco comments:     Cigar every afternoon   Substance and Sexual Activity    Alcohol use: Yes      Alcohol/week: 4.0 - 6.0 standard drinks of alcohol     Types: 4 - 6 Cans of beer per week     Comment: occ    Drug use: Yes     Types: Marijuana     Comment: medical    Sexual activity: Yes     Partners: Female     Review of Systems   Constitutional:  Positive for activity change and fever.   Respiratory:  Negative for shortness of breath and wheezing.    Cardiovascular:  Negative for chest pain, palpitations and leg swelling.   Gastrointestinal:  Negative for diarrhea, nausea and vomiting.   Genitourinary:  Negative for difficulty urinating, flank pain, frequency and urgency.   Musculoskeletal:  Positive for arthralgias and myalgias.   Skin:  Negative for color change and wound.   Neurological:  Negative for dizziness, weakness and headaches.   Psychiatric/Behavioral: Negative.       Objective:     Vital Signs (Most Recent):  Temp: 98.3 °F (36.8 °C) (05/06/25 0808)  Pulse: 86 (05/06/25 0948)  Resp: 20 (05/06/25 0808)  BP: (!) 97/54 (05/06/25 0808)  SpO2: 95 % (05/06/25 0808) Vital Signs (24h Range):  Temp:  [97.9 °F (36.6 °C)-100.3 °F (37.9 °C)] 98.3 °F (36.8 °C)  Pulse:  [] 86  Resp:  [16-22] 20  SpO2:  [92 %-99 %] 95 %  BP: ()/(51-80) 97/54     Weight: 75.2 kg (165 lb 12.6 oz)  Body mass index is 21.87 kg/m².     Physical Exam  HENT:      Nose: Nose normal.      Mouth/Throat:      Mouth: Mucous membranes are dry.      Pharynx: Oropharynx is clear.   Cardiovascular:      Rate and Rhythm: Normal rate and regular rhythm.      Pulses: Normal pulses.      Heart sounds: Normal heart sounds.   Pulmonary:      Effort: Pulmonary effort is normal.      Breath sounds: Normal breath sounds.   Abdominal:      General: Bowel sounds are normal.      Palpations: Abdomen is soft.      Comments: Gastrostomy tube present   Musculoskeletal:         General: Normal range of motion.   Skin:     General: Skin is warm and dry.   Neurological:      Mental Status: He is alert and oriented to person, place, and time.    Psychiatric:         Mood and Affect: Mood normal.                Significant Labs: All pertinent labs within the past 24 hours have been reviewed.    Significant Imaging: I have reviewed all pertinent imaging results/findings within the past 24 hours.  Assessment/Plan:     Assessment & Plan  Tobacco dependency  Dangers of cigarette smoking were reviewed with patient in detail. Patient was Counseled for 3-10 minutes. Nicotine replacement options were discussed. Nicotine replacement was discussed- not prescribed per patient's request  Hypomagnesemia  Patient has Abnormal Magnesium: hypomagnesemia. Will continue to monitor electrolytes closely. Will replace the affected electrolytes and repeat labs to be done after interventions completed. The patient's magnesium results have been reviewed and are listed below.  Mag 1.4 upon admission- replaced in ED  Recent Labs   Lab 05/06/25  0828   MG 1.8      Dysphagia  -in the setting of esophageal cancer stage IV  -gastrostomy tube present  -patient reports use of gastrostomy tube when needed been usually able to tolerate oral intake without issue    Esophageal cancer, stage IV  -patient is currently on FOLFOX Keytruda and Herceptin with 5 FU held -   -Hematology-Oncology consulted  -SVETLANA, ESR, CRP, rheumatoid factor, CCP, TSH obtained  -antiemetics as needed   -pain medications as needed   -patient previously seen by Palliative Care     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  Patient has metastatic cancer of esophageal cancer primary. The cancer has metastasized. The patient is under the care of an outpatient oncologist. The patient undergoing active chemotherapy-FOLFOX Keytruda and Herceptin with 5 FU . Their staging information is listed below.    Cancer Staging   Esophageal cancer, stage IV  Staging form: Esophagus - Adenocarcinoma, AJCC 8th Edition  - Clinical stage from 10/10/2024: Stage IVB (cTX, cN0, cM1, G2) - Signed by Zacarias Crowley MD on  10/10/2024      Iron deficiency anemia due to chronic blood loss  Anemia is likely due to Iron deficiency. Most recent hemoglobin and hematocrit are listed below.  Recent Labs     05/05/25 2257 05/06/25  0828   HGB 13.4* 11.6*   HCT 38.6* 33.3*     Plan  - Monitor serial CBC: Daily  - Transfuse PRBC if patient becomes hemodynamically unstable, symptomatic or H/H drops below 7/21.  - Patient has not received any PRBC transfusions to date  - Patient's anemia is currently stable  - patient followed by heme/Onc-   Immunodeficiency due to chemotherapy  -see plan for esophageal cancer stage IV  -IV antibiotics continued  -chest x-ray and UA negative for infectious process    Encounter for PEG (percutaneous endoscopic gastrostomy)  Patient noted to have a percutaneous endoscopic gastrostomy tube in place. I have personally inspected the tube.Tube was placed prior to this admission There are no signs of drainage or infection around the site. The tube is patent. Medications have not converted to liquid form if available.  Routine care to be done by wound care and nursing staff.       Fever  -T-max of 101.4° in the setting of chemotherapy.  -IV antibiotics initiated  -ANC stable  -chest x-ray with no acute findings  -UA negative for infectious process  -antipyretics as needed    Thrombocytopenia  The likely etiology of thrombocytopenia is infection and chemotherapy. The patients 3 most recent labs are listed below.  Recent Labs     05/05/25 2257 05/06/25  0113 05/06/25  0828   PLT 5* 6* 32*     Plan  - Will transfuse if platelet count is <20k.  - hematology/oncology following    Sepsis  This patient does not have evidence of infective focus. Procalcitonin 1.28 PLT 5>6 Lactic 1.3>0.8  My overall impression is sepsis.  Source: Unknown and immunocompromised on chemotherapy  Antibiotics given-   Antibiotics (72h ago, onward)      Start     Stop Route Frequency Ordered    05/06/25 0900  mupirocin 2 % ointment         05/11/25  0859 Nasl 2 times daily 05/06/25 0525 05/05/25 2030  piperacillin-tazobactam (ZOSYN) 4.5 g in D5W 100 mL IVPB (MB+)  (ED Adult Sepsis Treatment)         05/06/25 2329 IV Every 8 hours (non-standard times) 05/05/25 2026          Latest lactate reviewed-  Recent Labs   Lab 05/06/25  0113   LACTATE 0.8     Organ dysfunction indicated by Thrombocytopenia     Fluid challenge Actual Body Weight- The patient's actual body weight will be used to calculate the 30 ml/kg fluid bolus.     Post- resuscitation assessment No - Post resuscitation assessment not needed       Will Not start Pressors- Levophed for MAP of 65  Source control achieved by: IV antibiotics - Zosyn and Cefepime   Hypotension  -noted post chemotherapy  -improved with hydration  -IVF continued    VTE Risk Mitigation (From admission, onward)           Ordered     Place sequential compression device  Until discontinued         05/06/25 1005     Reason for No Pharmacological VTE Prophylaxis  Once        Question:  Reasons:  Answer:  Thrombocytopenia    05/06/25 1005     IP VTE HIGH RISK PATIENT  Once         05/06/25 1005     Place sequential compression device  Until discontinued         05/06/25 0807                                    June Masters NP  Department of Hospital Medicine  [facility] - Med Surg

## 2025-05-06 NOTE — NURSING
Patient arrived to floor via stretcher,via ED nurse, patient ambulated to restroom independently.  Aaox4 patient oriented to room , information on whiteboard. Bed in low locked position with call bell in reach. VSS, tele box [id number] in place and visible at nurse station. Patient denied having any acute distress and none observed.

## 2025-05-06 NOTE — ASSESSMENT & PLAN NOTE
-see plan for esophageal cancer stage IV  -IV antibiotics continued  -chest x-ray and UA negative for infectious process

## 2025-05-06 NOTE — FIRST PROVIDER EVALUATION
Emergency Department TeleTriage Encounter Note      CHIEF COMPLAINT    Chief Complaint   Patient presents with    Fever     States fever, body aches , on chemo. Infusion running now        VITAL SIGNS   Initial Vitals [05/05/25 1929]   BP Pulse Resp Temp SpO2   (!) 103/56 (!) 129 (!) 22 100.3 °F (37.9 °C) 98 %      MAP       --            ALLERGIES    Review of patient's allergies indicates:  No Known Allergies    PROVIDER TRIAGE NOTE  59-year-old male presents for evaluation of fever.  Patient is on chemotherapy infusions.  Fever developed today.  On home infusion.  Did not take anything for the fever.  Presents febrile tachycardic and tachypneic.      ORDERS  Labs Reviewed - No data to display    ED Orders (720h ago, onward)      None              Virtual Visit Note: The provider triage portion of this emergency department evaluation and documentation was performed via [employer], a HIPAA-compliant telemedicine application, in concert with a tele-presenter in the room. A face to face patient evaluation with one of my colleagues will occur once the patient is placed in an emergency department room.      DISCLAIMER: This note was prepared with [employer] voice recognition transcription software. Garbled syntax, mangled pronouns, and other bizarre constructions may be attributed to that software system.

## 2025-05-06 NOTE — ASSESSMENT & PLAN NOTE
- This patient does not have evidence of infective focus  - My overall impression is sepsis.  - Source: Unknown  - Antibiotics given:  Antibiotics (72h ago, onward)      Start     Stop Route Frequency Ordered    05/05/25 2030  piperacillin-tazobactam (ZOSYN) 4.5 g in D5W 100 mL IVPB (MB+)  (ED Adult Sepsis Treatment)         05/06/25 2329 IV Every 8 hours (non-standard times) 05/05/25 2026          Recent Labs   Lab 05/06/25  0113   LACTATE 0.8   Fluid challenge Actual Body Weight- The patient's actual body weight will be used to calculate the 30 ml/kg fluid bolus.   - Post- resuscitation assessment Yes - I attest a sepsis perfusion exam was performed within 6 hours of sepsis, severe sepsis, or septic shock presentation, following fluid resuscitation.  - Will Not start Pressors- Levophed for MAP of 65  Source control achieved by: Zosyn  - On my examination, patient repeat blood pressures improved to 110s/60s.   - Afebrile, Wbc wnl, no clear source of infection, continue abx coverage for now given immunodeficiency  - Patient is stable for admission to floor, does not have any critical care needs at this time

## 2025-05-06 NOTE — ASSESSMENT & PLAN NOTE
Patient noted to have a percutaneous endoscopic gastrostomy tube in place. I have personally inspected the tube.Tube was placed prior to this admission There are no signs of drainage or infection around the site. The tube is patent. Medications have not converted to liquid form if available.  Routine care to be done by wound care and nursing staff.

## 2025-05-06 NOTE — ASSESSMENT & PLAN NOTE
- Follows with Dr. Latif op  - Patient has metastatic cancer of esophageal primary  - Undergoing q2w chemo infusions + 5FU pump; pump has been turned off  - Given history, there is concern for sepsis but no clear source, continue abx for now

## 2025-05-06 NOTE — ASSESSMENT & PLAN NOTE
-T-max of 101.4° in the setting of chemotherapy.  -IV antibiotics initiated  -ANC stable  -chest x-ray with no acute findings  -UA negative for infectious process  -antipyretics as needed

## 2025-05-06 NOTE — ASSESSMENT & PLAN NOTE
The likely etiology of thrombocytopenia is bone marrow suppression due to chemotherapy. The patients 3 most recent labs are listed below.  Recent Labs     05/05/25  7027 05/06/25  0113   PLT 5* 6*   - Will transfuse if platelet count is <20k.  - Continue to monitor

## 2025-05-06 NOTE — CONSULTS
[facility] - Emergency Dept.  Critical Care Medicine  Consult Note    Patient Name: Rosalio Muñoz  MRN: [id number]  Admission Date: 5/5/2025  Hospital Length of Stay: 0 days  Code Status: Prior  Attending Physician: No att. providers found   Primary Care Provider: Michell Goyal MD   Principal Problem: Myalgias    Consults  Subjective:     HPI:  Rosalio Muñoz is a 59 y.o. male who has a past medical history of Esophageal cancer, stage IV, Hypertension, 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, and Palliative care encounter who presented to the ED for evaluation of myalgia. Associated symptoms include generalized weakness, arthralgia, fatigue, headache . Denies any Shortness of breath, productive cough, nausea, vomiting, diarrhea, abdominal pain. Patient started chemotherapy infusions for esophageal cancer back in October of last year. He has been following with Dr. Latif. Has 5-FU pump but reports to infusion center q2w. Had no issues with chemotherapy regimen. Yesterday he recieved infusion and upon returning home developed worsening arthralgia, myalgia, weakness. He states that these symptoms have been present over the past month and had similar episodes which required hospitalizations recently. Reports no recent changes in chemotherapy regimen. ON arrival to ED, became hypotensive with MAPs as low as 62, given total 2.5L sepsis fluids with only mild improvement. Lab workup notable for plt count of 5, WBC wnl, afebrile, Na 129, bicarb 19, mag 1.4, . LA wnl, procalc 1.28. UA with only trace protein. CXR w/ no acute findings. Flu negative. Given IV magnesium replacement and dose of Zosyn. CC was consulted for possible admission to ICU with concern for sepsis.     Hospital/ICU Course:  No notes on file    Past Medical History:   Diagnosis Date    Esophageal cancer, stage IV 10/10/2024    Hypertension     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 10/11/2024    Palliative care encounter  12/11/2024     Past Surgical History:   Procedure Laterality Date    COLONOSCOPY N/A 8/25/2023    Procedure: COLONOSCOPY;  Surgeon: Pebbles Spear MD;  Location: [facility] ENDO;  Service: Endoscopy;  Laterality: N/A;    COLONOSCOPY N/A 10/2/2024    Procedure: COLONOSCOPY  Cardiac clearance received on 09/20/24 per Dr. Lockett, Cardiology.  Note in encounters.  LB;  Surgeon: Sully Farris MD;  Location: [facility];  Service: Endoscopy;  Laterality: N/A;    ESOPHAGOGASTRODUODENOSCOPY N/A 10/2/2024    Procedure: EGD (ESOPHAGOGASTRODUODENOSCOPY);  Surgeon: Sully Farris MD;  Location: [facility];  Service: Endoscopy;  Laterality: N/A;    ESOPHAGOGASTRODUODENOSCOPY N/A 12/31/2024    Procedure: EGD (ESOPHAGOGASTRODUODENOSCOPY);  Surgeon: Maki Sullivan MD;  Location: [facility];  Service: Gastroenterology;  Laterality: N/A;    INSERTION OF VENOUS ACCESS PORT Left 10/21/2024    Procedure: INSERTION, VENOUS ACCESS PORT;  Surgeon: Roseanna Rosas MD;  Location: [facility] OR;  Service: General;  Laterality: Left;    SURGICAL REMOVAL OF LESION OF FACE Right 12/1/2023    Procedure: EXCISION, LESION, FACE;  Surgeon: Jose Flaherty MD;  Location: [facility] OR;  Service: ENT;  Laterality: Right;  1. Excision facial subcutaneous mass right cheek 3 cm. 2. Intermediate layered closure 3.5 cm    WISDOM TOOTH EXTRACTION      XI ROBOTIC APPENDECTOMY N/A 7/31/2024    Procedure: XI ROBOTIC APPENDECTOMY;  Surgeon: Paulo Saavedra MD;  Location: [facility] OR;  Service: General;  Laterality: N/A;    XI ROBOTIC INSERTION, GASTROSTOMY TUBE N/A 10/21/2024    Procedure: XI ROBOTIC INSERTION, GASTROSTOMY TUBE;  Surgeon: Roseanna Rosas MD;  Location: [facility] OR;  Service: General;  Laterality: N/A;     Review of patient's allergies indicates:  No Known Allergies    Family History       Problem Relation (Age of Onset)    Breast cancer Maternal Grandmother    Diabetes Father    Heart disease Father    Hyperlipidemia Mother    Hypertension  Father    Kidney disease Father    Prostate cancer Maternal Grandfather          Tobacco Use    Smoking status: Every Day     Current packs/day: 0.00     Types: Cigars, Cigarettes     Last attempt to quit: 10/4/2022     Years since quittin.5     Passive exposure: Never    Smokeless tobacco: Never    Tobacco comments:     Cigar every afternoon   Substance and Sexual Activity    Alcohol use: Yes     Alcohol/week: 4.0 - 6.0 standard drinks of alcohol     Types: 4 - 6 Cans of beer per week     Comment: occ    Drug use: Yes     Types: Marijuana     Comment: medical    Sexual activity: Yes     Partners: Female       Review of Systems   Constitutional:  Positive for fatigue. Negative for chills and fever.   Respiratory:  Negative for cough, chest tightness and shortness of breath.    Cardiovascular:  Negative for chest pain and leg swelling.   Gastrointestinal:  Negative for abdominal pain, constipation, diarrhea, nausea and vomiting.   Genitourinary:  Negative for dysuria.   Musculoskeletal:  Positive for arthralgias and myalgias.   Neurological:  Positive for weakness and headaches. Negative for dizziness.   Psychiatric/Behavioral:  Negative for agitation and confusion.      Objective:     Vital Signs (Most Recent):  Temp: 98.4 °F (36.9 °C) (25 0255)  Pulse: 91 (25 040)  Resp: 16 (25 040)  BP: 110/68 (25 040)  SpO2: 97 % (25) Vital Signs (24h Range):  Temp:  [97.9 °F (36.6 °C)-100.3 °F (37.9 °C)] 98.4 °F (36.9 °C)  Pulse:  [] 91  Resp:  [16-22] 16  SpO2:  [92 %-99 %] 97 %  BP: ()/(51-80) 110/68     Weight: 75.2 kg (165 lb 12.8 oz)  Body mass index is 21.87 kg/m².  Intake/Output Summary (Last 24 hours) at 2025 0426  Last data filed at 2025 0229  Gross per 24 hour   Intake 2406 ml   Output --   Net 2406 ml     Physical Exam  Constitutional:       General: He is not in acute distress.  HENT:      Head: Normocephalic.      Mouth/Throat:      Mouth: Mucous  membranes are moist.   Eyes:      Extraocular Movements: Extraocular movements intact.   Cardiovascular:      Rate and Rhythm: Normal rate and regular rhythm.      Pulses: Normal pulses.   Pulmonary:      Effort: Pulmonary effort is normal. No respiratory distress.      Breath sounds: No wheezing.      Comments: On room air  Abdominal:      General: There is no distension.      Palpations: Abdomen is soft.      Tenderness: There is no abdominal tenderness.      Comments: PEG in place   Musculoskeletal:         General: No swelling.      Cervical back: Neck supple.   Skin:     General: Skin is warm.      Coloration: Skin is not jaundiced.      Findings: No bruising.   Neurological:      Mental Status: He is alert and oriented to person, place, and time.   Psychiatric:         Mood and Affect: Mood normal.         Behavior: Behavior normal.       Lines/Drains/Airways       Central Venous Catheter Line  Duration             Port A Cath Single Lumen 10/21/24 1502 196 days              Drain  Duration                  Gastrostomy/Enterostomy 10/21/24 1413 Gastrostomy tube w/o balloon  days              Peripheral Intravenous Line  Duration                  Peripheral IV - Single Lumen 05/05/25 2257 20 G Left Antecubital <1 day         Peripheral IV - Single Lumen 05/06/25 0249 18 G Anterior;Distal;Right Forearm <1 day                  Significant Labs:    CBC/Anemia Profile:  Recent Labs   Lab 05/05/25 2257 05/06/25  0113   WBC 11.18  --    HGB 13.4*  --    HCT 38.6*  --    PLT 5* 6*   MCV 93  --    RDW 16.2*  --      Chemistries:  Recent Labs   Lab 05/05/25 2033   *   K 4.2      CO2 19*   BUN 13   CREATININE 0.7   CALCIUM 9.3   ALBUMIN 3.4*   PROT 8.7*   BILITOT 0.9   ALKPHOS 168*   ALT 18   AST 18   MG 1.4*     Lactic Acid:   Recent Labs   Lab 05/05/25 2033 05/06/25  0113   LACTATE 1.3 0.8     Urine Studies:   Recent Labs   Lab 05/05/25 2145   COLORU Yellow   APPEARANCEUA Clear   PHUR 7.0    SPECGRAV 1.020   PROTEINUA Trace*   GLUCUA Negative   BILIRUBINUA Negative   OCCULTUA Negative   NITRITE Negative   UROBILINOGEN Negative   LEUKOCYTESUR Negative     Significant Imaging:   Imaging Results              X-Ray Chest AP Portable (Final result)  Result time 05/05/25 21:54:33      Final result by Lito Trevino MD (05/05/25 21:54:33)                   Impression:      1.  Negative for acute process involving the chest.  2.  Stable findings as noted above.    Finalized on: 5/5/2025 9:54 PM By:  Lito Trevino MD  [facility]# [id number]      2025-05-05 21:56:[id number]     [facility]               Narrative:    EXAM:  XR CHEST AP PORTABLE    CLINICAL INDICATION:  Fever.  Sepsis    FINDINGS:  Frontal views of the chest were obtained.    Comparisons are made to 04/09/2025.     The lungs are clear. The cardiac silhouette size is normal. The trachea is midline and the mediastinal width is normal. Negative for focal infiltrate, effusion or pneumothorax. Pulmonary vasculature is normal. Negative for osseous abnormalities.  Stable ectatic chest port.  Ectatic and tortuous aorta.  With aortic arch calcifications.  Marginal spondylosis.  Gastrostomy tube in place.                                       Assessment & Plan  Hypomagnesemia  - Patient has Abnormal Magnesium: hypomagnesemia. Will continue to monitor electrolytes closely. Will replace the affected electrolytes and repeat labs to be done after interventions completed. The patient's magnesium results have been reviewed and are listed below.  Recent Labs   Lab 05/05/25 2033   MG 1.4*      Dysphagia  Encounter for PEG (percutaneous endoscopic gastrostomy)  - Patient noted to have a percutaneous endoscopic gastrostomy tube in place. I have personally inspected the tube.Tube was placed prior to this admission There are no signs of drainage or infection around the site. The tube is patent. Medications have not converted to liquid form if available.  Routine care to be done by  wound care and nursing staff.   - PEG placement given dysphagia, and esophageal cancer back in October  - States that he recently started tolerating more oral intake  - Would benefit from SLP eval this admission  Esophageal cancer, stage IV  Immunodeficiency due to chemotherapy  - Follows with Dr. Latif op  - Patient has metastatic cancer of esophageal primary  - Undergoing q2w chemo infusions + 5FU pump; pump has been turned off  - Given history, there is concern for sepsis but no clear source, continue abx for now  Thrombocytopenia  The likely etiology of thrombocytopenia is bone marrow suppression due to chemotherapy. The patients 3 most recent labs are listed below.  Recent Labs     05/05/25  2257 05/06/25  0113   PLT 5* 6*   - Will transfuse if platelet count is <20k.  - Continue to monitor  Sepsis  Hypotension  - This patient does not have evidence of infective focus  - My overall impression is sepsis.  - Source: Unknown  - Antibiotics given:  Antibiotics (72h ago, onward)      Start     Stop Route Frequency Ordered    05/05/25 2030  piperacillin-tazobactam (ZOSYN) 4.5 g in D5W 100 mL IVPB (MB+)  (ED Adult Sepsis Treatment)         05/06/25 2329 IV Every 8 hours (non-standard times) 05/05/25 2026          Recent Labs   Lab 05/06/25  0113   LACTATE 0.8   Fluid challenge Actual Body Weight- The patient's actual body weight will be used to calculate the 30 ml/kg fluid bolus.   - Post- resuscitation assessment Yes - I attest a sepsis perfusion exam was performed within 6 hours of sepsis, severe sepsis, or septic shock presentation, following fluid resuscitation.  - Will Not start Pressors- Levophed for MAP of 65  Source control achieved by: Zosyn  - On my examination, patient repeat blood pressures improved to 110s/60s.   - Afebrile, Wbc wnl, no clear source of infection, continue abx coverage for now given immunodeficiency  - Patient is stable for admission to floor, does not have any critical care needs at this  time     Thank you for your consult. I will follow-up with patient. Please contact us if you have any additional questions.     Mode Farmer PA-C  Critical Care Medicine  [facility] - Emergency Dept.

## 2025-05-06 NOTE — ASSESSMENT & PLAN NOTE
-in the setting of esophageal cancer stage IV  -gastrostomy tube present  -patient reports use of gastrostomy tube when needed been usually able to tolerate oral intake without issue

## 2025-05-06 NOTE — ED PROVIDER NOTES
SCRIBE #1 NOTE: I, Samantha Edward, am scribing for, and in the presence of, Nitesh Quezada. I have scribed the HPI, ROS, and PEx.     SCRIBE #2 NOTE: I, Pat Leung, am scribing for, and in the presence of,  Jin Burciaga Jr., MD. I have scribed the remaining portions of the note not scribed by Scribe #1.      History     Chief Complaint   Patient presents with    Fever     States fever, body aches , on chemo. Infusion running now      Review of patient's allergies indicates:  No Known Allergies      History of Present Illness     HPI    5/5/2025, 11:16 PM  History obtained from the wife and patient      History of Present Illness: Rosalio Muñoz is a 59 y.o. 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 Symptoms are constant and moderate in severity. No mitigating or exacerbating factors reported. Associated sxs include hip pain, shoulder pain, and knee pain. Patient denies any N/V/D, and all other sxs at this time. Prior Tx includes a oxycodone around 2:00 pm. No further complaints or concerns at this time.       Arrival mode: Personal vehicle     PCP: Michell Goyal MD        Past Medical History:  Past Medical History:   Diagnosis Date    Esophageal cancer, stage IV 10/10/2024    Hypertension     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 10/11/2024    Palliative care encounter 12/11/2024       Past Surgical History:  Past Surgical History:   Procedure Laterality Date    COLONOSCOPY N/A 8/25/2023    Procedure: COLONOSCOPY;  Surgeon: Pebbles Spear MD;  Location: [facility];  Service: Endoscopy;  Laterality: N/A;    COLONOSCOPY N/A 10/2/2024    Procedure: COLONOSCOPY  Cardiac clearance received on 09/20/24 per Dr. Lockett, Cardiology.  Note in encounters.  LB;  Surgeon: Sully Farris  MD KARLEE;  Location: [facility];  Service: Endoscopy;  Laterality: N/A;    ESOPHAGOGASTRODUODENOSCOPY N/A 10/2/2024    Procedure: EGD (ESOPHAGOGASTRODUODENOSCOPY);  Surgeon: Sully Farris MD;  Location: [facility] ENDO;  Service: Endoscopy;  Laterality: N/A;    ESOPHAGOGASTRODUODENOSCOPY N/A 2024    Procedure: EGD (ESOPHAGOGASTRODUODENOSCOPY);  Surgeon: Maki Sullivan MD;  Location: [facility];  Service: Gastroenterology;  Laterality: N/A;    INSERTION OF VENOUS ACCESS PORT Left 10/21/2024    Procedure: INSERTION, VENOUS ACCESS PORT;  Surgeon: Roseanna Rosas MD;  Location: [facility];  Service: General;  Laterality: Left;    SURGICAL REMOVAL OF LESION OF FACE Right 2023    Procedure: EXCISION, LESION, FACE;  Surgeon: Jose Flaherty MD;  Location: [facility] OR;  Service: ENT;  Laterality: Right;  1. Excision facial subcutaneous mass right cheek 3 cm. 2. Intermediate layered closure 3.5 cm    WISDOM TOOTH EXTRACTION      XI ROBOTIC APPENDECTOMY N/A 2024    Procedure: XI ROBOTIC APPENDECTOMY;  Surgeon: Paulo Saavedra MD;  Location: [facility] OR;  Service: General;  Laterality: N/A;    XI ROBOTIC INSERTION, GASTROSTOMY TUBE N/A 10/21/2024    Procedure: XI ROBOTIC INSERTION, GASTROSTOMY TUBE;  Surgeon: Roseanna Rosas MD;  Location: [facility] OR;  Service: General;  Laterality: N/A;         Family History:  Family History   Problem Relation Name Age of Onset    Hyperlipidemia Mother      Hypertension Father      Diabetes Father      Kidney disease Father      Heart disease Father      Breast cancer Maternal Grandmother      Prostate cancer Maternal Grandfather      Colon cancer Neg Hx         Social History:  Social History     Tobacco Use    Smoking status: Every Day     Current packs/day: 0.00     Types: Cigars, Cigarettes     Last attempt to quit: 10/4/2022     Years since quittin.6     Passive exposure: Never    Smokeless tobacco: Never    Tobacco comments:     Cigar every afternoon   Substance and  Sexual Activity    Alcohol use: Yes     Alcohol/week: 4.0 - 6.0 standard drinks of alcohol     Types: 4 - 6 Cans of beer per week     Comment: occ    Drug use: Yes     Types: Marijuana     Comment: medical    Sexual activity: Yes     Partners: Female        Review of Systems     Review of Systems   Gastrointestinal:  Negative for diarrhea, nausea and vomiting.   Musculoskeletal:  Positive for arthralgias (hip, knee, and shoulder).      Physical Exam     Initial Vitals [05/05/25 1929]   BP Pulse Resp Temp SpO2   (!) 103/56 (!) 129 (!) 22 100.3 °F (37.9 °C) 98 %      MAP       --          Physical Exam  Nursing Notes and Vital Signs Reviewed.  Constitutional: Patient is in no acute distress. Well-developed and thin. Elderly. Chronically ill.  Head: Atraumatic. Normocephalic.  Eyes: PERRL. EOM intact. Conjunctivae are not pale. No scleral icterus.  ENT: Mucous membranes are moist. Oropharynx is clear and symmetric.    Neck: Supple. Full ROM. No lymphadenopathy.  Cardiovascular: tachycardic. Regular rhythm. No murmurs, rubs, or gallops. Distal pulses are 2+ and symmetric  Pulmonary/Chest: No respiratory distress. Clear to auscultation bilaterally. No rales. Exutory wheezing. Diminished breath sounds bilaterally.  Abdominal: Soft and non-distended.  There is no tenderness.  No rebound, guarding, or rigidity. Feeding tube in place.  Musculoskeletal: Moves all extremities. No obvious deformities. No edema.  Skin: Warm and dry.  Neurological:  Alert, awake, and appropriate.  Normal speech.  No acute focal neurological deficits are appreciated.  Psychiatric: Normal affect. Good eye contact. Appropriate in content.     ED Course   Critical Care    Date/Time: 5/6/2025 12:54 AM    Performed by: Nitesh Quezada MD  Authorized by: Nitesh Quezada MD  Direct patient critical care time: 10 minutes  Additional history critical care time: 10 minutes  Ordering / reviewing critical care time: 10 minutes  Documentation  critical care time: 10 minutes  Consulting other physicians critical care time: 5 minutes  Consult with family critical care time: 0 minutes  Total critical care time (exclusive of procedural time) : 45 minutes  Critical care time was exclusive of separately billable procedures and treating other patients.  Critical care was necessary to treat or prevent imminent or life-threatening deterioration of the following conditions: Sepsis in immunocompromised patient.  Critical care was time spent personally by me on the following activities: development of treatment plan with patient or surrogate, discussions with consultants, evaluation of patient's response to treatment, examination of patient, obtaining history from patient or surrogate, ordering and performing treatments and interventions, ordering and review of laboratory studies, re-evaluation of patient's condition and review of old charts.        ED Vital Signs:  Vitals:    05/07/25 1617 05/07/25 1901 05/07/25 1929 05/07/25 2000   BP:   139/68    Pulse: 63 76 74 76   Resp:   19    Temp:   97.8 °F (36.6 °C)    TempSrc:       SpO2:   97%    Weight:       Height:        05/07/25 2100 05/07/25 2300 05/08/25 0008 05/08/25 0100   BP:   124/71    Pulse: 72 60 75 78   Resp:   18    Temp:   98.1 °F (36.7 °C)    TempSrc:       SpO2:   (!) 94%    Weight:       Height:        05/08/25 0300 05/08/25 0400 05/08/25 0424 05/08/25 0500   BP:   121/64    Pulse: 64 66 70 60   Resp:   18    Temp:   98 °F (36.7 °C)    TempSrc:       SpO2:   (!) 94%    Weight:       Height:        05/08/25 0711 05/08/25 0743 05/08/25 0923   BP:  120/73    Pulse: 70 76 88   Resp:  16    Temp:  98.4 °F (36.9 °C)    TempSrc:  Oral    SpO2:  95%    Weight:      Height:          Abnormal Lab Results:  Labs Reviewed   COMPREHENSIVE METABOLIC PANEL - Abnormal       Result Value    Sodium 129 (*)     Potassium 4.2      Chloride 100      CO2 19 (*)     Glucose 117 (*)     BUN 13      Creatinine 0.7      Calcium  9.3      Protein Total 8.7 (*)     Albumin 3.4 (*)     Bilirubin Total 0.9       (*)     AST 18      ALT 18      Anion Gap 10      eGFR >60     URINALYSIS, REFLEX TO URINE CULTURE - Abnormal    Color, UA Yellow      Appearance, UA Clear      pH, UA 7.0      Spec Grav UA 1.020      Protein, UA Trace (*)     Glucose, UA Negative      Ketones, UA Negative      Bilirubin, UA Negative      Blood, UA Negative      Nitrites, UA Negative      Urobilinogen, UA Negative      Leukocyte Esterase, UA Negative     PROCALCITONIN - Abnormal    Procalcitonin 1.28 (*)    CBC WITH DIFFERENTIAL - Abnormal    WBC 11.18      RBC 4.14 (*)     HGB 13.4 (*)     HCT 38.6 (*)     MCV 93      MCH 32.4 (*)     MCHC 34.7      RDW 16.2 (*)     Platelet Count 5 (*)     MPV        Nucleated RBC 0      Neut % 92.6 (*)     Lymph % 1.9 (*)     Mono % 4.3      Eos % 0.4      Basophil % 0.4      Imm Grans % 0.4      Neut # 10.34 (*)     Lymph # 0.21 (*)     Mono # 0.48      Eos # 0.05      Baso # 0.05      Imm Grans # 0.05 (*)    MAGNESIUM - Abnormal    Magnesium  1.4 (*)    PLATELET REVIEW - Abnormal    Platelet Estimate Decreased (*)    PLATELET COUNT - Abnormal    Platelet Count 6 (*)     MPV       PLATELET REVIEW - Abnormal    Platelet Estimate Decreased (*)    INFLUENZA A & B BY MOLECULAR - Normal    INFLUENZA A MOLECULAR Negative      INFLUENZA B MOLECULAR  Negative     GROUP A STREP, MOLECULAR - Normal    Group A Strep Molecular Negative      Narrative:     Arcanobacterium haemolyticum and Beta Streptococcus group C and G will not be detected by this test method.  Please order Throat Culture ([id number]) if suspected.       LACTIC ACID, PLASMA - Normal    Lactic Acid Level 1.3      Narrative:     Falsely low lactic acid results can be found in samples containing >=13.0 mg/dL total bilirubin and/or >=3.5 mg/dL direct bilirubin.    SARS-COV-2 RNA AMPLIFICATION, QUAL - Normal    SARS COV-2 Molecular Negative     LACTIC ACID, PLASMA - Normal     Lactic Acid Level 0.8      Narrative:     Falsely low lactic acid results can be found in samples containing >=13.0 mg/dL total bilirubin and/or >=3.5 mg/dL direct bilirubin.    CBC W/ AUTO DIFFERENTIAL    Narrative:     The following orders were created for panel order CBC auto differential.  Procedure                               Abnormality         Status                     ---------                               -----------         ------                     CBC with Differential[[id number]]       Abnormal            Final result                 Please view results for these tests on the individual orders.   GREY TOP URINE HOLD    Extra Tube Hold for add-ons.     TYPE & SCREEN    Specimen Outdate 05/09/2025 23:59      Group & Rh O POS      Indirect Dian NEG     PREPARE PLATELETS (DOSE) SOFT    UNIT NUMBER [id number]      UNIT ABO/RH O POS      DISPENSE STATUS Transfused      Unit Expiration [id number]      Product Code L8335S43      Unit Blood Type Code 5100      CROSSMATCH INTERPRETATION Not required      Narrative:     Blood products ready.  Called to [facility]_ .        All Lab Results:  Results for orders placed or performed during the hospital encounter of 05/05/25   Blood culture x two cultures. Draw prior to antibiotics.    Collection Time: 05/05/25  8:33 PM    Specimen: Peripheral, Antecubital, Right; Blood   Result Value Ref Range    Blood Culture No Growth After 5 Days    Influenza A & B by Molecular    Collection Time: 05/05/25  8:33 PM    Specimen: Nasopharyngeal Swab   Result Value Ref Range    INFLUENZA A MOLECULAR Negative Negative    INFLUENZA B MOLECULAR  Negative Negative   Comprehensive metabolic panel    Collection Time: 05/05/25  8:33 PM   Result Value Ref Range    Sodium 129 (L) 136 - 145 mmol/L    Potassium 4.2 3.5 - 5.1 mmol/L    Chloride 100 95 - 110 mmol/L    CO2 19 (L) 23 - 29 mmol/L    Glucose 117 (H) 70 - 110 mg/dL    BUN 13 6 - 20 mg/dL    Creatinine 0.7 0.5 - 1.4  mg/dL    Calcium 9.3 8.7 - 10.5 mg/dL    Protein Total 8.7 (H) 6.0 - 8.4 gm/dL    Albumin 3.4 (L) 3.5 - 5.2 g/dL    Bilirubin Total 0.9 0.1 - 1.0 mg/dL     (H) 40 - 150 unit/L    AST 18 11 - 45 unit/L    ALT 18 10 - 44 unit/L    Anion Gap 10 8 - 16 mmol/L    eGFR >60 >60 mL/min/1.73/m2   Lactic acid, plasma #1    Collection Time: 05/05/25  8:33 PM   Result Value Ref Range    Lactic Acid Level 1.3 0.5 - 2.2 mmol/L   Procalcitonin    Collection Time: 05/05/25  8:33 PM   Result Value Ref Range    Procalcitonin 1.28 (H) <0.25 ng/mL   Magnesium    Collection Time: 05/05/25  8:33 PM   Result Value Ref Range    Magnesium  1.4 (L) 1.6 - 2.6 mg/dL   EKG 12-lead    Collection Time: 05/05/25  9:36 PM   Result Value Ref Range    QRS Duration 82 ms    [facility]HS QTC Calculation 431 ms   Urinalysis, Reflex to Urine Culture Urine, Clean Catch    Collection Time: 05/05/25  9:45 PM    Specimen: Urine   Result Value Ref Range    Color, UA Yellow Straw, Marge, Yellow, Light-Orange    Appearance, UA Clear Clear    pH, UA 7.0 5.0 - 8.0    Spec Grav UA 1.020 1.005 - 1.030    Protein, UA Trace (A) Negative    Glucose, UA Negative Negative    Ketones, UA Negative Negative    Bilirubin, UA Negative Negative    Blood, UA Negative Negative    Nitrites, UA Negative Negative    Urobilinogen, UA Negative <2.0 EU/dL    Leukocyte Esterase, UA Negative Negative   GREY TOP URINE HOLD    Collection Time: 05/05/25  9:45 PM   Result Value Ref Range    Extra Tube Hold for add-ons.    Blood culture x two cultures. Draw prior to antibiotics.    Collection Time: 05/05/25 10:57 PM    Specimen: Peripheral, Antecubital, Left; Blood   Result Value Ref Range    Blood Culture No Growth After 5 Days    CBC with Differential    Collection Time: 05/05/25 10:57 PM   Result Value Ref Range    WBC 11.18 3.90 - 12.70 K/uL    RBC 4.14 (L) 4.60 - 6.20 M/uL    HGB 13.4 (L) 14.0 - 18.0 gm/dL    HCT 38.6 (L) 40.0 - 54.0 %    MCV 93 82 - 98 fL    MCH 32.4 (H) 27.0 - 31.0 pg     MCHC 34.7 32.0 - 36.0 g/dL    RDW 16.2 (H) 11.5 - 14.5 %    Platelet Count 5 (LL) 150 - 450 K/uL    MPV      Nucleated RBC 0 <=0 /100 WBC    Neut % 92.6 (H) 38 - 73 %    Lymph % 1.9 (L) 18 - 48 %    Mono % 4.3 4 - 15 %    Eos % 0.4 <=8 %    Basophil % 0.4 <=1.9 %    Imm Grans % 0.4 0.0 - 0.5 %    Neut # 10.34 (H) 1.8 - 7.7 K/uL    Lymph # 0.21 (L) 1 - 4.8 K/uL    Mono # 0.48 0.3 - 1 K/uL    Eos # 0.05 <=0.5 K/uL    Baso # 0.05 <=0.2 K/uL    Imm Grans # 0.05 (H) 0.00 - 0.04 K/uL   COVID-19 Rapid Screening    Collection Time: 05/05/25 10:57 PM   Result Value Ref Range    SARS COV-2 Molecular Negative Negative   Platelet Review    Collection Time: 05/05/25 10:57 PM   Result Value Ref Range    Platelet Estimate Decreased (A)     Group A Strep, Molecular    Collection Time: 05/05/25 11:23 PM    Specimen: Throat   Result Value Ref Range    Group A Strep Molecular Negative Negative   Lactic acid, plasma #2    Collection Time: 05/06/25  1:13 AM   Result Value Ref Range    Lactic Acid Level 0.8 0.5 - 2.2 mmol/L   Platelet Count    Collection Time: 05/06/25  1:13 AM   Result Value Ref Range    Platelet Count 6 (LL) 150 - 450 K/uL    MPV     Platelet Review    Collection Time: 05/06/25  1:13 AM   Result Value Ref Range    Platelet Estimate Decreased (A)     Type & Screen    Collection Time: 05/06/25  1:13 AM   Result Value Ref Range    Specimen Outdate 05/09/2025 23:59     Group & Rh O POS     Indirect Dian NEG    Prepare Platelets 1 Dose    Collection Time: 05/06/25  1:13 AM   Result Value Ref Range    UNIT NUMBER [id number]     UNIT ABO/RH O POS     DISPENSE STATUS Transfused     Unit Expiration [id number]     Product Code R5196H34     Unit Blood Type Code 5100     CROSSMATCH INTERPRETATION Not required    Basic metabolic panel    Collection Time: 05/06/25  8:28 AM   Result Value Ref Range    Sodium 132 (L) 136 - 145 mmol/L    Potassium 3.8 3.5 - 5.1 mmol/L    Chloride 106 95 - 110 mmol/L    CO2 20 (L) 23 - 29  mmol/L    Glucose 95 70 - 110 mg/dL    BUN 13 6 - 20 mg/dL    Creatinine 0.7 0.5 - 1.4 mg/dL    Calcium 8.4 (L) 8.7 - 10.5 mg/dL    Anion Gap 6 (L) 8 - 16 mmol/L    eGFR >60 >60 mL/min/1.73/m2   Magnesium    Collection Time: 05/06/25  8:28 AM   Result Value Ref Range    Magnesium  1.8 1.6 - 2.6 mg/dL   CBC with Differential    Collection Time: 05/06/25  8:28 AM   Result Value Ref Range    WBC 8.34 3.90 - 12.70 K/uL    RBC 3.54 (L) 4.60 - 6.20 M/uL    HGB 11.6 (L) 14.0 - 18.0 gm/dL    HCT 33.3 (L) 40.0 - 54.0 %    MCV 94 82 - 98 fL    MCH 32.8 (H) 27.0 - 31.0 pg    MCHC 34.8 32.0 - 36.0 g/dL    RDW 16.3 (H) 11.5 - 14.5 %    Platelet Count 32 (LL) 150 - 450 K/uL    MPV 11.8 9.2 - 12.9 fL    Nucleated RBC 0 <=0 /100 WBC    Neut % 85.1 (H) 38 - 73 %    Lymph % 2.8 (L) 18 - 48 %    Mono % 5.6 4 - 15 %    Eos % 5.3 <=8 %    Basophil % 0.6 <=1.9 %    Imm Grans % 0.6 (H) 0.0 - 0.5 %    Neut # 7.10 1.8 - 7.7 K/uL    Lymph # 0.23 (L) 1 - 4.8 K/uL    Mono # 0.47 0.3 - 1 K/uL    Eos # 0.44 <=0.5 K/uL    Baso # 0.05 <=0.2 K/uL    Imm Grans # 0.05 (H) 0.00 - 0.04 K/uL   Platelet Review    Collection Time: 05/06/25  8:28 AM   Result Value Ref Range    Platelet Estimate Decreased (A)     SVETLANA    Collection Time: 05/06/25  8:43 AM   Result Value Ref Range    SVETLANA Positive (A) Negative <1:80    SVETLANA Titer 1 1:160     SVETLANA Pattern 1  Speckled    Sedimentation rate    Collection Time: 05/06/25  8:43 AM   Result Value Ref Range    Sed Rate 76 (H) <=23 mm/hr   High sensitivity CRP    Collection Time: 05/06/25  8:43 AM   Result Value Ref Range    CRP, High Sensitivity 91.07 (H) <=3.19 mg/L   TSH    Collection Time: 05/06/25  8:43 AM   Result Value Ref Range    TSH 1.054 0.400 - 4.000 uIU/mL   Cyclic Citrullinated Peptide Antibody, IgG    Collection Time: 05/06/25  8:43 AM   Result Value Ref Range    CCP Quant <0.5 <=4.9 U/mL   Rheumatoid Factor    Collection Time: 05/06/25  8:43 AM   Result Value Ref Range    Rheumatoid Factor <13.0 <=15.0  IU/mL   Anti-DNA Ab, Double-Stranded    Collection Time: 05/06/25  8:43 AM   Result Value Ref Range    dsDNA Ab Qual Negative 1:10 Negative 1:10    dsDNA Ab Titer     Sjogrens syndrome-A extractable nuclear antibody    Collection Time: 05/06/25  8:43 AM   Result Value Ref Range    SSA Interpretation Negative Negative    SSA Antibody 0.06 0.00 - 0.99 Ratio   Sjogrens syndrome-B extractable nuclear antibody    Collection Time: 05/06/25  8:43 AM   Result Value Ref Range    SSB Interpretation Negative     SSB Antibody 0.06 Ratio   Anti-Garcia Antibody    Collection Time: 05/06/25  8:43 AM   Result Value Ref Range    Anti Sm Antibody 0.08 Ratio    SM Interpretation Negative    Anti Sm/RNP Antibody    Collection Time: 05/06/25  8:43 AM   Result Value Ref Range    Anti-Sm/RNP Interpretation Negative Negative    SM/RNP Antibody 0.08 Ratio   POCT glucose    Collection Time: 05/06/25  9:21 PM   Result Value Ref Range    POCT Glucose 95 70 - 110 mg/dL   POCT glucose    Collection Time: 05/07/25  5:22 AM   Result Value Ref Range    POCT Glucose 117 (H) 70 - 110 mg/dL   Comprehensive Metabolic Panel (CMP)    Collection Time: 05/07/25  5:48 AM   Result Value Ref Range    Sodium 135 (L) 136 - 145 mmol/L    Potassium 3.8 3.5 - 5.1 mmol/L    Chloride 108 95 - 110 mmol/L    CO2 20 (L) 23 - 29 mmol/L    Glucose 101 70 - 110 mg/dL    BUN 11 6 - 20 mg/dL    Creatinine 0.7 0.5 - 1.4 mg/dL    Calcium 8.6 (L) 8.7 - 10.5 mg/dL    Protein Total 7.1 6.0 - 8.4 gm/dL    Albumin 2.7 (L) 3.5 - 5.2 g/dL    Bilirubin Total 0.7 0.1 - 1.0 mg/dL     40 - 150 unit/L    AST 13 11 - 45 unit/L    ALT 13 10 - 44 unit/L    Anion Gap 7 (L) 8 - 16 mmol/L    eGFR >60 >60 mL/min/1.73/m2   Magnesium    Collection Time: 05/07/25  5:48 AM   Result Value Ref Range    Magnesium  1.5 (L) 1.6 - 2.6 mg/dL   Phosphorus    Collection Time: 05/07/25  5:48 AM   Result Value Ref Range    Phosphorus Level 2.6 (L) 2.7 - 4.5 mg/dL   CBC with Differential    Collection  Time: 05/07/25  5:48 AM   Result Value Ref Range    WBC 4.62 3.90 - 12.70 K/uL    RBC 3.61 (L) 4.60 - 6.20 M/uL    HGB 11.6 (L) 14.0 - 18.0 gm/dL    HCT 34.3 (L) 40.0 - 54.0 %    MCV 95 82 - 98 fL    MCH 32.1 (H) 27.0 - 31.0 pg    MCHC 33.8 32.0 - 36.0 g/dL    RDW 16.5 (H) 11.5 - 14.5 %    Platelet Count 38 (LL) 150 - 450 K/uL    MPV 12.7 9.2 - 12.9 fL    Nucleated RBC 0 <=0 /100 WBC    Neut % 63.5 38 - 73 %    Lymph % 14.5 (L) 18 - 48 %    Mono % 10.8 4 - 15 %    Eos % 9.7 (H) <=8 %    Basophil % 1.1 <=1.9 %    Imm Grans % 0.4 0.0 - 0.5 %    Neut # 2.93 1.8 - 7.7 K/uL    Lymph # 0.67 (L) 1 - 4.8 K/uL    Mono # 0.50 0.3 - 1 K/uL    Eos # 0.45 <=0.5 K/uL    Baso # 0.05 <=0.2 K/uL    Imm Grans # 0.02 0.00 - 0.04 K/uL   Platelet Review    Collection Time: 05/07/25  5:48 AM   Result Value Ref Range    Platelet Estimate Decreased (A)     CBC with Differential    Collection Time: 05/08/25  5:40 AM   Result Value Ref Range    WBC 4.82 3.90 - 12.70 K/uL    RBC 3.69 (L) 4.60 - 6.20 M/uL    HGB 11.9 (L) 14.0 - 18.0 gm/dL    HCT 34.9 (L) 40.0 - 54.0 %    MCV 95 82 - 98 fL    MCH 32.2 (H) 27.0 - 31.0 pg    MCHC 34.1 32.0 - 36.0 g/dL    RDW 16.2 (H) 11.5 - 14.5 %    Platelet Count 59 (L) 150 - 450 K/uL    MPV 11.3 9.2 - 12.9 fL    Nucleated RBC 0 <=0 /100 WBC    Neut % 60.4 38 - 73 %    Lymph % 19.5 18 - 48 %    Mono % 11.4 4 - 15 %    Eos % 7.7 <=8 %    Basophil % 0.6 <=1.9 %    Imm Grans % 0.4 0.0 - 0.5 %    Neut # 2.91 1.8 - 7.7 K/uL    Lymph # 0.94 (L) 1 - 4.8 K/uL    Mono # 0.55 0.3 - 1 K/uL    Eos # 0.37 <=0.5 K/uL    Baso # 0.03 <=0.2 K/uL    Imm Grans # 0.02 0.00 - 0.04 K/uL   Platelet Review    Collection Time: 05/08/25  5:40 AM   Result Value Ref Range    Platelet Estimate Decreased (A)     Comprehensive Metabolic Panel (CMP)    Collection Time: 05/08/25  5:41 AM   Result Value Ref Range    Sodium 136 136 - 145 mmol/L    Potassium 4.0 3.5 - 5.1 mmol/L    Chloride 107 95 - 110 mmol/L    CO2 21 (L) 23 - 29 mmol/L     Glucose 96 70 - 110 mg/dL    BUN 9 6 - 20 mg/dL    Creatinine 0.7 0.5 - 1.4 mg/dL    Calcium 8.7 8.7 - 10.5 mg/dL    Protein Total 7.0 6.0 - 8.4 gm/dL    Albumin 2.7 (L) 3.5 - 5.2 g/dL    Bilirubin Total 0.8 0.1 - 1.0 mg/dL     40 - 150 unit/L    AST 14 11 - 45 unit/L    ALT 12 10 - 44 unit/L    Anion Gap 8 8 - 16 mmol/L    eGFR >60 >60 mL/min/1.73/m2   Magnesium    Collection Time: 05/08/25  5:41 AM   Result Value Ref Range    Magnesium  1.5 (L) 1.6 - 2.6 mg/dL   Phosphorus    Collection Time: 05/08/25  5:41 AM   Result Value Ref Range    Phosphorus Level 3.4 2.7 - 4.5 mg/dL         Imaging Results:  Imaging Results              X-Ray Chest AP Portable (Final result)  Result time 05/05/25 21:54:33      Final result by Lito Trevino MD (05/05/25 21:54:33)                   Impression:      1.  Negative for acute process involving the chest.  2.  Stable findings as noted above.    Finalized on: 5/5/2025 9:54 PM By:  Lito Trevino MD  [facility]# [id number]      2025-05-05 21:56:37.951     [facility]               Narrative:    EXAM:  XR CHEST AP PORTABLE    CLINICAL INDICATION:  Fever.  Sepsis    FINDINGS:  Frontal views of the chest were obtained.    Comparisons are made to 04/09/2025.     The lungs are clear. The cardiac silhouette size is normal. The trachea is midline and the mediastinal width is normal. Negative for focal infiltrate, effusion or pneumothorax. Pulmonary vasculature is normal. Negative for osseous abnormalities.  Stable ectatic chest port.  Ectatic and tortuous aorta.  With aortic arch calcifications.  Marginal spondylosis.  Gastrostomy tube in place.                                         The EKG was ordered, reviewed, and independently interpreted by the ED provider.  Interpretation time: 21:36  Rate: 104 BPM  Rhythm: sinus tachycardia  Interpretation: no acute ST WA. No STEMI.           The Emergency Provider reviewed the vital signs and test results, which are outlined above.     ED Discussion      2:00 AM: Dr. Quezada transfers care of patient to Dr. Burciaga pending admission.    5:08 AM  Consultation has been obtained with the hospital medicine team.  P.m. standing.  Patient's blood pressure has been a bit on the soft side and they requested evaluation by critical care.  Patient was evaluated by tray in the ED in his pressure was at baseline in his stable.  Hospital Medicine graciously accepted the patient for admission     Medical Decision Making  59-year-old white male with a history of metastatic esophageal cancer.  Patient had chemotherapy today.  After getting, the patient had myalgias, arthralgias, elevated temperature.  Patient came in with possible sepsis in a immunocompromise patient.  He was dosed with Zosyn and cefepime.    Platelet count was low.  Platelet transfusion is ordered.    Chest x-ray was clear.    Differential diagnosis: Sepsis, severe sepsis, septic shock, immunocompromised state, anemia, thrombocytopenia, pancytopenia        Amount and/or Complexity of Data Reviewed  Labs: ordered. Decision-making details documented in ED Course.     Details: Lactate normal strep and COVID are negative as 11,000 white count hemoglobin 13.4.  Platelet count was 5.  UA was negative magnesium was low at 1.4 with the elevated protocol at 1.28.  Has a hyponatremia with sodium 129.  Renal function was benign.  Flu was negative  Radiology: ordered. Decision-making details documented in ED Course.     Details: Chest x-ray stable  ECG/medicine tests: ordered and independent interpretation performed. Decision-making details documented in ED Course.     Details: That has STEMI  Discussion of management or test interpretation with external provider(s): Consultation with the both the critical care team and Hospital Medicine team.  Hospital Medicine graciously accepted    Risk  OTC drugs.  Prescription drug management.  Drug therapy requiring intensive monitoring for toxicity.  Decision regarding  hospitalization.    Critical Care  Total time providing critical care: 45 minutes                ED Medication(s):  Medications   0.9% NaCl infusion ( Intravenous Verify Only 5/6/25 1633)   0.9% NaCl infusion ( Intravenous New Bag 5/7/25 2127)   ceFEPIme injection 1 g (1 g Intravenous Given 5/5/25 2258)   magnesium sulfate 2g in water 50mL IVPB (premix) (0 g Intravenous Stopped 5/6/25 0150)   acetaminophen tablet 1,000 mg (1,000 mg Oral Given 5/5/25 2327)   sodium chloride 0.9% bolus 2,256 mL 2,256 mL (0 mLs Intravenous Stopped 5/6/25 0229)   heparin, porcine (PF) 100 unit/mL injection flush 500 Units (500 Units Intravenous Given 5/6/25 1104)   magnesium sulfate 2g in water 50mL IVPB (premix) (0 g Intravenous Stopped 5/7/25 1110)   cyanocobalamin injection 1,000 mcg (1,000 mcg Intramuscular Given 5/8/25 0831)   magnesium sulfate 2g in water 50mL IVPB (premix) (0 g Intravenous Stopped 5/8/25 1042)       Discharge Medication List as of 5/8/2025 11:16 AM        START taking these medications    Details   multivitamin Tab Take 1 tablet by mouth once daily., Starting Fri 5/9/2025, OTC      thiamine 100 MG tablet Take 1 tablet (100 mg total) by mouth once daily., Starting Fri 5/9/2025, Until Sun 6/8/2025, OTC              Follow-up Information       Michell Goyal MD Follow up in 1 week(s).    Specialty: Family Medicine  Why: -please keep previously scheduled appointment  Contact information:  [id number] [place] [address] [address] [place] [address]  [phone]               Hudson Latif MD Follow up.    Specialty: Hematology and Oncology  Why: -hospital follow up in 1-2 weeks- please keep next available appointment  Contact information:  [address]  [place] [address] [phone]                                 Scribe Attestation:   Scribe #1: I performed the above scribed service and the documentation accurately describes the services I performed. I attest to the accuracy of the note.     Attending:    Physician Attestation Statement for Scribe #1: I, Nitesh Quezada, personally performed the services described in this documentation, as scribed by Samantha Edward, in my presence, and it is both accurate and complete.       Scribe Attestation:   Scribe #2: I performed the above scribed service and the documentation accurately describes the services I performed. I attest to the accuracy of the note.    Attending Attestation:           Physician Attestation for Scribe:    Physician Attestation Statement for Scribe #2: I, Jin Burciaga Jr., MD, reviewed documentation, as scribed by Pat Leung in my presence, and it is both accurate and complete. I also acknowledge and confirm the content of the note done by Scribe #1.           Clinical Impression       ICD-10-CM ICD-9-CM   1. Sepsis, due to unspecified organism, unspecified whether acute organ dysfunction present  A41.9 038.9     995.91   2. Screening for cardiovascular condition  Z13.6 V81.2   3. Anemia, unspecified type  D64.9 285.9   4. Immunocompromised  D84.9 279.9   5. Hypomagnesemia  E83.42 275.2   6. Thrombocytopenia  D69.6 287.5   7. Hyponatremia  E87.1 276.1   8. Chest pain  R07.9 786.50               Jin Burciaga Jr., MD  05/06/25 0567       Nitesh Quezada MD  05/13/25 1486

## 2025-05-06 NOTE — PLAN OF CARE
[facility] - Med Surg  Initial Discharge Assessment       Primary Care Provider: Michell Goyal MD    Admission Diagnosis: Hypomagnesemia [E83.42]  Hyponatremia [E87.1]  Screening for cardiovascular condition [Z13.6]  Thrombocytopenia [D69.6]  Immunocompromised [D84.9]  Anemia, unspecified type [D64.9]  Sepsis, due to unspecified organism, unspecified whether acute organ dysfunction present [A41.9]    Admission Date: 5/5/2025  Expected Discharge Date: PER ATTENDING         Payor: BLUE CROSS [employer] B[employer]EFIT / Plan: [employer] [place]UE [place]OSS [place] / Product Type: Self Funded /     Extended Emergency Contact Information  Primary Emergency Contact: Fanta Muñoz  Address: [address] [place] [address]  Mobile Phone: [phone]  Relation: Spouse    Discharge Plan A: Home         [address] [address] [facility] [address]  [address] [address] [address]  Phone: [phone] Fax: [phone]      Initial Assessment (most recent)       Adult Discharge Assessment - 05/06/25 1132          Discharge Assessment    Assessment Type Discharge Planning Assessment     Confirmed/corrected address, phone number and insurance Yes     Confirmed Demographics Correct on Facesheet     Source of Information patient     When was your last doctors appointment? --   N/A    Communicated RUTH with patient/caregiver Date not available/Unable to determine     Reason For Admission [facility] DEPENDENCY     People in Home spouse     Do you expect to return to your current living situation? Yes     Do you have help at home or someone to help you manage your care at home? Yes     Who are your caregiver(s) and their phone number(s)? SPOUSE     Prior to hospitilization cognitive status: Alert/Oriented     Current cognitive status: Alert/Oriented     Walking or Climbing Stairs Difficulty no     Dressing/Bathing Difficulty no     Equipment Currently Used at Home  none     Readmission within 30 days? Yes     Patient currently being followed by outpatient case management? No     Do you currently have service(s) that help you manage your care at home? No     Do you take prescription medications? Yes     Do you have prescription coverage? Yes     Coverage BCBS     Do you have any problems affording any of your prescribed medications? No     Is the patient taking medications as prescribed? yes     Who is going to help you get home at discharge? SPOUSE     How do you get to doctors appointments? car, drives self     Are you on dialysis? No     Do you take coumadin? No     Discharge Plan A Home                        Readmission Assessment (most recent)         Readmission Assessment - 05/06/25 1133                   Readmission     Was this a planned readmission? No      Why were you hospitalized in the last 30 days? fever      Why were you readmitted? Related to previous admission      When you left the hospital where did you go? Home with Family      Tell me about what happened between when you left the hospital and the day you returned. ran a fever      When did you start not feeling well? day ago      Did you try to manage your symptoms your self? Yes      What did you do? took meds      Did you call anyone? No      Why? came to the er      Did you try to see or did see a doctor or nurse before you came? No      Did you have  a follow-up appointment on discharge? Yes      Did you go? Yes

## 2025-05-06 NOTE — ASSESSMENT & PLAN NOTE
- Patient has Abnormal Magnesium: hypomagnesemia. Will continue to monitor electrolytes closely. Will replace the affected electrolytes and repeat labs to be done after interventions completed. The patient's magnesium results have been reviewed and are listed below.  Recent Labs   Lab 05/05/25 2033   MG 1.4*

## 2025-05-06 NOTE — ASSESSMENT & PLAN NOTE
Patient has metastatic cancer of esophageal cancer primary. The cancer has metastasized. The patient is under the care of an outpatient oncologist. The patient undergoing active chemotherapy-FOLFOX Keytruda and Herceptin with 5 FU . Their staging information is listed below.    Cancer Staging   Esophageal cancer, stage IV  Staging form: Esophagus - Adenocarcinoma, AJCC 8th Edition  - Clinical stage from 10/10/2024: Stage IVB (cTX, cN0, cM1, G2) - Signed by Zacarias Crowley MD on 10/10/2024

## 2025-05-06 NOTE — PT/OT/SLP EVAL
"Occupational Therapy   Evaluation and Discharge Note    Name: Rosalio Muñoz  MRN: [id number]  Admitting Diagnosis: Tobacco dependency  Recent Surgery: * No surgery found *      Recommendations:     Discharge Recommendations: No Therapy Indicated  Discharge Equipment Recommendations: none  Barriers to discharge:  None    Assessment:     Rosalio Muñoz is a 59 y.o. male with a medical diagnosis of Tobacco dependency. At this time, patient is functioning at their prior level of function and does not require further acute OT services.     Plan:     During this hospitalization, patient does not require further acute OT services.  Please re-consult if situation changes.    Plan of Care Reviewed with: patient    Subjective     Chief Complaint: Reported "I am doing ok."  Patient/Family Comments/goals: maintain independence    Occupational Profile:  Living Environment: Patient lives with his wife in a H with a threshold to enter.  Previous level of function: Patient was independent with ADLs and community distance ambulation.  Roles and Routines: Patient was an active .  Equipment Used at home: none  Assistance upon Discharge: family    Pain/Comfort:  Pain Rating 1: 3/10 (B knees and shdrs)  Pain Addressed 1:  (activity pacing)    Objective:     Completed EPIC chart review prior to session.  Patient found supine with telemetry, G/J tube (PORT A CATH) upon OT entry to room.    General Precautions: Standard, hearing impaired  Orthopedic Precautions: N/A  Braces: N/A  Respiratory Status: Room air     Occupational Performance:    Bed Mobility:    Patient completed Supine to Sit with independence    Functional Mobility/Transfers:  Patient completed Sit <> Stand Transfer with independence  with  no assistive device   Patient completed Bed <> Chair Transfer using Step Transfer technique with independence with no assistive device  Functional Mobility: Patient completed x220ft functional mobility without AD to " promote OOB activity.    Activities of Daily Living:  Upper Body Dressing: independence .  Lower Body Dressing: independence .    Cognitive/Visual Perceptual:  Cognitive/Psychosocial Skills:     -       Oriented to: Person, Place, Time, and Situation   -       Follows Commands/attention:Follows two-step commands    Physical Exam:  Balance:    -       sitting: WFL, standing: WFL  Upper Extremity Range of Motion:     -       Right Upper Extremity: WFL  -       Left Upper Extremity: WFL  Upper Extremity Strength:    -       Right Upper Extremity: WFL  -       Left Upper Extremity: WFL   Strength:    -       Right Upper Extremity: WFL  -       Left Upper Extremity: WFL    AMPAC 6 Click ADL:  AMPAC Total Score: 24    Treatment & Education:  Patient educated on role of OT in acute setting and benefits of OOB activity. Encouraged OOB throughout the course of hospitalization to decrease risk of hospital related debility. Patient stated understanding and in agreement with POC.    Patient left up in chair with all lines intact, call button in reach, and parents present    History:     Past Medical History:   Diagnosis Date    Esophageal cancer, stage IV 10/10/2024    Hypertension     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 10/11/2024    Palliative care encounter 12/11/2024     Past Surgical History:   Procedure Laterality Date    COLONOSCOPY N/A 8/25/2023    Procedure: COLONOSCOPY;  Surgeon: Pebbles Spear MD;  Location: [facility];  Service: Endoscopy;  Laterality: N/A;    COLONOSCOPY N/A 10/2/2024    Procedure: COLONOSCOPY  Cardiac clearance received on 09/20/24 per Dr. Lockett, Cardiology.  Note in encounters.  LB;  Surgeon: Sully Farris MD;  Location: [facility];  Service: Endoscopy;  Laterality: N/A;    ESOPHAGOGASTRODUODENOSCOPY N/A 10/2/2024    Procedure: EGD (ESOPHAGOGASTRODUODENOSCOPY);  Surgeon: Sully Farris MD;  Location: [facility];  Service: Endoscopy;  Laterality: N/A;     ESOPHAGOGASTRODUODENOSCOPY N/A 12/31/2024    Procedure: EGD (ESOPHAGOGASTRODUODENOSCOPY);  Surgeon: Maki Sullivan MD;  Location: [facility];  Service: Gastroenterology;  Laterality: N/A;    INSERTION OF VENOUS ACCESS PORT Left 10/21/2024    Procedure: INSERTION, VENOUS ACCESS PORT;  Surgeon: Roseanna Rosas MD;  Location: [facility] OR;  Service: General;  Laterality: Left;    SURGICAL REMOVAL OF LESION OF FACE Right 12/1/2023    Procedure: EXCISION, LESION, FACE;  Surgeon: Jose Flaherty MD;  Location: [facility] OR;  Service: ENT;  Laterality: Right;  1. Excision facial subcutaneous mass right cheek 3 cm. 2. Intermediate layered closure 3.5 cm    WISDOM TOOTH EXTRACTION      XI ROBOTIC APPENDECTOMY N/A 7/31/2024    Procedure: XI ROBOTIC APPENDECTOMY;  Surgeon: Paulo Saavedra MD;  Location: [facility];  Service: General;  Laterality: N/A;    XI ROBOTIC INSERTION, GASTROSTOMY TUBE N/A 10/21/2024    Procedure: XI ROBOTIC INSERTION, GASTROSTOMY TUBE;  Surgeon: Roseanna Rosas MD;  Location: [facility];  Service: General;  Laterality: N/A;       Time Tracking:     OT Date of Treatment: 05/06/25  OT Start Time: 1045  OT Stop Time: 1110  OT Total Time (min): 25 min    Billable Minutes:Evaluation 25    Tiffanie Deutsch OT  5/6/2025

## 2025-05-06 NOTE — ASSESSMENT & PLAN NOTE
-patient is currently on FOLFOX Keytruda and Herceptin with 5 FU held -   -Hematology-Oncology consulted  -SVETLANA, ESR, CRP, rheumatoid factor, CCP, TSH obtained  -antiemetics as needed   -pain medications as needed   -patient previously seen by Palliative Care

## 2025-05-06 NOTE — HPI
Rosalio Muñoz is a 59 y.o. male who has a past medical history of Esophageal cancer, stage IV, Hypertension, 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, and Palliative care encounter who presented to the ED for evaluation of myalgia. Associated symptoms include generalized weakness, arthralgia, fatigue, headache . Denies any Shortness of breath, productive cough, nausea, vomiting, diarrhea, abdominal pain. Patient started chemotherapy infusions for esophageal cancer back in October of last year. He has been following with Dr. Latif. Has 5-FU pump but reports to infusion center q2w. Had no issues with chemotherapy regimen. Yesterday he recieved infusion and upon returning home developed worsening arthralgia, myalgia, weakness. He states that these symptoms have been present over the past month and had similar episodes which required hospitalizations recently. Reports no recent changes in chemotherapy regimen. ON arrival to ED, became hypotensive with MAPs as low as 62, given total 2.5L sepsis fluids with only mild improvement. Lab workup notable for plt count of 5, WBC wnl, afebrile, Na 129, bicarb 19, mag 1.4, . LA wnl, procalc 1.28. UA with only trace protein. CXR w/ no acute findings. Flu negative. Given IV magnesium replacement and dose of Zosyn. CC was consulted for possible admission to ICU with concern for sepsis.

## 2025-05-06 NOTE — CONSULTS
Hematology/Oncology  Consult Note   Admission Date: 5/5/2025  Hospital Length of Stay: 0  Code Status: FULL Code  Attending Provider:    Consulting Provider: Stephanie Lainez MD  Primary Care Physician: Michell Goyal MD   Principal Problem: Tobacco dependency    Consult Requested By: No att. providers found  Reason for Consult: Neutropenic fever/thrombocytopenia post chemo in the setting of stage IV esophageal cancer    HPI   Rosalio Muñoz is a 59 y.o. male hypertension, tobacco dependency, stage IV esophageal cancer currently on  and targeted therapy.  He presented to [facility] ED with report of fever (Tmax 101.4), chills and generalized weakness which started 05/05/2025 after he returned home after receiving chemotherapy.    H&P reviewed.  Associated symptoms include hip pain, shoulder pain, and knee pain. Patient denies , shortness for breath, palpitations, nausea, diarrhea, vomiting, abdominal pain, urinary symptoms and all other symptoms at this time     The patient reports that his current pain regimen with oxycodone does help the pain but he has noticed that the pain occurs after each treatment.  I reviewed with him the various etiologies of his pain including immunotherapy induced arthritis versus myositis and recommend that we work this up.  I also reviewed the potential treatments as this is the source of his pain.  Given that it is intermittent and associated with treatment there is likely some other component causing his pain.  Otherwise this morning he has no acute complaints.  He was afebrile overnight.    Review of Systems   Constitutional:  Positive for malaise/fatigue. Negative for chills, fever and weight loss.   Respiratory:  Negative for shortness of breath.    Cardiovascular:  Negative for chest pain.   Gastrointestinal:  Negative for abdominal pain, constipation, diarrhea, nausea and vomiting.   Musculoskeletal:  Positive for back pain, joint pain and myalgias.   Neurological:   Negative for headaches.     Objective   Continuous Infusions:   0.9% NaCl   Intravenous Continuous 75 mL/hr at 05/06/25 1633 Rate Verify at 05/06/25 1633     Scheduled Meds:   ceFEPime IV (PEDS and ADULTS)  2 g Intravenous Q8H    gabapentin  100 mg Oral TID    mupirocin   Nasal BID    OLANZapine  5 mg Oral Daily    pantoprazole  40 mg Oral Daily    polyethylene glycol  17 g Oral Daily    sodium chloride 0.9%  10 mL Intravenous Q8H     PRN Meds:  Current Facility-Administered Medications:     0.9%  NaCl infusion (for blood administration), , Intravenous, Q24H PRN    acetaminophen, 650 mg, Oral, Q4H PRN    dextrose 50%, 12.5 g, Intravenous, PRN    dextrose 50%, 12.5 g, Intravenous, PRN    dextrose 50%, 25 g, Intravenous, PRN    dextrose 50%, 25 g, Intravenous, PRN    glucagon (human recombinant), 1 mg, Intramuscular, PRN    glucagon (human recombinant), 1 mg, Intramuscular, PRN    glucose, 16 g, Oral, PRN    glucose, 16 g, Oral, PRN    glucose, 24 g, Oral, PRN    glucose, 24 g, Oral, PRN    HYDROcodone-acetaminophen, 1 tablet, Oral, Q6H PRN    insulin aspart U-100, 0-5 Units, Subcutaneous, QID (AC + HS) PRN    melatonin, 6 mg, Oral, Nightly PRN    naloxone, 0.02 mg, Intravenous, PRN    ondansetron, 4 mg, Intravenous, Q8H PRN    simethicone, 1 tablet, Oral, QID PRN    sodium chloride 0.9%, 10 mL, Intravenous, Q12H PRN      Vital Signs Range (Last 24H):  Temp:  [97.9 °F (36.6 °C)-100.3 °F (37.9 °C)]   Pulse:  []   Resp:  [16-22]   BP: ()/(50-69)   SpO2:  [92 %-99 %]     Physical Exam  Constitutional:       General: He is not in acute distress.     Appearance: He is not ill-appearing, toxic-appearing or diaphoretic.   HENT:      Head: Normocephalic and atraumatic.   Eyes:      Extraocular Movements: Extraocular movements intact.      Conjunctiva/sclera: Conjunctivae normal.   Cardiovascular:      Rate and Rhythm: Normal rate.   Pulmonary:      Effort: Pulmonary effort is normal.   Neurological:      General:  "No focal deficit present.      Mental Status: He is alert and oriented to person, place, and time. Mental status is at baseline.   Psychiatric:         Mood and Affect: Mood normal.         Behavior: Behavior normal.         Thought Content: Thought content normal.       Laboratory   CBC with Differential:  Recent Labs   Lab 05/06/25  0828   WBC 8.34   LYMPH 0.23*  2.8*   BASOPHIL 0.6  0.05   RBC 3.54*   HCT 33.3*   HGB 11.6*   MCV 94   MCH 32.8*   RDW 16.3*   PLT 32*   MPV 11.8     CMP:  Recent Labs   Lab 05/05/25 2033 05/06/25  0828   * 95   CALCIUM 9.3 8.4*   ALBUMIN 3.4*  --    PROT 8.7*  --    * 132*   K 4.2 3.8   CO2 19* 20*    106   BUN 13 13   CREATININE 0.7 0.7   ALKPHOS 168*  --    ALT 18  --    AST 18  --    BILITOT 0.9  --      BMP:   Recent Labs   Lab 05/06/25  0828   GLU 95   CALCIUM 8.4*   *   K 3.8   CO2 20*      BUN 13   CREATININE 0.7     LFTs:   Recent Labs   Lab 05/05/25 2033   ALT 18   AST 18   ALKPHOS 168*   BILITOT 0.9   PROT 8.7*   ALBUMIN 3.4*     Haptoglobin: No results for input(s): "HAPTOGLOBIN" in the last 24 hours.  Tumor Markers: No results for input(s): "PSA", "CEA", "", "AFPTM", "OJ4200", "" in the last 24 hours.    Invalid input(s): "ALGTM"  Immunology: No results for input(s): "SPEP", "JESSICA", "SVETLANA", "FREELAMBDALI" in the last 24 hours.  Coagulation: No results for input(s): "PT", "INR", "APTT" in the last 24 hours.    Microbiology Results (last 7 days)       Procedure Component Value Units Date/Time    Blood culture x two cultures. Draw prior to antibiotics. [[id number]]  (Normal) Collected: 05/05/25 0593    Order Status: Completed Specimen: Blood from Peripheral, Antecubital, Left Updated: 05/06/25 1101     Blood Culture No Growth After 6 Hours    Blood culture x two cultures. Draw prior to antibiotics. [[id number]]  (Normal) Collected: 05/05/25 2033    Order Status: Completed Specimen: Blood from Peripheral, Antecubital, Right Updated: " 05/06/25 1101     Blood Culture No Growth After 6 Hours    Group A Strep, Molecular [[id number]]  (Normal) Collected: 05/05/25 2323    Order Status: Completed Specimen: Throat Updated: 05/05/25 2345     Group A Strep Molecular Negative    Narrative:      Arcanobacterium haemolyticum and Beta Streptococcus group C and G will not be detected by this test method.  Please order Throat Culture ([id number]) if suspected.        Rapid strep screen [[id number]] Collected: 05/05/25 2257    Order Status: Canceled Specimen: Throat     Influenza A & B by Molecular [[id number]]  (Normal) Collected: 05/05/25 2033    Order Status: Completed Specimen: Nasopharyngeal Swab Updated: 05/05/25 2105     INFLUENZA A MOLECULAR Negative     INFLUENZA B MOLECULAR  Negative            Imaging     Imaging Results              X-Ray Chest AP Portable (Final result)  Result time 05/05/25 21:54:33      Final result by Lito Trevino MD (05/05/25 21:54:33)                   Impression:      1.  Negative for acute process involving the chest.  2.  Stable findings as noted above.    Finalized on: 5/5/2025 9:54 PM By:  Lito Trevino MD  [facility]# [id number]      2025-05-05 21:56:37.951     [facility]               Narrative:    EXAM:  XR CHEST AP PORTABLE    CLINICAL INDICATION:  Fever.  Sepsis    FINDINGS:  Frontal views of the chest were obtained.    Comparisons are made to 04/09/2025.     The lungs are clear. The cardiac silhouette size is normal. The trachea is midline and the mediastinal width is normal. Negative for focal infiltrate, effusion or pneumothorax. Pulmonary vasculature is normal. Negative for osseous abnormalities.  Stable ectatic chest port.  Ectatic and tortuous aorta.  With aortic arch calcifications.  Marginal spondylosis.  Gastrostomy tube in place.                                          Pathology     Specimen (24h ago, onward)      None            Assessment and Plan:   Stage IVB (cTX, cN0, cM1, G2)  Esophageal Cancer  Immunodeficiency  due to chemotherapy  Primary oncologist: Dr. Latif   Outpatient treatment regimen:  FOLFOX, Keytruda, Herceptin (last cycle initiated 05/05/2025)  On presentation to the ED, I requested the 5 FU pump be stopped given reported fever; he had approximately 5-6 hours left for administration of 5 FU   Patient can follow up outpatient on hospital discharge for next cycle of chemotherapy        Febrile illness   Patient reports Tmax 101.4; T-max during hospitalization 100.3 inpatient was hypotensive and tachycardic with normal oxygen saturations on room air  Last chemotherapy administration as above   Chest x-ray/urinalysis negative for infection   Finalized blood cultures pending  Recommend discontinuing 5 FU pump; although he is not neutropenic at this time would aggressively treat as he has received the majority of his anticancer therapeutic regimen and will likely have some level of neutropenia in the next week        Arthralgias, Multiple Joints  Patient reports severe arthralgias in multiple joints after receiving chemotherapy; differential includes but is not limited to IO induced arthritis versus myositis  Discussed with the primary team obtaining inflammatory markers as well as TSH, CCP, RF, SVETLANA  If signs of IO induced arthritis we will discuss with the patient initiating steroids; IL-6 inhibitors are also an option if refractory to steroids      I appreciate the opportunity to participate in this patient's care. Please do not hesitate to contact me with any questions or concerns.     Stephanie Lainez MD  Hematology/Oncology

## 2025-05-06 NOTE — ASSESSMENT & PLAN NOTE
The likely etiology of thrombocytopenia is infection and chemotherapy. The patients 3 most recent labs are listed below.  Recent Labs     05/05/25  2257 05/06/25  0113 05/06/25  0828   PLT 5* 6* 32*     Plan  - Will transfuse if platelet count is <20k.  - hematology/oncology following

## 2025-05-07 LAB
ABSOLUTE EOSINOPHIL (OHS): 0.45 K/UL
ABSOLUTE MONOCYTE (OHS): 0.5 K/UL (ref 0.3–1)
ABSOLUTE NEUTROPHIL COUNT (OHS): 2.93 K/UL (ref 1.8–7.7)
ALBUMIN SERPL BCP-MCNC: 2.7 G/DL (ref 3.5–5.2)
ALP SERPL-CCNC: 119 UNIT/L (ref 40–150)
ALT SERPL W/O P-5'-P-CCNC: 13 UNIT/L (ref 10–44)
ANA (OHS): POSITIVE
ANA PATTERN 1 (OHS): ABNORMAL
ANA TITER 1 (OHS): ABNORMAL
ANION GAP (OHS): 7 MMOL/L (ref 8–16)
AST SERPL-CCNC: 13 UNIT/L (ref 11–45)
BASOPHILS # BLD AUTO: 0.05 K/UL
BASOPHILS NFR BLD AUTO: 1.1 %
BILIRUB SERPL-MCNC: 0.7 MG/DL (ref 0.1–1)
BUN SERPL-MCNC: 11 MG/DL (ref 6–20)
CALCIUM SERPL-MCNC: 8.6 MG/DL (ref 8.7–10.5)
CHLORIDE SERPL-SCNC: 108 MMOL/L (ref 95–110)
CO2 SERPL-SCNC: 20 MMOL/L (ref 23–29)
CREAT SERPL-MCNC: 0.7 MG/DL (ref 0.5–1.4)
ERYTHROCYTE [DISTWIDTH] IN BLOOD BY AUTOMATED COUNT: 16.5 % (ref 11.5–14.5)
GFR SERPLBLD CREATININE-BSD FMLA CKD-EPI: >60 ML/MIN/1.73/M2
GLUCOSE SERPL-MCNC: 101 MG/DL (ref 70–110)
HCT VFR BLD AUTO: 34.3 % (ref 40–54)
HGB BLD-MCNC: 11.6 GM/DL (ref 14–18)
IMM GRANULOCYTES # BLD AUTO: 0.02 K/UL (ref 0–0.04)
IMM GRANULOCYTES NFR BLD AUTO: 0.4 % (ref 0–0.5)
LYMPHOCYTES # BLD AUTO: 0.67 K/UL (ref 1–4.8)
MAGNESIUM SERPL-MCNC: 1.5 MG/DL (ref 1.6–2.6)
MCH RBC QN AUTO: 32.1 PG (ref 27–31)
MCHC RBC AUTO-ENTMCNC: 33.8 G/DL (ref 32–36)
MCV RBC AUTO: 95 FL (ref 82–98)
NUCLEATED RBC (/100WBC) (OHS): 0 /100 WBC
PHOSPHATE SERPL-MCNC: 2.6 MG/DL (ref 2.7–4.5)
PLATELET # BLD AUTO: 38 K/UL (ref 150–450)
PLATELET BLD QL SMEAR: ABNORMAL
PMV BLD AUTO: 12.7 FL (ref 9.2–12.9)
POCT GLUCOSE: 117 MG/DL (ref 70–110)
POCT GLUCOSE: 95 MG/DL (ref 70–110)
POTASSIUM SERPL-SCNC: 3.8 MMOL/L (ref 3.5–5.1)
PROT SERPL-MCNC: 7.1 GM/DL (ref 6–8.4)
RBC # BLD AUTO: 3.61 M/UL (ref 4.6–6.2)
RELATIVE EOSINOPHIL (OHS): 9.7 %
RELATIVE LYMPHOCYTE (OHS): 14.5 % (ref 18–48)
RELATIVE MONOCYTE (OHS): 10.8 % (ref 4–15)
RELATIVE NEUTROPHIL (OHS): 63.5 % (ref 38–73)
SODIUM SERPL-SCNC: 135 MMOL/L (ref 136–145)
WBC # BLD AUTO: 4.62 K/UL (ref 3.9–12.7)

## 2025-05-07 PROCEDURE — 63600175 PHARM REV CODE 636 W HCPCS: Performed by: EMERGENCY MEDICINE

## 2025-05-07 PROCEDURE — 84100 ASSAY OF PHOSPHORUS: CPT | Performed by: NURSE PRACTITIONER

## 2025-05-07 PROCEDURE — A4216 STERILE WATER/SALINE, 10 ML: HCPCS | Performed by: NURSE PRACTITIONER

## 2025-05-07 PROCEDURE — 85025 COMPLETE CBC W/AUTO DIFF WBC: CPT | Performed by: NURSE PRACTITIONER

## 2025-05-07 PROCEDURE — 25000003 PHARM REV CODE 250: Performed by: EMERGENCY MEDICINE

## 2025-05-07 PROCEDURE — 83735 ASSAY OF MAGNESIUM: CPT | Performed by: NURSE PRACTITIONER

## 2025-05-07 PROCEDURE — 63600175 PHARM REV CODE 636 W HCPCS: Performed by: NURSE PRACTITIONER

## 2025-05-07 PROCEDURE — 21400001 HC TELEMETRY ROOM

## 2025-05-07 PROCEDURE — 99233 SBSQ HOSP IP/OBS HIGH 50: CPT | Mod: ,,, | Performed by: INTERNAL MEDICINE

## 2025-05-07 PROCEDURE — 80053 COMPREHEN METABOLIC PANEL: CPT | Performed by: NURSE PRACTITIONER

## 2025-05-07 PROCEDURE — 36415 COLL VENOUS BLD VENIPUNCTURE: CPT | Performed by: NURSE PRACTITIONER

## 2025-05-07 PROCEDURE — 11000001 HC ACUTE MED/SURG PRIVATE ROOM

## 2025-05-07 PROCEDURE — 25000003 PHARM REV CODE 250: Performed by: NURSE PRACTITIONER

## 2025-05-07 PROCEDURE — 25000003 PHARM REV CODE 250: Performed by: FAMILY MEDICINE

## 2025-05-07 PROCEDURE — 25000003 PHARM REV CODE 250: Performed by: STUDENT IN AN ORGANIZED HEALTH CARE EDUCATION/TRAINING PROGRAM

## 2025-05-07 RX ORDER — ACETAMINOPHEN 500 MG
5000 TABLET ORAL DAILY
Status: DISCONTINUED | OUTPATIENT
Start: 2025-05-07 | End: 2025-05-08 | Stop reason: HOSPADM

## 2025-05-07 RX ORDER — THIAMINE HCL 100 MG
100 TABLET ORAL DAILY
Status: DISCONTINUED | OUTPATIENT
Start: 2025-05-07 | End: 2025-05-08 | Stop reason: HOSPADM

## 2025-05-07 RX ORDER — SODIUM CHLORIDE 9 MG/ML
INJECTION, SOLUTION INTRAVENOUS CONTINUOUS
Status: ACTIVE | OUTPATIENT
Start: 2025-05-07 | End: 2025-05-08

## 2025-05-07 RX ORDER — MAGNESIUM SULFATE HEPTAHYDRATE 40 MG/ML
2 INJECTION, SOLUTION INTRAVENOUS ONCE
Status: COMPLETED | OUTPATIENT
Start: 2025-05-07 | End: 2025-05-07

## 2025-05-07 RX ORDER — CYANOCOBALAMIN 1000 UG/ML
1000 INJECTION, SOLUTION INTRAMUSCULAR; SUBCUTANEOUS DAILY
Status: COMPLETED | OUTPATIENT
Start: 2025-05-07 | End: 2025-05-08

## 2025-05-07 RX ADMIN — CYANOCOBALAMIN 1000 MCG: 1000 INJECTION INTRAMUSCULAR; SUBCUTANEOUS at 09:05

## 2025-05-07 RX ADMIN — CEFEPIME 2 G: 2 INJECTION, POWDER, FOR SOLUTION INTRAVENOUS at 08:05

## 2025-05-07 RX ADMIN — Medication 1 TABLET: at 09:05

## 2025-05-07 RX ADMIN — POLYETHYLENE GLYCOL 3350 17 G: 17 POWDER, FOR SOLUTION ORAL at 09:05

## 2025-05-07 RX ADMIN — CEFEPIME 2 G: 2 INJECTION, POWDER, FOR SOLUTION INTRAVENOUS at 01:05

## 2025-05-07 RX ADMIN — THERA TABS 1 TABLET: TAB at 09:05

## 2025-05-07 RX ADMIN — GABAPENTIN 100 MG: 100 CAPSULE ORAL at 09:05

## 2025-05-07 RX ADMIN — OLANZAPINE 5 MG: 5 TABLET, FILM COATED ORAL at 09:05

## 2025-05-07 RX ADMIN — CEFEPIME 2 G: 2 INJECTION, POWDER, FOR SOLUTION INTRAVENOUS at 05:05

## 2025-05-07 RX ADMIN — PANTOPRAZOLE SODIUM 40 MG: 40 TABLET, DELAYED RELEASE ORAL at 09:05

## 2025-05-07 RX ADMIN — CHOLECALCIFEROL TAB 125 MCG (5000 UNIT) 5000 UNITS: 125 TAB at 09:05

## 2025-05-07 RX ADMIN — MAGNESIUM SULFATE HEPTAHYDRATE 2 G: 40 INJECTION, SOLUTION INTRAVENOUS at 09:05

## 2025-05-07 RX ADMIN — SODIUM CHLORIDE: 9 INJECTION, SOLUTION INTRAVENOUS at 09:05

## 2025-05-07 RX ADMIN — Medication 10 ML: at 03:05

## 2025-05-07 RX ADMIN — Medication 100 MG: at 09:05

## 2025-05-07 RX ADMIN — Medication 10 ML: at 06:05

## 2025-05-07 RX ADMIN — MUPIROCIN: 20 OINTMENT TOPICAL at 09:05

## 2025-05-07 RX ADMIN — Medication 10 ML: at 09:05

## 2025-05-07 RX ADMIN — GABAPENTIN 100 MG: 100 CAPSULE ORAL at 03:05

## 2025-05-07 NOTE — PLAN OF CARE
Discussed plan of care with patient and this patient was able to, verbalized understanding.  Patient remains free from injury.  Safety and comfort precautions maintained this shift.   Call light and personal belongings within reach, bed in low position with bed wheels locked.  No s/s of acute distress.   Purposeful rounding continued this shift.  Pain is controlled per MD order. IVF infusing.  Cardiac monitoring in place.  Diet orders continued.  Blood glucose monitoring continued this shift.  Vital signs continued per order.  Q2 repositioning   Patient mobility status remains independent  Chart and orders review completed.   Patient education about care completed. .  Problem: Adult Inpatient Plan of Care  Goal: Plan of Care Review  Outcome: Progressing  Goal: Patient-Specific Goal (Individualized)  Outcome: Progressing  Goal: Absence of Hospital-Acquired Illness or Injury  Outcome: Progressing  Goal: Optimal Comfort and Wellbeing  Outcome: Progressing  Goal: Readiness for Transition of Care  Outcome: Progressing     Problem: Sepsis/Septic Shock  Goal: Optimal Coping  Outcome: Progressing  Goal: Absence of Bleeding  Outcome: Progressing  Goal: Blood Glucose Level Within Targeted Range  Outcome: Progressing  Goal: Absence of Infection Signs and Symptoms  Outcome: Progressing  Goal: Optimal Nutrition Intake  Outcome: Progressing     Problem: Infection  Goal: Absence of Infection Signs and Symptoms  Outcome: Progressing     Problem: Wound  Goal: Optimal Coping  Outcome: Progressing  Goal: Optimal Functional Ability  Outcome: Progressing  Goal: Absence of Infection Signs and Symptoms  Outcome: Progressing  Goal: Improved Oral Intake  Outcome: Progressing  Goal: Optimal Pain Control and Function  Outcome: Progressing  Goal: Skin Health and Integrity  Outcome: Progressing  Goal: Optimal Wound Healing  Outcome: Progressing

## 2025-05-07 NOTE — PROGRESS NOTES
Hematology/Oncology  Progress Note   Admission Date: 5/5/2025  Hospital Length of Stay: 1  Code Status: FULL Code  Attending Provider:    Consulting Provider: Stephanie Lainez MD  Primary Care Physician: Michell Goyal MD   Principal Problem: Sepsis    Consult Requested By: Fausto Nguyen MD  Reason for Consult: Neutropenic fever/thrombocytopenia post chemo in the setting of stage IV esophageal cancer    Subjective   The patient reports no issues overnight.  He has some joint pain but it is improved from prior.  Overall his clinical course is improving.  No source of infection found however he is very high risk. Otherwise, I reviewed my recommendations as outlined below and he is in agreement.     Review of Systems   Constitutional:  Positive for malaise/fatigue. Negative for chills, fever and weight loss.   Respiratory:  Negative for shortness of breath.    Cardiovascular:  Negative for chest pain.   Gastrointestinal:  Negative for abdominal pain, constipation, diarrhea, nausea and vomiting.   Musculoskeletal:  Positive for back pain, joint pain and myalgias.   Neurological:  Negative for headaches.     HPI   Rosalio Muñoz is a 59 y.o. male hypertension, tobacco dependency, stage IV esophageal cancer currently on  and targeted therapy.  He presented to [facility] ED with report of fever (Tmax 101.4), chills and generalized weakness which started 05/05/2025 after he returned home after receiving chemotherapy.    H&P reviewed.  Associated symptoms include hip pain, shoulder pain, and knee pain. Patient denies , shortness for breath, palpitations, nausea, diarrhea, vomiting, abdominal pain, urinary symptoms and all other symptoms at this time     The patient reports that his current pain regimen with oxycodone does help the pain but he has noticed that the pain occurs after each treatment.  I reviewed with him the various etiologies of his pain including immunotherapy induced arthritis versus myositis  and recommend that we work this up.  I also reviewed the potential treatments as this is the source of his pain.  Given that it is intermittent and associated with treatment there is likely some other component causing his pain.  Otherwise this morning he has no acute complaints.  He was afebrile overnight.    Objective   Continuous Infusions:      Scheduled Meds:   ceFEPime IV (PEDS and ADULTS)  2 g Intravenous Q8H    gabapentin  100 mg Oral TID    mupirocin   Nasal BID    OLANZapine  5 mg Oral Daily    pantoprazole  40 mg Oral Daily    polyethylene glycol  17 g Oral Daily    sodium chloride 0.9%  10 mL Intravenous Q8H     PRN Meds:  Current Facility-Administered Medications:     0.9%  NaCl infusion (for blood administration), , Intravenous, Q24H PRN    acetaminophen, 650 mg, Oral, Q4H PRN    HYDROcodone-acetaminophen, 1 tablet, Oral, Q6H PRN    melatonin, 6 mg, Oral, Nightly PRN    naloxone, 0.02 mg, Intravenous, PRN    ondansetron, 4 mg, Intravenous, Q8H PRN    simethicone, 1 tablet, Oral, QID PRN    sodium chloride 0.9%, 10 mL, Intravenous, Q12H PRN      Vital Signs Range (Last 24H):  Temp:  [97.7 °F (36.5 °C)-98.5 °F (36.9 °C)]   Pulse:  [64-93]   Resp:  [18-20]   BP: ()/(50-63)   SpO2:  [92 %-97 %]     Physical Exam  Constitutional:       General: He is not in acute distress.     Appearance: He is not ill-appearing, toxic-appearing or diaphoretic.   HENT:      Head: Normocephalic and atraumatic.   Eyes:      Extraocular Movements: Extraocular movements intact.      Conjunctiva/sclera: Conjunctivae normal.   Cardiovascular:      Rate and Rhythm: Normal rate.   Pulmonary:      Effort: Pulmonary effort is normal.   Neurological:      General: No focal deficit present.      Mental Status: He is alert and oriented to person, place, and time. Mental status is at baseline.   Psychiatric:         Mood and Affect: Mood normal.         Behavior: Behavior normal.         Thought Content: Thought content normal.  "      Laboratory   CBC with Differential:  Recent Labs   Lab 05/07/25  0548   WBC 4.62   LYMPH 0.67*  14.5*   BASOPHIL 1.1  0.05   RBC 3.61*   HCT 34.3*   HGB 11.6*   MCV 95   MCH 32.1*   RDW 16.5*   PLT 38*   MPV 12.7     CMP:  Recent Labs   Lab 05/07/25  0548      CALCIUM 8.6*   ALBUMIN 2.7*   PROT 7.1   *   K 3.8   CO2 20*      BUN 11   CREATININE 0.7   ALKPHOS 119   ALT 13   AST 13   BILITOT 0.7     BMP:   Recent Labs   Lab 05/07/25  0548      CALCIUM 8.6*   *   K 3.8   CO2 20*      BUN 11   CREATININE 0.7     LFTs:   Recent Labs   Lab 05/07/25  0548   ALT 13   AST 13   ALKPHOS 119   BILITOT 0.7   PROT 7.1   ALBUMIN 2.7*     Haptoglobin: No results for input(s): "HAPTOGLOBIN" in the last 24 hours.  Tumor Markers: No results for input(s): "PSA", "CEA", "", "AFPTM", "[id number]", "" in the last 24 hours.    Invalid input(s): "ALGTM"  Immunology: No results for input(s): "SPEP", "JESSICA", "SVETLANA", "FREELAMBDALI" in the last 24 hours.  Coagulation: No results for input(s): "PT", "INR", "APTT" in the last 24 hours.    Microbiology Results (last 7 days)       Procedure Component Value Units Date/Time    Blood culture x two cultures. Draw prior to antibiotics. [[id number]]  (Normal) Collected: 05/05/25 2257    Order Status: Completed Specimen: Blood from Peripheral, Antecubital, Left Updated: 05/07/25 0502     Blood Culture No Growth After 24 Hours    Blood culture x two cultures. Draw prior to antibiotics. [[id number]]  (Normal) Collected: 05/05/25 2033    Order Status: Completed Specimen: Blood from Peripheral, Antecubital, Right Updated: 05/07/25 0502     Blood Culture No Growth After 24 Hours    Group A Strep, Molecular [[id number]]  (Normal) Collected: 05/05/25 2323    Order Status: Completed Specimen: Throat Updated: 05/05/25 3647     Group A Strep Molecular Negative    Narrative:      Arcanobacterium haemolyticum and Beta Streptococcus group C and G will not be detected " by this test method.  Please order Throat Culture ([id number]) if suspected.        Rapid strep screen [[id number]] Collected: 05/05/25 2257    Order Status: Canceled Specimen: Throat     Influenza A & B by Molecular [[id number]]  (Normal) Collected: 05/05/25 2033    Order Status: Completed Specimen: Nasopharyngeal Swab Updated: 05/05/25 2105     INFLUENZA A MOLECULAR Negative     INFLUENZA B MOLECULAR  Negative            Imaging     Imaging Results              X-Ray Chest AP Portable (Final result)  Result time 05/05/25 21:54:33      Final result by Lito Trevino MD (05/05/25 21:54:33)                   Impression:      1.  Negative for acute process involving the chest.  2.  Stable findings as noted above.    Finalized on: 5/5/2025 9:54 PM By:  Lito Trevino MD  [facility]# [id number]      2025-05-05 21:56:37.951     [facility]               Narrative:    EXAM:  XR CHEST AP PORTABLE    CLINICAL INDICATION:  Fever.  Sepsis    FINDINGS:  Frontal views of the chest were obtained.    Comparisons are made to 04/09/2025.     The lungs are clear. The cardiac silhouette size is normal. The trachea is midline and the mediastinal width is normal. Negative for focal infiltrate, effusion or pneumothorax. Pulmonary vasculature is normal. Negative for osseous abnormalities.  Stable ectatic chest port.  Ectatic and tortuous aorta.  With aortic arch calcifications.  Marginal spondylosis.  Gastrostomy tube in place.                                          Pathology     Specimen (24h ago, onward)      None            Assessment and Plan:   Stage IVB (cTX, cN0, cM1, G2)  Esophageal Cancer  Immunodeficiency due to chemotherapy  Primary oncologist: Dr. Latif   Outpatient treatment regimen:  FOLFOX, Keytruda, Herceptin (last cycle initiated 05/05/2025)  On presentation to the ED, I requested the 5 FU pump be stopped given reported fever; he had approximately 5-6 hours left for administration of 5 FU   Patient can follow up outpatient on hospital  discharge for next cycle of chemotherapy        Febrile illness   Patient reported Tmax 101.4; T-max during hospitalization 100.3 inpatient was hypotensive and tachycardic with normal oxygen saturations on room air  Last chemotherapy administration as above   Chest x-ray/urinalysis negative for infection   Finalized blood cultures pending but prelim negative  Patient afebrile O/N with no current source of infection noted and BP starting to improve   If clinically stable for discharge in the next 24-48 hours would recommend patient complete total of 7-10 days of antibiotic therapy        IO Induced Arthritis Multiple Joints, Mild to Moderate  Patient reports severe arthralgias in multiple joints after receiving   ESR and CRP elevated; RF and CCP normal  SVETLANA pending   Recommend:   Prednisone 10 mg daily with plan for outpatient taper/cessation; NSAIDs are an alternative  Patient will need repeat ESR/CRP in 4 weeks outpatient  If no improvement would consider holding immunotherapy      I appreciate the opportunity to participate in this patient's care. Please do not hesitate to contact me with any questions or concerns.     Stephanie Lainez MD  Hematology/Oncology

## 2025-05-07 NOTE — ASSESSMENT & PLAN NOTE
This patient does not have evidence of infective focus. Procalcitonin 1.28 PLT 5>6 Lactic 1.3>0.8  My overall impression is sepsis.  Source: Unknown and immunocompromised on chemotherapy  Antibiotics given-   Antibiotics (72h ago, onward)      Start     Stop Route Frequency Ordered    05/06/25 1700  ceFEPIme injection 2 g         -- IV Every 8 hours (non-standard times) 05/06/25 1551    05/06/25 0900  mupirocin 2 % ointment         05/11/25 0859 Nasl 2 times daily 05/06/25 0525          Latest lactate reviewed-  Recent Labs   Lab 05/06/25  0113   LACTATE 0.8     Organ dysfunction indicated by Thrombocytopenia     Fluid challenge Actual Body Weight- The patient's actual body weight will be used to calculate the 30 ml/kg fluid bolus.     Post- resuscitation assessment No - Post resuscitation assessment not needed       Will Not start Pressors- Levophed for MAP of 65  Source control achieved by: IV antibiotics - Cefepime   -Procalcitonin 1.28, CRP 91, Sed rate 76  -pt afebrile and blood cultures with NGTD

## 2025-05-08 VITALS
OXYGEN SATURATION: 95 % | WEIGHT: 165.81 LBS | SYSTOLIC BLOOD PRESSURE: 120 MMHG | HEART RATE: 88 BPM | DIASTOLIC BLOOD PRESSURE: 73 MMHG | HEIGHT: 73 IN | BODY MASS INDEX: 21.98 KG/M2 | TEMPERATURE: 98 F | RESPIRATION RATE: 16 BRPM

## 2025-05-08 PROBLEM — E83.42 HYPOMAGNESEMIA: Status: RESOLVED | Noted: 2023-05-30 | Resolved: 2025-05-08

## 2025-05-08 PROBLEM — A41.9 SEPSIS: Status: RESOLVED | Noted: 2025-05-06 | Resolved: 2025-05-08

## 2025-05-08 PROBLEM — R50.9 FEVER: Status: RESOLVED | Noted: 2025-03-26 | Resolved: 2025-05-08

## 2025-05-08 PROBLEM — I95.9 HYPOTENSION: Status: RESOLVED | Noted: 2025-05-06 | Resolved: 2025-05-08

## 2025-05-08 LAB
ABSOLUTE EOSINOPHIL (OHS): 0.37 K/UL
ABSOLUTE MONOCYTE (OHS): 0.55 K/UL (ref 0.3–1)
ABSOLUTE NEUTROPHIL COUNT (OHS): 2.91 K/UL (ref 1.8–7.7)
ALBUMIN SERPL BCP-MCNC: 2.7 G/DL (ref 3.5–5.2)
ALP SERPL-CCNC: 119 UNIT/L (ref 40–150)
ALT SERPL W/O P-5'-P-CCNC: 12 UNIT/L (ref 10–44)
ANION GAP (OHS): 8 MMOL/L (ref 8–16)
AST SERPL-CCNC: 14 UNIT/L (ref 11–45)
BASOPHILS # BLD AUTO: 0.03 K/UL
BASOPHILS NFR BLD AUTO: 0.6 %
BILIRUB SERPL-MCNC: 0.8 MG/DL (ref 0.1–1)
BUN SERPL-MCNC: 9 MG/DL (ref 6–20)
CALCIUM SERPL-MCNC: 8.7 MG/DL (ref 8.7–10.5)
CHLORIDE SERPL-SCNC: 107 MMOL/L (ref 95–110)
CO2 SERPL-SCNC: 21 MMOL/L (ref 23–29)
CREAT SERPL-MCNC: 0.7 MG/DL (ref 0.5–1.4)
DSDNA ANTIBODY (OHS): NORMAL
DSDNA ANTIBODY TITER (OHS): NORMAL
ERYTHROCYTE [DISTWIDTH] IN BLOOD BY AUTOMATED COUNT: 16.2 % (ref 11.5–14.5)
GFR SERPLBLD CREATININE-BSD FMLA CKD-EPI: >60 ML/MIN/1.73/M2
GLUCOSE SERPL-MCNC: 96 MG/DL (ref 70–110)
HCT VFR BLD AUTO: 34.9 % (ref 40–54)
HGB BLD-MCNC: 11.9 GM/DL (ref 14–18)
IMM GRANULOCYTES # BLD AUTO: 0.02 K/UL (ref 0–0.04)
IMM GRANULOCYTES NFR BLD AUTO: 0.4 % (ref 0–0.5)
LYMPHOCYTES # BLD AUTO: 0.94 K/UL (ref 1–4.8)
MAGNESIUM SERPL-MCNC: 1.5 MG/DL (ref 1.6–2.6)
MCH RBC QN AUTO: 32.2 PG (ref 27–31)
MCHC RBC AUTO-ENTMCNC: 34.1 G/DL (ref 32–36)
MCV RBC AUTO: 95 FL (ref 82–98)
NUCLEATED RBC (/100WBC) (OHS): 0 /100 WBC
PHOSPHATE SERPL-MCNC: 3.4 MG/DL (ref 2.7–4.5)
PLATELET # BLD AUTO: 59 K/UL (ref 150–450)
PLATELET BLD QL SMEAR: ABNORMAL
PMV BLD AUTO: 11.3 FL (ref 9.2–12.9)
POTASSIUM SERPL-SCNC: 4 MMOL/L (ref 3.5–5.1)
PROT SERPL-MCNC: 7 GM/DL (ref 6–8.4)
RBC # BLD AUTO: 3.69 M/UL (ref 4.6–6.2)
RELATIVE EOSINOPHIL (OHS): 7.7 %
RELATIVE LYMPHOCYTE (OHS): 19.5 % (ref 18–48)
RELATIVE MONOCYTE (OHS): 11.4 % (ref 4–15)
RELATIVE NEUTROPHIL (OHS): 60.4 % (ref 38–73)
SM  ANTIBODY (OHS): 0.08 RATIO
SM INTERPRETATION (OHS): NEGATIVE
SM/RNP ANTIBODY (OHS): 0.08 RATIO
SM/RNP INTERPRETATION (OHS): NEGATIVE
SODIUM SERPL-SCNC: 136 MMOL/L (ref 136–145)
SSA  ANTIBODY (OHS): 0.06 RATIO (ref 0–0.99)
SSA INTERPRETATION (OHS): NEGATIVE
SSB  ANTIBODY (OHS): 0.06 RATIO
SSB INTERPRETATION (OHS): NEGATIVE
WBC # BLD AUTO: 4.82 K/UL (ref 3.9–12.7)

## 2025-05-08 PROCEDURE — A4216 STERILE WATER/SALINE, 10 ML: HCPCS | Performed by: NURSE PRACTITIONER

## 2025-05-08 PROCEDURE — 25000003 PHARM REV CODE 250: Performed by: EMERGENCY MEDICINE

## 2025-05-08 PROCEDURE — 25000003 PHARM REV CODE 250: Performed by: NURSE PRACTITIONER

## 2025-05-08 PROCEDURE — 25000003 PHARM REV CODE 250: Performed by: FAMILY MEDICINE

## 2025-05-08 PROCEDURE — 36415 COLL VENOUS BLD VENIPUNCTURE: CPT | Performed by: NURSE PRACTITIONER

## 2025-05-08 PROCEDURE — 25000003 PHARM REV CODE 250: Performed by: STUDENT IN AN ORGANIZED HEALTH CARE EDUCATION/TRAINING PROGRAM

## 2025-05-08 PROCEDURE — 63600175 PHARM REV CODE 636 W HCPCS: Performed by: NURSE PRACTITIONER

## 2025-05-08 PROCEDURE — 84100 ASSAY OF PHOSPHORUS: CPT | Performed by: NURSE PRACTITIONER

## 2025-05-08 PROCEDURE — 63600175 PHARM REV CODE 636 W HCPCS: Performed by: EMERGENCY MEDICINE

## 2025-05-08 PROCEDURE — 80053 COMPREHEN METABOLIC PANEL: CPT | Performed by: NURSE PRACTITIONER

## 2025-05-08 PROCEDURE — 85025 COMPLETE CBC W/AUTO DIFF WBC: CPT | Performed by: NURSE PRACTITIONER

## 2025-05-08 PROCEDURE — 83735 ASSAY OF MAGNESIUM: CPT | Performed by: NURSE PRACTITIONER

## 2025-05-08 RX ORDER — PREDNISONE 10 MG/1
10 TABLET ORAL DAILY
Qty: 6 TABLET | Refills: 0 | Status: SHIPPED | OUTPATIENT
Start: 2025-05-09 | End: 2025-05-15

## 2025-05-08 RX ORDER — PREDNISONE 10 MG/1
10 TABLET ORAL DAILY
Qty: 6 TABLET | Refills: 0 | Status: SHIPPED | OUTPATIENT
Start: 2025-05-09 | End: 2025-05-08

## 2025-05-08 RX ORDER — LEVOFLOXACIN 750 MG/1
750 TABLET, FILM COATED ORAL DAILY
Qty: 7 TABLET | Refills: 0 | Status: SHIPPED | OUTPATIENT
Start: 2025-05-08 | End: 2025-05-08

## 2025-05-08 RX ORDER — LANOLIN ALCOHOL/MO/W.PET/CERES
100 CREAM (GRAM) TOPICAL DAILY
COMMUNITY
Start: 2025-05-09 | End: 2025-06-08

## 2025-05-08 RX ORDER — MUPIROCIN 20 MG/G
OINTMENT TOPICAL 2 TIMES DAILY
Qty: 22 G | Refills: 0 | Status: SHIPPED | OUTPATIENT
Start: 2025-05-08 | End: 2025-05-08

## 2025-05-08 RX ORDER — PREDNISONE 10 MG/1
10 TABLET ORAL DAILY
Status: DISCONTINUED | OUTPATIENT
Start: 2025-05-08 | End: 2025-05-08 | Stop reason: HOSPADM

## 2025-05-08 RX ORDER — LEVOFLOXACIN 750 MG/1
750 TABLET, FILM COATED ORAL DAILY
Qty: 7 TABLET | Refills: 0 | Status: SHIPPED | OUTPATIENT
Start: 2025-05-08 | End: 2025-05-15

## 2025-05-08 RX ORDER — MUPIROCIN 20 MG/G
OINTMENT TOPICAL 2 TIMES DAILY
Qty: 1 G | Refills: 0 | Status: SHIPPED | OUTPATIENT
Start: 2025-05-08 | End: 2025-05-11

## 2025-05-08 RX ORDER — MAGNESIUM SULFATE HEPTAHYDRATE 40 MG/ML
2 INJECTION, SOLUTION INTRAVENOUS ONCE
Status: COMPLETED | OUTPATIENT
Start: 2025-05-08 | End: 2025-05-08

## 2025-05-08 RX ORDER — ACETAMINOPHEN 500 MG
5000 TABLET ORAL DAILY
COMMUNITY
Start: 2025-05-09 | End: 2025-05-08

## 2025-05-08 RX ORDER — ACETAMINOPHEN 500 MG
5000 TABLET ORAL DAILY
COMMUNITY
Start: 2025-05-09

## 2025-05-08 RX ADMIN — CEFEPIME 2 G: 2 INJECTION, POWDER, FOR SOLUTION INTRAVENOUS at 12:05

## 2025-05-08 RX ADMIN — OLANZAPINE 5 MG: 5 TABLET, FILM COATED ORAL at 08:05

## 2025-05-08 RX ADMIN — THERA TABS 1 TABLET: TAB at 08:05

## 2025-05-08 RX ADMIN — Medication 100 MG: at 08:05

## 2025-05-08 RX ADMIN — CEFEPIME 2 G: 2 INJECTION, POWDER, FOR SOLUTION INTRAVENOUS at 08:05

## 2025-05-08 RX ADMIN — MAGNESIUM SULFATE HEPTAHYDRATE 2 G: 40 INJECTION, SOLUTION INTRAVENOUS at 08:05

## 2025-05-08 RX ADMIN — Medication 1 TABLET: at 08:05

## 2025-05-08 RX ADMIN — PREDNISONE 10 MG: 10 TABLET ORAL at 10:05

## 2025-05-08 RX ADMIN — POLYETHYLENE GLYCOL 3350 17 G: 17 POWDER, FOR SOLUTION ORAL at 08:05

## 2025-05-08 RX ADMIN — Medication 10 ML: at 06:05

## 2025-05-08 RX ADMIN — PANTOPRAZOLE SODIUM 40 MG: 40 TABLET, DELAYED RELEASE ORAL at 08:05

## 2025-05-08 RX ADMIN — CHOLECALCIFEROL TAB 125 MCG (5000 UNIT) 5000 UNITS: 125 TAB at 08:05

## 2025-05-08 RX ADMIN — CYANOCOBALAMIN 1000 MCG: 1000 INJECTION INTRAMUSCULAR; SUBCUTANEOUS at 08:05

## 2025-05-08 RX ADMIN — GABAPENTIN 100 MG: 100 CAPSULE ORAL at 08:05

## 2025-05-08 NOTE — PROGRESS NOTES
[facility] Medicine  Progress Note    Patient Name: Rosalio Muñoz  MRN: [id number]  Patient Class: IP- Inpatient   Admission Date: 5/5/2025  Length of Stay: 1 days  Attending Physician: Fausto Nguyen MD  Primary Care Provider: Michell Goyal MD        Subjective     Principal Problem:Sepsis        HPI:   Rosalio Muñoz is a 59 y.o. male patient with a PMHx of HTN, malignant neoplasm metastatic to other site, and esophageal cancer (stage IV), who presents to the Emergency Department for evaluation of a fever (Tmax 101.4) which onset gradually after getting home from chemotherapy on 5/5/2025. Symptoms are constant and moderate in severity. No mitigating or exacerbating factors reported. Associated symptoms include hip pain, shoulder pain, and knee pain. Patient denies , shortness for breath, palpitations, nausea, diarrhea, vomiting, abdominal pain, urinary symptoms and all other symptoms at this time.  Pain persists despite prior treatment of a oxycodone around 2:00 pm. No further complaints or concerns at this time.  Patient reports smoking 5-6 cigars (small) per day and drinks 1-2 beers per week.  ER workup showed: Labs:  H/H13.4/38.6, Na 129, CO2 19, glucose 117, Mag 1.4, , protein 8.7, albumin 3.4, procalcitonin 0.28, and lactic 1.3.  Chest x-ray negative for acute process.  EKG pending.  Platelets transfused and magnesium replaced in ED. Hematology/oncology consulted.  Patient desires to be full code at this time with Fanta Muñoz (wife) at [phone] as the surrogate decision maker.  Hospital medicine contacted for admission with patient placed inpatient for further evaluation of hypomagnesemia.         Overview/Hospital Course:  Patient admitted to hospital medicine for management of sepsis/thrombocytopenia in the setting of chemotherapy for esophageal cancer stage IV.  IV antibiotics initiated.  Platelets transfuse upward trend noted.  Procalcitonin and CRP/Sed rate  elevated.  TSH normal.  Magnesium replaced.  ANC normal with downward trend noted.  Hematology/oncology consulted with recommendations obtained On 05/07/2025, patient verbalized symptom improvement with increased mobility noted.  Magnesium replaced and vitamins/supplements initiated.  Hematology/oncology following.    Interval History: pt sitting up in bed upon exam with symptom improvement verbalized and increased mobility demonstrated.  Magnesium replaced.  Thrombocytopenia noted with upward trend.  IV antibiotics continued. Hematology/oncology following.     Review of Systems   Constitutional:  Positive for activity change. Negative for fever.   Respiratory:  Negative for shortness of breath and wheezing.    Cardiovascular:  Negative for chest pain, palpitations and leg swelling.   Gastrointestinal:  Negative for diarrhea, nausea and vomiting.   Genitourinary:  Negative for difficulty urinating, flank pain, frequency and urgency.   Musculoskeletal:  Positive for arthralgias and myalgias (improved).   Skin:  Negative for color change and wound.   Neurological:  Negative for dizziness, weakness and headaches.   Psychiatric/Behavioral: Negative.       Objective:     Vital Signs (Most Recent):  Temp: 97.8 °F (36.6 °C) (05/07/25 1615)  Pulse: 63 (05/07/25 1617)  Resp: 20 (05/07/25 1615)  BP: (!) 101/57 (05/07/25 1615)  SpO2: 97 % (05/07/25 1615) Vital Signs (24h Range):  Temp:  [97.7 °F (36.5 °C)-98.5 °F (36.9 °C)] 97.8 °F (36.6 °C)  Pulse:  [63-92] 63  Resp:  [18-20] 20  SpO2:  [92 %-97 %] 97 %  BP: ()/(53-63) 101/57     Weight: 75.2 kg (165 lb 12.6 oz)  Body mass index is 21.87 kg/m².    Intake/Output Summary (Last 24 hours) at 5/7/2025 1915  Last data filed at 5/7/2025 0547  Gross per 24 hour   Intake 120 ml   Output 1100 ml   Net -980 ml         Physical Exam  HENT:      Nose: Nose normal.      Mouth/Throat:      Mouth: Mucous membranes are dry.      Pharynx: Oropharynx is clear.   Cardiovascular:      Rate  and Rhythm: Normal rate and regular rhythm.      Pulses: Normal pulses.      Heart sounds: Normal heart sounds.   Pulmonary:      Effort: Pulmonary effort is normal.      Breath sounds: Normal breath sounds.   Abdominal:      General: Bowel sounds are normal.      Palpations: Abdomen is soft.      Comments: Gastrostomy tube present   Musculoskeletal:         General: Normal range of motion.   Skin:     General: Skin is warm and dry.   Neurological:      Mental Status: He is alert and oriented to person, place, and time.   Psychiatric:         Mood and Affect: Mood normal.               Significant Labs: All pertinent labs within the past 24 hours have been reviewed.    Significant Imaging: I have reviewed all pertinent imaging results/findings within the past 24 hours.      Assessment & Plan  Tobacco dependency  Dangers of cigarette smoking were reviewed with patient in detail. Patient was Counseled for 3-10 minutes. Nicotine replacement options were discussed. Nicotine replacement was discussed- not prescribed per patient's request  Hypomagnesemia  Patient has Abnormal Magnesium: hypomagnesemia. Will continue to monitor electrolytes closely. Will replace the affected electrolytes and repeat labs to be done after interventions completed. The patient's magnesium results have been reviewed and are listed below.  Mag 1.4 upon admission- replaced in ED  Recent Labs   Lab 05/07/25  0548   MG 1.5*    -replaced  Dysphagia  -in the setting of esophageal cancer stage IV  -gastrostomy tube present  -patient reports use of gastrostomy tube when needed but usually able to tolerate oral intake without issue  -nutritional supplements initiated    Esophageal cancer, stage IV  -patient is currently on FOLFOX Keytruda and Herceptin with 5 FU held -   -Hematology-Oncology consulted  -SVETLANA, ESR, CRP, rheumatoid factor, CCP, TSH obtained  -antiemetics as needed   -pain medications as needed   -patient previously seen by Palliative Care      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  Patient has metastatic cancer of esophageal cancer primary. The cancer has metastasized. The patient is under the care of an outpatient oncologist. The patient undergoing active chemotherapy-FOLFOX Keytruda and Herceptin with 5 FU . Their staging information is listed below.    Cancer Staging   Esophageal cancer, stage IV  Staging form: Esophagus - Adenocarcinoma, AJCC 8th Edition  - Clinical stage from 10/10/2024: Stage IVB (cTX, cN0, cM1, G2) - Signed by Zacarias Crowley MD on 10/10/2024    Hematology/Oncology following   -see plan for esophageal cancer stage IV  Iron deficiency anemia due to chronic blood loss  Anemia is likely due to Iron deficiency. Most recent hemoglobin and hematocrit are listed below.  Recent Labs     05/05/25  2257 05/06/25  0828 05/07/25  0548   HGB 13.4* 11.6* 11.6*   HCT 38.6* 33.3* 34.3*     Plan  - Monitor serial CBC: Daily  - Transfuse PRBC if patient becomes hemodynamically unstable, symptomatic or H/H drops below 7/21.  - Patient has not received any PRBC transfusions to date  - Patient's anemia is currently stable  - patient followed by heme/Onc-   Immunodeficiency due to chemotherapy  -see plan for esophageal cancer stage IV  -IV antibiotics continued  -chest x-ray and UA negative for infectious process  -ANC normal with downward trend noted in the setting of recent chemotherapy    Encounter for PEG (percutaneous endoscopic gastrostomy)  Patient noted to have a percutaneous endoscopic gastrostomy tube in place. I have personally inspected the tube.Tube was placed prior to this admission There are no signs of drainage or infection around the site. The tube is patent. Medications have not converted to liquid form if available.  Routine care to be done by wound care and nursing staff.       Fever  -T-max of 101.4° in the setting of chemotherapy upon admit  -IV antibiotics continued  -ANC normal with downward trend  -chest x-ray  with no acute findings  -UA negative for infectious process  -antipyretics as needed  5/7/2025- pt remains afebrile and blood culture with NGTD    Thrombocytopenia  The likely etiology of thrombocytopenia is infection and chemotherapy. The patients 3 most recent labs are listed below.  Recent Labs     05/06/25  0113 05/06/25  0828 05/07/25  0548   PLT 6* 32* 38*     Plan  - Will transfuse if platelet count is <20k.  - hematology/oncology following    Sepsis  This patient does not have evidence of infective focus. Procalcitonin 1.28 PLT 5>6 Lactic 1.3>0.8  My overall impression is sepsis.  Source: Unknown and immunocompromised on chemotherapy  Antibiotics given-   Antibiotics (72h ago, onward)      Start     Stop Route Frequency Ordered    05/06/25 1700  ceFEPIme injection 2 g         -- IV Every 8 hours (non-standard times) 05/06/25 1551    05/06/25 0900  mupirocin 2 % ointment         05/11/25 0859 Nasl 2 times daily 05/06/25 0525          Latest lactate reviewed-  Recent Labs   Lab 05/06/25  0113   LACTATE 0.8     Organ dysfunction indicated by Thrombocytopenia     Fluid challenge Actual Body Weight- The patient's actual body weight will be used to calculate the 30 ml/kg fluid bolus.     Post- resuscitation assessment No - Post resuscitation assessment not needed       Will Not start Pressors- Levophed for MAP of 65  Source control achieved by: IV antibiotics - Cefepime   -Procalcitonin 1.28, CRP 91, Sed rate 76  -pt afebrile and blood cultures with NGTD  Hypotension  -noted post chemotherapy  -improved with hydration  -IVF continued    VTE Risk Mitigation (From admission, onward)           Ordered     Place sequential compression device  Until discontinued         05/06/25 1005     Reason for No Pharmacological VTE Prophylaxis  Once        Question:  Reasons:  Answer:  Thrombocytopenia    05/06/25 1005     IP VTE HIGH RISK PATIENT  Once         05/06/25 1005     Place sequential compression device  Until  discontinued         05/06/25 0807                    Discharge Planning   RUTH: 5/10/2025     Code Status: Full Code   Medical Readiness for Discharge Date:   Discharge Plan A: Home                Please place Justification for DME        June Masters NP  Department of Hospital Medicine   [facility]

## 2025-05-08 NOTE — ASSESSMENT & PLAN NOTE
-see plan for esophageal cancer stage IV  -IV antibiotics continued  -chest x-ray and UA negative for infectious process  -ANC normal with downward trend noted in the setting of recent chemotherapy

## 2025-05-08 NOTE — SUBJECTIVE & OBJECTIVE
Interval History: pt sitting up in bed upon exam with symptom improvement verbalized and increased mobility demonstrated.  Magnesium replaced.  Thrombocytopenia noted with upward trend.  IV antibiotics continued. Hematology/oncology following.     Review of Systems   Constitutional:  Positive for activity change. Negative for fever.   Respiratory:  Negative for shortness of breath and wheezing.    Cardiovascular:  Negative for chest pain, palpitations and leg swelling.   Gastrointestinal:  Negative for diarrhea, nausea and vomiting.   Genitourinary:  Negative for difficulty urinating, flank pain, frequency and urgency.   Musculoskeletal:  Positive for arthralgias and myalgias (improved).   Skin:  Negative for color change and wound.   Neurological:  Negative for dizziness, weakness and headaches.   Psychiatric/Behavioral: Negative.       Objective:     Vital Signs (Most Recent):  Temp: 97.8 °F (36.6 °C) (05/07/25 1615)  Pulse: 63 (05/07/25 1617)  Resp: 20 (05/07/25 1615)  BP: (!) 101/57 (05/07/25 1615)  SpO2: 97 % (05/07/25 1615) Vital Signs (24h Range):  Temp:  [97.7 °F (36.5 °C)-98.5 °F (36.9 °C)] 97.8 °F (36.6 °C)  Pulse:  [63-92] 63  Resp:  [18-20] 20  SpO2:  [92 %-97 %] 97 %  BP: ()/(53-63) 101/57     Weight: 75.2 kg (165 lb 12.6 oz)  Body mass index is 21.87 kg/m².    Intake/Output Summary (Last 24 hours) at 5/7/2025 1915  Last data filed at 5/7/2025 0547  Gross per 24 hour   Intake 120 ml   Output 1100 ml   Net -980 ml         Physical Exam  HENT:      Nose: Nose normal.      Mouth/Throat:      Mouth: Mucous membranes are dry.      Pharynx: Oropharynx is clear.   Cardiovascular:      Rate and Rhythm: Normal rate and regular rhythm.      Pulses: Normal pulses.      Heart sounds: Normal heart sounds.   Pulmonary:      Effort: Pulmonary effort is normal.      Breath sounds: Normal breath sounds.   Abdominal:      General: Bowel sounds are normal.      Palpations: Abdomen is soft.      Comments: Gastrostomy  tube present   Musculoskeletal:         General: Normal range of motion.   Skin:     General: Skin is warm and dry.   Neurological:      Mental Status: He is alert and oriented to person, place, and time.   Psychiatric:         Mood and Affect: Mood normal.               Significant Labs: All pertinent labs within the past 24 hours have been reviewed.    Significant Imaging: I have reviewed all pertinent imaging results/findings within the past 24 hours.

## 2025-05-08 NOTE — ASSESSMENT & PLAN NOTE
Patient has metastatic cancer of esophageal cancer primary. The cancer has metastasized. The patient is under the care of an outpatient oncologist. The patient undergoing active chemotherapy-FOLFOX Keytruda and Herceptin with 5 FU . Their staging information is listed below.    Cancer Staging   Esophageal cancer, stage IV  Staging form: Esophagus - Adenocarcinoma, AJCC 8th Edition  - Clinical stage from 10/10/2024: Stage IVB (cTX, cN0, cM1, G2) - Signed by Zacarias Crowley MD on 10/10/2024    Hematology/Oncology following   -see plan for esophageal cancer stage IV

## 2025-05-08 NOTE — ASSESSMENT & PLAN NOTE
The likely etiology of thrombocytopenia is infection and chemotherapy. The patients 3 most recent labs are listed below.  Recent Labs     05/06/25  0828 05/07/25  0548 05/08/25  0540   PLT 32* 38* 59*     Plan  - Will transfuse if platelet count is <20k.  - hematology/oncology following

## 2025-05-08 NOTE — DISCHARGE SUMMARY
[facility] Medicine  Discharge Summary      Patient Name: Rosalio Muñoz  MRN: [id number]  VINEET: [id number]  Patient Class: IP- Inpatient  Admission Date: 5/5/2025  Hospital Length of Stay: 2 days  Discharge Date and Time: 5/8/2025 11:32 AM  Attending Physician: Dr. Fausto Nguyen   Discharging Provider: June Masters NP  Primary Care Provider: Michell Goyal MD    Primary Care Team: Networked reference to record PCT     HPI:    Rosalio Muñoz is a 59 y.o. male patient with a PMHx of HTN, malignant neoplasm metastatic to other site, and esophageal cancer (stage IV), who presents to the Emergency Department for evaluation of a fever (Tmax 101.4) which onset gradually after getting home from chemotherapy on 5/5/2025. Symptoms are constant and moderate in severity. No mitigating or exacerbating factors reported. Associated symptoms include hip pain, shoulder pain, and knee pain. Patient denies , shortness for breath, palpitations, nausea, diarrhea, vomiting, abdominal pain, urinary symptoms and all other symptoms at this time.  Pain persists despite prior treatment of a oxycodone around 2:00 pm. No further complaints or concerns at this time.  Patient reports smoking 5-6 cigars (small) per day and drinks 1-2 beers per week.  ER workup showed: Labs:  H/H13.4/38.6, Na 129, CO2 19, glucose 117, Mag 1.4, , protein 8.7, albumin 3.4, procalcitonin 0.28, and lactic 1.3.  Chest x-ray negative for acute process.  EKG pending.  Platelets transfused and magnesium replaced in ED. Hematology/oncology consulted.  Patient desires to be full code at this time with Fanta Muñoz (wife) at [phone] as the surrogate decision maker.  Hospital medicine contacted for admission with patient placed inpatient for further evaluation of hypomagnesemia.         * No surgery found *      Hospital Course:   Patient admitted to hospital medicine for management of sepsis/thrombocytopenia in the setting of  chemotherapy for esophageal cancer stage IV.  IV antibiotics initiated.  Platelets transfuse upward trend noted.  Procalcitonin and CRP/Sed rate elevated.  TSH normal.  Magnesium replaced.  ANC normal with downward trend noted.  Hematology/oncology consulted with recommendations obtained On 05/07/2025, patient verbalized symptom improvement with increased mobility noted.  Magnesium replaced and vitamins/supplements initiated.  Hematology/oncology following.  On 05/08/2025, thrombocytopenia improved with upward trend.  Vital signs remained stable with no signs of acute distress witnessed or reported in patient eager for discharge.  Patient seen and examined in deemed medically stable for discharge.  Case discussed with Hematology/Oncology for in agreement with discharge and recommendations received.  Medications reconciled with vitamin-D, Folbic, levofloxacin, MBI, mupirocin, prednisone, and thiamine prescribed.       Goals of Care Treatment Preferences:  Code Status: Full Code    Health care agent: SDM: Wife: Fanta Muñoz  [facility] care agent number: [phone]          What is most important right now is to focus on remaining as independent as possible, symptom/pain control.  Accordingly, we have decided that the best plan to meet the patient's goals includes continuing with treatment.      SDOH Screening:  The patient was screened for utility difficulties, food insecurity, transport difficulties, housing insecurity, and interpersonal safety and there were no concerns identified this admission.     Consults:         Assessment & Plan      Final Active Diagnoses:    Diagnosis Date Noted POA    Thrombocytopenia [D69.6] 05/06/2025 Yes    Tobacco dependency [F17.200] 05/06/2025 Yes    Encounter for PEG (percutaneous endoscopic gastrostomy) [Z43.1] 12/11/2024 Not Applicable    Immunodeficiency due to chemotherapy [D84.821, T45.1X5A, Z79.69] 10/28/2024 Not Applicable     Chronic    Iron deficiency anemia due to  chronic blood loss [D50.0] 10/12/2024 Yes     Chronic    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 [C79.89] 10/11/2024 Yes    Esophageal cancer, stage IV [C15.9] 10/10/2024 Yes     Chronic    Dysphagia [R13.10] 10/02/2024 Yes     Chronic      Problems Resolved During this Admission:    Diagnosis Date Noted Date Resolved POA    PRINCIPAL PROBLEM:  Sepsis [A41.9] 05/06/2025 05/08/2025 Yes    Hypotension [I95.9] 05/06/2025 05/08/2025 Yes    Fever [R50.9] 03/26/2025 05/08/2025 Yes    Hypomagnesemia [E83.42] 05/30/2023 05/08/2025 Yes       Discharged Condition: stable    Disposition: Home or Self Care    Follow Up:   Follow-up Information       Michell Goyal MD Follow up in 1 week(s).    Specialty: Family Medicine  Why: -please keep previously scheduled appointment  Contact information:  [address] [place] [address] [address] [place] [address]  [phone]               Hudson Latif MD Follow up.    Specialty: Hematology and Oncology  Why: -hospital follow up in 1-2 weeks- please keep next available appointment  Contact information:  [address]  [place] [address]  [phone]                           Patient Instructions:      Diet Adult Regular     Notify your health care provider if you experience any of the following:  temperature >100.4     Notify your health care provider if you experience any of the following:  persistent nausea and vomiting or diarrhea     Notify your health care provider if you experience any of the following:  increased confusion or weakness     Notify your health care provider if you experience any of the following:  persistent dizziness, light-headedness, or visual disturbances     Notify your health care provider if you experience any of the following:  severe persistent headache     Notify your health care provider if you experience any of the following:  difficulty breathing or increased cough     Notify your health care provider if you experience any of the following:   severe uncontrolled pain     Notify your health care provider if you experience any of the following:  redness, tenderness, or signs of infection (pain, swelling, redness, odor or green/yellow discharge around incision site)     Reason for not Ordering Smoking Cessation Referral     Order Specific Question Answer Comments   Reason for not ordering: Patient refused      Reason for not Prescribing Nicotine Replacement     Order Specific Question Answer Comments   Reason for not Prescribing: Patient refused      Activity as tolerated       Significant Diagnostic Studies: N/A    Pending Diagnostic Studies:       None           Medications:  Reconciled Home Medications:      Medication List        START taking these medications      cholecalciferol (vitamin D3) 125 mcg (5,000 unit) Tab  Take 1 tablet (5,000 Units total) by mouth once daily.     folic acid-vit B6-vit B12 2.5-25-2 mg 2.5-25-2 mg Tab  Commonly known as: FOLBIC or Equiv  Take 1 tablet by mouth once daily.     levoFLOXacin 750 MG tablet  Commonly known as: LEVAQUIN  Take 1 tablet (750 mg total) by mouth once daily. for 7 days     multivitamin Tab  Take 1 tablet by mouth once daily.     mupirocin 2 % ointment  Commonly known as: BACTROBAN  apply by Nasal route 2 (two) times daily. for 3 days     predniSONE 10 MG tablet  Commonly known as: DELTASONE  Take 1 tablet (10 mg total) by mouth once daily. for 6 doses     thiamine 100 MG tablet  Take 1 tablet (100 mg total) by mouth once daily.            CONTINUE taking these medications      ferrous sulfate 325 mg (65 mg iron) Tab tablet  Commonly known as: FEOSOL  Take 1 tablet (325 mg total) by mouth every other day.     gabapentin 100 MG capsule  Commonly known as: NEURONTIN  Take 1 capsule (100 mg total) by mouth 3 (three) times daily.     LIDOcaine-prilocaine cream  Commonly known as: EMLA  Apply topically as needed.     metoclopramide HCl 10 MG tablet  Commonly known as: REGLAN  Take 1 tablet (10 mg total) by  mouth every 6 (six) hours as needed (nausea, vomiting).     OLANZapine 5 MG tablet  Commonly known as: ZyPREXA  Take 1 tablet (5 mg total) by mouth every evening. Take nightly on days 1-3 of each chemotherapy cycle.     ondansetron 8 MG Tbdl  Commonly known as: ZOFRAN-ODT  Take 1 tablet (8 mg total) by mouth 2 (two) times daily.     pantoprazole 40 MG tablet  Commonly known as: PROTONIX  Take 1 tablet (40 mg total) by mouth once daily.              Indwelling Lines/Drains at time of discharge:   Lines/Drains/Airways       Drain  Duration                  Gastrostomy/Enterostomy 10/21/24 1413 Gastrostomy tube w/o balloon  days                    Time spent on the discharge of patient: > 40 minutes         June Masters NP  Department of Hospital Medicine  [facility] - Med Surg

## 2025-05-08 NOTE — ASSESSMENT & PLAN NOTE
Anemia is likely due to Iron deficiency. Most recent hemoglobin and hematocrit are listed below.  Recent Labs     05/05/25  2257 05/06/25  0828 05/07/25  0548   HGB 13.4* 11.6* 11.6*   HCT 38.6* 33.3* 34.3*     Plan  - Monitor serial CBC: Daily  - Transfuse PRBC if patient becomes hemodynamically unstable, symptomatic or H/H drops below 7/21.  - Patient has not received any PRBC transfusions to date  - Patient's anemia is currently stable  - patient followed by heme/Onc-

## 2025-05-08 NOTE — ASSESSMENT & PLAN NOTE
The likely etiology of thrombocytopenia is infection and chemotherapy. The patients 3 most recent labs are listed below.  Recent Labs     05/06/25  0113 05/06/25  0828 05/07/25  0548   PLT 6* 32* 38*     Plan  - Will transfuse if platelet count is <20k.  - hematology/oncology following

## 2025-05-08 NOTE — ASSESSMENT & PLAN NOTE
Anemia is likely due to Iron deficiency. Most recent hemoglobin and hematocrit are listed below.  Recent Labs     05/06/25  0828 05/07/25  0548 05/08/25  0540   HGB 11.6* 11.6* 11.9*   HCT 33.3* 34.3* 34.9*     Plan  - Monitor serial CBC: Daily  - Transfuse PRBC if patient becomes hemodynamically unstable, symptomatic or H/H drops below 7/21.  - Patient has not received any PRBC transfusions to date  - Patient's anemia is currently stable  - patient followed by heme/Onc-

## 2025-05-08 NOTE — ASSESSMENT & PLAN NOTE
Patient has Abnormal Magnesium: hypomagnesemia. Will continue to monitor electrolytes closely. Will replace the affected electrolytes and repeat labs to be done after interventions completed. The patient's magnesium results have been reviewed and are listed below.  Mag 1.4 upon admission- replaced in ED  Recent Labs   Lab 05/07/25  0548   MG 1.5*    -replaced

## 2025-05-08 NOTE — ASSESSMENT & PLAN NOTE
-in the setting of esophageal cancer stage IV  -gastrostomy tube present  -patient reports use of gastrostomy tube when needed but usually able to tolerate oral intake without issue  -nutritional supplements initiated

## 2025-05-08 NOTE — PLAN OF CARE
[facility] - Med Surg  Discharge Final Note    Primary Care Provider: Michell Goyal MD    Expected Discharge Date: 5/8/2025    Final Discharge Note (most recent)       Final Note - 05/08/25 1116          Final Note    Assessment Type Final Discharge Note     Anticipated Discharge Disposition Home or Self Care     Hospital Resources/Appts/Education Provided Appointments scheduled and added to AVS                              Contact Info       Michell Goyal MD   Specialty: Family Medicine   Relationship: PCP - [address] [place] [address]   Phone: [phone]       Next Steps: Follow up in 1 week(s)    Instructions: -please keep previously scheduled appointment    Hudson Latif MD   Specialty: Hematology and Oncology   Relationship: Consulting Physician    [address] [place] [address]  [place] [address]   Phone: [phone]       Next Steps: Follow up    Instructions: -hospital follow up in 1-2 weeks- please keep next available appointment

## 2025-05-08 NOTE — PLAN OF CARE
Problem: Adult Inpatient Plan of Care  Goal: Plan of Care Review  Outcome: Met  Goal: Patient-Specific Goal (Individualized)  Outcome: Met  Goal: Absence of Hospital-Acquired Illness or Injury  Outcome: Met  Goal: Optimal Comfort and Wellbeing  Outcome: Met  Goal: Readiness for Transition of Care  Outcome: Met     Problem: Sepsis/Septic Shock  Goal: Optimal Coping  Outcome: Met  Goal: Absence of Bleeding  Outcome: Met  Goal: Blood Glucose Level Within Targeted Range  Outcome: Met  Goal: Absence of Infection Signs and Symptoms  Outcome: Met  Goal: Optimal Nutrition Intake  Outcome: Met     Problem: Infection  Goal: Absence of Infection Signs and Symptoms  Outcome: Met     Problem: Wound  Goal: Optimal Coping  Outcome: Met  Goal: Optimal Functional Ability  Outcome: Met  Goal: Absence of Infection Signs and Symptoms  Outcome: Met  Goal: Improved Oral Intake  Outcome: Met  Goal: Optimal Pain Control and Function  Outcome: Met  Goal: Skin Health and Integrity  Outcome: Met  Goal: Optimal Wound Healing  Outcome: Met

## 2025-05-08 NOTE — HOSPITAL COURSE
Patient admitted to hospital medicine for management of sepsis/thrombocytopenia in the setting of chemotherapy for esophageal cancer stage IV.  IV antibiotics initiated.  Platelets transfuse upward trend noted.  Procalcitonin and CRP/Sed rate elevated.  TSH normal.  Magnesium replaced.  ANC normal with downward trend noted.  Hematology/oncology consulted with recommendations obtained On 05/07/2025, patient verbalized symptom improvement with increased mobility noted.  Magnesium replaced and vitamins/supplements initiated.  Hematology/oncology following.  On 05/08/2025, thrombocytopenia improved with upward trend.  Vital signs remained stable with no signs of acute distress witnessed or reported in patient eager for discharge.  Patient seen and examined in deemed medically stable for discharge.  Case discussed with Hematology/Oncology for in agreement with discharge and recommendations received.  Patient counseled on smoking cessation with understanding verbalized.  Medications reconciled with vitamin-D, Folbic, levofloxacin, MBI, mupirocin, prednisone, and thiamine prescribed.  Patient instructed to follow up with PCP and Hematology Oncology upon discharge for further evaluation.

## 2025-05-09 ENCOUNTER — PATIENT OUTREACH (OUTPATIENT)
Dept: ADMINISTRATIVE | Facility: CLINIC | Age: 60
End: 2025-05-09
Payer: COMMERCIAL

## 2025-05-09 ENCOUNTER — TELEPHONE (OUTPATIENT)
Dept: INFUSION THERAPY | Facility: HOSPITAL | Age: 60
End: 2025-05-09
Payer: COMMERCIAL

## 2025-05-09 NOTE — TELEPHONE ENCOUNTER
----- Message from Hudson Latif MD sent at 5/9/2025 10:08 AM CDT -----  Given to him every 2 weeks because he is getting his chemo then  ----- Message -----  From: Annabella Quiroz MA  Sent: 5/9/2025   8:38 AM CDT  To: Hudson Latif MD    Clarification please. Did you want his venofer every two weeks?  The Therapy plan has venofer weekly x 3 doses.

## 2025-05-10 LAB
OHS QRS DURATION: 82 MS
OHS QTC CALCULATION: 431 MS

## 2025-05-11 LAB
BACTERIA BLD CULT: NORMAL
BACTERIA BLD CULT: NORMAL

## 2025-05-13 ENCOUNTER — OFFICE VISIT (OUTPATIENT)
Dept: INTERNAL MEDICINE | Facility: CLINIC | Age: 60
End: 2025-05-13
Payer: COMMERCIAL

## 2025-05-13 VITALS
WEIGHT: 163.13 LBS | OXYGEN SATURATION: 96 % | RESPIRATION RATE: 16 BRPM | DIASTOLIC BLOOD PRESSURE: 74 MMHG | TEMPERATURE: 98 F | HEART RATE: 98 BPM | BODY MASS INDEX: 21.62 KG/M2 | HEIGHT: 73 IN | SYSTOLIC BLOOD PRESSURE: 120 MMHG

## 2025-05-13 DIAGNOSIS — D69.6 THROMBOCYTOPENIA: ICD-10-CM

## 2025-05-13 DIAGNOSIS — A41.9 SEPSIS, DUE TO UNSPECIFIED ORGANISM, UNSPECIFIED WHETHER ACUTE ORGAN DYSFUNCTION PRESENT: Primary | ICD-10-CM

## 2025-05-13 DIAGNOSIS — E83.42 HYPOMAGNESEMIA: ICD-10-CM

## 2025-05-13 PROCEDURE — 99999 PR PBB SHADOW E&M-EST. PATIENT-LVL IV: CPT | Mod: PBBFAC,,, | Performed by: FAMILY MEDICINE

## 2025-05-13 NOTE — PROGRESS NOTES
Subjective:       Patient ID: Rosalio Muñoz is a 59 y.o. male.    Chief Complaint: Hospital Follow Up    Transitional Care Note    Family and/or Caretaker present at visit?  Yes.  Diagnostic tests reviewed/disposition: I have reviewed all completed as well as pending diagnostic tests at the time of discharge.  Disease/illness education: see A/P  Home health/community services discussion/referrals: Patient does not have home health established from hospital visit.  They do not need home health.  If needed, we will set up home health for the patient.   Establishment or re-establishment of referral orders for community resources: No other necessary community resources.   Discussion with other health care providers: No discussion with other health care providers necessary.     History of Present Illness    - Patient presents for a hospital follow-up visit after being admitted for sepsis and low blood pressure. His wife is present with him today.  - Patient reports feeling improved since hospital discharge, but has GI symptoms today, including cramping and diarrhea. These symptoms occur occasionally with chemotherapy. Patient was admitted to the hospital for sepsis and low blood pressure, marking his 3rd hospital stay. During the admission, he received intravenous antibiotics. Upon discharge, patient was prescribed Levaquin (an oral antibiotic), but only took 1 dose at home. When taking the crushed Levaquin mixed with pudding, patient had a burning sensation on his tongue, similar to thrush. Due to the extensive antibiotic treatment received in the hospital and this adverse reaction, patient discontinued the Levaquin.  - Patient has a history of a bleeding ulcer in December, which required hospitalization. He is currently undergoing chemotherapy treatments, and typically has aching and fever a few hours after each chemo session. Patient was instructed to seek medical attention if his fever rises above a certain  "threshold, which is why he presented to ER.   - Patient reports normal eating habits now and wants to have his feeding tube (PEG tube) removed, as it interferes with some of his activities. He is willing to undergo surgery again to have it replaced if necessary in the future.  - Patient's last endoscopy was performed in January, with a previous one in December for bleeding. A colonoscopy was performed in October, during which 11 polyps were removed. Patient receives PET scans every 3-4 months, with the most recent one in April.  - Patient denies any blood in the stool.  Patient is otherwise without concerns today.    ROS:  General: -fever, -chills, -fatigue, -weight gain, -weight loss  Eyes: -eye pain, -vision change  ENMT: -hearing loss, -ear pain, -nasal congestion, -rhinorrhea, -dental problem, +TROUBLE SWALLOWING  Cardiovascular: -chest pain, -palpitations, -lower extremity edema  Respiratory: -cough, -trouble breathing  Gastrointestinal: -abdominal pain, -abdominal swelling, -nausea, -vomiting, +DIARRHEA, -constipation, -blood in stool  Genitourinary: -difficulty urinating, -genital problem  Musculoskeletal: -joint pain, -muscle aches  Integumentary: -rash  Lymphatics: -swollen glands, -easy bruising  Neurologic: -headache, -dizziness, -numbness, -weakness  Psychiatric: -anxiety, -depression, -sleep difficulty     Objective:     Vitals:    05/13/25 1102   BP: 120/74   BP Location: Right arm   Patient Position: Sitting   Pulse: 98   Resp: 16   Temp: 97.7 °F (36.5 °C)   TempSrc: Oral   SpO2: 96%   Weight: 74 kg (163 lb 2.3 oz)   Height: 6' 1" (1.854 m)          Physical Exam  Vitals reviewed.   Constitutional:       General: He is not in acute distress.     Appearance: He is well-developed.   HENT:      Head: Normocephalic and atraumatic.   Eyes:      General: Lids are normal. No scleral icterus.     Extraocular Movements: Extraocular movements intact.      Conjunctiva/sclera: Conjunctivae normal.      Pupils: " Pupils are equal, round, and reactive to light.   Pulmonary:      Effort: Pulmonary effort is normal.   Neurological:      Mental Status: He is alert and oriented to person, place, and time.      Cranial Nerves: No cranial nerve deficit.      Gait: Gait normal.   Psychiatric:         Mood and Affect: Mood and affect normal.           Assessment:       1. Sepsis, due to unspecified organism, unspecified whether acute organ dysfunction present    2. Hypomagnesemia    3. Thrombocytopenia        Plan:   1. Sepsis, due to unspecified organism, unspecified whether acute organ dysfunction present    2. Hypomagnesemia  -     Magnesium; Future; Expected date: 05/13/2025    3. Thrombocytopenia    Discharge summary reviewed.    - scheduled for labs for Dr. Latif, Hematology, 5/19, will add magnesium level.  - scheduled for follow up with Hematology 5/20.  - advised to discuss feeding tube concerns with Dr. Latif.     Evaluated ongoing stomach cramping and diarrhea, likely related to recent antibiotic use and chemotherapy.   Confirmed no blood in stool, negative blood cultures, and normal procalcitonin levels.   Recommend daily yogurt consumption to restore gut kristin.   Explained importance of staying hydrated and discussed signs of dehydration, including skin tenting, especially with ongoing diarrhea.     Patient experienced burning sensation in mouth after taking Levaquin, resembling thrush.   Blood cultures came back negative and procalcitonin levels were not elevated.   Discontinued Levaquin given negative results and unclear indication.    Patient expressed understanding and agreement with plan.    Follow up in about 6 months (around 11/13/2025), or if symptoms worsen or fail to improve, for annual with Dr. Goyal, link labs Mg to lab 5/19.    This note was generated with the assistance of ambient listening technology. Verbal consent was obtained by the patient and accompanying visitor(s) for the recording of patient  appointment to facilitate this note. I attest to having reviewed and edited the generated note for accuracy, though some syntax or spelling errors may persist. Please contact the author of this note for any clarification.

## 2025-05-19 ENCOUNTER — LAB VISIT (OUTPATIENT)
Dept: LAB | Facility: HOSPITAL | Age: 60
End: 2025-05-19
Attending: INTERNAL MEDICINE
Payer: COMMERCIAL

## 2025-05-19 DIAGNOSIS — D84.821 IMMUNODEFICIENCY DUE TO CHEMOTHERAPY: ICD-10-CM

## 2025-05-19 DIAGNOSIS — T45.1X5S PERIPHERAL NEUROPATHY DUE TO AND NOT CONCURRENT WITH CHEMOTHERAPY: ICD-10-CM

## 2025-05-19 DIAGNOSIS — C15.9 ESOPHAGEAL CANCER, STAGE IV: ICD-10-CM

## 2025-05-19 DIAGNOSIS — C78.7 SECONDARY LIVER CANCER: ICD-10-CM

## 2025-05-19 DIAGNOSIS — T45.1X5A IMMUNODEFICIENCY DUE TO CHEMOTHERAPY: ICD-10-CM

## 2025-05-19 DIAGNOSIS — C79.89 MALIGNANT NEOPLASM METASTATIC TO OTHER SITE: ICD-10-CM

## 2025-05-19 DIAGNOSIS — D50.0 IRON DEFICIENCY ANEMIA DUE TO CHRONIC BLOOD LOSS: ICD-10-CM

## 2025-05-19 DIAGNOSIS — E83.42 HYPOMAGNESEMIA: ICD-10-CM

## 2025-05-19 DIAGNOSIS — C78.6 SECONDARY ADENOCARCINOMA OF RETROPERITONEUM: ICD-10-CM

## 2025-05-19 DIAGNOSIS — Z79.69 IMMUNODEFICIENCY DUE TO CHEMOTHERAPY: ICD-10-CM

## 2025-05-19 DIAGNOSIS — G62.0 PERIPHERAL NEUROPATHY DUE TO AND NOT CONCURRENT WITH CHEMOTHERAPY: ICD-10-CM

## 2025-05-19 LAB
ABSOLUTE EOSINOPHIL (OHS): 0.15 K/UL
ABSOLUTE MONOCYTE (OHS): 0.71 K/UL (ref 0.3–1)
ABSOLUTE NEUTROPHIL COUNT (OHS): 3.64 K/UL (ref 1.8–7.7)
ALBUMIN SERPL BCP-MCNC: 3.3 G/DL (ref 3.5–5.2)
ALP SERPL-CCNC: 152 UNIT/L (ref 40–150)
ALT SERPL W/O P-5'-P-CCNC: 18 UNIT/L (ref 10–44)
ANION GAP (OHS): 9 MMOL/L (ref 8–16)
AST SERPL-CCNC: 19 UNIT/L (ref 11–45)
BASOPHILS # BLD AUTO: 0.07 K/UL
BASOPHILS NFR BLD AUTO: 1.1 %
BILIRUB SERPL-MCNC: 0.9 MG/DL (ref 0.1–1)
BUN SERPL-MCNC: 10 MG/DL (ref 6–20)
CALCIUM SERPL-MCNC: 8.9 MG/DL (ref 8.7–10.5)
CHLORIDE SERPL-SCNC: 102 MMOL/L (ref 95–110)
CO2 SERPL-SCNC: 23 MMOL/L (ref 23–29)
CREAT SERPL-MCNC: 0.7 MG/DL (ref 0.5–1.4)
ERYTHROCYTE [DISTWIDTH] IN BLOOD BY AUTOMATED COUNT: 16.7 % (ref 11.5–14.5)
FERRITIN SERPL-MCNC: 167 NG/ML (ref 20–300)
GFR SERPLBLD CREATININE-BSD FMLA CKD-EPI: >60 ML/MIN/1.73/M2
GLUCOSE SERPL-MCNC: 109 MG/DL (ref 70–110)
HCT VFR BLD AUTO: 37.1 % (ref 40–54)
HGB BLD-MCNC: 12.5 GM/DL (ref 14–18)
IMM GRANULOCYTES # BLD AUTO: 0.02 K/UL (ref 0–0.04)
IMM GRANULOCYTES NFR BLD AUTO: 0.3 % (ref 0–0.5)
IRON SATN MFR SERPL: 11 % (ref 20–50)
IRON SERPL-MCNC: 39 UG/DL (ref 45–160)
LYMPHOCYTES # BLD AUTO: 1.75 K/UL (ref 1–4.8)
MAGNESIUM SERPL-MCNC: 1.6 MG/DL (ref 1.6–2.6)
MCH RBC QN AUTO: 32.9 PG (ref 27–31)
MCHC RBC AUTO-ENTMCNC: 33.7 G/DL (ref 32–36)
MCV RBC AUTO: 98 FL (ref 82–98)
NUCLEATED RBC (/100WBC) (OHS): 0 /100 WBC
PLATELET # BLD AUTO: 184 K/UL (ref 150–450)
PMV BLD AUTO: 11.7 FL (ref 9.2–12.9)
POTASSIUM SERPL-SCNC: 4 MMOL/L (ref 3.5–5.1)
PROT SERPL-MCNC: 7.8 GM/DL (ref 6–8.4)
RBC # BLD AUTO: 3.8 M/UL (ref 4.6–6.2)
RELATIVE EOSINOPHIL (OHS): 2.4 %
RELATIVE LYMPHOCYTE (OHS): 27.6 % (ref 18–48)
RELATIVE MONOCYTE (OHS): 11.2 % (ref 4–15)
RELATIVE NEUTROPHIL (OHS): 57.4 % (ref 38–73)
SODIUM SERPL-SCNC: 134 MMOL/L (ref 136–145)
TIBC SERPL-MCNC: 355 UG/DL (ref 250–450)
TRANSFERRIN SERPL-MCNC: 240 MG/DL (ref 200–375)
TSH SERPL-ACNC: 1.35 UIU/ML (ref 0.4–4)
WBC # BLD AUTO: 6.34 K/UL (ref 3.9–12.7)

## 2025-05-19 PROCEDURE — 80053 COMPREHEN METABOLIC PANEL: CPT

## 2025-05-19 PROCEDURE — 84466 ASSAY OF TRANSFERRIN: CPT

## 2025-05-19 PROCEDURE — 83735 ASSAY OF MAGNESIUM: CPT

## 2025-05-19 PROCEDURE — 36415 COLL VENOUS BLD VENIPUNCTURE: CPT | Mod: PO

## 2025-05-19 PROCEDURE — 82728 ASSAY OF FERRITIN: CPT

## 2025-05-19 PROCEDURE — 84443 ASSAY THYROID STIM HORMONE: CPT

## 2025-05-19 PROCEDURE — 85025 COMPLETE CBC W/AUTO DIFF WBC: CPT

## 2025-05-20 ENCOUNTER — RESULTS FOLLOW-UP (OUTPATIENT)
Dept: INTERNAL MEDICINE | Facility: CLINIC | Age: 60
End: 2025-05-20

## 2025-05-20 ENCOUNTER — OFFICE VISIT (OUTPATIENT)
Dept: HEMATOLOGY/ONCOLOGY | Facility: CLINIC | Age: 60
End: 2025-05-20
Payer: COMMERCIAL

## 2025-05-20 DIAGNOSIS — C79.89 MALIGNANT NEOPLASM METASTATIC TO OTHER SITE: ICD-10-CM

## 2025-05-20 DIAGNOSIS — C15.9 ESOPHAGEAL CANCER, STAGE IV: Primary | Chronic | ICD-10-CM

## 2025-05-20 DIAGNOSIS — D50.0 IRON DEFICIENCY ANEMIA DUE TO CHRONIC BLOOD LOSS: Chronic | ICD-10-CM

## 2025-05-20 DIAGNOSIS — C78.6 SECONDARY ADENOCARCINOMA OF RETROPERITONEUM: ICD-10-CM

## 2025-05-20 DIAGNOSIS — Z79.69 IMMUNODEFICIENCY DUE TO CHEMOTHERAPY: Chronic | ICD-10-CM

## 2025-05-20 DIAGNOSIS — D84.821 IMMUNODEFICIENCY DUE TO CHEMOTHERAPY: Chronic | ICD-10-CM

## 2025-05-20 DIAGNOSIS — T45.1X5A IMMUNODEFICIENCY DUE TO CHEMOTHERAPY: Chronic | ICD-10-CM

## 2025-05-20 DIAGNOSIS — C78.7 SECONDARY LIVER CANCER: ICD-10-CM

## 2025-05-20 DIAGNOSIS — D69.6 THROMBOCYTOPENIA: ICD-10-CM

## 2025-05-20 PROCEDURE — 98006 SYNCH AUDIO-VIDEO EST MOD 30: CPT | Mod: 25,95,, | Performed by: INTERNAL MEDICINE

## 2025-05-20 NOTE — PROGRESS NOTES
Subjective:       Patient ID: Rosalio Muñoz is a 59 y.o. male.    Chief Complaint: Results, Chemotherapy, and Esophageal Cancer    HPI:  59-year-old male history of metastatic esophageal carcinoma patient to begin next cycle of treatment recent hospitalization with pancytopenia and severe thrombocytopenia.  Patient will require dose modification seen in virtual visit ECOG status 1    Past Medical History:   Diagnosis Date    Esophageal cancer, stage IV 10/10/2024    Hypertension     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 10/11/2024    Palliative care encounter 12/11/2024     Family History   Problem Relation Name Age of Onset    Hyperlipidemia Mother      Hypertension Father      Diabetes Father      Kidney disease Father      Heart disease Father      Breast cancer Maternal Grandmother      Prostate cancer Maternal Grandfather      Colon cancer Neg Hx       Social History[1]  Past Surgical History:   Procedure Laterality Date    COLONOSCOPY N/A 8/25/2023    Procedure: COLONOSCOPY;  Surgeon: Pebbles Spear MD;  Location: [facility];  Service: Endoscopy;  Laterality: N/A;    COLONOSCOPY N/A 10/2/2024    Procedure: COLONOSCOPY  Cardiac clearance received on 09/20/24 per Dr. Lockett, Cardiology.  Note in encounters.  LB;  Surgeon: Sully Farris MD;  Location: [facility];  Service: Endoscopy;  Laterality: N/A;    ESOPHAGOGASTRODUODENOSCOPY N/A 10/2/2024    Procedure: EGD (ESOPHAGOGASTRODUODENOSCOPY);  Surgeon: Sully Farris MD;  Location: [facility];  Service: Endoscopy;  Laterality: N/A;    ESOPHAGOGASTRODUODENOSCOPY N/A 12/31/2024    Procedure: EGD (ESOPHAGOGASTRODUODENOSCOPY);  Surgeon: Maki Sullivan MD;  Location: [facility];  Service: Gastroenterology;  Laterality: N/A;    INSERTION OF VENOUS ACCESS PORT Left 10/21/2024    Procedure: INSERTION, VENOUS ACCESS PORT;  Surgeon: Roseanna Rosas MD;  Location: [facility];  Service: General;  Laterality: Left;    SURGICAL REMOVAL OF LESION  "OF FACE Right 12/1/2023    Procedure: EXCISION, LESION, FACE;  Surgeon: Jose Flaherty MD;  Location: [facility] OR;  Service: ENT;  Laterality: Right;  1. Excision facial subcutaneous mass right cheek 3 cm. 2. Intermediate layered closure 3.5 cm    WISDOM TOOTH EXTRACTION      XI ROBOTIC APPENDECTOMY N/A 7/31/2024    Procedure: XI ROBOTIC APPENDECTOMY;  Surgeon: Paulo Saavedra MD;  Location: [facility] OR;  Service: General;  Laterality: N/A;    XI ROBOTIC INSERTION, GASTROSTOMY TUBE N/A 10/21/2024    Procedure: XI ROBOTIC INSERTION, GASTROSTOMY TUBE;  Surgeon: Roseanna Rosas MD;  Location: [facility] OR;  Service: General;  Laterality: N/A;       Labs:  Lab Results   Component Value Date    WBC 6.34 05/19/2025    HGB 12.5 (L) 05/19/2025    HCT 37.1 (L) 05/19/2025    MCV 98 05/19/2025     05/19/2025     BMP  Lab Results   Component Value Date     (L) 05/19/2025    K 4.0 05/19/2025     05/19/2025    CO2 23 05/19/2025    BUN 10 05/19/2025    CREATININE 0.7 05/19/2025    CALCIUM 8.9 05/19/2025    ANIONGAP 9 05/19/2025    ESTGFRAFRICA >60.0 07/08/2022    EGFRNONAA >60.0 07/08/2022     Lab Results   Component Value Date    ALT 18 05/19/2025    AST 19 05/19/2025    ALKPHOS 152 (H) 05/19/2025    BILITOT 0.9 05/19/2025       Lab Results   Component Value Date    IRON 39 (L) 05/19/2025    TIBC 355 05/19/2025    FERRITIN 167.0 05/19/2025     Lab Results   Component Value Date    LZWAAXON33 445 10/19/2018     No results found for: "FOLATE"  Lab Results   Component Value Date    TSH 1.348 05/19/2025         Review of Systems   Constitutional:  Negative for activity change, appetite change, chills, diaphoresis, fatigue, fever and unexpected weight change.   HENT:  Negative for congestion, dental problem, drooling, ear discharge, ear pain, facial swelling, hearing loss, mouth sores, nosebleeds, postnasal drip, rhinorrhea, sinus pressure, sneezing, sore throat, tinnitus, trouble swallowing and voice change.    Eyes:  " Negative for photophobia, pain, discharge, redness, itching and visual disturbance.   Respiratory:  Negative for apnea, cough, choking, chest tightness, shortness of breath, wheezing and stridor.    Cardiovascular:  Negative for chest pain, palpitations and leg swelling.   Gastrointestinal:  Negative for abdominal distention, abdominal pain, anal bleeding, blood in stool, constipation, diarrhea, nausea, rectal pain and vomiting.   Endocrine: Negative for cold intolerance, heat intolerance, polydipsia, polyphagia and polyuria.   Genitourinary:  Negative for decreased urine volume, difficulty urinating, dysuria, enuresis, flank pain, frequency, genital sores, hematuria, penile discharge, penile pain, penile swelling, scrotal swelling, testicular pain and urgency.   Musculoskeletal:  Negative for arthralgias, back pain, gait problem, joint swelling, myalgias, neck pain and neck stiffness.   Skin:  Negative for color change, pallor, rash and wound.   Allergic/Immunologic: Negative for environmental allergies, food allergies and immunocompromised state.   Neurological:  Negative for dizziness, tremors, seizures, syncope, facial asymmetry, speech difficulty, weakness, light-headedness, numbness and headaches.   Hematological:  Negative for adenopathy. Does not bruise/bleed easily.   Psychiatric/Behavioral:  Negative for agitation, behavioral problems, confusion, decreased concentration, dysphoric mood, hallucinations, self-injury, sleep disturbance and suicidal ideas. The patient is not nervous/anxious and is not hyperactive.        Objective:      Physical Exam  Constitutional:       Appearance: Normal appearance.   Neurological:      Mental Status: He is alert.             Assessment:      1. Esophageal cancer, stage IV    2. Immunodeficiency due to chemotherapy    3. Thrombocytopenia    4. Iron deficiency anemia due to chronic blood loss    5. 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    6. Secondary adenocarcinoma  of retroperitoneum    7. Secondary liver cancer           Med Onc Chart Routing      Follow up with physician . Return 2 to three-week with CBC CMP serum iron TIBC ferritin LDH and C-reactive protein   Follow up with JORDANA    Infusion scheduling note    Injection scheduling note 20% dose reduction   Labs    Imaging    Pharmacy appointment    Other referrals                 Plan:     The patient location is:  Home  The chief complaint leading to consultation is:  Esophageal cancer    Visit type: audiovisual    Face to Face time with patient: 30 minutes of total time spent on the encounter, which includes face to face time and non-face to face time preparing to see the patient (eg, review of tests), Obtaining and/or reviewing separately obtained history, Documenting clinical information in the electronic or other health record, Independently interpreting results (not separately reported) and communicating results to the patient/family/caregiver, or Care coordination (not separately reported).         Each patient to whom he or she provides medical services by telemedicine is:  (1) informed of the relationship between the physician and patient and the respective role of any other health care provider with respect to management of the patient; and (2) notified that he or she may decline to receive medical services by telemedicine and may withdraw from such care at any time.    Notes:  Reviewed information with patient counts acceptable for treatment.  Dose modification with 20% dose reduction talked to clinical pharmacist to review.  Continue with current plan treatment last imaging 04/07/2020 25 repeat imaging probably in June or July 2025 for continued response        Hudson Latif Jr, MD FACP         [1]   Social History  Socioeconomic History    Marital status:     Number of children: 2   Occupational History    Occupation:    Tobacco Use    Smoking status: Every Day     Current packs/day:  0.00     Types: Cigars, Cigarettes     Last attempt to quit: 10/4/2022     Years since quittin.6     Passive exposure: Never    Smokeless tobacco: Never    Tobacco comments:     Cigar every afternoon   Substance and Sexual Activity    Alcohol use: Yes     Alcohol/week: 4.0 - 6.0 standard drinks of alcohol     Types: 4 - 6 Cans of beer per week     Comment: occ    Drug use: Yes     Types: Marijuana     Comment: medical    Sexual activity: Yes     Partners: Female     Social Drivers of Health     Financial Resource Strain: Low Risk  (2025)    Overall Financial Resource Strain (CARDIA)     Difficulty of Paying Living Expenses: Not hard at all   Food Insecurity: No Food Insecurity (2025)    Hunger Vital Sign     Worried About Running Out of Food in the Last Year: Never true     Ran Out of Food in the Last Year: Never true   Transportation Needs: No Transportation Needs (2025)    PRAPARE - Transportation     Lack of Transportation (Medical): No     Lack of Transportation (Non-Medical): No   Physical Activity: Unknown (2025)    Exercise Vital Sign     Days of Exercise per Week: Patient declined     Minutes of Exercise per Session: 20 min   Stress: No Stress Concern Present (2025)    [employer] [place] [employer] - Occupational Stress Questionnaire     Feeling of Stress : Not at all   Housing Stability: Low Risk  (2025)    Housing Stability Vital Sign     Unable to Pay for Housing in the Last Year: No     Number of Times Moved in the Last Year: 0     Homeless in the Last Year: No

## 2025-05-21 ENCOUNTER — OFFICE VISIT (OUTPATIENT)
Dept: HEMATOLOGY/ONCOLOGY | Facility: CLINIC | Age: 60
End: 2025-05-21
Payer: COMMERCIAL

## 2025-05-21 ENCOUNTER — INFUSION (OUTPATIENT)
Dept: INFUSION THERAPY | Facility: HOSPITAL | Age: 60
End: 2025-05-21
Attending: INTERNAL MEDICINE
Payer: COMMERCIAL

## 2025-05-21 VITALS
BODY MASS INDEX: 21.77 KG/M2 | SYSTOLIC BLOOD PRESSURE: 125 MMHG | TEMPERATURE: 99 F | HEART RATE: 91 BPM | HEIGHT: 73 IN | WEIGHT: 164.25 LBS | RESPIRATION RATE: 18 BRPM | DIASTOLIC BLOOD PRESSURE: 73 MMHG | OXYGEN SATURATION: 97 %

## 2025-05-21 DIAGNOSIS — D50.0 IRON DEFICIENCY ANEMIA DUE TO CHRONIC BLOOD LOSS: ICD-10-CM

## 2025-05-21 DIAGNOSIS — C78.6 SECONDARY ADENOCARCINOMA OF RETROPERITONEUM: ICD-10-CM

## 2025-05-21 DIAGNOSIS — C79.89 MALIGNANT NEOPLASM METASTATIC TO OTHER SITE: ICD-10-CM

## 2025-05-21 DIAGNOSIS — T80.90XA INFUSION REACTION, INITIAL ENCOUNTER: Primary | ICD-10-CM

## 2025-05-21 DIAGNOSIS — D50.0 IRON DEFICIENCY ANEMIA DUE TO CHRONIC BLOOD LOSS: Chronic | ICD-10-CM

## 2025-05-21 DIAGNOSIS — C15.9 ESOPHAGEAL CANCER, STAGE IV: Primary | ICD-10-CM

## 2025-05-21 DIAGNOSIS — C15.9 ESOPHAGEAL CANCER, STAGE IV: ICD-10-CM

## 2025-05-21 PROCEDURE — 96413 CHEMO IV INFUSION 1 HR: CPT

## 2025-05-21 PROCEDURE — 96375 TX/PRO/DX INJ NEW DRUG ADDON: CPT

## 2025-05-21 PROCEDURE — 96417 CHEMO IV INFUS EACH ADDL SEQ: CPT

## 2025-05-21 PROCEDURE — 1159F MED LIST DOCD IN RCRD: CPT | Mod: CPTII,95,, | Performed by: INTERNAL MEDICINE

## 2025-05-21 PROCEDURE — 63600175 PHARM REV CODE 636 W HCPCS: Performed by: INTERNAL MEDICINE

## 2025-05-21 PROCEDURE — 1160F RVW MEDS BY RX/DR IN RCRD: CPT | Mod: CPTII,95,, | Performed by: INTERNAL MEDICINE

## 2025-05-21 PROCEDURE — 96367 TX/PROPH/DG ADDL SEQ IV INF: CPT

## 2025-05-21 PROCEDURE — 25000003 PHARM REV CODE 250: Performed by: INTERNAL MEDICINE

## 2025-05-21 PROCEDURE — 98006 SYNCH AUDIO-VIDEO EST MOD 30: CPT | Mod: 95,,, | Performed by: INTERNAL MEDICINE

## 2025-05-21 RX ORDER — PROCHLORPERAZINE EDISYLATE 5 MG/ML
10 INJECTION INTRAMUSCULAR; INTRAVENOUS ONCE AS NEEDED
Status: DISCONTINUED | OUTPATIENT
Start: 2025-05-21 | End: 2025-05-21 | Stop reason: HOSPADM

## 2025-05-21 RX ORDER — HEPARIN 100 UNIT/ML
500 SYRINGE INTRAVENOUS
OUTPATIENT
Start: 2025-06-15

## 2025-05-21 RX ORDER — EPINEPHRINE 0.3 MG/.3ML
0.3 INJECTION SUBCUTANEOUS ONCE AS NEEDED
OUTPATIENT
Start: 2025-06-15

## 2025-05-21 RX ORDER — EPINEPHRINE 0.3 MG/.3ML
0.3 INJECTION SUBCUTANEOUS ONCE AS NEEDED
Status: DISCONTINUED | OUTPATIENT
Start: 2025-05-21 | End: 2025-05-21 | Stop reason: HOSPADM

## 2025-05-21 RX ORDER — SODIUM CHLORIDE 0.9 % (FLUSH) 0.9 %
10 SYRINGE (ML) INJECTION
Status: DISCONTINUED | OUTPATIENT
Start: 2025-05-21 | End: 2025-05-21 | Stop reason: HOSPADM

## 2025-05-21 RX ORDER — HEPARIN 100 UNIT/ML
500 SYRINGE INTRAVENOUS
Status: DISCONTINUED | OUTPATIENT
Start: 2025-05-21 | End: 2025-05-21 | Stop reason: HOSPADM

## 2025-05-21 RX ORDER — DIPHENHYDRAMINE HYDROCHLORIDE 50 MG/ML
50 INJECTION, SOLUTION INTRAMUSCULAR; INTRAVENOUS ONCE AS NEEDED
Status: DISCONTINUED | OUTPATIENT
Start: 2025-05-21 | End: 2025-05-21 | Stop reason: HOSPADM

## 2025-05-21 RX ORDER — SODIUM CHLORIDE 0.9 % (FLUSH) 0.9 %
10 SYRINGE (ML) INJECTION
OUTPATIENT
Start: 2025-06-15

## 2025-05-21 RX ADMIN — HYDROCORTISONE SODIUM SUCCINATE 100 MG: 100 INJECTION, POWDER, FOR SOLUTION INTRAMUSCULAR; INTRAVENOUS at 11:05

## 2025-05-21 RX ADMIN — OXALIPLATIN 80 MG: 5 INJECTION, SOLUTION INTRAVENOUS at 09:05

## 2025-05-21 RX ADMIN — PROCHLORPERAZINE EDISYLATE 10 MG: 5 INJECTION INTRAMUSCULAR; INTRAVENOUS at 11:05

## 2025-05-21 RX ADMIN — TRASTUZUMAB-ANNS 300 MG: 420 INJECTION, POWDER, LYOPHILIZED, FOR SOLUTION INTRAVENOUS at 08:05

## 2025-05-21 RX ADMIN — DEXTROSE MONOHYDRATE: 50 INJECTION, SOLUTION INTRAVENOUS at 11:05

## 2025-05-21 RX ADMIN — SODIUM CHLORIDE 0.25 MG: 9 INJECTION, SOLUTION INTRAVENOUS at 09:05

## 2025-05-21 RX ADMIN — HEPARIN SODIUM (PORCINE) LOCK FLUSH IV SOLN 100 UNIT/ML 500 UNITS: 100 SOLUTION at 12:05

## 2025-05-21 NOTE — NURSING
One hour and a half into Oxaliplatin infusion pt began to report feeling pain to right shoulder radiating across his back as well has bilateral hip and knee pain, infusion put on hold and notified MD also gave 100mg of solu-cortef then at about 5 min later pt began to have nausea with vomiting, gave Compazine per MAR and continued to hold infusion for 1 hour per Dr. Latif, supervisor Kimber notified and pt spouse present.   1230  Nausea resolved and vital signs are returning to base line, pain has diminished some what , pt and spouse decided to abort treatment today and await results of new scan that was requested per Dr. Latif

## 2025-05-21 NOTE — PROGRESS NOTES
Subjective:       Patient ID: Rosalio Muñoz is a 59 y.o. male.    Chief Complaint: Results and Esophageal Cancer    HPI:  59-year-old male was scheduled for cycle 5 day 1 of FOLFOX metastatic esophageal cancer.  Patient had reaction after oxaliplatin infusion was called by infusion charge nurse to review seen in virtual visit ECOG status 2  Past Medical History:   Diagnosis Date    Esophageal cancer, stage IV 10/10/2024    Hypertension     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 10/11/2024    Palliative care encounter 12/11/2024     Family History   Problem Relation Name Age of Onset    Hyperlipidemia Mother      Hypertension Father      Diabetes Father      Kidney disease Father      Heart disease Father      Breast cancer Maternal Grandmother      Prostate cancer Maternal Grandfather      Colon cancer Neg Hx       Social History[1]  Past Surgical History:   Procedure Laterality Date    COLONOSCOPY N/A 8/25/2023    Procedure: COLONOSCOPY;  Surgeon: Pebbles Spear MD;  Location: [facility];  Service: Endoscopy;  Laterality: N/A;    COLONOSCOPY N/A 10/2/2024    Procedure: COLONOSCOPY  Cardiac clearance received on 09/20/24 per Dr. Lockett, Cardiology.  Note in encounters.  LB;  Surgeon: Sully Farris MD;  Location: [facility];  Service: Endoscopy;  Laterality: N/A;    ESOPHAGOGASTRODUODENOSCOPY N/A 10/2/2024    Procedure: EGD (ESOPHAGOGASTRODUODENOSCOPY);  Surgeon: Sully Farris MD;  Location: [facility];  Service: Endoscopy;  Laterality: N/A;    ESOPHAGOGASTRODUODENOSCOPY N/A 12/31/2024    Procedure: EGD (ESOPHAGOGASTRODUODENOSCOPY);  Surgeon: Maki Sullivan MD;  Location: [facility];  Service: Gastroenterology;  Laterality: N/A;    INSERTION OF VENOUS ACCESS PORT Left 10/21/2024    Procedure: INSERTION, VENOUS ACCESS PORT;  Surgeon: Roseanna Rosas MD;  Location: [facility];  Service: General;  Laterality: Left;    SURGICAL REMOVAL OF LESION OF FACE Right 12/1/2023    Procedure:  "EXCISION, LESION, FACE;  Surgeon: Jose Flaherty MD;  Location: [facility] OR;  Service: ENT;  Laterality: Right;  1. Excision facial subcutaneous mass right cheek 3 cm. 2. Intermediate layered closure 3.5 cm    WISDOM TOOTH EXTRACTION      XI ROBOTIC APPENDECTOMY N/A 7/31/2024    Procedure: XI ROBOTIC APPENDECTOMY;  Surgeon: Paulo Saavedra MD;  Location: [facility] OR;  Service: General;  Laterality: N/A;    XI ROBOTIC INSERTION, GASTROSTOMY TUBE N/A 10/21/2024    Procedure: XI ROBOTIC INSERTION, GASTROSTOMY TUBE;  Surgeon: Roseanna Rosas MD;  Location: [facility] OR;  Service: General;  Laterality: N/A;       Labs:  Lab Results   Component Value Date    WBC 6.34 05/19/2025    HGB 12.5 (L) 05/19/2025    HCT 37.1 (L) 05/19/2025    MCV 98 05/19/2025     05/19/2025     BMP  Lab Results   Component Value Date     (L) 05/19/2025    K 4.0 05/19/2025     05/19/2025    CO2 23 05/19/2025    BUN 10 05/19/2025    CREATININE 0.7 05/19/2025    CALCIUM 8.9 05/19/2025    ANIONGAP 9 05/19/2025    ESTGFRAFRICA >60.0 07/08/2022    EGFRNONAA >60.0 07/08/2022     Lab Results   Component Value Date    ALT 18 05/19/2025    AST 19 05/19/2025    ALKPHOS 152 (H) 05/19/2025    BILITOT 0.9 05/19/2025       Lab Results   Component Value Date    IRON 39 (L) 05/19/2025    TIBC 355 05/19/2025    FERRITIN 167.0 05/19/2025     Lab Results   Component Value Date    [id number] 445 10/19/2018     No results found for: "FOLATE"  Lab Results   Component Value Date    TSH 1.348 05/19/2025         Review of Systems   Constitutional:  Positive for fatigue. Negative for activity change, appetite change, chills, diaphoresis, fever and unexpected weight change.   HENT:  Negative for congestion, dental problem, drooling, ear discharge, ear pain, facial swelling, hearing loss, mouth sores, nosebleeds, postnasal drip, rhinorrhea, sinus pressure, sneezing, sore throat, tinnitus, trouble swallowing and voice change.    Eyes:  Negative for photophobia, " pain, discharge, redness, itching and visual disturbance.   Respiratory:  Negative for apnea, cough, choking, chest tightness, shortness of breath, wheezing and stridor.    Cardiovascular:  Negative for chest pain, palpitations and leg swelling.   Gastrointestinal:  Negative for abdominal distention, abdominal pain, anal bleeding, blood in stool, constipation, diarrhea, nausea, rectal pain and vomiting.   Endocrine: Negative for cold intolerance, heat intolerance, polydipsia, polyphagia and polyuria.   Genitourinary:  Negative for decreased urine volume, difficulty urinating, dysuria, enuresis, flank pain, frequency, genital sores, hematuria, penile discharge, penile pain, penile swelling, scrotal swelling, testicular pain and urgency.   Musculoskeletal:  Negative for arthralgias, back pain, gait problem, joint swelling, myalgias, neck pain and neck stiffness.   Skin:  Negative for color change, pallor, rash and wound.   Allergic/Immunologic: Negative for environmental allergies, food allergies and immunocompromised state.   Neurological:  Positive for weakness. Negative for dizziness, tremors, seizures, syncope, facial asymmetry, speech difficulty, light-headedness, numbness and headaches.   Hematological:  Negative for adenopathy. Does not bruise/bleed easily.   Psychiatric/Behavioral:  Negative for agitation, behavioral problems, confusion, decreased concentration, dysphoric mood, hallucinations, self-injury, sleep disturbance and suicidal ideas. The patient is not nervous/anxious and is not hyperactive.        Objective:      Physical Exam  Constitutional:       Appearance: Normal appearance. He is ill-appearing.   Neurological:      Mental Status: He is alert.             Assessment:      1. Infusion reaction, initial encounter    2. Esophageal cancer, stage IV    3. Iron deficiency anemia due to chronic blood loss    4. 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    5. Secondary adenocarcinoma of  retroperitoneum           Med Onc Chart Routing      Follow up with physician . Return to clinic after CT chest abdomen pelvis   Follow up with JORDANA    Infusion scheduling note    Injection scheduling note    Labs    Imaging    Pharmacy appointment    Other referrals                 Plan:     The patient location is:  [facility] The chief complaint leading to consultation is:  Infusion reaction    Visit type: audiovisual    Face to Face time with patient: 30 minutes of total time spent on the encounter, which includes face to face time and non-face to face time preparing to see the patient (eg, review of tests), Obtaining and/or reviewing separately obtained history, Documenting clinical information in the electronic or other health record, Independently interpreting results (not separately reported) and communicating results to the patient/family/caregiver, or Care coordination (not separately reported).         Each patient to whom he or she provides medical services by telemedicine is:  (1) informed of the relationship between the physician and patient and the respective role of any other health care provider with respect to management of the patient; and (2) notified that he or she may decline to receive medical services by telemedicine and may withdraw from such care at any time.    Notes:    Numerous come conversations with charge nurse.  As to reaction of patient.  At this time poly likelihood represents oxaliplatin infusion.  At this time would like to proceed with discontinuation of all treatment today both of chemotherapy as well as iron preparation variant will return after CT chest abdomen pelvis 4 cycles to assess response variant discussed implications and answered questions with him talked to his wife as well      Hudson Latif Jr, MD FACP         [1]   Social History  Socioeconomic History    Marital status:     Number of children: 2   Occupational History    Occupation: Industrial     Tobacco Use    Smoking status: Every Day     Current packs/day: 0.00     Types: Cigars, Cigarettes     Last attempt to quit: 10/4/2022     Years since quittin.6     Passive exposure: Never    Smokeless tobacco: Never    Tobacco comments:     Cigar every afternoon   Substance and Sexual Activity    Alcohol use: Yes     Alcohol/week: 4.0 - 6.0 standard drinks of alcohol     Types: 4 - 6 Cans of beer per week     Comment: occ    Drug use: Yes     Types: Marijuana     Comment: medical    Sexual activity: Yes     Partners: Female     Social Drivers of Health     Financial Resource Strain: Low Risk  (2025)    Overall Financial Resource Strain (CARDIA)     Difficulty of Paying Living Expenses: Not hard at all   Food Insecurity: No Food Insecurity (2025)    Hunger Vital Sign     Worried About Running Out of Food in the Last Year: Never true     Ran Out of Food in the Last Year: Never true   Transportation Needs: No Transportation Needs (2025)    PRAPARE - Transportation     Lack of Transportation (Medical): No     Lack of Transportation (Non-Medical): No   Physical Activity: Unknown (2025)    Exercise Vital Sign     Days of Exercise per Week: Patient declined     Minutes of Exercise per Session: 20 min   Stress: No Stress Concern Present (2025)    [employer] [place] [employer] - Occupational Stress Questionnaire     Feeling of Stress : Not at all   Housing Stability: Low Risk  (2025)    Housing Stability Vital Sign     Unable to Pay for Housing in the Last Year: No     Number of Times Moved in the Last Year: 0     Homeless in the Last Year: No

## 2025-05-21 NOTE — PLAN OF CARE
Problem: Adult Inpatient Plan of Care  Goal: Plan of Care Review  Outcome: Ongoing  Flowsheets (Taken 5/21/2025 1316)  Plan of Care Reviewed With: patient  Goal: Patient-Specific Goal (Individualized)  Outcome: Ongoing  Flowsheets (Taken 5/21/2025 1316)  Individualized Care Needs: Reclined position, warm blanket pillow and water with an orange juice  Anxieties, Fears or Concerns: Has had to go to the ER after each chemo treatment  Patient/Family-Specific Goals (Include Timeframe): Tolerate treatments  Goal: Absence of Hospital-Acquired Illness or Injury  Outcome: Ongoing  Intervention: Identify and Manage Fall Risk  Flowsheets (Taken 5/21/2025 1316)  Safety Promotion/Fall Prevention: in recliner, wheels locked  Intervention: Prevent Infection  Flowsheets (Taken 5/21/2025 1316)  Infection Prevention:   equipment surfaces disinfected   hand hygiene promoted   personal protective equipment utilized  Goal: Optimal Comfort and Wellbeing  Outcome: Ongoing  Intervention: Provide Person-Centered Care  Flowsheets (Taken 5/21/2025 1316)  Trust Relationship/Rapport:   care explained   questions encouraged   choices provided   reassurance provided   emotional support provided   thoughts/feelings acknowledged   empathic listening provided   questions answered

## 2025-05-21 NOTE — DISCHARGE INSTRUCTIONS
[facility]  [address] [address]  [address]  [phone] phone     [phone] fax  Hours of Operation: Monday- Friday 8:00am- 5:00pm  After hours phone  [phone]  Hematology / Oncology Physicians on call      ELVIA [address] NP Phaon Dunbar, NP Khelsea Conley, FNP    Please call with any concerns regarding your appointment today.

## 2025-05-26 ENCOUNTER — RESULTS FOLLOW-UP (OUTPATIENT)
Dept: HEMATOLOGY/ONCOLOGY | Facility: CLINIC | Age: 60
End: 2025-05-26

## 2025-05-26 ENCOUNTER — HOSPITAL ENCOUNTER (OUTPATIENT)
Dept: RADIOLOGY | Facility: HOSPITAL | Age: 60
Discharge: HOME | End: 2025-05-26
Attending: INTERNAL MEDICINE
Payer: COMMERCIAL

## 2025-05-26 DIAGNOSIS — C15.9 ESOPHAGEAL CANCER, STAGE IV: ICD-10-CM

## 2025-05-26 PROCEDURE — 71260 CT THORAX DX C+: CPT | Mod: 26,,, | Performed by: RADIOLOGY

## 2025-05-26 PROCEDURE — 74177 CT ABD & PELVIS W/CONTRAST: CPT | Mod: 26,,, | Performed by: RADIOLOGY

## 2025-05-26 PROCEDURE — 71260 CT THORAX DX C+: CPT | Mod: TC

## 2025-05-26 PROCEDURE — A9698 NON-RAD CONTRAST MATERIALNOC: HCPCS | Performed by: INTERNAL MEDICINE

## 2025-05-26 PROCEDURE — 25500020 PHARM REV CODE 255: Performed by: INTERNAL MEDICINE

## 2025-05-26 RX ADMIN — IOHEXOL 1000 ML: 12 SOLUTION ORAL at 12:05

## 2025-05-26 RX ADMIN — IOHEXOL 100 ML: 350 INJECTION, SOLUTION INTRAVENOUS at 01:05

## 2025-05-27 ENCOUNTER — TELEPHONE (OUTPATIENT)
Dept: AUDIOLOGY | Facility: CLINIC | Age: 60
End: 2025-05-27
Payer: COMMERCIAL

## 2025-05-27 NOTE — TELEPHONE ENCOUNTER
Spoke to Mrs. Muñoz. Mr. Muñoz' right HA is not keeping a charge and needs to be recharged mid-day. It will need to go into [facility] for repair. I offered to send it off once I receive it at [facility]. She opts to wait and drop it off next Friday at [facility] when Reem has returned and when she is off of work. I explained that we are extremely short staffed and our next available annual audio/hafu appts are not until late July. She understands and will look at their schedules for then and let us know.

## 2025-05-27 NOTE — TELEPHONE ENCOUNTER
----- Message from José Galindo sent at 5/27/2025  9:59 AM CDT -----  Contact: PT Spouse [phone]---    ----- Message -----  From: Ana Yang  Sent: 5/27/2025   9:53 AM CDT  To: [facility] Clinical Staff    PT Spouse calling to schedule an appointment with the provider, because pt hearing aid is not working. Please call to advise.

## 2025-06-02 ENCOUNTER — PATIENT MESSAGE (OUTPATIENT)
Dept: CARDIOLOGY | Facility: HOSPITAL | Age: 60
End: 2025-06-02
Payer: COMMERCIAL

## 2025-06-03 ENCOUNTER — LAB VISIT (OUTPATIENT)
Dept: LAB | Facility: HOSPITAL | Age: 60
End: 2025-06-03
Attending: INTERNAL MEDICINE
Payer: COMMERCIAL

## 2025-06-03 ENCOUNTER — RESULTS FOLLOW-UP (OUTPATIENT)
Dept: HEMATOLOGY/ONCOLOGY | Facility: CLINIC | Age: 60
End: 2025-06-03

## 2025-06-03 DIAGNOSIS — Z79.69 IMMUNODEFICIENCY DUE TO CHEMOTHERAPY: ICD-10-CM

## 2025-06-03 DIAGNOSIS — C15.9 ESOPHAGEAL CANCER, STAGE IV: ICD-10-CM

## 2025-06-03 DIAGNOSIS — D84.821 IMMUNODEFICIENCY DUE TO CHEMOTHERAPY: ICD-10-CM

## 2025-06-03 DIAGNOSIS — C78.6 SECONDARY ADENOCARCINOMA OF RETROPERITONEUM: ICD-10-CM

## 2025-06-03 DIAGNOSIS — G62.0 PERIPHERAL NEUROPATHY DUE TO AND NOT CONCURRENT WITH CHEMOTHERAPY: ICD-10-CM

## 2025-06-03 DIAGNOSIS — T45.1X5A IMMUNODEFICIENCY DUE TO CHEMOTHERAPY: ICD-10-CM

## 2025-06-03 DIAGNOSIS — T45.1X5S PERIPHERAL NEUROPATHY DUE TO AND NOT CONCURRENT WITH CHEMOTHERAPY: ICD-10-CM

## 2025-06-03 DIAGNOSIS — C78.7 SECONDARY LIVER CANCER: ICD-10-CM

## 2025-06-03 DIAGNOSIS — D50.0 IRON DEFICIENCY ANEMIA DUE TO CHRONIC BLOOD LOSS: ICD-10-CM

## 2025-06-03 DIAGNOSIS — C79.89 MALIGNANT NEOPLASM METASTATIC TO OTHER SITE: ICD-10-CM

## 2025-06-03 LAB
ABSOLUTE EOSINOPHIL (OHS): 0.22 K/UL
ABSOLUTE MONOCYTE (OHS): 0.73 K/UL (ref 0.3–1)
ABSOLUTE NEUTROPHIL COUNT (OHS): 3.34 K/UL (ref 1.8–7.7)
ALBUMIN SERPL BCP-MCNC: 3.4 G/DL (ref 3.5–5.2)
ALP SERPL-CCNC: 173 UNIT/L (ref 40–150)
ALT SERPL W/O P-5'-P-CCNC: 12 UNIT/L (ref 10–44)
ANION GAP (OHS): 10 MMOL/L (ref 8–16)
AST SERPL-CCNC: 18 UNIT/L (ref 11–45)
BASOPHILS # BLD AUTO: 0.07 K/UL
BASOPHILS NFR BLD AUTO: 1.1 %
BILIRUB SERPL-MCNC: 0.6 MG/DL (ref 0.1–1)
BUN SERPL-MCNC: 9 MG/DL (ref 6–20)
CALCIUM SERPL-MCNC: 9.1 MG/DL (ref 8.7–10.5)
CHLORIDE SERPL-SCNC: 101 MMOL/L (ref 95–110)
CO2 SERPL-SCNC: 23 MMOL/L (ref 23–29)
CREAT SERPL-MCNC: 0.7 MG/DL (ref 0.5–1.4)
ERYTHROCYTE [DISTWIDTH] IN BLOOD BY AUTOMATED COUNT: 16.6 % (ref 11.5–14.5)
FERRITIN SERPL-MCNC: 81 NG/ML (ref 20–300)
GFR SERPLBLD CREATININE-BSD FMLA CKD-EPI: >60 ML/MIN/1.73/M2
GLUCOSE SERPL-MCNC: 92 MG/DL (ref 70–110)
HCT VFR BLD AUTO: 37.8 % (ref 40–54)
HGB BLD-MCNC: 12.8 GM/DL (ref 14–18)
IMM GRANULOCYTES # BLD AUTO: 0.02 K/UL (ref 0–0.04)
IMM GRANULOCYTES NFR BLD AUTO: 0.3 % (ref 0–0.5)
IRON SATN MFR SERPL: 20 % (ref 20–50)
IRON SERPL-MCNC: 87 UG/DL (ref 45–160)
LDH SERPL-CCNC: 228 U/L (ref 110–260)
LYMPHOCYTES # BLD AUTO: 2.02 K/UL (ref 1–4.8)
MCH RBC QN AUTO: 33.3 PG (ref 27–31)
MCHC RBC AUTO-ENTMCNC: 33.9 G/DL (ref 32–36)
MCV RBC AUTO: 98 FL (ref 82–98)
NUCLEATED RBC (/100WBC) (OHS): 0 /100 WBC
PLATELET # BLD AUTO: 318 K/UL (ref 150–450)
PMV BLD AUTO: 10.7 FL (ref 9.2–12.9)
POTASSIUM SERPL-SCNC: 4.5 MMOL/L (ref 3.5–5.1)
PROT SERPL-MCNC: 8.4 GM/DL (ref 6–8.4)
RBC # BLD AUTO: 3.84 M/UL (ref 4.6–6.2)
RELATIVE EOSINOPHIL (OHS): 3.4 %
RELATIVE LYMPHOCYTE (OHS): 31.6 % (ref 18–48)
RELATIVE MONOCYTE (OHS): 11.4 % (ref 4–15)
RELATIVE NEUTROPHIL (OHS): 52.2 % (ref 38–73)
SODIUM SERPL-SCNC: 134 MMOL/L (ref 136–145)
TIBC SERPL-MCNC: 429 UG/DL (ref 250–450)
TRANSFERRIN SERPL-MCNC: 290 MG/DL (ref 200–375)
WBC # BLD AUTO: 6.4 K/UL (ref 3.9–12.7)

## 2025-06-03 PROCEDURE — 84075 ASSAY ALKALINE PHOSPHATASE: CPT

## 2025-06-03 PROCEDURE — 36415 COLL VENOUS BLD VENIPUNCTURE: CPT | Mod: PO

## 2025-06-03 PROCEDURE — 82728 ASSAY OF FERRITIN: CPT

## 2025-06-03 PROCEDURE — 84466 ASSAY OF TRANSFERRIN: CPT

## 2025-06-03 PROCEDURE — 85025 COMPLETE CBC W/AUTO DIFF WBC: CPT

## 2025-06-03 PROCEDURE — 83615 LACTATE (LD) (LDH) ENZYME: CPT

## 2025-06-04 ENCOUNTER — OFFICE VISIT (OUTPATIENT)
Dept: HEMATOLOGY/ONCOLOGY | Facility: CLINIC | Age: 60
End: 2025-06-04
Payer: COMMERCIAL

## 2025-06-04 ENCOUNTER — INFUSION (OUTPATIENT)
Dept: INFUSION THERAPY | Facility: HOSPITAL | Age: 60
End: 2025-06-04
Attending: INTERNAL MEDICINE
Payer: COMMERCIAL

## 2025-06-04 VITALS
SYSTOLIC BLOOD PRESSURE: 111 MMHG | HEART RATE: 85 BPM | HEIGHT: 73 IN | TEMPERATURE: 98 F | BODY MASS INDEX: 21.62 KG/M2 | RESPIRATION RATE: 18 BRPM | WEIGHT: 163.13 LBS | DIASTOLIC BLOOD PRESSURE: 69 MMHG | OXYGEN SATURATION: 96 %

## 2025-06-04 DIAGNOSIS — C15.9 ESOPHAGEAL CANCER, STAGE IV: Primary | Chronic | ICD-10-CM

## 2025-06-04 DIAGNOSIS — C78.6 SECONDARY ADENOCARCINOMA OF RETROPERITONEUM: ICD-10-CM

## 2025-06-04 DIAGNOSIS — C79.89 MALIGNANT NEOPLASM METASTATIC TO OTHER SITE: ICD-10-CM

## 2025-06-04 DIAGNOSIS — D50.0 IRON DEFICIENCY ANEMIA DUE TO CHRONIC BLOOD LOSS: Chronic | ICD-10-CM

## 2025-06-04 DIAGNOSIS — C78.7 SECONDARY LIVER CANCER: ICD-10-CM

## 2025-06-04 DIAGNOSIS — C15.9 ESOPHAGEAL CANCER, STAGE IV: Primary | ICD-10-CM

## 2025-06-04 PROCEDURE — 96413 CHEMO IV INFUSION 1 HR: CPT

## 2025-06-04 PROCEDURE — 25000003 PHARM REV CODE 250: Performed by: INTERNAL MEDICINE

## 2025-06-04 PROCEDURE — 63600175 PHARM REV CODE 636 W HCPCS: Performed by: INTERNAL MEDICINE

## 2025-06-04 PROCEDURE — 98006 SYNCH AUDIO-VIDEO EST MOD 30: CPT | Mod: 25,95,, | Performed by: INTERNAL MEDICINE

## 2025-06-04 PROCEDURE — 1159F MED LIST DOCD IN RCRD: CPT | Mod: CPTII,95,, | Performed by: INTERNAL MEDICINE

## 2025-06-04 PROCEDURE — 96365 THER/PROPH/DIAG IV INF INIT: CPT

## 2025-06-04 PROCEDURE — 1160F RVW MEDS BY RX/DR IN RCRD: CPT | Mod: CPTII,95,, | Performed by: INTERNAL MEDICINE

## 2025-06-04 RX ORDER — HEPARIN 100 UNIT/ML
500 SYRINGE INTRAVENOUS
Status: DISCONTINUED | OUTPATIENT
Start: 2025-06-04 | End: 2025-06-04 | Stop reason: HOSPADM

## 2025-06-04 RX ORDER — SODIUM CHLORIDE 0.9 % (FLUSH) 0.9 %
10 SYRINGE (ML) INJECTION
Status: DISCONTINUED | OUTPATIENT
Start: 2025-06-04 | End: 2025-06-04 | Stop reason: HOSPADM

## 2025-06-04 RX ADMIN — HEPARIN 500 UNITS: 100 SYRINGE at 12:06

## 2025-06-04 RX ADMIN — TRASTUZUMAB-ANNS 298 MG: 420 INJECTION, POWDER, LYOPHILIZED, FOR SOLUTION INTRAVENOUS at 12:06

## 2025-06-17 ENCOUNTER — LAB VISIT (OUTPATIENT)
Dept: LAB | Facility: HOSPITAL | Age: 60
End: 2025-06-17
Attending: INTERNAL MEDICINE
Payer: COMMERCIAL

## 2025-06-17 ENCOUNTER — RESULTS FOLLOW-UP (OUTPATIENT)
Dept: HEMATOLOGY/ONCOLOGY | Facility: CLINIC | Age: 60
End: 2025-06-17

## 2025-06-17 DIAGNOSIS — Z79.69 IMMUNODEFICIENCY DUE TO CHEMOTHERAPY: ICD-10-CM

## 2025-06-17 DIAGNOSIS — C79.89 MALIGNANT NEOPLASM METASTATIC TO OTHER SITE: ICD-10-CM

## 2025-06-17 DIAGNOSIS — D50.0 IRON DEFICIENCY ANEMIA DUE TO CHRONIC BLOOD LOSS: ICD-10-CM

## 2025-06-17 DIAGNOSIS — T45.1X5S PERIPHERAL NEUROPATHY DUE TO AND NOT CONCURRENT WITH CHEMOTHERAPY: ICD-10-CM

## 2025-06-17 DIAGNOSIS — C78.6 SECONDARY ADENOCARCINOMA OF RETROPERITONEUM: ICD-10-CM

## 2025-06-17 DIAGNOSIS — C78.7 SECONDARY LIVER CANCER: ICD-10-CM

## 2025-06-17 DIAGNOSIS — C15.9 ESOPHAGEAL CANCER, STAGE IV: ICD-10-CM

## 2025-06-17 DIAGNOSIS — T45.1X5A IMMUNODEFICIENCY DUE TO CHEMOTHERAPY: ICD-10-CM

## 2025-06-17 DIAGNOSIS — G62.0 PERIPHERAL NEUROPATHY DUE TO AND NOT CONCURRENT WITH CHEMOTHERAPY: ICD-10-CM

## 2025-06-17 DIAGNOSIS — D84.821 IMMUNODEFICIENCY DUE TO CHEMOTHERAPY: ICD-10-CM

## 2025-06-17 LAB
ABSOLUTE EOSINOPHIL (OHS): 0.17 K/UL
ABSOLUTE MONOCYTE (OHS): 0.63 K/UL (ref 0.3–1)
ABSOLUTE NEUTROPHIL COUNT (OHS): 3.36 K/UL (ref 1.8–7.7)
ALBUMIN SERPL BCP-MCNC: 3.5 G/DL (ref 3.5–5.2)
ALP SERPL-CCNC: 156 UNIT/L (ref 40–150)
ALT SERPL W/O P-5'-P-CCNC: 11 UNIT/L (ref 10–44)
ANION GAP (OHS): 10 MMOL/L (ref 8–16)
AST SERPL-CCNC: 16 UNIT/L (ref 11–45)
BASOPHILS # BLD AUTO: 0.07 K/UL
BASOPHILS NFR BLD AUTO: 1.1 %
BILIRUB SERPL-MCNC: 0.7 MG/DL (ref 0.1–1)
BUN SERPL-MCNC: 9 MG/DL (ref 6–20)
CALCIUM SERPL-MCNC: 9 MG/DL (ref 8.7–10.5)
CHLORIDE SERPL-SCNC: 103 MMOL/L (ref 95–110)
CO2 SERPL-SCNC: 23 MMOL/L (ref 23–29)
CREAT SERPL-MCNC: 0.7 MG/DL (ref 0.5–1.4)
ERYTHROCYTE [DISTWIDTH] IN BLOOD BY AUTOMATED COUNT: 14.8 % (ref 11.5–14.5)
FERRITIN SERPL-MCNC: 50 NG/ML (ref 20–300)
GFR SERPLBLD CREATININE-BSD FMLA CKD-EPI: >60 ML/MIN/1.73/M2
GLUCOSE SERPL-MCNC: 92 MG/DL (ref 70–110)
HCT VFR BLD AUTO: 39.2 % (ref 40–54)
HGB BLD-MCNC: 13.2 GM/DL (ref 14–18)
IMM GRANULOCYTES # BLD AUTO: 0.02 K/UL (ref 0–0.04)
IMM GRANULOCYTES NFR BLD AUTO: 0.3 % (ref 0–0.5)
IRON SATN MFR SERPL: 14 % (ref 20–50)
IRON SERPL-MCNC: 57 UG/DL (ref 45–160)
LDH SERPL-CCNC: 117 U/L (ref 110–260)
LYMPHOCYTES # BLD AUTO: 1.98 K/UL (ref 1–4.8)
MCH RBC QN AUTO: 33.6 PG (ref 27–31)
MCHC RBC AUTO-ENTMCNC: 33.7 G/DL (ref 32–36)
MCV RBC AUTO: 100 FL (ref 82–98)
NUCLEATED RBC (/100WBC) (OHS): 0 /100 WBC
PLATELET # BLD AUTO: 197 K/UL (ref 150–450)
PMV BLD AUTO: 11.1 FL (ref 9.2–12.9)
POTASSIUM SERPL-SCNC: 4.2 MMOL/L (ref 3.5–5.1)
PROT SERPL-MCNC: 7.8 GM/DL (ref 6–8.4)
RBC # BLD AUTO: 3.93 M/UL (ref 4.6–6.2)
RELATIVE EOSINOPHIL (OHS): 2.7 %
RELATIVE LYMPHOCYTE (OHS): 31.8 % (ref 18–48)
RELATIVE MONOCYTE (OHS): 10.1 % (ref 4–15)
RELATIVE NEUTROPHIL (OHS): 54 % (ref 38–73)
SODIUM SERPL-SCNC: 136 MMOL/L (ref 136–145)
TIBC SERPL-MCNC: 417 UG/DL (ref 250–450)
TRANSFERRIN SERPL-MCNC: 282 MG/DL (ref 200–375)
WBC # BLD AUTO: 6.23 K/UL (ref 3.9–12.7)

## 2025-06-17 PROCEDURE — 36415 COLL VENOUS BLD VENIPUNCTURE: CPT | Mod: PO

## 2025-06-17 PROCEDURE — 84466 ASSAY OF TRANSFERRIN: CPT

## 2025-06-17 PROCEDURE — 85025 COMPLETE CBC W/AUTO DIFF WBC: CPT

## 2025-06-17 PROCEDURE — 83615 LACTATE (LD) (LDH) ENZYME: CPT

## 2025-06-17 PROCEDURE — 82728 ASSAY OF FERRITIN: CPT

## 2025-06-17 PROCEDURE — 80053 COMPREHEN METABOLIC PANEL: CPT

## 2025-06-18 ENCOUNTER — CLINICAL SUPPORT (OUTPATIENT)
Dept: AUDIOLOGY | Facility: CLINIC | Age: 60
End: 2025-06-18
Payer: COMMERCIAL

## 2025-06-18 ENCOUNTER — INFUSION (OUTPATIENT)
Dept: INFUSION THERAPY | Facility: HOSPITAL | Age: 60
End: 2025-06-18
Attending: INTERNAL MEDICINE
Payer: COMMERCIAL

## 2025-06-18 ENCOUNTER — OFFICE VISIT (OUTPATIENT)
Dept: HEMATOLOGY/ONCOLOGY | Facility: CLINIC | Age: 60
End: 2025-06-18
Payer: COMMERCIAL

## 2025-06-18 VITALS
RESPIRATION RATE: 16 BRPM | WEIGHT: 164.44 LBS | BODY MASS INDEX: 21.79 KG/M2 | HEART RATE: 90 BPM | HEIGHT: 73 IN | SYSTOLIC BLOOD PRESSURE: 122 MMHG | TEMPERATURE: 98 F | OXYGEN SATURATION: 97 % | DIASTOLIC BLOOD PRESSURE: 65 MMHG

## 2025-06-18 DIAGNOSIS — Z79.69 IMMUNODEFICIENCY DUE TO CHEMOTHERAPY: Chronic | ICD-10-CM

## 2025-06-18 DIAGNOSIS — T45.1X5A IMMUNODEFICIENCY DUE TO CHEMOTHERAPY: Chronic | ICD-10-CM

## 2025-06-18 DIAGNOSIS — C15.9 ESOPHAGEAL CANCER, STAGE IV: Primary | Chronic | ICD-10-CM

## 2025-06-18 DIAGNOSIS — C79.89 MALIGNANT NEOPLASM METASTATIC TO OTHER SITE: ICD-10-CM

## 2025-06-18 DIAGNOSIS — H90.3 SENSORINEURAL HEARING LOSS OF BOTH EARS: Primary | ICD-10-CM

## 2025-06-18 DIAGNOSIS — C78.6 SECONDARY ADENOCARCINOMA OF RETROPERITONEUM: ICD-10-CM

## 2025-06-18 DIAGNOSIS — C78.7 SECONDARY LIVER CANCER: ICD-10-CM

## 2025-06-18 DIAGNOSIS — D50.0 IRON DEFICIENCY ANEMIA DUE TO CHRONIC BLOOD LOSS: Chronic | ICD-10-CM

## 2025-06-18 DIAGNOSIS — C15.9 ESOPHAGEAL CANCER, STAGE IV: Primary | ICD-10-CM

## 2025-06-18 DIAGNOSIS — D84.821 IMMUNODEFICIENCY DUE TO CHEMOTHERAPY: Chronic | ICD-10-CM

## 2025-06-18 DIAGNOSIS — D69.6 THROMBOCYTOPENIA: ICD-10-CM

## 2025-06-18 PROCEDURE — 1159F MED LIST DOCD IN RCRD: CPT | Mod: CPTII,95,, | Performed by: INTERNAL MEDICINE

## 2025-06-18 PROCEDURE — 96417 CHEMO IV INFUS EACH ADDL SEQ: CPT

## 2025-06-18 PROCEDURE — 1160F RVW MEDS BY RX/DR IN RCRD: CPT | Mod: CPTII,95,, | Performed by: INTERNAL MEDICINE

## 2025-06-18 PROCEDURE — 25000003 PHARM REV CODE 250: Performed by: INTERNAL MEDICINE

## 2025-06-18 PROCEDURE — 98006 SYNCH AUDIO-VIDEO EST MOD 30: CPT | Mod: 25,95,, | Performed by: INTERNAL MEDICINE

## 2025-06-18 PROCEDURE — 96413 CHEMO IV INFUSION 1 HR: CPT

## 2025-06-18 PROCEDURE — 63600175 PHARM REV CODE 636 W HCPCS: Performed by: INTERNAL MEDICINE

## 2025-06-18 PROCEDURE — A4216 STERILE WATER/SALINE, 10 ML: HCPCS | Performed by: INTERNAL MEDICINE

## 2025-06-18 RX ORDER — PROCHLORPERAZINE EDISYLATE 5 MG/ML
10 INJECTION INTRAMUSCULAR; INTRAVENOUS ONCE AS NEEDED
Status: DISCONTINUED | OUTPATIENT
Start: 2025-06-18 | End: 2025-06-18 | Stop reason: HOSPADM

## 2025-06-18 RX ORDER — DIPHENHYDRAMINE HYDROCHLORIDE 50 MG/ML
50 INJECTION, SOLUTION INTRAMUSCULAR; INTRAVENOUS ONCE AS NEEDED
Status: DISCONTINUED | OUTPATIENT
Start: 2025-06-18 | End: 2025-06-18 | Stop reason: HOSPADM

## 2025-06-18 RX ORDER — HEPARIN 100 UNIT/ML
500 SYRINGE INTRAVENOUS
Status: DISCONTINUED | OUTPATIENT
Start: 2025-06-18 | End: 2025-06-18 | Stop reason: HOSPADM

## 2025-06-18 RX ORDER — SODIUM CHLORIDE 0.9 % (FLUSH) 0.9 %
10 SYRINGE (ML) INJECTION
Status: DISCONTINUED | OUTPATIENT
Start: 2025-06-18 | End: 2025-06-18 | Stop reason: HOSPADM

## 2025-06-18 RX ORDER — EPINEPHRINE 0.3 MG/.3ML
0.3 INJECTION SUBCUTANEOUS ONCE AS NEEDED
Status: DISCONTINUED | OUTPATIENT
Start: 2025-06-18 | End: 2025-06-18 | Stop reason: HOSPADM

## 2025-06-18 RX ADMIN — HEPARIN 500 UNITS: 100 SYRINGE at 09:06

## 2025-06-18 RX ADMIN — TRASTUZUMAB-ANNS 420 MG: 420 INJECTION, POWDER, LYOPHILIZED, FOR SOLUTION INTRAVENOUS at 09:06

## 2025-06-18 RX ADMIN — Medication 10 ML: at 09:06

## 2025-06-18 RX ADMIN — SODIUM CHLORIDE 400 MG: 9 INJECTION, SOLUTION INTRAVENOUS at 08:06

## 2025-06-18 NOTE — PROGRESS NOTES
Rosalio Muñoz dropped off his aids on 06/18/2025 for hearing aid repair.    Patient and wife report that neither aid is charging well and both aids need cleaning.    Hearing aid assessment reveals that both aids and  are significantly impacted with debris, and  has significant debris.   Both aids were cleaned - removed lashell screen (impacted) and wax filter - Ran Redux: 1.0 uL - both aids are still very weak  Connected to Inspire but error received.   Right aid does not appear to be holding charge.   There is also a crack in the left shell  Cleaned  and contacts appear to be working.    Both aids are in need of  repair. Quoted repair/recase fee to patient and his wife by phone call. They want to proceed with repair. Due to patient has stage !V esophageal cancer, patient may  aids on return.  Then we will schedule audiogram/hearing aid appointment at next available. They agree with this plan. Repair balance due at pick-up.

## 2025-06-18 NOTE — DISCHARGE INSTRUCTIONS
.[facility]  [address] [address]  [address] [address]  [phone] phone     [phone] fax  Hours of Operation: Monday- Friday 8:00am- 5:00pm  After hours phone  [phone]  Hematology / Oncology Physicians on call    Dr. Salomon Carcamo        Nurse Practitioners:    Magdalena Wilson, DOMINICK Lima, DOMINICK Griffin, DOMINICK Infante, DOMINICK Bran, PA      Please don't hesitate to call if you have any concerns.

## 2025-06-18 NOTE — PROGRESS NOTES
Subjective:       Patient ID: Rosalio Muñoz is a 59 y.o. male.    Chief Complaint: Results, Esophageal Cancer, and Chemotherapy    HPI:  59-year-old male history of metastatic esophageal cancer with multiple relapses patient had previously been treated with FOLFOX in retreatment as well as Herceptin in Keytruda.  The patient unfortunately suffered significant hematological toxicity in decision has been made to move followed with immunotherapy portion of treatment only.  Seen in virtual visit ECOG status 1 with Keytruda and Herceptin q. 3 weeks    Past Medical History:   Diagnosis Date    Esophageal cancer, stage IV 10/10/2024    Hypertension     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 10/11/2024    Palliative care encounter 12/11/2024     Family History   Problem Relation Name Age of Onset    Hyperlipidemia Mother      Hypertension Father      Diabetes Father      Kidney disease Father      Heart disease Father      Breast cancer Maternal Grandmother      Prostate cancer Maternal Grandfather      Colon cancer Neg Hx       Social History[1]  Past Surgical History:   Procedure Laterality Date    COLONOSCOPY N/A 8/25/2023    Procedure: COLONOSCOPY;  Surgeon: Pebbles Spear MD;  Location: [facility];  Service: Endoscopy;  Laterality: N/A;    COLONOSCOPY N/A 10/2/2024    Procedure: COLONOSCOPY  Cardiac clearance received on 09/20/24 per Dr. Lockett, Cardiology.  Note in encounters.  LB;  Surgeon: Sully Farris MD;  Location: [facility];  Service: Endoscopy;  Laterality: N/A;    ESOPHAGOGASTRODUODENOSCOPY N/A 10/2/2024    Procedure: EGD (ESOPHAGOGASTRODUODENOSCOPY);  Surgeon: Sully Farris MD;  Location: [facility];  Service: Endoscopy;  Laterality: N/A;    ESOPHAGOGASTRODUODENOSCOPY N/A 12/31/2024    Procedure: EGD (ESOPHAGOGASTRODUODENOSCOPY);  Surgeon: Maki Sullivan MD;  Location: [facility];  Service: Gastroenterology;  Laterality: N/A;    INSERTION OF VENOUS ACCESS PORT Left 10/21/2024  "   Procedure: INSERTION, VENOUS ACCESS PORT;  Surgeon: Roseanna Rosas MD;  Location: [facility] OR;  Service: General;  Laterality: Left;    SURGICAL REMOVAL OF LESION OF FACE Right 12/1/2023    Procedure: EXCISION, LESION, FACE;  Surgeon: Jose Flaherty MD;  Location: [facility] OR;  Service: ENT;  Laterality: Right;  1. Excision facial subcutaneous mass right cheek 3 cm. 2. Intermediate layered closure 3.5 cm    WISDOM TOOTH EXTRACTION      XI ROBOTIC APPENDECTOMY N/A 7/31/2024    Procedure: XI ROBOTIC APPENDECTOMY;  Surgeon: Paulo Saavedra MD;  Location: [facility] OR;  Service: General;  Laterality: N/A;    XI ROBOTIC INSERTION, GASTROSTOMY TUBE N/A 10/21/2024    Procedure: XI ROBOTIC INSERTION, GASTROSTOMY TUBE;  Surgeon: Roseanna Rosas MD;  Location: [facility] OR;  Service: General;  Laterality: N/A;       Labs:  Lab Results   Component Value Date    WBC 6.23 06/17/2025    HGB 13.2 (L) 06/17/2025    HCT 39.2 (L) 06/17/2025     (H) 06/17/2025     06/17/2025     BMP  Lab Results   Component Value Date     06/17/2025    K 4.2 06/17/2025     06/17/2025    CO2 23 06/17/2025    BUN 9 06/17/2025    CREATININE 0.7 06/17/2025    CALCIUM 9.0 06/17/2025    ANIONGAP 10 06/17/2025    ESTGFRAFRICA >60.0 07/08/2022    EGFRNONAA >60.0 07/08/2022     Lab Results   Component Value Date    ALT 11 06/17/2025    AST 16 06/17/2025    ALKPHOS 156 (H) 06/17/2025    BILITOT 0.7 06/17/2025       Lab Results   Component Value Date    IRON 57 06/17/2025    TIBC 417 06/17/2025    FERRITIN 50.0 06/17/2025     Lab Results   Component Value Date    [id number] 445 10/19/2018     No results found for: "FOLATE"  Lab Results   Component Value Date    TSH 1.348 05/19/2025         Review of Systems   Constitutional:  Negative for activity change, appetite change, chills, diaphoresis, fatigue, fever and unexpected weight change.   HENT:  Negative for congestion, dental problem, drooling, ear discharge, ear pain, facial swelling, " hearing loss, mouth sores, nosebleeds, postnasal drip, rhinorrhea, sinus pressure, sneezing, sore throat, tinnitus, trouble swallowing and voice change.    Eyes:  Negative for photophobia, pain, discharge, redness, itching and visual disturbance.   Respiratory:  Negative for apnea, cough, choking, chest tightness, shortness of breath, wheezing and stridor.    Cardiovascular:  Negative for chest pain, palpitations and leg swelling.   Gastrointestinal:  Negative for abdominal distention, abdominal pain, anal bleeding, blood in stool, constipation, diarrhea, nausea, rectal pain and vomiting.   Endocrine: Negative for cold intolerance, heat intolerance, polydipsia, polyphagia and polyuria.   Genitourinary:  Negative for decreased urine volume, difficulty urinating, dysuria, enuresis, flank pain, frequency, genital sores, hematuria, penile discharge, penile pain, penile swelling, scrotal swelling, testicular pain and urgency.   Musculoskeletal:  Negative for arthralgias, back pain, gait problem, joint swelling, myalgias, neck pain and neck stiffness.   Skin:  Negative for color change, pallor, rash and wound.   Allergic/Immunologic: Negative for environmental allergies, food allergies and immunocompromised state.   Neurological:  Negative for dizziness, tremors, seizures, syncope, facial asymmetry, speech difficulty, weakness, light-headedness, numbness and headaches.   Hematological:  Negative for adenopathy. Does not bruise/bleed easily.   Psychiatric/Behavioral:  Negative for agitation, behavioral problems, confusion, decreased concentration, dysphoric mood, hallucinations, self-injury, sleep disturbance and suicidal ideas. The patient is not nervous/anxious and is not hyperactive.        Objective:      Physical Exam  Constitutional:       Appearance: Normal appearance. He is ill-appearing.   Neurological:      Mental Status: He is alert.             Assessment:      Metastatic esophageal carcinoma   Secondary  retroperitoneal esophageal cancer  Immuno deficiency chemotherapy  Med Onc Chart Routing      Follow up with physician . Return to clinic in three-week can be seen by APAP CBC CMP serum iron TIBC ferritin and TSH in myself in 6weeks   Follow up with JORDANA    Infusion scheduling note    Injection scheduling note Keytruda and Herceptin q. 3 weeks   Labs    Imaging    Pharmacy appointment    Other referrals                 Plan:     The patient location is:  Home  The chief complaint leading to consultation is:  Esophageal cancer    Visit type: audiovisual    Face to Face time with patient: 30 minutes of total time spent on the encounter, which includes face to face time and non-face to face time preparing to see the patient (eg, review of tests), Obtaining and/or reviewing separately obtained history, Documenting clinical information in the electronic or other health record, Independently interpreting results (not separately reported) and communicating results to the patient/family/caregiver, or Care coordination (not separately reported).         Each patient to whom he or she provides medical services by telemedicine is:  (1) informed of the relationship between the physician and patient and the respective role of any other health care provider with respect to management of the patient; and (2) notified that he or she may decline to receive medical services by telemedicine and may withdraw from such care at any time.    Notes:    Reviewed information with patient.  At this time patient has recurrent esophageal cancer significant hematological toxicity after multiple lines of therapy will hold chemotherapy portion continue with maintenance Herceptin in Keytruda.  Orders written reviewed can be seen by APAP next cycle and myself in 6 weeks      Hudson Latif Jr, MD FACP         [1]   Social History  Socioeconomic History    Marital status:     Number of children: 2   Occupational History    Occupation: Industrial     Tobacco Use    Smoking status: Every Day     Current packs/day: 0.00     Types: Cigars, Cigarettes     Last attempt to quit: 10/4/2022     Years since quittin.7     Passive exposure: Never    Smokeless tobacco: Never    Tobacco comments:     Cigar every afternoon   Substance and Sexual Activity    Alcohol use: Yes     Alcohol/week: 4.0 - 6.0 standard drinks of alcohol     Types: 4 - 6 Cans of beer per week     Comment: occ    Drug use: Yes     Types: Marijuana     Comment: medical    Sexual activity: Yes     Partners: Female     Social Drivers of Health     Financial Resource Strain: Low Risk  (2025)    Overall Financial Resource Strain (CARDIA)     Difficulty of Paying Living Expenses: Not hard at all   Food Insecurity: No Food Insecurity (2025)    Hunger Vital Sign     Worried About Running Out of Food in the Last Year: Never true     Ran Out of Food in the Last Year: Never true   Transportation Needs: No Transportation Needs (2025)    PRAPARE - Transportation     Lack of Transportation (Medical): No     Lack of Transportation (Non-Medical): No   Physical Activity: Unknown (2025)    Exercise Vital Sign     Days of Exercise per Week: Patient declined     Minutes of Exercise per Session: 20 min   Stress: No Stress Concern Present (2025)    [employer] [place] [employer] - Occupational Stress Questionnaire     Feeling of Stress : Not at all   Housing Stability: Low Risk  (2025)    Housing Stability Vital Sign     Unable to Pay for Housing in the Last Year: No     Number of Times Moved in the Last Year: 0     Homeless in the Last Year: No

## 2025-06-20 ENCOUNTER — TELEPHONE (OUTPATIENT)
Dept: HEMATOLOGY/ONCOLOGY | Facility: CLINIC | Age: 60
End: 2025-06-20
Payer: COMMERCIAL

## 2025-06-20 NOTE — TELEPHONE ENCOUNTER
"Call placed to check in on pt post infusion. Pt reports he is doing well with Immunotherapy tx alone and denies any current complaints. Pt and wife question removal of pt's PEG tube as he does not use it; I encouraged pt/wife to discuss with Dr. Latif and Dr. Rosas , both agree to do so. Pt denies any further needs/concerns at this time though I encouraged them to reach out with any needs/concerns.     Oncology Navigation   Intake  Cancer Type: Head and Neck  Type of Referral: Internal  Date of Referral: 10/08/24  Initial Nurse Navigator Contact: 10/08/24  Referral to Initial Contact Timeline (days): 0  First Appointment Available: 10/17/24  Appointment Date: 10/17/24  First Available Date vs. Scheduled Date (days): 0  Multiple appointments: No  Reason if booked > 7 days after scheduling: Specific provider / access     Treatment  Current Status: Active  Date Presented to Tumor Board: 10/11/24    Surgery: Completed  Surgical Oncologist: Ralph  Type of Surgery: feeding tube and PORT placement  Surgery Schedule Date: 10/21/24    Medical Oncologist: Salomon  Consult Date: 10/11/24  Chemotherapy: Planned  Chemotherapy Regimen: [id number]  Immunotherapy: Planned  Immunotherapy Name: trastuzumab + pembro  Start Date: 24       Procedures: PET scan  EUS / EGD: EGD  PET Scan Schedule Date: 10/10/24    General Referrals: Chemo Education; Dietitian; Palliative Care  Dietitian Name: Agnes Wong Referral Date: 10/16/24  Palliative Care Referral Date: 10/28/24          Support Systems: Spouse/significant other; Family members  Barriers of Care: Barriers to Care "Assessment completed-no barriers noted"     Acuity  Stage: 2  Systemic Treatment - predicted or initiated: Immunotherapy (+2)  Treatment Tolerability: Minimal symptoms  ECO  Comorbidities in Medical History: 0  Hospitalization Within the Past Month: 0  Other Medical Factors (+2 each): Home medical equipment required (feeding tube)   Needed: " 0  Support: 0  Verbalizes Financial Concerns: 0  Transportation: 0  History of noncompliance/frequent no shows and cancellations: 0  Verbalizes the need for more education: 0  Navigation Acuity: 2     Follow Up  No follow-ups on file.

## 2025-06-25 ENCOUNTER — CLINICAL SUPPORT (OUTPATIENT)
Dept: AUDIOLOGY | Facility: CLINIC | Age: 60
End: 2025-06-25
Payer: COMMERCIAL

## 2025-06-25 DIAGNOSIS — H90.3 SENSORINEURAL HEARING LOSS, BILATERAL: Primary | ICD-10-CM

## 2025-06-25 DIAGNOSIS — H90.A22 SENSORINEURAL HEARING LOSS (SNHL) OF LEFT EAR WITH RESTRICTED HEARING OF RIGHT EAR: Primary | ICD-10-CM

## 2025-06-25 PROCEDURE — 99999 PR PBB SHADOW E&M-EST. PATIENT-LVL I: CPT | Mod: PBBFAC,,, | Performed by: AUDIOLOGIST-HEARING AID FITTER

## 2025-06-25 NOTE — PROGRESS NOTES
Rosalio Sebastian Muñoz was seen 06/25/2025 for hearing aid repair delivery. Both aids are back from repair. Replaced battery and circuit R/L; and recased cracked left shell. Audiogram was completed today. Both aids were reprogrammed to today's audiogram - no significant change in hearing. Set experienced level and re-ran feedback. Patient is pleased. Reviewed use/care and repair warranty. PIF today.       Make: Nina   Model: Musa Fagan AI 2400 HS-R  Right SN:   Left SN: [id number]  Right SN: [id number]  Repair warranty expires: 06/27/2026

## 2025-06-25 NOTE — PROGRESS NOTES
Rosalio Muñoz was seen 06/25/2025 for an audiological evaluation.  Patient has history of bilateral sensorineural hearing loss. His last audiogram was in 2022. He suspects further decline in hearing since that time. He wears hearing aids. He has history of tinnitus in the past. Patient had a negative MRI IAC/Temporal bone 06/25/2021. He has undergone chemotherapy for esophageal cancer. He does not appreciate any changes in hearing following chemotherapy.     Results reveal a moderate sensorineural hearing loss 250-8000 Hz for the right ear, and a moderate-to-severe sensorineural hearing loss 250-8000 Hz for the left ear.   Speech Reception Thresholds were 55 dBHL for the right ear and 55 dBHL for the left ear.   Word recognition scores were good for the right ear and fair for the left ear.   Tympanograms were Type A for the right ear and Type A for the left ear.    No significant change in hearing since his previous audiogram from 08/18/2022.   Patient was counseled on the above findings.    Recommendations:  Audiogram every two years to monitor hearing loss, or sooner if any perceived changes in hearing.  Continue with hearing aids.

## 2025-07-07 NOTE — PROGRESS NOTES
Subjective:       Patient ID: Rosalio Muñoz is a 59 y.o. male.    Chief Complaint: Cancer and Chemotherapy    Primary Oncologist/Hematologist: Dr. Latif    HPI: Mr. Muñoz is a 59 year old male who is following up for his stage IV metastatic esophageal carcinoma. He is here for cycle 6 day 1 of trastuzumab (kanjinti) on days 1 and 21  + pembrolizumab (keytruda) q 6 weeks on day 21.   Cancer Hx: had an EGD which did show a fungating mass at his GE junction, biopsies confirmed the presence of adenocarcinoma. Subsequent PET-CT scan showed a 2.2 cm hypermetabolic right posterior hepatic lesion concerning for metastatic disease, as well as hypermetabolic gastrohepatic ligament lymph node.   In 2024, he was admitted to the hospital for bleeding gastric ulcer   Previously treated with  FOLFOX + trastuzumab q 2 weeks + pembrolizumab (keytruda) q 6 weeks but d/c due to hematological toxicity     Today:He states he has been feeling well. He denies any fevers, illnesses, bleeding, n/v/d/c. He states his appetite is good and he has been staying hydrated. He denies any pain. He continues to take iron every 3rd day ro so, due to Gi upset sometimes.     Social History     Socioeconomic History    Marital status:     Number of children: 2   Occupational History    Occupation:    Tobacco Use    Smoking status: Every Day     Current packs/day: 0.00     Types: Cigars, Cigarettes     Last attempt to quit: 10/4/2022     Years since quittin.7     Passive exposure: Never    Smokeless tobacco: Never    Tobacco comments:     Cigar every afternoon   Substance and Sexual Activity    Alcohol use: Yes     Alcohol/week: 4.0 - 6.0 standard drinks of alcohol     Types: 4 - 6 Cans of beer per week     Comment: occ    Drug use: Yes     Types: Marijuana     Comment: medical    Sexual activity: Yes     Partners: Female     Social Drivers of Health     Financial Resource Strain: Low Risk  (2025)     Overall Financial Resource Strain (CARDIA)     Difficulty of Paying Living Expenses: Not hard at all   Food Insecurity: No Food Insecurity (5/7/2025)    Hunger Vital Sign     Worried About Running Out of Food in the Last Year: Never true     Ran Out of Food in the Last Year: Never true   Transportation Needs: No Transportation Needs (5/7/2025)    PRAPARE - Transportation     Lack of Transportation (Medical): No     Lack of Transportation (Non-Medical): No   Physical Activity: Unknown (2/12/2025)    Exercise Vital Sign     Days of Exercise per Week: Patient declined     Minutes of Exercise per Session: 20 min   Stress: No Stress Concern Present (5/7/2025)    [employer] [place] [employer] - Occupational Stress Questionnaire     Feeling of Stress : Not at all   Housing Stability: Low Risk  (5/7/2025)    Housing Stability Vital Sign     Unable to Pay for Housing in the Last Year: No     Number of Times Moved in the Last Year: 0     Homeless in the Last Year: No       Past Medical History:   Diagnosis Date    Esophageal cancer, stage IV 10/10/2024    Hypertension     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 10/11/2024    Palliative care encounter 12/11/2024       Family History   Problem Relation Name Age of Onset    Hyperlipidemia Mother      Hypertension Father      Diabetes Father      Kidney disease Father      Heart disease Father      Breast cancer Maternal Grandmother      Prostate cancer Maternal Grandfather      Colon cancer Neg Hx         Past Surgical History:   Procedure Laterality Date    COLONOSCOPY N/A 8/25/2023    Procedure: COLONOSCOPY;  Surgeon: Pebbles Spear MD;  Location: [facility];  Service: Endoscopy;  Laterality: N/A;    COLONOSCOPY N/A 10/2/2024    Procedure: COLONOSCOPY  Cardiac clearance received on 09/20/24 per Dr. Lockett, Cardiology.  Note in encounters.  LB;  Surgeon: Sully Farris MD;  Location: [facility];  Service: Endoscopy;  Laterality: N/A;     ESOPHAGOGASTRODUODENOSCOPY N/A 10/2/2024    Procedure: EGD (ESOPHAGOGASTRODUODENOSCOPY);  Surgeon: Sully Farris MD;  Location: [facility];  Service: Endoscopy;  Laterality: N/A;    ESOPHAGOGASTRODUODENOSCOPY N/A 12/31/2024    Procedure: EGD (ESOPHAGOGASTRODUODENOSCOPY);  Surgeon: Maki Sullivan MD;  Location: [facility];  Service: Gastroenterology;  Laterality: N/A;    INSERTION OF VENOUS ACCESS PORT Left 10/21/2024    Procedure: INSERTION, VENOUS ACCESS PORT;  Surgeon: Roseanna Rosas MD;  Location: [facility];  Service: General;  Laterality: Left;    SURGICAL REMOVAL OF LESION OF FACE Right 12/1/2023    Procedure: EXCISION, LESION, FACE;  Surgeon: Jose Flaherty MD;  Location: [facility] OR;  Service: ENT;  Laterality: Right;  1. Excision facial subcutaneous mass right cheek 3 cm. 2. Intermediate layered closure 3.5 cm    WISDOM TOOTH EXTRACTION      XI ROBOTIC APPENDECTOMY N/A 7/31/2024    Procedure: XI ROBOTIC APPENDECTOMY;  Surgeon: Paulo Saavedra MD;  Location: [facility] OR;  Service: General;  Laterality: N/A;    XI ROBOTIC INSERTION, GASTROSTOMY TUBE N/A 10/21/2024    Procedure: XI ROBOTIC INSERTION, GASTROSTOMY TUBE;  Surgeon: Roseanna Rosas MD;  Location: [facility];  Service: General;  Laterality: N/A;       Review of Systems   Constitutional:  Negative for activity change, appetite change, chills, diaphoresis, fatigue, fever and unexpected weight change.   HENT:  Negative for congestion, nosebleeds and rhinorrhea.    Respiratory:  Negative for cough and shortness of breath.    Cardiovascular:  Negative for chest pain and leg swelling.   Gastrointestinal:  Negative for abdominal pain, anal bleeding, blood in stool, constipation, diarrhea, nausea and vomiting.   Genitourinary:  Negative for hematuria.   Musculoskeletal:  Negative for arthralgias and myalgias.   Skin:  Negative for color change and pallor.   Allergic/Immunologic: Positive for immunocompromised state.   Neurological:  Negative for  dizziness, weakness, light-headedness, numbness and headaches.         Medication List with Changes/Refills   Current Medications    CHOLECALCIFEROL, VITAMIN D3, 125 MCG (5,000 UNIT) TAB    Take 1 tablet (5,000 Units total) by mouth once daily.    FERROUS SULFATE (FEOSOL) 325 MG (65 MG IRON) TAB TABLET    Take 1 tablet (325 mg total) by mouth every other day.    GABAPENTIN (NEURONTIN) 100 MG CAPSULE    Take 1 capsule (100 mg total) by mouth 3 (three) times daily.    LIDOCAINE-PRILOCAINE (EMLA) CREAM    Apply topically as needed.    METOCLOPRAMIDE HCL (REGLAN) 10 MG TABLET    Take 1 tablet (10 mg total) by mouth every 6 (six) hours as needed (nausea, vomiting).    MULTIVITAMIN TAB    Take 1 tablet by mouth once daily.    OLANZAPINE (ZYPREXA) 5 MG TABLET    Take 1 tablet (5 mg total) by mouth every evening. Take nightly on days 1-3 of each chemotherapy cycle.    ONDANSETRON (ZOFRAN-ODT) 8 MG TBDL    Take 1 tablet (8 mg total) by mouth 2 (two) times daily.    PANTOPRAZOLE (PROTONIX) 40 MG TABLET    Take 1 tablet (40 mg total) by mouth once daily.     Objective:     There were no vitals filed for this visit.      Physical Exam  Constitutional:       General: He is not in acute distress.     Appearance: He is not ill-appearing, toxic-appearing or diaphoretic.   Neurological:      Mental Status: He is alert.   Psychiatric:         Mood and Affect: Mood normal.          Physical exam limited due to video visit    Labs/Results:  Lab Results   Component Value Date    WBC 6.17 07/08/2025    RBC 4.27 (L) 07/08/2025    HGB 14.1 07/08/2025    HCT 42.5 07/08/2025     (H) 07/08/2025    MCH 33.0 (H) 07/08/2025    MCHC 33.2 07/08/2025    RDW 14.2 07/08/2025     07/08/2025    MPV 10.9 07/08/2025    GRAN 5.9 03/13/2025    GRAN 84.2 (H) 03/13/2025    LYMPH 24.5 07/08/2025    LYMPH 1.51 07/08/2025    MONO 11.8 07/08/2025    MONO 0.73 07/08/2025    EOS 2.6 07/08/2025    EOS 0.16 07/08/2025    BASO 0.06 03/13/2025     EOSINOPHIL 4.1 03/13/2025    BASOPHIL 1.0 07/08/2025    BASOPHIL 0.06 07/08/2025     CMP  Sodium   Date Value Ref Range Status   07/08/2025 134 (L) 136 - 145 mmol/L Final   03/13/2025 136 136 - 145 mmol/L Final     Potassium   Date Value Ref Range Status   07/08/2025 4.6 3.5 - 5.1 mmol/L Final   03/13/2025 4.0 3.5 - 5.1 mmol/L Final     Chloride   Date Value Ref Range Status   07/08/2025 102 95 - 110 mmol/L Final   03/13/2025 102 95 - 110 mmol/L Final     CO2   Date Value Ref Range Status   07/08/2025 24 23 - 29 mmol/L Final   03/13/2025 24 23 - 29 mmol/L Final     Glucose   Date Value Ref Range Status   07/08/2025 98 70 - 110 mg/dL Final   03/13/2025 84 70 - 110 mg/dL Final     BUN   Date Value Ref Range Status   07/08/2025 8 6 - 20 mg/dL Final     Creatinine   Date Value Ref Range Status   07/08/2025 0.8 0.5 - 1.4 mg/dL Final     Calcium   Date Value Ref Range Status   07/08/2025 9.2 8.7 - 10.5 mg/dL Final   03/13/2025 10.0 8.7 - 10.5 mg/dL Final     Protein Total   Date Value Ref Range Status   07/08/2025 7.8 6.0 - 8.4 gm/dL Final     Total Protein   Date Value Ref Range Status   03/13/2025 8.2 6.0 - 8.4 g/dL Final     Albumin   Date Value Ref Range Status   07/08/2025 3.4 (L) 3.5 - 5.2 g/dL Final   03/13/2025 3.8 3.5 - 5.2 g/dL Final     Total Bilirubin   Date Value Ref Range Status   03/13/2025 0.7 0.1 - 1.0 mg/dL Final     Comment:     For infants and newborns, interpretation of results should be based  on gestational age, weight and in agreement with clinical  observations.    Premature Infant recommended reference ranges:  Up to 24 hours.............<8.0 mg/dL  Up to 48 hours............<12.0 mg/dL  3-5 days..................<15.0 mg/dL  6-29 days.................<15.0 mg/dL       Bilirubin Total   Date Value Ref Range Status   07/08/2025 0.6 0.1 - 1.0 mg/dL Final     Comment:     For infants and newborns, interpretation of results should be based   on gestational age, weight and in agreement with clinical    observations.    Premature Infant recommended reference ranges:   0-24 hours:  <8.0 mg/dL   24-48 hours: <12.0 mg/dL   3-5 days:    <15.0 mg/dL   6-29 days:   <15.0 mg/dL     Alkaline Phosphatase   Date Value Ref Range Status   03/13/2025 97 40 - 150 U/L Final     ALP   Date Value Ref Range Status   07/08/2025 157 (H) 40 - 150 unit/L Final     AST   Date Value Ref Range Status   07/08/2025 19 11 - 45 unit/L Final   03/13/2025 21 10 - 40 U/L Final     ALT   Date Value Ref Range Status   07/08/2025 9 (L) 10 - 44 unit/L Final   03/13/2025 21 10 - 44 U/L Final     Anion Gap   Date Value Ref Range Status   07/08/2025 8 8 - 16 mmol/L Final     eGFR   Date Value Ref Range Status   07/08/2025 >60 >60 mL/min/1.73/m2 Final     Comment:     Estimated GFR calculated using the CKD-EPI creatinine (2021) equation.   03/13/2025 >60 >60 mL/min/1.73 m^2 Final      Latest Reference Range & Units 07/08/25 09:41   TSH 0.400 - 4.000 uIU/mL 1.433      Latest Reference Range & Units 07/08/25 09:41   Iron 45 - 160 ug/dL 58   TIBC 250 - 450 ug/dL 403   Transferrin 200 - 375 mg/dL 272   Ferritin 20.0 - 300.0 ng/mL 34.0   Iron Saturation 20 - 50 % 14 (L)     Ct c/a/p 5/26/25  Impression:  1.  Multiple small noncalcified nodules in the right lung are stable in comparison to 04/07/2025.  2.  No acute pulmonary disease, pleural effusion or adenopathy is visible in the chest.  3.  Concentric wall thickening of the distal segment of the esophagus consistent with known malignancy.  There is a stable appearance of locally prominent lymph nodes suspicious for metastatic adenopathy in the left para-esophageal space and gastrohepatic ligament compared to 04/07/2025.    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)     Plan:     Esophageal cancer, stage IV, Secondary adenocarcinoma of retroperitoneum, Secondary liver cancer, 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, Immunodeficiency due to chemotherapy  --metastatic esophageal adenocarcinoma   --PDL1 + and HER2 +  --s/p treated with  FOLFOX + trastuzumab q 2 weeks + pembrolizumab (keytruda) q 6 weeks but d/c due to hematological toxicity   --continue with cycle 6 day 1 of trastuzumab (kanjinti) on days 1 and 21  + pembrolizumab (keytruda) q 6 weeks on day 21  --ANC:3.69 plts:241 crcl:106.2 ml/min, cret:0.8 tbili:0.6 Ast:19 ALT:9 glucose:98   --echo q 3 months. 4/10/25 EF:55-60%  --continue to follow with nutrition  --iron: 58, sat: 14%, ferritin: 34-stable     Follow-Up: 3 weeks with cbc cmp tsh and echo prior for cycle 6 day 21-standing orders in    Shahrzad Bran PA-C  Hematology Oncology    Route Chart for Scheduling    Med Onc Chart Routing      Follow up with physician . 3 weeks with cbc cmp tsh and echo prior for next cycle   Follow up with JORDANA    Infusion scheduling note   3 weeks for next cycle   Injection scheduling note    Labs CBC, CMP and TSH   Scheduling:  Preferred lab:  Lab interval:  standing orders in   Imaging ECHO   prior to next cycle   Pharmacy appointment    Other referrals                Treatment Plan Information   OP GASTROESOPHAGEAL pembrolizumab Q6W trastuzumab + MFOLFOX6 (oxaliplatin leucovorin fluorouracil) Q2W --> Q6W pembro + trastuzumab Q3W Hudson Latif MD   Associated diagnosis: Esophageal cancer, stage IV Stage IVB cTX, cN0, cM1, G2 noted on 10/10/2024  Associated diagnosis: 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   noted on 10/11/2024  Associated diagnosis: Secondary adenocarcinoma of retroperitoneum   noted on 10/11/2024   Line of treatment: First Line  Treatment Goal: Control     Upcoming Treatment Dates - OP GASTROESOPHAGEAL pembrolizumab Q6W trastuzumab + MFOLFOX6 (oxaliplatin  leucovorin fluorouracil) Q2W --> Q6W pembro + trastuzumab Q3W    7/9/2025       Chemotherapy       trastuzumab-anns (KANJINTI) 448 mg in 0.9% NaCl 250 mL chemo infusion  7/30/2025       Chemotherapy       trastuzumab-anns (KANJINTI) 444 mg in 0.9% NaCl 250 mL chemo infusion       Immunotherapy       pembrolizumab (KEYTRUDA) 400 mg in 0.9% NaCl SolP 116 mL infusion  8/20/2025       Chemotherapy       trastuzumab-anns (KANJINTI) in 0.9% NaCl chemo infusion  9/9/2025       Chemotherapy       trastuzumab-anns (KANJINTI) in 0.9% NaCl chemo infusion       Immunotherapy       pembrolizumab (KEYTRUDA) 400 mg in 0.9% NaCl SolP 116 mL infusion    Supportive Plan Information  IV FLUIDS AND ELECTROLYTES Abi Lima NP   Associated Diagnosis: Esophageal cancer, stage IV Stage IVB cTX, cN0, cM1, G2 noted on 10/10/2024   Line of treatment: Supportive Care   Treatment goal: Supportive     Upcoming Treatment Dates - IV FLUIDS AND ELECTROLYTES    No upcoming days in selected categories.    Therapy Plan Information  OP IV IRON THERAPY for Iron deficiency anemia due to chronic blood loss, noted on 10/12/2024  Medications  iron sucrose injection 200 mg  200 mg, Intravenous, Every 2 weeks  Anaphylaxis/Hypersensitivity  EPINEPHrine (EPIPEN) 0.3 mg/0.3 mL pen injection 0.3 mg  0.3 mg, Intramuscular, PRN  hydrocortisone sodium succinate injection 100 mg  100 mg, Intravenous, PRN  Flushes  0.9% NaCl 250 mL flush bag  Intravenous, Every visit  sodium chloride 0.9% flush 10 mL  10 mL, Intravenous, Every visit  heparin, porcine (PF) 100 unit/mL injection flush 500 Units  500 Units, Intravenous, Every visit  alteplase injection 2 mg  2 mg, Intra-Catheter, Every visit      No therapy plan of the specified type found.    No therapy plan of the specified type found.    The patient location is: home  The chief complaint leading to consultation is: cx and chemo  Visit type:  Synchronous audio video   Face to Face time with patient: 15 minutes  of total time spent on the encounter, which includes face to face time and non-face to face time preparing to see the patient (eg, review of tests), Obtaining and/or reviewing separately obtained history, Documenting clinical information in the electronic or other health record, Independently interpreting results (not separately reported) and communicating results to the patient/family/caregiver, or Care coordination (not separately reported).   Each patient to whom he or she provides medical services by telemedicine is:  (1) informed of the relationship between the provider and patient and the respective role of any other health care provider with respect to management of the patient; and (2) notified that he or she may decline to receive medical services

## 2025-07-08 ENCOUNTER — LAB VISIT (OUTPATIENT)
Dept: LAB | Facility: HOSPITAL | Age: 60
End: 2025-07-08
Attending: INTERNAL MEDICINE
Payer: COMMERCIAL

## 2025-07-08 DIAGNOSIS — D50.0 IRON DEFICIENCY ANEMIA DUE TO CHRONIC BLOOD LOSS: ICD-10-CM

## 2025-07-08 DIAGNOSIS — Z79.69 IMMUNODEFICIENCY DUE TO CHEMOTHERAPY: ICD-10-CM

## 2025-07-08 DIAGNOSIS — T45.1X5S PERIPHERAL NEUROPATHY DUE TO AND NOT CONCURRENT WITH CHEMOTHERAPY: ICD-10-CM

## 2025-07-08 DIAGNOSIS — T45.1X5A IMMUNODEFICIENCY DUE TO CHEMOTHERAPY: ICD-10-CM

## 2025-07-08 DIAGNOSIS — C15.9 ESOPHAGEAL CANCER, STAGE IV: ICD-10-CM

## 2025-07-08 DIAGNOSIS — D84.821 IMMUNODEFICIENCY DUE TO CHEMOTHERAPY: ICD-10-CM

## 2025-07-08 DIAGNOSIS — C78.6 SECONDARY ADENOCARCINOMA OF RETROPERITONEUM: ICD-10-CM

## 2025-07-08 DIAGNOSIS — C78.7 SECONDARY LIVER CANCER: ICD-10-CM

## 2025-07-08 DIAGNOSIS — C79.89 MALIGNANT NEOPLASM METASTATIC TO OTHER SITE: ICD-10-CM

## 2025-07-08 DIAGNOSIS — G62.0 PERIPHERAL NEUROPATHY DUE TO AND NOT CONCURRENT WITH CHEMOTHERAPY: ICD-10-CM

## 2025-07-08 LAB
ABSOLUTE EOSINOPHIL (OHS): 0.16 K/UL
ABSOLUTE MONOCYTE (OHS): 0.73 K/UL (ref 0.3–1)
ABSOLUTE NEUTROPHIL COUNT (OHS): 3.69 K/UL (ref 1.8–7.7)
ALBUMIN SERPL BCP-MCNC: 3.4 G/DL (ref 3.5–5.2)
ALP SERPL-CCNC: 157 UNIT/L (ref 40–150)
ALT SERPL W/O P-5'-P-CCNC: 9 UNIT/L (ref 10–44)
ANION GAP (OHS): 8 MMOL/L (ref 8–16)
AST SERPL-CCNC: 19 UNIT/L (ref 11–45)
BASOPHILS # BLD AUTO: 0.06 K/UL
BASOPHILS NFR BLD AUTO: 1 %
BILIRUB SERPL-MCNC: 0.6 MG/DL (ref 0.1–1)
BUN SERPL-MCNC: 8 MG/DL (ref 6–20)
CALCIUM SERPL-MCNC: 9.2 MG/DL (ref 8.7–10.5)
CHLORIDE SERPL-SCNC: 102 MMOL/L (ref 95–110)
CO2 SERPL-SCNC: 24 MMOL/L (ref 23–29)
CREAT SERPL-MCNC: 0.8 MG/DL (ref 0.5–1.4)
ERYTHROCYTE [DISTWIDTH] IN BLOOD BY AUTOMATED COUNT: 14.2 % (ref 11.5–14.5)
FERRITIN SERPL-MCNC: 34 NG/ML (ref 20–300)
GFR SERPLBLD CREATININE-BSD FMLA CKD-EPI: >60 ML/MIN/1.73/M2
GLUCOSE SERPL-MCNC: 98 MG/DL (ref 70–110)
HCT VFR BLD AUTO: 42.5 % (ref 40–54)
HGB BLD-MCNC: 14.1 GM/DL (ref 14–18)
IMM GRANULOCYTES # BLD AUTO: 0.02 K/UL (ref 0–0.04)
IMM GRANULOCYTES NFR BLD AUTO: 0.3 % (ref 0–0.5)
IRON SATN MFR SERPL: 14 % (ref 20–50)
IRON SERPL-MCNC: 58 UG/DL (ref 45–160)
LYMPHOCYTES # BLD AUTO: 1.51 K/UL (ref 1–4.8)
MCH RBC QN AUTO: 33 PG (ref 27–31)
MCHC RBC AUTO-ENTMCNC: 33.2 G/DL (ref 32–36)
MCV RBC AUTO: 100 FL (ref 82–98)
NUCLEATED RBC (/100WBC) (OHS): 0 /100 WBC
PLATELET # BLD AUTO: 241 K/UL (ref 150–450)
PMV BLD AUTO: 10.9 FL (ref 9.2–12.9)
POTASSIUM SERPL-SCNC: 4.6 MMOL/L (ref 3.5–5.1)
PROT SERPL-MCNC: 7.8 GM/DL (ref 6–8.4)
RBC # BLD AUTO: 4.27 M/UL (ref 4.6–6.2)
RELATIVE EOSINOPHIL (OHS): 2.6 %
RELATIVE LYMPHOCYTE (OHS): 24.5 % (ref 18–48)
RELATIVE MONOCYTE (OHS): 11.8 % (ref 4–15)
RELATIVE NEUTROPHIL (OHS): 59.8 % (ref 38–73)
SODIUM SERPL-SCNC: 134 MMOL/L (ref 136–145)
TIBC SERPL-MCNC: 403 UG/DL (ref 250–450)
TRANSFERRIN SERPL-MCNC: 272 MG/DL (ref 200–375)
TSH SERPL-ACNC: 1.43 UIU/ML (ref 0.4–4)
WBC # BLD AUTO: 6.17 K/UL (ref 3.9–12.7)

## 2025-07-08 PROCEDURE — 82728 ASSAY OF FERRITIN: CPT

## 2025-07-08 PROCEDURE — 36415 COLL VENOUS BLD VENIPUNCTURE: CPT | Mod: PO

## 2025-07-08 PROCEDURE — 85025 COMPLETE CBC W/AUTO DIFF WBC: CPT

## 2025-07-08 PROCEDURE — 83540 ASSAY OF IRON: CPT

## 2025-07-08 PROCEDURE — 84443 ASSAY THYROID STIM HORMONE: CPT

## 2025-07-08 PROCEDURE — 80053 COMPREHEN METABOLIC PANEL: CPT

## 2025-07-09 ENCOUNTER — INFUSION (OUTPATIENT)
Dept: INFUSION THERAPY | Facility: HOSPITAL | Age: 60
End: 2025-07-09
Attending: INTERNAL MEDICINE
Payer: COMMERCIAL

## 2025-07-09 ENCOUNTER — OFFICE VISIT (OUTPATIENT)
Dept: HEMATOLOGY/ONCOLOGY | Facility: CLINIC | Age: 60
End: 2025-07-09
Payer: COMMERCIAL

## 2025-07-09 VITALS
WEIGHT: 164.25 LBS | OXYGEN SATURATION: 97 % | DIASTOLIC BLOOD PRESSURE: 73 MMHG | TEMPERATURE: 99 F | HEART RATE: 84 BPM | RESPIRATION RATE: 16 BRPM | SYSTOLIC BLOOD PRESSURE: 129 MMHG | HEIGHT: 73 IN | BODY MASS INDEX: 21.77 KG/M2

## 2025-07-09 DIAGNOSIS — C79.89 MALIGNANT NEOPLASM METASTATIC TO OTHER SITE: ICD-10-CM

## 2025-07-09 DIAGNOSIS — C78.6 SECONDARY ADENOCARCINOMA OF RETROPERITONEUM: ICD-10-CM

## 2025-07-09 DIAGNOSIS — C15.9 ESOPHAGEAL CANCER, STAGE IV: Primary | ICD-10-CM

## 2025-07-09 DIAGNOSIS — D50.0 IRON DEFICIENCY ANEMIA DUE TO CHRONIC BLOOD LOSS: Chronic | ICD-10-CM

## 2025-07-09 DIAGNOSIS — C15.9 ESOPHAGEAL CANCER, STAGE IV: Primary | Chronic | ICD-10-CM

## 2025-07-09 DIAGNOSIS — Z79.69 IMMUNODEFICIENCY DUE TO CHEMOTHERAPY: Chronic | ICD-10-CM

## 2025-07-09 DIAGNOSIS — C78.7 SECONDARY LIVER CANCER: ICD-10-CM

## 2025-07-09 DIAGNOSIS — D84.821 IMMUNODEFICIENCY DUE TO CHEMOTHERAPY: Chronic | ICD-10-CM

## 2025-07-09 DIAGNOSIS — T45.1X5A IMMUNODEFICIENCY DUE TO CHEMOTHERAPY: Chronic | ICD-10-CM

## 2025-07-09 PROCEDURE — 25000003 PHARM REV CODE 250

## 2025-07-09 PROCEDURE — 96413 CHEMO IV INFUSION 1 HR: CPT

## 2025-07-09 PROCEDURE — 63600175 PHARM REV CODE 636 W HCPCS

## 2025-07-09 RX ORDER — PROCHLORPERAZINE EDISYLATE 5 MG/ML
10 INJECTION INTRAMUSCULAR; INTRAVENOUS ONCE AS NEEDED
Status: CANCELLED | OUTPATIENT
Start: 2025-07-09

## 2025-07-09 RX ORDER — SODIUM CHLORIDE 0.9 % (FLUSH) 0.9 %
10 SYRINGE (ML) INJECTION
Status: CANCELLED | OUTPATIENT
Start: 2025-07-09

## 2025-07-09 RX ORDER — HEPARIN 100 UNIT/ML
500 SYRINGE INTRAVENOUS
Status: DISCONTINUED | OUTPATIENT
Start: 2025-07-09 | End: 2025-07-09 | Stop reason: HOSPADM

## 2025-07-09 RX ORDER — EPINEPHRINE 0.3 MG/.3ML
0.3 INJECTION SUBCUTANEOUS ONCE AS NEEDED
Status: CANCELLED | OUTPATIENT
Start: 2025-07-09

## 2025-07-09 RX ORDER — HEPARIN 100 UNIT/ML
500 SYRINGE INTRAVENOUS
Status: CANCELLED | OUTPATIENT
Start: 2025-07-09

## 2025-07-09 RX ORDER — DIPHENHYDRAMINE HYDROCHLORIDE 50 MG/ML
50 INJECTION, SOLUTION INTRAMUSCULAR; INTRAVENOUS ONCE AS NEEDED
Status: CANCELLED | OUTPATIENT
Start: 2025-07-09

## 2025-07-09 RX ORDER — SODIUM CHLORIDE 0.9 % (FLUSH) 0.9 %
10 SYRINGE (ML) INJECTION
Status: DISCONTINUED | OUTPATIENT
Start: 2025-07-09 | End: 2025-07-09 | Stop reason: HOSPADM

## 2025-07-09 RX ADMIN — TRASTUZUMAB-ANNS 420 MG: 420 INJECTION, POWDER, LYOPHILIZED, FOR SOLUTION INTRAVENOUS at 10:07

## 2025-07-09 RX ADMIN — HEPARIN 500 UNITS: 100 SYRINGE at 10:07

## 2025-07-09 NOTE — PLAN OF CARE
Problem: Adult Inpatient Plan of Care  Goal: Plan of Care Review  Outcome: Progressing  Flowsheets (Taken 7/9/2025 1027)  Plan of Care Reviewed With: patient  Goal: Patient-Specific Goal (Individualized)  Outcome: Progressing  Flowsheets (Taken 7/9/2025 1027)  Individualized Care Needs: feet up, warm blanket, ice water  Anxieties, Fears or Concerns: none  Patient/Family-Specific Goals (Include Timeframe): tolerate treatment  Goal: Optimal Comfort and Wellbeing  Outcome: Progressing  Intervention: Provide Person-Centered Care  Flowsheets (Taken 7/9/2025 1027)  Trust Relationship/Rapport:   care explained   reassurance provided   choices provided   thoughts/feelings acknowledged   emotional support provided   empathic listening provided   questions answered   questions encouraged

## 2025-07-09 NOTE — DISCHARGE INSTRUCTIONS
[facility]  [id number] [address]  [address] [address]  [phone] phone     [phone] fax  Hours of Operation: Monday- Friday 8:00am- 5:00pm  After hours phone  [phone]  Hematology / Oncology Physicians on call      [address] NP Phaon Dunbar, NP Khelsea Conley, FNP    Please call with any concerns regarding your appointment today.

## 2025-07-18 ENCOUNTER — TELEPHONE (OUTPATIENT)
Dept: NUTRITION | Facility: CLINIC | Age: 60
End: 2025-07-18
Payer: COMMERCIAL

## 2025-07-18 NOTE — TELEPHONE ENCOUNTER
Returned patient call. Spouse requested [employer] renewal form. RD filled it out for Mr. Muñoz and told Ms. Muñoz I would send it in now.  Signature: Agnes Carl, MPH, RD, LDN

## 2025-07-22 ENCOUNTER — HOSPITAL ENCOUNTER (OUTPATIENT)
Dept: CARDIOLOGY | Facility: HOSPITAL | Age: 60
Discharge: HOME | End: 2025-07-22
Payer: COMMERCIAL

## 2025-07-22 VITALS
BODY MASS INDEX: 21.74 KG/M2 | DIASTOLIC BLOOD PRESSURE: 73 MMHG | HEIGHT: 73 IN | SYSTOLIC BLOOD PRESSURE: 129 MMHG | WEIGHT: 164 LBS

## 2025-07-22 DIAGNOSIS — C15.9 ESOPHAGEAL CANCER, STAGE IV: Chronic | ICD-10-CM

## 2025-07-22 LAB
AORTIC ROOT ANNULUS: 3.6 CM
AORTIC SIZE INDEX (SOV): 1.8 CM/M2
AORTIC SIZE INDEX: 1.5 CM/M2
ASCENDING AORTA: 3 CM
AV INDEX (PROSTH): 0.97
AV MEAN GRADIENT: 3 MMHG
AV PEAK GRADIENT: 5 MMHG
AV VALVE AREA BY VELOCITY RATIO: 3.8 CM²
AV VALVE AREA: 4 CM²
AV VELOCITY RATIO: 0.91
BSA FOR ECHO PROCEDURE: 1.96 M2
CV ECHO LV RWT: 0.43 CM
DOP CALC AO PEAK VEL: 1.1 M/S
DOP CALC AO VTI: 22.2 CM
DOP CALC LVOT AREA: 4.2 CM2
DOP CALC LVOT DIAMETER: 2.3 CM
DOP CALC LVOT PEAK VEL: 1 M/S
DOP CALC LVOT STROKE VOLUME: 89.3 CM3
DOP CALC RVOT PEAK VEL: 0.64 M/S
DOP CALC RVOT VTI: 12.6 CM
DOP CALCLVOT PEAK VEL VTI: 21.5 CM
E WAVE DECELERATION TIME: 198 MSEC
E/A RATIO: 0.78
E/E' RATIO: 6 M/S
ECHO LV POSTERIOR WALL: 0.9 CM (ref 0.6–1.1)
FRACTIONAL SHORTENING: 28.6 % (ref 28–44)
INTERVENTRICULAR SEPTUM: 0.9 CM (ref 0.6–1.1)
IVC DIAMETER: 1.05 CM
IVRT: 86 MSEC
LA MAJOR: 5.3 CM
LA MINOR: 5.2 CM
LA WIDTH: 3.3 CM
LEFT ATRIUM AREA SYSTOLIC (APICAL 2 CHAMBER): 19.7 CM2
LEFT ATRIUM AREA SYSTOLIC (APICAL 4 CHAMBER): 18.6 CM2
LEFT ATRIUM SIZE: 3 CM
LEFT ATRIUM VOLUME INDEX MOD: 28 ML/M2
LEFT ATRIUM VOLUME INDEX: 22 ML/M2
LEFT ATRIUM VOLUME MOD: 56 ML
LEFT ATRIUM VOLUME: 44 CM3
LEFT INTERNAL DIMENSION IN SYSTOLE: 3 CM (ref 2.1–4)
LEFT VENTRICLE DIASTOLIC VOLUME INDEX: 40.4 ML/M2
LEFT VENTRICLE DIASTOLIC VOLUME: 80 ML
LEFT VENTRICLE END SYSTOLIC VOLUME APICAL 2 CHAMBER: 60.8 ML
LEFT VENTRICLE END SYSTOLIC VOLUME APICAL 4 CHAMBER: 49.6 ML
LEFT VENTRICLE MASS INDEX: 59.9 G/M2
LEFT VENTRICLE SYSTOLIC VOLUME INDEX: 17.2 ML/M2
LEFT VENTRICLE SYSTOLIC VOLUME: 34 ML
LEFT VENTRICULAR INTERNAL DIMENSION IN DIASTOLE: 4.2 CM (ref 3.5–6)
LEFT VENTRICULAR MASS: 118.7 G
LV LATERAL E/E' RATIO: 4.5 M/S
LV SEPTAL E/E' RATIO: 7.3 M/S
LVED V (TEICH): 80.23 ML
LVES V (TEICH): 34.25 ML
LVOT MG: 2.07 MMHG
LVOT MV: 0.66 CM/S
Lab: 1.6 CM/M
Lab: 1.9 CM/M
MV PEAK A VEL: 0.74 M/S
MV PEAK E VEL: 0.58 M/S
MV STENOSIS PRESSURE HALF TIME: 57.29 MS
MV VALVE AREA P 1/2 METHOD: 3.84 CM2
OHS CV CPX PATIENT HEIGHT IN: 73
OHS CV RV/LV RATIO: 0.83 CM
PISA TR MAX VEL: 2.3 M/S
PV MEAN GRADIENT: 1 MMHG
PV PEAK GRADIENT: 2 MMHG
PV PEAK VELOCITY: 0.77 M/S
RA MAJOR: 4.84 CM
RA PRESSURE ESTIMATED: 3 MMHG
RA WIDTH: 3.73 CM
RIGHT VENTRICLE DIASTOLIC BASEL DIMENSION: 3.5 CM
RIGHT VENTRICULAR END-DIASTOLIC DIMENSION: 3.54 CM
RV TB RVSP: 5 MMHG
SINUS: 3.5 CM
STJ: 3.4 CM
TDI LATERAL: 0.13 M/S
TDI SEPTAL: 0.08 M/S
TDI: 0.11 M/S
TR MAX PG: 21 MMHG
TRICUSPID ANNULAR PLANE SYSTOLIC EXCURSION: 2.4 CM
TV REST PULMONARY ARTERY PRESSURE: 24 MMHG
Z-SCORE OF LEFT VENTRICULAR DIMENSION IN END DIASTOLE: -3.08
Z-SCORE OF LEFT VENTRICULAR DIMENSION IN END SYSTOLE: -1.25

## 2025-07-22 PROCEDURE — 93306 TTE W/DOPPLER COMPLETE: CPT

## 2025-07-22 PROCEDURE — 93306 TTE W/DOPPLER COMPLETE: CPT | Mod: 26,,, | Performed by: INTERNAL MEDICINE

## 2025-07-23 ENCOUNTER — PATIENT MESSAGE (OUTPATIENT)
Dept: SURGICAL ONCOLOGY | Facility: CLINIC | Age: 60
End: 2025-07-23
Payer: COMMERCIAL

## 2025-07-28 ENCOUNTER — TELEPHONE (OUTPATIENT)
Dept: SURGICAL ONCOLOGY | Facility: CLINIC | Age: 60
End: 2025-07-28
Payer: COMMERCIAL

## 2025-07-29 ENCOUNTER — LAB VISIT (OUTPATIENT)
Dept: LAB | Facility: HOSPITAL | Age: 60
End: 2025-07-29
Attending: INTERNAL MEDICINE
Payer: COMMERCIAL

## 2025-07-29 ENCOUNTER — OFFICE VISIT (OUTPATIENT)
Dept: SURGICAL ONCOLOGY | Facility: CLINIC | Age: 60
End: 2025-07-29
Payer: COMMERCIAL

## 2025-07-29 VITALS
HEART RATE: 100 BPM | WEIGHT: 160.5 LBS | DIASTOLIC BLOOD PRESSURE: 71 MMHG | BODY MASS INDEX: 21.27 KG/M2 | TEMPERATURE: 98 F | SYSTOLIC BLOOD PRESSURE: 107 MMHG | HEIGHT: 73 IN

## 2025-07-29 DIAGNOSIS — Z43.1 ATTENTION TO G-TUBE: Primary | ICD-10-CM

## 2025-07-29 DIAGNOSIS — C79.89 MALIGNANT NEOPLASM METASTATIC TO OTHER SITE: ICD-10-CM

## 2025-07-29 DIAGNOSIS — C15.9 ESOPHAGEAL CANCER, STAGE IV: ICD-10-CM

## 2025-07-29 DIAGNOSIS — T45.1X5S PERIPHERAL NEUROPATHY DUE TO AND NOT CONCURRENT WITH CHEMOTHERAPY: ICD-10-CM

## 2025-07-29 DIAGNOSIS — Z79.69 IMMUNODEFICIENCY DUE TO CHEMOTHERAPY: ICD-10-CM

## 2025-07-29 DIAGNOSIS — D84.821 IMMUNODEFICIENCY DUE TO CHEMOTHERAPY: ICD-10-CM

## 2025-07-29 DIAGNOSIS — G62.0 PERIPHERAL NEUROPATHY DUE TO AND NOT CONCURRENT WITH CHEMOTHERAPY: ICD-10-CM

## 2025-07-29 DIAGNOSIS — C78.6 SECONDARY ADENOCARCINOMA OF RETROPERITONEUM: ICD-10-CM

## 2025-07-29 DIAGNOSIS — D50.0 IRON DEFICIENCY ANEMIA DUE TO CHRONIC BLOOD LOSS: ICD-10-CM

## 2025-07-29 DIAGNOSIS — T45.1X5A IMMUNODEFICIENCY DUE TO CHEMOTHERAPY: ICD-10-CM

## 2025-07-29 DIAGNOSIS — C78.7 SECONDARY LIVER CANCER: ICD-10-CM

## 2025-07-29 LAB
ABSOLUTE EOSINOPHIL (OHS): 0.11 K/UL
ABSOLUTE MONOCYTE (OHS): 0.9 K/UL (ref 0.3–1)
ABSOLUTE NEUTROPHIL COUNT (OHS): 4.6 K/UL (ref 1.8–7.7)
ALBUMIN SERPL BCP-MCNC: 3.5 G/DL (ref 3.5–5.2)
ALP SERPL-CCNC: 146 UNIT/L (ref 40–150)
ALT SERPL W/O P-5'-P-CCNC: 8 UNIT/L (ref 10–44)
ANION GAP (OHS): 7 MMOL/L (ref 8–16)
AST SERPL-CCNC: 23 UNIT/L (ref 11–45)
BASOPHILS # BLD AUTO: 0.07 K/UL
BASOPHILS NFR BLD AUTO: 0.9 %
BILIRUB SERPL-MCNC: 0.6 MG/DL (ref 0.1–1)
BUN SERPL-MCNC: 9 MG/DL (ref 6–20)
CALCIUM SERPL-MCNC: 9 MG/DL (ref 8.7–10.5)
CHLORIDE SERPL-SCNC: 100 MMOL/L (ref 95–110)
CO2 SERPL-SCNC: 26 MMOL/L (ref 23–29)
CREAT SERPL-MCNC: 0.7 MG/DL (ref 0.5–1.4)
ERYTHROCYTE [DISTWIDTH] IN BLOOD BY AUTOMATED COUNT: 13.2 % (ref 11.5–14.5)
GFR SERPLBLD CREATININE-BSD FMLA CKD-EPI: >60 ML/MIN/1.73/M2
GLUCOSE SERPL-MCNC: 93 MG/DL (ref 70–110)
HCT VFR BLD AUTO: 43.1 % (ref 40–54)
HGB BLD-MCNC: 14.7 GM/DL (ref 14–18)
IMM GRANULOCYTES # BLD AUTO: 0.02 K/UL (ref 0–0.04)
IMM GRANULOCYTES NFR BLD AUTO: 0.3 % (ref 0–0.5)
LYMPHOCYTES # BLD AUTO: 2.11 K/UL (ref 1–4.8)
MCH RBC QN AUTO: 32.8 PG (ref 27–31)
MCHC RBC AUTO-ENTMCNC: 34.1 G/DL (ref 32–36)
MCV RBC AUTO: 96 FL (ref 82–98)
NUCLEATED RBC (/100WBC) (OHS): 0 /100 WBC
PLATELET # BLD AUTO: 279 K/UL (ref 150–450)
PMV BLD AUTO: 11 FL (ref 9.2–12.9)
POTASSIUM SERPL-SCNC: 4.3 MMOL/L (ref 3.5–5.1)
PROT SERPL-MCNC: 7.8 GM/DL (ref 6–8.4)
RBC # BLD AUTO: 4.48 M/UL (ref 4.6–6.2)
RELATIVE EOSINOPHIL (OHS): 1.4 %
RELATIVE LYMPHOCYTE (OHS): 27 % (ref 18–48)
RELATIVE MONOCYTE (OHS): 11.5 % (ref 4–15)
RELATIVE NEUTROPHIL (OHS): 58.9 % (ref 38–73)
SODIUM SERPL-SCNC: 133 MMOL/L (ref 136–145)
TSH SERPL-ACNC: 1.51 UIU/ML (ref 0.4–4)
WBC # BLD AUTO: 7.81 K/UL (ref 3.9–12.7)

## 2025-07-29 PROCEDURE — 99999 PR PBB SHADOW E&M-EST. PATIENT-LVL III: CPT | Mod: PBBFAC,,, | Performed by: SURGERY

## 2025-07-29 PROCEDURE — 3078F DIAST BP <80 MM HG: CPT | Mod: CPTII,S$GLB,, | Performed by: SURGERY

## 2025-07-29 PROCEDURE — 82040 ASSAY OF SERUM ALBUMIN: CPT

## 2025-07-29 PROCEDURE — 3008F BODY MASS INDEX DOCD: CPT | Mod: CPTII,S$GLB,, | Performed by: SURGERY

## 2025-07-29 PROCEDURE — 99213 OFFICE O/P EST LOW 20 MIN: CPT | Mod: S$GLB,,, | Performed by: SURGERY

## 2025-07-29 PROCEDURE — 36415 COLL VENOUS BLD VENIPUNCTURE: CPT | Mod: PO

## 2025-07-29 PROCEDURE — 84443 ASSAY THYROID STIM HORMONE: CPT

## 2025-07-29 PROCEDURE — 3074F SYST BP LT 130 MM HG: CPT | Mod: CPTII,S$GLB,, | Performed by: SURGERY

## 2025-07-29 PROCEDURE — 85025 COMPLETE CBC W/AUTO DIFF WBC: CPT

## 2025-07-29 PROCEDURE — 1159F MED LIST DOCD IN RCRD: CPT | Mod: CPTII,S$GLB,, | Performed by: SURGERY

## 2025-07-29 NOTE — PROGRESS NOTES
Surgical Oncology Clinic Note      Referring Provider: Dr. Hudson Latif   PCP: Michell Goyal MD    Reason For Visit: Gastroesophageal: esophageal cancer (GE junction)    HISTORY OF PRESENT ILLNESS     Rosalio Muñoz is a 59 y.o. male w/ significant smoking and drinking history who presents today for evaluation and management of a distal esophageal mass found on imaging.      Wanted to establish care with a primary care physician after losing about 25-30 lb over the previous 4 months without effort.  At that time a CT of the abdomen and pelvis with IV contrast was obtained as well as thyroid studies (which were normal).  The CT scan showed severe thickening at the GE junction concerning for an esophageal mass as well as multiple enlarged lymph nodes within the epigastric region concerning for local metastatic disease.  He was also noted to have some scattered subcentimeter hypodensities within the liver too small to characterize.  He was then referred to me for further evaluation.  Of note, he was recently admitted to the hospital for 5 days with a acute appendicitis back in July.    He states that ever since he had COVID about 2 years ago he has had really hard time swallowing pills and that really cold or really hot liquids have burned and really bothered him.  He attributed all that COVID originally.  However does note that over the last 4-5 months he has had increasing issues with swallowing and he is really limited himself now to just liquids and softer foods.  He used to eat ribeye steak regularly and according to his wife has not had any in about 4-5 months.  He states whenever he eats he feels like food gets stuck in his chest and then he feels full very quickly.  He does note that soups and mashed potatoes go down fine but anything more solid than that tends to get stuck.  He has occasionally vomited and even vomited up a liquid diet the other night but states that that is uncommon.  He  has tried to really maintain his weight and has been drinking more [employer] protein shake, ice cream, peanut butter, and whole milk-and has managed to gain back about 5 lb.  He has also decreased his drinking in his gone from drinking 8-10 beers a day to around 4 beers a day.  He does note a longstanding history of reflux but has never had an EGD and was told several years ago the hiatal hernia.    He does have a longstanding history of smoking.  He used to smoke about 2 packs a day from age 17 up until a couple of years ago.  He did quit smoking at that time but started sleeping however quit that when his hypertension got worse, and then he started smoking cigars and he now smokes about 5-6 baby cigars in a day has done that for quite some time.  His last colonoscopy was a little over a year ago, at that time they found around 22 polyps and he was told to get a repeat scope in 6 months but has not had that done yet.  He has no real family history of cancer.    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  Wife is house supervisor at [employer].  2 sons - ages 36 and 32.   for 36 years.        INTERVAL HISTORY   10/16/2024:  Patient returns to clinic today.  He has had an EGD which did show a fungating mass at his GE junction, biopsies confirmed the presence of adenocarcinoma.  Subsequent PET-CT scan showed a 2.2 cm hypermetabolic right posterior hepatic lesion concerning for metastatic disease, as well as hypermetabolic gastrohepatic ligament lymph node.  He has met with Dr. Crowley with Radiation Oncology as well as Dr. Latif with Medical Oncology.  He was originally planning to go see Dr. Bran, my partner, in [place] however with a diagnosis of presumed metastatic disease they are deferring that at this time.    11/5/2024:  doing 3 bolus feeds/ day and having a little PO intake.  States he feels bloated for about an hour after doing the  feeds.  Feeling more energy now.      12/11/2024:  doing well.  Some fatigue with chemo.  No complaints at this time.     07/29/2025:  Comes in to discuss leaking from his G-tube.  Tube accidentally pulled out a few months ago and was replaced in the ER with a new 20 [place] G-tube.  Since that time who has had some issues with leaking from around the tube.  He is still on [place] plus trastuzumab but his other chemo was stopped due to toxicity.  He is still taking about 30% of his nutrition via his G-tube.      Oncology History   Esophageal cancer, stage IV   10/10/2024 Initial Diagnosis    Esophageal cancer, stage IV     10/10/2024 Cancer Staged    Staging form: Esophagus - Adenocarcinoma, AJCC 8th Edition  - Clinical stage from 10/10/2024: Stage IVB (cTX, cN0, cM1, G2)     10/12/2024 - 10/12/2024 Chemotherapy    Treatment Summary   Plan Name: OP GI mFOLFOX6 (oxaliplatin leucovorin fluorouracil) Q2W  Treatment Goal: Control  Status: Inactive  Start Date:   End Date: 10/11/2024  Provider: Hudson Latif MD  Chemotherapy: fluorouraciL injection 400 mg, 200 mg/m2 = 400 mg (50 % of original dose 400 mg/m2), Intravenous, Clinic/HOD 1 time, 0 of 12 cycles  Dose modification: 200 mg/m2 (50 % of original dose 400 mg/m2, Cycle 12, Reason: Original Dose Changed, Comment: DPYD IM)  oxaliplatin (ELOXATIN) 85 mg/m2 = 170 mg in D5W 534 mL chemo infusion, 85 mg/m2 = 170 mg, Intravenous, Clinic/HOD 1 time, 0 of 12 cycles  fluorouracil (Adrucil) 1,200 mg/m2 = 2,400 mg in 0.9% NaCl 100 mL chemo infusion, 1,200 mg/m2 = 2,400 mg (50 % of original dose 2,400 mg/m2), Intravenous, Over 46 hours, 0 of 12 cycles  Dose modification: 1,200 mg/m2 (50 % of original dose 2,400 mg/m2, Cycle 12, Reason: Original Dose Changed, Comment: DPYD IM)     10/28/2024 -  Chemotherapy    Treatment Summary   Plan Name: OP GASTROESOPHAGEAL pembrolizumab Q6W trastuzumab + MFOLFOX6 (oxaliplatin leucovorin fluorouracil) Q2W --> Q6W pembro + trastuzumab  Q3W  Treatment Goal: Control  Status: Active  Start Date: 10/28/2024  End Date: 8/24/2027 (Planned)  Provider: Hudson Latif MD  Chemotherapy: fluorouraciL injection 405 mg, 200 mg/m2 = 405 mg (50 % of original dose 400 mg/m2), Intravenous, Clinic/HOD 1 time, 4 of 4 cycles  Dose modification: 200 mg/m2 (50 % of original dose 400 mg/m2, Cycle 1, Reason: Original Dose Changed, Comment: pgx), 160 mg/m2 (80 % of original dose 400 mg/m2, Cycle 2, Reason: Dose Not Tolerated, Comment: enteritis hospitalization)  Administration: 405 mg (11/6/2024), 405 mg (11/20/2024), 405 mg (12/4/2024), 395 mg (12/18/2024), 315 mg (1/15/2025), 320 mg (1/29/2025), 320 mg (2/12/2025), 320 mg (2/26/2025), 320 mg (3/12/2025), 315 mg (5/5/2025), 320 mg (3/26/2025), 325 mg (4/9/2025)  oxaliplatin (ELOXATIN) 85 mg/m2 = 173 mg in D5W 599.6 mL chemo infusion, 85 mg/m2 = 173 mg, Intravenous, Clinic/HOD 1 time, 5 of 5 cycles  Dose modification: 68 mg/m2 (80 % of original dose 85 mg/m2, Cycle 2, Reason: Dose Not Tolerated, Comment: enteritis hospitalization), 51 mg/m2 (original dose 85 mg/m2, Cycle 4), 40.8 mg/m2 (80 % of original dose 85 mg/m2, Cycle 5, Reason: Dose Not Tolerated)  Administration: 173 mg (11/6/2024), 173 mg (11/20/2024), 173 mg (12/4/2024), 168 mg (12/18/2024), 134 mg (1/15/2025), 135 mg (1/29/2025), 135 mg (2/12/2025), 136 mg (2/26/2025), 136 mg (3/12/2025), 100 mg (5/5/2025), 135 mg (3/26/2025), 137 mg (4/9/2025), 80 mg (5/21/2025)  fluorouracil (Adrucil) 1,200 mg/m2 = 2,435 mg in 0.9% NaCl 100 mL chemo infusion, 1,200 mg/m2 = 2,435 mg (50 % of original dose 2,400 mg/m2), Intravenous, Over 46 hours, 5 of 5 cycles  Dose modification: 1,200 mg/m2 (50 % of original dose 2,400 mg/m2, Cycle 1, Reason: Original Dose Changed, Comment: pgx), 960 mg/m2 (80 % of original dose 2,400 mg/m2, Cycle 2, Reason: Dose Not Tolerated, Comment: enteritis hospitalization), 768 mg/m2 (80 % of original dose 2,400 mg/m2, Cycle 5, Reason: Dose Not  Tolerated)  Administration: 2,435 mg (11/6/2024), 2,435 mg (11/20/2024), 2,435 mg (12/4/2024), 2,375 mg (12/18/2024), 1,890 mg (1/15/2025), 1,910 mg (1/29/2025), 1,910 mg (2/12/2025), 1,920 mg (2/26/2025), 1,920 mg (3/12/2025), 1,890 mg (5/5/2025), 1,910 mg (3/26/2025), 1,940 mg (4/9/2025)  trastuzumab-anns (KANJINTI) 450 mg in 0.9% NaCl 306.4 mL chemo infusion, 482 mg, Intravenous, Clinic/HOD 1 time, 6 of 24 cycles  Dose modification: 6 mg/kg (original dose 4 mg/kg, Cycle 24, Reason: MD Discretion, Comment: switching to q3w to align with pembro (dropped chemo))  Administration: 450 mg (11/6/2024), 300 mg (11/20/2024), 300 mg (12/4/2024), 300 mg (12/18/2024), 300 mg (1/15/2025), 300 mg (1/29/2025), 300 mg (2/12/2025), 300 mg (2/26/2025), 300 mg (3/12/2025), 300 mg (5/5/2025), 300 mg (3/26/2025), 300 mg (4/9/2025), 300 mg (5/21/2025), 298 mg (6/4/2025), 420 mg (6/18/2025), 420 mg (7/9/2025)     Secondary adenocarcinoma of retroperitoneum   10/11/2024 Initial Diagnosis    Secondary adenocarcinoma of retroperitoneum     10/12/2024 - 10/12/2024 Chemotherapy    Treatment Summary   Plan Name: OP GI mFOLFOX6 (oxaliplatin leucovorin fluorouracil) Q2W  Treatment Goal: Control  Status: Inactive  Start Date:   End Date: 10/11/2024  Provider: Hudson Latif MD  Chemotherapy: fluorouraciL injection 400 mg, 200 mg/m2 = 400 mg (50 % of original dose 400 mg/m2), Intravenous, Clinic/HOD 1 time, 0 of 12 cycles  Dose modification: 200 mg/m2 (50 % of original dose 400 mg/m2, Cycle 12, Reason: Original Dose Changed, Comment: DPYD IM)  oxaliplatin (ELOXATIN) 85 mg/m2 = 170 mg in D5W 534 mL chemo infusion, 85 mg/m2 = 170 mg, Intravenous, Clinic/HOD 1 time, 0 of 12 cycles  fluorouracil (Adrucil) 1,200 mg/m2 = 2,400 mg in 0.9% NaCl 100 mL chemo infusion, 1,200 mg/m2 = 2,400 mg (50 % of original dose 2,400 mg/m2), Intravenous, Over 46 hours, 0 of 12 cycles  Dose modification: 1,200 mg/m2 (50 % of original dose 2,400 mg/m2, Cycle 12,  Reason: Original Dose Changed, Comment: DPYD IM)     10/28/2024 -  Chemotherapy    Treatment Summary   Plan Name: OP GASTROESOPHAGEAL pembrolizumab Q6W trastuzumab + MFOLFOX6 (oxaliplatin leucovorin fluorouracil) Q2W --> Q6W pembro + trastuzumab Q3W  Treatment Goal: Control  Status: Active  Start Date: 10/28/2024  End Date: 8/24/2027 (Planned)  Provider: Hudson Latif MD  Chemotherapy: fluorouraciL injection 405 mg, 200 mg/m2 = 405 mg (50 % of original dose 400 mg/m2), Intravenous, Clinic/HOD 1 time, 4 of 4 cycles  Dose modification: 200 mg/m2 (50 % of original dose 400 mg/m2, Cycle 1, Reason: Original Dose Changed, Comment: pgx), 160 mg/m2 (80 % of original dose 400 mg/m2, Cycle 2, Reason: Dose Not Tolerated, Comment: enteritis hospitalization)  Administration: 405 mg (11/6/2024), 405 mg (11/20/2024), 405 mg (12/4/2024), 395 mg (12/18/2024), 315 mg (1/15/2025), 320 mg (1/29/2025), 320 mg (2/12/2025), 320 mg (2/26/2025), 320 mg (3/12/2025), 315 mg (5/5/2025), 320 mg (3/26/2025), 325 mg (4/9/2025)  oxaliplatin (ELOXATIN) 85 mg/m2 = 173 mg in D5W 599.6 mL chemo infusion, 85 mg/m2 = 173 mg, Intravenous, Clinic/HOD 1 time, 5 of 5 cycles  Dose modification: 68 mg/m2 (80 % of original dose 85 mg/m2, Cycle 2, Reason: Dose Not Tolerated, Comment: enteritis hospitalization), 51 mg/m2 (original dose 85 mg/m2, Cycle 4), 40.8 mg/m2 (80 % of original dose 85 mg/m2, Cycle 5, Reason: Dose Not Tolerated)  Administration: 173 mg (11/6/2024), 173 mg (11/20/2024), 173 mg (12/4/2024), 168 mg (12/18/2024), 134 mg (1/15/2025), 135 mg (1/29/2025), 135 mg (2/12/2025), 136 mg (2/26/2025), 136 mg (3/12/2025), 100 mg (5/5/2025), 135 mg (3/26/2025), 137 mg (4/9/2025), 80 mg (5/21/2025)  fluorouracil (Adrucil) 1,200 mg/m2 = 2,435 mg in 0.9% NaCl 100 mL chemo infusion, 1,200 mg/m2 = 2,435 mg (50 % of original dose 2,400 mg/m2), Intravenous, Over 46 hours, 5 of 5 cycles  Dose modification: 1,200 mg/m2 (50 % of original dose 2,400 mg/m2, Cycle  1, Reason: Original Dose Changed, Comment: pgx), 960 mg/m2 (80 % of original dose 2,400 mg/m2, Cycle 2, Reason: Dose Not Tolerated, Comment: enteritis hospitalization), 768 mg/m2 (80 % of original dose 2,400 mg/m2, Cycle 5, Reason: Dose Not Tolerated)  Administration: 2,435 mg (11/6/2024), 2,435 mg (11/20/2024), 2,435 mg (12/4/2024), 2,375 mg (12/18/2024), 1,890 mg (1/15/2025), 1,910 mg (1/29/2025), 1,910 mg (2/12/2025), 1,920 mg (2/26/2025), 1,920 mg (3/12/2025), 1,890 mg (5/5/2025), 1,910 mg (3/26/2025), 1,940 mg (4/9/2025)  trastuzumab-anns (KANJINTI) 450 mg in 0.9% NaCl 306.4 mL chemo infusion, 482 mg, Intravenous, Clinic/HOD 1 time, 6 of 24 cycles  Dose modification: 6 mg/kg (original dose 4 mg/kg, Cycle 24, Reason: MD Discretion, Comment: switching to q3w to align with pembro (dropped chemo))  Administration: 450 mg (11/6/2024), 300 mg (11/20/2024), 300 mg (12/4/2024), 300 mg (12/18/2024), 300 mg (1/15/2025), 300 mg (1/29/2025), 300 mg (2/12/2025), 300 mg (2/26/2025), 300 mg (3/12/2025), 300 mg (5/5/2025), 300 mg (3/26/2025), 300 mg (4/9/2025), 300 mg (5/21/2025), 298 mg (6/4/2025), 420 mg (6/18/2025), 420 mg (7/9/2025)     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   10/11/2024 Initial Diagnosis    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     10/12/2024 - 10/12/2024 Chemotherapy    Treatment Summary   Plan Name: OP GI mFOLFOX6 (oxaliplatin leucovorin fluorouracil) Q2W  Treatment Goal: Control  Status: Inactive  Start Date:   End Date: 10/11/2024  Provider: Hudson Latif MD  Chemotherapy: fluorouraciL injection 400 mg, 200 mg/m2 = 400 mg (50 % of original dose 400 mg/m2), Intravenous, Clinic/HOD 1 time, 0 of 12 cycles  Dose modification: 200 mg/m2 (50 % of original dose 400 mg/m2, Cycle 12, Reason: Original Dose Changed, Comment: DPYD IM)  oxaliplatin (ELOXATIN) 85 mg/m2 = 170 mg in D5W 534 mL chemo infusion, 85 mg/m2 = 170 mg, Intravenous, Clinic/HOD 1 time, 0 of 12 cycles  fluorouracil (Adrucil) 1,200  mg/m2 = 2,400 mg in 0.9% NaCl 100 mL chemo infusion, 1,200 mg/m2 = 2,400 mg (50 % of original dose 2,400 mg/m2), Intravenous, Over 46 hours, 0 of 12 cycles  Dose modification: 1,200 mg/m2 (50 % of original dose 2,400 mg/m2, Cycle 12, Reason: Original Dose Changed, Comment: DPYD IM)     10/28/2024 -  Chemotherapy    Treatment Summary   Plan Name: OP GASTROESOPHAGEAL pembrolizumab Q6W trastuzumab + MFOLFOX6 (oxaliplatin leucovorin fluorouracil) Q2W --> Q6W pembro + trastuzumab Q3W  Treatment Goal: Control  Status: Active  Start Date: 10/28/2024  End Date: 8/24/2027 (Planned)  Provider: Hudson Latif MD  Chemotherapy: fluorouraciL injection 405 mg, 200 mg/m2 = 405 mg (50 % of original dose 400 mg/m2), Intravenous, Clinic/HOD 1 time, 4 of 4 cycles  Dose modification: 200 mg/m2 (50 % of original dose 400 mg/m2, Cycle 1, Reason: Original Dose Changed, Comment: pgx), 160 mg/m2 (80 % of original dose 400 mg/m2, Cycle 2, Reason: Dose Not Tolerated, Comment: enteritis hospitalization)  Administration: 405 mg (11/6/2024), 405 mg (11/20/2024), 405 mg (12/4/2024), 395 mg (12/18/2024), 315 mg (1/15/2025), 320 mg (1/29/2025), 320 mg (2/12/2025), 320 mg (2/26/2025), 320 mg (3/12/2025), 315 mg (5/5/2025), 320 mg (3/26/2025), 325 mg (4/9/2025)  oxaliplatin (ELOXATIN) 85 mg/m2 = 173 mg in D5W 599.6 mL chemo infusion, 85 mg/m2 = 173 mg, Intravenous, Clinic/HOD 1 time, 5 of 5 cycles  Dose modification: 68 mg/m2 (80 % of original dose 85 mg/m2, Cycle 2, Reason: Dose Not Tolerated, Comment: enteritis hospitalization), 51 mg/m2 (original dose 85 mg/m2, Cycle 4), 40.8 mg/m2 (80 % of original dose 85 mg/m2, Cycle 5, Reason: Dose Not Tolerated)  Administration: 173 mg (11/6/2024), 173 mg (11/20/2024), 173 mg (12/4/2024), 168 mg (12/18/2024), 134 mg (1/15/2025), 135 mg (1/29/2025), 135 mg (2/12/2025), 136 mg (2/26/2025), 136 mg (3/12/2025), 100 mg (5/5/2025), 135 mg (3/26/2025), 137 mg (4/9/2025), 80 mg (5/21/2025)  fluorouracil (Adrucil)  1,200 mg/m2 = 2,435 mg in 0.9% NaCl 100 mL chemo infusion, 1,200 mg/m2 = 2,435 mg (50 % of original dose 2,400 mg/m2), Intravenous, Over 46 hours, 5 of 5 cycles  Dose modification: 1,200 mg/m2 (50 % of original dose 2,400 mg/m2, Cycle 1, Reason: Original Dose Changed, Comment: pgx), 960 mg/m2 (80 % of original dose 2,400 mg/m2, Cycle 2, Reason: Dose Not Tolerated, Comment: enteritis hospitalization), 768 mg/m2 (80 % of original dose 2,400 mg/m2, Cycle 5, Reason: Dose Not Tolerated)  Administration: 2,435 mg (11/6/2024), 2,435 mg (11/20/2024), 2,435 mg (12/4/2024), 2,375 mg (12/18/2024), 1,890 mg (1/15/2025), 1,910 mg (1/29/2025), 1,910 mg (2/12/2025), 1,920 mg (2/26/2025), 1,920 mg (3/12/2025), 1,890 mg (5/5/2025), 1,910 mg (3/26/2025), 1,940 mg (4/9/2025)  trastuzumab-anns (KANJINTI) 450 mg in 0.9% NaCl 306.4 mL chemo infusion, 482 mg, Intravenous, Clinic/[facility] 1 time, 6 of 24 cycles  Dose modification: 6 mg/kg (original dose 4 mg/kg, Cycle 24, Reason: MD Discretion, Comment: switching to q3w to align with pembro (dropped chemo))  Administration: 450 mg (11/6/2024), 300 mg (11/20/2024), 300 mg (12/4/2024), 300 mg (12/18/2024), 300 mg (1/15/2025), 300 mg (1/29/2025), 300 mg (2/12/2025), 300 mg (2/26/2025), 300 mg (3/12/2025), 300 mg (5/5/2025), 300 mg (3/26/2025), 300 mg (4/9/2025), 300 mg (5/21/2025), 298 mg (6/4/2025), 420 mg (6/18/2025), 420 mg (7/9/2025)      Genetic Testing    Patient has genetic testing done for MYRISK.                                              Results revealed patient has the following mutation(s):  GENETIC RESULT: NEGATIVE - NO CLINICALLY SIGNIFICANT MUTATION IDENTIFIED           Past Medical History:   Diagnosis Date    Esophageal cancer, stage IV 10/10/2024    Hypertension     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 10/11/2024    Palliative care encounter 12/11/2024       Past Surgical History:   Procedure Laterality Date    COLONOSCOPY N/A 8/25/2023    Procedure: COLONOSCOPY;   Surgeon: Pebbles Spear MD;  Location: [facility];  Service: Endoscopy;  Laterality: N/A;    COLONOSCOPY N/A 10/2/2024    Procedure: COLONOSCOPY  Cardiac clearance received on 09/20/24 per Dr. Lockett, Cardiology.  Note in encounters.  LB;  Surgeon: Sully Farris MD;  Location: [facility];  Service: Endoscopy;  Laterality: N/A;    ESOPHAGOGASTRODUODENOSCOPY N/A 10/2/2024    Procedure: EGD (ESOPHAGOGASTRODUODENOSCOPY);  Surgeon: Sully Farris MD;  Location: [facility];  Service: Endoscopy;  Laterality: N/A;    ESOPHAGOGASTRODUODENOSCOPY N/A 12/31/2024    Procedure: EGD (ESOPHAGOGASTRODUODENOSCOPY);  Surgeon: Maki Sullivan MD;  Location: [facility];  Service: Gastroenterology;  Laterality: N/A;    INSERTION OF VENOUS ACCESS PORT Left 10/21/2024    Procedure: INSERTION, VENOUS ACCESS PORT;  Surgeon: Roseanna Rosas MD;  Location: [facility];  Service: General;  Laterality: Left;    SURGICAL REMOVAL OF LESION OF FACE Right 12/1/2023    Procedure: EXCISION, LESION, FACE;  Surgeon: Jose Flaherty MD;  Location: [facility];  Service: ENT;  Laterality: Right;  1. Excision facial subcutaneous mass right cheek 3 cm. 2. Intermediate layered closure 3.5 cm    WISDOM TOOTH EXTRACTION      XI ROBOTIC APPENDECTOMY N/A 7/31/2024    Procedure: XI ROBOTIC APPENDECTOMY;  Surgeon: Paulo Saavedra MD;  Location: [facility];  Service: General;  Laterality: N/A;    XI ROBOTIC INSERTION, GASTROSTOMY TUBE N/A 10/21/2024    Procedure: XI ROBOTIC INSERTION, GASTROSTOMY TUBE;  Surgeon: Roseanna Rosas MD;  Location: [facility] OR;  Service: General;  Laterality: N/A;       Family History   Problem Relation Name Age of Onset    Hyperlipidemia Mother      Hypertension Father      Diabetes Father      Kidney disease Father      Heart disease Father      Breast cancer Maternal Grandmother      Prostate cancer Maternal Grandfather      Colon cancer Neg Hx         Social History     Socioeconomic History    Marital status:      Number of children: 2   Occupational History    Occupation: Industrial electrician   Tobacco Use    Smoking status: Every Day     Current packs/day: 0.00     Types: Cigars, Cigarettes     Last attempt to quit: 10/4/2022     Years since quittin.8     Passive exposure: Never    Smokeless tobacco: Never    Tobacco comments:     Cigar every afternoon   Substance and Sexual Activity    Alcohol use: Yes     Alcohol/week: 4.0 - 6.0 standard drinks of alcohol     Types: 4 - 6 Cans of beer per week     Comment: occ    Drug use: Yes     Types: Marijuana     Comment: medical    Sexual activity: Yes     Partners: Female     Social Drivers of Health     Financial Resource Strain: Low Risk  (2025)    Overall Financial Resource Strain (CARDIA)     Difficulty of Paying Living Expenses: Not hard at all   Food Insecurity: No Food Insecurity (2025)    Hunger Vital Sign     Worried About Running Out of Food in the Last Year: Never true     Ran Out of Food in the Last Year: Never true   Transportation Needs: No Transportation Needs (2025)    PRAPARE - Transportation     Lack of Transportation (Medical): No     Lack of Transportation (Non-Medical): No   Physical Activity: Unknown (2025)    Exercise Vital Sign     Days of Exercise per Week: Patient declined     Minutes of Exercise per Session: 20 min   Stress: No Stress Concern Present (2025)    [employer] [place] [employer] - Occupational Stress Questionnaire     Feeling of Stress : Not at all   Housing Stability: Low Risk  (2025)    Housing Stability Vital Sign     Unable to Pay for Housing in the Last Year: No     Number of Times Moved in the Last Year: 0     Homeless in the Last Year: No          Medication List            Accurate as of 2025  4:05 PM. If you have any questions, ask your nurse or doctor.                CONTINUE taking these medications      cholecalciferol (vitamin D3) 125 mcg (5,000 unit) Tab  Take 1 tablet (5,000  Units total) by mouth once daily.     ferrous sulfate 325 mg (65 mg iron) Tab tablet  Commonly known as: FEOSOL  Take 1 tablet (325 mg total) by mouth every other day.     gabapentin 100 MG capsule  Commonly known as: NEURONTIN  Take 1 capsule (100 mg total) by mouth 3 (three) times daily.     LIDOcaine-prilocaine cream  Commonly known as: EMLA  Apply topically as needed.     metoclopramide HCl 10 MG tablet  Commonly known as: REGLAN  Take 1 tablet (10 mg total) by mouth every 6 (six) hours as needed (nausea, vomiting).     multivitamin Tab  Take 1 tablet by mouth once daily.     OLANZapine 5 MG tablet  Commonly known as: ZyPREXA  Take 1 tablet (5 mg total) by mouth every evening. Take nightly on days 1-3 of each chemotherapy cycle.     ondansetron 8 MG Tbdl  Commonly known as: ZOFRAN-ODT  Take 1 tablet (8 mg total) by mouth 2 (two) times daily.     pantoprazole 40 MG tablet  Commonly known as: PROTONIX  Take 1 tablet (40 mg total) by mouth once daily.              Review of patient's allergies indicates:  No Known Allergies    Review of Systems   Constitutional:  Negative for chills, fever, malaise/fatigue and weight loss.   HENT: Negative.     Respiratory:  Negative for cough and shortness of breath.    Cardiovascular:  Negative for chest pain, palpitations and orthopnea.   Gastrointestinal:  Negative for abdominal pain, blood in stool, constipation, diarrhea, nausea and vomiting.   Genitourinary:  Negative for frequency, hematuria and urgency.   Skin: Negative.  Negative for rash.   Neurological:  Negative for dizziness.   Psychiatric/Behavioral:  Negative for depression. The patient is not nervous/anxious.        PHYSICAL EXAM     Vitals:    07/29/25 0824   BP: 107/71   Pulse: 100   Temp: 98.2 °F (36.8 °C)     Body mass index is 21.17 kg/m².  ECOG SCORE             Physical Exam  Constitutional:       General: He is not in acute distress.     Appearance: Normal appearance. He is not ill-appearing.   HENT:       "Head: Normocephalic and atraumatic.      Mouth/Throat:      Mouth: Mucous membranes are moist.   Eyes:      Extraocular Movements: Extraocular movements intact.      Conjunctiva/sclera: Conjunctivae normal.   Cardiovascular:      Rate and Rhythm: Normal rate and regular rhythm.   Pulmonary:      Effort: Pulmonary effort is normal.   Abdominal:      General: Abdomen is flat.      Palpations: Abdomen is soft. There is no mass.      Tenderness: There is no abdominal tenderness.      Comments: G-tube in place, skin around it healthy appearing with no visible leakage.  Flange for G-tube not tight against skin however.   Musculoskeletal:         General: Normal range of motion.   Skin:     General: Skin is warm and dry.   Neurological:      General: No focal deficit present.      Mental Status: He is alert.   Psychiatric:         Mood and Affect: Mood normal.         Behavior: Behavior normal.         Thought Content: Thought content normal.          Incisions c/d/I.  G tube in place.  Port incision well healed       DATA REVIEW:  I personally reviewed the following records for this visit: lab work from prior visit, notes from other physicians, computed tomography/CT imaging, surgical pathology results, endoscopy results, radiographic study evaluation, and laboratory results done by primary care physician    LABS       Lab Results   Component Value Date    WBC 7.81 07/29/2025    HGB 14.7 07/29/2025    HCT 43.1 07/29/2025     07/29/2025     Lab Results   Component Value Date    GLU 93 07/29/2025    CALCIUM 9.0 07/29/2025    ALBUMIN 3.5 07/29/2025    PROT 7.8 07/29/2025     (L) 07/29/2025    K 4.3 07/29/2025    CO2 26 07/29/2025     07/29/2025    BUN 9 07/29/2025    CREATININE 0.7 07/29/2025    ALKPHOS 146 07/29/2025    ALT 8 (L) 07/29/2025    AST 23 07/29/2025    BILITOT 0.6 07/29/2025       Nutritional:  No results found for: "PREALBUMIN"    Tumor Markers:  No results found for: "CEA", "AMYLASE", " ""ASPIRATE", "XFJ574", "", "SV3804", "AFP", "AFPTM"  No results found for: ""    PATHOLOGY     10/02/2024:  EGD          Abnormal   1. Esophagus, mass, biopsy:  - Adenocarcinoma, moderately differentiated, see comment.  MMR pending**    2. Stomach, biopsy:  - Antral mucosa with minimal reactive changes.    - Negative for Helicobacter organisms on routine H&E sections.      3. Colon, transverse, polypectomies:  - Colonic mucosa with hyperplastic change and lymphoid aggregate, multiple fragments.      4. Colon, descending, polypectomies:  - Tubular adenoma, multiple fragments.    - Colonic mucosa with hyperplastic change, multiple fragments.      5. Colon, sigmoid, polypectomy:  - Hyperplastic polyp.      6. Rectum, polypectomies:  - Hyperplastic polyp, multiple fragments.      COMMENT: The esophagus mass (specimen 1) shows a malignant epithelial proliferation.  Immunostains show the cells to be positive for CK7 and negative for CDX2, synaptophysin, and chromogranin.  This is consistent with adenocarcinoma.  Part 1 of this case   is reviewed by Dr. TESSA Parson who agrees with the above diagnosis.  Appropriate positive controls are examined.  Specimen 1 will be sent for Claudin 18.2, results will be issued in addendum.    Immunohistochemistry (IHC) Testing for Mismatch Repair (MMR) Proteins:    MLH1 - Intact nuclear expression  MSH2 - Intact nuclear expression  MSH6 - Intact nuclear expression  PMS2 - Intact nuclear expression    Background nonneoplastic tissue/internal control with intact nuclear expression    IHC Interpretation  No loss of nuclear expression of MMR proteins: low probability of microsatellite instability    There are exceptions to the above IHC interpretations. These results should not be considered in isolation, and clinical correlation with genetic counseling is recommended to assess the need for germline testing.  VC       Comment: Interp By Ioana Gomes MD, Signed on 10/15/2024 at " 08:14   Supplemental Diagnosis      Abnormal   HER2 by immunohistochemistry performed on specimen 1: Positive (3+)    Specimen 1 sent for tempus.  Appropriate positive controls are examined         10/18/2024:  IR Liver biopsy   Liver mass (needle biopsy):   Positive for malignancy.  Consistent with metastatic carcinoma   See comment.     Comment:  The patient has biopsy proven esophageal adenocarcinoma (HGS-, 10/2/2024). Ancillary studies (MMR IHC, Her2 IHC, Gclbkwd12.2, and Tempus NGS) have been ordered on the previous biopsy material.     IMAGING     08/07/2023:  CT Chest- Lung screen  FINDINGS:   There are 2 stable tiny benign type subpleural nodules in the right middle lobe measuring up to 3 mm on image 280 series 4.  Stable 3 mm benign-type subpleural nodule in the left upper lobe on image 100.  No suspicious lung nodules are identified.  No lung masses.  No acute infiltrate or pleural effusion identified.  No pathologically enlarged lymph nodes are identified. Coronary artery calcifications are noted. No suspicious lytic or blastic bone marrow lesions are identified.  Limited images through the upper abdomen demonstrate no suspicious mass or acute disease.    07/31/2024:  CT Renal Stone Study  Impression:   1.  Inflammatory changes surround the appendix which, measures 1 cm in diameter.  There are some air bubbles immediately adjacent to the tip of the appendix in the adjacent fat, concerning for early perforation of the appendix.   2. Multiple dilated air and fluid-filled loops of small bowel noted, concerning for ileus or small-bowel obstruction.   3.  Negative for acute process otherwise.  Negative for renal stone disease or hydronephrosis.   4.  Extensive coronary artery calcifications.  Large hiatal hernia.  Vascular calcifications without aneurysmal change.  Other nonemergent findings as described above.     09/06/2024:  CT Chest, Abdomen, and Pelvis  Impression:   1. Severe thickening at the GE  junction which could reflect distal esophageal mass versus less likely esophagitis. Recommend endoscopy with possible tissue sampling.  Multiple enlarged lymph nodes are seen within the epigastric region measuring up to 2.4 cm concerning for metastatic disease.   2.  See above for additional findings and recommendations    10/10/2024:  PET CT Scan  Impression:  Gastroesophageal cancer with metastatic lymphadenopathy and right hepatic lobe metastasis.    ASSESSMENT & PLAN     1. Attention to G-tube    2. Esophageal cancer, stage IV         Rosalio Mñuoz is a 59 y.o. male with synchronous oligometastatic esophageal adenocarcinoma s/p robotic G tube and port placement.     I looked at his tube with him and his wife today.  I explained to them that the leaking is due to the loose flange which allows the balloon to fall back inside the stomach I am for contents to leak around it and around the tube.  The flange was reapproximated to the abdominal wall, allowing the tube to be tight enough but still able to twist easily.    He asked about removal of the tube.  I discussed with him that I am happy to remove the tube whenever he would like however I would recommend he wait until 100% of his nutrition is orally as he is finally maintaining his weight.  I discussed with him and his wife that in his perfectly fine with me if she deflated the balloon and removed the tube at home and places a dressing on it since she is a certified registered nurse and more than capable of doing that.  On the other hand I am also happy to see him back here for removal as well.    Mr. Muñoz expressed understanding in regards to our discussion today. Many good questions were asked on today's visit, all of which were answered to their satisfaction.    Follow-up: Follow up as needed.                  Roseanna Rosas MD MS              Surgical Oncology              [facility] [place]              Office:  [phone]       20 minutes were spent on today's visit in face-to-face and non face-to-face time with the patient. This patient was diagnosed with metastatic esophageal cancer and time was required to provide counseling and guidance regarding their diagnosis. Time was spent reviewing additional new or outside records and information pertaining to their work-up, treatment and/or surveillance in order to formulate a treatment plan in line with standardized guidelines.

## 2025-07-30 ENCOUNTER — TELEPHONE (OUTPATIENT)
Dept: PREADMISSION TESTING | Facility: HOSPITAL | Age: 60
End: 2025-07-30
Payer: COMMERCIAL

## 2025-07-30 ENCOUNTER — OFFICE VISIT (OUTPATIENT)
Dept: HEMATOLOGY/ONCOLOGY | Facility: CLINIC | Age: 60
End: 2025-07-30
Payer: COMMERCIAL

## 2025-07-30 ENCOUNTER — INFUSION (OUTPATIENT)
Dept: INFUSION THERAPY | Facility: HOSPITAL | Age: 60
End: 2025-07-30
Attending: INTERNAL MEDICINE
Payer: COMMERCIAL

## 2025-07-30 ENCOUNTER — DOCUMENTATION ONLY (OUTPATIENT)
Dept: HEMATOLOGY/ONCOLOGY | Facility: CLINIC | Age: 60
End: 2025-07-30
Payer: COMMERCIAL

## 2025-07-30 VITALS
TEMPERATURE: 98 F | RESPIRATION RATE: 18 BRPM | OXYGEN SATURATION: 98 % | HEIGHT: 73 IN | HEART RATE: 75 BPM | DIASTOLIC BLOOD PRESSURE: 69 MMHG | BODY MASS INDEX: 21.26 KG/M2 | WEIGHT: 160.38 LBS | SYSTOLIC BLOOD PRESSURE: 108 MMHG

## 2025-07-30 DIAGNOSIS — C79.89 MALIGNANT NEOPLASM METASTATIC TO OTHER SITE: ICD-10-CM

## 2025-07-30 DIAGNOSIS — C15.9 ESOPHAGEAL CANCER, STAGE IV: Primary | ICD-10-CM

## 2025-07-30 DIAGNOSIS — T45.1X5A IMMUNODEFICIENCY DUE TO CHEMOTHERAPY: Chronic | ICD-10-CM

## 2025-07-30 DIAGNOSIS — Z79.69 IMMUNODEFICIENCY DUE TO CHEMOTHERAPY: Chronic | ICD-10-CM

## 2025-07-30 DIAGNOSIS — C78.6 SECONDARY ADENOCARCINOMA OF RETROPERITONEUM: ICD-10-CM

## 2025-07-30 DIAGNOSIS — D50.0 IRON DEFICIENCY ANEMIA DUE TO CHRONIC BLOOD LOSS: Chronic | ICD-10-CM

## 2025-07-30 DIAGNOSIS — D84.821 IMMUNODEFICIENCY DUE TO CHEMOTHERAPY: Chronic | ICD-10-CM

## 2025-07-30 DIAGNOSIS — D69.6 THROMBOCYTOPENIA: ICD-10-CM

## 2025-07-30 DIAGNOSIS — C79.89 MALIGNANT NEOPLASM METASTATIC TO OTHER SITE: Primary | ICD-10-CM

## 2025-07-30 DIAGNOSIS — C15.9 ESOPHAGEAL CANCER, STAGE IV: Primary | Chronic | ICD-10-CM

## 2025-07-30 DIAGNOSIS — C78.7 SECONDARY LIVER CANCER: ICD-10-CM

## 2025-07-30 PROCEDURE — 98006 SYNCH AUDIO-VIDEO EST MOD 30: CPT | Mod: 25,95,, | Performed by: INTERNAL MEDICINE

## 2025-07-30 PROCEDURE — 96417 CHEMO IV INFUS EACH ADDL SEQ: CPT

## 2025-07-30 PROCEDURE — 63600175 PHARM REV CODE 636 W HCPCS: Mod: JZ,TB | Performed by: INTERNAL MEDICINE

## 2025-07-30 PROCEDURE — 25000003 PHARM REV CODE 250: Performed by: INTERNAL MEDICINE

## 2025-07-30 PROCEDURE — 96413 CHEMO IV INFUSION 1 HR: CPT

## 2025-07-30 RX ORDER — PROCHLORPERAZINE EDISYLATE 5 MG/ML
10 INJECTION, SOLUTION INTRAMUSCULAR; INTRAVENOUS ONCE AS NEEDED
Status: CANCELLED | OUTPATIENT
Start: 2025-07-30

## 2025-07-30 RX ORDER — SODIUM CHLORIDE 0.9 % (FLUSH) 0.9 %
10 SYRINGE (ML) INJECTION
Status: CANCELLED | OUTPATIENT
Start: 2025-07-30

## 2025-07-30 RX ORDER — PROCHLORPERAZINE EDISYLATE 5 MG/ML
10 INJECTION INTRAMUSCULAR; INTRAVENOUS ONCE AS NEEDED
Status: DISCONTINUED | OUTPATIENT
Start: 2025-07-30 | End: 2025-07-30 | Stop reason: HOSPADM

## 2025-07-30 RX ORDER — HEPARIN 100 UNIT/ML
500 SYRINGE INTRAVENOUS
Status: DISCONTINUED | OUTPATIENT
Start: 2025-07-30 | End: 2025-07-30 | Stop reason: HOSPADM

## 2025-07-30 RX ORDER — HEPARIN 100 UNIT/ML
500 SYRINGE INTRAVENOUS
Status: CANCELLED | OUTPATIENT
Start: 2025-07-30

## 2025-07-30 RX ORDER — DIPHENHYDRAMINE HYDROCHLORIDE 50 MG/ML
50 INJECTION, SOLUTION INTRAMUSCULAR; INTRAVENOUS ONCE AS NEEDED
OUTPATIENT
Start: 2025-07-30

## 2025-07-30 RX ORDER — SODIUM CHLORIDE 0.9 % (FLUSH) 0.9 %
10 SYRINGE (ML) INJECTION
Status: DISCONTINUED | OUTPATIENT
Start: 2025-07-30 | End: 2025-07-30 | Stop reason: HOSPADM

## 2025-07-30 RX ORDER — EPINEPHRINE 0.3 MG/.3ML
0.3 INJECTION SUBCUTANEOUS ONCE AS NEEDED
OUTPATIENT
Start: 2025-07-30

## 2025-07-30 RX ADMIN — SODIUM CHLORIDE 400 MG: 9 INJECTION, SOLUTION INTRAVENOUS at 09:07

## 2025-07-30 RX ADMIN — HEPARIN 500 UNITS: 100 SYRINGE at 10:07

## 2025-07-30 RX ADMIN — TRASTUZUMAB-ANNS 420 MG: 420 INJECTION, POWDER, LYOPHILIZED, FOR SOLUTION INTRAVENOUS at 10:07

## 2025-07-30 RX ADMIN — SODIUM CHLORIDE: 9 INJECTION, SOLUTION INTRAVENOUS at 09:07

## 2025-07-30 NOTE — PLAN OF CARE
Problem: Adult Inpatient Plan of Care  Goal: Plan of Care Review  Outcome: Progressing  Flowsheets (Taken 7/30/2025 1041)  Plan of Care Reviewed With:   patient   spouse  Goal: Patient-Specific Goal (Individualized)  Outcome: Progressing  Flowsheets (Taken 7/30/2025 1041)  Individualized Care Needs: Reclined position , warma blanket and ice water  Anxieties, Fears or Concerns: concerned about why he was referred to endo  Patient/Family-Specific Goals (Include Timeframe): Will tolerate treatment  Goal: Absence of Hospital-Acquired Illness or Injury  Outcome: Progressing  Intervention: Identify and Manage Fall Risk  Flowsheets (Taken 7/30/2025 1041)  Safety Promotion/Fall Prevention: in recliner, wheels locked  Intervention: Prevent Infection  Flowsheets (Taken 7/30/2025 1041)  Infection Prevention:   equipment surfaces disinfected   hand hygiene promoted   personal protective equipment utilized  Goal: Optimal Comfort and Wellbeing  Outcome: Progressing  Intervention: Provide Person-Centered Care  Flowsheets (Taken 7/30/2025 1041)  Trust Relationship/Rapport:   care explained   questions encouraged   choices provided   reassurance provided   emotional support provided   thoughts/feelings acknowledged   empathic listening provided   questions answered

## 2025-07-30 NOTE — PROGRESS NOTES
Subjective:       Patient ID: Rosalio Muñoz is a 59 y.o. male.    Chief Complaint: Results, Chemotherapy, and Esophageal Cancer    HPI:  Here 59-year-old male metastatic esophageal cancer patient presents for cycle 7 day 8OP GASTROESOPHAGEAL pembrolizumab Q6W trastuzumab + MFOLFOX6 (oxaliplatin leucovorin fluorouracil) Q2W --> Q6W pembro + trastuzumab Q3W patient is tolerating therapy well denies nausea vomiting fevers chills night sweats ECOG status 1 seen in virtual visit    Past Medical History:   Diagnosis Date    Esophageal cancer, stage IV 10/10/2024    Hypertension     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 10/11/2024    Palliative care encounter 12/11/2024     Family History   Problem Relation Name Age of Onset    Hyperlipidemia Mother      Hypertension Father      Diabetes Father      Kidney disease Father      Heart disease Father      Breast cancer Maternal Grandmother      Prostate cancer Maternal Grandfather      Colon cancer Neg Hx       Social History[1]  Past Surgical History:   Procedure Laterality Date    COLONOSCOPY N/A 8/25/2023    Procedure: COLONOSCOPY;  Surgeon: Pebbles Spear MD;  Location: [facility];  Service: Endoscopy;  Laterality: N/A;    COLONOSCOPY N/A 10/2/2024    Procedure: COLONOSCOPY  Cardiac clearance received on 09/20/24 per Dr. Lockett, Cardiology.  Note in encounters.  LB;  Surgeon: Sully Farris MD;  Location: [facility];  Service: Endoscopy;  Laterality: N/A;    ESOPHAGOGASTRODUODENOSCOPY N/A 10/2/2024    Procedure: EGD (ESOPHAGOGASTRODUODENOSCOPY);  Surgeon: Sully Farris MD;  Location: [facility];  Service: Endoscopy;  Laterality: N/A;    ESOPHAGOGASTRODUODENOSCOPY N/A 12/31/2024    Procedure: EGD (ESOPHAGOGASTRODUODENOSCOPY);  Surgeon: Maki Sullivan MD;  Location: [facility];  Service: Gastroenterology;  Laterality: N/A;    INSERTION OF VENOUS ACCESS PORT Left 10/21/2024    Procedure: INSERTION, VENOUS ACCESS PORT;  Surgeon: Ralph  "MD Roseanna;  Location: [facility] OR;  Service: General;  Laterality: Left;    SURGICAL REMOVAL OF LESION OF FACE Right 12/1/2023    Procedure: EXCISION, LESION, FACE;  Surgeon: Jose Flaherty MD;  Location: [facility] OR;  Service: ENT;  Laterality: Right;  1. Excision facial subcutaneous mass right cheek 3 cm. 2. Intermediate layered closure 3.5 cm    WISDOM TOOTH EXTRACTION      XI ROBOTIC APPENDECTOMY N/A 7/31/2024    Procedure: XI ROBOTIC APPENDECTOMY;  Surgeon: Paulo Saavedra MD;  Location: [facility] OR;  Service: General;  Laterality: N/A;    XI ROBOTIC INSERTION, GASTROSTOMY TUBE N/A 10/21/2024    Procedure: XI ROBOTIC INSERTION, GASTROSTOMY TUBE;  Surgeon: Roseanna Rosas MD;  Location: [facility] OR;  Service: General;  Laterality: N/A;       Labs:  Lab Results   Component Value Date    WBC 7.81 07/29/2025    HGB 14.7 07/29/2025    HCT 43.1 07/29/2025    MCV 96 07/29/2025     07/29/2025     BMP  Lab Results   Component Value Date     (L) 07/29/2025    K 4.3 07/29/2025     07/29/2025    CO2 26 07/29/2025    BUN 9 07/29/2025    CREATININE 0.7 07/29/2025    CALCIUM 9.0 07/29/2025    ANIONGAP 7 (L) 07/29/2025    ESTGFRAFRICA >60.0 07/08/2022    EGFRNONAA >60.0 07/08/2022     Lab Results   Component Value Date    ALT 8 (L) 07/29/2025    AST 23 07/29/2025    ALKPHOS 146 07/29/2025    BILITOT 0.6 07/29/2025       Lab Results   Component Value Date    IRON 58 07/08/2025    TIBC 403 07/08/2025    FERRITIN 34.0 07/08/2025     Lab Results   Component Value Date    [id number] 445 10/19/2018     No results found for: "FOLATE"  Lab Results   Component Value Date    TSH 1.511 07/29/2025         Review of Systems   Constitutional:  Negative for activity change, appetite change, chills, diaphoresis, fatigue, fever and unexpected weight change.   HENT:  Negative for congestion, dental problem, drooling, ear discharge, ear pain, facial swelling, hearing loss, mouth sores, nosebleeds, postnasal drip, rhinorrhea, sinus " pressure, sneezing, sore throat, tinnitus, trouble swallowing and voice change.    Eyes:  Negative for photophobia, pain, discharge, redness, itching and visual disturbance.   Respiratory:  Negative for apnea, cough, choking, chest tightness, shortness of breath, wheezing and stridor.    Cardiovascular:  Negative for chest pain, palpitations and leg swelling.   Gastrointestinal:  Negative for abdominal distention, abdominal pain, anal bleeding, blood in stool, constipation, diarrhea, nausea, rectal pain and vomiting.   Endocrine: Negative for cold intolerance, heat intolerance, polydipsia, polyphagia and polyuria.   Genitourinary:  Negative for decreased urine volume, difficulty urinating, dysuria, enuresis, flank pain, frequency, genital sores, hematuria, penile discharge, penile pain, penile swelling, scrotal swelling, testicular pain and urgency.   Musculoskeletal:  Negative for arthralgias, back pain, gait problem, joint swelling, myalgias, neck pain and neck stiffness.   Skin:  Negative for color change, pallor, rash and wound.   Allergic/Immunologic: Negative for environmental allergies, food allergies and immunocompromised state.   Neurological:  Negative for dizziness, tremors, seizures, syncope, facial asymmetry, speech difficulty, weakness, light-headedness, numbness and headaches.   Hematological:  Negative for adenopathy. Does not bruise/bleed easily.   Psychiatric/Behavioral:  Negative for agitation, behavioral problems, confusion, decreased concentration, dysphoric mood, hallucinations, self-injury, sleep disturbance and suicidal ideas. The patient is not nervous/anxious and is not hyperactive.        Objective:      Physical Exam  Constitutional:       Appearance: Normal appearance.   Neurological:      Mental Status: He is alert.             Assessment:      1. Esophageal cancer, stage IV    2. 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    3. Secondary adenocarcinoma of retroperitoneum    4.  Secondary liver cancer    5. Iron deficiency anemia due to chronic blood loss    6. Immunodeficiency due to chemotherapy    7. Thrombocytopenia           Med Onc Chart Routing      Follow up with physician . Return to clinic prior to next cycle on 08/2025 with CT chest abdomen pelvis CBC CMP serum iron TIBC and ferritin prior   Follow up with JORDANA    Infusion scheduling note    Injection scheduling note Cycle 7 day 8 today return in end of August with imaging studies prior along with next cycle planned   Labs    Imaging    Pharmacy appointment    Other referrals                 Plan:   The patient location is:  Home  The chief complaint leading to consultation is:  Esophageal cancer chemotherapy    Visit type: audiovisual    Face to Face time with patient:  minutes of total time spent on the encounter, which includes face to face time and non-face to face time preparing to see the patient (eg, review of tests), Obtaining and/or reviewing separately obtained history, Documenting clinical information in the electronic or other health record, Independently interpreting results (not separately reported) and communicating results to the patient/family/caregiver, or Care coordination (not separately reported).         Each patient to whom he or she provides medical services by telemedicine is:  (1) informed of the relationship between the physician and patient and the respective role of any other health care provider with respect to management of the patient; and (2) notified that he or she may decline to receive medical services by telemedicine and may withdraw from such care at any time.    Notes:  Proceed with cycle 7 day 8 today return in proximally three-week with repeat CT chest abdomen pelvis compared to May of 2025 variant along with CBC CMP serum iron TIBC and ferritin       [1]   Social History  Socioeconomic History    Marital status:     Number of children: 2   Occupational History    Occupation:     Tobacco Use    Smoking status: Every Day     Current packs/day: 0.00     Types: Cigars, Cigarettes     Last attempt to quit: 10/4/2022     Years since quittin.8     Passive exposure: Never    Smokeless tobacco: Never    Tobacco comments:     Cigar every afternoon   Substance and Sexual Activity    Alcohol use: Yes     Alcohol/week: 4.0 - 6.0 standard drinks of alcohol     Types: 4 - 6 Cans of beer per week     Comment: occ    Drug use: Yes     Types: Marijuana     Comment: medical    Sexual activity: Yes     Partners: Female     Social Drivers of Health     Financial Resource Strain: Low Risk  (2025)    Overall Financial Resource Strain (CARDIA)     Difficulty of Paying Living Expenses: Not hard at all   Food Insecurity: No Food Insecurity (2025)    Hunger Vital Sign     Worried About Running Out of Food in the Last Year: Never true     Ran Out of Food in the Last Year: Never true   Transportation Needs: No Transportation Needs (2025)    PRAPARE - Transportation     Lack of Transportation (Medical): No     Lack of Transportation (Non-Medical): No   Physical Activity: Unknown (2025)    Exercise Vital Sign     Days of Exercise per Week: Patient declined     Minutes of Exercise per Session: 20 min   Stress: No Stress Concern Present (2025)    [employer] [place] [employer] - Occupational Stress Questionnaire     Feeling of Stress : Not at all   Housing Stability: Low Risk  (2025)    Housing Stability Vital Sign     Unable to Pay for Housing in the Last Year: No     Number of Times Moved in the Last Year: 0     Homeless in the Last Year: No

## 2025-07-30 NOTE — PROGRESS NOTES
Pt called to clarify need for ENDO referral that was placed today. I clarified with Dr. Latif who confirmed EGD/Colon are NOT needed at this time, ok to cancel referral.

## 2025-07-30 NOTE — DISCHARGE INSTRUCTIONS
[facility]  [address] [address]  [address]  [phone] phone     [phone] fax  Hours of Operation: Monday- Friday 8:00am- 5:00pm  After hours phone  [phone]  Hematology / Oncology Physicians on call      ELVIA [address] NP Phaon Dunbar, NP Khelsea Conley, FNP    Please call with any concerns regarding your appointment today.

## 2025-08-18 ENCOUNTER — LAB VISIT (OUTPATIENT)
Dept: LAB | Facility: HOSPITAL | Age: 60
End: 2025-08-18
Attending: INTERNAL MEDICINE
Payer: COMMERCIAL

## 2025-08-18 DIAGNOSIS — C78.6 SECONDARY ADENOCARCINOMA OF RETROPERITONEUM: ICD-10-CM

## 2025-08-18 DIAGNOSIS — C78.7 SECONDARY LIVER CANCER: ICD-10-CM

## 2025-08-18 DIAGNOSIS — G62.0 PERIPHERAL NEUROPATHY DUE TO AND NOT CONCURRENT WITH CHEMOTHERAPY: ICD-10-CM

## 2025-08-18 DIAGNOSIS — D50.0 IRON DEFICIENCY ANEMIA DUE TO CHRONIC BLOOD LOSS: ICD-10-CM

## 2025-08-18 DIAGNOSIS — D84.821 IMMUNODEFICIENCY DUE TO CHEMOTHERAPY: ICD-10-CM

## 2025-08-18 DIAGNOSIS — C15.9 ESOPHAGEAL CANCER, STAGE IV: ICD-10-CM

## 2025-08-18 DIAGNOSIS — T45.1X5A IMMUNODEFICIENCY DUE TO CHEMOTHERAPY: ICD-10-CM

## 2025-08-18 DIAGNOSIS — C79.89 MALIGNANT NEOPLASM METASTATIC TO OTHER SITE: ICD-10-CM

## 2025-08-18 DIAGNOSIS — T45.1X5S PERIPHERAL NEUROPATHY DUE TO AND NOT CONCURRENT WITH CHEMOTHERAPY: ICD-10-CM

## 2025-08-18 DIAGNOSIS — Z79.69 IMMUNODEFICIENCY DUE TO CHEMOTHERAPY: ICD-10-CM

## 2025-08-18 LAB
ABSOLUTE EOSINOPHIL (OHS): 0.17 K/UL
ABSOLUTE MONOCYTE (OHS): 0.77 K/UL (ref 0.3–1)
ABSOLUTE NEUTROPHIL COUNT (OHS): 5.71 K/UL (ref 1.8–7.7)
ALBUMIN SERPL BCP-MCNC: 3.2 G/DL (ref 3.5–5.2)
ALP SERPL-CCNC: 106 UNIT/L (ref 40–150)
ALT SERPL W/O P-5'-P-CCNC: <8 UNIT/L (ref 10–44)
ANION GAP (OHS): 8 MMOL/L (ref 8–16)
AST SERPL-CCNC: 15 UNIT/L (ref 11–45)
BASOPHILS # BLD AUTO: 0.06 K/UL
BASOPHILS NFR BLD AUTO: 0.7 %
BILIRUB SERPL-MCNC: 0.3 MG/DL (ref 0.1–1)
BUN SERPL-MCNC: 14 MG/DL (ref 6–20)
CALCIUM SERPL-MCNC: 9.2 MG/DL (ref 8.7–10.5)
CHLORIDE SERPL-SCNC: 101 MMOL/L (ref 95–110)
CO2 SERPL-SCNC: 26 MMOL/L (ref 23–29)
CREAT SERPL-MCNC: 0.7 MG/DL (ref 0.5–1.4)
ERYTHROCYTE [DISTWIDTH] IN BLOOD BY AUTOMATED COUNT: 13.2 % (ref 11.5–14.5)
GFR SERPLBLD CREATININE-BSD FMLA CKD-EPI: >60 ML/MIN/1.73/M2
GLUCOSE SERPL-MCNC: 132 MG/DL (ref 70–110)
HCT VFR BLD AUTO: 32.7 % (ref 40–54)
HGB BLD-MCNC: 11 GM/DL (ref 14–18)
IMM GRANULOCYTES # BLD AUTO: 0.04 K/UL (ref 0–0.04)
IMM GRANULOCYTES NFR BLD AUTO: 0.5 % (ref 0–0.5)
LYMPHOCYTES # BLD AUTO: 2.04 K/UL (ref 1–4.8)
MCH RBC QN AUTO: 32.5 PG (ref 27–31)
MCHC RBC AUTO-ENTMCNC: 33.6 G/DL (ref 32–36)
MCV RBC AUTO: 97 FL (ref 82–98)
NUCLEATED RBC (/100WBC) (OHS): 0 /100 WBC
PLATELET # BLD AUTO: 372 K/UL (ref 150–450)
PMV BLD AUTO: 9.1 FL (ref 9.2–12.9)
POTASSIUM SERPL-SCNC: 4.4 MMOL/L (ref 3.5–5.1)
PROT SERPL-MCNC: 7.2 GM/DL (ref 6–8.4)
RBC # BLD AUTO: 3.38 M/UL (ref 4.6–6.2)
RELATIVE EOSINOPHIL (OHS): 1.9 %
RELATIVE LYMPHOCYTE (OHS): 23.2 % (ref 18–48)
RELATIVE MONOCYTE (OHS): 8.8 % (ref 4–15)
RELATIVE NEUTROPHIL (OHS): 64.9 % (ref 38–73)
SODIUM SERPL-SCNC: 135 MMOL/L (ref 136–145)
WBC # BLD AUTO: 8.79 K/UL (ref 3.9–12.7)

## 2025-08-18 PROCEDURE — 82728 ASSAY OF FERRITIN: CPT

## 2025-08-18 PROCEDURE — 84466 ASSAY OF TRANSFERRIN: CPT

## 2025-08-18 PROCEDURE — 85025 COMPLETE CBC W/AUTO DIFF WBC: CPT

## 2025-08-18 PROCEDURE — 80053 COMPREHEN METABOLIC PANEL: CPT

## 2025-08-18 PROCEDURE — 36415 COLL VENOUS BLD VENIPUNCTURE: CPT

## 2025-08-19 ENCOUNTER — HOSPITAL ENCOUNTER (OUTPATIENT)
Dept: RADIOLOGY | Facility: HOSPITAL | Age: 60
Discharge: HOME | End: 2025-08-19
Attending: INTERNAL MEDICINE
Payer: COMMERCIAL

## 2025-08-19 DIAGNOSIS — C79.89 MALIGNANT NEOPLASM METASTATIC TO OTHER SITE: ICD-10-CM

## 2025-08-19 LAB
FERRITIN SERPL-MCNC: 31 NG/ML (ref 20–300)
IRON SATN MFR SERPL: 14 % (ref 20–50)
IRON SERPL-MCNC: 51 UG/DL (ref 45–160)
TIBC SERPL-MCNC: 367 UG/DL (ref 250–450)
TRANSFERRIN SERPL-MCNC: 248 MG/DL (ref 200–375)

## 2025-08-19 PROCEDURE — A9698 NON-RAD CONTRAST MATERIALNOC: HCPCS | Performed by: INTERNAL MEDICINE

## 2025-08-19 PROCEDURE — 74177 CT ABD & PELVIS W/CONTRAST: CPT | Mod: 26,,, | Performed by: RADIOLOGY

## 2025-08-19 PROCEDURE — 25500020 PHARM REV CODE 255: Performed by: INTERNAL MEDICINE

## 2025-08-19 PROCEDURE — 71260 CT THORAX DX C+: CPT | Mod: TC

## 2025-08-19 PROCEDURE — 71260 CT THORAX DX C+: CPT | Mod: 26,,, | Performed by: RADIOLOGY

## 2025-08-19 RX ADMIN — IOHEXOL 1000 ML: 12 SOLUTION ORAL at 09:08

## 2025-08-19 RX ADMIN — IOHEXOL 100 ML: 350 INJECTION, SOLUTION INTRAVENOUS at 09:08

## 2025-08-20 ENCOUNTER — OFFICE VISIT (OUTPATIENT)
Dept: HEMATOLOGY/ONCOLOGY | Facility: CLINIC | Age: 60
End: 2025-08-20
Payer: COMMERCIAL

## 2025-08-20 ENCOUNTER — INFUSION (OUTPATIENT)
Dept: INFUSION THERAPY | Facility: HOSPITAL | Age: 60
End: 2025-08-20
Attending: INTERNAL MEDICINE
Payer: COMMERCIAL

## 2025-08-20 VITALS
DIASTOLIC BLOOD PRESSURE: 64 MMHG | SYSTOLIC BLOOD PRESSURE: 100 MMHG | HEART RATE: 91 BPM | RESPIRATION RATE: 18 BRPM | TEMPERATURE: 99 F | OXYGEN SATURATION: 98 %

## 2025-08-20 VITALS
DIASTOLIC BLOOD PRESSURE: 68 MMHG | BODY MASS INDEX: 21.54 KG/M2 | HEART RATE: 105 BPM | WEIGHT: 162.5 LBS | OXYGEN SATURATION: 97 % | SYSTOLIC BLOOD PRESSURE: 101 MMHG | TEMPERATURE: 98 F | HEIGHT: 73 IN

## 2025-08-20 DIAGNOSIS — D50.0 IRON DEFICIENCY ANEMIA DUE TO CHRONIC BLOOD LOSS: Chronic | ICD-10-CM

## 2025-08-20 DIAGNOSIS — C79.89 MALIGNANT NEOPLASM METASTATIC TO OTHER SITE: Primary | ICD-10-CM

## 2025-08-20 DIAGNOSIS — C15.9 ESOPHAGEAL CANCER, STAGE IV: ICD-10-CM

## 2025-08-20 DIAGNOSIS — D84.821 IMMUNODEFICIENCY DUE TO CHEMOTHERAPY: Chronic | ICD-10-CM

## 2025-08-20 DIAGNOSIS — T45.1X5A IMMUNODEFICIENCY DUE TO CHEMOTHERAPY: Chronic | ICD-10-CM

## 2025-08-20 DIAGNOSIS — C78.6 SECONDARY ADENOCARCINOMA OF RETROPERITONEUM: ICD-10-CM

## 2025-08-20 DIAGNOSIS — D50.0 IRON DEFICIENCY ANEMIA DUE TO CHRONIC BLOOD LOSS: Primary | ICD-10-CM

## 2025-08-20 DIAGNOSIS — Z79.69 IMMUNODEFICIENCY DUE TO CHEMOTHERAPY: Chronic | ICD-10-CM

## 2025-08-20 DIAGNOSIS — C79.89 MALIGNANT NEOPLASM METASTATIC TO OTHER SITE: ICD-10-CM

## 2025-08-20 PROCEDURE — 3078F DIAST BP <80 MM HG: CPT | Mod: CPTII,S$GLB,, | Performed by: INTERNAL MEDICINE

## 2025-08-20 PROCEDURE — 99215 OFFICE O/P EST HI 40 MIN: CPT | Mod: 25,S$GLB,, | Performed by: INTERNAL MEDICINE

## 2025-08-20 PROCEDURE — 1159F MED LIST DOCD IN RCRD: CPT | Mod: CPTII,S$GLB,, | Performed by: INTERNAL MEDICINE

## 2025-08-20 PROCEDURE — 96375 TX/PRO/DX INJ NEW DRUG ADDON: CPT

## 2025-08-20 PROCEDURE — 3074F SYST BP LT 130 MM HG: CPT | Mod: CPTII,S$GLB,, | Performed by: INTERNAL MEDICINE

## 2025-08-20 PROCEDURE — 3008F BODY MASS INDEX DOCD: CPT | Mod: CPTII,S$GLB,, | Performed by: INTERNAL MEDICINE

## 2025-08-20 PROCEDURE — 1160F RVW MEDS BY RX/DR IN RCRD: CPT | Mod: CPTII,S$GLB,, | Performed by: INTERNAL MEDICINE

## 2025-08-20 PROCEDURE — 96413 CHEMO IV INFUSION 1 HR: CPT

## 2025-08-20 PROCEDURE — 63600175 PHARM REV CODE 636 W HCPCS: Performed by: INTERNAL MEDICINE

## 2025-08-20 PROCEDURE — 99999 PR PBB SHADOW E&M-EST. PATIENT-LVL IV: CPT | Mod: PBBFAC,,, | Performed by: INTERNAL MEDICINE

## 2025-08-20 PROCEDURE — 25000003 PHARM REV CODE 250: Performed by: INTERNAL MEDICINE

## 2025-08-20 RX ORDER — HEPARIN 100 UNIT/ML
500 SYRINGE INTRAVENOUS
Status: DISCONTINUED | OUTPATIENT
Start: 2025-08-20 | End: 2025-08-20 | Stop reason: HOSPADM

## 2025-08-20 RX ORDER — HEPARIN 100 UNIT/ML
500 SYRINGE INTRAVENOUS
OUTPATIENT
Start: 2025-08-20

## 2025-08-20 RX ORDER — SODIUM CHLORIDE 0.9 % (FLUSH) 0.9 %
10 SYRINGE (ML) INJECTION
OUTPATIENT
Start: 2025-08-20

## 2025-08-20 RX ORDER — DIPHENHYDRAMINE HYDROCHLORIDE 50 MG/ML
50 INJECTION, SOLUTION INTRAMUSCULAR; INTRAVENOUS ONCE AS NEEDED
OUTPATIENT
Start: 2025-08-20 | End: 2037-01-16

## 2025-08-20 RX ORDER — PROCHLORPERAZINE EDISYLATE 5 MG/ML
10 INJECTION, SOLUTION INTRAMUSCULAR; INTRAVENOUS ONCE AS NEEDED
OUTPATIENT
Start: 2025-08-20

## 2025-08-20 RX ORDER — PROCHLORPERAZINE EDISYLATE 5 MG/ML
10 INJECTION, SOLUTION INTRAMUSCULAR; INTRAVENOUS ONCE AS NEEDED
Status: CANCELLED | OUTPATIENT
Start: 2025-08-20

## 2025-08-20 RX ORDER — SODIUM CHLORIDE 0.9 % (FLUSH) 0.9 %
10 SYRINGE (ML) INJECTION
Status: DISCONTINUED | OUTPATIENT
Start: 2025-08-20 | End: 2025-08-20 | Stop reason: HOSPADM

## 2025-08-20 RX ORDER — EPINEPHRINE 0.3 MG/.3ML
0.3 INJECTION SUBCUTANEOUS ONCE AS NEEDED
Status: CANCELLED | OUTPATIENT
Start: 2025-08-20 | End: 2037-01-16

## 2025-08-20 RX ORDER — SODIUM CHLORIDE 0.9 % (FLUSH) 0.9 %
10 SYRINGE (ML) INJECTION
Status: CANCELLED | OUTPATIENT
Start: 2025-08-20

## 2025-08-20 RX ORDER — DIPHENHYDRAMINE HYDROCHLORIDE 50 MG/ML
50 INJECTION, SOLUTION INTRAMUSCULAR; INTRAVENOUS ONCE AS NEEDED
Status: CANCELLED | OUTPATIENT
Start: 2025-08-20 | End: 2037-01-16

## 2025-08-20 RX ORDER — EPINEPHRINE 0.3 MG/.3ML
0.3 INJECTION SUBCUTANEOUS ONCE AS NEEDED
OUTPATIENT
Start: 2025-08-20 | End: 2037-01-16

## 2025-08-20 RX ORDER — HEPARIN 100 UNIT/ML
500 SYRINGE INTRAVENOUS
Status: CANCELLED | OUTPATIENT
Start: 2025-08-20

## 2025-08-20 RX ADMIN — IRON SUCROSE 200 MG: 20 INJECTION, SOLUTION INTRAVENOUS at 11:08

## 2025-08-20 RX ADMIN — SODIUM CHLORIDE: 9 INJECTION, SOLUTION INTRAVENOUS at 11:08

## 2025-08-20 RX ADMIN — HEPARIN 500 UNITS: 100 SYRINGE at 11:08

## 2025-08-20 RX ADMIN — TRASTUZUMAB-ANNS 420 MG: 420 INJECTION, POWDER, LYOPHILIZED, FOR SOLUTION INTRAVENOUS at 10:08

## 2025-08-21 ENCOUNTER — HOSPITAL ENCOUNTER (OUTPATIENT)
Dept: PREADMISSION TESTING | Facility: HOSPITAL | Age: 60
Discharge: HOME | End: 2025-08-21
Attending: INTERNAL MEDICINE
Payer: COMMERCIAL

## 2025-08-21 DIAGNOSIS — C78.6 SECONDARY ADENOCARCINOMA OF RETROPERITONEUM: ICD-10-CM

## 2025-08-21 DIAGNOSIS — C15.9 ESOPHAGEAL CANCER, STAGE IV: Primary | Chronic | ICD-10-CM

## 2025-08-21 DIAGNOSIS — C79.89 MALIGNANT NEOPLASM METASTATIC TO OTHER SITE: ICD-10-CM

## 2025-08-29 ENCOUNTER — ANESTHESIA (OUTPATIENT)
Dept: ENDOSCOPY | Facility: HOSPITAL | Age: 60
End: 2025-08-29
Payer: COMMERCIAL

## 2025-08-29 ENCOUNTER — ANESTHESIA EVENT (OUTPATIENT)
Dept: ENDOSCOPY | Facility: HOSPITAL | Age: 60
End: 2025-08-29
Payer: COMMERCIAL

## 2025-08-29 ENCOUNTER — HOSPITAL ENCOUNTER (OUTPATIENT)
Dept: ENDOSCOPY | Facility: HOSPITAL | Age: 60
Discharge: HOME | End: 2025-08-29
Attending: INTERNAL MEDICINE | Admitting: INTERNAL MEDICINE
Payer: COMMERCIAL

## 2025-08-29 PROCEDURE — 63600175 PHARM REV CODE 636 W HCPCS: Performed by: ANESTHESIOLOGY

## 2025-08-29 RX ORDER — LIDOCAINE HYDROCHLORIDE 10 MG/ML
INJECTION, SOLUTION EPIDURAL; INFILTRATION; INTRACAUDAL; PERINEURAL
Status: DISCONTINUED | OUTPATIENT
Start: 2025-08-29 | End: 2025-08-29

## 2025-08-29 RX ORDER — PROPOFOL 10 MG/ML
VIAL (ML) INTRAVENOUS
Status: DISCONTINUED | OUTPATIENT
Start: 2025-08-29 | End: 2025-08-29

## 2025-08-29 RX ADMIN — PROPOFOL 50 MG: 10 INJECTION, EMULSION INTRAVENOUS at 07:08

## 2025-08-29 RX ADMIN — LIDOCAINE HYDROCHLORIDE 100 MG: 10 INJECTION, SOLUTION EPIDURAL; INFILTRATION; INTRACAUDAL; PERINEURAL at 07:08

## 2025-09-02 DIAGNOSIS — G62.0 PERIPHERAL NEUROPATHY DUE TO AND NOT CONCURRENT WITH CHEMOTHERAPY: ICD-10-CM

## 2025-09-02 DIAGNOSIS — T45.1X5S PERIPHERAL NEUROPATHY DUE TO AND NOT CONCURRENT WITH CHEMOTHERAPY: ICD-10-CM

## 2025-09-02 RX ORDER — GABAPENTIN 100 MG/1
100 CAPSULE ORAL 3 TIMES DAILY
Qty: 90 CAPSULE | Refills: 0 | Status: SHIPPED | OUTPATIENT
Start: 2025-09-02

## 2025-09-05 ENCOUNTER — OFFICE VISIT (OUTPATIENT)
Dept: HEMATOLOGY/ONCOLOGY | Facility: CLINIC | Age: 60
End: 2025-09-05
Payer: COMMERCIAL

## 2025-09-05 VITALS
OXYGEN SATURATION: 96 % | TEMPERATURE: 98 F | HEART RATE: 98 BPM | BODY MASS INDEX: 21.42 KG/M2 | DIASTOLIC BLOOD PRESSURE: 62 MMHG | HEIGHT: 72 IN | WEIGHT: 158.19 LBS | SYSTOLIC BLOOD PRESSURE: 101 MMHG

## 2025-09-05 DIAGNOSIS — C78.7 SECONDARY LIVER CANCER: ICD-10-CM

## 2025-09-05 DIAGNOSIS — C78.6 SECONDARY ADENOCARCINOMA OF RETROPERITONEUM: ICD-10-CM

## 2025-09-05 DIAGNOSIS — C79.89 MALIGNANT NEOPLASM METASTATIC TO OTHER SITE: ICD-10-CM

## 2025-09-05 DIAGNOSIS — C15.9 ESOPHAGEAL CANCER, STAGE IV: Primary | Chronic | ICD-10-CM

## 2025-09-05 PROCEDURE — 99999 PR PBB SHADOW E&M-EST. PATIENT-LVL III: CPT | Mod: PBBFAC,,, | Performed by: INTERNAL MEDICINE

## (undated) DEVICE — SET PNEUMOCLEAR HEAT HUM SE HF

## (undated) DEVICE — COVER LIGHT HANDLE 80/CA

## (undated) DEVICE — SPONGE COTTON TRAY 4X4IN

## (undated) DEVICE — SOL NS 1000CC

## (undated) DEVICE — TUBING MEDI-VAC 20FT .25IN

## (undated) DEVICE — SUT V-LOC 90 GS22 2-0 VIO 23CM

## (undated) DEVICE — DRAPE LAPSCP CHOLE 122X102X78

## (undated) DEVICE — ADHESIVE MASTISOL VIAL 48/BX

## (undated) DEVICE — PACK BASIC SETUP SC BR

## (undated) DEVICE — SOL NORMAL USPCA 0.9%

## (undated) DEVICE — SUT 2-0 VICRYL / SH (J417)

## (undated) DEVICE — SYR 10CC LUER LOCK

## (undated) DEVICE — DRAPE THREE-QTR REINF 53X77IN

## (undated) DEVICE — ADHESIVE DERMABOND ADVANCED

## (undated) DEVICE — SUT VICRYL 4-0 ANTIBACT

## (undated) DEVICE — STAPLER SKIN PROXIMATE WIDE

## (undated) DEVICE — DRAPE MOBILE C-ARM

## (undated) DEVICE — COVER CAMERA OPERATING ROOM

## (undated) DEVICE — NDL ECLIPSE SAF REG 25GX1.5IN

## (undated) DEVICE — SPONGE GAUZE 4X4 12 PLY STRL

## (undated) DEVICE — DRESSING TRANS 4X4 TEGADERM

## (undated) DEVICE — SEAL CANN UNIVERSAL 5-12MM

## (undated) DEVICE — SUT VICRYL 3-0 27 SH

## (undated) DEVICE — SYR LUER LOCK STERILE 10ML

## (undated) DEVICE — DRAPE ORTH SPLIT 77X108IN

## (undated) DEVICE — GOWN POLY REINF BRTH SLV XL

## (undated) DEVICE — NDL HYPO 27G X 1 1/2

## (undated) DEVICE — KIT ANTIFOG W/SPONG & FLUID

## (undated) DEVICE — BAG TISS RETRV MONARCH 10MM

## (undated) DEVICE — MANIFOLD 4 PORT

## (undated) DEVICE — DEVICE CLOSURE DISP 14G

## (undated) DEVICE — ELECTRODE BLADE INSULATED 1 IN

## (undated) DEVICE — SUT MONOCRYL 4-0 PS-2

## (undated) DEVICE — ELECTRODE REM PLYHSV RETURN 9

## (undated) DEVICE — NDL PNEUMO INSUFFLATI 120MM

## (undated) DEVICE — RELOAD SUREFORM 45 2.5 WHT 6R

## (undated) DEVICE — GAUZE WOVEN STRL 12-PLY 4X4IN

## (undated) DEVICE — TROCAR ENDOPATH XCEL 12X100MM

## (undated) DEVICE — SUT CTD VICRYL 0 UND BR SUT

## (undated) DEVICE — IRRIGATOR ENDOWRIST XI SUCTION

## (undated) DEVICE — DRAPE INCISE IOBAN 2 23X17IN

## (undated) DEVICE — DRAPE ARM DAVINCI XI

## (undated) DEVICE — SUT VICRYL CTD 2-0 GI 27 SH

## (undated) DEVICE — GRASPER TIP RENEW LAP FENEST

## (undated) DEVICE — COVER TIP CURVED SCISSORS XI

## (undated) DEVICE — SUT MONOCRYL 4.0 PS2 CP496G

## (undated) DEVICE — OBTURATOR BLADELESS 8MM XI CLR

## (undated) DEVICE — NDL SPINAL 20GX3.5 HUB

## (undated) DEVICE — SKIN MARKER DEVON 160

## (undated) DEVICE — CORD BIPOLAR ELECTROSURGICAL

## (undated) DEVICE — SUT VICRYL PLUS 5-0 27 IN

## (undated) DEVICE — SUT ETHILON 2-0 BLK PS-2

## (undated) DEVICE — APPLICATOR CHLORAPREP ORN 26ML

## (undated) DEVICE — PAD PINK TRENDELENBURG POS XL

## (undated) DEVICE — IRRIGATOR ENDOSCOPY DISP.

## (undated) DEVICE — TROCAR ENDOPATH XCEL 5X100MM

## (undated) DEVICE — SUT MONOCRYL 4-0 SH UND MON

## (undated) DEVICE — TUBING SUCTION STRAIGHT .25X20

## (undated) DEVICE — SUT V-LOC 180 2-0 GS-22 9IN

## (undated) DEVICE — SUT PROLENE 5-0 PS-3 18 IN

## (undated) DEVICE — CLOSURE SKIN STERI STRIP 1/2X4

## (undated) DEVICE — TRAY SKIN SCRUB WET PREMIUM

## (undated) DEVICE — STAPLER SUREFORM SGL USE 45

## (undated) DEVICE — TOWEL OR DISP STRL BLUE 4/PK

## (undated) DEVICE — SUT PROLENE 3-0 SH DA 36 BL

## (undated) DEVICE — DRAPE COLUMN DAVINCI XI

## (undated) DEVICE — DECANTER 6 VIAL

## (undated) DEVICE — SUT MCRYL PLUS 4-0 PS2 27IN

## (undated) DEVICE — NDL SPINAL 22X3.5 PENCAN

## (undated) DEVICE — GLOVE SIGNATURE ESSNTL LTX 8

## (undated) DEVICE — SUT PROLENE 2-0 30 SH

## (undated) DEVICE — GLOVE SURG BIOGEL LATEX SZ 7.5

## (undated) DEVICE — SET TUBE DAVINCI 5 HI FLOW INS

## (undated) DEVICE — GLOVE SIGNATURE ESSNTL LTX 6.5

## (undated) DEVICE — Device

## (undated) DEVICE — SOL STRL WATER INJ 1000ML BG

## (undated) DEVICE — DRAPE UTILITY STRL 15X26IN

## (undated) DEVICE — RELOAD SUREFORM 45 3.5 BLU 6R

## (undated) DEVICE — SYR PLASTIPAK LL 3ML

## (undated) DEVICE — SET TRI-LUMEN FILTERED TUBE

## (undated) DEVICE — DRAPE THYROID SOFT STERILE

## (undated) DEVICE — TIP GRASPER FENESTRATED DISP